# Patient Record
Sex: FEMALE | Race: WHITE | NOT HISPANIC OR LATINO | Employment: OTHER | URBAN - METROPOLITAN AREA
[De-identification: names, ages, dates, MRNs, and addresses within clinical notes are randomized per-mention and may not be internally consistent; named-entity substitution may affect disease eponyms.]

---

## 2017-01-04 ENCOUNTER — OFFICE VISIT (OUTPATIENT)
Dept: FAMILY MEDICINE | Facility: CLINIC | Age: 67
End: 2017-01-04
Payer: MEDICARE

## 2017-01-04 VITALS
HEART RATE: 90 BPM | TEMPERATURE: 98.6 F | DIASTOLIC BLOOD PRESSURE: 75 MMHG | HEIGHT: 60 IN | BODY MASS INDEX: 22.3 KG/M2 | OXYGEN SATURATION: 99 % | WEIGHT: 113.6 LBS | SYSTOLIC BLOOD PRESSURE: 115 MMHG

## 2017-01-04 DIAGNOSIS — E06.3 HYPOTHYROIDISM DUE TO HASHIMOTO'S THYROIDITIS: ICD-10-CM

## 2017-01-04 DIAGNOSIS — Z23 NEED FOR PNEUMOCOCCAL VACCINATION: ICD-10-CM

## 2017-01-04 DIAGNOSIS — Z00.00 ROUTINE GENERAL MEDICAL EXAMINATION AT A HEALTH CARE FACILITY: Primary | ICD-10-CM

## 2017-01-04 PROCEDURE — 36415 COLL VENOUS BLD VENIPUNCTURE: CPT | Performed by: NURSE PRACTITIONER

## 2017-01-04 PROCEDURE — 90732 PPSV23 VACC 2 YRS+ SUBQ/IM: CPT | Performed by: NURSE PRACTITIONER

## 2017-01-04 PROCEDURE — G0402 INITIAL PREVENTIVE EXAM: HCPCS | Performed by: NURSE PRACTITIONER

## 2017-01-04 PROCEDURE — 84443 ASSAY THYROID STIM HORMONE: CPT | Performed by: NURSE PRACTITIONER

## 2017-01-04 PROCEDURE — 84439 ASSAY OF FREE THYROXINE: CPT | Performed by: NURSE PRACTITIONER

## 2017-01-04 PROCEDURE — G0009 ADMIN PNEUMOCOCCAL VACCINE: HCPCS | Performed by: NURSE PRACTITIONER

## 2017-01-04 RX ORDER — LEVOTHYROXINE SODIUM 100 UG/1
TABLET ORAL
Qty: 45 TABLET | Refills: 3 | Status: SHIPPED | OUTPATIENT
Start: 2017-01-04 | End: 2018-01-05

## 2017-01-04 RX ORDER — LEVOTHYROXINE SODIUM 88 UG/1
TABLET ORAL
Qty: 45 TABLET | Refills: 3 | Status: SHIPPED | OUTPATIENT
Start: 2017-01-04 | End: 2018-01-05

## 2017-01-04 NOTE — MR AVS SNAPSHOT
After Visit Summary   1/4/2017    Julieta Rivera    MRN: [id number]           Patient Information     Date Of Birth          1950        Visit Information        Provider Department      1/4/2017 4:15 PM Simona Tucker APRN [facility]        Today's Diagnoses     Routine general medical examination at a health care facility    -  1     Hypothyroidism due to Hashimoto's thyroiditis         Need for pneumococcal vaccination           Care Instructions      Preventive Health Recommendations  Female Ages 65 +    Yearly exam:     See your health care provider every year in order to  o Review health changes.   o Discuss preventive care.    o Review your medicines if your doctor has prescribed any.      You no longer need a yearly Pap test unless you've had an abnormal Pap test in the past 10 years. If you have vaginal symptoms, such as bleeding or discharge, be sure to talk with your provider about a Pap test.      Every 1 to 2 years, have a mammogram.  If you are over 69, talk with your health care provider about whether or not you want to continue having screening mammograms.  Due by December 2017.      Every 10 years, have a colonoscopy - due 2019.  Or, have a yearly FIT test (stool test). These exams will check for colon cancer.       Have a cholesterol test every 5 years, or more often if your doctor advises it.       Have a diabetes test (fasting glucose) every three years. If you are at risk for diabetes, you should have this test more often.       At age 65, have a bone density scan (DEXA) to check for osteoporosis (brittle bone disease).    Shots:    Get a flu shot each year.    Get a tetanus shot every 10 years.    Talk to your doctor about your pneumonia vaccines. There are now two you should receive - Pneumovax (PPSV 23) and Prevnar (PCV 13).    Talk to your doctor about the shingles vaccine.    Talk to your doctor about the hepatitis B vaccine.    Nutrition:     Eat  at least 5 servings of fruits and vegetables each day.      Eat whole-grain bread, whole-wheat pasta and brown rice instead of white grains and rice.      Talk to your provider about Calcium and Vitamin D.     Lifestyle    Exercise at least 150 minutes a week (30 minutes a day, 5 days a week). This will help you control your weight and prevent disease.      Limit alcohol to one drink per day.      No smoking.       Wear sunscreen to prevent skin cancer.       See your dentist twice a year for an exam and cleaning.      See your eye doctor every 1 to 2 years to screen for conditions such as glaucoma, macular degeneration, cataracts, etc         Follow-ups after your visit        Who to contact     If you have questions or need follow up information about today's clinic visit or your schedule please contact [facility] directly at [phone].  Normal or non-critical lab and imaging results will be communicated to you by [employer], letter or phone within 4 business days after the clinic has received the results. If you do not hear from us within 7 days, please contact the clinic through [employer] or phone. If you have a critical or abnormal lab result, we will notify you by phone as soon as possible.  Submit refill requests through [employer] or call your pharmacy and they will forward the refill request to us. Please allow 3 business days for your refill to be completed.          Additional Information About Your Visit        [employer] Information     [employer] gives you secure access to your electronic health record. If you see a primary care provider, you can also send messages to your care team and make appointments. If you have questions, please call your primary care clinic.  If you do not have a primary care provider, please call [phone] and they will assist you.        Care EveryWhere ID     This is your Care EveryWhere ID. This could be used by other organizations to access your [facility]  records  [id number]        Your Vitals Were     Pulse Temperature Height BMI (Body Mass Index) Pulse Oximetry       90 98.6  F (37  C) (Oral) 5' (1.524 m) 22.19 kg/m2 99%        Blood Pressure from Last 3 Encounters:   01/04/17 115/75   03/18/16 110/76   12/15/15 105/72    Weight from Last 3 Encounters:   01/04/17 113 lb 9.6 oz (51.529 kg)   12/15/15 120 lb 14.4 oz (54.84 kg)   10/09/15 125 lb 6.4 oz (56.881 kg)              We Performed the Following     PNEUMOCOCCAL VACCINE,ADULT,SQ OR IM     T4 free     TSH          Today's Medication Changes          These changes are accurate as of: 1/4/17  5:03 PM.  If you have any questions, ask your nurse or doctor.               Stop taking these medicines if you haven't already. Please contact your care team if you have questions.     estradiol 0.1 MG/GM cream   Commonly known as:  ESTRACE VAGINAL   Stopped by:  Simona Tucker APRN CNP                Where to get your medicines      These medications were sent to [facility] [id number] [facility] [employer] - [place], [place] - [address]  [address] [address]     Phone:  [phone]    - levothyroxine 100 MCG tablet  - levothyroxine 88 MCG tablet             Primary Care Provider Office Phone # Fax #    Emperatriz Solomon [phone]       [facility] [address] [address]        Thank you!     Thank you for choosing [facility]  for your care. Our goal is always to provide you with excellent care. Hearing back from our patients is one way we can continue to improve our services. Please take a few minutes to complete the written survey that you may receive in the mail after your visit with us. Thank you!             Your Updated Medication List - Protect others around you: Learn how to safely use, store and throw away your medicines at www.disposemymeds.org.          This list is accurate as of: 1/4/17  5:03 PM.  Always use your most recent med  list.                   Brand Name Dispense Instructions for use    co-enzyme Q-10 100 MG Caps capsule      Take  by mouth daily.       FISH OIL PO      Take 1 capsule by mouth daily.       * levothyroxine 100 MCG tablet    SYNTHROID/LEVOTHROID    45 tablet    Take  by mouth See Admin Instructions. Alternate 0.10 mg tab with 0.088 mg tab every other day       * levothyroxine 88 MCG tablet    SYNTHROID/LEVOTHROID    45 tablet    Take  by mouth daily. Alternate 0.088 mg with 0.10 mg every other day       MULTIVITAMIN & MINERAL PO      Take 2 tablets by mouth daily.       VITAMIN D (CHOLECALCIFEROL) PO      Take 2,000 Units by mouth daily       * Notice:  This list has 2 medication(s) that are the same as other medications prescribed for you. Read the directions carefully, and ask your doctor or other care provider to review them with you.

## 2017-01-04 NOTE — PATIENT INSTRUCTIONS

## 2017-01-04 NOTE — PROGRESS NOTES
SUBJECTIVE:     CC: Julieta Rivera is an 66 year old woman who presents for preventive health visit.     Healthy Habits:    Answers for HPI/ROS submitted by the patient on 1/1/2017   Annual Exam:  Getting at least 3 servings of Calcium per day:: Yes  Bi-annual eye exam:: Yes  Dental care twice a year:: Yes  Sleep apnea or symptoms of sleep apnea:: None  Frequency of exercise:: 2-3 days/week  Duration of exercise:: 15-30 minutes  Taking medications regularly:: Yes  Medication side effects:: Other  Additional concerns today:: No  PHQ-2 Depressed: Not at all, Several days  PHQ-2 Score: 1    Questions/Concerns: None      Today's PHQ-2 Score:   PHQ-2 ( 1999 [employer]) 1/4/2017 1/1/2017   Q1: Little interest or pleasure in doing things 0 -   Q2: Feeling down, depressed or hopeless 0 -   PHQ-2 Score 0 -   Little interest or pleasure in doing things - Not at all   Feeling down, depressed or hopeless - Several days   PHQ-2 Score - 1       Abuse: Current or Past(Physical, Sexual or Emotional)- No  Do you feel safe in your environment - Yes    Social History   Substance Use Topics     Smoking status: Former Smoker     Quit date: 01/01/1971     Smokeless tobacco: Never Used     Alcohol Use: No      Comment: none since last november.     The patient does not drink >3 drinks per day nor >7 drinks per week.    Recent Labs   Lab Test  12/16/15   0828  11/22/13   0900  11/03/11   0833   CHOL  215*  233*  203*   HDL  83  92  75   LDL  114*  127  118   TRIG  89  67  49   CHOLHDLRATIO   --   2.5  2.7   NHDL  132*   --    --        Reviewed orders with patient.  Reviewed health maintenance and updated orders accordingly - Yes    Mammo Decision Support:  Patient over age 50, mutual decision to screen reflected in health maintenance.    Pertinent mammograms are reviewed under the imaging tab.  History of abnormal Pap smear: NO - age 65 - see link Cervical Cytology Screening Guidelines  All Histories reviewed and updated in Epic.  Past  Medical History   Diagnosis Date     Hypothyroidism 2000     Factor V Leiden (H)      Abnormal Pap smear 1970s     normal since      Past Surgical History   Procedure Laterality Date      section       Tonsillectomy, adenoidectomy, combined       Colposcopy cervix, biopsy cervix, endocervical curettage, combined         Twin grandsons are 14 months - Zach has long-gap esophageal atresia, came home at 6.5 months, had home nurse for a couple months, still many regular appointments and surgeries.  Isaac is other twin and healthy.  Julieta has been on long-term leave from job to help her daughter with the twins.  Feeling tired, but also reports feeling stronger.    ROS:  C: NEGATIVE for fever, chills, change in weight  I: NEGATIVE for worrisome rashes, moles or lesions  E: NEGATIVE for vision changes or irritation  ENT: NEGATIVE for ear, mouth and throat problems  R: NEGATIVE for significant cough or SOB  B: NEGATIVE for masses, tenderness or discharge  CV: NEGATIVE for chest pain, palpitations or peripheral edema  GI: NEGATIVE for nausea, abdominal pain, heartburn, or change in bowel habits  : NEGATIVE for unusual urinary or vaginal symptoms. No vaginal bleeding.  M: NEGATIVE for significant arthralgias or myalgia  N: NEGATIVE for weakness, dizziness or paresthesias  P: NEGATIVE for changes in mood or affect     Problem list, Medication list, Allergies, and Medical/Social/Surgical histories reviewed in [facility] and updated as appropriate.  OBJECTIVE:     /75 mmHg  Pulse 90  Temp(Src) 98.6  F (37  C) (Oral)  Ht 5' (1.524 m)  Wt 113 lb 9.6 oz (51.529 kg)  BMI 22.19 kg/m2  SpO2 99%  EXAM:  GENERAL APPEARANCE: healthy, alert and no distress  EYES: Eyes grossly normal to inspection, PERRL and conjunctivae and sclerae normal  HENT: ear canals and TM's normal, nose and mouth without ulcers or lesions, oropharynx clear and oral mucous membranes moist  NECK: no adenopathy, no asymmetry, masses,  or scars and thyroid normal to palpation  RESP: lungs clear to auscultation - no rales, rhonchi or wheezes  BREAST: normal without masses, tenderness or nipple discharge and no palpable axillary masses or adenopathy  CV: regular rate and rhythm, normal S1 S2, no S3 or S4, no murmur, click or rub, no peripheral edema and peripheral pulses strong  ABDOMEN: soft, nontender, no hepatosplenomegaly, no masses and bowel sounds normal  MS: no musculoskeletal defects are noted and gait is age appropriate without ataxia  SKIN: no suspicious lesions or rashes  NEURO: Normal strength and tone, sensory exam grossly normal, mentation intact and speech normal  PSYCH: mentation appears normal and affect normal/bright    ASSESSMENT/PLAN:     (Z00.00) Routine general medical examination at a health care facility  (primary encounter diagnosis)  Comment:   Plan:     (E03.8,  E06.3) Hypothyroidism due to Hashimoto's thyroiditis  Comment:   Plan: TSH, T4 free, levothyroxine         (SYNTHROID/LEVOTHROID) 100 MCG tablet,         levothyroxine (SYNTHROID/LEVOTHROID) 88 MCG         tablet            (Z23) Need for pneumococcal vaccination  Comment:   Plan: PNEUMOCOCCAL VACCINE,ADULT,SQ OR IM             COUNSELING:   Reviewed preventive health counseling, as reflected in patient instructions       reports that she quit smoking about 46 years ago. She has never used smokeless tobacco.    Estimated body mass index is 22.19 kg/(m^2) as calculated from the following:    Height as of this encounter: 5' (1.524 m).    Weight as of this encounter: 113 lb 9.6 oz (51.529 kg).       Counseling Resources:  ATP IV Guidelines  Pooled Cohorts Equation Calculator  Breast Cancer Risk Calculator  FRAX Risk Assessment  ICSI Preventive Guidelines  Dietary Guidelines for Americans, 2010  USDA's MyPlate  ASA Prophylaxis  Lung CA Screening    Simona Tucker, JOSÉ LUIS [facility]

## 2017-01-04 NOTE — NURSING NOTE
Chief Complaint   Patient presents with     Physical     Thyroid Disease       Initial /75 mmHg  Pulse 90  Temp(Src) 98.6  F (37  C) (Oral)  Ht 5' (1.524 m)  Wt 113 lb 9.6 oz (51.529 kg)  BMI 22.19 kg/m2  SpO2 99% Estimated body mass index is 22.19 kg/(m^2) as calculated from the following:    Height as of this encounter: 5' (1.524 m).    Weight as of this encounter: 113 lb 9.6 oz (51.529 kg).  BP completed using cuff size: bry Long MA

## 2017-01-06 LAB
T4 FREE SERPL-MCNC: 1.27 NG/DL (ref 0.76–1.46)
TSH SERPL DL<=0.05 MIU/L-ACNC: 0.67 MU/L (ref 0.4–4)

## 2017-01-06 NOTE — PROGRESS NOTES
Quick Note:    Julieta,    It was so nice to see you this week. I am very impressed with your energy and dedication to your family. They are so lamont to have you! Your labs were all normal. If you have any questions, please feel free to contact the clinic.    FRED Lewis  ______

## 2018-01-04 NOTE — PROGRESS NOTES
SUBJECTIVE:   Julieta Rivera is a 67 year old female who presents for Preventive Visit.  Are you in the first 12 months of your Medicare Part B coverage?  No    Healthy Habits:    Do you get at least three servings of calcium containing foods daily (dairy, green leafy vegetables, etc.)? yes    Amount of exercise or daily activities, outside of work: 7 day(s) per week    Problems taking medications regularly No    Medication side effects: No    Have you had an eye exam in the past two years? no    Do you see a dentist twice per year? no    Do you have sleep apnea, excessive snoring or daytime drowsiness?no      Ability to successfully perform activities of daily living: Yes, no assistance needed    Home safety:  none identified     Hearing impairment: No    Fall risk:  Fallen 2 or more times in the past year?: No  Any fall with injury in the past year?: No    COGNITIVE SCREEN  1) Repeat 3 items (Banana, Sunrise, Chair)    2) Clock draw: NORMAL  3) 3 item recall: Recalls 3 objects  Results: 3 items recalled: COGNITIVE IMPAIRMENT LESS LIKELY    Mini-CogTM Copyright S Jake. Licensed by the author for use in [facility]; reprinted with permission ([email or web address]@[email or web address]). All rights reserved.        Hypothyroidism Follow-up      Since last visit, patient describes the following symptoms: Weight stable, no hair loss, no skin changes, no constipation, no loose stools; fatigue    Reviewed and updated as needed this visit by clinical staff  Tobacco  Allergies  Meds  Med Hx  Surg Hx  Fam Hx  Soc Hx        Reviewed and updated as needed this visit by Provider        Social History   Substance Use Topics     Smoking status: Former Smoker     Quit date: 1/1/1971     Smokeless tobacco: Never Used     Alcohol use No      Comment: none since last november.       If you drink alcohol do you typically have >3 drinks per day or >7 drinks per week? No                        Today's PHQ-2 Score:   PHQ-2 ( 1999 Pfizer)  1/5/2018 1/4/2017   Q1: Little interest or pleasure in doing things 0 0   Q2: Feeling down, depressed or hopeless 0 0   PHQ-2 Score 0 0   Q1: Little interest or pleasure in doing things - -   Q2: Feeling down, depressed or hopeless - -   PHQ-2 Score - -       Do you feel safe in your environment - Yes    Do you have a Health Care Directive?: No: Advance care planning reviewed with patient; information given to patient to review.      Current providers sharing in care for this patient include: Patient Care Team:  Emperatriz Solomon MD as PCP - General (Family Practice)    The following health maintenance items are reviewed in Epic and correct as of today:  Health Maintenance   Topic Date Due     HEPATITIS C SCREENING  11/20/1967     ADVANCE DIRECTIVE PLANNING Q5 YRS  11/20/2004     DEXA SCAN SCREENING (SYSTEM ASSIGNED)  11/20/2014     FALL RISK ASSESSMENT  10/09/2016     INFLUENZA VACCINE (SYSTEM ASSIGNED)  09/01/2017     MAMMO SCREEN Q2 YR (SYSTEM ASSIGNED)  12/15/2017     COLON CANCER SCREEN (SYSTEM ASSIGNED)  10/06/2019     LIPID SCREEN Q5 YR FEMALE (SYSTEM ASSIGNED)  12/16/2020     TETANUS IMMUNIZATION (SYSTEM ASSIGNED)  12/15/2025     PNEUMOCOCCAL  Completed        Pneumonia Vaccine:UTD  Mammogram Screening: Patient over age 50, mutual decision to screen reflected in health maintenance.    History of abnormal Pap smear: NO - age 65 - see link Cervical Cytology Screening Guidelines  Grandkid twins turned 2 in November, corrected age January.  One twin still on J feeds, but eating well.  Getting up at night to take care of feedings.  Just bought a house last week in [place].    ROS:  C: NEGATIVE for fever, chills, change in weight  I: NEGATIVE for worrisome rashes, moles or lesions  E: NEGATIVE for vision changes or irritation  E/M: NEGATIVE for ear, mouth and throat problems  R: NEGATIVE for significant cough or SOB  B: NEGATIVE for masses, tenderness or discharge  CV: NEGATIVE for chest pain, palpitations  "or peripheral edema  GI: NEGATIVE for nausea, abdominal pain, heartburn, or change in bowel habits; loose stools recently  : NEGATIVE for frequency, dysuria, or hematuria  M: NEGATIVE for significant arthralgias or myalgia  N: NEGATIVE for weakness, dizziness or paresthesias  E: NEGATIVE for temperature intolerance, skin/hair changes  H: NEGATIVE for bleeding problems  P: NEGATIVE for changes in mood or affect    OBJECTIVE:   /72  Pulse 85  Temp 98.1  F (36.7  C) (Oral)  Ht 5' 0.43\" (1.535 m)  Wt 124 lb 1.6 oz (56.3 kg)  SpO2 97%  BMI 23.89 kg/m2 Estimated body mass index is 23.89 kg/(m^2) as calculated from the following:    Height as of this encounter: 5' 0.43\" (1.535 m).    Weight as of this encounter: 124 lb 1.6 oz (56.3 kg).  EXAM:   GENERAL: healthy, alert and no distress  EYES: Eyes grossly normal to inspection, PERRL and conjunctivae and sclerae normal  HENT: ear canals and TM's normal, nose and mouth without ulcers or lesions  NECK: no adenopathy, no asymmetry, masses, or scars and thyroid normal to palpation  RESP: lungs clear to auscultation - no rales, rhonchi or wheezes  BREAST: normal without masses, tenderness or nipple discharge and no palpable axillary masses or adenopathy  CV: regular rate and rhythm, normal S1 S2, no S3 or S4, no murmur, click or rub, no peripheral edema and peripheral pulses strong  ABDOMEN: soft, nontender, no hepatosplenomegaly, no masses and bowel sounds normal  MS: no gross musculoskeletal defects noted, no edema  SKIN: no suspicious lesions or rashes  NEURO: Normal strength and tone, mentation intact and speech normal  PSYCH: mentation appears normal, affect normal/bright  LYMPH: no cervical, supraclavicular, axillary adenopathy    ASSESSMENT / PLAN:   (Z00.00) Medicare annual wellness visit, subsequent  (primary encounter diagnosis)  Comment:   Plan:     (E03.8,  E06.3) Hypothyroidism due to Hashimoto's thyroiditis  Comment:   Plan: TSH, T4 free, levothyroxine " "        (SYNTHROID/LEVOTHROID) 100 MCG tablet,         levothyroxine (SYNTHROID/LEVOTHROID) 88 MCG         tablet   Levothyroxine company changed three months ago.  May affect lab values    (Z12.11) Special screening for malignant neoplasms, colon  Comment:   Plan: GASTROENTEROLOGY ADULT REF PROCEDURE ONLY,          Had been recommended 1 year follow-up in 2009    (Z11.59) Need for hepatitis C screening test  Comment:   Plan:   Hepatitis C antibody    End of Life Planning:  Patient currently has an advanced directive: No.  I have verified the patient's ablity to prepare an advanced directive/make health care decisions.  Literature was provided to assist patient in preparing an advanced directive.    COUNSELING:  Reviewed preventive health counseling, as reflected in patient instructions    Estimated body mass index is 23.89 kg/(m^2) as calculated from the following:    Height as of this encounter: 5' 0.43\" (1.535 m).    Weight as of this encounter: 124 lb 1.6 oz (56.3 kg).       reports that she quit smoking about 47 years ago. She has never used smokeless tobacco.        Appropriate preventive services were discussed with this patient, including applicable screening as appropriate for cardiovascular disease, diabetes, osteopenia/osteoporosis, and glaucoma.  As appropriate for age/gender, discussed screening for colorectal cancer, prostate cancer, breast cancer, and cervical cancer. Checklist reviewing preventive services available has been given to the patient.    Reviewed patients plan of care and provided an AVS. The Basic Care Plan (routine screening as documented in Health Maintenance) for Julieta meets the Care Plan requirement. This Care Plan has been established and reviewed with the Patient.    Counseling Resources:  ATP IV Guidelines  Pooled Cohorts Equation Calculator  Breast Cancer Risk Calculator  FRAX Risk Assessment  ICSI Preventive Guidelines  Dietary Guidelines for Americans, 2010  USDA's MyPlate  ASA " Prophylaxis  Lung CA Screening    JOSÉ LUIS [facility]

## 2018-01-04 NOTE — PATIENT INSTRUCTIONS
"Schedule colonoscopy    .[email or web address]     Or find the site by going to www.[facility].org and searching \"Honoring Choices\".      The forms are all on that page, but I highly recommend that you sign up for one of the classes. They're really good and the questions on the forms sound like mumbo jumbo to anyone who doesn't work in health care.         Preventive Health Recommendations    Female Ages 65 +    Yearly exam:     See your health care provider every year in order to  o Review health changes.   o Discuss preventive care.    o Review your medicines if your doctor has prescribed any.      You no longer need a yearly Pap test unless you've had an abnormal Pap test in the past 10 years. If you have vaginal symptoms, such as bleeding or discharge, be sure to talk with your provider about a Pap test.      Every 1 to 2 years, have a mammogram.  If you are over 69, talk with your health care provider about whether or not you want to continue having screening mammograms.      Every 10 years, have a colonoscopy. Or, have a yearly FIT test (stool test). These exams will check for colon cancer.       Have a cholesterol test every 5 years, or more often if your doctor advises it.       Have a diabetes test (fasting glucose) every three years. If you are at risk for diabetes, you should have this test more often.       At age 65, have a bone density scan (DEXA) to check for osteoporosis (brittle bone disease).    Shots:    Get a flu shot each year.    Get a tetanus shot every 10 years.    Talk to your doctor about your pneumonia vaccines. There are now two you should receive - Pneumovax (PPSV 23) and Prevnar (PCV 13).    Talk to your doctor about the shingles vaccine.    Talk to your doctor about the hepatitis B vaccine.    Nutrition:     Eat at least 5 servings of fruits and vegetables each day.      Eat whole-grain bread, whole-wheat pasta and " brown rice instead of white grains and rice.      Talk to your provider about Calcium and Vitamin D.     Lifestyle    Exercise at least 150 minutes a week (30 minutes a day, 5 days a week). This will help you control your weight and prevent disease.      Limit alcohol to one drink per day.      No smoking.       Wear sunscreen to prevent skin cancer.       See your dentist twice a year for an exam and cleaning.      See your eye doctor every 1 to 2 years to screen for conditions such as glaucoma, macular degeneration and cataracts.

## 2018-01-05 ENCOUNTER — OFFICE VISIT (OUTPATIENT)
Dept: FAMILY MEDICINE | Facility: CLINIC | Age: 68
End: 2018-01-05
Payer: COMMERCIAL

## 2018-01-05 VITALS
BODY MASS INDEX: 24.36 KG/M2 | OXYGEN SATURATION: 97 % | TEMPERATURE: 98.1 F | WEIGHT: 124.1 LBS | HEART RATE: 85 BPM | DIASTOLIC BLOOD PRESSURE: 72 MMHG | HEIGHT: 60 IN | SYSTOLIC BLOOD PRESSURE: 114 MMHG

## 2018-01-05 DIAGNOSIS — E06.3 HYPOTHYROIDISM DUE TO HASHIMOTO'S THYROIDITIS: ICD-10-CM

## 2018-01-05 DIAGNOSIS — Z11.59 NEED FOR HEPATITIS C SCREENING TEST: ICD-10-CM

## 2018-01-05 DIAGNOSIS — Z00.00 MEDICARE ANNUAL WELLNESS VISIT, SUBSEQUENT: Primary | ICD-10-CM

## 2018-01-05 DIAGNOSIS — Z23 NEED FOR PROPHYLACTIC VACCINATION AND INOCULATION AGAINST INFLUENZA: ICD-10-CM

## 2018-01-05 DIAGNOSIS — Z12.11 SPECIAL SCREENING FOR MALIGNANT NEOPLASMS, COLON: ICD-10-CM

## 2018-01-05 LAB
T4 FREE SERPL-MCNC: 1.4 NG/DL (ref 0.76–1.46)
TSH SERPL DL<=0.005 MIU/L-ACNC: 5.23 MU/L (ref 0.4–4)

## 2018-01-05 PROCEDURE — G0008 ADMIN INFLUENZA VIRUS VAC: HCPCS | Performed by: NURSE PRACTITIONER

## 2018-01-05 PROCEDURE — 84439 ASSAY OF FREE THYROXINE: CPT | Performed by: NURSE PRACTITIONER

## 2018-01-05 PROCEDURE — 86803 HEPATITIS C AB TEST: CPT | Performed by: NURSE PRACTITIONER

## 2018-01-05 PROCEDURE — G0438 PPPS, INITIAL VISIT: HCPCS | Performed by: NURSE PRACTITIONER

## 2018-01-05 PROCEDURE — 84443 ASSAY THYROID STIM HORMONE: CPT | Performed by: NURSE PRACTITIONER

## 2018-01-05 PROCEDURE — 90662 IIV NO PRSV INCREASED AG IM: CPT | Performed by: NURSE PRACTITIONER

## 2018-01-05 PROCEDURE — 36415 COLL VENOUS BLD VENIPUNCTURE: CPT | Performed by: NURSE PRACTITIONER

## 2018-01-05 RX ORDER — LEVOTHYROXINE SODIUM 100 UG/1
TABLET ORAL
Qty: 45 TABLET | Refills: 3 | Status: SHIPPED | OUTPATIENT
Start: 2018-01-05 | End: 2018-04-06

## 2018-01-05 RX ORDER — LEVOTHYROXINE SODIUM 88 UG/1
TABLET ORAL
Qty: 45 TABLET | Refills: 3 | Status: SHIPPED | OUTPATIENT
Start: 2018-01-05 | End: 2018-04-19

## 2018-01-05 NOTE — PROGRESS NOTES

## 2018-01-05 NOTE — NURSING NOTE
"Chief Complaint   Patient presents with     Physical       Initial /72  Pulse 85  Temp 98.1  F (36.7  C) (Oral)  Ht 5' 0.43\" (1.535 m)  Wt 124 lb 1.6 oz (56.3 kg)  SpO2 97%  BMI 23.89 kg/m2 Estimated body mass index is 23.89 kg/(m^2) as calculated from the following:    Height as of this encounter: 5' 0.43\" (1.535 m).    Weight as of this encounter: 124 lb 1.6 oz (56.3 kg).  Medication Reconciliation: complete       Erwin Long MA      "

## 2018-01-05 NOTE — MR AVS SNAPSHOT
"              After Visit Summary   1/5/2018    Julieta Rivera    MRN: [id number]           Patient Information     Date Of Birth          1950        Visit Information        Provider Department      1/5/2018 10:30 AM Simona Tucker APRN CNP [facility]        Today's Diagnoses     Medicare annual wellness visit, subsequent    -  1    Hypothyroidism due to Hashimoto's thyroiditis        Special screening for malignant neoplasms, colon        Need for hepatitis C screening test          Care Instructions    Schedule colonoscopy    [email or web address]     Or find the site by going to [email or web address] and searching \"Honoring Choices\".      The forms are all on that page, but I highly recommend that you sign up for one of the classes. They're really good and the questions on the forms sound like mumbo jumbo to anyone who doesn't work in health care.         Preventive Health Recommendations    Female Ages 65 +    Yearly exam:     See your health care provider every year in order to  o Review health changes.   o Discuss preventive care.    o Review your medicines if your doctor has prescribed any.      You no longer need a yearly Pap test unless you've had an abnormal Pap test in the past 10 years. If you have vaginal symptoms, such as bleeding or discharge, be sure to talk with your provider about a Pap test.      Every 1 to 2 years, have a mammogram.  If you are over 69, talk with your health care provider about whether or not you want to continue having screening mammograms.      Every 10 years, have a colonoscopy. Or, have a yearly FIT test (stool test). These exams will check for colon cancer.       Have a cholesterol test every 5 years, or more often if your doctor advises it.       Have a diabetes test (fasting glucose) every three years. If you are at risk for diabetes, you should have this test more often.       At " age 65, have a bone density scan (DEXA) to check for osteoporosis (brittle bone disease).    Shots:    Get a flu shot each year.    Get a tetanus shot every 10 years.    Talk to your doctor about your pneumonia vaccines. There are now two you should receive - Pneumovax (PPSV 23) and Prevnar (PCV 13).    Talk to your doctor about the shingles vaccine.    Talk to your doctor about the hepatitis B vaccine.    Nutrition:     Eat at least 5 servings of fruits and vegetables each day.      Eat whole-grain bread, whole-wheat pasta and brown rice instead of white grains and rice.      Talk to your provider about Calcium and Vitamin D.     Lifestyle    Exercise at least 150 minutes a week (30 minutes a day, 5 days a week). This will help you control your weight and prevent disease.      Limit alcohol to one drink per day.      No smoking.       Wear sunscreen to prevent skin cancer.       See your dentist twice a year for an exam and cleaning.      See your eye doctor every 1 to 2 years to screen for conditions such as glaucoma, macular degeneration and cataracts.          Follow-ups after your visit        Additional Services     GASTROENTEROLOGY ADULT REF PROCEDURE ONLY       Last Lab Result: Creatinine (mg/dL)       Date                     Value                 11/22/2013               0.69             ----------  Body mass index is 23.89 kg/(m^2).      Patient will be contacted to schedule procedure.     Please be aware that coverage of these services is subject to the terms and limitations of your health insurance plan.  Call member services at your health plan with any benefit or coverage questions.  Any procedures must be performed at a [facility] facility OR coordinated by your clinic's referral office.    Please bring the following with you to your appointment:    (1) Any X-Rays, CTs or MRIs which have been performed.  Contact the facility where they were done to arrange for  prior to your scheduled  "appointment.    (2) List of current medications   (3) This referral request   (4) Any documents/labs given to you for this referral                  Who to contact     If you have questions or need follow up information about today's clinic visit or your schedule please contact [facility] directly at [phone].  Normal or non-critical lab and imaging results will be communicated to you by MyChart, letter or phone within 4 business days after the clinic has received the results. If you do not hear from us within 7 days, please contact the clinic through [employer] or phone. If you have a critical or abnormal lab result, we will notify you by phone as soon as possible.  Submit refill requests through [employer] or call your pharmacy and they will forward the refill request to us. Please allow 3 business days for your refill to be completed.          Additional Information About Your Visit        MyChart Information     [employer] gives you secure access to your electronic health record. If you see a primary care provider, you can also send messages to your care team and make appointments. If you have questions, please call your primary care clinic.  If you do not have a primary care provider, please call [phone] and they will assist you.        Care EveryWhere ID     This is your Care EveryWhere ID. This could be used by other organizations to access your [place] medical records  [id number]        Your Vitals Were     Pulse Temperature Height Pulse Oximetry BMI (Body Mass Index)       85 98.1  F (36.7  C) (Oral) 5' 0.43\" (1.535 m) 97% 23.89 kg/m2        Blood Pressure from Last 3 Encounters:   01/05/18 114/72   01/04/17 115/75   03/18/16 110/76    Weight from Last 3 Encounters:   01/05/18 124 lb 1.6 oz (56.3 kg)   01/04/17 113 lb 9.6 oz (51.5 kg)   12/15/15 120 lb 14.4 oz (54.8 kg)              We Performed the Following     GASTROENTEROLOGY ADULT REF PROCEDURE ONLY     Hepatitis C antibody     T4 " free     TSH          Where to get your medicines      These medications were sent to [id number] - [place], [place] - [address]  [address] [address]     Phone:  [phone]     levothyroxine 100 MCG tablet    levothyroxine 88 MCG tablet          Primary Care Provider Office Phone # Fax #    Emperatriz Solomon [phone]       [facility] [address] [address]        Equal Access to Services     CARTER DEL RIO : Hadii aad ku hadasho Soomaali, waaxda luqadaha, qaybta kaalmada adeegyada, waxay idiin hayaan adeeg khstephen hdez . So [facility] [phone].    ATENCIÓN: Si habla español, tiene a phillips disposición servicios gratuitos de asistencia lingüística. [facility] [phone].    We comply with applicable federal civil rights laws and [place] laws. We do not discriminate on the basis of race, color, national origin, age, disability, sex, sexual orientation, or gender identity.            Thank you!     Thank you for choosing [facility]  for your care. Our goal is always to provide you with excellent care. Hearing back from our patients is one way we can continue to improve our services. Please take a few minutes to complete the written survey that you may receive in the mail after your visit with us. Thank you!             Your Updated Medication List - Protect others around you: Learn how to safely use, store and throw away your medicines at www.disposemymeds.org.          This list is accurate as of: 1/5/18 11:37 AM.  Always use your most recent med list.                   Brand Name Dispense Instructions for use Diagnosis    co-enzyme Q-10 100 MG Caps capsule      Take  by mouth daily.        FISH OIL PO      Take 1 capsule by mouth daily.        * levothyroxine 100 MCG tablet    SYNTHROID/LEVOTHROID    45 tablet    Take  by mouth See Admin Instructions. Alternate 0.10 mg tab with 0.088 mg tab every other day     Hypothyroidism due to Hashimoto's thyroiditis       * levothyroxine 88 MCG tablet    SYNTHROID/LEVOTHROID    45 tablet    Take  by mouth daily. Alternate 0.088 mg with 0.10 mg every other day    Hypothyroidism due to Hashimoto's thyroiditis       MULTIVITAMIN & MINERAL PO      Take 2 tablets by mouth daily.        VITAMIN D (CHOLECALCIFEROL) PO      Take 2,000 Units by mouth daily        * Notice:  This list has 2 medication(s) that are the same as other medications prescribed for you. Read the directions carefully, and ask your doctor or other care provider to review them with you.

## 2018-01-08 ENCOUNTER — MYC MEDICAL ADVICE (OUTPATIENT)
Dept: FAMILY MEDICINE | Facility: CLINIC | Age: 68
End: 2018-01-08

## 2018-01-08 DIAGNOSIS — E06.3 HYPOTHYROIDISM DUE TO HASHIMOTO'S THYROIDITIS: Primary | ICD-10-CM

## 2018-01-08 LAB — HCV AB SERPL QL IA: NONREACTIVE

## 2018-01-08 NOTE — TELEPHONE ENCOUNTER
Ava,     Patient sent a [employer] message about her thyroid medication/dosage.     TSH   Date Value Ref Range Status   01/05/2018 5.23 (H) 0.40 - 4.00 mU/L Final     T4 Free   Date Value Ref Range Status   01/05/2018 1.40 0.76 - 1.46 ng/dL Final     Vikki Carr RN  [facility]

## 2018-01-10 NOTE — TELEPHONE ENCOUNTER
Routing to provider - Garrett - please review and advise as appropriate  1. Please review message   2. From writer's understanding patient is planning on using alternative 88 and 100 mcg dose of generic Mylan and will recheck labs in 6-8 weeks?    Thank you,  Temo Gill RN

## 2018-01-10 NOTE — TELEPHONE ENCOUNTER
Component      Latest Ref Rng & Units 1/30/2012 11/1/2012 10/24/2013 1/23/2014   TSH      0.40 - 4.00 mU/L 2.75 1.89 3.06 2.82   T4 Free      0.76 - 1.46 ng/dL 1.44 1.35 1.44 1.41     Component      Latest Ref Rng & Units 11/10/2014 10/9/2015 1/4/2017 1/5/2018   TSH      0.40 - 4.00 mU/L 1.35 1.38 0.67 5.23 (H)   T4 Free      0.76 - 1.46 ng/dL 1.34 1.23 1.27 1.40     Spoke with the patient.  She was on the same Mylan dose for many years before this past October.  Will return to that  instead of changing doses.  Recheck lab-only in 6-10 weeks, but schedule OV if wanting to make dose changes or having symptoms.  FRED Lewis

## 2018-01-25 ENCOUNTER — RADIANT APPOINTMENT (OUTPATIENT)
Dept: MAMMOGRAPHY | Facility: CLINIC | Age: 68
End: 2018-01-25
Payer: COMMERCIAL

## 2018-01-25 DIAGNOSIS — Z12.31 VISIT FOR SCREENING MAMMOGRAM: ICD-10-CM

## 2018-01-25 PROCEDURE — 77067 SCR MAMMO BI INCL CAD: CPT

## 2018-01-29 ENCOUNTER — TELEPHONE (OUTPATIENT)
Dept: FAMILY MEDICINE | Facility: CLINIC | Age: 68
End: 2018-01-29

## 2018-02-02 ENCOUNTER — RADIANT APPOINTMENT (OUTPATIENT)
Dept: MAMMOGRAPHY | Facility: CLINIC | Age: 68
End: 2018-02-02
Attending: NURSE PRACTITIONER
Payer: COMMERCIAL

## 2018-02-02 DIAGNOSIS — R92.8 ABNORMAL MAMMOGRAM OF RIGHT BREAST: ICD-10-CM

## 2018-02-12 ENCOUNTER — RADIANT APPOINTMENT (OUTPATIENT)
Dept: MAMMOGRAPHY | Facility: CLINIC | Age: 68
End: 2018-02-12
Attending: NURSE PRACTITIONER
Payer: COMMERCIAL

## 2018-02-12 DIAGNOSIS — R92.8 ABNORMAL MAMMOGRAM OF RIGHT BREAST: ICD-10-CM

## 2018-02-12 RX ORDER — IOPAMIDOL 612 MG/ML
100 INJECTION, SOLUTION INTRAVASCULAR ONCE
Status: COMPLETED | OUTPATIENT
Start: 2018-02-12 | End: 2018-02-12

## 2018-02-12 RX ORDER — LIDOCAINE HYDROCHLORIDE 10 MG/ML
10 INJECTION, SOLUTION EPIDURAL; INFILTRATION; INTRACAUDAL; PERINEURAL ONCE
Status: COMPLETED | OUTPATIENT
Start: 2018-02-12 | End: 2018-02-12

## 2018-02-12 RX ADMIN — LIDOCAINE HYDROCHLORIDE 100 MG: 10 INJECTION, SOLUTION EPIDURAL; INFILTRATION; INTRACAUDAL; PERINEURAL at 10:40

## 2018-02-12 RX ADMIN — IOPAMIDOL 80 ML: 612 INJECTION, SOLUTION INTRAVASCULAR at 10:02

## 2018-02-13 LAB — COPATH REPORT: NORMAL

## 2018-02-14 ENCOUNTER — TELEPHONE (OUTPATIENT)
Dept: MAMMOGRAPHY | Facility: CLINIC | Age: 68
End: 2018-02-14

## 2018-02-14 ENCOUNTER — TELEPHONE (OUTPATIENT)
Dept: FAMILY MEDICINE | Facility: CLINIC | Age: 68
End: 2018-02-14

## 2018-02-14 ENCOUNTER — MYC MEDICAL ADVICE (OUTPATIENT)
Dept: FAMILY MEDICINE | Facility: CLINIC | Age: 68
End: 2018-02-14

## 2018-02-14 DIAGNOSIS — C50.911 INVASIVE DUCTAL CARCINOMA OF RIGHT BREAST (H): Primary | ICD-10-CM

## 2018-02-14 NOTE — TELEPHONE ENCOUNTER
Spoke to Julieta this afternoon regarding her new diagnosis of Invasive Mammary/Ductal Carcinoma found during her biopsy earlier this week.  We discussed the Radiologist's recommendation of surgical and oncological follow up.  She is going to start off with meeting Dr. Jonathan Gay on [facility], [facility]nd [facility] to discuss oncological treatment.  Julieta verbalized understanding and all questions and concerns were answered at this time..

## 2018-02-14 NOTE — TELEPHONE ENCOUNTER
Phone call to patient to check in regarding recent breast cancer diagnosis.  There was no answer and phone does not go to voicemail.  Sent [employer] message FRED Lewis

## 2018-02-15 LAB — COPATH REPORT: NORMAL

## 2018-02-21 ASSESSMENT — ENCOUNTER SYMPTOMS
BACK PAIN: 0
ARTHRALGIAS: 1
MUSCLE CRAMPS: 1
NECK MASS: 0
SORE THROAT: 0
BREAST PAIN: 1
SINUS CONGESTION: 1
SINUS PAIN: 0
MYALGIAS: 0
JOINT SWELLING: 1
ORTHOPNEA: 0
HYPERTENSION: 0
LIGHT-HEADEDNESS: 0
EXERCISE INTOLERANCE: 0
MUSCLE WEAKNESS: 0
BREAST MASS: 0
SLEEP DISTURBANCES DUE TO BREATHING: 0
NECK PAIN: 1
HOARSE VOICE: 0
PALPITATIONS: 1
TROUBLE SWALLOWING: 1
SYNCOPE: 0
SMELL DISTURBANCE: 0
HYPOTENSION: 0
LEG PAIN: 0
TASTE DISTURBANCE: 0
STIFFNESS: 1

## 2018-02-22 ENCOUNTER — ONCOLOGY VISIT (OUTPATIENT)
Dept: ONCOLOGY | Facility: CLINIC | Age: 68
End: 2018-02-22
Attending: INTERNAL MEDICINE
Payer: COMMERCIAL

## 2018-02-22 ENCOUNTER — ALLIED HEALTH/NURSE VISIT (OUTPATIENT)
Dept: ONCOLOGY | Facility: CLINIC | Age: 68
End: 2018-02-22

## 2018-02-22 ENCOUNTER — CARE COORDINATION (OUTPATIENT)
Dept: ONCOLOGY | Facility: CLINIC | Age: 68
End: 2018-02-22

## 2018-02-22 ENCOUNTER — CARE COORDINATION (OUTPATIENT)
Dept: CARE COORDINATION | Facility: CLINIC | Age: 68
End: 2018-02-22

## 2018-02-22 VITALS
DIASTOLIC BLOOD PRESSURE: 71 MMHG | WEIGHT: 124.7 LBS | TEMPERATURE: 97.3 F | HEART RATE: 63 BPM | BODY MASS INDEX: 24.48 KG/M2 | HEIGHT: 60 IN | OXYGEN SATURATION: 98 % | SYSTOLIC BLOOD PRESSURE: 119 MMHG

## 2018-02-22 DIAGNOSIS — C50.411 MALIGNANT NEOPLASM OF UPPER-OUTER QUADRANT OF RIGHT BREAST IN FEMALE, ESTROGEN RECEPTOR NEGATIVE (H): Primary | ICD-10-CM

## 2018-02-22 DIAGNOSIS — E03.9 HYPOTHYROIDISM, UNSPECIFIED TYPE: ICD-10-CM

## 2018-02-22 DIAGNOSIS — Z71.9 VISIT FOR COUNSELING: Primary | ICD-10-CM

## 2018-02-22 DIAGNOSIS — Z51.11 ENCOUNTER FOR ANTINEOPLASTIC CHEMOTHERAPY: ICD-10-CM

## 2018-02-22 DIAGNOSIS — Z17.1 MALIGNANT NEOPLASM OF UPPER-OUTER QUADRANT OF RIGHT BREAST IN FEMALE, ESTROGEN RECEPTOR NEGATIVE (H): Primary | ICD-10-CM

## 2018-02-22 DIAGNOSIS — D68.9 CLOTTING DISORDER (H): ICD-10-CM

## 2018-02-22 PROCEDURE — G0463 HOSPITAL OUTPT CLINIC VISIT: HCPCS | Mod: ZF

## 2018-02-22 PROCEDURE — 99205 OFFICE O/P NEW HI 60 MIN: CPT | Mod: ZP | Performed by: INTERNAL MEDICINE

## 2018-02-22 ASSESSMENT — PAIN SCALES - GENERAL: PAINLEVEL: NO PAIN (0)

## 2018-02-22 NOTE — PROGRESS NOTES
Social Work Follow-Up Encounter Visit  Oncology Clinic    Data/Intervention:  Patient Name:  Julieta Rivera  /Age:  1950 (67 year old)    Reason for Follow-Up:  Social work was asked by RNCC to meet with patient to assist with resources for the home.    Collaborated With:    -RNCC  -Patient   -Patient's sister in law    Social work met with patient and sister in law in exam room.  Patient stated she lives at home with her daughter and two twin grandsons.  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  Patient expressed great concerns about the availability of help in the home for her medically fragile grandson, extensively describing his care needs and her role as his primary nighttime caregiver.  She also stated that the family is in the process of moving homes.  She expressed a need for additional financial assistance for their childcare needs as well as support in the home. SW was able to briefly discuss limited ability to get additional assistance in the home for patient's grandson but recommended family go through the child's pediatrician office or the [facility] to discuss getting additional support services in the home.  SW also discussed availability of financial riky programs including [employer] and [employer] which would be able to off-set household expenses for private funds to be used for other needs.  Patient did not indicated interest in applying for these programs at this time.  SW gave education on [employer] and patient requested referral be completed.  Patient also given information about [employer] programs and support for children/granchildren of adults with cancer.  [employer] provided at this visit.    Resources Provided:  OA referral completed  [employer] given  AF financial info  Cancer Legal Line  Senior Linkage Line  [employer]    Assessment:  Patient  "appeared very anxious and was frequently difficult to redirect in conversation.  She rarely spoke about her cancer diagnosis simply stating \"we didn't need this right now.\" Patient primarily discussed her anxiety surrounding the caregiving needs of her grandsons and conversation was focused on her desire to \"get help for them.\"     Plan:  SW provided patient/family with writer's contact information and availability.    Soo Yeon Han, MSW, [facility]  Pager: [phone]  Phone: [phone]    "

## 2018-02-22 NOTE — TELEPHONE ENCOUNTER
Left voicemail for the patient on new phone number.  Asked to call clinic tomorrow.  FRED Lewis     Spoke with the patient.  Was very shocked with the results and the recommended treatment.  The type of cancer cells are more worrisome and they do want to.  Has first consultation appointment with oncologist tomorrow morning and March 2nd.  Sister-in-law is coming to help with move and babies.  Sister has chemo for endometrial cancer and had terrible sickness with chemo.      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  Daughter and twins live with patient and she is unsure how they get care for the child while patient is ill and in treatment.  Referred to care coordination for help with program and troubleshooting.    FRED Lewis

## 2018-02-22 NOTE — PROGRESS NOTES
Dr. Eugene Guzman  Professor  Department of Surgery  [address] [place] [address]    February 22, 2018    Dear Dr. Guzman,     Thank you for referring Julieta Rivera to our clinic for recommendations for her new diagnosis of triple negative breast cancer.     HISTORY OF PRESENT ILLNESS:  Julieta Rivera is a 67-year-old woman who was referred to our clinic with a new diagnosis of right triple-negative breast cancer.  Julieta was followed by routine screening mammography, when she was discovered to have a 7 x 6 x 9-mm mass at the 12 o'clock position of the right breast 6 cm from the nipple-areolar complex.  She did undergo a biopsy of this mass which showed an ER-negative, HI-negative, HER2-nonamplified, invasive mammary carcinoma of no special type, invasive ductal carcinoma, [place] grade 3.  Ductal carcinoma in situ was also noted.  Nuclear grade 2 solid type.  HER2 FISH showed no amplification.  She now comes to our clinic for recommendations.      She has hypothyroidism and is on levothyroxine 88 alternating with 100 mcg daily.  She has no pain.  She has fatigue related to the care of a grandson with esophageal atresia at home.  She has no depression and no anxiety.  She has no weight loss.  Diet has not changed.  She has no loss of energy.  She does not sleep during the day.  She can perform all of her household chores.  She has noticed no abnormality of either breast.   ECOG 0 PS.      REVIEW OF SYSTEMS:  She has no fever or headaches.  She has an occasional dry cough.  She has no chest pain, shortness of breath, hemoptysis, loss of appetite, nausea, vomiting, abdominal pain, constipation, diarrhea, bone pain, back pain, muscle or joint complaints, numbness or tingling in the hands and feet, or hearing loss.  She does have some arthritis in her hands.  She recently has had some depression related to the demands of caring for her two grandsons at home.  The remainder of a  12-point review of systems is negative.       PAST MEDICAL HISTORY:  She has no history of breast surgery in the past or breast cancer in the past.  She has no history of radiation of any kind.  She has no history of tumor of any kind.  She may have a history of a heart problem with mitral prolapse and a murmur.  The last echo we have on record is from 1996.  She has no history of heart attack, breathing problems, blood clots, seizures, arthritis, peptic ulcer disease, osteoporosis or bone fractures.  She is not currently participating in a clinical trial and has not had any significant weight loss.  She has no history of hypertension, but she does have a history of factor V Leiden because her sister was diagnosed with factor V Leiden and Julieta was tested, although Julieta herself has had no blood clots or pulmonary emboli.      FAMILY HISTORY:  There is a history of breast cancer in two paternal aunts, but no first-degree relatives.  One of her aunts was diagnosed in her 50s, the other in her 60s.  She has no male relatives with breast cancer.  The remainder of her family history was negative.       PAST MENSTRUAL HISTORY:  First period was at age 13-1/2.  Last menstrual period was in 05/2003.  She has been pregnant twice at age 26 and 30 with two live births and no miscarriages or abortions.  She used oral contraceptives only once or twice.  Uterus and ovaries are in place.  She has no history of hormone replacement therapy.       ALLERGIES:  She has no allergy to seafood, iodine or contrast dye.  She does not take aspirin.      HABITS:  She did smoke 1 pack per day in college for 3 years from age 17 to 20 and has not smoked since.  She does not drink significant alcohol and has no heavy alcohol history in the past.       PERSONAL AND SOCIAL HISTORY:  She does have a history of being a .  Her  is 80 years old but is able to take care of his activities of daily living.  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 Julieta has had much stress taking care of a toddler grandson with history of a  esophageal atresia repair and an upcoming move of her family.       PHYSICAL EXAMINATION:   VITAL SIGNS:  Blood pressure 119/71, temperature 97.3, pulse 63, respirations not noted, O2 sat 98% on room air, height 1.5 meters and weight 56.6 kg.   GENERAL:  Julieta Rivera appeared generally well.     HEENT:  She has no alopecia.  Examination of the oropharynx is without lesions.   LYMPH:  There is no palpable cervical, supraclavicular, subclavicular or axillary lymphadenopathy.   BREAST:  Right breast is without masses, although there is some slight fullness where the biopsy was performed at the 12 o'clock position about 3 fingerbreadths above the nipple-areolar complex.  No masses in the right breast.  No masses in the left breast.   LUNGS:  Clear to percussion and auscultation.   HEART:  There is a regular rate and rhythm, S1, S2.   ABDOMEN:  Soft, nontender, without hepatosplenomegaly.   EXTREMITIES:  Without edema.   PSYCHIATRIC:  Mood and affect were normal.   NEUROLOGIC:  Deep tendon reflexes were 2+ in the upper and lower extremities bilaterally.  Motor strength is normal in the upper and lower extremities bilaterally.       LABORATORY DATA:  None today.      ASSESSMENT AND PLAN:  Julieta Rivera is a 67-year-old woman with a new diagnosis of invasive ductal carcinoma of the right breast at the 12 o'clock position 6 cm above the nipple-areolar complex which was ER-negative, [place]-negative and HER2-nonamplified, measuring 7 x 6 x 9 mm, being a clinical stage I T1b clinical N0 MX, triple-negative invasive ductal carcinoma.  After discussion with Radiology, we do not think that a breast MRI would be indicated in this setting.  The ultrasound is in agreement with the mammogram and she also had a contrast mammogram which only showed uptake at the index lesion as described.  She is clinically node negative.       RECOMMENDATIONS:     1.  I discussed with Julieta and her sister that I do recommend neoadjuvant chemotherapy.  I think this would be a very reasonable approach for her and it would allow us to tell response of the tumor.  Furthermore, we would be able to potentially give capecitabine if need be if there is lack of response.  We discussed the types of chemotherapy that might be reasonable for her.  I did discuss this also with Dr. Kaley Tidwell.  Our thinking is that docetaxel and cyclophosphamide would be preferred to Taxol followed by dose-dense AC for 4 cycles.  Julieta was very nervous about anthracycline-based therapy because of a history of mitral prolapse diagnosed in the 1990s and uncertain cardiac status.  She does have a 2/6 systolic murmur at the left sternal border radiating to the apex, and we will obtain an echocardiogram.    2.  Choice of neoadjuvant chemotherapy.  I went over the risks and potential benefits of Taxol weekly for 12 weeks followed by dose dense AC and also docetaxel/cyclophosphamide for 4 cycles.  Both would involve growth factor support.  AC has a 3-4% risk of CHF.  TC has a 1-2% risk of pulmonary fibrosis.  Both regimens have risk of febrile neutropenia despite growth factor support. I think in terms of cardiac toxicity, TC for 4 cycles would be preferable, and Julieta feels that way as well.  She is not in favor of anthracycline-based therapy and came to the visit today very worried that we would be recommending it.  I discussed with her that TC is a very reasonable option.  I discussed the risks and potential benefits of TC.  I discussed that Neulasta growth factor would be required and that there still is a risk of febrile neutropenia even with Neulasta for growth factor support on day 2 of each cycle.  I discussed that docetaxel has about 2% incidence of pulmonary fibrosis and we will need to monitor her closely for pulmonary symptoms.  I discussed that in any event she would require a  port.    3.  We discussed that a baseline echocardiogram would be important.  A transthoracic echocardiogram would be good to start with.  It is possible she may require a SERGIO as well.  We will refer her to Dr. Kaley Tidwell for evaluation of her mitral prolapse and murmur which will be better determined by echocardiogram.  4.  Heart MRI is very reasonable.  There is a research study to look at heart MRI in relation to neoadjuvant chemotherapy for breast cancer.  This is a study by Dr. George Finn, and I do think that this study would be very reasonable for her.  She is in agreement but will need to look at the consent form.    5.  Social Work consult will need to be placed.  Social Work consult is essential because of the very difficult home situation and the need for respite in order to be treated for breast cancer.  Social Work consult in progress.    6.  Port placement.  I will consult with Dr. Kaz Feliciano regarding use of a port in a patient with factor V Leiden.  She will need anticoagulation in order to place a port.   7.  We will check the TSH.   8.  Follow up. We will see Julieta in followup in our clinic next week.  MRI heart, echocardiogram, social work consult, CBC, CMP.  Single lumen powerport.  Begin TC chemotherapy 3-1 after visit with me.  Cardiology consult this week.  Hematology consult this week. TSH next visit.      Thank you for referring Julieta Rivera to our clinic.  We will obtain a followup surgical consultation with Dr. Eugene Guzman.  Thank you for allowing us to participate in Julieta Rivera's care.     Sincerely,    Jonathan Gay M.D.      Division of Hematology, Oncology, Transplant   Department of Medicine   [facility]   [phone]     ADDENDUM:  Echo shows that she has mild to moderate aortic stenosis. Peak gradient is 25 mm.  EF 60-65%.    ADDENDUM2:  I called Julieta on 2-27 at 9:24 AM to follow up on tests.  I did discuss the port with  Dr. Kaz Feliciano and because there is no clotting history anticoagulation is not necessary. For a less than 1 cm [id number] triple negative breast cancer TC x 4 is a reasonable approach for node negative triple negative breast cancer where the primary is less than 1 cm.     Narrative            [id number]  [id number]  [id number]  [id number]^GABRIELLE^CHEVY^RUBEN           [facility]  Diagnostic and Treamtent-3rd Floor  [address] [place] [address]     Name: FAIZA BURGOS  MRN: [id number]  : 1950  Study Date: 2018 03:15 PM  Age: 67 yrs  Gender: Female  Patient Location: [facility]  Reason For Study: Malignant neoplasm of upper-outer quadrant of right breast  Ordering Physician: CHEVY ANTHONY  Referring Physician: CHEVY ANTHONY  Performed By: ARNOLD Brooks     BSA: 1.5 m2  Height: 60 in  Weight: 124 lb  BP: 119/71 mmHg  _____________________________________________________________________________  __        Procedure  Echocardiogram with two-dimensional, color and spectral Doppler performed.  _____________________________________________________________________________  __        Interpretation Summary  Left ventricular function, chamber size, wall motion, and wall thickness are  normal. The EF is 60-65%.  Global peak LV longitudinal strain is averaged at -21.2%. This is within  reported normal limits (normal <-18%).  The right ventricle is normal size. Global right ventricular function is  normal.  Mild aortic stenosis is present. Mean gradient is 13 mmHg.  No pericardial effusion is present.     Previous study not available for comparison.     _____________________________________________________________________________  __        Left Ventricle  Left ventricular function, chamber size, wall motion, and wall thickness are  normal.The EF is 60-65%. LV Global strain is -21.2%. Left ventricular  diastolic function is normal. Global peak LV  longitudinal strain is averaged  at -21.2%. This is within reported normal limits (normal <-18%).     Right Ventricle  The right ventricle is normal size. Global right ventricular function is  normal.     Atria  Both atria appear normal.     Mitral Valve  The mitral valve is normal.        Aortic Valve  Difficult to assess individual cusps. Mild aortic stenosis is present. The  mean AoV pressure gradient is 13.4 mmHg. The peak AoV pressure gradient is  25.2 mmHg. The peak aortic velocity is 2.51 m/sec. The calculated aortic valve  are is 1.2 cm^2.     Tricuspid Valve  Mild tricuspid insufficiency is present. The right ventricular systolic  pressure is approximated at 22.0 mmHg plus the right atrial pressure.  Pulmonary artery systolic pressure is normal.     Pulmonic Valve  The pulmonic valve is normal.     Vessels  The aorta root is normal. The inferior vena cava was normal in size with  preserved respiratory variability. Estimated mean right atrial pressure is 3  mmHg.     Pericardium  No pericardial effusion is present.        Compared to Previous Study  Previous study not available for comparison.     Attestation  I have personally viewed the imaging and agree with the interpretation and  report as documented by the fellow, Kwame Calvin, and/or edited by me.  _____________________________________________________________________________  __     MMode/2D Measurements & Calculations  IVSd: 0.67 cm  LVIDd: 3.9 cm  LVIDs: 2.6 cm  LVPWd: 0.63 cm  FS: 32.9 %  LV mass(C)d: 67.6 grams  LV mass(C)dI: 44.4 grams/m2  Ao root diam: 2.8 cm  asc Aorta Diam: 3.3 cm  LVOT diam: 2.1 cm  LVOT area: 3.5 cm2     EF(MOD-bp): 64.0 %  LA Volume (BP): 24.9 ml     LA Volume Index (BP): 16.4 ml/m2  RWT: 0.32        Doppler Measurements & Calculations  MV E max nick: 66.0 cm/sec  MV A max nick: 96.0 cm/sec  MV E/A: 0.69  MV dec time: 0.17 sec  Ao V2 max: 251.2 cm/sec  Ao max P.2 mmHg  Ao V2 mean: 170.2 cm/sec  Ao mean P.4  mmHg  Ao V2 VTI: 52.1 cm  JAGDEEP(I,D): 1.2 cm2  JAGDEEP(V,D): 1.4 cm2  LV V1 max P.0 mmHg  LV V1 max: 99.4 cm/sec  LV V1 VTI: 18.2 cm  SV(LVOT): 63.1 ml  SI(LVOT): 41.4 ml/m2  PA V2 max: 75.6 cm/sec  PA max P.3 mmHg  PA acc time: 0.11 sec  TR max nick: 234.6 cm/sec  TR max P.0 mmHg  JAGDEEP Index (cm2/m2): 0.79  E/E' av.7  Lateral E/e': 6.2     Medial E/e': 9.2     QLAB 2DQ/CMQ  10_EDV(AP2)(aCMQ): 39.6 ml  10_ESV(AP2)(aCMQ): 12.1 ml  10_EDV(AP4)(aCMQ): 42.1 ml  10_ESV(AP4)(aCMQ): 12.6 ml  10_EDV(Bi-Plane)(aCMQ): 40.6 ml  10_EF(Bi-Plane)(aCMQ): 69.8 %  10_EF(AP2)(aCMQ): 69.5 %  10_EF(AP4)(aCMQ): 70.1 %  10_ESV(Bi-Plane)(aCMQ): 12.3 ml     _____________________________________________________________________________  __        Report approved by: Harry MADSEN 2018 05:32 PM           I spent 90 minutes with the patient more than 50% of which was in counseling and coordination of care.

## 2018-02-22 NOTE — PROGRESS NOTES
Spoke to patient and sister and gave writer's business card and telephone number to contact the [facility]. Instructed patient not use MyChart for any symptom management. Gave brochures for [employer] and [employer]. Paged  to meet with patient with [employer] backpacks.  Answered all patient and Sister's questions and verbalized understanding. Tamanna Neff RN, BSN.

## 2018-02-22 NOTE — PROGRESS NOTES
Clinic Care Coordination Contact  Care Team Conversations    Today's oncology note reviewed. MD referred to oncology SW specifically to address the caregiving concerns below. PCP notified. Will close to care coordination at this time as patient is currently under the care of the oncology RN CC and SW team.     Suly Galaviz R.N.  Clinic Care Coordinator  [facility]  [phone]

## 2018-02-22 NOTE — LETTER
2/22/2018       RE: Julieta Rivera  [address] [place] [address]     Dear Colleague,    Thank you for referring your patient, Julieta Rivera, to the [facility]. Please see a copy of my visit note below.    Dr. Eugene Guzman  Professor  Department of Surgery  [address] [place] [address]    February 22, 2018    Dear Dr. Guzman,     Thank you for referring Julieta Rivera to our clinic for recommendations for her new diagnosis of triple negative breast cancer.     HISTORY OF PRESENT ILLNESS:  Julieta Rivera is a 67-year-old woman who was referred to our clinic with a new diagnosis of right triple-negative breast cancer.  Julieta was followed by routine screening mammography, when she was discovered to have a 7 x 6 x 9-mm mass at the 12 o'clock position of the right breast 6 cm from the nipple-areolar complex.  She did undergo a biopsy of this mass which showed an ER-negative, TX-negative, HER2-nonamplified, invasive mammary carcinoma of no special type, invasive ductal carcinoma, Jeff grade 3.  Ductal carcinoma in situ was also noted.  Nuclear grade 2 solid type.  HER2 FISH showed no amplification.  She now comes to our clinic for recommendations.      She has hypothyroidism and is on levothyroxine 88 alternating with 100 mcg daily.  She has no pain.  She has fatigue related to the care of a grandson with esophageal atresia at home.  She has no depression and no anxiety.  She has no weight loss.  Diet has not changed.  She has no loss of energy.  She does not sleep during the day.  She can perform all of her household chores.  She has noticed no abnormality of either breast.   ECOG 0 PS.      REVIEW OF SYSTEMS:  She has no fever or headaches.  She has an occasional dry cough.  She has no chest pain, shortness of breath, hemoptysis, loss of appetite, nausea, vomiting, abdominal pain, constipation, diarrhea, bone pain, back pain, muscle or joint  complaints, numbness or tingling in the hands and feet, or hearing loss.  She does have some arthritis in her hands.  She recently has had some depression related to the demands of caring for her two grandsons at home.  The remainder of a 12-point review of systems is negative.       PAST MEDICAL HISTORY:  She has no history of breast surgery in the past or breast cancer in the past.  She has no history of radiation of any kind.  She has no history of tumor of any kind.  She may have a history of a heart problem with mitral prolapse and a murmur.  The last echo we have on record is from 1996.  She has no history of heart attack, breathing problems, blood clots, seizures, arthritis, peptic ulcer disease, osteoporosis or bone fractures.  She is not currently participating in a clinical trial and has not had any significant weight loss.  She has no history of hypertension, but she does have a history of factor V Leiden because her sister was diagnosed with factor V Leiden and Julieta was tested, although Julieta herself has had no blood clots or pulmonary emboli.      FAMILY HISTORY:  There is a history of breast cancer in two paternal aunts, but no first-degree relatives.  One of her aunts was diagnosed in her 50s, the other in her 60s.  She has no male relatives with breast cancer.  The remainder of her family history was negative.       PAST MENSTRUAL HISTORY:  First period was at age 13-1/2.  Last menstrual period was in 05/2003.  She has been pregnant twice at age 26 and 30 with two live births and no miscarriages or abortions.  She used oral contraceptives only once or twice.  Uterus and ovaries are in place.  She has no history of hormone replacement therapy.       ALLERGIES:  She has no allergy to seafood, iodine or contrast dye.  She does not take aspirin.      HABITS:  She did smoke 1 pack per day in college for 3 years from age 17 to 20 and has not smoked since.  She does not drink significant alcohol and has no  heavy alcohol history in the past.       PERSONAL AND SOCIAL HISTORY:  She does have a history of being a .  Her  is 80 years old but is able to take care of his activities of daily living.  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       PHYSICAL EXAMINATION:   VITAL SIGNS:  Blood pressure 119/71, temperature 97.3, pulse 63, respirations not noted, O2 sat 98% on room air, height 1.5 meters and weight 56.6 kg.   GENERAL:  Julieta Rivera appeared generally well.     HEENT:  She has no alopecia.  Examination of the oropharynx is without lesions.   LYMPH:  There is no palpable cervical, supraclavicular, subclavicular or axillary lymphadenopathy.   BREAST:  Right breast is without masses, although there is some slight fullness where the biopsy was performed at the 12 o'clock position about 3 fingerbreadths above the nipple-areolar complex.  No masses in the right breast.  No masses in the left breast.   LUNGS:  Clear to percussion and auscultation.   HEART:  There is a regular rate and rhythm, S1, S2.   ABDOMEN:  Soft, nontender, without hepatosplenomegaly.   EXTREMITIES:  Without edema.   PSYCHIATRIC:  Mood and affect were normal.   NEUROLOGIC:  Deep tendon reflexes were 2+ in the upper and lower extremities bilaterally.  Motor strength is normal in the upper and lower extremities bilaterally.       LABORATORY DATA:  None today.      ASSESSMENT AND PLAN:  Julieta Rivera is a 67-year-old woman with a new diagnosis of invasive ductal carcinoma of the right breast at the 12 o'clock position 6 cm above the nipple-areolar complex which was ER-negative, PA-negative and HER2-nonamplified, measuring 7 x 6 x 9 mm, being a clinical stage I T1b clinical N0 MX, triple-negative invasive ductal carcinoma.  After discussion with Radiology, we do not think  that a breast MRI would be indicated in this setting.  The ultrasound is in agreement with the mammogram and she also had a contrast mammogram which only showed uptake at the index lesion as described.  She is clinically node negative.      RECOMMENDATIONS:     1.  I discussed with Julieta and her sister that I do recommend neoadjuvant chemotherapy.  I think this would be a very reasonable approach for her and it would allow us to tell response of the tumor.  Furthermore, we would be able to potentially give capecitabine if need be if there is lack of response.  We discussed the types of chemotherapy that might be reasonable for her.  I did discuss this also with Dr. Kaley Tidwell.  Our thinking is that docetaxel and cyclophosphamide would be preferred to Taxol followed by dose-dense AC for 4 cycles.  Julieta was very nervous about anthracycline-based therapy because of a history of mitral prolapse diagnosed in the 1990s and uncertain cardiac status.  She does have a 2/6 systolic murmur at the left sternal border radiating to the apex, and we will obtain an echocardiogram.    2.  Choice of neoadjuvant chemotherapy.  I went over the risks and potential benefits of Taxol weekly for 12 weeks followed by dose dense AC and also docetaxel/cyclophosphamide for 4 cycles.  Both would involve growth factor support.  AC has a 3-4% risk of CHF.  TC has a 1-2% risk of pulmonary fibrosis.  Both regimens have risk of febrile neutropenia despite growth factor support. I think in terms of cardiac toxicity, TC for 4 cycles would be preferable, and Julieta feels that way as well.  She is not in favor of anthracycline-based therapy and came to the visit today very worried that we would be recommending it.  I discussed with her that TC is a very reasonable option.  I discussed the risks and potential benefits of TC.  I discussed that Neulasta growth factor would be required and that there still is a risk of febrile neutropenia even with Neulasta  for growth factor support on day 2 of each cycle.  I discussed that docetaxel has about 2% incidence of pulmonary fibrosis and we will need to monitor her closely for pulmonary symptoms.  I discussed that in any event she would require a port.    3.  We discussed that a baseline echocardiogram would be important.  A transthoracic echocardiogram would be good to start with.  It is possible she may require a SERGIO as well.  We will refer her to Dr. Kaley Tidwell for evaluation of her mitral prolapse and murmur which will be better determined by echocardiogram.  4.  Heart MRI is very reasonable.  There is a research study to look at heart MRI in relation to neoadjuvant chemotherapy for breast cancer.  This is a study by Dr. George Finn, and I do think that this study would be very reasonable for her.  She is in agreement but will need to look at the consent form.    5.  Social Work consult will need to be placed.  Social Work consult is essential because of the very difficult home situation and the need for respite in order to be treated for breast cancer.  Social Work consult in progress.    6.  Port placement.  I will consult with Dr. Kaz Feliciano regarding use of a port in a patient with factor V Leiden.  She will need anticoagulation in order to place a port.   7.  We will check the TSH.   8.  Follow up. We will see Julieta in followup in our clinic next week.  MRI heart, echocardiogram, social work consult, CBC, CMP.  Single lumen powerport.  Begin TC chemotherapy 3-1 after visit with me.  Cardiology consult this week.  Hematology consult this week. TSH next visit.      Thank you for referring Julieta Rivera to our clinic.  We will obtain a followup surgical consultation with Dr. Eugene Guzman.  Thank you for allowing us to participate in Julieta Rivera's care.     Sincerely,    Jonathan Gay M.D.      Division of Hematology, Oncology, Transplant   Department of Medicine   [facility]  [place]   [phone]     ADDENDUM:  Echo shows that she has mild to moderate aortic stenosis. Peak gradient is 25 mm.  EF 60-65%.      Narrative            [id number]  [facility]  [id number]  [id number]^GABRIELLE^CHEVY^RUBEN           [facility]  Diagnostic and Treamtent-3rd Floor  [address] [place] [address]     Name: FAIZA BURGOS  MRN: [id number]  : 1950  Study Date: 2018 03:15 PM  Age: 67 yrs  Gender: Female  [place] [facility] [place]  Reason For Study: Malignant neoplasm of upper-outer quadrant of right breast  Ordering Physician: CHEVY ANTHONY  Referring Physician: CHEVY ANTHONY  Performed By: MARY GRACE Brooks     BSA: 1.5 m2  Height: 60 in  Weight: 124 lb  BP: 119/71 mmHg  _____________________________________________________________________________  __        Procedure  Echocardiogram with two-dimensional, color and spectral Doppler performed.  _____________________________________________________________________________  __        Interpretation Summary  Left ventricular function, chamber size, wall motion, and wall thickness are  normal. The EF is 60-65%.  Global peak LV longitudinal strain is averaged at -21.2%. This is within  reported normal limits (normal <-18%).  The right ventricle is normal size. Global right ventricular function is  normal.  Mild aortic stenosis is present. Mean gradient is 13 mmHg.  No pericardial effusion is present.     Previous study not available for comparison.     _____________________________________________________________________________  __        Left Ventricle  Left ventricular function, chamber size, wall motion, and wall thickness are  normal.The EF is 60-65%. LV Global strain is -21.2%. Left ventricular  diastolic function is normal. Global peak LV longitudinal strain is averaged  at -21.2%. This is within reported normal limits (normal <-18%).     Right Ventricle  The right  ventricle is normal size. Global right ventricular function is  normal.     Atria  Both atria appear normal.     Mitral Valve  The mitral valve is normal.        Aortic Valve  Difficult to assess individual cusps. Mild aortic stenosis is present. The  mean AoV pressure gradient is 13.4 mmHg. The peak AoV pressure gradient is  25.2 mmHg. The peak aortic velocity is 2.51 m/sec. The calculated aortic valve  are is 1.2 cm^2.     Tricuspid Valve  Mild tricuspid insufficiency is present. The right ventricular systolic  pressure is approximated at 22.0 mmHg plus the right atrial pressure.  Pulmonary artery systolic pressure is normal.     Pulmonic Valve  The pulmonic valve is normal.     Vessels  The aorta root is normal. The inferior vena cava was normal in size with  preserved respiratory variability. Estimated mean right atrial pressure is 3  mmHg.     Pericardium  No pericardial effusion is present.        Compared to Previous Study  Previous study not available for comparison.     Attestation  I have personally viewed the imaging and agree with the interpretation and  report as documented by the fellow, Kwame Calvin, and/or edited by me.  _____________________________________________________________________________  __     MMode/2D Measurements & Calculations  IVSd: 0.67 cm  LVIDd: 3.9 cm  LVIDs: 2.6 cm  LVPWd: 0.63 cm  FS: 32.9 %  LV mass(C)d: 67.6 grams  LV mass(C)dI: 44.4 grams/m2  Ao root diam: 2.8 cm  asc Aorta Diam: 3.3 cm  LVOT diam: 2.1 cm  LVOT area: 3.5 cm2     EF(MOD-bp): 64.0 %  LA Volume (BP): 24.9 ml     LA Volume Index (BP): 16.4 ml/m2  RWT: 0.32        Doppler Measurements & Calculations  MV E max nick: 66.0 cm/sec  MV A max nick: 96.0 cm/sec  MV E/A: 0.69  MV dec time: 0.17 sec  Ao V2 max: 251.2 cm/sec  Ao max P.2 mmHg  Ao V2 mean: 170.2 cm/sec  Ao mean P.4 mmHg  Ao V2 VTI: 52.1 cm  JAGDEEP(I,D): 1.2 cm2  JAGDEEP(V,D): 1.4 cm2  LV V1 max P.0 mmHg  LV V1 max: 99.4 cm/sec  LV V1 VTI: 18.2  cm  SV(LVOT): 63.1 ml  SI(LVOT): 41.4 ml/m2  PA V2 max: 75.6 cm/sec  PA max P.3 mmHg  PA acc time: 0.11 sec  TR max nick: 234.6 cm/sec  TR max P.0 mmHg  JAGDEEP Index (cm2/m2): 0.79  E/E' av.7  Lateral E/e': 6.2     Medial E/e': 9.2     QLAB 2DQ/CMQ  10_EDV(AP2)(aCMQ): 39.6 ml  10_ESV(AP2)(aCMQ): 12.1 ml  10_EDV(AP4)(aCMQ): 42.1 ml  10_ESV(AP4)(aCMQ): 12.6 ml  10_EDV(Bi-Plane)(aCMQ): 40.6 ml  10_EF(Bi-Plane)(aCMQ): 69.8 %  10_EF(AP2)(aCMQ): 69.5 %  10_EF(AP4)(aCMQ): 70.1 %  10_ESV(Bi-Plane)(aCMQ): 12.3 ml     _____________________________________________________________________________  __        Report approved by: Harry MADSEN 2018 05:32 PM           I spent 90 minutes with the patient more than 50% of which was in counseling and coordination of care.     Again, thank you for allowing me to participate in the care of your patient.      Sincerely,    Jonathan Gay MD

## 2018-02-22 NOTE — MR AVS SNAPSHOT
After Visit Summary   2/22/2018    Julieta Rivera    MRN: [id number]           Patient Information     Date Of Birth          1950        Visit Information        Provider Department      2/22/2018 2:54 PM Han, Soo Yeon, MSW [facility]        Today's Diagnoses     Visit for counseling    -  1       Follow-ups after your visit        Your next 10 appointments already scheduled     Feb 23, 2018  3:00 PM CST   Ech Complete with [id number]   [facility] ([facility])    [address]  3rd Floor  [facility] [id number] [phone]           1. Please bring or wear a comfortable two-piece outfit. 2. You may eat, drink and take your normal medicines. 3. For any questions that cannot be answered, please contact the ordering physician            Mar 01, 2018  9:30 AM CST   NEW CLOTTING DISORDER with Bill Hurd MD   [facility] ([facility])    [address]  [address]  [facility] [address]   [phone]            Mar 02, 2018  9:15 AM CST   (Arrive by 9:00 AM)   New Patient Visit with Eugene Guzman MD   [facility] ([facility])    [address]  [address]  [facility] [id number] [phone]            Mar 08, 2018   Procedure with Brian Tolbert PA-C   [facility] ([facility])    [address] [place] [address]  5th Floor  [facility] [id number] [phone]           Located in the Clinics and Surgery Center at [address] [place], [place] [id number].   parking is very convenient and highly recommended.  is a $6 flat rate fee.  Both  and self parkers should enter the main arrival plaza from [facility]; parking attendants will direct you based on your parking preference.            Mar 08, 2018  8:00 AM CST   (Arrive by 6:30 AM)   IR CHEST  PORT PLACEMENT > 5 YRS OF AGE with [id number]   [facility] ([facility])    [address]  5th Floor  [facility] [address]              1. Your doctor will need to do a history and physical within 7 days before this procedure. 2. Your doctor will which medications should not be taken the morning of the exam. 3. Laboratory tests are to be obtained by your doctor prior to the exam (Basic Metabolic Panel, CBCP, PTT and INR) (No labs needed if you are having a tunneled catheter exchange or removal) 4. If you have allergies to x-ray contrast or iodine, contact your doctor or a Radiology nurse prior to the exam day for instructions. 5. Someone will need to drive you to and from the hospital. 6. If you are or may be pregnant, contact your doctor or a Radiology nurse prior to the day of the exam. 7. If you have diabetes, check with your doctor or a Radiology nurse to see if your insulin needs to be adjusted for the exam. 8. If you are taking a medication called Glucophage or Glucovance; these medications need to be held the day of the exam and for approximately 48 hours following. A blood sample must be drawn so your creatinine level can be checked before resuming this medication. 9. If you are taking Coumadin (to thin you blood) please contact your doctor or a Radiology nurse at least 3 days before the exam for special instructions. 10. You should not have received contrast within 48 hours of this exam. 11. The day before your exam you may eat your regular diet and are encouraged to drink at least 2 quarts of clear liquids. Drink no alcoholic beverages for 24 hours prior to the exam. 12. If you have a colostomy you will need to irrigate it with tap water at 8PM the evening before and again at 6AM the morning of the exam. 13. Do not smoke for 24 hours prior to the procedure. 14. Birth to 4 years: - Breast feeding must be stopped 4 hours prior to exam - Solid food or formula must  be stopped 6 hours prior to exam - Tube feedings must be stopped 6 hours prior to exam 15. 4-10 years old: - Nothing to eat or drink 6 hours prior to exam 16. 10+ years old: - Nothing to eat or drink 8 hours prior to exam 17. The morning of the exam you may brush your teeth and take medications as directed with a sip of water. 18. When discharged, you cannot drive until morning, and an adult must be with you until then. You should stay in the [facility] overnight. 19. Bring a list of all drugs you are taking; include supplements and over-the-counter medications. Wear comfortable clothes and leave your valuables at home.            Mar 08, 2018 11:00 AM CST   Masonic Lab Draw with UC MASONIC LAB DRAW   [facility] ([facility])    [address] [place] [address]  [address]  [facility] [address]   [phone]            Mar 08, 2018 11:30 AM CST   (Arrive by 11:15 AM)   Return Visit with Jonathan Gay MD   [facility] ([facility])    [address] [place] [address]  [address]  [facility] [address]   [phone]            Mar 08, 2018 12:30 PM CST   Infusion 180 with  ONCOLOGY INFUSION, UC 30 ATC   [facility] ([facility])    [address] [place] [address]  [address]  [facility] [address]   [phone]            Mar 19, 2018  9:30 AM CDT   (Arrive by 9:15 AM)   NEW CANCER VISIT with Kaley Tidwell MD   [facility])    [address] [place] [address]  [address]  [facility] [address]   [phone]              Future tests that were ordered for you today     Open Standing Orders        Priority Remaining Interval Expires Ordered    CBC with platelets differential Routine 52/52 2/22/2019 2/22/2018    Comprehensive metabolic panel Routine 52/52 2/22/2019 2/22/2018          Open Future Orders        Priority Expected Expires Ordered    IR Chest Port  Placement > 5 Yrs of Age Routine  2/22/2019 2/22/2018    Echocardiogram Complete Routine  2/22/2019 2/22/2018            Who to contact     If you have questions or need follow up information about today's clinic visit or your schedule please contact [facility] directly at [phone].  Normal or non-critical lab and imaging results will be communicated to you by MyChart, letter or phone within 4 business days after the clinic has received the results. If you do not hear from us within 7 days, please contact the clinic through [employer] or phone. If you have a critical or abnormal lab result, we will notify you by phone as soon as possible.  Submit refill requests through [employer] or call your pharmacy and they will forward the refill request to us. Please allow 3 business days for your refill to be completed.          Additional Information About Your Visit        [employer] Information     [employer] gives you secure access to your electronic health record. If you see a primary care provider, you can also send messages to your care team and make appointments. If you have questions, please call your primary care clinic.  If you do not have a primary care provider, please call [phone] and they will assist you.        Care EveryWhere ID     This is your Care EveryWhere ID. This could be used by other organizations to access your [place] medical records  [id number]         Blood Pressure from Last 3 Encounters:   02/22/18 119/71   01/05/18 114/72   01/04/17 115/75    Weight from Last 3 Encounters:   02/22/18 56.6 kg (124 lb 11.2 oz)   01/05/18 56.3 kg (124 lb 1.6 oz)   01/04/17 51.5 kg (113 lb 9.6 oz)              Today, you had the following     No orders found for display       Primary Care Provider Fax #    Provider Not In System [phone]                Equal Access to Services     CARTER DEL RIO : katja Keita, khadar borrego  miriamflor iliajacquelineyasmani woodson'aan ah. So [facility] [phone].    ATENCIÓN: Si geeta law, tiene a phillips disposición servicios gratuitos de asistencia lingüística. Kvng al [phone].    We comply with applicable federal civil rights laws and [place] laws. We do not discriminate on the basis of race, color, national origin, age, disability, sex, sexual orientation, or gender identity.            Thank you!     Thank you for choosing [facility]  for your care. Our goal is always to provide you with excellent care. Hearing back from our patients is one way we can continue to improve our services. Please take a few minutes to complete the written survey that you may receive in the mail after your visit with us. Thank you!             Your Updated Medication List - Protect others around you: Learn how to safely use, store and throw away your medicines at www.disposemymeds.org.          This list is accurate as of 2/22/18  3:08 PM.  Always use your most recent med list.                   Brand Name Dispense Instructions for use Diagnosis    FISH OIL PO      Take 1 capsule by mouth daily.        * levothyroxine 100 MCG tablet    SYNTHROID/LEVOTHROID    45 tablet    Take  by mouth See Admin Instructions. Alternate 0.10 mg tab with 0.088 mg tab every other day    Hypothyroidism due to Hashimoto's thyroiditis       * levothyroxine 88 MCG tablet    SYNTHROID/LEVOTHROID    45 tablet    Take  by mouth daily. Alternate 0.088 mg with 0.10 mg every other day    Hypothyroidism due to Hashimoto's thyroiditis       MULTIVITAMIN & MINERAL PO      Take 2 tablets by mouth daily.        VITAMIN D (CHOLECALCIFEROL) PO      Take 2,000 Units by mouth daily        * Notice:  This list has 2 medication(s) that are the same as other medications prescribed for you. Read the directions carefully, and ask your doctor or other care provider to review them with you.

## 2018-02-22 NOTE — PROGRESS NOTES
FAIZA BURGOS          [id number]               : 1950  F       [address]                                 PCP: TONIO ONTIVEROS*     [facility] [id number]                                CTR: [facility]            Name: FAIZA BURGOS Date: 2018       Home: [phone]          Payor:              [facility]     Plan:               MVA PROGRESSIVE     Sponsor Code:       1975     Subscriber ID:      [id number]     Subscriber Name:    FAIZA BURGOS     Subscriber Address: [address] [place] [address]          Effective From:     16     Effective To:            Group Number: Not Available     Group Name  : Not Available               Date       Provider                   Department   Center            2018  [id number] PURCELL      RDFP         RDFP          Order Date:2018     Ordering User:SIMONA PURCELL [[email or web address]]     Encounter Provider:Simona Purcell APRN CNP [[id number]]     Authorizing Provider: Simona Purcell APRN CNP [[id number]]     Department: FAMILY PRACTICE[[id number]]          Ordering Provider NPI: [id number]  Simona Purcell     [facility] [place] [address], [address]      [address]     Phone: [phone]                    Procedure Requested       9027.110 CARE COORDINATION REFERRAL            [#[id number]]         Priority: Routine  Class: Local Print         Comment:Services are provided by a Care Coordinator for people with complex                  needs such as: medical, social, or financial troubles.  The Care                  Coordinator works with the patient and their Primary Care Provider                  to determine health goals, obtain resources, achieve outcomes, and                  develop care plans that help coordinate the patient's care.                                     Reason for Referral: Patient/Caregiver Support: Resources for                    Support and Respite Care                                     Additional pertinent details:  Patient diagnosed with breast                   cancer and will be starting treatment.  Has been acting as a                   primary caregiver for one of her twin grandson with complex                   medical needs.  Needs help with navigating limitations of cancer                   and treatment and how it will impact caregiving.                                    Clinical Staff have discussed the Care Coordination Referral with                   the patient and/or caregiver: yes       Associated Diagnoses         C50.911 Invasive ductal carcinoma of right breast (H)               PAIGERAFAEL AGUIARDAWOOD CARTER          [id number]               : 1950  F      [address]                                 PCP: [id number] WILSON     [place] [address]                                CTR: [facility]

## 2018-02-22 NOTE — NURSING NOTE
Oncology Rooming Note    February 22, 2018 10:22 AM   Julieta Rivera is a 67 year old female who presents for:    Chief Complaint   Patient presents with     Oncology Clinic Visit     New Patient-Consult     Initial Vitals: /71 (BP Location: Right arm, Patient Position: Sitting, Cuff Size: Adult Regular)  Pulse 63  Temp 97.3  F (36.3  C) (Oral)  Ht 1.524 m (5')  Wt 56.6 kg (124 lb 11.2 oz)  SpO2 98%  BMI 24.35 kg/m2 Estimated body mass index is 24.35 kg/(m^2) as calculated from the following:    Height as of this encounter: 1.524 m (5').    Weight as of this encounter: 56.6 kg (124 lb 11.2 oz). Body surface area is 1.55 meters squared.  No Pain (0) Comment: Data Unavailable   No LMP recorded. Patient is postmenopausal.  Allergies reviewed: Yes  Medications reviewed: Yes    Medications: Medication refills not needed today.  Pharmacy name entered into [employer]:    [facility] [address] [place], [place] - [address] [facility] [place], [place] - [address]    Clinical concerns: Questions Dr. Gay was notified.    10 minutes for nursing intake (face to face time)     Radha Chandler LPN

## 2018-02-22 NOTE — PROGRESS NOTES
Clinic Care Coordination Contact  Care Team Conversations    Per chart review patient and sister met with oncology RN CC and SW to discuss resources. Left msg for RN CC, Tamanna, to call back to make sure issues as below were also addressed.     Suly Galaviz R.N.  Clinic Care Coordinator  [facility]  [phone]

## 2018-02-23 ENCOUNTER — RADIANT APPOINTMENT (OUTPATIENT)
Dept: CARDIOLOGY | Facility: CLINIC | Age: 68
End: 2018-02-23
Attending: INTERNAL MEDICINE
Payer: COMMERCIAL

## 2018-02-23 DIAGNOSIS — Z51.11 ENCOUNTER FOR ANTINEOPLASTIC CHEMOTHERAPY: ICD-10-CM

## 2018-02-23 DIAGNOSIS — Z17.1 MALIGNANT NEOPLASM OF UPPER-OUTER QUADRANT OF RIGHT BREAST IN FEMALE, ESTROGEN RECEPTOR NEGATIVE (H): ICD-10-CM

## 2018-02-23 DIAGNOSIS — C50.411 MALIGNANT NEOPLASM OF UPPER-OUTER QUADRANT OF RIGHT BREAST IN FEMALE, ESTROGEN RECEPTOR NEGATIVE (H): ICD-10-CM

## 2018-02-25 PROBLEM — Z17.1 MALIGNANT NEOPLASM OF UPPER OUTER QUADRANT OF BREAST IN FEMALE, ESTROGEN RECEPTOR NEGATIVE (H): Status: ACTIVE | Noted: 2018-02-25

## 2018-02-25 PROBLEM — C50.419 MALIGNANT NEOPLASM OF UPPER OUTER QUADRANT OF BREAST IN FEMALE, ESTROGEN RECEPTOR NEGATIVE (H): Status: ACTIVE | Noted: 2018-02-25

## 2018-02-27 ENCOUNTER — CARE COORDINATION (OUTPATIENT)
Dept: ONCOLOGY | Facility: CLINIC | Age: 68
End: 2018-02-27

## 2018-02-27 ENCOUNTER — CARE COORDINATION (OUTPATIENT)
Dept: CARE COORDINATION | Facility: CLINIC | Age: 68
End: 2018-02-27

## 2018-02-27 ENCOUNTER — TELEPHONE (OUTPATIENT)
Dept: ONCOLOGY | Facility: CLINIC | Age: 68
End: 2018-02-27

## 2018-02-27 NOTE — TELEPHONE ENCOUNTER
I spoke with Julieta and let her know that her echo showed AS, mild to moderate.  I recommended TC x 4.  I discussed that I had a discussion with Dr. Feliciano this AM and she does not need anticoagulation with a port because she has not had any clotting events that we know of in her lifetime.  All of her questions were answered.     Jonathan Gay MD

## 2018-02-27 NOTE — PROGRESS NOTES
Clinic Care Coordination Contact  Care Team Conversations    Received voice mail from oncology RN CC. Chart reviewed. Her and her SW partner are assisting patient with many resources. Left msg with introduction on her voice mail with instructions to reach to me if needing anything from PCP.     Suly Galaviz R.N.  Clinic Care Coordinator  [facility]  [phone]

## 2018-03-01 ENCOUNTER — OFFICE VISIT (OUTPATIENT)
Dept: HEMATOLOGY | Facility: CLINIC | Age: 68
End: 2018-03-01
Attending: INTERNAL MEDICINE
Payer: COMMERCIAL

## 2018-03-01 VITALS
RESPIRATION RATE: 12 BRPM | HEIGHT: 60 IN | TEMPERATURE: 97.4 F | SYSTOLIC BLOOD PRESSURE: 118 MMHG | BODY MASS INDEX: 24.21 KG/M2 | OXYGEN SATURATION: 99 % | DIASTOLIC BLOOD PRESSURE: 72 MMHG | HEART RATE: 85 BPM | WEIGHT: 123.3 LBS

## 2018-03-01 DIAGNOSIS — E06.3 HYPOTHYROIDISM DUE TO HASHIMOTO'S THYROIDITIS: ICD-10-CM

## 2018-03-01 DIAGNOSIS — D68.51 HETEROZYGOUS FACTOR V LEIDEN MUTATION (H): Primary | ICD-10-CM

## 2018-03-01 LAB
T4 FREE SERPL-MCNC: 1.39 NG/DL (ref 0.76–1.46)
TSH SERPL DL<=0.005 MIU/L-ACNC: 3.76 MU/L (ref 0.4–4)

## 2018-03-01 PROCEDURE — 99214 OFFICE O/P EST MOD 30 MIN: CPT | Performed by: INTERNAL MEDICINE

## 2018-03-01 PROCEDURE — 36415 COLL VENOUS BLD VENIPUNCTURE: CPT | Performed by: NURSE PRACTITIONER

## 2018-03-01 PROCEDURE — G0463 HOSPITAL OUTPT CLINIC VISIT: HCPCS

## 2018-03-01 PROCEDURE — 84443 ASSAY THYROID STIM HORMONE: CPT | Performed by: NURSE PRACTITIONER

## 2018-03-01 PROCEDURE — 84439 ASSAY OF FREE THYROXINE: CPT | Performed by: NURSE PRACTITIONER

## 2018-03-01 ASSESSMENT — PAIN SCALES - GENERAL: PAINLEVEL: NO PAIN (0)

## 2018-03-01 NOTE — NURSING NOTE
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 Hurd  Face To Face Time for Education (After provider visit): 5 minutes  Reviewed & updated allergy and med list.    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, chemo, port placement  Teaching Topic: Reviewed risk of venous clots & signs & symptoms of DVT/PE  Family Hx:  Sister-FVL & had PE (after lung exposure at work); daughter had DVT when pregnant    Plan:  No anticoagulation recommended at present     Person(s) involved in teaching:   Patient     Motivation Level: good  Asks Questions: Yes      Eager to Learn: Yes  Cooperative: Yes    Receptive (willing/able to accept information): Yes     Patient demonstrates understanding of the following:  Reason for the appointment, diagnosis and treatment plan: Yes  Knowledge of proper use of medications and conditions for which they are ordered (with special attention to potential side effects or drug interactions): Yes  Which situations necessitate calling provider and whom to contact: Yes    Nutritional needs and diet plan: NA  Pain management techniques: NA  Wound Care: NA  How and/when to access community resources: NA    Educated patient about the signs, symptoms of and risk factors for venous thrombosis (VTE) and provided the [facility] book lawrence, which reviews these facts.    Patient has been found to be positive for Factor V Leiden.  Has known for many years.    Patient verbalized understanding of the above information.  They deny further questions at this time.    Patient given the contact card for the [facility] and has the appropriate numbers to call with any questions.  Alisha Pascual, RN, MSN -Nurse  Clinician, [facility] [phone]

## 2018-03-01 NOTE — PROGRESS NOTES
Julieta,    The thyroid results were normal.  I hope you have gotten all settled in at your new home.  Suly, our care coordinator, mentioned that the oncology clinic care coordinator was able to help find some resources.  If you need any other assistance, please don't hesitate to let me know.  If you have any questions, please feel free to contact the clinic.    FRED Lewis

## 2018-03-02 ENCOUNTER — MYC MEDICAL ADVICE (OUTPATIENT)
Dept: FAMILY MEDICINE | Facility: CLINIC | Age: 68
End: 2018-03-02

## 2018-03-02 ENCOUNTER — CARE COORDINATION (OUTPATIENT)
Dept: ONCOLOGY | Facility: CLINIC | Age: 68
End: 2018-03-02

## 2018-03-02 NOTE — PROGRESS NOTES
Spoke to patient to arrange chemotherapy education and telephone disconnected and left a VM to return call if having the day of the start of infusion. Tamanna Neff RN, BSN

## 2018-03-02 NOTE — PROGRESS NOTES
[facility]  [address] [place] [address]  Phone: [phone], Fax: [phone]      Outpatient Visit Note:    Patient: Julieta Rivera  MRN: [id number]  : 1950  RHONDA: Mar 1, 2018      Reason for Visit:  Concern about risk of VTE and Factor V Leiden    History: Julieta Rivera is a 67 year old woman with a history of heterozygosity for FVL who presents for discussion of VTE risk and FVL.  Julieta reports that she was told her about FVL after her sister was diagnosed with FVL heterozygosity after an unprovoked pulmonary embolism.  After she was tested, Julieta decided to get tested as well, but has never had a VTE event herself.  Julieta also notes that her daughter recently had a large and proximal DVT during a twin pregnancy.    Concern over VTE risk has recently arisen due to Julieta's recent diagnosis of breast cancer.  She is receiving a port placement soon.  She is having neadjuvant chemotherapy and then surgery and radiation.      Julieta is also extremely stressed with her current home situation.  She is helping to care for her twin grandchildren who were extremely premature and 1 of which had pulm and esophageal atresia requiring tube feeds and frequent care.      Past Medical History:  Past Medical History:   Diagnosis Date     Abnormal Pap smear 1970s    normal since     Factor V Leiden (H)      Hypothyroidism fall        Medications:  Current Outpatient Prescriptions   Medication Sig Dispense Refill     levothyroxine (SYNTHROID/LEVOTHROID) 100 MCG tablet Take  by mouth See Admin Instructions. Alternate 0.10 mg tab with 0.088 mg tab every other day 45 tablet 3     levothyroxine (SYNTHROID/LEVOTHROID) 88 MCG tablet Take  by mouth daily. Alternate 0.088 mg with 0.10 mg every other day 45 tablet 3     VITAMIN D, CHOLECALCIFEROL, PO Take 2,000 Units by mouth daily       Omega-3 Fatty Acids (FISH OIL PO) Take 1 capsule by mouth daily.       Multiple  Vitamins-Minerals (MULTIVITAMIN & MINERAL PO) Take 2 tablets by mouth daily.          Allergies:  Allergies   Allergen Reactions     Seasonal Allergies        ROS:  A 14 point ROS is negative except as stated in the HPI    Objective:  Vitals: B/P: 118/72, T: 97.4, P: 85, R: 12, Wt: 123 lbs 4.8 oz  Exam: Exam was deferred today as the entirety of the visit was spent counseling the patient.    Assessment and Plan:  In summary, Julieta Rivera is a 67 year old woman with heterozygosity for FVL.  My assessment is that without a personal history of VTE, she is at extremely low risk for VTE associated with indwelling catheter placement and for catheter-associated thrombosis during treatment.  She should have standard post-operative chemoprophylaxis.    I explained the genetics, including variable phenotypic penetrance of FVL today.  FVL is extremely common in people of Northern  decent (5%) with highly variable association with actual VTE events.  She does have a family history of VTE, including an unprovoked VTE in sister and pregnacy associated VTE in daughter but these are weaker associations for prediction VTE in her.  Her cancer diagnosis (though breast cancer is a lower-risk cancer) and chemotherapy do put her at risk for cancer-associated VTE, in fact this diagnosis likely is the greater contributor to her risk and FVL.  Her Khorana score for cancer-associated VTE is 0 indicating around a 1% risk for VTE in the next 6 months.  Thus the risk of anticoagulation is likely higher than the benefit she would get.    The patient is given our center's contact information and is instructed to call if she should have any further questions or concerns.  Otherwise, we will plan on seeing her back as needed.      Total Time Spent:  I spent a total of 25 minutes face-to-face with Julieta Rivera during today's office visit.  Over 50% of this time was spent counseling the patient and/or coordinating care regarding FVL  and VTE risk.      Bill Hurd MD   of Medicine  [facility] [employer]

## 2018-03-04 NOTE — PROGRESS NOTES
Dr. Eugene Guzman  Professor  Department of Surgery  [address] [address]     February 22, 2018     Dear Dr. Guzman,     Thank you for referring Julieta Rivera to our clinic for recommendations for her new diagnosis of triple negative breast cancer.      HISTORY OF PRESENT ILLNESS:  Julieta Rivera is a 67-year-old woman who was referred to our clinic with a new diagnosis of right triple-negative breast cancer.  Julieta was followed by routine screening mammography, when she was discovered to have a 7 x 6 x 9-mm mass at the 12 o'clock position of the right breast 6 cm from the nipple-areolar complex.  She did undergo a biopsy of this mass which showed an ER-negative, NH-negative, HER2-nonamplified, invasive mammary carcinoma of no special type, invasive ductal carcinoma, [place] grade 3.  Ductal carcinoma in situ was also noted.  Nuclear grade 2 solid type.  HER2 FISH showed no amplification.  She now comes to our clinic for recommendations.       She has hypothyroidism and is on levothyroxine 88 alternating with 100 mcg daily.  She has no pain.  She has fatigue related to the care of a grandson with esophageal atresia at home.  She has no depression and no anxiety.  She has no weight loss.  Diet has not changed.  She has no loss of energy.  She does not sleep during the day.  She can perform all of her household chores.  She has noticed no abnormality of either breast.   ECOG 0 PS.       REVIEW OF SYSTEMS:  She has no fever or headaches.  She has an occasional dry cough.  She has no chest pain, shortness of breath, hemoptysis, loss of appetite, nausea, vomiting, abdominal pain, constipation, diarrhea, bone pain, back pain, muscle or joint complaints, numbness or tingling in the hands and feet, or hearing loss.  She does have some arthritis in her hands.  She recently has had some depression related to the demands of caring for her two grandsons at home.  The remainder of  a 12-point review of systems is negative.        PAST MEDICAL HISTORY:  She has no history of breast surgery in the past or breast cancer in the past.  She has no history of radiation of any kind.  She has no history of tumor of any kind.  She may have a history of a heart problem with mitral prolapse and a murmur.  The last echo we have on record is from 1996.  She has no history of heart attack, breathing problems, blood clots, seizures, arthritis, peptic ulcer disease, osteoporosis or bone fractures.  She is not currently participating in a clinical trial and has not had any significant weight loss.  She has no history of hypertension, but she does have a history of factor V Leiden because her sister was diagnosed with factor V Leiden and Julieta was tested, although Julieta herself has had no blood clots or pulmonary emboli.       FAMILY HISTORY:  There is a history of breast cancer in two paternal aunts, but no first-degree relatives.  One of her aunts was diagnosed in her 50s, the other in her 60s.  She has no male relatives with breast cancer.  The remainder of her family history was negative.        PAST MENSTRUAL HISTORY:  First period was at age 13-1/2.  Last menstrual period was in 05/2003.  She has been pregnant twice at age 26 and 30 with two live births and no miscarriages or abortions.  She used oral contraceptives only once or twice.  Uterus and ovaries are in place.  She has no history of hormone replacement therapy.        ALLERGIES:  She has no allergy to seafood, iodine or contrast dye.  She does not take aspirin.       HABITS:  She did smoke 1 pack per day in college for 3 years from age 17 to 20 and has not smoked since.  She does not drink significant alcohol and has no heavy alcohol history in the past.        PERSONAL AND SOCIAL HISTORY:  She does have a history of being a .  Her  is 80 years old but is able to take care of his activities of daily living.  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 Julieta has had much stress taking care of a toddler grandson with history of a  esophageal atresia repair and an upcoming move of her family.          INTERVAL HISTORY:  Julieta returns to clinic having had her port placed.  She has had some mild discomfort with some neck stiffness and noticing the port when swallowing, but that has gotten better today.  She has no pain, moderate fatigue.  She is up at night feeding her 2-year-old grandson who has esophageal atresia.  She has no depression but significant anxiety.      REVIEW OF SYSTEMS:  She denies fevers or chills, cough, chest pain, shortness of breath, nausea, vomiting, constipation, diarrhea, bone pain, back pain or headache.  The remainder of a 10-point review of systems is negative.      Julieta has no history of angina.  She has no history of hypertension.  Her cholesterol has generally been in acceptable range, although slightly over 200 recently.      FAMILY HISTORY:  Positive for heart disease.  Father had an MI in his 50s and had a bypass and  at age 78.  Mother had rheumatic heart disease at age 38 and  at age 42.      She has been exercising and has been quite active.  She does yoga.  She works long hours taking care of her grandchildren.      PHYSICAL EXAMINATION:   VITAL SIGNS:  Blood pressure 111/70, temperature 97.5, pulse 84, respirations 16, O2 sat 99% on room air, height 1.5 meters and weight 55.4 kg.   GENERAL:  Julieta appeared generally well.  She has no alopecia.   HEENT:  Oropharynx is without lesions.   LYMPH:  There is no palpable cervical, supraclavicular, subclavicular or axillary lymphadenopathy.   BREASTS:  Exam was not performed today.  Port is in place and without tenderness.  Port is on the left.   RESPIRATORY:  Clear to percussion and auscultation.   HEART:  There is a regular rate and rhythm, S1, S2.  She has a 2/6 systolic murmur at the left sternal border radiating to the apex.   ABDOMEN:   Soft and nontender, without hepatosplenomegaly.   EXTREMITIES:  Without edema.   PSYCHIATRIC:  Mood and affect were normal.      LABORATORY DATA:  The CMP is within normal limits.  CBC was within normal limits.  Absolute neutrophil count is 4400, platelets 165,000, WBC of 6.7, hemoglobin of 13.6.  Echocardiogram showed an ejection fraction of 60%-65% with moderate aortic stenosis.      ASSESSMENT AND PLAN:   1.  Julieta Rivera is a 67-year-old woman with a history of a T1b N0 MX, triple-negative invasive ductal carcinoma of the right breast measuring maximum dimension 9 mm, grade 3.  She comes to clinic today to begin TC for 4 cycles, which will take a total of 12 weeks.  I discussed that she will need Neulasta growth factor.  The docetaxel is 75 mg/m2, and the cyclophosphamide 600 mg/m2, with Neulasta or Onpro given on day 2 of each cycle.  I did discuss with her that there is a 2% risk of pulmonary fibrosis, a less than 0.5% risk of leukemia and a 0.5% risk of permanent hair loss with the docetaxel.  She understands and would like to proceed.  We talked about that if she develops a fever, she should go to the emergency room within 1 hour and come to the [facility] if possible.  She understands.  I discussed that the Neulasta or Onpro works most of the time, but sometimes it does not and she should come in if she has a fever.   2.  Neulasta or Onpro can result in low back pain and flu-like symptoms.  I discussed that Claritin can sometimes help with this and she can get that over-the-counter.   3.  Nausea and vomiting are potential side effects, and a prescription for prochlorperazine 5 mg q.6h. as well as lorazepam 0.5 mg q.6 hours have been given.   4.  Reaffirmation of decision for TC versus dose-dense AC and paclitaxel.  I discussed with Julieta that she does have moderate aortic stenosis.  Development of congestive heart failure with the risk of 4% with AC could be an even higher risk if she has  moderate aortic stenosis within guidelines for a less than 1 cm tumor and less than 1 cm triple negative tumor that is node negative.  I do think that TC is a reasonable option.   5.  Discussion of the CREATE-X approach.  We discussed that if there is a less than complete response to neoadjuvant TC, the CREATE-X approach with capecitabine is a reasonable option.   6.  History of factor V Leiden.  I discussed with Dr. Kaz Feliciano, and no thrombosis prophylaxis is required given that Julieta is at age 67 and has had no history of thrombosis, even though she does carry the factor V Leiden mutation -- it is predilection to thrombosis, but anticoagulation is not absolutely indicated in this setting and does have risks.   7.  TSH slightly elevated.  We will continue to monitor.   8.  Cardiology consult with Dr. Tidwell.   9.  Followup.  We will see Julieta in followup in our clinic in 3 weeks or sooner as dictated by symptoms. Return tomorrow for Neulasta.  Follow up with me March 29 with CBC, CMP and docetaxel/cyclophosphamide.         Thank you for allowing us to continue to participate in Julieta Rivera's care.      Jonathan Gay MD      [facility]         I spent 45 minutes with the patient more than 50% of which was in counseling and coordination of care.

## 2018-03-05 ENCOUNTER — RADIANT APPOINTMENT (OUTPATIENT)
Dept: RADIOLOGY | Facility: AMBULATORY SURGERY CENTER | Age: 68
End: 2018-03-05
Attending: INTERNAL MEDICINE
Payer: COMMERCIAL

## 2018-03-05 ENCOUNTER — SURGERY (OUTPATIENT)
Age: 68
End: 2018-03-05

## 2018-03-05 ENCOUNTER — HOSPITAL ENCOUNTER (OUTPATIENT)
Facility: AMBULATORY SURGERY CENTER | Age: 68
End: 2018-03-05
Attending: PHYSICIAN ASSISTANT
Payer: COMMERCIAL

## 2018-03-05 VITALS
SYSTOLIC BLOOD PRESSURE: 126 MMHG | BODY MASS INDEX: 24.23 KG/M2 | DIASTOLIC BLOOD PRESSURE: 78 MMHG | RESPIRATION RATE: 18 BRPM | HEART RATE: 72 BPM | WEIGHT: 123.4 LBS | HEIGHT: 60 IN | OXYGEN SATURATION: 98 % | TEMPERATURE: 97.6 F

## 2018-03-05 DIAGNOSIS — Z17.1 MALIGNANT NEOPLASM OF UPPER-OUTER QUADRANT OF RIGHT BREAST IN FEMALE, ESTROGEN RECEPTOR NEGATIVE (H): ICD-10-CM

## 2018-03-05 DIAGNOSIS — C50.411 MALIGNANT NEOPLASM OF UPPER-OUTER QUADRANT OF RIGHT BREAST IN FEMALE, ESTROGEN RECEPTOR NEGATIVE (H): ICD-10-CM

## 2018-03-05 LAB
ERYTHROCYTE [DISTWIDTH] IN BLOOD BY AUTOMATED COUNT: 12.6 % (ref 10–15)
HCT VFR BLD AUTO: 39.1 % (ref 35–47)
HGB BLD-MCNC: 13.6 G/DL (ref 11.7–15.7)
INR PPP: 0.94 (ref 0.86–1.14)
MCH RBC QN AUTO: 30.9 PG (ref 26.5–33)
MCHC RBC AUTO-ENTMCNC: 34.8 G/DL (ref 31.5–36.5)
MCV RBC AUTO: 89 FL (ref 78–100)
PLATELET # BLD AUTO: 192 10E9/L (ref 150–450)
RBC # BLD AUTO: 4.4 10E12/L (ref 3.8–5.2)
WBC # BLD AUTO: 5 10E9/L (ref 4–11)

## 2018-03-05 DEVICE — CATH PORT POWERPORT CLEARVUE SLIM 6FR 5616000
Type: IMPLANTABLE DEVICE | Site: CHEST | Status: NON-FUNCTIONAL
Removed: 2018-11-15

## 2018-03-05 RX ORDER — SODIUM CHLORIDE, SODIUM LACTATE, POTASSIUM CHLORIDE, CALCIUM CHLORIDE 600; 310; 30; 20 MG/100ML; MG/100ML; MG/100ML; MG/100ML
INJECTION, SOLUTION INTRAVENOUS CONTINUOUS
Status: DISCONTINUED | OUTPATIENT
Start: 2018-03-05 | End: 2018-03-06 | Stop reason: HOSPADM

## 2018-03-05 RX ORDER — MEPERIDINE HYDROCHLORIDE 25 MG/ML
12.5 INJECTION INTRAMUSCULAR; INTRAVENOUS; SUBCUTANEOUS
Status: DISCONTINUED | OUTPATIENT
Start: 2018-03-05 | End: 2018-03-06 | Stop reason: HOSPADM

## 2018-03-05 RX ORDER — NALOXONE HYDROCHLORIDE 0.4 MG/ML
.1-.4 INJECTION, SOLUTION INTRAMUSCULAR; INTRAVENOUS; SUBCUTANEOUS
Status: DISCONTINUED | OUTPATIENT
Start: 2018-03-05 | End: 2018-03-06 | Stop reason: HOSPADM

## 2018-03-05 RX ORDER — HEPARIN SODIUM,PORCINE 10 UNIT/ML
5 VIAL (ML) INTRAVENOUS EVERY 24 HOURS
Status: DISCONTINUED | OUTPATIENT
Start: 2018-03-05 | End: 2018-03-06 | Stop reason: HOSPADM

## 2018-03-05 RX ORDER — LIDOCAINE 40 MG/G
CREAM TOPICAL
Status: DISCONTINUED | OUTPATIENT
Start: 2018-03-05 | End: 2018-03-06 | Stop reason: HOSPADM

## 2018-03-05 RX ORDER — ONDANSETRON 4 MG/1
4 TABLET, ORALLY DISINTEGRATING ORAL EVERY 30 MIN PRN
Status: DISCONTINUED | OUTPATIENT
Start: 2018-03-05 | End: 2018-03-06 | Stop reason: HOSPADM

## 2018-03-05 RX ORDER — HEPARIN SODIUM (PORCINE) LOCK FLUSH IV SOLN 100 UNIT/ML 100 UNIT/ML
5 SOLUTION INTRAVENOUS
Status: DISCONTINUED | OUTPATIENT
Start: 2018-03-05 | End: 2018-03-06 | Stop reason: HOSPADM

## 2018-03-05 RX ORDER — SODIUM CHLORIDE 9 MG/ML
INJECTION, SOLUTION INTRAVENOUS CONTINUOUS
Status: DISCONTINUED | OUTPATIENT
Start: 2018-03-05 | End: 2018-03-06 | Stop reason: HOSPADM

## 2018-03-05 RX ORDER — ONDANSETRON 2 MG/ML
4 INJECTION INTRAMUSCULAR; INTRAVENOUS EVERY 30 MIN PRN
Status: DISCONTINUED | OUTPATIENT
Start: 2018-03-05 | End: 2018-03-06 | Stop reason: HOSPADM

## 2018-03-05 RX ORDER — FENTANYL CITRATE 50 UG/ML
25-50 INJECTION, SOLUTION INTRAMUSCULAR; INTRAVENOUS
Status: DISCONTINUED | OUTPATIENT
Start: 2018-03-05 | End: 2018-03-06 | Stop reason: HOSPADM

## 2018-03-05 RX ADMIN — Medication 20 ML: at 14:11

## 2018-03-05 NOTE — IP AVS SNAPSHOT
[facility]    [address] [place] [address]    Phone:  [phone]    Fax:  [phone]                                       After Visit Summary   3/5/2018    Julieta Rivera    MRN: [id number]           After Visit Summary Signature Page     I have received my discharge instructions, and my questions have been answered. I have discussed any challenges I see with this plan with the nurse or doctor.    ..........................................................................................................................................  Patient/Patient Representative Signature      ..........................................................................................................................................  Patient Representative Print Name and Relationship to Patient    ..................................................               ................................................  Date                                            Time    ..........................................................................................................................................  Reviewed by Signature/Title    ...................................................              ..............................................  Date                                                            Time

## 2018-03-05 NOTE — IP AVS SNAPSHOT
MRN:[id number]                      After Visit Summary   3/5/2018    Julieta Rivera    MRN: [id number]           Thank you!     Thank you for choosing [facility] for your care. Our goal is always to provide you with excellent care. Hearing back from our patients is one way we can continue to improve our services. Please take a few minutes to complete the written survey that you may receive in the mail after you visit with us. Thank you!        Patient Information     Date Of Birth          1950        About your hospital stay     You were admitted on:  March 5, 2018 You last received care in the:  [facility]    You were discharged on:  March 5, 2018       Who to Call     For medical emergencies, please call 911.  For non-urgent questions about your medical care, please call your primary care provider or clinic, None  For questions related to your surgery, please call your surgery clinic        Attending Provider     Provider Brian Nixon PA-C Radiology       Primary Care Provider Fax #    Provider Not In System [phone]      Your next 10 appointments already scheduled     Mar 08, 2018 11:00 AM CST   [facility] Lab Draw with  [facility] LAB DRAW   [facility] Lab Draw ([facility])    [address] [place] [address]  [address]  [facility] [address]   [phone]            Mar 08, 2018 11:30 AM CST   (Arrive by 11:15 AM)   Return Visit with Jonathan Gay MD   [facility] ([facility])    [address] [place] [address]  [address]  [facility] [address]   [phone]            Mar 08, 2018 12:30 PM CST   Infusion 180 with  ONCOLOGY INFUSION, UC 30 ATC   [facility] ([facility])    [address] [place] [address]  [address]  [facility] [address]   [phone]            Mar 19, 2018  9:30 AM CDT   (Arrive by 9:15 AM)   NEW CANCER  VISIT with Kaley Tidwell MD   [facility] ([facility])    [address]  [address]  [facility] [address]   [phone]            Mar 29, 2018 12:00 PM CDT   [facility] Lab Draw with UC [facility] LAB DRAW   [facility] Lab Draw ([facility])    [address]  [address]  [facility] [address]   [phone]            Mar 29, 2018 12:30 PM CDT   Infusion 180 with  ONCOLOGY INFUSION, UC 30 ATC   [facility] ([facility])    [address]  [address]  [facility] [address]   [phone]              Further instructions from your care team         A collaboration between [facility] and [facility]  Experts in minimally invasive, targeted treatments performed using imaging guidance    Venous Access Device,  Port Catheter or Tunneled Central Line Placement    Today you had a procedure today to install a venous access device; either a tunneled central vein catheter or a subcutaneous port catheter.    One of our Radiology PAs performed this procedure for you today:  ? Brian Tolbert PA-C    After you go home:  - Drink plenty of fluids.  Generally 6-8 (8 ounce) glasses a day is recommended.  - Resume your regular diet unless otherwise ordered by a medical provider.  - Keep any applied tape/gauze dressings clean and dry.  Change tape/gauze dressings if they get wet or soiled.  - You may shower the following day after procedure, however cover and protect from moisture any tape/gauze dressings.  You may let water hit and run over dried skin glue, but do not scrub.  Pat the area dry after showering.  - Port placement incisions are closed with absorbable suture, meaning they do not need to be removed at a later date, and a topical skin adhesive (skin glue).  This glue will wear off in 7-14 days.  Do not remove before this time.  If 14 days  have passed and residual glue is present, you may gently remove it.  - Do not apply gels, lotions, or ointments to the glue site for the first 10 days as this may cause the glue to prematurely soften and fail.  - Do not perform strenuous activities or lift greater than 10 pounds for the next three days.  - If there is bleeding or oozing from the procedure site, apply firm pressure to the area for 5-10 minutes.  If the bleeding continues seek medical advice at the numbers below.  - Mild procedure site discomfort can be treated with an ice pack and over-the-counter pain relievers.        Call our Interventional Radiology (IR) service if:  - If you start bleeding from the procedure site.  If you do start to bleed from the site, lie down and hold some pressure on the site.  Our radiology provider can help you decide if you need to return to the hospital.  - If you have new or worsening pain related to the procedure.  - If you have concerning swelling at the procedure site.  - If you develop persistent nausea or vomiting.  - If you develop hives or a rash or any unexplained itching.  - If you have a fever of greater than 100.5  F and chills in the first 5 days after procedure.  - Any other concerns related to your procedure.      [facility]  Interventional Radiology (IR)  [address] [place] [address]    Contact Number:  [phone]  (IR control desk)  - Monday - Friday 8:00 am - 4:30 pm    After hours for urgent concerns:  [phone]  - After 4:30 pm Monday - Friday, Weekends and Holidays.   - Ask for Interventional Radiology on-call.  Someone is available 24 hours a day.  - [facility] toll free number:  [phone]        Pending Results     Date and Time Order Name Status Description    3/5/2018 1152 IR CHEST PORT PLACEMENT > 5 YRS OF AGE In process             Admission Information     Date & Time Provider Department Dept. Phone    3/5/2018 Didi  RAD [facility] [phone]      Your Vitals Were     Blood Pressure Pulse Temperature Respirations Height Weight    114/70 74 97.6  F (36.4  C) (Oral) 21 1.524 m (5') 56 kg (123 lb 6.4 oz)    Pulse Oximetry BMI (Body Mass Index)                98% 24.1 kg/m2          [employer] Information     [employer] gives you secure access to your electronic health record. If you see a primary care provider, you can also send messages to your care team and make appointments. If you have questions, please call your primary care clinic.  If you do not have a primary care provider, please call [phone] and they will assist you.      [employer] is an electronic gateway that provides easy, online access to your medical records. With [employer], you can request a clinic appointment, read your test results, renew a prescription or communicate with your care team.     To access your existing account, please contact your [facility] or call [phone] for assistance.        Care EveryWhere ID     This is your Care EveryWhere ID. This could be used by other organizations to access your [place] medical records  [id number]        Equal Access to Services     CARTER DEL RIO : Hadii india terrello Sohussein, waaxda luqadaha, qaybta kaalmada adebal, khadar solano. So [facility] [phone].    ATENCIÓN: Si habla español, tiene a phillips disposición servicios gratuitos de asistencia lingüística. Kvng al [phone].    We comply with applicable federal civil rights laws and [place] laws. We do not discriminate on the basis of race, color, national origin, age, disability, sex, sexual orientation, or gender identity.               Review of your medicines      UNREVIEWED medicines. Ask your doctor about these medicines        Dose / Directions    FISH OIL PO        Dose:  1 capsule   Take 1 capsule by mouth daily.   Refills:  0       * levothyroxine 100  MCG tablet   Commonly known as:  SYNTHROID/LEVOTHROID   Used for:  Hypothyroidism due to Hashimoto's thyroiditis        Take  by mouth See Admin Instructions. Alternate 0.10 mg tab with 0.088 mg tab every other day   Quantity:  45 tablet   Refills:  3       * levothyroxine 88 MCG tablet   Commonly known as:  SYNTHROID/LEVOTHROID   Used for:  Hypothyroidism due to Hashimoto's thyroiditis        Take  by mouth daily. Alternate 0.088 mg with 0.10 mg every other day   Quantity:  45 tablet   Refills:  3       MULTIVITAMIN & MINERAL PO        Dose:  2 tablet   Take 2 tablets by mouth daily.   Refills:  0       VITAMIN D (CHOLECALCIFEROL) PO        Dose:  2000 Units   Take 2,000 Units by mouth daily   Refills:  0       * Notice:  This list has 2 medication(s) that are the same as other medications prescribed for you. Read the directions carefully, and ask your doctor or other care provider to review them with you.             Protect others around you: Learn how to safely use, store and throw away your medicines at www.disposemymeds.org.             Medication List: This is a list of all your medications and when to take them. Check marks below indicate your daily home schedule. Keep this list as a reference.      Medications           Morning Afternoon Evening Bedtime As Needed    FISH OIL PO   Take 1 capsule by mouth daily.                                * levothyroxine 100 MCG tablet   Commonly known as:  SYNTHROID/LEVOTHROID   Take  by mouth See Admin Instructions. Alternate 0.10 mg tab with 0.088 mg tab every other day                                * levothyroxine 88 MCG tablet   Commonly known as:  SYNTHROID/LEVOTHROID   Take  by mouth daily. Alternate 0.088 mg with 0.10 mg every other day                                MULTIVITAMIN & MINERAL PO   Take 2 tablets by mouth daily.                                VITAMIN D (CHOLECALCIFEROL) PO   Take 2,000 Units by mouth daily                                * Notice:   This list has 2 medication(s) that are the same as other medications prescribed for you. Read the directions carefully, and ask your doctor or other care provider to review them with you.

## 2018-03-05 NOTE — BRIEF OP NOTE
Interventional Radiology Brief Post Procedure Note    Procedure: Central Venous Chest Port Placement     Proceduralist: Wm Leon PA-C    Assistant: Prashant RICHARDS    Time Out: Prior to the start of the procedure and with procedural staff participation, I verbally confirmed the patient s identity using two indicators, relevant allergies, that the procedure was appropriate and matched the consent or emergent situation, and that the correct equipment/implants were available. Immediately prior to starting the procedure I conducted the Time Out with the procedural staff and re-confirmed the patient s name, procedure, and site/side. (The Joint Commission universal protocol was followed.)  Yes        Sedation: None. Local Anesthestic used    Findings: Image guided placement of left IJ 6 Fr. 27 cm single lumen central venous chest port. Port is ready for use.     Estimated Blood Loss: Less than 10 ml    Fluoroscopy Time:  1.0 minute(s)    SPECIMENS: None    Complications: 1. None     Condition: Stable    Plan: Follow up per primary team. Return to IR for removal when indicated.     Comments: See dictated procedure note for full details.    Brian Tolbert PA-C

## 2018-03-05 NOTE — DISCHARGE INSTRUCTIONS
A collaboration between [facility] and [facility]  Experts in minimally invasive, targeted treatments performed using imaging guidance    Venous Access Device,  Port Catheter or Tunneled Central Line Placement    Today you had a procedure today to install a venous access device; either a tunneled central vein catheter or a subcutaneous port catheter.    One of our Radiology PAs performed this procedure for you today:  ? Brian Tolbert PA-C    After you go home:  - Drink plenty of fluids.  Generally 6-8 (8 ounce) glasses a day is recommended.  - Resume your regular diet unless otherwise ordered by a medical provider.  - Keep any applied tape/gauze dressings clean and dry.  Change tape/gauze dressings if they get wet or soiled.  - You may shower the following day after procedure, however cover and protect from moisture any tape/gauze dressings.  You may let water hit and run over dried skin glue, but do not scrub.  Pat the area dry after showering.  - Port placement incisions are closed with absorbable suture, meaning they do not need to be removed at a later date, and a topical skin adhesive (skin glue).  This glue will wear off in 7-14 days.  Do not remove before this time.  If 14 days have passed and residual glue is present, you may gently remove it.  - Do not apply gels, lotions, or ointments to the glue site for the first 10 days as this may cause the glue to prematurely soften and fail.  - Do not perform strenuous activities or lift greater than 10 pounds for the next three days.  - If there is bleeding or oozing from the procedure site, apply firm pressure to the area for 5-10 minutes.  If the bleeding continues seek medical advice at the numbers below.  - Mild procedure site discomfort can be treated with an ice pack and over-the-counter pain relievers.        Call our Interventional Radiology (IR) service if:  - If you start bleeding from the procedure  site.  If you do start to bleed from the site, lie down and hold some pressure on the site.  Our radiology provider can help you decide if you need to return to the hospital.  - If you have new or worsening pain related to the procedure.  - If you have concerning swelling at the procedure site.  - If you develop persistent nausea or vomiting.  - If you develop hives or a rash or any unexplained itching.  - If you have a fever of greater than 100.5  F and chills in the first 5 days after procedure.  - Any other concerns related to your procedure.      [facility]  Interventional Radiology (IR)  [address] [place] [address]    Contact Number:  [phone]  (IR control desk)  - Monday - Friday 8:00 am - 4:30 pm    After hours for urgent concerns:  [phone]  - After 4:30 pm Monday - Friday, Weekends and Holidays.   - Ask for Interventional Radiology on-call.  Someone is available 24 hours a day.  - [facility] toll free number:  [phone]

## 2018-03-07 ENCOUNTER — CARE COORDINATION (OUTPATIENT)
Dept: ONCOLOGY | Facility: CLINIC | Age: 68
End: 2018-03-07

## 2018-03-07 DIAGNOSIS — C50.419 MALIGNANT NEOPLASM OF UPPER OUTER QUADRANT OF BREAST IN FEMALE, ESTROGEN RECEPTOR NEGATIVE (H): Primary | ICD-10-CM

## 2018-03-07 DIAGNOSIS — Z17.1 MALIGNANT NEOPLASM OF UPPER OUTER QUADRANT OF BREAST IN FEMALE, ESTROGEN RECEPTOR NEGATIVE (H): Primary | ICD-10-CM

## 2018-03-07 RX ORDER — LIDOCAINE/PRILOCAINE 2.5 %-2.5%
CREAM (GRAM) TOPICAL
Qty: 30 G | Refills: 1 | Status: SHIPPED | OUTPATIENT
Start: 2018-03-07 | End: 2018-06-04

## 2018-03-07 NOTE — PROGRESS NOTES
Spoke to patient to discuss chemotherapy education tomorrow at 1PM during first infusion. Discussed if patient wants to use EMLA cream to start six days after port placement and patient is interested and sent prescription to the [facility] to have available tomorrow to start using with next infusion.  Answered all patient's questions and verbalized understanding. Tamanna Neff RN, BSN.

## 2018-03-08 ENCOUNTER — ONCOLOGY VISIT (OUTPATIENT)
Dept: ONCOLOGY | Facility: CLINIC | Age: 68
End: 2018-03-08
Attending: INTERNAL MEDICINE
Payer: COMMERCIAL

## 2018-03-08 ENCOUNTER — APPOINTMENT (OUTPATIENT)
Dept: LAB | Facility: CLINIC | Age: 68
End: 2018-03-08
Attending: INTERNAL MEDICINE
Payer: COMMERCIAL

## 2018-03-08 VITALS
OXYGEN SATURATION: 99 % | BODY MASS INDEX: 23.99 KG/M2 | WEIGHT: 122.2 LBS | HEIGHT: 60 IN | RESPIRATION RATE: 16 BRPM | SYSTOLIC BLOOD PRESSURE: 111 MMHG | HEART RATE: 84 BPM | DIASTOLIC BLOOD PRESSURE: 70 MMHG | TEMPERATURE: 97.5 F

## 2018-03-08 DIAGNOSIS — Z17.1 MALIGNANT NEOPLASM OF UPPER-OUTER QUADRANT OF RIGHT BREAST IN FEMALE, ESTROGEN RECEPTOR NEGATIVE (H): Primary | ICD-10-CM

## 2018-03-08 DIAGNOSIS — C50.411 MALIGNANT NEOPLASM OF UPPER-OUTER QUADRANT OF RIGHT BREAST IN FEMALE, ESTROGEN RECEPTOR NEGATIVE (H): Primary | ICD-10-CM

## 2018-03-08 DIAGNOSIS — Z51.89 ENCOUNTER FOR OTHER SPECIFIED AFTERCARE: ICD-10-CM

## 2018-03-08 DIAGNOSIS — E03.9 HYPOTHYROIDISM, UNSPECIFIED TYPE: ICD-10-CM

## 2018-03-08 LAB
ALBUMIN SERPL-MCNC: 3.8 G/DL (ref 3.4–5)
ALP SERPL-CCNC: 63 U/L (ref 40–150)
ALT SERPL W P-5'-P-CCNC: 22 U/L (ref 0–50)
ANION GAP SERPL CALCULATED.3IONS-SCNC: 8 MMOL/L (ref 3–14)
AST SERPL W P-5'-P-CCNC: 23 U/L (ref 0–45)
BASOPHILS # BLD AUTO: 0 10E9/L (ref 0–0.2)
BASOPHILS NFR BLD AUTO: 0.3 %
BILIRUB SERPL-MCNC: 0.5 MG/DL (ref 0.2–1.3)
BUN SERPL-MCNC: 12 MG/DL (ref 7–30)
CALCIUM SERPL-MCNC: 8.8 MG/DL (ref 8.5–10.1)
CHLORIDE SERPL-SCNC: 102 MMOL/L (ref 94–109)
CO2 SERPL-SCNC: 24 MMOL/L (ref 20–32)
CREAT SERPL-MCNC: 0.59 MG/DL (ref 0.52–1.04)
DIFFERENTIAL METHOD BLD: NORMAL
EOSINOPHIL # BLD AUTO: 0.1 10E9/L (ref 0–0.7)
EOSINOPHIL NFR BLD AUTO: 0.9 %
ERYTHROCYTE [DISTWIDTH] IN BLOOD BY AUTOMATED COUNT: 12.5 % (ref 10–15)
GFR SERPL CREATININE-BSD FRML MDRD: >90 ML/MIN/1.7M2
GLUCOSE SERPL-MCNC: 119 MG/DL (ref 70–99)
HCT VFR BLD AUTO: 40 % (ref 35–47)
HGB BLD-MCNC: 13.6 G/DL (ref 11.7–15.7)
IMM GRANULOCYTES # BLD: 0 10E9/L (ref 0–0.4)
IMM GRANULOCYTES NFR BLD: 0.3 %
LYMPHOCYTES # BLD AUTO: 1.7 10E9/L (ref 0.8–5.3)
LYMPHOCYTES NFR BLD AUTO: 25.6 %
MCH RBC QN AUTO: 31.2 PG (ref 26.5–33)
MCHC RBC AUTO-ENTMCNC: 34 G/DL (ref 31.5–36.5)
MCV RBC AUTO: 92 FL (ref 78–100)
MONOCYTES # BLD AUTO: 0.5 10E9/L (ref 0–1.3)
MONOCYTES NFR BLD AUTO: 7.2 %
NEUTROPHILS # BLD AUTO: 4.4 10E9/L (ref 1.6–8.3)
NEUTROPHILS NFR BLD AUTO: 65.7 %
NRBC # BLD AUTO: 0 10*3/UL
NRBC BLD AUTO-RTO: 0 /100
PLATELET # BLD AUTO: 165 10E9/L (ref 150–450)
POTASSIUM SERPL-SCNC: 3.5 MMOL/L (ref 3.4–5.3)
PROT SERPL-MCNC: 6.9 G/DL (ref 6.8–8.8)
RBC # BLD AUTO: 4.36 10E12/L (ref 3.8–5.2)
SODIUM SERPL-SCNC: 134 MMOL/L (ref 133–144)
T4 FREE SERPL-MCNC: 1.4 NG/DL (ref 0.76–1.46)
TSH SERPL DL<=0.005 MIU/L-ACNC: 4.41 MU/L (ref 0.4–4)
WBC # BLD AUTO: 6.7 10E9/L (ref 4–11)

## 2018-03-08 PROCEDURE — 99215 OFFICE O/P EST HI 40 MIN: CPT | Mod: ZP | Performed by: INTERNAL MEDICINE

## 2018-03-08 PROCEDURE — 25000128 H RX IP 250 OP 636: Mod: ZF | Performed by: INTERNAL MEDICINE

## 2018-03-08 PROCEDURE — 84443 ASSAY THYROID STIM HORMONE: CPT | Performed by: INTERNAL MEDICINE

## 2018-03-08 PROCEDURE — 84439 ASSAY OF FREE THYROXINE: CPT | Performed by: INTERNAL MEDICINE

## 2018-03-08 PROCEDURE — 96417 CHEMO IV INFUS EACH ADDL SEQ: CPT

## 2018-03-08 PROCEDURE — 96375 TX/PRO/DX INJ NEW DRUG ADDON: CPT

## 2018-03-08 PROCEDURE — 85025 COMPLETE CBC W/AUTO DIFF WBC: CPT | Performed by: INTERNAL MEDICINE

## 2018-03-08 PROCEDURE — 96413 CHEMO IV INFUSION 1 HR: CPT

## 2018-03-08 PROCEDURE — 80053 COMPREHEN METABOLIC PANEL: CPT | Performed by: INTERNAL MEDICINE

## 2018-03-08 PROCEDURE — 96377 APPLICATON ON-BODY INJECTOR: CPT | Mod: 59

## 2018-03-08 PROCEDURE — G0463 HOSPITAL OUTPT CLINIC VISIT: HCPCS | Mod: ZF

## 2018-03-08 RX ORDER — PALONOSETRON 0.05 MG/ML
0.25 INJECTION, SOLUTION INTRAVENOUS ONCE
Status: COMPLETED | OUTPATIENT
Start: 2018-03-08 | End: 2018-03-08

## 2018-03-08 RX ORDER — LORAZEPAM 2 MG/ML
0.5 INJECTION INTRAMUSCULAR EVERY 4 HOURS PRN
Status: CANCELLED
Start: 2018-03-08

## 2018-03-08 RX ORDER — SODIUM CHLORIDE 9 MG/ML
1000 INJECTION, SOLUTION INTRAVENOUS CONTINUOUS PRN
Status: CANCELLED
Start: 2018-03-08

## 2018-03-08 RX ORDER — PROCHLORPERAZINE MALEATE 10 MG
5 TABLET ORAL EVERY 6 HOURS PRN
Qty: 30 TABLET | Refills: 3 | Status: SHIPPED | OUTPATIENT
Start: 2018-03-08 | End: 2018-05-31

## 2018-03-08 RX ORDER — EPINEPHRINE 1 MG/ML
0.3 INJECTION, SOLUTION, CONCENTRATE INTRAVENOUS EVERY 5 MIN PRN
Status: CANCELLED | OUTPATIENT
Start: 2018-03-08

## 2018-03-08 RX ORDER — HEPARIN SODIUM (PORCINE) LOCK FLUSH IV SOLN 100 UNIT/ML 100 UNIT/ML
5 SOLUTION INTRAVENOUS ONCE
Status: COMPLETED | OUTPATIENT
Start: 2018-03-08 | End: 2018-03-08

## 2018-03-08 RX ORDER — DEXAMETHASONE 4 MG/1
8 TABLET ORAL 2 TIMES DAILY WITH MEALS
Qty: 10 TABLET | Refills: 3 | Status: SHIPPED | OUTPATIENT
Start: 2018-03-08 | End: 2018-03-12

## 2018-03-08 RX ORDER — ALBUTEROL SULFATE 0.83 MG/ML
2.5 SOLUTION RESPIRATORY (INHALATION)
Status: CANCELLED | OUTPATIENT
Start: 2018-03-08

## 2018-03-08 RX ORDER — MEPERIDINE HYDROCHLORIDE 25 MG/ML
25 INJECTION INTRAMUSCULAR; INTRAVENOUS; SUBCUTANEOUS EVERY 30 MIN PRN
Status: CANCELLED | OUTPATIENT
Start: 2018-03-08

## 2018-03-08 RX ORDER — ALBUTEROL SULFATE 90 UG/1
1-2 AEROSOL, METERED RESPIRATORY (INHALATION)
Status: CANCELLED
Start: 2018-03-08

## 2018-03-08 RX ORDER — HEPARIN SODIUM (PORCINE) LOCK FLUSH IV SOLN 100 UNIT/ML 100 UNIT/ML
500 SOLUTION INTRAVENOUS ONCE
Status: COMPLETED | OUTPATIENT
Start: 2018-03-08 | End: 2018-03-08

## 2018-03-08 RX ORDER — PALONOSETRON 0.05 MG/ML
0.25 INJECTION, SOLUTION INTRAVENOUS ONCE
Status: CANCELLED
Start: 2018-03-08 | End: 2018-03-08

## 2018-03-08 RX ORDER — METHYLPREDNISOLONE SODIUM SUCCINATE 125 MG/2ML
125 INJECTION, POWDER, LYOPHILIZED, FOR SOLUTION INTRAMUSCULAR; INTRAVENOUS
Status: CANCELLED
Start: 2018-03-08

## 2018-03-08 RX ORDER — LORAZEPAM 0.5 MG/1
0.5 TABLET ORAL EVERY 4 HOURS PRN
Qty: 30 TABLET | Refills: 3 | Status: SHIPPED | OUTPATIENT
Start: 2018-03-08 | End: 2018-06-04

## 2018-03-08 RX ORDER — DIPHENHYDRAMINE HYDROCHLORIDE 50 MG/ML
50 INJECTION INTRAMUSCULAR; INTRAVENOUS
Status: CANCELLED
Start: 2018-03-08

## 2018-03-08 RX ORDER — EPINEPHRINE 0.3 MG/.3ML
0.3 INJECTION SUBCUTANEOUS EVERY 5 MIN PRN
Status: CANCELLED | OUTPATIENT
Start: 2018-03-08

## 2018-03-08 RX ADMIN — SODIUM CHLORIDE, PRESERVATIVE FREE 500 UNITS: 5 INJECTION INTRAVENOUS at 15:56

## 2018-03-08 RX ADMIN — PALONOSETRON HYDROCHLORIDE 0.25 MG: 0.25 INJECTION INTRAVENOUS at 13:20

## 2018-03-08 RX ADMIN — CYCLOPHOSPHAMIDE 1000 MG: 1 INJECTION, POWDER, FOR SOLUTION INTRAVENOUS; ORAL at 15:02

## 2018-03-08 RX ADMIN — SODIUM CHLORIDE 250 ML: 9 INJECTION, SOLUTION INTRAVENOUS at 13:20

## 2018-03-08 RX ADMIN — PEGFILGRASTIM 6 MG: KIT SUBCUTANEOUS at 15:47

## 2018-03-08 RX ADMIN — DEXAMETHASONE SODIUM PHOSPHATE 12 MG: 10 INJECTION, SOLUTION INTRAMUSCULAR; INTRAVENOUS at 13:22

## 2018-03-08 RX ADMIN — SODIUM CHLORIDE, PRESERVATIVE FREE 5 ML: 5 INJECTION INTRAVENOUS at 11:21

## 2018-03-08 RX ADMIN — DOCETAXEL 120 MG: 20 INJECTION, SOLUTION, CONCENTRATE INTRAVENOUS at 13:52

## 2018-03-08 ASSESSMENT — PAIN SCALES - GENERAL: PAINLEVEL: MILD PAIN (2)

## 2018-03-08 NOTE — MR AVS SNAPSHOT
After Visit Summary   3/8/2018    Julieta Rivera    MRN: [id number]           Patient Information     Date Of Birth          1950        Visit Information        Provider Department      3/8/2018 12:30 PM  30 ATC;  ONCOLOGY INFUSION [facility]        Today's Diagnoses     Malignant neoplasm of upper-outer quadrant of right breast in female, estrogen receptor negative (H)    -  1    Hypothyroidism, unspecified type        Encounter for other specified aftercare          Care Instructions    Neulasta Onpro On-Body injector applied to left arm at 4pm  Neulasta injection will start on FRIDAY at 7pm, approximately 27 hours after application applied today.   When the dose delivery starts, it will take about 45 minutes to complete.  You may remove the on-body neulasta patch at 8pm.  Neulasta Onpro On-Body should have green flashing light. Call triage or on-call MD if injector flashes red or appears to be leaking.  Keep Onpro On-Body Neulasta 4 inches away from electrical equipment and to avoid showering 4 hours prior to injection.         [facility] Main Line: [phone]    Call triage nurse with chills and/or temperature greater than or equal to 100.4, uncontrolled nausea/vomiting, diarrhea, constipation, dizziness, shortness of breath, chest pain, bleeding, unexplained bruising, or any new/concerning symptoms, questions/concerns.   If you are having any concerning symptoms or wish to speak to a provider before your next infusion visit, please call your care coordinator or triage to notify them so we can adequately serve you.   Triage Nurse Line: [phone]    If after hours, weekends, or holidays, call main hospital  and ask for Oncology doctor on call @ [phone]               March 2018 Sunday Monday Tuesday Wednesday Thursday Friday Saturday                       1     NEW CLOTTING DISORDER    9:30 AM   (60 min.)   Bill Hurd MD    [facility]     LAB   11:15 AM   (15 min.)   RD LAB   [facility] 2     3       4     5     Outpatient Visit   10:50 AM   [facility]     IR CHEST PORT PLACEMENT >5 YRS   11:00 AM   (75 min.)   [id number]   [facility]     INSERT PORT VASCULAR ACCESS   12:30 PM   Brian Tolbert PA-C   [facility] OR 6     7     8     [facility] DRAW   11:00 AM   (15 min.)    [facility]     [facility] RETURN   11:15 AM   (30 min.)   Jonathan Gay MD   [facility]     [facility] ONC INFUSION 180   12:30 PM   (180 min.)   UC ONCOLOGY INFUSION   [facility] 9     10       11     12     13     14     15     16     17       18     19     P NEW CANCER    9:15 AM   (60 min.)   Kaley Tidwell MD   [facility] 20     21     22     23     24       25     26     27     28     29     P [facility] LAB DRAW   12:00 PM   (15 min.)    [facility] LAB DRAW   [facility] Lab Draw     [facility] RETURN   12:15 PM   (30 min.)   [facility]    1:00 PM   (180 min.)    ONCOLOGY INFUSION   [facility] 30 31 April 2018 Sunday Monday Tuesday Wednesday Thursday Friday Saturday   1     2     3     4     5     6     7       8     9     10     11     12     13     14       15     16     17     18     19     20     21       22     23     24     25     26     27     28       29     30                                           Lab Results:  Recent Results (from the past 12 hour(s))   CBC with platelets differential    Collection Time: 03/08/18 11:32 AM   Result Value Ref Range    WBC 6.7 4.0 - 11.0 10e9/L    RBC Count 4.36 3.8 - 5.2 10e12/L    Hemoglobin 13.6 11.7 - 15.7 g/dL    Hematocrit 40.0 35.0 - 47.0 %    MCV 92 78 - 100 fl    MCH 31.2 26.5 - 33.0 pg    MCHC 34.0 31.5 - 36.5 g/dL    RDW  12.5 10.0 - 15.0 %    Platelet Count 165 150 - 450 10e9/L    Diff Method Automated Method     % Neutrophils 65.7 %    % Lymphocytes 25.6 %    % Monocytes 7.2 %    % Eosinophils 0.9 %    % Basophils 0.3 %    % Immature Granulocytes 0.3 %    Nucleated RBCs 0 0 /100    Absolute Neutrophil 4.4 1.6 - 8.3 10e9/L    Absolute Lymphocytes 1.7 0.8 - 5.3 10e9/L    Absolute Monocytes 0.5 0.0 - 1.3 10e9/L    Absolute Eosinophils 0.1 0.0 - 0.7 10e9/L    Absolute Basophils 0.0 0.0 - 0.2 10e9/L    Abs Immature Granulocytes 0.0 0 - 0.4 10e9/L    Absolute Nucleated RBC 0.0    Comprehensive metabolic panel    Collection Time: 03/08/18 11:32 AM   Result Value Ref Range    Sodium 134 133 - 144 mmol/L    Potassium 3.5 3.4 - 5.3 mmol/L    Chloride 102 94 - 109 mmol/L    Carbon Dioxide 24 20 - 32 mmol/L    Anion Gap 8 3 - 14 mmol/L    Glucose 119 (H) 70 - 99 mg/dL    Urea Nitrogen 12 7 - 30 mg/dL    Creatinine 0.59 0.52 - 1.04 mg/dL    GFR Estimate >90 >60 mL/min/1.7m2    GFR Estimate If Black >90 >60 mL/min/1.7m2    Calcium 8.8 8.5 - 10.1 mg/dL    Bilirubin Total 0.5 0.2 - 1.3 mg/dL    Albumin 3.8 3.4 - 5.0 g/dL    Protein Total 6.9 6.8 - 8.8 g/dL    Alkaline Phosphatase 63 40 - 150 U/L    ALT 22 0 - 50 U/L    AST 23 0 - 45 U/L   TSH with free T4 reflex    Collection Time: 03/08/18 11:32 AM   Result Value Ref Range    TSH 4.41 (H) 0.40 - 4.00 mU/L   T4 free    Collection Time: 03/08/18 11:32 AM   Result Value Ref Range    T4 Free 1.40 0.76 - 1.46 ng/dL             Follow-ups after your visit        Your next 10 appointments already scheduled     Mar 19, 2018  9:30 AM CDT   (Arrive by 9:15 AM)   NEW CANCER VISIT with Kaley Tidwell MD   [facility])    [address] [place] [address]  [address] [place] [address]   [phone]            Mar 29, 2018 12:00 PM CDT   [facility] Lab Draw with  [facility] LAB DRAW   [facility] Lab Draw ([facility] [place])    [address] [place] [address]  [address]  [address]  [facility] [address]   [phone]            Mar 29, 2018 12:30 PM CDT   (Arrive by 12:15 PM)   Return Visit with Jonathan Gay MD   [facility] ([facility])    [address] [place] [address] [address]  [address]  [facility] [id number] [phone]            Mar 29, 2018  1:00 PM CDT   Infusion 180 with UC ONCOLOGY INFUSION, UC 25 ATC   [facility] ([facility])    [address] [place] [address] [address]  [address]  [facility] [id number] [phone]              Future tests that were ordered for you today     Open Standing Orders        Priority Remaining Interval Expires Ordered    CBC with platelets differential Routine 52/52  3/8/2019 3/8/2018    Comprehensive metabolic panel Routine 52/52  3/8/2019 3/8/2018            Who to contact     If you have questions or need follow up information about today's clinic visit or your schedule please contact [facility] directly at [phone].  Normal or non-critical lab and imaging results will be communicated to you by Vopiumhart, letter or phone within 4 business days after the clinic has received the results. If you do not hear from us within 7 days, please contact the clinic through Hilosoft or phone. If you have a critical or abnormal lab result, we will notify you by phone as soon as possible.  Submit refill requests through Hilosoft or call your pharmacy and they will forward the refill request to us. Please allow 3 business days for your refill to be completed.          Additional Information About Your Visit        Vopiumhart Information     Hilosoft gives you secure access to your electronic health record. If you see a primary care provider, you can also send messages to your care team and make appointments. If you have questions, please call your primary care clinic.  If you do not have a primary care provider, please call [phone] and they will assist  you.        Care EveryWhere ID     This is your Care EveryWhere ID. This could be used by other organizations to access your [facility] medical records  [id number]         Blood Pressure from Last 3 Encounters:   03/08/18 111/70   03/05/18 126/78   03/01/18 118/72    Weight from Last 3 Encounters:   03/08/18 55.4 kg (122 lb 3.2 oz)   03/05/18 56 kg (123 lb 6.4 oz)   03/01/18 55.9 kg (123 lb 4.8 oz)              We Performed the Following     CBC with platelets differential     Comprehensive metabolic panel     T4 free     TSH with free T4 reflex          Today's Medication Changes          These changes are accurate as of 3/8/18  4:03 PM.  If you have any questions, ask your nurse or doctor.               Start taking these medicines.        Dose/Directions    dexamethasone 4 MG tablet   Commonly known as:  DECADRON   Used for:  Encounter for other specified aftercare, Malignant neoplasm of upper-outer quadrant of right breast in female, estrogen receptor negative (H)        Dose:  8 mg   Take 2 tablets (8 mg) by mouth 2 times daily (with meals) Start evening AFTER Docetaxel dose and continue for 4 additional doses.   Quantity:  10 tablet   Refills:  3       LORazepam 0.5 MG tablet   Commonly known as:  ATIVAN   Used for:  Encounter for other specified aftercare, Malignant neoplasm of upper-outer quadrant of right breast in female, estrogen receptor negative (H)        Dose:  0.5 mg   Take 1 tablet (0.5 mg) by mouth every 4 hours as needed (Anxiety, Nausea/Vomiting or Sleep)   Quantity:  30 tablet   Refills:  3       prochlorperazine 10 MG tablet   Commonly known as:  COMPAZINE   Used for:  Encounter for other specified aftercare, Malignant neoplasm of upper-outer quadrant of right breast in female, estrogen receptor negative (H)        Dose:  5 mg   Take 0.5 tablets (5 mg) by mouth every 6 hours as needed (Nausea/Vomiting)   Quantity:  30 tablet   Refills:  3            Where to get your medicines      These  medications were sent to [place], [place] - [address]  [address], [facility] [address]    Hours:  TRANSPLANT PHONE NUMBER [phone] Phone:  [phone]     dexamethasone 4 MG tablet    prochlorperazine 10 MG tablet         Some of these will need a paper prescription and others can be bought over the counter.  Ask your nurse if you have questions.     Bring a paper prescription for each of these medications     LORazepam 0.5 MG tablet                Primary Care Provider Fax #    Provider Not In System [phone]                Equal Access to Services     [facility] : Hadii ind[place] pierre hadyazan Sohussein, waaxda luqadaha, qaybta kaalmacait sylvester, khadar hdez . So [facility] [phone].    ATENCIÓN: Si habla español, tiene a phillips disposición servicios gratuitos de asistencia lingüística. Blancoame al [phone].    We comply with applicable federal civil rights laws and [place] laws. We do not discriminate on the basis of race, color, national origin, age, disability, sex, sexual orientation, or gender identity.            Thank you!     Thank you for choosing [facility]  for your care. Our goal is always to provide you with excellent care. Hearing back from our patients is one way we can continue to improve our services. Please take a few minutes to complete the written survey that you may receive in the mail after your visit with us. Thank you!             Your Updated Medication List - Protect others around you: Learn how to safely use, store and throw away your medicines at www.disposemymeds.org.          This list is accurate as of 3/8/18  4:03 PM.  Always use your most recent med list.                   Brand Name Dispense Instructions for use Diagnosis    dexamethasone 4 MG tablet    DECADRON    10 tablet    Take 2 tablets (8 mg) by mouth 2 times daily (with meals) Start evening AFTER Docetaxel  dose and continue for 4 additional doses.    Encounter for other specified aftercare, Malignant neoplasm of upper-outer quadrant of right breast in female, estrogen receptor negative (H)       FISH OIL PO      Take 1 capsule by mouth daily.        * levothyroxine 100 MCG tablet    SYNTHROID/LEVOTHROID    45 tablet    Take  by mouth See Admin Instructions. Alternate 0.10 mg tab with 0.088 mg tab every other day    Hypothyroidism due to Hashimoto's thyroiditis       * levothyroxine 88 MCG tablet    SYNTHROID/LEVOTHROID    45 tablet    Take  by mouth daily. Alternate 0.088 mg with 0.10 mg every other day    Hypothyroidism due to Hashimoto's thyroiditis       lidocaine-prilocaine cream    EMLA    30 g    Apply to port site one hour prior to access. May start using six days after port placement    Malignant neoplasm of upper outer quadrant of breast in female, estrogen receptor negative (H)       LORazepam 0.5 MG tablet    ATIVAN    30 tablet    Take 1 tablet (0.5 mg) by mouth every 4 hours as needed (Anxiety, Nausea/Vomiting or Sleep)    Encounter for other specified aftercare, Malignant neoplasm of upper-outer quadrant of right breast in female, estrogen receptor negative (H)       MULTIVITAMIN & MINERAL PO      Take 2 tablets by mouth daily.        prochlorperazine 10 MG tablet    COMPAZINE    30 tablet    Take 0.5 tablets (5 mg) by mouth every 6 hours as needed (Nausea/Vomiting)    Encounter for other specified aftercare, Malignant neoplasm of upper-outer quadrant of right breast in female, estrogen receptor negative (H)       VITAMIN D (CHOLECALCIFEROL) PO      Take 2,000 Units by mouth daily        * Notice:  This list has 2 medication(s) that are the same as other medications prescribed for you. Read the directions carefully, and ask your doctor or other care provider to review them with you.

## 2018-03-08 NOTE — NURSING NOTE
"Chief Complaint   Patient presents with     Port Draw     Labs drawn from port by RN. Line flushed with saline and heparin. Vs taken and pt checked in for appt     Port accessed with 20g 3/4\" gripper needle by RN, labs collected, line flushed with saline and heparin.  Vitals taken. Pt checked in for appointment(s).    Vikki Han RN  "

## 2018-03-08 NOTE — PATIENT INSTRUCTIONS
Neulasta Onpro On-Body injector applied to left arm at 4pm  Neulasta injection will start on FRIDAY at 7pm, approximately 27 hours after application applied today.   When the dose delivery starts, it will take about 45 minutes to complete.  You may remove the on-body neulasta patch at 8pm.  Neulasta Onpro On-Body should have green flashing light. Call triage or on-call MD if injector flashes red or appears to be leaking.  Keep Onpro On-Body Neulasta 4 inches away from electrical equipment and to avoid showering 4 hours prior to injection.         [facility] Main Line: [phone]    Call triage nurse with chills and/or temperature greater than or equal to 100.4, uncontrolled nausea/vomiting, diarrhea, constipation, dizziness, shortness of breath, chest pain, bleeding, unexplained bruising, or any new/concerning symptoms, questions/concerns.   If you are having any concerning symptoms or wish to speak to a provider before your next infusion visit, please call your care coordinator or triage to notify them so we can adequately serve you.   Triage Nurse Line: [phone]    If after hours, weekends, or holidays, call main hospital  and ask for Oncology doctor on call @ [phone]               March 2018 Sunday Monday Tuesday Wednesday Thursday Friday Saturday                       1     NEW CLOTTING DISORDER    9:30 AM   (60 min.)   Bill Hurd MD   [facility]     LAB   11:15 AM   (15 min.)   RD LAB   [facility] 2     3       4     5     Outpatient Visit   10:50 AM   [facility]     IR CHEST PORT PLACEMENT >5 YRS   11:00 AM   (75 min.)   [id number]   [facility]     INSERT PORT VASCULAR ACCESS   12:30 PM   Brian Tolbert PA-C    OR 6     7     8     [facility] LAB DRAW   11:00 AM   (15 min.)    [facility] LAB DRAW   [facility] Lab Draw     [facility] RETURN   11:15 AM   (30 min.)   Deidra  Jonathan Zuniga MD   [facility]   12:30 PM   (180 min.)   UC ONCOLOGY INFUSION   [facility] 9     10       11     12     13     14     15     16     17       18     19     UMP NEW CANCER    9:15 AM   (60 min.)   Kaley Tidwell MD   [facility] 20     21     22     23     24       25     26     27     28     29     [facility] LAB DRAW   12:00 PM   (15 min.)    [facility] LAB DRAW   [facility] Lab Draw     UMP RETURN   12:15 PM   (30 min.)   Jonathan Gay MD   [facility]     UM ONC INFUSION 180    1:00 PM   (180 min.)    ONCOLOGY INFUSION   [facility] 30 31 April 2018 Sunday Monday Tuesday Wednesday Thursday Friday Saturday   1     2     3     4     5     6     7       8     9     10     11     12     13     14       15     16     17     18     19     20     21       22     23     24     25     26     27     28       29     30                                           Lab Results:  Recent Results (from the past 12 hour(s))   CBC with platelets differential    Collection Time: 03/08/18 11:32 AM   Result Value Ref Range    WBC 6.7 4.0 - 11.0 10e9/L    RBC Count 4.36 3.8 - 5.2 10e12/L    Hemoglobin 13.6 11.7 - 15.7 g/dL    Hematocrit 40.0 35.0 - 47.0 %    MCV 92 78 - 100 fl    MCH 31.2 26.5 - 33.0 pg    MCHC 34.0 31.5 - 36.5 g/dL    RDW 12.5 10.0 - 15.0 %    Platelet Count 165 150 - 450 10e9/L    Diff Method Automated Method     % Neutrophils 65.7 %    % Lymphocytes 25.6 %    % Monocytes 7.2 %    % Eosinophils 0.9 %    % Basophils 0.3 %    % Immature Granulocytes 0.3 %    Nucleated RBCs 0 0 /100    Absolute Neutrophil 4.4 1.6 - 8.3 10e9/L    Absolute Lymphocytes 1.7 0.8 - 5.3 10e9/L    Absolute Monocytes 0.5 0.0 - 1.3 10e9/L    Absolute Eosinophils 0.1 0.0 - 0.7 10e9/L    Absolute Basophils 0.0 0.0 - 0.2 10e9/L    Abs Immature Granulocytes 0.0 0 - 0.4 10e9/L     Absolute Nucleated RBC 0.0    Comprehensive metabolic panel    Collection Time: 03/08/18 11:32 AM   Result Value Ref Range    Sodium 134 133 - 144 mmol/L    Potassium 3.5 3.4 - 5.3 mmol/L    Chloride 102 94 - 109 mmol/L    Carbon Dioxide 24 20 - 32 mmol/L    Anion Gap 8 3 - 14 mmol/L    Glucose 119 (H) 70 - 99 mg/dL    Urea Nitrogen 12 7 - 30 mg/dL    Creatinine 0.59 0.52 - 1.04 mg/dL    GFR Estimate >90 >60 mL/min/1.7m2    GFR Estimate If Black >90 >60 mL/min/1.7m2    Calcium 8.8 8.5 - 10.1 mg/dL    Bilirubin Total 0.5 0.2 - 1.3 mg/dL    Albumin 3.8 3.4 - 5.0 g/dL    Protein Total 6.9 6.8 - 8.8 g/dL    Alkaline Phosphatase 63 40 - 150 U/L    ALT 22 0 - 50 U/L    AST 23 0 - 45 U/L   TSH with free T4 reflex    Collection Time: 03/08/18 11:32 AM   Result Value Ref Range    TSH 4.41 (H) 0.40 - 4.00 mU/L   T4 free    Collection Time: 03/08/18 11:32 AM   Result Value Ref Range    T4 Free 1.40 0.76 - 1.46 ng/dL

## 2018-03-08 NOTE — NURSING NOTE
Oncology Rooming Note    March 8, 2018 11:41 AM   Julieta Rivera is a 67 year old female who presents for:    Chief Complaint   Patient presents with     Port Draw     Labs drawn from port by RN. Line flushed with saline and heparin. Vs taken and pt checked in for appt     Oncology Clinic Visit     Return: New Breast CA     Initial Vitals: /70 (BP Location: Right arm, Cuff Size: Adult Regular)  Pulse 84  Temp 97.5  F (36.4  C) (Oral)  Resp 16  Ht 1.524 m (5')  Wt 55.4 kg (122 lb 3.2 oz)  SpO2 99%  BMI 23.87 kg/m2 Estimated body mass index is 23.87 kg/(m^2) as calculated from the following:    Height as of this encounter: 1.524 m (5').    Weight as of this encounter: 55.4 kg (122 lb 3.2 oz). Body surface area is 1.53 meters squared.  Mild Pain (2) Comment: Data Unavailable   No LMP recorded. Patient is postmenopausal.  Allergies reviewed: YES  Medications reviewed: YES    Medications: Medication refills not needed today.  Pharmacy name entered into O[facility]:    [facility] [place], [place] - [address] [facility] [place], [place] - [address]    Clinical concerns: no new concerns.  Pt received flu shot elsewhere. See Immunizations     6 minutes for nursing intake (face to face time)     Lucía Maddox CMA

## 2018-03-08 NOTE — MR AVS SNAPSHOT
After Visit Summary   3/8/2018    Julieta Rivera    MRN: [id number]           Patient Information     Date Of Birth          1950        Visit Information        Provider Department      3/8/2018 11:30 AM Jonathan Gay MD [facility]        Today's Diagnoses     Malignant neoplasm of upper-outer quadrant of right breast in female, estrogen receptor negative (H)    -  1    Encounter for other specified aftercare           Follow-ups after your visit        Follow-up notes from your care team     Return in about 3 weeks (around 3/29/2018).      Your next 10 appointments already scheduled     Mar 19, 2018  9:30 AM CDT   (Arrive by 9:15 AM)   NEW CANCER VISIT with Kaley Tidwell MD   [facility] ([facility])    [address] [place] [address]  [address]  [facility] [id number] [phone]            Mar 29, 2018 12:00 PM CDT   [facility] Draw with  [facility] DRAW   [facility] Draw ([facility])    [address] [place] [address]  [address]  [facility] [id number] [phone]            Mar 29, 2018 12:30 PM CDT   (Arrive by 12:15 PM)   Return Visit with Jonathan Gay MD   [facility] ([facility])    [address] [place] [address]  [address]  [facility] [id number] [phone]            Mar 29, 2018  1:00 PM CDT   Infusion 180 with  ONCOLOGY INFUSION, UC 25 ATC   [facility] ([facility])    [address] [place] [address]  [address]  [facility] [id number] [phone]              Who to contact     If you have questions or need follow up information about today's clinic visit or your schedule please contact [facility] directly at [phone].  Normal or non-critical lab and imaging results will be communicated to you by MyChart, letter or phone within 4 business days after the clinic has received  the results. If you do not hear from us within 7 days, please contact the clinic through [employer] or phone. If you have a critical or abnormal lab result, we will notify you by phone as soon as possible.  Submit refill requests through [employer] or call your pharmacy and they will forward the refill request to us. Please allow 3 business days for your refill to be completed.          Additional Information About Your Visit        [employer] Information     [employer] gives you secure access to your electronic health record. If you see a primary care provider, you can also send messages to your care team and make appointments. If you have questions, please call your primary care clinic.  If you do not have a primary care provider, please call [phone] and they will assist you.        Care EveryWhere ID     This is your Care EveryWhere ID. This could be used by other organizations to access your [place] medical records  [id number]        Your Vitals Were     Pulse Temperature Respirations Height Pulse Oximetry BMI (Body Mass Index)    84 97.5  F (36.4  C) (Oral) 16 1.524 m (5') 99% 23.87 kg/m2       Blood Pressure from Last 3 Encounters:   03/08/18 111/70   03/05/18 126/78   03/01/18 118/72    Weight from Last 3 Encounters:   03/08/18 55.4 kg (122 lb 3.2 oz)   03/05/18 56 kg (123 lb 6.4 oz)   03/01/18 55.9 kg (123 lb 4.8 oz)                 Today's Medication Changes          These changes are accurate as of 3/8/18 11:59 PM.  If you have any questions, ask your nurse or doctor.               Start taking these medicines.        Dose/Directions    dexamethasone 4 MG tablet   Commonly known as:  DECADRON   Used for:  Encounter for other specified aftercare, Malignant neoplasm of upper-outer quadrant of right breast in female, estrogen receptor negative (H)        Dose:  8 mg   Take 2 tablets (8 mg) by mouth 2 times daily (with meals) Start evening AFTER Docetaxel dose and continue for 4 additional doses.   Quantity:  10  tablet   Refills:  3       LORazepam 0.5 MG tablet   Commonly known as:  ATIVAN   Used for:  Encounter for other specified aftercare, Malignant neoplasm of upper-outer quadrant of right breast in female, estrogen receptor negative (H)        Dose:  0.5 mg   Take 1 tablet (0.5 mg) by mouth every 4 hours as needed (Anxiety, Nausea/Vomiting or Sleep)   Quantity:  30 tablet   Refills:  3       prochlorperazine 10 MG tablet   Commonly known as:  COMPAZINE   Used for:  Encounter for other specified aftercare, Malignant neoplasm of upper-outer quadrant of right breast in female, estrogen receptor negative (H)        Dose:  5 mg   Take 0.5 tablets (5 mg) by mouth every 6 hours as needed (Nausea/Vomiting)   Quantity:  30 tablet   Refills:  3            Where to get your medicines      These medications were sent to [place], [place] - [address]  [address] [address]    Hours:  TRANSPLANT PHONE NUMBER [phone] Phone:  [phone]     dexamethasone 4 MG tablet    prochlorperazine 10 MG tablet         Some of these will need a paper prescription and others can be bought over the counter.  Ask your nurse if you have questions.     Bring a paper prescription for each of these medications     LORazepam 0.5 MG tablet                Primary Care Provider Fax #    Provider Not In System [phone]                Equal Access to Services     CARTER DEL RIO AH: Luis chong Sohussein, waaxda luqadaha, qaybta kaalmada adebal, khadar solano. So [facility] [phone].    ATENCIÓN: Si habla español, tiene a phillips disposición servicios gratuitos de asistencia lingüística. Llame al [phone].    We comply with applicable federal civil rights laws and [place] laws. We do not discriminate on the basis of race, color, national origin, age, disability, sex, sexual orientation, or gender identity.            Thank you!     Thank  you for choosing [facility]IC  for your care. Our goal is always to provide you with excellent care. Hearing back from our patients is one way we can continue to improve our services. Please take a few minutes to complete the written survey that you may receive in the mail after your visit with us. Thank you!             Your Updated Medication List - Protect others around you: Learn how to safely use, store and throw away your medicines at www.disposemymeds.org.          This list is accurate as of 3/8/18 11:59 PM.  Always use your most recent med list.                   Brand Name Dispense Instructions for use Diagnosis    dexamethasone 4 MG tablet    DECADRON    10 tablet    Take 2 tablets (8 mg) by mouth 2 times daily (with meals) Start evening AFTER Docetaxel dose and continue for 4 additional doses.    Encounter for other specified aftercare, Malignant neoplasm of upper-outer quadrant of right breast in female, estrogen receptor negative (H)       FISH OIL PO      Take 1 capsule by mouth daily.        * levothyroxine 100 MCG tablet    SYNTHROID/LEVOTHROID    45 tablet    Take  by mouth See Admin Instructions. Alternate 0.10 mg tab with 0.088 mg tab every other day    Hypothyroidism due to Hashimoto's thyroiditis       * levothyroxine 88 MCG tablet    SYNTHROID/LEVOTHROID    45 tablet    Take  by mouth daily. Alternate 0.088 mg with 0.10 mg every other day    Hypothyroidism due to Hashimoto's thyroiditis       lidocaine-prilocaine cream    EMLA    30 g    Apply to port site one hour prior to access. May start using six days after port placement    Malignant neoplasm of upper outer quadrant of breast in female, estrogen receptor negative (H)       LORazepam 0.5 MG tablet    ATIVAN    30 tablet    Take 1 tablet (0.5 mg) by mouth every 4 hours as needed (Anxiety, Nausea/Vomiting or Sleep)    Encounter for other specified aftercare, Malignant neoplasm of upper-outer quadrant of right breast in  female, estrogen receptor negative (H)       MULTIVITAMIN & MINERAL PO      Take 2 tablets by mouth daily.        prochlorperazine 10 MG tablet    COMPAZINE    30 tablet    Take 0.5 tablets (5 mg) by mouth every 6 hours as needed (Nausea/Vomiting)    Encounter for other specified aftercare, Malignant neoplasm of upper-outer quadrant of right breast in female, estrogen receptor negative (H)       VITAMIN D (CHOLECALCIFEROL) PO      Take 2,000 Units by mouth daily        * Notice:  This list has 2 medication(s) that are the same as other medications prescribed for you. Read the directions carefully, and ask your doctor or other care provider to review them with you.

## 2018-03-08 NOTE — PROGRESS NOTES
Infusion Nursing Note:  Julieta Rivera presents today for Cycle 1 Day 1 Taxotere, Cytoxan.    Patient seen by provider today: Yes: Dr. Gya   present during visit today: Not Applicable.    Note: New to infusion room.  Tour of [address] given.  Oriented to snack station, bathroom location and call light.  Has received information on Taxotere and Cytoxan prior to infusion.  Tamanna Neff RNCC here as well reviewing teaching materials while in infusion.    Intravenous Access:  Implanted Port.    Treatment Conditions:  Lab Results   Component Value Date    HGB 13.6 03/08/2018     Lab Results   Component Value Date    WBC 6.7 03/08/2018      Lab Results   Component Value Date    ANEU 4.4 03/08/2018     Lab Results   Component Value Date     03/08/2018      Lab Results   Component Value Date     03/08/2018                   Lab Results   Component Value Date    POTASSIUM 3.5 03/08/2018           No results found for: MAG         Lab Results   Component Value Date    CR 0.59 03/08/2018                   Lab Results   Component Value Date    CINDI 8.8 03/08/2018                Lab Results   Component Value Date    BILITOTAL 0.5 03/08/2018           Lab Results   Component Value Date    ALBUMIN 3.8 03/08/2018                    Lab Results   Component Value Date    ALT 22 03/08/2018           Lab Results   Component Value Date    AST 23 03/08/2018       Results reviewed, labs MET treatment parameters, ok to proceed with treatment.    Neulasta Onpro On-Body injector applied to left arm at 4pm with light facing down.  Writer discussed Neulasta injection would start on Friday at 7pm, approximately 27 hours after application applied today.  Written and Verbal instruction reviewed with patient.  Pt instructed when the dose delivery starts, it will take about 45 minutes to complete.  Pt aware Neulasta Onpro On-Body should have green flashing light and to call triage or on-call MD if injector flashes red or  appears to be leaking. Pt aware to keep Onpro On-Body Neulasta 4 inches away from electrical equipment and to avoid showering 4 hours prior to injection.   Neulasta Onpro Lot number: [id number]      Post Infusion Assessment:  Patient tolerated infusion without incident.  Blood return noted pre and post infusion.  Site patent and intact, free from redness, edema or discomfort.  No evidence of extravasations.  Access discontinued per protocol.    Discharge Plan:   Prescription refills given for Ativan, Compazine, Dexamethasone (Claritin provided OTC).  Copy of AVS reviewed with patient and/or family.  Patient will return 3/29/18 for next appointment.  Patient discharged in stable condition accompanied by: .  Departure Mode: Ambulatory.  Face to Face time: 0.    Ana Freraro RN

## 2018-03-08 NOTE — LETTER
3/8/2018       RE: Julieta Rivera  [address] [place] [address]     Dear Colleague,    Thank you for referring your patient, Julieta Rivera, to the [facility]. Please see a copy of my visit note below.    Dr. Eugene Guzman  Professor  Department of Surgery  [address] [place] [address]     February 22, 2018     Dear Dr. Guzman,     Thank you for referring Julieta Rivera to our clinic for recommendations for her new diagnosis of triple negative breast cancer.      HISTORY OF PRESENT ILLNESS:  Julieta Rivera is a 67-year-old woman who was referred to our clinic with a new diagnosis of right triple-negative breast cancer.  Julieta was followed by routine screening mammography, when she was discovered to have a 7 x 6 x 9-mm mass at the 12 o'clock position of the right breast 6 cm from the nipple-areolar complex.  She did undergo a biopsy of this mass which showed an ER-negative, OR-negative, HER2-nonamplified, invasive mammary carcinoma of no special type, invasive ductal carcinoma, Jeff grade 3.  Ductal carcinoma in situ was also noted.  Nuclear grade 2 solid type.  HER2 FISH showed no amplification.  She now comes to our clinic for recommendations.       She has hypothyroidism and is on levothyroxine 88 alternating with 100 mcg daily.  She has no pain.  She has fatigue related to the care of a grandson with esophageal atresia at home.  She has no depression and no anxiety.  She has no weight loss.  Diet has not changed.  She has no loss of energy.  She does not sleep during the day.  She can perform all of her household chores.  She has noticed no abnormality of either breast.   ECOG 0 PS.       REVIEW OF SYSTEMS:  She has no fever or headaches.  She has an occasional dry cough.  She has no chest pain, shortness of breath, hemoptysis, loss of appetite, nausea, vomiting, abdominal pain, constipation, diarrhea, bone pain, back pain, muscle or  joint complaints, numbness or tingling in the hands and feet, or hearing loss.  She does have some arthritis in her hands.  She recently has had some depression related to the demands of caring for her two grandsons at home.  The remainder of a 12-point review of systems is negative.        PAST MEDICAL HISTORY:  She has no history of breast surgery in the past or breast cancer in the past.  She has no history of radiation of any kind.  She has no history of tumor of any kind.  She may have a history of a heart problem with mitral prolapse and a murmur.  The last echo we have on record is from 1996.  She has no history of heart attack, breathing problems, blood clots, seizures, arthritis, peptic ulcer disease, osteoporosis or bone fractures.  She is not currently participating in a clinical trial and has not had any significant weight loss.  She has no history of hypertension, but she does have a history of factor V Leiden because her sister was diagnosed with factor V Leiden and Julieta was tested, although Julieta herself has had no blood clots or pulmonary emboli.       FAMILY HISTORY:  There is a history of breast cancer in two paternal aunts, but no first-degree relatives.  One of her aunts was diagnosed in her 50s, the other in her 60s.  She has no male relatives with breast cancer.  The remainder of her family history was negative.        PAST MENSTRUAL HISTORY:  First period was at age 13-1/2.  Last menstrual period was in 05/2003.  She has been pregnant twice at age 26 and 30 with two live births and no miscarriages or abortions.  She used oral contraceptives only once or twice.  Uterus and ovaries are in place.  She has no history of hormone replacement therapy.        ALLERGIES:  She has no allergy to seafood, iodine or contrast dye.  She does not take aspirin.       HABITS:  She did smoke 1 pack per day in college for 3 years from age 17 to 20 and has not smoked since.  She does not drink significant alcohol  and has no heavy alcohol history in the past.        PERSONAL AND SOCIAL HISTORY:  She does have a history of being a .  Her  is 80 years old but is able to take care of his activities of daily living.  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          INTERVAL HISTORY:  Julieta returns to clinic having had her port placed.  She has had some mild discomfort with some neck stiffness and noticing the port when swallowing, but that has gotten better today.  She has no pain, moderate fatigue.  She is up at night feeding her 2-year-old grandson who has esophageal atresia.  She has no depression but significant anxiety.      REVIEW OF SYSTEMS:  She denies fevers or chills, cough, chest pain, shortness of breath, nausea, vomiting, constipation, diarrhea, bone pain, back pain or headache.  The remainder of a 10-point review of systems is negative.      Julieta has no history of angina.  She has no history of hypertension.  Her cholesterol has generally been in acceptable range, although slightly over 200 recently.      FAMILY HISTORY:  Positive for heart disease.  Father had an MI in his 50s and had a bypass and  at age 78.  Mother had rheumatic heart disease at age 38 and  at age 42.      She has been exercising and has been quite active.  She does yoga.  She works long hours taking care of her grandchildren.      PHYSICAL EXAMINATION:   VITAL SIGNS:  Blood pressure 111/70, temperature 97.5, pulse 84, respirations 16, O2 sat 99% on room air, height 1.5 meters and weight 55.4 kg.   GENERAL:  Julieta appeared generally well.  She has no alopecia.   HEENT:  Oropharynx is without lesions.   LYMPH:  There is no palpable cervical, supraclavicular, subclavicular or axillary lymphadenopathy.   BREASTS:  Exam was not performed today.  Port is in place and  without tenderness.  Port is on the left.   RESPIRATORY:  Clear to percussion and auscultation.   HEART:  There is a regular rate and rhythm, S1, S2.  She has a 2/6 systolic murmur at the left sternal border radiating to the apex.   ABDOMEN:  Soft and nontender, without hepatosplenomegaly.   EXTREMITIES:  Without edema.   PSYCHIATRIC:  Mood and affect were normal.      LABORATORY DATA:  The CMP is within normal limits.  CBC was within normal limits.  Absolute neutrophil count is 4400, platelets 165,000, WBC of 6.7, hemoglobin of 13.6.  Echocardiogram showed an ejection fraction of 60%-65% with moderate aortic stenosis.      ASSESSMENT AND PLAN:   1.  Julieta Rivera is a 67-year-old woman with a history of a T1b N0 MX, triple-negative invasive ductal carcinoma of the right breast measuring maximum dimension 9 mm, grade 3.  She comes to clinic today to begin TC for 4 cycles, which will take a total of 12 weeks.  I discussed that she will need Neulasta growth factor.  The docetaxel is 75 mg/m2, and the cyclophosphamide 600 mg/m2, with Neulasta or Onpro given on day 2 of each cycle.  I did discuss with her that there is a 2% risk of pulmonary fibrosis, a less than 0.5% risk of leukemia and a 0.5% risk of permanent hair loss with the docetaxel.  She understands and would like to proceed.  We talked about that if she develops a fever, she should go to the emergency room within 1 hour and come to the [facility] if possible.  She understands.  I discussed that the Neulasta or Onpro works most of the time, but sometimes it does not and she should come in if she has a fever.   2.  Neulasta or Onpro can result in low back pain and flu-like symptoms.  I discussed that Claritin can sometimes help with this and she can get that over-the-counter.   3.  Nausea and vomiting are potential side effects, and a prescription for prochlorperazine 5 mg q.6h. as well as lorazepam 0.5 mg q.6 hours have been given.   4.   Reaffirmation of decision for TC versus dose-dense AC and paclitaxel.  I discussed with Julieta that she does have moderate aortic stenosis.  Development of congestive heart failure with the risk of 4% with AC could be an even higher risk if she has moderate aortic stenosis within guidelines for a less than 1 cm tumor and less than 1 cm triple negative tumor that is node negative.  I do think that TC is a reasonable option.   5.  Discussion of the CREATE-X approach.  We discussed that if there is a less than complete response to neoadjuvant TC, the CREATE-X approach with capecitabine is a reasonable option.   6.  History of factor V Leiden.  I discussed with Dr. Kaz Feliciano, and no thrombosis prophylaxis is required given that Julieta is at age 67 and has had no history of thrombosis, even though she does carry the factor V Leiden mutation -- it is predilection to thrombosis, but anticoagulation is not absolutely indicated in this setting and does have risks.   7.  TSH slightly elevated.  We will continue to monitor.   8.  Cardiology consult with Dr. Tidwell.   9.  Followup.  We will see Julieta in followup in our clinic in 3 weeks or sooner as dictated by symptoms. Return tomorrow for Neulasta.  Follow up with me March 29 with CBC, CMP and docetaxel/cyclophosphamide.      Thank you for allowing us to continue to participate in Julieta Rivera's care.      Jonathan Gay MD      [facility]     I spent 45 minutes with the patient more than 50% of which was in counseling and coordination of care.

## 2018-03-12 ENCOUNTER — TELEPHONE (OUTPATIENT)
Dept: ONCOLOGY | Facility: CLINIC | Age: 68
End: 2018-03-12

## 2018-03-12 ENCOUNTER — OFFICE VISIT (OUTPATIENT)
Dept: URGENT CARE | Facility: URGENT CARE | Age: 68
End: 2018-03-12
Payer: COMMERCIAL

## 2018-03-12 ENCOUNTER — CARE COORDINATION (OUTPATIENT)
Dept: ONCOLOGY | Facility: CLINIC | Age: 68
End: 2018-03-12

## 2018-03-12 ENCOUNTER — HOSPITAL ENCOUNTER (EMERGENCY)
Facility: CLINIC | Age: 68
Discharge: HOME | End: 2018-03-12
Attending: EMERGENCY MEDICINE | Admitting: EMERGENCY MEDICINE
Payer: COMMERCIAL

## 2018-03-12 VITALS
BODY MASS INDEX: 23.5 KG/M2 | SYSTOLIC BLOOD PRESSURE: 100 MMHG | OXYGEN SATURATION: 98 % | HEIGHT: 60 IN | HEART RATE: 86 BPM | DIASTOLIC BLOOD PRESSURE: 72 MMHG | RESPIRATION RATE: 18 BRPM | TEMPERATURE: 98.2 F | WEIGHT: 119.7 LBS

## 2018-03-12 VITALS
SYSTOLIC BLOOD PRESSURE: 114 MMHG | HEART RATE: 101 BPM | WEIGHT: 122 LBS | TEMPERATURE: 98.6 F | HEIGHT: 63 IN | BODY MASS INDEX: 21.62 KG/M2 | OXYGEN SATURATION: 99 % | DIASTOLIC BLOOD PRESSURE: 70 MMHG

## 2018-03-12 DIAGNOSIS — R53.81 MALAISE: ICD-10-CM

## 2018-03-12 DIAGNOSIS — T50.905A ADVERSE EFFECT OF DRUG, INITIAL ENCOUNTER: ICD-10-CM

## 2018-03-12 DIAGNOSIS — R30.0 DYSURIA: Primary | ICD-10-CM

## 2018-03-12 LAB
ALBUMIN SERPL-MCNC: 3.8 G/DL (ref 3.4–5)
ALBUMIN UR-MCNC: NEGATIVE MG/DL
ALP SERPL-CCNC: 91 U/L (ref 40–150)
ALT SERPL W P-5'-P-CCNC: 25 U/L (ref 0–50)
ANION GAP SERPL CALCULATED.3IONS-SCNC: 8 MMOL/L (ref 3–14)
APPEARANCE UR: CLEAR
AST SERPL W P-5'-P-CCNC: 22 U/L (ref 0–45)
BASOPHILS # BLD AUTO: 0 10E9/L (ref 0–0.2)
BASOPHILS NFR BLD AUTO: 0.9 %
BILIRUB SERPL-MCNC: 0.6 MG/DL (ref 0.2–1.3)
BILIRUB UR QL STRIP: NEGATIVE
BUN SERPL-MCNC: 13 MG/DL (ref 7–30)
CALCIUM SERPL-MCNC: 8.7 MG/DL (ref 8.5–10.1)
CHLORIDE SERPL-SCNC: 103 MMOL/L (ref 94–109)
CO2 BLDCOV-SCNC: 24 MMOL/L (ref 21–28)
CO2 SERPL-SCNC: 27 MMOL/L (ref 20–32)
COLOR UR AUTO: YELLOW
CREAT SERPL-MCNC: 0.63 MG/DL (ref 0.52–1.04)
DEPRECATED S PYO AG THROAT QL EIA: NORMAL
DIFFERENTIAL METHOD BLD: ABNORMAL
EOSINOPHIL # BLD AUTO: 0.2 10E9/L (ref 0–0.7)
EOSINOPHIL NFR BLD AUTO: 2.8 %
ERYTHROCYTE [DISTWIDTH] IN BLOOD BY AUTOMATED COUNT: 12.4 % (ref 10–15)
GFR SERPL CREATININE-BSD FRML MDRD: >90 ML/MIN/1.7M2
GLUCOSE SERPL-MCNC: 98 MG/DL (ref 70–99)
GLUCOSE UR STRIP-MCNC: NEGATIVE MG/DL
HCT VFR BLD AUTO: 39.5 % (ref 35–47)
HGB BLD-MCNC: 13.6 G/DL (ref 11.7–15.7)
HGB UR QL STRIP: NEGATIVE
KETONES UR STRIP-MCNC: NEGATIVE MG/DL
LACTATE BLD-SCNC: 1.1 MMOL/L (ref 0.7–2.1)
LEUKOCYTE ESTERASE UR QL STRIP: NEGATIVE
LYMPHOCYTES # BLD AUTO: 0.6 10E9/L (ref 0.8–5.3)
LYMPHOCYTES NFR BLD AUTO: 11.1 %
MCH RBC QN AUTO: 31 PG (ref 26.5–33)
MCHC RBC AUTO-ENTMCNC: 34.4 G/DL (ref 31.5–36.5)
MCV RBC AUTO: 90 FL (ref 78–100)
MONOCYTES # BLD AUTO: 0.2 10E9/L (ref 0–1.3)
MONOCYTES NFR BLD AUTO: 2.8 %
NEUTROPHILS # BLD AUTO: 4.4 10E9/L (ref 1.6–8.3)
NEUTROPHILS NFR BLD AUTO: 82.4 %
NITRATE UR QL: NEGATIVE
PCO2 BLDV: 29 MM HG (ref 40–50)
PH BLDV: 7.53 PH (ref 7.32–7.43)
PH UR STRIP: 6 PH (ref 5–7)
PLATELET # BLD AUTO: 94 10E9/L (ref 150–450)
PLATELET # BLD EST: ABNORMAL 10*3/UL
PO2 BLDV: 16 MM HG (ref 25–47)
POTASSIUM SERPL-SCNC: 3.8 MMOL/L (ref 3.4–5.3)
PROT SERPL-MCNC: 7.3 G/DL (ref 6.8–8.8)
RBC # BLD AUTO: 4.39 10E12/L (ref 3.8–5.2)
RBC MORPH BLD: NORMAL
SAO2 % BLDV FROM PO2: 28 %
SODIUM SERPL-SCNC: 138 MMOL/L (ref 133–144)
SOURCE: NORMAL
SP GR UR STRIP: 1 (ref 1–1.03)
SPECIMEN SOURCE: NORMAL
UROBILINOGEN UR STRIP-ACNC: 0.2 EU/DL (ref 0.2–1)
WBC # BLD AUTO: 5.4 10E9/L (ref 4–11)

## 2018-03-12 PROCEDURE — 99213 OFFICE O/P EST LOW 20 MIN: CPT | Performed by: PHYSICIAN ASSISTANT

## 2018-03-12 PROCEDURE — 82803 BLOOD GASES ANY COMBINATION: CPT

## 2018-03-12 PROCEDURE — 87081 CULTURE SCREEN ONLY: CPT | Performed by: EMERGENCY MEDICINE

## 2018-03-12 PROCEDURE — 99283 EMERGENCY DEPT VISIT LOW MDM: CPT | Performed by: EMERGENCY MEDICINE

## 2018-03-12 PROCEDURE — 87880 STREP A ASSAY W/OPTIC: CPT | Performed by: EMERGENCY MEDICINE

## 2018-03-12 PROCEDURE — 81003 URINALYSIS AUTO W/O SCOPE: CPT | Performed by: FAMILY MEDICINE

## 2018-03-12 PROCEDURE — 25000132 ZZH RX MED GY IP 250 OP 250 PS 637: Performed by: EMERGENCY MEDICINE

## 2018-03-12 PROCEDURE — 99284 EMERGENCY DEPT VISIT MOD MDM: CPT | Mod: Z6 | Performed by: EMERGENCY MEDICINE

## 2018-03-12 PROCEDURE — 83605 ASSAY OF LACTIC ACID: CPT

## 2018-03-12 PROCEDURE — 85025 COMPLETE CBC W/AUTO DIFF WBC: CPT | Performed by: EMERGENCY MEDICINE

## 2018-03-12 PROCEDURE — 80053 COMPREHEN METABOLIC PANEL: CPT | Performed by: EMERGENCY MEDICINE

## 2018-03-12 RX ORDER — ACETAMINOPHEN 500 MG
1000 TABLET ORAL ONCE
Status: COMPLETED | OUTPATIENT
Start: 2018-03-12 | End: 2018-03-12

## 2018-03-12 RX ADMIN — ACETAMINOPHEN 1000 MG: 500 TABLET ORAL at 21:09

## 2018-03-12 NOTE — ED AVS SNAPSHOT
[facility], Emergency Department    [address]    [facility] [address]    Phone:  [phone]    Fax:  [phone]                                       Julieta Rivera   MRN: [id number]    Department:  [facility], Emergency Department   Date of Visit:  3/12/2018           After Visit Summary Signature Page     I have received my discharge instructions, and my questions have been answered. I have discussed any challenges I see with this plan with the nurse or doctor.    ..........................................................................................................................................  Patient/Patient Representative Signature      ..........................................................................................................................................  Patient Representative Print Name and Relationship to Patient    ..................................................               ................................................  Date                                            Time    ..........................................................................................................................................  Reviewed by Signature/Title    ...................................................              ..............................................  Date                                                            Time

## 2018-03-12 NOTE — PROGRESS NOTES
"Patient called with c/o a rash on face and swollen face w/o shortness of breath or lip swelling that started the \"first few days after chemotherapy that has resolved. Port site \"hurts\" over the weekend and had lifted one of her two year old grandson's to change his diaper. Patient also c/o pain with urination and is trying to get more fluids in. Instructed patient to have a UA done today and found a [facility] near her home to have a UA done. Will send message to Dr Gay with facial rash symptoms Tamanna Neff RN, BSN    "

## 2018-03-12 NOTE — MR AVS SNAPSHOT
After Visit Summary   3/12/2018    Juleita Rivera    MRN: [id number]           Patient Information     Date Of Birth          1950        Visit Information        Provider Department      3/12/2018 6:30 PM Lydia Britton PA-C [facility]        Today's Diagnoses     Dysuria    -  1    Malaise           Follow-ups after your visit        Follow-up notes from your care team     Return for ER immediately today.      Your next 10 appointments already scheduled     Mar 19, 2018  9:30 AM CDT   (Arrive by 9:15 AM)   NEW CANCER VISIT with Kaley Tidwell MD   [facility]    [address] [place] [address]  [address]  [facility] [address]   [phone]            Mar 29, 2018 12:00 PM CDT   [facility] Draw with  [facility] DRAW   [facility] ([facility])    [address]  [address]  [facility] [address] [phone]            Mar 29, 2018 12:30 PM CDT   (Arrive by 12:15 PM)   Return Visit with Jonathan Gay MD   [facility] ([facility])    [address]  [address]  [facility] [address] [phone]            Mar 29, 2018  1:00 PM CDT   Infusion 180 with  ONCOLOGY INFUSION, UC 25 ATC   [facility] ([facility])    [address]  [address]  [facility] [address] [phone]              Who to contact     If you have questions or need follow up information about today's clinic visit or your schedule please contact [facility] directly at [phone].  Normal or non-critical lab and imaging results will be communicated to you by MyChart, letter or phone within 4 business days after the clinic has received the results. If you do not hear from us within 7 days, please contact the clinic through MyChart or phone. If you have a  "critical or abnormal lab result, we will notify you by phone as soon as possible.  Submit refill requests through [employer] or call your pharmacy and they will forward the refill request to us. Please allow 3 business days for your refill to be completed.          Additional Information About Your Visit        [employer] Information     [employer] gives you secure access to your electronic health record. If you see a primary care provider, you can also send messages to your care team and make appointments. If you have questions, please call your primary care clinic.  If you do not have a primary care provider, please call [phone] and they will assist you.        Care EveryWhere ID     This is your Care EveryWhere ID. This could be used by other organizations to access your [facility] medical records  [id number]        Your Vitals Were     Pulse Temperature Height Pulse Oximetry BMI (Body Mass Index)       101 98.6  F (37  C) (Tympanic) 5' 3\" (1.6 m) 99% 21.61 kg/m2        Blood Pressure from Last 3 Encounters:   03/12/18 112/65   03/12/18 114/70   03/08/18 111/70    Weight from Last 3 Encounters:   03/12/18 119 lb 11.2 oz (54.3 kg)   03/12/18 122 lb (55.3 kg)   03/08/18 122 lb 3.2 oz (55.4 kg)              We Performed the Following     *UA reflex to Microscopic and Culture ([place] and [facility] [facility] (except [place] and [place])        Primary Care Provider Fax #    Provider Not In System [phone]                Equal Access to Services     CARTER DEL RIO : Hadii india ku hadasho Soomaali, waaxda luqadaha, qaybta kaalmada ziayacait, khadar hdez . So [facility] [phone].    ATENCIÓN: Si habla español, tiene a phillips disposición servicios gratuitos de asistencia lingüística. Llame al [phone].    We comply with applicable federal civil rights laws and [place] laws. We do not discriminate on the basis of race, color, national origin, age, disability, sex, sexual orientation, or gender " identity.            Thank you!     Thank you for choosing [facility]  for your care. Our goal is always to provide you with excellent care. Hearing back from our patients is one way we can continue to improve our services. Please take a few minutes to complete the written survey that you may receive in the mail after your visit with us. Thank you!             Your Updated Medication List - Protect others around you: Learn how to safely use, store and throw away your medicines at www.disposemymeds.org.          This list is accurate as of 3/12/18  8:59 PM.  Always use your most recent med list.                   Brand Name Dispense Instructions for use Diagnosis    FISH OIL PO      Take 1 capsule by mouth daily.        * levothyroxine 100 MCG tablet    SYNTHROID/LEVOTHROID    45 tablet    Take  by mouth See Admin Instructions. Alternate 0.10 mg tab with 0.088 mg tab every other day    Hypothyroidism due to Hashimoto's thyroiditis       * levothyroxine 88 MCG tablet    SYNTHROID/LEVOTHROID    45 tablet    Take  by mouth daily. Alternate 0.088 mg with 0.10 mg every other day    Hypothyroidism due to Hashimoto's thyroiditis       lidocaine-prilocaine cream    EMLA    30 g    Apply to port site one hour prior to access. May start using six days after port placement    Malignant neoplasm of upper outer quadrant of breast in female, estrogen receptor negative (H)       LORazepam 0.5 MG tablet    ATIVAN    30 tablet    Take 1 tablet (0.5 mg) by mouth every 4 hours as needed (Anxiety, Nausea/Vomiting or Sleep)    Encounter for other specified aftercare, Malignant neoplasm of upper-outer quadrant of right breast in female, estrogen receptor negative (H)       MULTIVITAMIN & MINERAL PO      Take 2 tablets by mouth daily.        prochlorperazine 10 MG tablet    COMPAZINE    30 tablet    Take 0.5 tablets (5 mg) by mouth every 6 hours as needed (Nausea/Vomiting)    Encounter for other specified aftercare,  Malignant neoplasm of upper-outer quadrant of right breast in female, estrogen receptor negative (H)       VITAMIN D (CHOLECALCIFEROL) PO      Take 2,000 Units by mouth daily        * Notice:  This list has 2 medication(s) that are the same as other medications prescribed for you. Read the directions carefully, and ask your doctor or other care provider to review them with you.

## 2018-03-12 NOTE — TELEPHONE ENCOUNTER
Central Prior Authorization Team   Phone: [phone]      PA Initiation    Medication: LORazepam (ATIVAN) 0.5 MG tablet - initiated  Insurance Company: [employer] - Phone [phone] Fax [phone]  Pharmacy Filling the Rx: [facility] *MAIL ONLY*  Filling Pharmacy Phone: [phone]  Filling Pharmacy Fax:    Start Date: 3/12/2018    Initiated by phone

## 2018-03-12 NOTE — ED AVS SNAPSHOT
[facility], Emergency Department    [address]    [place] [address]    Phone:  [phone]    Fax:  [phone]                                       Julieta Rivera   MRN: [id number]    Department:  [facility], Emergency Department   Date of Visit:  3/12/2018           Patient Information     Date Of Birth          1950        Your diagnoses for this visit were:     Adverse effect of drug, initial encounter known side effects       You were seen by Tarun Galaviz MD.        Discharge Instructions       Please make an appointment to follow up with your physicians as directed.    Discharge References/Attachments     PEGFILGRASTIM (ENGLISH)      Future Appointments        Provider Department Dept Phone Center    3/19/2018 9:30 AM Kaley Tidwell MD [facility] [phone] [facility]    3/29/2018 12:00 PM [facility] Lab Draw [facility] Lab Draw [phone] [facility]    3/29/2018 12:30 PM Jonathan Gay MD [facility] [phone] [facility]    3/29/2018 1:00 PM [facility] Oncology Infusion [facility] [phone] [facility]      24 Hour Appointment Hotline       To make an appointment at any [facility], call [phone] ([phone]). If you don't have a family doctor or clinic, we will help you find one. [facility] clinics are conveniently located to serve the needs of you and your family.             Review of your medicines      Our records show that you are taking the medicines listed below. If these are incorrect, please call your family doctor or clinic.        Dose / Directions Last dose taken    FISH OIL PO   Dose:  1 capsule        Take 1 capsule by mouth daily.   Refills:  0        * levothyroxine 100 MCG tablet   Commonly known as:  SYNTHROID/LEVOTHROID   Quantity:  45 tablet        Take  by mouth See Admin Instructions. Alternate 0.10 mg tab with 0.088 mg tab every other day   Refills:  3        *  levothyroxine 88 MCG tablet   Commonly known as:  SYNTHROID/LEVOTHROID   Quantity:  45 tablet        Take  by mouth daily. Alternate 0.088 mg with 0.10 mg every other day   Refills:  3        lidocaine-prilocaine cream   Commonly known as:  EMLA   Quantity:  30 g        Apply to port site one hour prior to access. May start using six days after port placement   Refills:  1        LORazepam 0.5 MG tablet   Commonly known as:  ATIVAN   Dose:  0.5 mg   Quantity:  30 tablet        Take 1 tablet (0.5 mg) by mouth every 4 hours as needed (Anxiety, Nausea/Vomiting or Sleep)   Refills:  3        MULTIVITAMIN & MINERAL PO   Dose:  2 tablet        Take 2 tablets by mouth daily.   Refills:  0        prochlorperazine 10 MG tablet   Commonly known as:  COMPAZINE   Dose:  5 mg   Quantity:  30 tablet        Take 0.5 tablets (5 mg) by mouth every 6 hours as needed (Nausea/Vomiting)   Refills:  3        VITAMIN D (CHOLECALCIFEROL) PO   Dose:  2000 Units        Take 2,000 Units by mouth daily   Refills:  0        * Notice:  This list has 2 medication(s) that are the same as other medications prescribed for you. Read the directions carefully, and ask your doctor or other care provider to review them with you.            Procedures and tests performed during your visit     Beta strep group A culture    CBC with platelets differential    Comprehensive metabolic panel    ISTAT CG4 gases lactate eve nursing POCT    ISTAT gases lactate eve POCT    Rapid strep screen      Orders Needing Specimen Collection     None      Pending Results     Date and Time Order Name Status Description    3/12/2018 2131 Beta strep group A culture In process             Pending Culture Results     Date and Time Order Name Status Description    3/12/2018 2131 Beta strep group A culture In process             Pending Results Instructions     If you had any lab results that were not finalized at the time of your Discharge, you can call the ED Lab Result RN at  [phone]. You will be contacted by this team for any positive Lab results or changes in treatment. The nurses are available 7 days a week from 10A to 6:30P.  You can leave a message 24 hours per day and they will return your call.        Thank you for choosing [facility]       Thank you for choosing [facility] for your care. Our goal is always to provide you with excellent care. Hearing back from our patients is one way we can continue to improve our services. Please take a few minutes to complete the written survey that you may receive in the mail after you visit with us. Thank you!        [employer] Information     [employer] gives you secure access to your electronic health record. If you see a primary care provider, you can also send messages to your care team and make appointments. If you have questions, please call your primary care clinic.  If you do not have a primary care provider, please call [phone] and they will assist you.        Care EveryWhere ID     This is your Care EveryWhere ID. This could be used by other organizations to access your [facility] medical records  [id number]        Equal Access to Services     CARTER DEL RIO : Hadwenceslao Polk, wahoney lewis, qagiorgi sylvester, khadar solano. So [facility] [phone].    ATENCIÓN: Si habla español, tiene a phillips disposición servicios gratuitos de asistencia lingüística. Llame al [phone].    We comply with applicable federal civil rights laws and [place] laws. We do not discriminate on the basis of race, color, national origin, age, disability, sex, sexual orientation, or gender identity.            After Visit Summary       This is your record. Keep this with you and show to your community pharmacist(s) and doctor(s) at your next visit.

## 2018-03-13 NOTE — PROGRESS NOTES
"SUBJECTIVE:   Julieta Rivera is a 67 year old female presenting with a chief complaint of   Chief Complaint   Patient presents with     Urgent Care     UTI     c/o dysuria for 1 day   .  SUBJECTIVE:  Onset of symptoms was 1day(s).  Course of illness is worsening  Severity moderate  Current and associated symptoms dysuria- burning pain with urination. She has some \"pelvic cramping.\"   She denies any urinary frequency. No hematuria. No darker urine color. No fever measured, but she \"feels feverish.\" She has a sore throat, headache, fatigue. She admits to mild cough. No vomiting. She is eating and drinking okay.  She is pushing fluids as she is on chemo for breast cancer. She started last Thursday. She has some \"upper chest pain which is not comfortable.\" She denies any redness around her port site.    Treatment and measures tried Increase fluid intake  Predisposing factors include currently on chemo for breast cancer      ROS  See HPI      Past Medical History:   Diagnosis Date     Abnormal Pap smear 1970s    normal since     Factor V Leiden (H)      Hypothyroidism fall 2000     Current Outpatient Prescriptions   Medication Sig Dispense Refill     LORazepam (ATIVAN) 0.5 MG tablet Take 1 tablet (0.5 mg) by mouth every 4 hours as needed (Anxiety, Nausea/Vomiting or Sleep) 30 tablet 3     prochlorperazine (COMPAZINE) 10 MG tablet Take 0.5 tablets (5 mg) by mouth every 6 hours as needed (Nausea/Vomiting) 30 tablet 3     lidocaine-prilocaine (EMLA) cream Apply to port site one hour prior to access. May start using six days after port placement 30 g 1     levothyroxine (SYNTHROID/LEVOTHROID) 100 MCG tablet Take  by mouth See Admin Instructions. Alternate 0.10 mg tab with 0.088 mg tab every other day 45 tablet 3     levothyroxine (SYNTHROID/LEVOTHROID) 88 MCG tablet Take  by mouth daily. Alternate 0.088 mg with 0.10 mg every other day 45 tablet 3     VITAMIN D, CHOLECALCIFEROL, PO Take 2,000 Units by mouth daily       " "Omega-3 Fatty Acids (FISH OIL PO) Take 1 capsule by mouth daily.       Multiple Vitamins-Minerals (MULTIVITAMIN & MINERAL PO) Take 2 tablets by mouth daily.         Family History   Problem Relation Age of Onset     HEART DISEASE Mother      HEART DISEASE Father      CEREBROVASCULAR DISEASE Father      DIABETES Father      DIABETES Brother      Hypertension Brother      Obesity Brother      Obesity Sister      CANCER Sister      Hypertension Sister        Social History   Substance Use Topics     Smoking status: Former Smoker     Quit date: 1/1/1971     Smokeless tobacco: Never Used     Alcohol use No      Comment: none since last november.       OBJECTIVE  /70  Pulse 101  Temp 98.6  F (37  C) (Tympanic)  Ht 5' 3\" (1.6 m)  Wt 122 lb (55.3 kg)  SpO2 99%  BMI 21.61 kg/m2    General: alert, appears nontoxic, but not feeling well, NAD. Afebrile.  Skin: no erythema of skin surrounding chemo ports site - left upper chest  EENT: Oropharynx: MMM.   Respiratory: No distress. Lungs CTAB.  Cardiovascular:RRR. No murmurs, clicks gallops or rub.   Gastrointestinal: Abdomen soft, nontender. BS normal. No masses, organomegaly.   Neurologic: Follows commands. Gait normal.   Psychiatric: flat affect, downcast mood        Labs:  Results for orders placed or performed in visit on 03/12/18 (from the past 24 hour(s))   *UA reflex to Microscopic and Culture ([place] [facility] (except [place] and [place])   Result Value Ref Range    Color Urine Yellow     Appearance Urine Clear     Glucose Urine Negative NEG^Negative mg/dL    Bilirubin Urine Negative NEG^Negative    Ketones Urine Negative NEG^Negative mg/dL    Specific Gravity Urine 1.005 1.003 - 1.035    Blood Urine Negative NEG^Negative    pH Urine 6.0 5.0 - 7.0 pH    Protein Albumin Urine Negative NEG^Negative mg/dL    Urobilinogen Urine 0.2 0.2 - 1.0 EU/dL    Nitrite Urine Negative NEG^Negative    Leukocyte Esterase Urine Negative NEG^Negative    Source " Midstream Urine              ASSESSMENT/PLAN:    ICD-10-CM    1. Dysuria R30.0 *UA reflex to Microscopic and Culture ([place] and [facility] (except [place] and [place])   2. Malaise R53.81            Medical Decision Making:    Differential Diagnosis: UTI, sepsis, other serious bacterial infection    Serious Comorbid Conditions: breast cancer, just started chemo 4 days ago    Patient's UA was negative. Despite absence of fever, she may not spike a fever due to immunosuppression. I have concern for other severe infection given her generalized malaise and fatigue. I recommended she go to the ER for workup. Patient understood and agreed. As she is vitally normal, she is appropriate for transport via private car with her .  I gave report to Dr Jim Cheng at [facility] ED who accepted patient information.      Lydia Britton PA-C

## 2018-03-13 NOTE — NURSING NOTE
"Chief Complaint   Patient presents with     Urgent Care     UTI     c/o dysuria for 1 day       Initial /70  Pulse 101  Temp 98.6  F (37  C) (Tympanic)  Ht 5' 3\" (1.6 m)  Wt 122 lb (55.3 kg)  SpO2 99%  BMI 21.61 kg/m2 Estimated body mass index is 21.61 kg/(m^2) as calculated from the following:    Height as of this encounter: 5' 3\" (1.6 m).    Weight as of this encounter: 122 lb (55.3 kg).  Medication Reconciliation: complete   Alicia Khanna MA    "

## 2018-03-13 NOTE — ED PROVIDER NOTES
"  History     Chief Complaint   Patient presents with     Chest Pain     \"low grade chest pain since chemo port was put in\"; denies SOB or dyspnea     Possible infection     patient sent here due to symptoms of burning with urination, but UTI r/o; sore throat; chills but no fever; pt ended chemo last Thursday; on NuLesta     HPI  Julieta Rivera is a 67 year old female who presents Emergency Department stating that she just received a Neulasta injection and now is on day 3.  Patient states she was warned that she would have some side effects from it , and she states that she feels like she was hit by a [employer] truck last night.  Patient has a history of breast cancer and is undergoing chemotherapy.  The patient states that she has had no fevers but has diffuse myalgias with some mild shortness of breath and some lower pelvic discomfort.  Patient states she feels like she has to pee, and that she has a UTI. However, she states that she was checked at the [facility] and her urine was negative.  The patient states that she was told she should take some Tylenol for myalgias, but she has not done so as of yet.  Patient denies any vomiting, diarrhea, melena, or bright blood per rectum.    This part of the medical record was transcribed by Sahde Mayo Scribnikole, from a dictation done by Tarun Galaviz MD.     PAST MEDICAL HISTORY  Past Medical History:   Diagnosis Date     Abnormal Pap smear 1970s    normal since     Factor V Leiden (H)      Hypothyroidism 2000     PAST SURGICAL HISTORY  Past Surgical History:   Procedure Laterality Date      SECTION       COLPOSCOPY CERVIX, BIOPSY CERVIX, ENDOCERVICAL CURETTAGE, COMBINED       INSERT PORT VASCULAR ACCESS N/A 3/5/2018    Procedure: INSERT PORT VASCULAR ACCESS;  Single Lumen Chest Power Port Placement;  Surgeon: Brian Tolbert PA-C;  [facility]     TONSILLECTOMY, ADENOIDECTOMY, COMBINED       FAMILY HISTORY  Family History "   Problem Relation Age of Onset     HEART DISEASE Mother      HEART DISEASE Father      CEREBROVASCULAR DISEASE Father      DIABETES Father      DIABETES Brother      Hypertension Brother      Obesity Brother      Obesity Sister      CANCER Sister      Hypertension Sister      SOCIAL HISTORY  Social History   Substance Use Topics     Smoking status: Former Smoker     Quit date: 1/1/1971     Smokeless tobacco: Never Used     Alcohol use No      Comment: none since last november.     MEDICATIONS  Previous Medications    LEVOTHYROXINE (SYNTHROID/LEVOTHROID) 100 MCG TABLET    Take  by mouth See Admin Instructions. Alternate 0.10 mg tab with 0.088 mg tab every other day    LEVOTHYROXINE (SYNTHROID/LEVOTHROID) 88 MCG TABLET    Take  by mouth daily. Alternate 0.088 mg with 0.10 mg every other day    LIDOCAINE-PRILOCAINE (EMLA) CREAM    Apply to port site one hour prior to access. May start using six days after port placement    LORAZEPAM (ATIVAN) 0.5 MG TABLET    Take 1 tablet (0.5 mg) by mouth every 4 hours as needed (Anxiety, Nausea/Vomiting or Sleep)    MULTIPLE VITAMINS-MINERALS (MULTIVITAMIN & MINERAL PO)    Take 2 tablets by mouth daily.    OMEGA-3 FATTY ACIDS (FISH OIL PO)    Take 1 capsule by mouth daily.    PROCHLORPERAZINE (COMPAZINE) 10 MG TABLET    Take 0.5 tablets (5 mg) by mouth every 6 hours as needed (Nausea/Vomiting)    VITAMIN D, CHOLECALCIFEROL, PO    Take 2,000 Units by mouth daily     ALLERGIES  Allergies   Allergen Reactions     Seasonal Allergies            I have reviewed the Medications, Allergies, Past Medical and Surgical History, and Social History in the Epic system.    Review of Systems   All other systems reviewed and are negative.      Physical Exam   BP: 112/65  Pulse: 86  Temp: 98.2  F (36.8  C)  Resp: 20 (does sound SOB with activity)  Height: 152.4 cm (5')  Weight: 54.3 kg (119 lb 11.2 oz)  SpO2: 99 %      Physical Exam   Constitutional: She is oriented to person, place, and time.    Alert conversant pleasant slightly anxious   HENT:   Head: Atraumatic.   Eyes: EOM are normal. Pupils are equal, round, and reactive to light.   Cardiovascular: Normal heart sounds.    Pulmonary/Chest: Breath sounds normal. She has no wheezes. She has no rales.   Abdominal: Soft. There is no tenderness.   Musculoskeletal: She exhibits no edema or tenderness.   Neurological: She is alert and oriented to person, place, and time. No cranial nerve deficit.   Grossly intact and symmetric   Skin: No rash noted.   Psychiatric: She has a normal mood and affect.       ED Course     ED Course     Procedures        Results for orders placed or performed during the hospital encounter of 03/12/18   CBC with platelets differential   Result Value Ref Range    WBC 5.4 4.0 - 11.0 10e9/L    RBC Count 4.39 3.8 - 5.2 10e12/L    Hemoglobin 13.6 11.7 - 15.7 g/dL    Hematocrit 39.5 35.0 - 47.0 %    MCV 90 78 - 100 fl    MCH 31.0 26.5 - 33.0 pg    MCHC 34.4 31.5 - 36.5 g/dL    RDW 12.4 10.0 - 15.0 %    Platelet Count 94 (L) 150 - 450 10e9/L    Diff Method Manual Differential     % Neutrophils 82.4 %    % Lymphocytes 11.1 %    % Monocytes 2.8 %    % Eosinophils 2.8 %    % Basophils 0.9 %    Absolute Neutrophil 4.4 1.6 - 8.3 10e9/L    Absolute Lymphocytes 0.6 (L) 0.8 - 5.3 10e9/L    Absolute Monocytes 0.2 0.0 - 1.3 10e9/L    Absolute Eosinophils 0.2 0.0 - 0.7 10e9/L    Absolute Basophils 0.0 0.0 - 0.2 10e9/L    RBC Morphology Normal     Platelet Estimate Decreased    Comprehensive metabolic panel   Result Value Ref Range    Sodium 138 133 - 144 mmol/L    Potassium 3.8 3.4 - 5.3 mmol/L    Chloride 103 94 - 109 mmol/L    Carbon Dioxide 27 20 - 32 mmol/L    Anion Gap 8 3 - 14 mmol/L    Glucose 98 70 - 99 mg/dL    Urea Nitrogen 13 7 - 30 mg/dL    Creatinine 0.63 0.52 - 1.04 mg/dL    GFR Estimate >90 >60 mL/min/1.7m2    GFR Estimate If Black >90 >60 mL/min/1.7m2    Calcium 8.7 8.5 - 10.1 mg/dL    Bilirubin Total 0.6 0.2 - 1.3 mg/dL    Albumin  3.8 3.4 - 5.0 g/dL    Protein Total 7.3 6.8 - 8.8 g/dL    Alkaline Phosphatase 91 40 - 150 U/L    ALT 25 0 - 50 U/L    AST 22 0 - 45 U/L   ISTAT gases lactate eve POCT   Result Value Ref Range    Ph Venous 7.53 (H) 7.32 - 7.43 pH    PCO2 Venous 29 (L) 40 - 50 mm Hg    PO2 Venous 16 (L) 25 - 47 mm Hg    Bicarbonate Venous 24 21 - 28 mmol/L    O2 Sat Venous 28 %    Lactic Acid 1.1 0.7 - 2.1 mmol/L   Rapid strep screen   Result Value Ref Range    Specimen Description Throat     Rapid Strep A Screen       NEGATIVE: No Group A streptococcal antigen detected by immunoassay, await culture report.       Labs Ordered and Resulted from Time of ED Arrival Up to the Time of Departure from the ED   CBC WITH PLATELETS DIFFERENTIAL - Abnormal; Notable for the following:        Result Value    Platelet Count 94 (*)     Absolute Lymphocytes 0.6 (*)     All other components within normal limits   ISTAT  GASES LACTATE EVE POCT - Abnormal; Notable for the following:     Ph Venous 7.53 (*)     PCO2 Venous 29 (*)     PO2 Venous 16 (*)     All other components within normal limits   COMPREHENSIVE METABOLIC PANEL   ISTAT CG4 GASES LACTATE EVE NURSING POCT   RAPID STREP SCREEN   BETA STREP GROUP A CULTURE            Assessments & Plan (with Medical Decision Making)     I have reviewed the nursing notes.    Medications   acetaminophen (TYLENOL) tablet 1,000 mg (1,000 mg Oral Given 3/12/18 2109)     Patient remained afebrile in the ER.  After the patient's evaluation in the ER and her labs came back, she states that she feels much better and that most of her myalgias are gone.  Patient is in agreement that her symptoms are most likely from the Neulasta and is ready to go home.    I have reviewed the findings, diagnosis, plan and need for follow up with the patient.    Final diagnoses:   Adverse effect of drug, initial encounter - known side effects     Please make an appointment to follow up with your physicians as directed.    Routine  discharge instructions were given for this diagnosis.    Tarun Galaviz MD    3/12/2018   [facility], [place], EMERGENCY DEPARTMENT     Tarun Galaviz MD  03/12/18 2566

## 2018-03-14 ENCOUNTER — CARE COORDINATION (OUTPATIENT)
Dept: CARE COORDINATION | Facility: CLINIC | Age: 68
End: 2018-03-14

## 2018-03-14 LAB
BACTERIA SPEC CULT: NORMAL
SPECIMEN SOURCE: NORMAL

## 2018-03-14 NOTE — TELEPHONE ENCOUNTER
Central Prior Authorization Team   Phone: [phone]    Can we get the patients chart updated with a DX of something specific to the lorazepam use so that we can get this sent back in and approved for the patient please?

## 2018-03-14 NOTE — TELEPHONE ENCOUNTER
Spoke with patient last Friday 3/9 on personal phone.  She is struggling with care of medically fragile grandson.  Mom has reached out to pediatrician.  Did start chemo and feeling surprisingly good.  I spoke with Dr. Fountain who is the peds PCP - mom did reach out and patient would likely be able to have some type of home care.  We have same RN care coordinator, Suly Galaviz.  Was able to speak with her today about getting ball rolling on some sort of home care to reduce care burden.  FRED Lewis

## 2018-03-14 NOTE — PROGRESS NOTES
Clinic Care Coordination Contact  Care Team Conversations    Sent mushtaq msg to patient for ED follow up    Suly Galaviz R.N.  Clinic Care Coordinator  [facility]  [phone]

## 2018-03-14 NOTE — TELEPHONE ENCOUNTER
Central Prior Authorization Team   Phone: [phone]      Prior Authorization Approval    Authorization Effective Date: 2/10/2018  Authorization Expiration Date: 3/14/2019  Medication: LORazepam (ATIVAN) 0.5 MG tablet - Approved  Insurance Company: [employer] - Phone [phone] Fax [phone]  Expected CoPay: Insurance did a temp. 30 day fill on 3/08/2018 cant be re-ran until next month      Which Pharmacy is filling the prescription (Not needed for infusion/clinic administered): [facility] *MAIL ONLY*  Pharmacy Notified: Yes  Patient Notified: Yes

## 2018-03-15 ENCOUNTER — CARE COORDINATION (OUTPATIENT)
Dept: ONCOLOGY | Facility: CLINIC | Age: 68
End: 2018-03-15

## 2018-03-15 NOTE — PROGRESS NOTES
Called patient's cell phone to follow-up ED visit and left a VM to return call to discuss how currently doing. Tamanna Neff RN, BSN

## 2018-03-19 ENCOUNTER — OFFICE VISIT (OUTPATIENT)
Dept: CARDIOLOGY | Facility: CLINIC | Age: 68
End: 2018-03-19
Attending: INTERNAL MEDICINE
Payer: COMMERCIAL

## 2018-03-19 ENCOUNTER — TELEPHONE (OUTPATIENT)
Dept: ONCOLOGY | Facility: CLINIC | Age: 68
End: 2018-03-19

## 2018-03-19 VITALS
SYSTOLIC BLOOD PRESSURE: 110 MMHG | WEIGHT: 120.1 LBS | BODY MASS INDEX: 23.46 KG/M2 | DIASTOLIC BLOOD PRESSURE: 72 MMHG | OXYGEN SATURATION: 99 % | HEART RATE: 82 BPM

## 2018-03-19 DIAGNOSIS — Z17.1 MALIGNANT NEOPLASM OF UPPER-OUTER QUADRANT OF RIGHT BREAST IN FEMALE, ESTROGEN RECEPTOR NEGATIVE (H): Primary | ICD-10-CM

## 2018-03-19 DIAGNOSIS — Z91.89 AT RISK FOR CARDIOMYOPATHY: ICD-10-CM

## 2018-03-19 DIAGNOSIS — C50.411 MALIGNANT NEOPLASM OF UPPER-OUTER QUADRANT OF RIGHT BREAST IN FEMALE, ESTROGEN RECEPTOR NEGATIVE (H): Primary | ICD-10-CM

## 2018-03-19 DIAGNOSIS — Q23.0 AORTIC STENOSIS WITH BICUSPID VALVE: ICD-10-CM

## 2018-03-19 DIAGNOSIS — Q23.81 AORTIC STENOSIS WITH BICUSPID VALVE: ICD-10-CM

## 2018-03-19 PROCEDURE — 93010 ELECTROCARDIOGRAM REPORT: CPT | Mod: ZP | Performed by: INTERNAL MEDICINE

## 2018-03-19 PROCEDURE — 99204 OFFICE O/P NEW MOD 45 MIN: CPT | Mod: ZP | Performed by: INTERNAL MEDICINE

## 2018-03-19 PROCEDURE — G0463 HOSPITAL OUTPT CLINIC VISIT: HCPCS | Mod: 25,ZF

## 2018-03-19 PROCEDURE — 93005 ELECTROCARDIOGRAM TRACING: CPT | Mod: ZF

## 2018-03-19 ASSESSMENT — PAIN SCALES - GENERAL: PAINLEVEL: NO PAIN (0)

## 2018-03-19 NOTE — MR AVS SNAPSHOT
After Visit Summary   3/19/2018    Julieta Rivera    MRN: [id number]           Patient Information     Date Of Birth          1950        Visit Information        Provider Department      3/19/2018 9:30 AM Kaley Tidwell MD [facility]        Today's Diagnoses     Mitral valve prolapse    -  1      Care Instructions    Patient Instructions:  It was a pleasure to see you in the cardiology clinic today.      If you have any questions, call  Ami Dia RN, at [phone].  Press Option #1 for the [facility], and then press Option #3 for nursing.  We are encouraging the use of MyChart to communicate with your HealthCare Provider    Note the new medications: none  Stop the following medications: none    The results from today include: none  Please follow up with Dr. Kaley Tidwell in three months      If you have an urgent need after hours (8:00 am to 4:30 pm) please call [phone] and ask for the cardiology fellow on call.          Follow-ups after your visit        Additional Services     Follow-Up with Cardiologist                 Follow-up notes from your care team     Return in about 3 months (around 6/19/2018).      Your next 10 appointments already scheduled     Mar 29, 2018 12:00 PM CDT   [facility] Lab Draw with  [facility] DRAW   [facility] Lab Draw ([facility])    [address] [place] [address]  [address]  [facility] [address] [phone]            Mar 29, 2018 12:30 PM CDT   (Arrive by 12:15 PM)   Return Visit with Jonathan Gay MD   [facility] ([facility])    [address]  [address]  [facility] [address] [phone]            Mar 29, 2018  1:00 PM CDT   Infusion 180 with  ONCOLOGY INFUSION, [facility] 25 ATC   [facility] ([facility])    [address] [place] [address]  [address]  [facility] [address]    [phone]            Jun 04, 2018 11:00 AM CDT   (Arrive by 10:45 AM)   RETURN CANCER VISIT with Kaley Tidwell MD   [facility] ([facility])    [address]  [address] [place] [address] [phone]              Future tests that were ordered for you today     Open Future Orders        Priority Expected Expires Ordered    Follow-Up with Cardiologist Routine 6/19/2018 8/24/2018 3/19/2018            Who to contact     If you have questions or need follow up information about today's clinic visit or your schedule please contact [facility] directly at [phone].  Normal or non-critical lab and imaging results will be communicated to you by Zapointhart, letter or phone within 4 business days after the clinic has received the results. If you do not hear from us within 7 days, please contact the clinic through Zapointhart or phone. If you have a critical or abnormal lab result, we will notify you by phone as soon as possible.  Submit refill requests through Veezeon or call your pharmacy and they will forward the refill request to us. Please allow 3 business days for your refill to be completed.          Additional Information About Your Visit        Zapointhart Information     Veezeon gives you secure access to your electronic health record. If you see a primary care provider, you can also send messages to your care team and make appointments. If you have questions, please call your primary care clinic.  If you do not have a primary care provider, please call [phone] and they will assist you.        Care EveryWhere ID     This is your Care EveryWhere ID. This could be used by other organizations to access your [facility] medical records  [id number]        Your Vitals Were     Pulse Pulse Oximetry BMI (Body Mass Index)             82 99% 23.46 kg/m2          Blood Pressure from Last 3 Encounters:   03/19/18 110/72   03/12/18 100/72   03/12/18 114/70    Weight  from Last 3 Encounters:   03/19/18 54.5 kg (120 lb 1.6 oz)   03/12/18 54.3 kg (119 lb 11.2 oz)   03/12/18 55.3 kg (122 lb)              We Performed the Following     EKG 12-lead, tracing only (Same Day)        Primary Care Provider Office Phone # Fax #    JOSÉ LUIS Sol [facility] [phone]       [facility] [address] [facility] [address]  [facility] [address]        Equal Access to Services     [facility] : Hadii aad ku hadasho Soomaali, waaxda luqadaha, qaybta kaalmada adeegyada, waxay idiin hayskye hdez . So [facility] [phone].    ATENCIÓN: Si habla español, tiene a phillips disposición servicios gratuitos de asistencia lingüística. [facility] [phone].    We comply with applicable federal civil rights laws and [place] laws. We do not discriminate on the basis of race, color, national origin, age, disability, sex, sexual orientation, or gender identity.            Thank you!     Thank you for choosing [facility]  for your care. Our goal is always to provide you with excellent care. Hearing back from our patients is one way we can continue to improve our services. Please take a few minutes to complete the written survey that you may receive in the mail after your visit with us. Thank you!             Your Updated Medication List - Protect others around you: Learn how to safely use, store and throw away your medicines at www.disposemymeds.org.          This list is accurate as of 3/19/18 10:43 AM.  Always use your most recent med list.                   Brand Name Dispense Instructions for use Diagnosis    FISH OIL PO      Take 1 capsule by mouth daily.        * levothyroxine 100 MCG tablet    SYNTHROID/LEVOTHROID    45 tablet    Take  by mouth See Admin Instructions. Alternate 0.10 mg tab with 0.088 mg tab every other day    Hypothyroidism due to Hashimoto's thyroiditis       * levothyroxine 88 MCG tablet    SYNTHROID/LEVOTHROID    45 tablet    Take  by mouth daily. Alternate  0.088 mg with 0.10 mg every other day    Hypothyroidism due to Hashimoto's thyroiditis       lidocaine-prilocaine cream    EMLA    30 g    Apply to port site one hour prior to access. May start using six days after port placement    Malignant neoplasm of upper outer quadrant of breast in female, estrogen receptor negative (H)       LORazepam 0.5 MG tablet    ATIVAN    30 tablet    Take 1 tablet (0.5 mg) by mouth every 4 hours as needed (Anxiety, Nausea/Vomiting or Sleep)    Encounter for other specified aftercare, Malignant neoplasm of upper-outer quadrant of right breast in female, estrogen receptor negative (H)       MULTIVITAMIN & MINERAL PO      Take 2 tablets by mouth daily.        prochlorperazine 10 MG tablet    COMPAZINE    30 tablet    Take 0.5 tablets (5 mg) by mouth every 6 hours as needed (Nausea/Vomiting)    Encounter for other specified aftercare, Malignant neoplasm of upper-outer quadrant of right breast in female, estrogen receptor negative (H)       VITAMIN D (CHOLECALCIFEROL) PO      Take 2,000 Units by mouth daily        * Notice:  This list has 2 medication(s) that are the same as other medications prescribed for you. Read the directions carefully, and ask your doctor or other care provider to review them with you.

## 2018-03-19 NOTE — NURSING NOTE
Chief Complaint   Patient presents with     New Patient      referral from Dr. Gay for cardio oncology     Vitals were taken and medications were reconciled. EKG was performed    Jocelyn GURROLA  9:20 AM

## 2018-03-19 NOTE — TELEPHONE ENCOUNTER
"Julieta called into triage requesting to speak to Dr. Gay. She states that last Wednesday she developed a rash on one of there hands and a few days ago it start on the other. It is improving, and is not as \"raw.\" She describes it as red and with a lit of little bumps. No open blisters or bleeding. She has been using cortisone cream which seems to be helping, and is wondering if Dr. Gay recommends another remedy. Dr. Gay is out of the office today, discussed with Tamanna Neff RNCC. Agreed that patient could be set up to be seen in clinic later this week. Unable to recommend creams without seeing rash.    Called patient back to recommend coming in for an appointment and she says that she does not feel it's necessary as it's improving. She knows to call if the rash spreads of does not improve, but reiterates the fact that it has much improved in the last 5 days.    Beba Pena RN  "

## 2018-03-19 NOTE — PATIENT INSTRUCTIONS
Patient Instructions:  It was a pleasure to see you in the cardiology clinic today.      If you have any questions, call  Ami Dia RN, at [phone].  Press Option #1 for the [facility], and then press Option #3 for nursing.  We are encouraging the use of [employer] to communicate with your HealthCare Provider    Note the new medications: none  Stop the following medications: none    The results from today include: none  Please follow up with Dr. Kaley Tidwell in three months      If you have an urgent need after hours (8:00 am to 4:30 pm) please call [phone] and ask for the cardiology fellow on call.

## 2018-03-19 NOTE — LETTER
3/19/2018      RE: Julieta Rivera  [address] [place] [address]       Dear Colleague,    Thank you for the opportunity to participate in the care of your patient, Julieta Rivera, at the [facility]. Please see a copy of my visit note below.    HPI:     Julieta Rivera is a 67-year-old woman who was recently diagnosed with triple-negative invasive ductal carcinoma of the right breast and started her first cycle of docetaxel/cyclophosphamide on 3/8/18. She also has a history of factor V Leiden mutation but has no history of thrombosis and is not on anticoagulation.   She underwent an echocardiogram prior to initiation of chemotherapy in Feb 2018 and she has mild bicuspid aortic valvular stenosis with a peak velocity of 2.5 m/s and mean gradient of 13 mmHg. She has preserved biventricular function and no evidence of pulmonary hypertension. She does have a long standing history of cardiac murmur and had an echocardiogram in 1999 and the aortic valve was suspicious for a bicuspid valve but without any significant stenosis or regurgitation. She  has no personal history of coronary artery disease, heart failure, arrhythmias, hyperlipidemia, tobacco use or diabetes. There is no family history of premature coronary artery disease. She denies symptoms of chest pain, dyspnea, palpitations, edema or syncope. She has mild fatigue otherwise reports a good exercise tolerance of >4 METs.    PAST MEDICAL HISTORY:  Past Medical History:   Diagnosis Date     Abnormal Pap smear 1970s    normal since     Factor V Leiden (H)      Hypothyroidism fall 2000       CURRENT MEDICATIONS:  Current Outpatient Prescriptions   Medication Sig Dispense Refill     LORazepam (ATIVAN) 0.5 MG tablet Take 1 tablet (0.5 mg) by mouth every 4 hours as needed (Anxiety, Nausea/Vomiting or Sleep) 30 tablet 3     prochlorperazine (COMPAZINE) 10 MG tablet Take 0.5 tablets (5 mg) by mouth  every 6 hours as needed (Nausea/Vomiting) 30 tablet 3     lidocaine-prilocaine (EMLA) cream Apply to port site one hour prior to access. May start using six days after port placement 30 g 1     levothyroxine (SYNTHROID/LEVOTHROID) 100 MCG tablet Take  by mouth See Admin Instructions. Alternate 0.10 mg tab with 0.088 mg tab every other day 45 tablet 3     levothyroxine (SYNTHROID/LEVOTHROID) 88 MCG tablet Take  by mouth daily. Alternate 0.088 mg with 0.10 mg every other day 45 tablet 3     VITAMIN D, CHOLECALCIFEROL, PO Take 2,000 Units by mouth daily       Omega-3 Fatty Acids (FISH OIL PO) Take 1 capsule by mouth daily.       Multiple Vitamins-Minerals (MULTIVITAMIN & MINERAL PO) Take 2 tablets by mouth daily.         PAST SURGICAL HISTORY:  Past Surgical History:   Procedure Laterality Date      SECTION       COLPOSCOPY CERVIX, BIOPSY CERVIX, ENDOCERVICAL CURETTAGE, COMBINED       INSERT PORT VASCULAR ACCESS N/A 3/5/2018    Procedure: INSERT PORT VASCULAR ACCESS;  Single Lumen Chest Power Port Placement;  Surgeon: Brian Tolbert PA-C;  Location: [facility] OR     TONSILLECTOMY, ADENOIDECTOMY, COMBINED         ALLERGIES     Allergies   Allergen Reactions     Seasonal Allergies        FAMILY HISTORY:  Family History   Problem Relation Age of Onset     HEART DISEASE Mother      HEART DISEASE Father      CEREBROVASCULAR DISEASE Father      DIABETES Father      DIABETES Brother      Hypertension Brother      Obesity Brother      Obesity Sister      CANCER Sister      Hypertension Sister        SOCIAL HISTORY:  Social History     Social History     Marital status:      Spouse name: N/A     Number of children: N/A     Years of education: N/A     Social History Main Topics     Smoking status: Former Smoker     Quit date: 1971     Smokeless tobacco: Never Used     Alcohol use No      Comment: none since last november.     Drug use: No     Sexual activity: Yes     Partners: Male     Other Topics  Concern     Stress Concern No     Weight Concern No     Exercise Yes     yoga class weekly, walking     Seat Belt Yes     ROS:   Constitutional: No fever, chills, or sweats. No weight gain/loss   ENT: No visual disturbance, ear ache, epistaxis, sore throat  Allergies/Immunologic: Negative.   Respiratory: No cough, hemoptysia  Cardiovascular: As per HPI  GI: No nausea, vomiting, hematemesis, melena, or hematochezia  : No urinary frequency, dysuria, or hematuria  Integument: Negative  Psychiatric: Negative  Neuro: Negative  Endocrinology: Negative   Musculoskeletal: Negative    EXAM:  /72  Pulse 82  Wt 54.5 kg (120 lb 1.6 oz)  SpO2 99%  BMI 23.46 kg/m2  In general, the patient is a pleasant female in no apparent distress.      HEENT: NC/AT.  PERRLA.  EOMI.  Sclerae white, not injected.    Neck: Carotids 2+ bilaterally without bruits.  No jugular venous distension.   Lymph: No cervical adenopathy. No thyromegaly.   Heart: RRR. Normal S1, S2 preserved. 2/6 systolic ejection murmur RUSB; no rub, click, or gallop. There is no heave.    Lungs: Clear bilaterally.  No rhonchi, wheezes, rales.   GI: Soft, nontender, nondistended.   Extremities: No edema.  The pulses are 2+at the radial and DP bilaterally.  Neuro: grossly non focal.   Skin: no rashes.  Musculoskeletal: normal muscle strength, no acute arthritis, gait normal.    Labs:  LIPID RESULTS:  Lab Results   Component Value Date    CHOL 215 (H) 12/16/2015    HDL 83 12/16/2015     (H) 12/16/2015    TRIG 89 12/16/2015    CHOLHDLRATIO 2.5 11/22/2013    NHDL 132 (H) 12/16/2015       LIVER ENZYME RESULTS:  Lab Results   Component Value Date    AST 22 03/12/2018    ALT 25 03/12/2018       CBC RESULTS:  Lab Results   Component Value Date    WBC 5.4 03/12/2018    RBC 4.39 03/12/2018    HGB 13.6 03/12/2018    HCT 39.5 03/12/2018    MCV 90 03/12/2018    MCH 31.0 03/12/2018    MCHC 34.4 03/12/2018    RDW 12.4 03/12/2018    PLT 94 (L) 03/12/2018       BMP  RESULTS:  Lab Results   Component Value Date     03/12/2018    POTASSIUM 3.8 03/12/2018    CHLORIDE 103 03/12/2018    CO2 27 03/12/2018    ANIONGAP 8 03/12/2018    GLC 98 03/12/2018    BUN 13 03/12/2018    CR 0.63 03/12/2018    GFRESTIMATED >90 03/12/2018    GFRESTBLACK >90 03/12/2018    CINDI 8.7 03/12/2018        A1C RESULTS:  Lab Results   Component Value Date    A1C 5.1 11/22/2013       INR RESULTS:  Lab Results   Component Value Date    INR 0.94 03/05/2018       Cardiac data:    ECG today was personally reviewed and shows NSR, no acute ST-T changes      Echo 2/23/18 was personally reviewed   Left ventricular function, chamber size, wall motion, and wall thickness are normal. The EF is 60-65%.  Global peak LV longitudinal strain is averaged at -21.2%. This is within reported normal limits (normal <-18%).  The right ventricle is normal size. Global right ventricular function is normal.  Aortic valve is bicuspid. Mild aortic stenosis is present. Mean gradient is 13 mmHg.  No pericardial effusion is present.      Assessment and Plan:   67 year old woman  1. Right breast cancer, triple negative  2. Mild bicuspid aortic valve stenosis, mean gradient 13 mmHg  3. Preserved biventricular function    Julieta has no symptoms of angina or cardiomyopathy such as shortness of breath, edema or weight gain. Her exam today is consistent with mild aortic stenosis, euvolemia and normal heart rate and blood pressure. She reports an exercise tolerance of > 4 METS. I would recommend continued cardiac surveillance by assessment of cardiac symptoms during chemotherapy. She has a congenital bicuspid valve with mild stenosis that does not require any intervention at this point and this should not preclude her from receiving chemotherapy. I have recommended a cardiac MRA of the aorta for assessment of aortopathy given that she has a bicuspid valve. Based on the limited views of the thoracic aorta on the Feb 2018 echo, she does not have  aortic aneurysm and she can undergo the MRA after the completion of chemotherapy. She was advised to remain physically active and I will see her back in 3 mo.     Kaley Tidwell MD, MS  Staff Cardiologist, [facility] [place]   Pager: [phone]      CC  Patient Care Team:  Simona Tucker APRN CNP as PCP - General (Nurse Practitioner - Family)  Tamanna Neff, RN as Nurse Coordinator (Breast Oncology)  Chevy Gay MD as Referring Physician (Oncology)  Kaley Tidwell MD as MD (Cardiology)  Trice Galaviz RN as Clinic Care Coordinator (Primary Care - CC)  CHEVY AGY

## 2018-03-19 NOTE — PROGRESS NOTES
HPI:     Julieta Rivera is a 67-year-old woman who was recently diagnosed with triple-negative invasive ductal carcinoma of the right breast and started her first cycle of docetaxel/cyclophosphamide on 3/8/18. She also has a history of factor V Leiden mutation but has no history of thrombosis and is not on anticoagulation.   She underwent an echocardiogram prior to initiation of chemotherapy in Feb 2018 and she has mild bicuspid aortic valvular stenosis with a peak velocity of 2.5 m/s and mean gradient of 13 mmHg. She has preserved biventricular function and no evidence of pulmonary hypertension. She does have a long standing history of cardiac murmur and had an echocardiogram in 1999 and the aortic valve was suspicious for a bicuspid valve but without any significant stenosis or regurgitation. She  has no personal history of coronary artery disease, heart failure, arrhythmias, hyperlipidemia, tobacco use or diabetes. There is no family history of premature coronary artery disease. She denies symptoms of chest pain, dyspnea, palpitations, edema or syncope. She has mild fatigue otherwise reports a good exercise tolerance of >4 METs.    PAST MEDICAL HISTORY:  Past Medical History:   Diagnosis Date     Abnormal Pap smear 1970s    normal since     Factor V Leiden (H)      Hypothyroidism fall 2000       CURRENT MEDICATIONS:  Current Outpatient Prescriptions   Medication Sig Dispense Refill     LORazepam (ATIVAN) 0.5 MG tablet Take 1 tablet (0.5 mg) by mouth every 4 hours as needed (Anxiety, Nausea/Vomiting or Sleep) 30 tablet 3     prochlorperazine (COMPAZINE) 10 MG tablet Take 0.5 tablets (5 mg) by mouth every 6 hours as needed (Nausea/Vomiting) 30 tablet 3     lidocaine-prilocaine (EMLA) cream Apply to port site one hour prior to access. May start using six days after port placement 30 g 1     levothyroxine (SYNTHROID/LEVOTHROID) 100 MCG tablet Take  by mouth See Admin Instructions. Alternate 0.10 mg tab with 0.088 mg  tab every other day 45 tablet 3     levothyroxine (SYNTHROID/LEVOTHROID) 88 MCG tablet Take  by mouth daily. Alternate 0.088 mg with 0.10 mg every other day 45 tablet 3     VITAMIN D, CHOLECALCIFEROL, PO Take 2,000 Units by mouth daily       Omega-3 Fatty Acids (FISH OIL PO) Take 1 capsule by mouth daily.       Multiple Vitamins-Minerals (MULTIVITAMIN & MINERAL PO) Take 2 tablets by mouth daily.         PAST SURGICAL HISTORY:  Past Surgical History:   Procedure Laterality Date      SECTION       COLPOSCOPY CERVIX, BIOPSY CERVIX, ENDOCERVICAL CURETTAGE, COMBINED       INSERT PORT VASCULAR ACCESS N/A 3/5/2018    Procedure: INSERT PORT VASCULAR ACCESS;  Single Lumen Chest Power Port Placement;  Surgeon: Brian Tolbert PA-C;  Location: [facility] OR     TONSILLECTOMY, ADENOIDECTOMY, COMBINED         ALLERGIES     Allergies   Allergen Reactions     Seasonal Allergies        FAMILY HISTORY:  Family History   Problem Relation Age of Onset     HEART DISEASE Mother      HEART DISEASE Father      CEREBROVASCULAR DISEASE Father      DIABETES Father      DIABETES Brother      Hypertension Brother      Obesity Brother      Obesity Sister      CANCER Sister      Hypertension Sister        SOCIAL HISTORY:  Social History     Social History     Marital status:      Spouse name: N/A     Number of children: N/A     Years of education: N/A     Social History Main Topics     Smoking status: Former Smoker     Quit date: 1971     Smokeless tobacco: Never Used     Alcohol use No      Comment: none since last november.     Drug use: No     Sexual activity: Yes     Partners: Male     Other Topics Concern     Stress Concern No     Weight Concern No     Exercise Yes     yoga class weekly, walking     Seat Belt Yes     ROS:   Constitutional: No fever, chills, or sweats. No weight gain/loss   ENT: No visual disturbance, ear ache, epistaxis, sore throat  Allergies/Immunologic: Negative.   Respiratory: No cough,  hemoptysia  Cardiovascular: As per HPI  GI: No nausea, vomiting, hematemesis, melena, or hematochezia  : No urinary frequency, dysuria, or hematuria  Integument: Negative  Psychiatric: Negative  Neuro: Negative  Endocrinology: Negative   Musculoskeletal: Negative    EXAM:  /72  Pulse 82  Wt 54.5 kg (120 lb 1.6 oz)  SpO2 99%  BMI 23.46 kg/m2  In general, the patient is a pleasant female in no apparent distress.      HEENT: NC/AT.  PERRLA.  EOMI.  Sclerae white, not injected.    Neck: Carotids 2+ bilaterally without bruits.  No jugular venous distension.   Lymph: No cervical adenopathy. No thyromegaly.   Heart: RRR. Normal S1, S2 preserved. 2/6 systolic ejection murmur RUSB; no rub, click, or gallop. There is no heave.    Lungs: Clear bilaterally.  No rhonchi, wheezes, rales.   GI: Soft, nontender, nondistended.   Extremities: No edema.  The pulses are 2+at the radial and DP bilaterally.  Neuro: grossly non focal.   Skin: no rashes.  Musculoskeletal: normal muscle strength, no acute arthritis, gait normal.    Labs:  LIPID RESULTS:  Lab Results   Component Value Date    CHOL 215 (H) 12/16/2015    HDL 83 12/16/2015     (H) 12/16/2015    TRIG 89 12/16/2015    CHOLHDLRATIO 2.5 11/22/2013    NHDL 132 (H) 12/16/2015       LIVER ENZYME RESULTS:  Lab Results   Component Value Date    AST 22 03/12/2018    ALT 25 03/12/2018       CBC RESULTS:  Lab Results   Component Value Date    WBC 5.4 03/12/2018    RBC 4.39 03/12/2018    HGB 13.6 03/12/2018    HCT 39.5 03/12/2018    MCV 90 03/12/2018    MCH 31.0 03/12/2018    MCHC 34.4 03/12/2018    RDW 12.4 03/12/2018    PLT 94 (L) 03/12/2018       BMP RESULTS:  Lab Results   Component Value Date     03/12/2018    POTASSIUM 3.8 03/12/2018    CHLORIDE 103 03/12/2018    CO2 27 03/12/2018    ANIONGAP 8 03/12/2018    GLC 98 03/12/2018    BUN 13 03/12/2018    CR 0.63 03/12/2018    GFRESTIMATED >90 03/12/2018    GFRESTBLACK >90 03/12/2018    CINDI 8.7 03/12/2018        A1C  RESULTS:  Lab Results   Component Value Date    A1C 5.1 11/22/2013       INR RESULTS:  Lab Results   Component Value Date    INR 0.94 03/05/2018       Cardiac data:    ECG today was personally reviewed and shows NSR, no acute ST-T changes      Echo 2/23/18 was personally reviewed   Left ventricular function, chamber size, wall motion, and wall thickness are normal. The EF is 60-65%.  Global peak LV longitudinal strain is averaged at -21.2%. This is within reported normal limits (normal <-18%).  The right ventricle is normal size. Global right ventricular function is normal.  Aortic valve is bicuspid. Mild aortic stenosis is present. Mean gradient is 13 mmHg.  No pericardial effusion is present.      Assessment and Plan:   67 year old woman  1. Right breast cancer, triple negative  2. Mild bicuspid aortic valve stenosis, mean gradient 13 mmHg  3. Preserved biventricular function    Julieta has no symptoms of angina or cardiomyopathy such as shortness of breath, edema or weight gain. Her exam today is consistent with mild aortic stenosis, euvolemia and normal heart rate and blood pressure. She reports an exercise tolerance of > 4 METS. I would recommend continued cardiac surveillance by assessment of cardiac symptoms during chemotherapy. She has a congenital bicuspid valve with mild stenosis that does not require any intervention at this point and this should not preclude her from receiving chemotherapy. I have recommended a cardiac MRA of the aorta for assessment of aortopathy given that she has a bicuspid valve. Based on the limited views of the thoracic aorta on the Feb 2018 echo, she does not have aortic aneurysm and she can undergo the MRA after the completion of chemotherapy. She was advised to remain physically active and I will see her back in 3 mo.     Kaley Tidwell MD, MS  Staff Cardiologist, [facility]   Pager: [phone]      CC  Patient Care Team:  Simona Tucker APRN CNP as PCP - General  (Nurse Practitioner - Family)  Tamanna Neff RN as Nurse Coordinator (Breast Oncology)  Chevy Gay MD as Referring Physician (Oncology)  Kaley Tidwell MD as MD (Cardiology)  Trice Galaviz RN as Clinic Care Coordinator (Primary Care - CC)  CHEVY GAY

## 2018-03-20 LAB — INTERPRETATION ECG - MUSE: NORMAL

## 2018-03-23 ASSESSMENT — ENCOUNTER SYMPTOMS
SNORES LOUDLY: 0
POOR WOUND HEALING: 0
FEVER: 0
DYSPNEA ON EXERTION: 1
NIGHT SWEATS: 1
TASTE DISTURBANCE: 1
SINUS CONGESTION: 1
HYPOTENSION: 0
ORTHOPNEA: 1
HOARSE VOICE: 1
LEG PAIN: 0
WHEEZING: 0
BRUISES/BLEEDS EASILY: 1
NECK PAIN: 1
INCREASED ENERGY: 0
BREAST PAIN: 1
SLEEP DISTURBANCES DUE TO BREATHING: 0
SMELL DISTURBANCE: 0
SWOLLEN GLANDS: 1
SPUTUM PRODUCTION: 0
HEMOPTYSIS: 0
STIFFNESS: 1
HYPERTENSION: 0
FATIGUE: 1
WEIGHT GAIN: 0
EXERCISE INTOLERANCE: 0
ARTHRALGIAS: 1
DIFFICULTY URINATING: 0
DECREASED APPETITE: 0
SKIN CHANGES: 0
POLYPHAGIA: 0
CHILLS: 1
BREAST MASS: 0
COUGH DISTURBING SLEEP: 1
SHORTNESS OF BREATH: 1
FLANK PAIN: 0
POLYDIPSIA: 1
LIGHT-HEADEDNESS: 0
SORE THROAT: 1
JOINT SWELLING: 1
SINUS PAIN: 1
ALTERED TEMPERATURE REGULATION: 1
DYSURIA: 1
COUGH: 1
POSTURAL DYSPNEA: 1
HALLUCINATIONS: 0
MYALGIAS: 0
TROUBLE SWALLOWING: 0
NECK MASS: 1
SYNCOPE: 0
MUSCLE WEAKNESS: 0
MUSCLE CRAMPS: 0
NAIL CHANGES: 1
WEIGHT LOSS: 0
PALPITATIONS: 0
BACK PAIN: 1
HEMATURIA: 0

## 2018-03-28 RX ORDER — SODIUM CHLORIDE 9 MG/ML
1000 INJECTION, SOLUTION INTRAVENOUS CONTINUOUS PRN
Status: CANCELLED
Start: 2018-03-29

## 2018-03-28 RX ORDER — PALONOSETRON 0.05 MG/ML
0.25 INJECTION, SOLUTION INTRAVENOUS ONCE
Status: CANCELLED
Start: 2018-03-29 | End: 2018-03-29

## 2018-03-28 RX ORDER — ALBUTEROL SULFATE 90 UG/1
1-2 AEROSOL, METERED RESPIRATORY (INHALATION)
Status: CANCELLED
Start: 2018-03-29

## 2018-03-28 RX ORDER — LORAZEPAM 2 MG/ML
0.5 INJECTION INTRAMUSCULAR EVERY 4 HOURS PRN
Status: CANCELLED
Start: 2018-03-29

## 2018-03-28 RX ORDER — EPINEPHRINE 0.3 MG/.3ML
0.3 INJECTION SUBCUTANEOUS EVERY 5 MIN PRN
Status: CANCELLED | OUTPATIENT
Start: 2018-03-29

## 2018-03-28 RX ORDER — METHYLPREDNISOLONE SODIUM SUCCINATE 125 MG/2ML
125 INJECTION, POWDER, LYOPHILIZED, FOR SOLUTION INTRAMUSCULAR; INTRAVENOUS
Status: CANCELLED
Start: 2018-03-29

## 2018-03-28 RX ORDER — ALBUTEROL SULFATE 0.83 MG/ML
2.5 SOLUTION RESPIRATORY (INHALATION)
Status: CANCELLED | OUTPATIENT
Start: 2018-03-29

## 2018-03-28 RX ORDER — DIPHENHYDRAMINE HYDROCHLORIDE 50 MG/ML
50 INJECTION INTRAMUSCULAR; INTRAVENOUS
Status: CANCELLED
Start: 2018-03-29

## 2018-03-28 RX ORDER — MEPERIDINE HYDROCHLORIDE 25 MG/ML
25 INJECTION INTRAMUSCULAR; INTRAVENOUS; SUBCUTANEOUS EVERY 30 MIN PRN
Status: CANCELLED | OUTPATIENT
Start: 2018-03-29

## 2018-03-28 RX ORDER — EPINEPHRINE 1 MG/ML
0.3 INJECTION, SOLUTION, CONCENTRATE INTRAVENOUS EVERY 5 MIN PRN
Status: CANCELLED | OUTPATIENT
Start: 2018-03-29

## 2018-03-28 RX ORDER — HEPARIN SODIUM (PORCINE) LOCK FLUSH IV SOLN 100 UNIT/ML 100 UNIT/ML
500 SOLUTION INTRAVENOUS ONCE
Status: CANCELLED
Start: 2018-03-29 | End: 2018-03-29

## 2018-03-28 NOTE — PROGRESS NOTES
Dr. Eugene Guzman  Professor  Department of Surgery  [address] [address]      February 22, 2018      Dear Dr. Guzman,     Thank you for referring Julieta Rivera to our clinic for recommendations for her new diagnosis of triple negative breast cancer.       HISTORY OF PRESENT ILLNESS:  Julieta Rivera is a 67-year-old woman who was referred to our clinic with a new diagnosis of right triple-negative breast cancer.  Julieta was followed by routine screening mammography, when she was discovered to have a 7 x 6 x 9-mm mass at the 12 o'clock position of the right breast 6 cm from the nipple-areolar complex.  She did undergo a biopsy of this mass which showed an ER-negative, FL-negative, HER2-nonamplified, invasive mammary carcinoma of no special type, invasive ductal carcinoma, Rayle grade 3.  Ductal carcinoma in situ was also noted.  Nuclear grade 2 solid type.  HER2 FISH showed no amplification.  She now comes to our clinic for recommendations.       She has hypothyroidism and is on levothyroxine 88 alternating with 100 mcg daily.  She has no pain.  She has fatigue related to the care of a grandson with esophageal atresia at home.  She has no depression and no anxiety.  She has no weight loss.  Diet has not changed.  She has no loss of energy.  She does not sleep during the day.  She can perform all of her household chores.  She has noticed no abnormality of either breast.   ECOG 0 PS.       REVIEW OF SYSTEMS:  She has no fever or headaches.  She has an occasional dry cough.  She has no chest pain, shortness of breath, hemoptysis, loss of appetite, nausea, vomiting, abdominal pain, constipation, diarrhea, bone pain, back pain, muscle or joint complaints, numbness or tingling in the hands and feet, or hearing loss.  She does have some arthritis in her hands.  She recently has had some depression related to the demands of caring for her two grandsons at home.  The remainder  of a 12-point review of systems is negative.        PAST MEDICAL HISTORY:  She has no history of breast surgery in the past or breast cancer in the past.  She has no history of radiation of any kind.  She has no history of tumor of any kind.  She may have a history of a heart problem with mitral prolapse and a murmur.  The last echo we have on record is from 1996.  She has no history of heart attack, breathing problems, blood clots, seizures, arthritis, peptic ulcer disease, osteoporosis or bone fractures.  She is not currently participating in a clinical trial and has not had any significant weight loss.  She has no history of hypertension, but she does have a history of factor V Leiden because her sister was diagnosed with factor V Leiden and Julieta was tested, although Julieta herself has had no blood clots or pulmonary emboli.       FAMILY HISTORY:  There is a history of breast cancer in two paternal aunts, but no first-degree relatives.  One of her aunts was diagnosed in her 50s, the other in her 60s.  She has no male relatives with breast cancer.  The remainder of her family history was negative.        PAST MENSTRUAL HISTORY:  First period was at age 13-1/2.  Last menstrual period was in 05/2003.  She has been pregnant twice at age 26 and 30 with two live births and no miscarriages or abortions.  She used oral contraceptives only once or twice.  Uterus and ovaries are in place.  She has no history of hormone replacement therapy.        ALLERGIES:  She has no allergy to seafood, iodine or contrast dye.  She does not take aspirin.       HABITS:  She did smoke 1 pack per day in college for 3 years from age 17 to 20 and has not smoked since.  She does not drink significant alcohol and has no heavy alcohol history in the past.        PERSONAL AND SOCIAL HISTORY:  She does have a history of being a .  Her  is 80 years old but is able to take care of his activities of daily living.  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 Julieta has had much stress taking care of a toddler grandson with history of a  esophageal atresia repair and an upcoming move of her family.        PAST MEDICAL HISTORY:  Julieta has no history of angina.  She has no history of hypertension.  Her cholesterol has generally been in acceptable range, although slightly over 200 recently.       FAMILY HISTORY:  Positive for heart disease.  Father had an MI in his 50s and had a bypass and  at age 78.  Mother had rheumatic heart disease at age 38 and  at age 42.       Julieta returns to clinic for cycle 2, day 1 of docetaxel and cyclophosphamide.  She tolerated her first cycle reasonably well.  She had very significant fatigue which gradually improved.  She also has some pain in her low back related to Neulasta.  She also had very significant fatigue.  She has now recovered from that fatigue.  She did have some difficulty sleeping but is no longer on night shift work for taking care of her grandson who is 2 years old with esophageal atresia.  Her daughter has been taking over for the night shift.  She has no depression, no anxiety.  On review of systems, she denies fevers or chills.  Her highest temperature was 99.5.  She does have a cough at night but no chest pain or shortness of breath.  She has some upper respiratory infection symptoms with a drippy nose.  No nausea or vomiting.  She did have a rash on the dorsum of both hands which responded to steroids and she also had some of this rash on her face as well.  The remainder of a 10-point review of systems is negative except for some low back pain and bone pain, which she attributes to the Neulasta.  Claritin did not help.  She denies any headaches.  She is taking calcium and vitamin D.      PHYSICAL EXAMINATION:   VITAL SIGNS:  Blood pressure 123/73, temperature 97.6, pulse 85, respirations 16, O2 sat 100% on room air, height 1.5 meters and weight 54 kg.   GENERAL:  Julieta  appeared generally well.  She has thinning hair and is wearing a head scarf.   HEENT:  No lesions in the oropharynx and no erythema.   LYMPH NODE:  No palpable cervical, supraclavicular, subclavicular or axillary lymphadenopathy.   BREASTS:  Exam was not performed today.   LUNGS:  Clear to percussion and auscultation.   HEART:  There is a regular rate and rhythm, S1, S2, with a 2/6 systolic murmur best heard at the left sternal border radiating to the apex.   ABDOMEN:  Soft and nontender without hepatosplenomegaly.   EXTREMITIES:  Without edema.   PSYCHIATRIC:  Mood and affect were normal.      The CMP and CBC were within normal limits.  She did have a strep test 03/12/2018 which was negative.  She did have an abnormal blood gas from the emergency room 03/12/2018.  The reason for this is unclear.  She did have a urinalysis that was unremarkable and was released home from the 03/12/2018 ED visit.      ASSESSMENT AND PLAN:     1.  Julieta Rivera is a 67-year-old woman with a history of a [id number], triple-negative invasive ductal carcinoma of the right breast, measuring maximum dimension 9 mm, grade 3.  She comes to clinic today for cycle 2 of docetaxel and cyclophosphamide.  She tolerated the first cycle of treatment and tolerated the Neulasta with some low back pain which was not relieved by Claritin.  She is ready to proceed with her next cycle.  The neutrophil count is now normal.   2.  She had very little nausea or vomiting with the chemotherapy but did have very significant fatigue.   3.  Port placement has been successful with not clotting so far, despite history of factor V Leiden.  I did discuss with Dr. Kaz Feliciano and he affirmed that no thrombosis prophylaxis is required given that Julieta is at age 67 and no history of thrombosis.  So far her port has been working well without any difficulty.   4.  Cardiology followup will be with Dr. Tidwell for her valve disease.   5.  Followup.  We will see Julieta in followup  in our clinic in 3 weeks or sooner as dictated by symptoms. Follow up with [facility] 4-19 with TC C3 and with me 5-10 with TC C4/4. With CBC, CMP both of her visits.        Thank you for allowing us to continue to participate in Julieta Rivera's care.      Jonathan Gay MD      [facility]           I spent 30 minutes with the patient more than 50% of which was in counseling and coordination of care.

## 2018-03-29 ENCOUNTER — INFUSION THERAPY VISIT (OUTPATIENT)
Dept: ONCOLOGY | Facility: CLINIC | Age: 68
End: 2018-03-29
Attending: INTERNAL MEDICINE
Payer: COMMERCIAL

## 2018-03-29 ENCOUNTER — APPOINTMENT (OUTPATIENT)
Dept: LAB | Facility: CLINIC | Age: 68
End: 2018-03-29
Attending: INTERNAL MEDICINE
Payer: COMMERCIAL

## 2018-03-29 VITALS
HEART RATE: 85 BPM | SYSTOLIC BLOOD PRESSURE: 126 MMHG | HEIGHT: 60 IN | TEMPERATURE: 97.6 F | DIASTOLIC BLOOD PRESSURE: 73 MMHG | RESPIRATION RATE: 16 BRPM | OXYGEN SATURATION: 100 % | BODY MASS INDEX: 23.44 KG/M2 | WEIGHT: 119.4 LBS

## 2018-03-29 DIAGNOSIS — C50.411 MALIGNANT NEOPLASM OF UPPER-OUTER QUADRANT OF RIGHT BREAST IN FEMALE, ESTROGEN RECEPTOR NEGATIVE (H): ICD-10-CM

## 2018-03-29 DIAGNOSIS — Z17.1 MALIGNANT NEOPLASM OF UPPER-OUTER QUADRANT OF RIGHT BREAST IN FEMALE, ESTROGEN RECEPTOR NEGATIVE (H): ICD-10-CM

## 2018-03-29 DIAGNOSIS — Z17.1 MALIGNANT NEOPLASM OF UPPER-OUTER QUADRANT OF RIGHT BREAST IN FEMALE, ESTROGEN RECEPTOR NEGATIVE (H): Primary | ICD-10-CM

## 2018-03-29 DIAGNOSIS — Z51.89 ENCOUNTER FOR OTHER SPECIFIED AFTERCARE: ICD-10-CM

## 2018-03-29 DIAGNOSIS — C50.411 MALIGNANT NEOPLASM OF UPPER-OUTER QUADRANT OF RIGHT BREAST IN FEMALE, ESTROGEN RECEPTOR NEGATIVE (H): Primary | ICD-10-CM

## 2018-03-29 DIAGNOSIS — Z51.89 ENCOUNTER FOR OTHER SPECIFIED AFTERCARE: Primary | ICD-10-CM

## 2018-03-29 LAB
ALBUMIN SERPL-MCNC: 3.6 G/DL (ref 3.4–5)
ALP SERPL-CCNC: 67 U/L (ref 40–150)
ALT SERPL W P-5'-P-CCNC: 20 U/L (ref 0–50)
ANION GAP SERPL CALCULATED.3IONS-SCNC: 8 MMOL/L (ref 3–14)
AST SERPL W P-5'-P-CCNC: 18 U/L (ref 0–45)
BASOPHILS # BLD AUTO: 0.1 10E9/L (ref 0–0.2)
BASOPHILS NFR BLD AUTO: 0.9 %
BILIRUB SERPL-MCNC: 0.4 MG/DL (ref 0.2–1.3)
BUN SERPL-MCNC: 8 MG/DL (ref 7–30)
CALCIUM SERPL-MCNC: 8.9 MG/DL (ref 8.5–10.1)
CHLORIDE SERPL-SCNC: 107 MMOL/L (ref 94–109)
CO2 SERPL-SCNC: 24 MMOL/L (ref 20–32)
CREAT SERPL-MCNC: 0.56 MG/DL (ref 0.52–1.04)
DIFFERENTIAL METHOD BLD: NORMAL
EOSINOPHIL # BLD AUTO: 0 10E9/L (ref 0–0.7)
EOSINOPHIL NFR BLD AUTO: 0.5 %
ERYTHROCYTE [DISTWIDTH] IN BLOOD BY AUTOMATED COUNT: 13.5 % (ref 10–15)
GFR SERPL CREATININE-BSD FRML MDRD: >90 ML/MIN/1.7M2
GLUCOSE SERPL-MCNC: 102 MG/DL (ref 70–99)
HCT VFR BLD AUTO: 36.7 % (ref 35–47)
HGB BLD-MCNC: 12.4 G/DL (ref 11.7–15.7)
IMM GRANULOCYTES # BLD: 0 10E9/L (ref 0–0.4)
IMM GRANULOCYTES NFR BLD: 0.4 %
LYMPHOCYTES # BLD AUTO: 1.4 10E9/L (ref 0.8–5.3)
LYMPHOCYTES NFR BLD AUTO: 24.5 %
MCH RBC QN AUTO: 31.2 PG (ref 26.5–33)
MCHC RBC AUTO-ENTMCNC: 33.8 G/DL (ref 31.5–36.5)
MCV RBC AUTO: 92 FL (ref 78–100)
MONOCYTES # BLD AUTO: 0.5 10E9/L (ref 0–1.3)
MONOCYTES NFR BLD AUTO: 9.5 %
NEUTROPHILS # BLD AUTO: 3.7 10E9/L (ref 1.6–8.3)
NEUTROPHILS NFR BLD AUTO: 64.2 %
NRBC # BLD AUTO: 0 10*3/UL
NRBC BLD AUTO-RTO: 0 /100
PLATELET # BLD AUTO: 268 10E9/L (ref 150–450)
POTASSIUM SERPL-SCNC: 3.9 MMOL/L (ref 3.4–5.3)
PROT SERPL-MCNC: 6.6 G/DL (ref 6.8–8.8)
RBC # BLD AUTO: 3.97 10E12/L (ref 3.8–5.2)
SODIUM SERPL-SCNC: 139 MMOL/L (ref 133–144)
WBC # BLD AUTO: 5.7 10E9/L (ref 4–11)

## 2018-03-29 PROCEDURE — 25000128 H RX IP 250 OP 636: Mod: ZF | Performed by: INTERNAL MEDICINE

## 2018-03-29 PROCEDURE — 96377 APPLICATON ON-BODY INJECTOR: CPT | Mod: 59

## 2018-03-29 PROCEDURE — 96417 CHEMO IV INFUS EACH ADDL SEQ: CPT

## 2018-03-29 PROCEDURE — G0463 HOSPITAL OUTPT CLINIC VISIT: HCPCS | Mod: ZF

## 2018-03-29 PROCEDURE — 85025 COMPLETE CBC W/AUTO DIFF WBC: CPT | Performed by: INTERNAL MEDICINE

## 2018-03-29 PROCEDURE — 80053 COMPREHEN METABOLIC PANEL: CPT | Performed by: INTERNAL MEDICINE

## 2018-03-29 PROCEDURE — 99214 OFFICE O/P EST MOD 30 MIN: CPT | Mod: ZP | Performed by: INTERNAL MEDICINE

## 2018-03-29 PROCEDURE — 96413 CHEMO IV INFUSION 1 HR: CPT

## 2018-03-29 PROCEDURE — 96375 TX/PRO/DX INJ NEW DRUG ADDON: CPT

## 2018-03-29 RX ORDER — PALONOSETRON 0.05 MG/ML
0.25 INJECTION, SOLUTION INTRAVENOUS ONCE
Status: COMPLETED | OUTPATIENT
Start: 2018-03-29 | End: 2018-03-29

## 2018-03-29 RX ORDER — HEPARIN SODIUM (PORCINE) LOCK FLUSH IV SOLN 100 UNIT/ML 100 UNIT/ML
5 SOLUTION INTRAVENOUS
Status: COMPLETED | OUTPATIENT
Start: 2018-03-29 | End: 2018-03-29

## 2018-03-29 RX ORDER — HEPARIN SODIUM (PORCINE) LOCK FLUSH IV SOLN 100 UNIT/ML 100 UNIT/ML
500 SOLUTION INTRAVENOUS ONCE
Status: COMPLETED | OUTPATIENT
Start: 2018-03-29 | End: 2018-03-29

## 2018-03-29 RX ADMIN — PALONOSETRON HYDROCHLORIDE 0.25 MG: 0.25 INJECTION INTRAVENOUS at 13:47

## 2018-03-29 RX ADMIN — DOCETAXEL 120 MG: 20 INJECTION, SOLUTION, CONCENTRATE INTRAVENOUS at 14:12

## 2018-03-29 RX ADMIN — DEXAMETHASONE SODIUM PHOSPHATE 12 MG: 10 INJECTION, SOLUTION INTRAMUSCULAR; INTRAVENOUS at 13:50

## 2018-03-29 RX ADMIN — SODIUM CHLORIDE, PRESERVATIVE FREE 500 UNITS: 5 INJECTION INTRAVENOUS at 15:49

## 2018-03-29 RX ADMIN — PEGFILGRASTIM 6 MG: KIT SUBCUTANEOUS at 15:27

## 2018-03-29 RX ADMIN — SODIUM CHLORIDE, PRESERVATIVE FREE 5 ML: 5 INJECTION INTRAVENOUS at 12:18

## 2018-03-29 RX ADMIN — SODIUM CHLORIDE 250 ML: 9 INJECTION, SOLUTION INTRAVENOUS at 13:47

## 2018-03-29 RX ADMIN — CYCLOPHOSPHAMIDE 920 MG: 1 INJECTION, POWDER, FOR SOLUTION INTRAVENOUS; ORAL at 15:17

## 2018-03-29 ASSESSMENT — PAIN SCALES - GENERAL: PAINLEVEL: NO PAIN (0)

## 2018-03-29 NOTE — PROGRESS NOTES
Infusion Nursing Note:  Julieta Rivera presents today for Cycle 2 Day 1 Taxotere, Cyclophosphamide, and Neulasta On-Pro..    Patient seen by provider today: Yes: Dr. Jonathan Gay MD.    Intravenous Access:  Implanted Port.    Treatment Conditions:  Lab Results   Component Value Date    HGB 12.4 03/29/2018     Lab Results   Component Value Date    WBC 5.7 03/29/2018      Lab Results   Component Value Date    ANEU 3.7 03/29/2018     Lab Results   Component Value Date     03/29/2018      Lab Results   Component Value Date     03/29/2018                   Lab Results   Component Value Date    POTASSIUM 3.9 03/29/2018           No results found for: MAG         Lab Results   Component Value Date    CR 0.56 03/29/2018                   Lab Results   Component Value Date    CINDI 8.9 03/29/2018                Lab Results   Component Value Date    BILITOTAL 0.4 03/29/2018           Lab Results   Component Value Date    ALBUMIN 3.6 03/29/2018                    Lab Results   Component Value Date    ALT 20 03/29/2018           Lab Results   Component Value Date    AST 18 03/29/2018       Results reviewed, labs MET treatment parameters, ok to proceed with treatment.      Post Infusion Assessment:  Patient tolerated infusion without incident.  Blood return noted pre and post infusion.  Site patent and intact, free from redness, edema or discomfort.  No evidence of extravasations.  Access discontinued per protocol.    Discharge Plan:   Prescription refills given for Decadron.  Discharge instructions reviewed with: Patient.  Patient and/or family verbalized understanding of discharge instructions and all questions answered.  Patient already received a copy of her schedule.  Patient discharged in stable condition accompanied by: self.  Departure Mode: Ambulatory.    MIKAYLA FITZPATRICK RN    Neulasta On Pro- On Body injector applied to patient today on the back of patient's left arm at 1530 with light facing  elza. Writer discussed Neulasta injection would start tomorrow at 1830, approximately 27 hours after application applied today.  Written and Verbal instruction reviewed with patient.  Pt instructed when the dose delivery starts, it will take about 45 minutes to complete. Pt aware Neulasta Onpro On-Body should have green flashing light and to call triage or on-call MD if injector flashes red or appears to be leaking. Pt aware to keep Onpro On-Body Neualsta 4 inches away from electrical equipment and to avoid showering 4 hours prior to injection. Neulasta Onpro Lot number documented on MAR.

## 2018-03-29 NOTE — NURSING NOTE
Oncology Rooming Note    March 29, 2018 12:51 PM   Julieta Rivera is a 67 year old female who presents for:    Chief Complaint   Patient presents with     Port Draw     port accessed and labs drawn by rn.  vs taken.     Oncology Clinic Visit     Return: Breast ca      Initial Vitals: /73 (BP Location: Right arm, Patient Position: Sitting, Cuff Size: Adult Regular)  Pulse 85  Temp 97.6  F (36.4  C) (Oral)  Resp 16  Ht 1.524 m (5')  Wt 54.2 kg (119 lb 6.4 oz)  SpO2 100%  BMI 23.32 kg/m2 Estimated body mass index is 23.32 kg/(m^2) as calculated from the following:    Height as of this encounter: 1.524 m (5').    Weight as of this encounter: 54.2 kg (119 lb 6.4 oz). Body surface area is 1.51 meters squared.  No Pain (0) Comment: Data Unavailable   No LMP recorded. Patient is postmenopausal.  Allergies reviewed: YES  Medications reviewed: YES    Medications: Medication refills not needed today.  Pharmacy name entered into [employer]:    [id number] [place], [place] - [address] [facility] [place], [place] - [address] [id number] [place], [place] - [address]    Clinical concerns: no new concerns.  Pt received flu shot elsewhere. See Immunizations     6 minutes for nursing intake (face to face time)     Lucía Maddox CMA

## 2018-03-29 NOTE — MR AVS SNAPSHOT
After Visit Summary   3/29/2018    Julieta Rivera    MRN: [id number]           Patient Information     Date Of Birth          1950        Visit Information        Provider Department      3/29/2018 1:00 PM  25 ATC;  ONCOLOGY INFUSION [facility]        Today's Diagnoses     Encounter for other specified aftercare    -  1    Malignant neoplasm of upper-outer quadrant of right breast in female, estrogen receptor negative (H)          Care Instructions    Contact Numbers  [facility] Nurse Triage: [phone]  After Hours Nurse Line:  [phone]    Please call the [facility] Nurse Triage line or after hours number if you experience a temperature greater than or equal to 100.5, shaking chills, have uncontrolled nausea, vomiting and/or diarrhea, dizziness, shortness of breath, chest pain, bleeding, unexplained bruising, or if you have any other new/concerning symptoms, questions or concerns.     If you are having any concerning symptoms or wish to speak to a provider before your next infusion visit, please call your care coordinator or triage to notify them so we can adequately serve you.     If you need a refill on a narcotic prescription or other medication, please call triage before your infusion appointment.           March 2018 Sunday Monday Tuesday Wednesday Thursday Friday Saturday                       1     NEW CLOTTING DISORDER    9:30 AM   (60 min.)   Bill Hurd MD   [facility]     LAB   11:15 AM   (15 min.)   RD LAB   [facility] 2     3       4     5     Outpatient Visit   10:50 AM   [facility]     IR CHEST PORT PLACEMENT >5 YRS   11:00 AM   (75 min.)   [email or web address]   [facility]     INSERT PORT VASCULAR ACCESS   12:30 PM   Brian Tolbert PA-C    OR 6     7     8     [facility] LAB DRAW   11:00 AM   (15 min.)    [facility]     UMP RETURN   11:15 AM   (30 min.)   Jonathan Gay MD   [facility]     UMP ONC INFUSION 180   12:30 PM   (180 min.)   UC ONCOLOGY INFUSION   [facility] 9     10       11     12     TEAM SHORT    6:30 PM   (5 min.)   Lydia Britton PA-C   [facility]     Admission    8:42 PM   Tarun Galaviz MD   [facility] [place], Emergency Department   (Discharge: 3/12/2018) 13     14     15     16     17       18     19     UMP NEW CANCER    9:15 AM   (60 min.)   Kaley Tidwell MD   [facility] 20     21     22     23     24       25     26     27     28     29     P [facility] LAB DRAW   12:00 PM   (15 min.)    [facility] LAB DRAW   [facility] Draw     UMP RETURN   12:15 PM   (30 min.)   Jonathan Gay MD   [facility]     UMP ONC INFUSION 180    1:00 PM   (180 min.)   UC ONCOLOGY INFUSION   [facility] 30 31 April 2018 Sunday Monday Tuesday Wednesday Thursday Friday Saturday   1     2     3     4     5     6     7       8     9     10     11     12     13     14       15     16     17     18     19     P [facility] LAB DRAW   10:45 AM   (15 min.)    [facility] LAB DRAW   [facility] Lab Draw     UMP RETURN   11:05 AM   (50 min.)   Valentine Blevins PA   [facility]     UMP ONC INFUSION 180   12:30 PM   (180 min.)   UC ONCOLOGY INFUSION   [facility] 20     21       22     23     24     25     26     27     28       29     30                                          Recent Results (from the past 24 hour(s))   CBC with platelets differential    Collection Time: 03/29/18 12:25 PM   Result Value Ref Range    WBC 5.7 4.0 - 11.0 10e9/L    RBC Count 3.97 3.8 - 5.2 10e12/L    Hemoglobin 12.4 11.7 - 15.7 g/dL    Hematocrit 36.7 35.0 - 47.0 %    MCV 92 78 - 100 fl    MCH 31.2 26.5 - 33.0 pg    MCHC 33.8 31.5 - 36.5  g/dL    RDW 13.5 10.0 - 15.0 %    Platelet Count 268 150 - 450 10e9/L    Diff Method Automated Method     % Neutrophils 64.2 %    % Lymphocytes 24.5 %    % Monocytes 9.5 %    % Eosinophils 0.5 %    % Basophils 0.9 %    % Immature Granulocytes 0.4 %    Nucleated RBCs 0 0 /100    Absolute Neutrophil 3.7 1.6 - 8.3 10e9/L    Absolute Lymphocytes 1.4 0.8 - 5.3 10e9/L    Absolute Monocytes 0.5 0.0 - 1.3 10e9/L    Absolute Eosinophils 0.0 0.0 - 0.7 10e9/L    Absolute Basophils 0.1 0.0 - 0.2 10e9/L    Abs Immature Granulocytes 0.0 0 - 0.4 10e9/L    Absolute Nucleated RBC 0.0    Comprehensive metabolic panel    Collection Time: 03/29/18 12:25 PM   Result Value Ref Range    Sodium 139 133 - 144 mmol/L    Potassium 3.9 3.4 - 5.3 mmol/L    Chloride 107 94 - 109 mmol/L    Carbon Dioxide 24 20 - 32 mmol/L    Anion Gap 8 3 - 14 mmol/L    Glucose 102 (H) 70 - 99 mg/dL    Urea Nitrogen 8 7 - 30 mg/dL    Creatinine 0.56 0.52 - 1.04 mg/dL    GFR Estimate >90 >60 mL/min/1.7m2    GFR Estimate If Black >90 >60 mL/min/1.7m2    Calcium 8.9 8.5 - 10.1 mg/dL    Bilirubin Total 0.4 0.2 - 1.3 mg/dL    Albumin 3.6 3.4 - 5.0 g/dL    Protein Total 6.6 (L) 6.8 - 8.8 g/dL    Alkaline Phosphatase 67 40 - 150 U/L    ALT 20 0 - 50 U/L    AST 18 0 - 45 U/L                 Follow-ups after your visit        Your next 10 appointments already scheduled     Apr 19, 2018 10:45 AM CDT   [facility] Lab Draw with  [facility] LAB DRAW   [facility] ([facility])    [address] [place] [address] [phone]            Apr 19, 2018 11:20 AM CDT   (Arrive by 11:05 AM)   Return Visit with PADMINI Saavedra   [facility] ([facility])    [address]  [address]  [facility] [address]   [phone]            Apr 19, 2018 12:30 PM CDT   Infusion 180 with UC ONCOLOGY INFUSION, UC 17 ATC   [facility] ([facility] [place])    [address]  [address]  [facility] [address]   [phone]            May 10, 2018 11:00 AM CDT   [facility] Lab Draw with  [facility] LAB DRAW   [facility] Draw ([facility])    [address]  [address]  [facility] [address]   [phone]            May 10, 2018 11:30 AM CDT   (Arrive by 11:15 AM)   Return Visit with Jonathan Gay MD   [facility] ([facility])    [address] [place] [address]  [address]  [facility] [address]   [phone]            May 10, 2018 12:30 PM CDT   Infusion 180 with UC ONCOLOGY INFUSION, UC 11 ATC   [facility] ([facility])    [address] [place] [address]  [address]  [facility] [address]   [phone]            Jun 04, 2018 11:00 AM CDT   (Arrive by 10:45 AM)   RETURN CANCER VISIT with Kaley Tidwell MD   [facility] ([facility])    [address] [place] [address]  [address]  [facility] [address] [phone]              Future tests that were ordered for you today     Open Standing Orders        Priority Remaining Interval Expires Ordered    CBC with platelets differential Routine 51/52  3/28/2019 3/28/2018    Comprehensive metabolic panel Routine 51/52  3/28/2019 3/28/2018            Who to contact     If you have questions or need follow up information about today's clinic visit or your schedule please contact [facility] directly at [phone].  Normal or non-critical lab and imaging results will be communicated to you by MyChart, letter or phone within 4 business days after the clinic has received the results. If you do not hear from us within 7 days, please contact the clinic through MyChart or phone. If you have a critical or abnormal lab result, we will notify you by phone as soon as possible.  Submit refill requests through [employer] or call your pharmacy and  they will forward the refill request to us. Please allow 3 business days for your refill to be completed.          Additional Information About Your Visit        [employer] Information     [employer] gives you secure access to your electronic health record. If you see a primary care provider, you can also send messages to your care team and make appointments. If you have questions, please call your primary care clinic.  If you do not have a primary care provider, please call [phone] and they will assist you.        Care EveryWhere ID     This is your Care EveryWhere ID. This could be used by other organizations to access your [place] medical records  [id number]         Blood Pressure from Last 3 Encounters:   03/29/18 126/73   03/19/18 110/72   03/12/18 100/72    Weight from Last 3 Encounters:   03/29/18 54.2 kg (119 lb 6.4 oz)   03/19/18 54.5 kg (120 lb 1.6 oz)   03/12/18 54.3 kg (119 lb 11.2 oz)              Today, you had the following     No orders found for display       Primary Care Provider Office Phone # Fax #    Simona GUERRA Garrett, APRN [facility] [phone]       [facility] [address]  [facility] [address]        Equal Access to Services     CARTER DEL RIO : Hadii aad ku hadasho Soomaali, waaxda luqadaha, qaybta kaalmada adeegyada, waxay idiin haydanieln zia william laerik . So [facility] [phone].    ATENCIÓN: Si habla español, tiene a phillips disposición servicios gratuitos de asistencia lingüística. Llame al [phone].    We comply with applicable federal civil rights laws and [place] laws. We do not discriminate on the basis of race, color, national origin, age, disability, sex, sexual orientation, or gender identity.            Thank you!     Thank you for choosing [facility]  for your care. Our goal is always to provide you with excellent care. Hearing back from our patients is one way we can continue to improve our services. Please take a few minutes to complete  the written survey that you may receive in the mail after your visit with us. Thank you!             Your Updated Medication List - Protect others around you: Learn how to safely use, store and throw away your medicines at www.disposemymeds.org.          This list is accurate as of 3/29/18  3:54 PM.  Always use your most recent med list.                   Brand Name Dispense Instructions for use Diagnosis    FISH OIL PO      Take 1 capsule by mouth daily.        * levothyroxine 100 MCG tablet    SYNTHROID/LEVOTHROID    45 tablet    Take  by mouth See Admin Instructions. Alternate 0.10 mg tab with 0.088 mg tab every other day    Hypothyroidism due to Hashimoto's thyroiditis       * levothyroxine 88 MCG tablet    SYNTHROID/LEVOTHROID    45 tablet    Take  by mouth daily. Alternate 0.088 mg with 0.10 mg every other day    Hypothyroidism due to Hashimoto's thyroiditis       lidocaine-prilocaine cream    EMLA    30 g    Apply to port site one hour prior to access. May start using six days after port placement    Malignant neoplasm of upper outer quadrant of breast in female, estrogen receptor negative (H)       LORazepam 0.5 MG tablet    ATIVAN    30 tablet    Take 1 tablet (0.5 mg) by mouth every 4 hours as needed (Anxiety, Nausea/Vomiting or Sleep)    Encounter for other specified aftercare, Malignant neoplasm of upper-outer quadrant of right breast in female, estrogen receptor negative (H)       MULTIVITAMIN & MINERAL PO      Take 2 tablets by mouth daily.        prochlorperazine 10 MG tablet    COMPAZINE    30 tablet    Take 0.5 tablets (5 mg) by mouth every 6 hours as needed (Nausea/Vomiting)    Encounter for other specified aftercare, Malignant neoplasm of upper-outer quadrant of right breast in female, estrogen receptor negative (H)       VITAMIN D (CHOLECALCIFEROL) PO      Take 2,000 Units by mouth daily        * Notice:  This list has 2 medication(s) that are the same as other medications prescribed for you.  Read the directions carefully, and ask your doctor or other care provider to review them with you.

## 2018-03-29 NOTE — PATIENT INSTRUCTIONS
Contact Numbers  [facility] Nurse Triage: [phone]  After Hours Nurse Line:  [phone]    Please call the [facility] Nurse Triage line or after hours number if you experience a temperature greater than or equal to 100.5, shaking chills, have uncontrolled nausea, vomiting and/or diarrhea, dizziness, shortness of breath, chest pain, bleeding, unexplained bruising, or if you have any other new/concerning symptoms, questions or concerns.     If you are having any concerning symptoms or wish to speak to a provider before your next infusion visit, please call your care coordinator or triage to notify them so we can adequately serve you.     If you need a refill on a narcotic prescription or other medication, please call triage before your infusion appointment.           March 2018 Sunday Monday Tuesday Wednesday Thursday Friday Saturday                       1     NEW CLOTTING DISORDER    9:30 AM   (60 min.)   Bill Hurd MD   [facility]     LAB   11:15 AM   (15 min.)   RD LAB   [facility] 2     3       4     5     Outpatient Visit   10:50 AM   [facility]     IR CHEST PORT PLACEMENT >5 YRS   11:00 AM   (75 min.)   [id number]   [facility] Imaging     INSERT PORT VASCULAR ACCESS   12:30 PM   Brian Tolbert PA-C    OR 6     7     8     [facility] LAB DRAW   11:00 AM   (15 min.)    [facility] LAB DRAW   [facility] Lab Draw     [facility] RETURN   11:15 AM   (30 min.)   Jonathan Gay MD   [facility] ONC INFUSION 180   12:30 PM   (180 min.)    ONCOLOGY INFUSION   [facility] 9     10       11     12     TEAM SHORT    6:30 PM   (5 min.)   Lydia Britton PA-C   [facility]     Admission    8:42 PM   Tarun Galaviz MD   [facility], Emergency Department   (Discharge: 3/12/2018) 13     14     15     16     17        18     19     UMP NEW CANCER    9:15 AM   (60 min.)   Kaley Tidwell MD   [facility] 20     21     22     23     24       25     26     27     28     29     P [facility] LAB DRAW   12:00 PM   (15 min.)   UC [facility] LAB DRAW   [facility] Lab Draw     UMP RETURN   12:15 PM   (30 min.)   Jonathan Gay MD   [facility]     UMP ONC INFUSION 180    1:00 PM   (180 min.)   UC ONCOLOGY INFUSION   [facility] 30 31 April 2018 Sunday Monday Tuesday Wednesday Thursday Friday Saturday   1     2     3     4     5     6     7       8     9     10     11     12     13     14       15     16     17     18     19     [facility] LAB DRAW   10:45 AM   (15 min.)    [facility] LAB DRAW   [facility] Lab Draw     UMP RETURN   11:05 AM   (50 min.)   Valentine Blevins PA   [facility]     UMP ONC INFUSION 180   12:30 PM   (180 min.)   UC ONCOLOGY INFUSION   [facility] 20     21       22     23     24     25     26     27     28       29     30                                          Recent Results (from the past 24 hour(s))   CBC with platelets differential    Collection Time: 03/29/18 12:25 PM   Result Value Ref Range    WBC 5.7 4.0 - 11.0 10e9/L    RBC Count 3.97 3.8 - 5.2 10e12/L    Hemoglobin 12.4 11.7 - 15.7 g/dL    Hematocrit 36.7 35.0 - 47.0 %    MCV 92 78 - 100 fl    MCH 31.2 26.5 - 33.0 pg    MCHC 33.8 31.5 - 36.5 g/dL    RDW 13.5 10.0 - 15.0 %    Platelet Count 268 150 - 450 10e9/L    Diff Method Automated Method     % Neutrophils 64.2 %    % Lymphocytes 24.5 %    % Monocytes 9.5 %    % Eosinophils 0.5 %    % Basophils 0.9 %    % Immature Granulocytes 0.4 %    Nucleated RBCs 0 0 /100    Absolute Neutrophil 3.7 1.6 - 8.3 10e9/L    Absolute Lymphocytes 1.4 0.8 - 5.3 10e9/L    Absolute Monocytes 0.5 0.0 - 1.3 10e9/L    Absolute Eosinophils 0.0 0.0 - 0.7 10e9/L    Absolute Basophils 0.1 0.0 - 0.2 10e9/L     Abs Immature Granulocytes 0.0 0 - 0.4 10e9/L    Absolute Nucleated RBC 0.0    Comprehensive metabolic panel    Collection Time: 03/29/18 12:25 PM   Result Value Ref Range    Sodium 139 133 - 144 mmol/L    Potassium 3.9 3.4 - 5.3 mmol/L    Chloride 107 94 - 109 mmol/L    Carbon Dioxide 24 20 - 32 mmol/L    Anion Gap 8 3 - 14 mmol/L    Glucose 102 (H) 70 - 99 mg/dL    Urea Nitrogen 8 7 - 30 mg/dL    Creatinine 0.56 0.52 - 1.04 mg/dL    GFR Estimate >90 >60 mL/min/1.7m2    GFR Estimate If Black >90 >60 mL/min/1.7m2    Calcium 8.9 8.5 - 10.1 mg/dL    Bilirubin Total 0.4 0.2 - 1.3 mg/dL    Albumin 3.6 3.4 - 5.0 g/dL    Protein Total 6.6 (L) 6.8 - 8.8 g/dL    Alkaline Phosphatase 67 40 - 150 U/L    ALT 20 0 - 50 U/L    AST 18 0 - 45 U/L

## 2018-03-29 NOTE — NURSING NOTE
"Chief Complaint   Patient presents with     Port Draw     port accessed and labs drawn by rn.  vs taken.     Port accessed with 20g 3/4\" gripper needle, labs drawn, port flushed with saline and heparin, vitals checked, pt checked in for next appointment.  Suly Samayoa RN    "

## 2018-03-29 NOTE — LETTER
3/29/2018       RE: Julieta Rivera  [address] [place] [address]     Dear Colleague,    Thank you for referring your patient, Julieta Rivera, to the [facility]. Please see a copy of my visit note below.    Dr. Eugene Guzman  Professor  Department of Surgery  [address] [place] [address]      February 22, 2018      Dear Dr. Guzman,     Thank you for referring Julieta Rivera to our clinic for recommendations for her new diagnosis of triple negative breast cancer.       HISTORY OF PRESENT ILLNESS:  Julieta Rivera is a 67-year-old woman who was referred to our clinic with a new diagnosis of right triple-negative breast cancer.  Julieta was followed by routine screening mammography, when she was discovered to have a 7 x 6 x 9-mm mass at the 12 o'clock position of the right breast 6 cm from the nipple-areolar complex.  She did undergo a biopsy of this mass which showed an ER-negative, W[place]-negative, HER2-nonamplified, invasive mammary carcinoma of no special type, invasive ductal carcinoma, Jeff grade 3.  Ductal carcinoma in situ was also noted.  Nuclear grade 2 solid type.  HER2 FISH showed no amplification.  She now comes to our clinic for recommendations.       She has hypothyroidism and is on levothyroxine 88 alternating with 100 mcg daily.  She has no pain.  She has fatigue related to the care of a grandson with esophageal atresia at home.  She has no depression and no anxiety.  She has no weight loss.  Diet has not changed.  She has no loss of energy.  She does not sleep during the day.  She can perform all of her household chores.  She has noticed no abnormality of either breast.   ECOG 0 PS.       REVIEW OF SYSTEMS:  She has no fever or headaches.  She has an occasional dry cough.  She has no chest pain, shortness of breath, hemoptysis, loss of appetite, nausea, vomiting, abdominal pain, constipation, diarrhea, bone pain, back pain, muscle  or joint complaints, numbness or tingling in the hands and feet, or hearing loss.  She does have some arthritis in her hands.  She recently has had some depression related to the demands of caring for her two grandsons at home.  The remainder of a 12-point review of systems is negative.        PAST MEDICAL HISTORY:  She has no history of breast surgery in the past or breast cancer in the past.  She has no history of radiation of any kind.  She has no history of tumor of any kind.  She may have a history of a heart problem with mitral prolapse and a murmur.  The last echo we have on record is from 1996.  She has no history of heart attack, breathing problems, blood clots, seizures, arthritis, peptic ulcer disease, osteoporosis or bone fractures.  She is not currently participating in a clinical trial and has not had any significant weight loss.  She has no history of hypertension, but she does have a history of factor V Leiden because her sister was diagnosed with factor V Leiden and Julieta was tested, although Julieta herself has had no blood clots or pulmonary emboli.       FAMILY HISTORY:  There is a history of breast cancer in two paternal aunts, but no first-degree relatives.  One of her aunts was diagnosed in her 50s, the other in her 60s.  She has no male relatives with breast cancer.  The remainder of her family history was negative.        PAST MENSTRUAL HISTORY:  First period was at age 13-1/2.  Last menstrual period was in 05/2003.  She has been pregnant twice at age 26 and 30 with two live births and no miscarriages or abortions.  She used oral contraceptives only once or twice.  Uterus and ovaries are in place.  She has no history of hormone replacement therapy.        ALLERGIES:  She has no allergy to seafood, iodine or contrast dye.  She does not take aspirin.       HABITS:  She did smoke 1 pack per day in college for 3 years from age 17 to 20 and has not smoked since.  She does not drink significant  alcohol and has no heavy alcohol history in the past.        PERSONAL AND SOCIAL HISTORY:  She does have a history of being a .  Her  is 80 years old but is able to take care of his activities of daily living.  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        PAST MEDICAL HISTORY:  Julieta has no history of angina.  She has no history of hypertension.  Her cholesterol has generally been in acceptable range, although slightly over 200 recently.       FAMILY HISTORY:  Positive for heart disease.  Father had an MI in his 50s and had a bypass and  at age 78.  Mother had rheumatic heart disease at age 38 and  at age 42.       Julieta returns to clinic for cycle 2, day 1 of docetaxel and cyclophosphamide.  She tolerated her first cycle reasonably well.  She had very significant fatigue which gradually improved.  She also has some pain in her low back related to Neulasta.  She also had very significant fatigue.  She has now recovered from that fatigue.  She did have some difficulty sleeping but is no longer on night shift work for taking care of her grandson who is 2 years old with esophageal atresia.  Her daughter has been taking over for the night shift.  She has no depression, no anxiety.  On review of systems, she denies fevers or chills.  Her highest temperature was 99.5.  She does have a cough at night but no chest pain or shortness of breath.  She has some upper respiratory infection symptoms with a drippy nose.  No nausea or vomiting.  She did have a rash on the dorsum of both hands which responded to steroids and she also had some of this rash on her face as well.  The remainder of a 10-point review of systems is negative except for some low back pain and bone pain, which she attributes to the Neulasta.  Claritin did not help.  She  denies any headaches.  She is taking calcium and vitamin D.      PHYSICAL EXAMINATION:   VITAL SIGNS:  Blood pressure 123/73, temperature 97.6, pulse 85, respirations 16, O2 sat 100% on room air, height 1.5 meters and weight 54 kg.   GENERAL:  Julieta appeared generally well.  She has thinning hair and is wearing a head scarf.   HEENT:  No lesions in the oropharynx and no erythema.   LYMPH NODE:  No palpable cervical, supraclavicular, subclavicular or axillary lymphadenopathy.   BREASTS:  Exam was not performed today.   LUNGS:  Clear to percussion and auscultation.   HEART:  There is a regular rate and rhythm, S1, S2, with a 2/6 systolic murmur best heard at the left sternal border radiating to the apex.   ABDOMEN:  Soft and nontender without hepatosplenomegaly.   EXTREMITIES:  Without edema.   PSYCHIATRIC:  Mood and affect were normal.      The CMP and CBC were within normal limits.  She did have a strep test 03/12/2018 which was negative.  She did have an abnormal blood gas from the emergency room 03/12/2018.  The reason for this is unclear.  She did have a urinalysis that was unremarkable and was released home from the 03/12/2018 ED visit.      ASSESSMENT AND PLAN:     1.  Julieta Rivera is a 67-year-old woman with a history of a [id number], triple-negative invasive ductal carcinoma of the right breast, measuring maximum dimension 9 mm, grade 3.  She comes to clinic today for cycle 2 of docetaxel and cyclophosphamide.  She tolerated the first cycle of treatment and tolerated the Neulasta with some low back pain which was not relieved by Claritin.  She is ready to proceed with her next cycle.  The neutrophil count is now normal.   2.  She had very little nausea or vomiting with the chemotherapy but did have very significant fatigue.   3.  Port placement has been successful with not clotting so far, despite history of factor V Leiden.  I did discuss with Dr. Kaz Feliciano and he affirmed that no thrombosis prophylaxis is  required given that Julieta is at age 67 and no history of thrombosis.  So far her port has been working well without any difficulty.   4.  Cardiology followup will be with Dr. Tidwell for her valve disease.   5.  Followup.  We will see Julieta in followup in our clinic in 3 weeks or sooner as dictated by symptoms. Follow up with SAM 4-19 with TC C3 and with me 5-10 with TC C4/4. With CBC, CMP both of her visits.        Thank you for allowing us to continue to participate in Julieta Rivera's care.      Jonathan Gay MD      [facility]       I spent 30 minutes with the patient more than 50% of which was in counseling and coordination of care.     Again, thank you for allowing me to participate in the care of your patient.      Sincerely,    Jonathan Gay MD

## 2018-03-29 NOTE — MR AVS SNAPSHOT
After Visit Summary   3/29/2018    Julieta Rivera    MRN: [id number]           Patient Information     Date Of Birth          1950        Visit Information        Provider Department      3/29/2018 12:30 PM Jonathan Gya MD [facility]        Today's Diagnoses     Malignant neoplasm of upper-outer quadrant of right breast in female, estrogen receptor negative (H)    -  1       Follow-ups after your visit        Follow-up notes from your care team     Return in about 6 weeks (around 5/10/2018).      Your next 10 appointments already scheduled     Apr 19, 2018 10:45 AM CDT   Masonic Lab Draw with UC MASONIC LAB DRAW   [facility] Masonic Lab Draw ([facility])    [address]  [address]  [facility] [address]   [phone]            Apr 19, 2018 11:20 AM CDT   (Arrive by 11:05 AM)   Return Visit with PADMINI Saavedra   [facility] ([facility])    [address] [place] [address]  [address]  [facility] [address]   [phone]            Apr 19, 2018 12:30 PM CDT   Infusion 180 with UC ONCOLOGY INFUSION, UC 17 ATC   [facility] ([facility])    [address]  [address]  [place] [facility] [address]   [phone]            May 10, 2018 11:00 AM CDT   Masonic Lab Draw with UC MASONIC LAB DRAW   [facility] Masonic Lab Draw ([facility])    [address]  [address]  [place] [address]   [phone]            May 10, 2018 11:30 AM CDT   (Arrive by 11:15 AM)   Return Visit with Jonathan Gay MD   [facility] ([facility])    [address] [place] [address]  [address]  [facility] [address]   [phone]            May 10, 2018 12:30 PM CDT   Infusion 180 with UC ONCOLOGY INFUSION, UC 11 ATC   [facility] ([facility])    [address]  [address]  [facility] [id number] [phone]            Jun 04, 2018 11:00 AM CDT   (Arrive by 10:45 AM)   RETURN CANCER VISIT with Kaley Tidwell MD   [facility] ([facility])    [address]  [address]  [facility] [place] [facility] [id number] [phone]              Future tests that were ordered for you today     Open Standing Orders        Priority Remaining Interval Expires Ordered    CBC with platelets differential Routine 51/52  3/28/2019 3/28/2018    Comprehensive metabolic panel Routine 51/52  3/28/2019 3/28/2018            Who to contact     If you have questions or need follow up information about today's clinic visit or your schedule please contact [facility] directly at [phone].  Normal or non-critical lab and imaging results will be communicated to you by Wummelkistehart, letter or phone within 4 business days after the clinic has received the results. If you do not hear from us within 7 days, please contact the clinic through Wummelkistehart or phone. If you have a critical or abnormal lab result, we will notify you by phone as soon as possible.  Submit refill requests through Gucash or call your pharmacy and they will forward the refill request to us. Please allow 3 business days for your refill to be completed.          Additional Information About Your Visit        WummelkisteharHaileo Information     Gucash gives you secure access to your electronic health record. If you see a primary care provider, you can also send messages to your care team and make appointments. If you have questions, please call your primary care clinic.  If you do not have a primary care provider, please call [phone] and they will assist you.        Care EveryWhere ID     This is your Care EveryWhere ID. This could be used by other organizations to access your [facility] medical records  [id number]        Your Vitals Were     Pulse Temperature Respirations Height  Pulse Oximetry BMI (Body Mass Index)    85 97.6  F (36.4  C) (Oral) 16 1.524 m (5') 100% 23.32 kg/m2       Blood Pressure from Last 3 Encounters:   03/29/18 126/73   03/19/18 110/72   03/12/18 100/72    Weight from Last 3 Encounters:   03/29/18 54.2 kg (119 lb 6.4 oz)   03/19/18 54.5 kg (120 lb 1.6 oz)   03/12/18 54.3 kg (119 lb 11.2 oz)              We Performed the Following     CBC with platelets differential     Comprehensive metabolic panel        Primary Care Provider Office Phone # Fax #    Simona GUERRA JOSÉ LUIS Tucker [facility] [phone]       [facility] [address]  [facility] [address]        Equal Access to Services     CARTER DEL RIO : Hadii india pierre hadasho Sohussein, waaxda luqadaha, qaybta kaalmada adefloryada, khadar hdez . So [facility] [phone].    ATENCIÓN: Si habla español, tiene a phillips disposición servicios gratuitos de asistencia lingüística. Kvng al [phone].    We comply with applicable federal civil rights laws and [place] laws. We do not discriminate on the basis of race, color, national origin, age, disability, sex, sexual orientation, or gender identity.            Thank you!     Thank you for choosing [facility]  for your care. Our goal is always to provide you with excellent care. Hearing back from our patients is one way we can continue to improve our services. Please take a few minutes to complete the written survey that you may receive in the mail after your visit with us. Thank you!             Your Updated Medication List - Protect others around you: Learn how to safely use, store and throw away your medicines at www.disposemymeds.org.          This list is accurate as of 3/29/18  1:20 PM.  Always use your most recent med list.                   Brand Name Dispense Instructions for use Diagnosis    FISH OIL PO      Take 1 capsule by mouth daily.        * levothyroxine 100 MCG tablet    SYNTHROID/LEVOTHROID    45 tablet     Take  by mouth See Admin Instructions. Alternate 0.10 mg tab with 0.088 mg tab every other day    Hypothyroidism due to Hashimoto's thyroiditis       * levothyroxine 88 MCG tablet    SYNTHROID/LEVOTHROID    45 tablet    Take  by mouth daily. Alternate 0.088 mg with 0.10 mg every other day    Hypothyroidism due to Hashimoto's thyroiditis       lidocaine-prilocaine cream    EMLA    30 g    Apply to port site one hour prior to access. May start using six days after port placement    Malignant neoplasm of upper outer quadrant of breast in female, estrogen receptor negative (H)       LORazepam 0.5 MG tablet    ATIVAN    30 tablet    Take 1 tablet (0.5 mg) by mouth every 4 hours as needed (Anxiety, Nausea/Vomiting or Sleep)    Encounter for other specified aftercare, Malignant neoplasm of upper-outer quadrant of right breast in female, estrogen receptor negative (H)       MULTIVITAMIN & MINERAL PO      Take 2 tablets by mouth daily.        prochlorperazine 10 MG tablet    COMPAZINE    30 tablet    Take 0.5 tablets (5 mg) by mouth every 6 hours as needed (Nausea/Vomiting)    Encounter for other specified aftercare, Malignant neoplasm of upper-outer quadrant of right breast in female, estrogen receptor negative (H)       VITAMIN D (CHOLECALCIFEROL) PO      Take 2,000 Units by mouth daily        * Notice:  This list has 2 medication(s) that are the same as other medications prescribed for you. Read the directions carefully, and ask your doctor or other care provider to review them with you.

## 2018-04-03 ENCOUNTER — PATIENT OUTREACH (OUTPATIENT)
Dept: CARE COORDINATION | Facility: CLINIC | Age: 68
End: 2018-04-03

## 2018-04-03 DIAGNOSIS — Z51.89 ENCOUNTER FOR OTHER SPECIFIED AFTERCARE: Primary | ICD-10-CM

## 2018-04-03 NOTE — PROGRESS NOTES
Clinic Care Coordination Contact    Situation: Patient chart reviewed by care coordinator.    Background: active treatment for breast cancer    Assessment: patient attended oncology visit 3-29-18    Plan/Recommendations: tolerating chemotherapy treatments well. Follow up with oncologist in 3 weeks    Suly Galaviz R.N.  Clinic Care Coordinator  [facility]  [phone]

## 2018-04-05 ENCOUNTER — MYC MEDICAL ADVICE (OUTPATIENT)
Dept: FAMILY MEDICINE | Facility: CLINIC | Age: 68
End: 2018-04-05

## 2018-04-05 DIAGNOSIS — E06.3 HYPOTHYROIDISM DUE TO HASHIMOTO'S THYROIDITIS: ICD-10-CM

## 2018-04-05 NOTE — TELEPHONE ENCOUNTER
Ava,    Please see patient's mychart message.     Medication/pharmacy is cued in refill encounter under today for pt, will route to you.    Adia Iraheta RN  [facility]

## 2018-04-06 ENCOUNTER — MYC MEDICAL ADVICE (OUTPATIENT)
Dept: FAMILY MEDICINE | Facility: CLINIC | Age: 68
End: 2018-04-06

## 2018-04-06 RX ORDER — LEVOTHYROXINE SODIUM 100 UG/1
100 TABLET ORAL DAILY
Qty: 60 TABLET | Refills: 1 | Status: SHIPPED | OUTPATIENT
Start: 2018-04-06 | End: 2018-08-05

## 2018-04-06 NOTE — TELEPHONE ENCOUNTER
Patient's recent thyroid levels.    Component      Latest Ref Rng & Units 1/4/2017 1/5/2018 3/1/2018 3/8/2018   TSH      0.40 - 4.00 mU/L 0.67 5.23 (H) 3.76 4.41 (H)   T4 Free      0.76 - 1.46 ng/dL 1.27 1.40 1.39 1.40

## 2018-04-19 ENCOUNTER — ONCOLOGY VISIT (OUTPATIENT)
Dept: ONCOLOGY | Facility: CLINIC | Age: 68
End: 2018-04-19
Attending: INTERNAL MEDICINE
Payer: COMMERCIAL

## 2018-04-19 VITALS
RESPIRATION RATE: 16 BRPM | SYSTOLIC BLOOD PRESSURE: 111 MMHG | HEIGHT: 60 IN | WEIGHT: 117 LBS | OXYGEN SATURATION: 98 % | BODY MASS INDEX: 22.97 KG/M2 | TEMPERATURE: 97.8 F | DIASTOLIC BLOOD PRESSURE: 70 MMHG | HEART RATE: 79 BPM

## 2018-04-19 DIAGNOSIS — Z51.89 ENCOUNTER FOR OTHER SPECIFIED AFTERCARE: ICD-10-CM

## 2018-04-19 DIAGNOSIS — C50.411 MALIGNANT NEOPLASM OF UPPER-OUTER QUADRANT OF RIGHT BREAST IN FEMALE, ESTROGEN RECEPTOR NEGATIVE (H): Primary | ICD-10-CM

## 2018-04-19 DIAGNOSIS — T45.1X5A PERIPHERAL NEUROPATHY DUE TO CHEMOTHERAPY (H): Primary | ICD-10-CM

## 2018-04-19 DIAGNOSIS — C50.411 MALIGNANT NEOPLASM OF UPPER-OUTER QUADRANT OF RIGHT BREAST IN FEMALE, ESTROGEN RECEPTOR NEGATIVE (H): ICD-10-CM

## 2018-04-19 DIAGNOSIS — Z17.1 MALIGNANT NEOPLASM OF UPPER-OUTER QUADRANT OF RIGHT BREAST IN FEMALE, ESTROGEN RECEPTOR NEGATIVE (H): Primary | ICD-10-CM

## 2018-04-19 DIAGNOSIS — Z17.1 MALIGNANT NEOPLASM OF UPPER-OUTER QUADRANT OF RIGHT BREAST IN FEMALE, ESTROGEN RECEPTOR NEGATIVE (H): ICD-10-CM

## 2018-04-19 DIAGNOSIS — G62.0 PERIPHERAL NEUROPATHY DUE TO CHEMOTHERAPY (H): Primary | ICD-10-CM

## 2018-04-19 LAB
ALBUMIN SERPL-MCNC: 3.6 G/DL (ref 3.4–5)
ALP SERPL-CCNC: 62 U/L (ref 40–150)
ALT SERPL W P-5'-P-CCNC: 18 U/L (ref 0–50)
ANION GAP SERPL CALCULATED.3IONS-SCNC: 8 MMOL/L (ref 3–14)
AST SERPL W P-5'-P-CCNC: 20 U/L (ref 0–45)
BASOPHILS # BLD AUTO: 0 10E9/L (ref 0–0.2)
BASOPHILS NFR BLD AUTO: 0.8 %
BILIRUB SERPL-MCNC: 0.5 MG/DL (ref 0.2–1.3)
BUN SERPL-MCNC: 8 MG/DL (ref 7–30)
CALCIUM SERPL-MCNC: 8.6 MG/DL (ref 8.5–10.1)
CHLORIDE SERPL-SCNC: 107 MMOL/L (ref 94–109)
CO2 SERPL-SCNC: 25 MMOL/L (ref 20–32)
CREAT SERPL-MCNC: 0.56 MG/DL (ref 0.52–1.04)
DIFFERENTIAL METHOD BLD: ABNORMAL
EOSINOPHIL # BLD AUTO: 0 10E9/L (ref 0–0.7)
EOSINOPHIL NFR BLD AUTO: 0.2 %
ERYTHROCYTE [DISTWIDTH] IN BLOOD BY AUTOMATED COUNT: 14.3 % (ref 10–15)
GFR SERPL CREATININE-BSD FRML MDRD: >90 ML/MIN/1.7M2
GLUCOSE SERPL-MCNC: 98 MG/DL (ref 70–99)
HCT VFR BLD AUTO: 34.9 % (ref 35–47)
HGB BLD-MCNC: 11.9 G/DL (ref 11.7–15.7)
IMM GRANULOCYTES # BLD: 0 10E9/L (ref 0–0.4)
IMM GRANULOCYTES NFR BLD: 0.2 %
LYMPHOCYTES # BLD AUTO: 1.1 10E9/L (ref 0.8–5.3)
LYMPHOCYTES NFR BLD AUTO: 20.4 %
MCH RBC QN AUTO: 31.4 PG (ref 26.5–33)
MCHC RBC AUTO-ENTMCNC: 34.1 G/DL (ref 31.5–36.5)
MCV RBC AUTO: 92 FL (ref 78–100)
MONOCYTES # BLD AUTO: 0.5 10E9/L (ref 0–1.3)
MONOCYTES NFR BLD AUTO: 9.4 %
NEUTROPHILS # BLD AUTO: 3.6 10E9/L (ref 1.6–8.3)
NEUTROPHILS NFR BLD AUTO: 69 %
NRBC # BLD AUTO: 0 10*3/UL
NRBC BLD AUTO-RTO: 0 /100
PLATELET # BLD AUTO: 229 10E9/L (ref 150–450)
POTASSIUM SERPL-SCNC: 3.6 MMOL/L (ref 3.4–5.3)
PROT SERPL-MCNC: 6.4 G/DL (ref 6.8–8.8)
RBC # BLD AUTO: 3.79 10E12/L (ref 3.8–5.2)
SODIUM SERPL-SCNC: 140 MMOL/L (ref 133–144)
WBC # BLD AUTO: 5.2 10E9/L (ref 4–11)

## 2018-04-19 PROCEDURE — 99214 OFFICE O/P EST MOD 30 MIN: CPT | Mod: ZP | Performed by: PHYSICIAN ASSISTANT

## 2018-04-19 PROCEDURE — 96377 APPLICATON ON-BODY INJECTOR: CPT | Mod: 59

## 2018-04-19 PROCEDURE — G0463 HOSPITAL OUTPT CLINIC VISIT: HCPCS | Mod: ZF

## 2018-04-19 PROCEDURE — 85025 COMPLETE CBC W/AUTO DIFF WBC: CPT | Performed by: INTERNAL MEDICINE

## 2018-04-19 PROCEDURE — 25000128 H RX IP 250 OP 636: Mod: ZF | Performed by: PHYSICIAN ASSISTANT

## 2018-04-19 PROCEDURE — 96375 TX/PRO/DX INJ NEW DRUG ADDON: CPT

## 2018-04-19 PROCEDURE — 96413 CHEMO IV INFUSION 1 HR: CPT

## 2018-04-19 PROCEDURE — 80053 COMPREHEN METABOLIC PANEL: CPT | Performed by: INTERNAL MEDICINE

## 2018-04-19 PROCEDURE — 25000128 H RX IP 250 OP 636: Mod: ZF | Performed by: INTERNAL MEDICINE

## 2018-04-19 PROCEDURE — 96417 CHEMO IV INFUS EACH ADDL SEQ: CPT

## 2018-04-19 RX ORDER — HEPARIN SODIUM (PORCINE) LOCK FLUSH IV SOLN 100 UNIT/ML 100 UNIT/ML
500 SOLUTION INTRAVENOUS ONCE
Status: CANCELLED
Start: 2018-04-19 | End: 2018-04-19

## 2018-04-19 RX ORDER — ALBUTEROL SULFATE 90 UG/1
1-2 AEROSOL, METERED RESPIRATORY (INHALATION)
Status: CANCELLED
Start: 2018-04-19

## 2018-04-19 RX ORDER — HEPARIN SODIUM (PORCINE) LOCK FLUSH IV SOLN 100 UNIT/ML 100 UNIT/ML
5 SOLUTION INTRAVENOUS ONCE
Status: COMPLETED | OUTPATIENT
Start: 2018-04-19 | End: 2018-04-19

## 2018-04-19 RX ORDER — METHYLPREDNISOLONE SODIUM SUCCINATE 125 MG/2ML
125 INJECTION, POWDER, LYOPHILIZED, FOR SOLUTION INTRAMUSCULAR; INTRAVENOUS
Status: CANCELLED
Start: 2018-04-19

## 2018-04-19 RX ORDER — EPINEPHRINE 0.3 MG/.3ML
0.3 INJECTION SUBCUTANEOUS EVERY 5 MIN PRN
Status: CANCELLED | OUTPATIENT
Start: 2018-04-19

## 2018-04-19 RX ORDER — EPINEPHRINE 1 MG/ML
0.3 INJECTION, SOLUTION, CONCENTRATE INTRAVENOUS EVERY 5 MIN PRN
Status: CANCELLED | OUTPATIENT
Start: 2018-04-19

## 2018-04-19 RX ORDER — MEPERIDINE HYDROCHLORIDE 25 MG/ML
25 INJECTION INTRAMUSCULAR; INTRAVENOUS; SUBCUTANEOUS EVERY 30 MIN PRN
Status: CANCELLED | OUTPATIENT
Start: 2018-04-19

## 2018-04-19 RX ORDER — HEPARIN SODIUM (PORCINE) LOCK FLUSH IV SOLN 100 UNIT/ML 100 UNIT/ML
500 SOLUTION INTRAVENOUS ONCE
Status: COMPLETED | OUTPATIENT
Start: 2018-04-19 | End: 2018-04-19

## 2018-04-19 RX ORDER — MULTIVITAMIN WITH IRON
100 TABLET ORAL DAILY
Qty: 30 TABLET | Refills: 3 | Status: SHIPPED | OUTPATIENT
Start: 2018-04-19 | End: 2018-08-17

## 2018-04-19 RX ORDER — PALONOSETRON 0.05 MG/ML
0.25 INJECTION, SOLUTION INTRAVENOUS ONCE
Status: COMPLETED | OUTPATIENT
Start: 2018-04-19 | End: 2018-04-19

## 2018-04-19 RX ORDER — DIPHENHYDRAMINE HYDROCHLORIDE 50 MG/ML
50 INJECTION INTRAMUSCULAR; INTRAVENOUS
Status: CANCELLED
Start: 2018-04-19

## 2018-04-19 RX ORDER — SODIUM CHLORIDE 9 MG/ML
1000 INJECTION, SOLUTION INTRAVENOUS CONTINUOUS PRN
Status: CANCELLED
Start: 2018-04-19

## 2018-04-19 RX ORDER — PALONOSETRON 0.05 MG/ML
0.25 INJECTION, SOLUTION INTRAVENOUS ONCE
Status: CANCELLED
Start: 2018-04-19 | End: 2018-04-19

## 2018-04-19 RX ORDER — ALBUTEROL SULFATE 0.83 MG/ML
2.5 SOLUTION RESPIRATORY (INHALATION)
Status: CANCELLED | OUTPATIENT
Start: 2018-04-19

## 2018-04-19 RX ORDER — LORAZEPAM 2 MG/ML
0.5 INJECTION INTRAMUSCULAR EVERY 4 HOURS PRN
Status: CANCELLED
Start: 2018-04-19

## 2018-04-19 RX ADMIN — PALONOSETRON HYDROCHLORIDE 0.25 MG: 0.25 INJECTION INTRAVENOUS at 13:28

## 2018-04-19 RX ADMIN — CYCLOPHOSPHAMIDE 920 MG: 1 INJECTION, POWDER, FOR SOLUTION INTRAVENOUS; ORAL at 14:48

## 2018-04-19 RX ADMIN — DEXAMETHASONE SODIUM PHOSPHATE 12 MG: 10 INJECTION, SOLUTION INTRAMUSCULAR; INTRAVENOUS at 13:34

## 2018-04-19 RX ADMIN — SODIUM CHLORIDE 250 ML: 9 INJECTION, SOLUTION INTRAVENOUS at 13:29

## 2018-04-19 RX ADMIN — PEGFILGRASTIM 6 MG: KIT SUBCUTANEOUS at 14:51

## 2018-04-19 RX ADMIN — SODIUM CHLORIDE, PRESERVATIVE FREE 500 UNITS: 5 INJECTION INTRAVENOUS at 15:20

## 2018-04-19 RX ADMIN — DOCETAXEL 120 MG: 20 INJECTION, SOLUTION, CONCENTRATE INTRAVENOUS at 13:46

## 2018-04-19 RX ADMIN — SODIUM CHLORIDE, PRESERVATIVE FREE 5 ML: 5 INJECTION INTRAVENOUS at 11:33

## 2018-04-19 ASSESSMENT — PAIN SCALES - GENERAL: PAINLEVEL: NO PAIN (0)

## 2018-04-19 NOTE — MR AVS SNAPSHOT
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)    -  1    Encounter for other specified aftercare        Malignant neoplasm of upper-outer quadrant of right breast in female, estrogen receptor negative (H)          Care Instructions    Dexamethasone:    Take 2 tablets (8mg) tonight, and 2 tablets (8mg) twice daily on Friday, 4/20, then 1 tablet (4mg) twice daily on Saturday, 4/21, then 1 tablet (4mg) on Sunday and 1 tablet (4mg) on Monday          Follow-ups after your visit        Your next 10 appointments already scheduled     May 10, 2018 11:00 AM CDT   [facility] Draw with [facility] LAB DRAW   [facility] ([facility])    [address]  [address]  [facility] [id number] [phone]            May 10, 2018 11:30 AM CDT   (Arrive by 11:15 AM)   Return Visit with Jonathan Gay MD   [facility] ([facility])    [address]  [address]  [facility] [id number] [phone]            May 10, 2018 12:30 PM CDT   Infusion 180 with  ONCOLOGY INFUSION, UC 11 ATC   [facility] ([facility])    [address]  [address]  [facility] [address] [phone]            Jun 04, 2018 11:00 AM CDT   (Arrive by 10:45 AM)   RETURN CANCER VISIT with Kaley Tidwell MD   [facility] ([facility])    [address] [place] [address]  [address]  [facility] [id number] [phone]              Who to contact     If you have questions or need follow up information about today's clinic visit or your schedule please contact [facility] [place] directly at [phone].  Normal or non-critical lab and imaging results will be communicated to you by Cylon Controlshart, letter or phone within 4 business days after the clinic has received the results. If you do not hear from us within 7 days, please contact the clinic through [employer] or phone. If you have a critical or abnormal lab result, we will notify you by phone as soon as possible.  Submit refill requests through Zerply or call your pharmacy and they will forward the refill request to us. Please allow 3 business days for your refill to be completed.          Additional Information About Your Visit        Zerply Information     Zerply gives you secure access to your electronic health record. If you see a primary care provider, you can also send messages to your care team and make appointments. If you have questions, please call your primary care clinic.  If you do not have a primary care provider, please call [phone] and they will assist you.        Care EveryWhere ID     This is your Care EveryWhere ID. This could be used by other organizations to access your [place] medical records  [id number]        Your Vitals Were     Pulse Temperature Respirations Height Pulse Oximetry BMI (Body Mass Index)    79 97.8  F (36.6  C) (Oral) 16 1.524 m (5') 98% 22.85 kg/m2       Blood Pressure from Last 3 Encounters:   04/19/18 111/70   03/29/18 126/73   03/19/18 110/72    Weight from Last 3 Encounters:   04/19/18 53.1 kg (117 lb)   03/29/18 54.2 kg (119 lb 6.4 oz)   03/19/18 54.5 kg (120 lb 1.6 oz)              Today, you had the following     No orders found for display         Today's Medication Changes          These changes are accurate as of 4/19/18  2:08 PM.  If you have any questions, ask your nurse or doctor.               Start taking these medicines.        Dose/Directions    pyridoxine 100 MG tablet   Commonly known as:  VITAMIN B-6   Used for:  Peripheral neuropathy due to chemotherapy  (H)   Started by:  Valentine Blevins PA        Dose:  100 mg   Take 1 tablet (100 mg) by mouth daily   Quantity:  30 tablet   Refills:  3         These medicines have changed or have updated prescriptions.        Dose/Directions    levothyroxine 100 MCG tablet   Commonly known as:  SYNTHROID/LEVOTHROID   This may have changed:  Another medication with the same name was removed. Continue taking this medication, and follow the directions you see here.   Used for:  Hypothyroidism due to Hashimoto's thyroiditis   Changed by:  Valentine Blevins PA        Dose:  100 mcg   Take 1 tablet (100 mcg) by mouth daily Take by mouth daily   Quantity:  60 tablet   Refills:  1            Where to get your medicines      These medications were sent to [facility] [address]  [address] [address]    Hours:  TRANSPLANT PHONE NUMBER [phone] Phone:  [phone]     pyridoxine 100 MG tablet                Primary Care Provider Office Phone # Fax #    Simona JOSÉ LUIS [facility] [phone]       [facility] [address]  [facility] [address]        Goals        General    Medical (pt-stated)     Notes - Note created  4/3/2018 12:31 PM by Trice Galaviz, RN    Goal Statement: I will manage stress associated with caring for my grandchildren  Measure of Success: verbalization of stress reduction  Supportive Steps to Achieve: outpatient counseling, support of RN CC  Barriers: none  Strengths: family support  Date to Achieve By: 6 months            Equal Access to Services     BELINDA DEL RIO AH: Hadii aad ku hadasho Soomaali, waaxda luqadaha, qaybta kaalmada adeegyada, khadar pruitt hayskye hdez . So [facility] [phone].    ATENCIÓN: Si habla español, tiene a phillips disposición servicios gratuitos de asistencia lingüística. Llame al [phone].    We comply with applicable federal civil rights laws and [place]  laws. We do not discriminate on the basis of race, color, national origin, age, disability, sex, sexual orientation, or gender identity.            Thank you!     Thank you for choosing [facility]  for your care. Our goal is always to provide you with excellent care. Hearing back from our patients is one way we can continue to improve our services. Please take a few minutes to complete the written survey that you may receive in the mail after your visit with us. Thank you!             Your Updated Medication List - Protect others around you: Learn how to safely use, store and throw away your medicines at www.disposemymeds.org.          This list is accurate as of 4/19/18  2:08 PM.  Always use your most recent med list.                   Brand Name Dispense Instructions for use Diagnosis    FISH OIL PO      Take 1 capsule by mouth daily.        levothyroxine 100 MCG tablet    SYNTHROID/LEVOTHROID    60 tablet    Take 1 tablet (100 mcg) by mouth daily Take by mouth daily    Hypothyroidism due to Hashimoto's thyroiditis       lidocaine-prilocaine cream    EMLA    30 g    Apply to port site one hour prior to access. May start using six days after port placement    Malignant neoplasm of upper outer quadrant of breast in female, estrogen receptor negative (H)       LORazepam 0.5 MG tablet    ATIVAN    30 tablet    Take 1 tablet (0.5 mg) by mouth every 4 hours as needed (Anxiety, Nausea/Vomiting or Sleep)    Encounter for other specified aftercare, Malignant neoplasm of upper-outer quadrant of right breast in female, estrogen receptor negative (H)       MULTIVITAMIN & MINERAL PO      Take 2 tablets by mouth daily.        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

## 2018-04-19 NOTE — MR AVS SNAPSHOT
After Visit Summary   4/19/2018    Julieta Rivera    MRN: [id number]           Patient Information     Date Of Birth          1950        Visit Information        Provider Department      4/19/2018 12:30 PM LYNN 17 ATC;  ONCOLOGY INFUSION [facility]        Today's Diagnoses     Malignant neoplasm of upper-outer quadrant of right breast in female, estrogen receptor negative (H)    -  1    Encounter for other specified aftercare          Care Instructions    Dexamethasone:     Take 2 tablets (8mg) tonight, and 2 tablets (8mg) twice daily on Friday, 4/20, then 1 tablet (4mg) twice daily on Saturday, 4/21, then 1 tablet (4mg) on Sunday and 1 tablet (4mg) on Monday      Contact Numbers  [facility]: [phone]    After Hours:  [phone]  Triage: [phone]    Please call the [facility] Triage line if you experience a temperature greater than or equal to 100.5, shaking chills, have uncontrolled nausea, vomiting and/or diarrhea, dizziness, shortness of breath, chest pain, bleeding, unexplained bruising, or if you have any other new/concerning symptoms, questions or concerns.     If it is after hours, weekends, or holidays, please call the main hospital  at  [phone] and ask to speak to the Oncology doctor on call.     If you are having any concerning symptoms or wish to speak to a provider before your next infusion visit, please call your care coordinator or triage to notify them so we can adequately serve you.     If you need a refill on a narcotic prescription or other medication, please call triage before your infusion appointment.         April 2018 Sunday Monday Tuesday Wednesday Thursday Friday Saturday   1     2     3     4     5     6     7       8     9     10     11     12     13     14       15     16     17     18     19     [facility] MASONIC LAB DRAW   10:45 AM   (15 min.)    [facility] LAB DRAW   [facility]     [facility] RETURN   11:05  AM   (50 min.)   Valentine Blevins PA   [facility] ONC INFUSION 180   12:30 PM   (180 min.)   UC ONCOLOGY INFUSION   [facility] 20     21       22     23     24     25     26     27     28       29     30                                         May 2018   [place] Monday Tuesday Wednesday Thursday Friday Saturday             1     2     3     4     5       6     7     8     9     10     [facility] MASONIC LAB DRAW   11:00 AM   (15 min.)    MASONIC LAB DRAW   [facility] Lab Draw     P RETURN   11:15 AM   (30 min.)   Jonathan Gay MD   [facility] ONC INFUSION 180   12:30 PM   (180 min.)   UC ONCOLOGY INFUSION   [facility] 11     12       13     14     15     16     17     18     19       20     21     22     23     24     25     26       27     28     29     30     31                           Recent Results (from the past 24 hour(s))   CBC with platelets differential    Collection Time: 04/19/18 11:40 AM   Result Value Ref Range    WBC 5.2 4.0 - 11.0 10e9/L    RBC Count 3.79 (L) 3.8 - 5.2 10e12/L    Hemoglobin 11.9 11.7 - 15.7 g/dL    Hematocrit 34.9 (L) 35.0 - 47.0 %    MCV 92 78 - 100 fl    MCH 31.4 26.5 - 33.0 pg    MCHC 34.1 31.5 - 36.5 g/dL    RDW 14.3 10.0 - 15.0 %    Platelet Count 229 150 - 450 10e9/L    Diff Method Automated Method     % Neutrophils 69.0 %    % Lymphocytes 20.4 %    % Monocytes 9.4 %    % Eosinophils 0.2 %    % Basophils 0.8 %    % Immature Granulocytes 0.2 %    Nucleated RBCs 0 0 /100    Absolute Neutrophil 3.6 1.6 - 8.3 10e9/L    Absolute Lymphocytes 1.1 0.8 - 5.3 10e9/L    Absolute Monocytes 0.5 0.0 - 1.3 10e9/L    Absolute Eosinophils 0.0 0.0 - 0.7 10e9/L    Absolute Basophils 0.0 0.0 - 0.2 10e9/L    Abs Immature Granulocytes 0.0 0 - 0.4 10e9/L    Absolute Nucleated RBC 0.0    Comprehensive metabolic panel    Collection Time: 04/19/18 11:40 AM   Result Value Ref Range    Sodium  140 133 - 144 mmol/L    Potassium 3.6 3.4 - 5.3 mmol/L    Chloride 107 94 - 109 mmol/L    Carbon Dioxide 25 20 - 32 mmol/L    Anion Gap 8 3 - 14 mmol/L    Glucose 98 70 - 99 mg/dL    Urea Nitrogen 8 7 - 30 mg/dL    Creatinine 0.56 0.52 - 1.04 mg/dL    GFR Estimate >90 >60 mL/min/1.7m2    GFR Estimate If Black >90 >60 mL/min/1.7m2    Calcium 8.6 8.5 - 10.1 mg/dL    Bilirubin Total 0.5 0.2 - 1.3 mg/dL    Albumin 3.6 3.4 - 5.0 g/dL    Protein Total 6.4 (L) 6.8 - 8.8 g/dL    Alkaline Phosphatase 62 40 - 150 U/L    ALT 18 0 - 50 U/L    AST 20 0 - 45 U/L                 Follow-ups after your visit        Your next 10 appointments already scheduled     May 10, 2018 11:00 AM CDT   [facility]   [facility] Draw ([facility])    [address] [place] [address]  [address]  [facility] [id number] [phone]            May 10, 2018 11:30 AM CDT   (Arrive by 11:15 AM)   Return Visit with Jonathan Gay MD   [facility] ([facility])    [address] [place] [address]  [address]  [facility] [address] [phone]            May 10, 2018 12:30 PM CDT   Infusion 180 with  ONCOLOGY INFUSION, UC 11 ATC   [facility] ([facility])    [address] [place] [address]  [address]  [facility] [id number] [phone]            Jun 04, 2018 11:00 AM CDT   (Arrive by 10:45 AM)   RETURN CANCER VISIT with Kaley Tidwell MD   [facility] ([facility])    [address] [place] [address]  [address]  [facility] [id number] [phone]              Who to contact     If you have questions or need follow up information about today's clinic visit or your schedule please contact [facility] directly at [phone].  Normal or non-critical lab and imaging results will be communicated to you by MyChart, letter or phone within 4 business days after the clinic has  received the results. If you do not hear from us within 7 days, please contact the clinic through Drync or phone. If you have a critical or abnormal lab result, we will notify you by phone as soon as possible.  Submit refill requests through Drync or call your pharmacy and they will forward the refill request to us. Please allow 3 business days for your refill to be completed.          Additional Information About Your Visit        Drync Information     Drync gives you secure access to your electronic health record. If you see a primary care provider, you can also send messages to your care team and make appointments. If you have questions, please call your primary care clinic.  If you do not have a primary care provider, please call [phone] and they will assist you.        Care EveryWhere ID     This is your Care EveryWhere ID. This could be used by other organizations to access your [place] medical records  [id number]         Blood Pressure from Last 3 Encounters:   04/19/18 111/70   03/29/18 126/73   03/19/18 110/72    Weight from Last 3 Encounters:   04/19/18 53.1 kg (117 lb)   03/29/18 54.2 kg (119 lb 6.4 oz)   03/19/18 54.5 kg (120 lb 1.6 oz)              We Performed the Following     CBC with platelets differential     Comprehensive metabolic panel          Today's Medication Changes          These changes are accurate as of 4/19/18  1:23 PM.  If you have any questions, ask your nurse or doctor.               Start taking these medicines.        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        Dose/Directions    levothyroxine 100 MCG tablet   Commonly known as:  SYNTHROID/LEVOTHROID   This may have  changed:  Another medication with the same name was removed. Continue taking this medication, and follow the directions you see here.   Used for:  Hypothyroidism due to Hashimoto's thyroiditis   Changed by:  Valentine Blevins PA        Dose:  100 mcg   Take 1 tablet (100 mcg) by mouth daily Take by mouth daily   Quantity:  60 tablet   Refills:  1            Where to get your medicines      These medications were sent to [place], [place] - [address]  [address], [facility] [address]    Hours:  TRANSPLANT PHONE NUMBER [phone] Phone:  [phone]     pyridoxine 100 MG tablet                Primary Care Provider Office Phone # Fax #    Simona GUERRA TuckerJOSÉ LUIS [facility] [phone]       [facility] [address]  [facility] [address]        Goals        General    Medical (pt-stated)     Notes - Note created  4/3/2018 12:31 PM by Trice Galaviz RN    Goal Statement: I will manage stress associated with caring for my grandchildren  Measure of Success: verbalization of stress reduction  Supportive Steps to Achieve: outpatient counseling, support of RN CC  Barriers: none  Strengths: family support  Date to Achieve By: 6 months            Equal Access to Services     CARTER DEL RIO AH: Hadwenceslao terrello Sohussein, waaxda luqadaha, qaybta kaalmada adeegyada, khadar solano. So [facility] [phone].    ATENCIÓN: Si habla español, tiene a phillips disposición servicios gratuitos de asistencia lingüística. Llame al [phone].    We comply with applicable federal civil rights laws and [place] laws. We do not discriminate on the basis of race, color, national origin, age, disability, sex, sexual orientation, or gender identity.            Thank you!     Thank you for choosing [facility]CER [facility]  for your care. Our goal is always to provide you with excellent care. Hearing back from our patients is  one way we can continue to improve our services. Please take a few minutes to complete the written survey that you may receive in the mail after your visit with us. Thank you!             Your Updated Medication List - Protect others around you: Learn how to safely use, store and throw away your medicines at www.disposemymeds.org.          This list is accurate as of 4/19/18  1:23 PM.  Always use your most recent med list.                   Brand Name Dispense Instructions for use Diagnosis    FISH OIL PO      Take 1 capsule by mouth daily.        levothyroxine 100 MCG tablet    SYNTHROID/LEVOTHROID    60 tablet    Take 1 tablet (100 mcg) by mouth daily Take by mouth daily    Hypothyroidism due to Hashimoto's thyroiditis       lidocaine-prilocaine cream    EMLA    30 g    Apply to port site one hour prior to access. May start using six days after port placement    Malignant neoplasm of upper outer quadrant of breast in female, estrogen receptor negative (H)       LORazepam 0.5 MG tablet    ATIVAN    30 tablet    Take 1 tablet (0.5 mg) by mouth every 4 hours as needed (Anxiety, Nausea/Vomiting or Sleep)    Encounter for other specified aftercare, Malignant neoplasm of upper-outer quadrant of right breast in female, estrogen receptor negative (H)       MULTIVITAMIN & MINERAL PO      Take 2 tablets by mouth daily.        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

## 2018-04-19 NOTE — NURSING NOTE
Oncology Rooming Note    April 19, 2018 12:05 PM   Julieta Rivera is a 67 year old female who presents for:    Chief Complaint   Patient presents with     Oncology Clinic Visit     Return visit related to Breast Cancer     Port Draw     labs drawn via port by RN     Initial Vitals: /70 (BP Location: Right arm, Patient Position: Chair, Cuff Size: Adult Regular)  Pulse 79  Temp 97.8  F (36.6  C) (Oral)  Resp 16  Ht 1.524 m (5')  Wt 53.1 kg (117 lb)  SpO2 98%  BMI 22.85 kg/m2 Estimated body mass index is 22.85 kg/(m^2) as calculated from the following:    Height as of this encounter: 1.524 m (5').    Weight as of this encounter: 53.1 kg (117 lb). Body surface area is 1.5 meters squared.  No Pain (0) Comment: Data Unavailable   No LMP recorded. Patient is postmenopausal.  Allergies reviewed: Yes  Medications reviewed: Yes    Medications: Medication refills not needed today.  Pharmacy name entered into whodoyou:    [id number] [place], [place] - [address] [place], [place] - [address]  [id number] [place], [place] - [address]    Clinical concerns: No new concerns. Provider was notified.    10 minutes for nursing intake (face to face time)     Moni Baer LPN

## 2018-04-19 NOTE — PATIENT INSTRUCTIONS
Dexamethasone:    Take 2 tablets (8mg) tonight, and 2 tablets (8mg) twice daily on Friday, 4/20, then 1 tablet (4mg) twice daily on Saturday, 4/21, then 1 tablet (4mg) on Sunday and 1 tablet (4mg) on Monday

## 2018-04-19 NOTE — LETTER
4/19/2018       RE: Julieta Rivera  [address] [place] [address]     Dear Colleague,    Thank you for referring your patient, Julieta Rivera, to the [facility]. Please see a copy of my visit note below.    Oncology/Hematology Visit Note  Apr 19, 2018    Reason for Visit: follow up of right triple-negative breast CA    History of Present Illness: Julieta Rivera is a 67 year old female with moderate aortic stenosis, hypothyroidism and a new diagnosis of right triple-negative breast cancer.  Julieta was followed by routine screening mammography, when she was discovered to have a 7 x 6 x 9-mm mass at the 12 o'clock position of the right breast 6 cm from the nipple-areolar complex.  She did undergo a biopsy of this mass which showed an ER-negative, NE-negative, HER2-nonamplified, invasive mammary carcinoma of no special type, invasive ductal carcinoma, [place] grade 3.  Ductal carcinoma in situ was also noted.  Nuclear grade 2 solid type.  HER2 FISH showed no amplification. She is currently undergoing treatment with neoadjuvant docetaxel and cyclophosphamide. Please see previous notes for further details on the patient's history. She comes in today for routine follow up prior to cycle 3.    Interval History:  Julieta states that starting on the evening of the third evening after chemo she starts feeling very fatigued. She has altered taste and no appetite. No nausea. She feels like lying in bed all day. She has noticed some mild dyspnea while walking up stairs carrying laundry, but not at rest. Denies chest pain, edema. She takes Claritin for the discomfort from neulasta but does not feel as though it is helping. She had a temp of 100.2 one time but it did not go higher than that. She had a pruritic rash on her low back after this past cycle. She applied some hydrocortisone. She did notice some bleeding from excoriations, although she tried not to scratch it. No longer pruritic. The  second week after chemotherapy she starts feeling better. Appetite returns and less fatigue. She sometimes has loose stool, sometimes constipation. She has also noticed some tingling and numbness in feet, but this is mild. Remaining ROS negative    Current Outpatient Prescriptions   Medication Sig Dispense Refill     levothyroxine (SYNTHROID/LEVOTHROID) 100 MCG tablet Take 1 tablet (100 mcg) by mouth daily Take by mouth daily 60 tablet 1     LORazepam (ATIVAN) 0.5 MG tablet Take 1 tablet (0.5 mg) by mouth every 4 hours as needed (Anxiety, Nausea/Vomiting or Sleep) 30 tablet 3     Multiple Vitamins-Minerals (MULTIVITAMIN & MINERAL PO) Take 2 tablets by mouth daily.       Omega-3 Fatty Acids (FISH OIL PO) Take 1 capsule by mouth daily.       pyridoxine (VITAMIN B-6) 100 MG tablet Take 1 tablet (100 mg) by mouth daily 30 tablet 3     VITAMIN D, CHOLECALCIFEROL, PO Take 2,000 Units by mouth daily       lidocaine-prilocaine (EMLA) cream Apply to port site one hour prior to access. May start using six days after port placement (Patient not taking: Reported on 4/19/2018) 30 g 1     prochlorperazine (COMPAZINE) 10 MG tablet Take 0.5 tablets (5 mg) by mouth every 6 hours as needed (Nausea/Vomiting) (Patient not taking: Reported on 4/19/2018) 30 tablet 3     [DISCONTINUED] levothyroxine (SYNTHROID/LEVOTHROID) 88 MCG tablet Take  by mouth daily. Alternate 0.088 mg with 0.10 mg every other day (Patient not taking: Reported on 4/19/2018) 45 tablet 3       Physical Examination:  General: The patient is a pleasant female in no acute distress.  /70 (BP Location: Right arm, Patient Position: Chair, Cuff Size: Adult Regular)  Pulse 79  Temp 97.8  F (36.6  C) (Oral)  Resp 16  Ht 1.524 m (5')  Wt 53.1 kg (117 lb)  SpO2 98%  BMI 22.85 kg/m2  Wt Readings from Last 10 Encounters:   04/19/18 53.1 kg (117 lb)   03/29/18 54.2 kg (119 lb 6.4 oz)   03/19/18 54.5 kg (120 lb 1.6 oz)   03/12/18 54.3 kg (119 lb 11.2 oz)   03/12/18  55.3 kg (122 lb)   03/08/18 55.4 kg (122 lb 3.2 oz)   03/05/18 56 kg (123 lb 6.4 oz)   03/01/18 55.9 kg (123 lb 4.8 oz)   02/22/18 56.6 kg (124 lb 11.2 oz)   01/05/18 56.3 kg (124 lb 1.6 oz)     HEENT: EOMI, PERRL. Sclerae are anicteric. Oral mucosa is pink and moist with no lesions or thrush.   Lymph: No lymphadenopathy in the cervical, supraclavicular or axillary areas.   Breast: not performed today  Heart: Regular rate and rhythm. 2/6 FIORELLA   Lungs: Clear to auscultation bilaterally.   Abdomen: Bowel sounds present, soft, nontender with no palpable hepatosplenomegaly or masses.   Extremities: No lower extremity edema noted bilaterally.   Neuro: Cranial nerves II through XII are grossly intact.  Skin: Healing excoriations in mid low back. No erythema. No other rashes, petechiae, or bruising noted on exposed skin.    Laboratory Data:  Results for FAIZA BURGOS (MRN [id number]) as of 4/19/2018 13:13   4/19/2018 11:40   Sodium 140   Potassium 3.6   Chloride 107   Carbon Dioxide 25   Urea Nitrogen 8   Creatinine 0.56   GFR Estimate >90   GFR Estimate If Black >90   Calcium 8.6   Anion Gap 8   Albumin 3.6   Protein Total 6.4 (L)   Bilirubin Total 0.5   Alkaline Phosphatase 62   ALT 18   AST 20   Glucose 98   WBC 5.2   Hemoglobin 11.9   Hematocrit 34.9 (L)   Platelet Count 229   RBC Count 3.79 (L)   MCV 92   MCH 31.4   MCHC 34.1   RDW 14.3   Diff Method Automated Method   % Neutrophils 69.0   % Lymphocytes 20.4   % Monocytes 9.4   % Eosinophils 0.2   % Basophils 0.8   % Immature Granulocytes 0.2   Nucleated RBCs 0   Absolute Neutrophil 3.6   Absolute Lymphocytes 1.1   Absolute Monocytes 0.5   Absolute Eosinophils 0.0   Absolute Basophils 0.0   Abs Immature Granulocytes 0.0   Absolute Nucleated RBC 0.0       Assessment and Plan:    Triple-negative right breast cancer: She presents today prior to C3 Docetaxel and cyclophosphamide. She is tolerating well aside from fatigue and low appetite for the first week after chemo.  Mild neuropathy in feet after the last cycle. Rash on right hand after C1 and rash on back after c2. Labs reviewed. Proceed with C3  --Will slowly taper dex prep this cycle to see if it helps with fatigue.  --Scheduled for C4 on 5/10 with labs, Dr. Gay prior    Rash: Maybe secondary to docetaxel or possibly neulasta. Affecting right hand after C1 and low back after C2. Resolved today on exam. Continue OTC hydrocortisone cream. Will continue to monitor. Patient to call if worsening rash this cycle. Also discussed benadryl prn for pruritic symptoms.    Peripheral neuropathy: Grade 1 on feet. Mild tingling/numbness. Likely 2/2 to chemo. Will start vitamin B6 100mg daily. Continue to monitor.    Bicuspid aortic valve stenosis: Followed by Dr. Tidwell who has recommended cardiac MRA after chemotherapy.     Hypothyroidism: dose recently increased to 100mcg daily. She will follow up with PCP in 6 weeks.    Family history Factor V Leiden: no thrombosis prophylaxis required given Julieta is age 67 with no history of thrombosis, per Dr. Feliciano. So far port is working well without difficulty.    Valentine Blevins PA-C  [facility]  [address] [place] [address] [phone]

## 2018-04-19 NOTE — PROGRESS NOTES
Infusion Nursing Note:  Julieta Rivera presents today for C3 Taxotere-Cytoxan-Onbody Neulasta.    Patient seen by provider today: Yes: PADMINI Diehl    Treatment Conditions:  Lab Results   Component Value Date    HGB 11.9 04/19/2018     Lab Results   Component Value Date    WBC 5.2 04/19/2018      Lab Results   Component Value Date    ANEU 3.6 04/19/2018     Lab Results   Component Value Date     04/19/2018      Lab Results   Component Value Date     04/19/2018                   Lab Results   Component Value Date    POTASSIUM 3.6 04/19/2018           No results found for: MAG         Lab Results   Component Value Date    CR 0.56 04/19/2018                   Lab Results   Component Value Date    CINDI 8.6 04/19/2018                Lab Results   Component Value Date    BILITOTAL 0.5 04/19/2018           Lab Results   Component Value Date    ALBUMIN 3.6 04/19/2018                    Lab Results   Component Value Date    ALT 18 04/19/2018           Lab Results   Component Value Date    AST 20 04/19/2018       Results reviewed, labs MET treatment parameters, ok to proceed with treatment.    Intravenous Access:  Implanted Port.  Access dc'd at time of discharge.      Note:   Results reviewed, copy given to patient.  Proceed with treatment.    Copy of AVS given to patient. + Blood return from PORT pre and post infusion.  Tolerated infusion without incident. DEX Prescription filled today.   D/C in care of self.  Pt will return 5/10 for next appointment.   Neulasta Onpro On-Body injector applied to L arm  at 1445 with light facing pt's elbow.  Writer discussed Neulasta injection would start on 4/20 at 1745, approximately 27 hours after application applied today.  Written and Verbal instruction reviewed with patient.  Pt instructed when the dose delivery starts, it will take about 45 minutes to complete.  Pt aware Neulasta Onpro On-Body should have green flashing light and to call triage or on-call MD if  injector flashes red or appears to be leaking. Pt aware to keep Onpro On-Body Neulasta 4 inches away from electrical equipment and to avoid showering 4 hours prior to injection.   Neulasta Onpro Lot number: [id number]        Melody Arias RN

## 2018-04-19 NOTE — PROGRESS NOTES
Oncology/Hematology Visit Note  Apr 19, 2018    Reason for Visit: follow up of right triple-negative breast CA    History of Present Illness: Julieta Rivera is a 67 year old female with moderate aortic stenosis, hypothyroidism and a new diagnosis of right triple-negative breast cancer.  Julieta was followed by routine screening mammography, when she was discovered to have a 7 x 6 x 9-mm mass at the 12 o'clock position of the right breast 6 cm from the nipple-areolar complex.  She did undergo a biopsy of this mass which showed an ER-negative, LA-negative, HER2-nonamplified, invasive mammary carcinoma of no special type, invasive ductal carcinoma, [place] grade 3.  Ductal carcinoma in situ was also noted.  Nuclear grade 2 solid type.  HER2 FISH showed no amplification. She is currently undergoing treatment with neoadjuvant docetaxel and cyclophosphamide. Please see previous notes for further details on the patient's history. She comes in today for routine follow up prior to cycle 3.    Interval History:  Julieta states that starting on the evening of the third evening after chemo she starts feeling very fatigued. She has altered taste and no appetite. No nausea. She feels like lying in bed all day. She has noticed some mild dyspnea while walking up stairs carrying laundry, but not at rest. Denies chest pain, edema. She takes Claritin for the discomfort from neulasta but does not feel as though it is helping. She had a temp of 100.2 one time but it did not go higher than that. She had a pruritic rash on her low back after this past cycle. She applied some hydrocortisone. She did notice some bleeding from excoriations, although she tried not to scratch it. No longer pruritic. The second week after chemotherapy she starts feeling better. Appetite returns and less fatigue. She sometimes has loose stool, sometimes constipation. She has also noticed some tingling and numbness in feet, but this is mild. Remaining ROS  negative    Current Outpatient Prescriptions   Medication Sig Dispense Refill     levothyroxine (SYNTHROID/LEVOTHROID) 100 MCG tablet Take 1 tablet (100 mcg) by mouth daily Take by mouth daily 60 tablet 1     LORazepam (ATIVAN) 0.5 MG tablet Take 1 tablet (0.5 mg) by mouth every 4 hours as needed (Anxiety, Nausea/Vomiting or Sleep) 30 tablet 3     Multiple Vitamins-Minerals (MULTIVITAMIN & MINERAL PO) Take 2 tablets by mouth daily.       Omega-3 Fatty Acids (FISH OIL PO) Take 1 capsule by mouth daily.       pyridoxine (VITAMIN B-6) 100 MG tablet Take 1 tablet (100 mg) by mouth daily 30 tablet 3     VITAMIN D, CHOLECALCIFEROL, PO Take 2,000 Units by mouth daily       lidocaine-prilocaine (EMLA) cream Apply to port site one hour prior to access. May start using six days after port placement (Patient not taking: Reported on 4/19/2018) 30 g 1     prochlorperazine (COMPAZINE) 10 MG tablet Take 0.5 tablets (5 mg) by mouth every 6 hours as needed (Nausea/Vomiting) (Patient not taking: Reported on 4/19/2018) 30 tablet 3     [DISCONTINUED] levothyroxine (SYNTHROID/LEVOTHROID) 88 MCG tablet Take  by mouth daily. Alternate 0.088 mg with 0.10 mg every other day (Patient not taking: Reported on 4/19/2018) 45 tablet 3       Physical Examination:  General: The patient is a pleasant female in no acute distress.  /70 (BP Location: Right arm, Patient Position: Chair, Cuff Size: Adult Regular)  Pulse 79  Temp 97.8  F (36.6  C) (Oral)  Resp 16  Ht 1.524 m (5')  Wt 53.1 kg (117 lb)  SpO2 98%  BMI 22.85 kg/m2  Wt Readings from Last 10 Encounters:   04/19/18 53.1 kg (117 lb)   03/29/18 54.2 kg (119 lb 6.4 oz)   03/19/18 54.5 kg (120 lb 1.6 oz)   03/12/18 54.3 kg (119 lb 11.2 oz)   03/12/18 55.3 kg (122 lb)   03/08/18 55.4 kg (122 lb 3.2 oz)   03/05/18 56 kg (123 lb 6.4 oz)   03/01/18 55.9 kg (123 lb 4.8 oz)   02/22/18 56.6 kg (124 lb 11.2 oz)   01/05/18 56.3 kg (124 lb 1.6 oz)     HEENT: EOMI, PERRL. Sclerae are anicteric.  Oral mucosa is pink and moist with no lesions or thrush.   Lymph: No lymphadenopathy in the cervical, supraclavicular or axillary areas.   Breast: not performed today  Heart: Regular rate and rhythm. 2/6 FIORELLA   Lungs: Clear to auscultation bilaterally.   Abdomen: Bowel sounds present, soft, nontender with no palpable hepatosplenomegaly or masses.   Extremities: No lower extremity edema noted bilaterally.   Neuro: Cranial nerves II through XII are grossly intact.  Skin: Healing excoriations in mid low back. No erythema. No other rashes, petechiae, or bruising noted on exposed skin.    Laboratory Data:  Results for FAIZA BURGOS (MRN [id number]) as of 4/19/2018 13:13   4/19/2018 11:40   Sodium 140   Potassium 3.6   Chloride 107   Carbon Dioxide 25   Urea Nitrogen 8   Creatinine 0.56   GFR Estimate >90   GFR Estimate If Black >90   Calcium 8.6   Anion Gap 8   Albumin 3.6   Protein Total 6.4 (L)   Bilirubin Total 0.5   Alkaline Phosphatase 62   ALT 18   AST 20   Glucose 98   WBC 5.2   Hemoglobin 11.9   Hematocrit 34.9 (L)   Platelet Count 229   RBC Count 3.79 (L)   MCV 92   MCH 31.4   MCHC 34.1   RDW 14.3   Diff Method Automated Method   % Neutrophils 69.0   % Lymphocytes 20.4   % Monocytes 9.4   % Eosinophils 0.2   % Basophils 0.8   % Immature Granulocytes 0.2   Nucleated RBCs 0   Absolute Neutrophil 3.6   Absolute Lymphocytes 1.1   Absolute Monocytes 0.5   Absolute Eosinophils 0.0   Absolute Basophils 0.0   Abs Immature Granulocytes 0.0   Absolute Nucleated RBC 0.0       Assessment and Plan:    Triple-negative right breast cancer: She presents today prior to C3 Docetaxel and cyclophosphamide. She is tolerating well aside from fatigue and low appetite for the first week after chemo. Mild neuropathy in feet after the last cycle. Rash on right hand after C1 and rash on back after c2. Labs reviewed. Proceed with C3  --Will slowly taper dex prep this cycle to see if it helps with fatigue.  --Scheduled for C4 on 5/10  with labs, Dr. Gay prior    Rash: Maybe secondary to docetaxel or possibly neulasta. Affecting right hand after C1 and low back after C2. Resolved today on exam. Continue OTC hydrocortisone cream. Will continue to monitor. Patient to call if worsening rash this cycle. Also discussed benadryl prn for pruritic symptoms.    Peripheral neuropathy: Grade 1 on feet. Mild tingling/numbness. Likely 2/2 to chemo. Will start vitamin B6 100mg daily. Continue to monitor.    Bicuspid aortic valve stenosis: Followed by Dr. Tidwell who has recommended cardiac MRA after chemotherapy.     Hypothyroidism: dose recently increased to 100mcg daily. She will follow up with PCP in 6 weeks.    Family history Factor V Leiden: no thrombosis prophylaxis required given Julieta is age 67 with no history of thrombosis, per Dr. Feliciano. So far port is working well without difficulty.    Valentine Blevins PA-C  [facility]  [address], [place] [id number] [phone]

## 2018-04-19 NOTE — NURSING NOTE
Chief Complaint   Patient presents with     Oncology Clinic Visit     Return visit related to Breast Cancer     Port Draw     labs drawn via port by RN     /70 (BP Location: Right arm, Patient Position: Chair, Cuff Size: Adult Regular)  Pulse 79  Temp 97.8  F (36.6  C) (Oral)  Wt 53.1 kg (117 lb)  SpO2 98%  BMI 22.85 kg/m2    Vitals taken. Port accessed by RN. Labs collected and sent. Line flushed with NS & Heparin. Pt tolerated well. Pt checked in for next appointment.    Svetlana Larose, RN

## 2018-05-02 ENCOUNTER — CARE COORDINATION (OUTPATIENT)
Dept: ONCOLOGY | Facility: CLINIC | Age: 68
End: 2018-05-02

## 2018-05-02 NOTE — PROGRESS NOTES
Patient requesting a script for a cranial prosthesis, but her insurance does not cover wigs. Message sent to [facility] Tamanna Blair to contact patient for a wig fitting in the near future.  Answered all patient's questions and verbalized understanding. Tamanna Neff RN, BSN.

## 2018-05-09 RX ORDER — METHYLPREDNISOLONE SODIUM SUCCINATE 125 MG/2ML
125 INJECTION, POWDER, LYOPHILIZED, FOR SOLUTION INTRAMUSCULAR; INTRAVENOUS
Status: CANCELLED
Start: 2018-05-10

## 2018-05-09 RX ORDER — LORAZEPAM 2 MG/ML
0.5 INJECTION INTRAMUSCULAR EVERY 4 HOURS PRN
Status: CANCELLED
Start: 2018-05-10

## 2018-05-09 RX ORDER — DIPHENHYDRAMINE HYDROCHLORIDE 50 MG/ML
50 INJECTION INTRAMUSCULAR; INTRAVENOUS
Status: CANCELLED
Start: 2018-05-10

## 2018-05-09 RX ORDER — ALBUTEROL SULFATE 0.83 MG/ML
2.5 SOLUTION RESPIRATORY (INHALATION)
Status: CANCELLED | OUTPATIENT
Start: 2018-05-10

## 2018-05-09 RX ORDER — PALONOSETRON 0.05 MG/ML
0.25 INJECTION, SOLUTION INTRAVENOUS ONCE
Status: CANCELLED
Start: 2018-05-10 | End: 2018-05-10

## 2018-05-09 RX ORDER — ALBUTEROL SULFATE 90 UG/1
1-2 AEROSOL, METERED RESPIRATORY (INHALATION)
Status: CANCELLED
Start: 2018-05-10

## 2018-05-09 RX ORDER — EPINEPHRINE 0.3 MG/.3ML
0.3 INJECTION SUBCUTANEOUS EVERY 5 MIN PRN
Status: CANCELLED | OUTPATIENT
Start: 2018-05-10

## 2018-05-09 RX ORDER — MEPERIDINE HYDROCHLORIDE 25 MG/ML
25 INJECTION INTRAMUSCULAR; INTRAVENOUS; SUBCUTANEOUS EVERY 30 MIN PRN
Status: CANCELLED | OUTPATIENT
Start: 2018-05-10

## 2018-05-09 RX ORDER — HEPARIN SODIUM (PORCINE) LOCK FLUSH IV SOLN 100 UNIT/ML 100 UNIT/ML
500 SOLUTION INTRAVENOUS ONCE
Status: CANCELLED
Start: 2018-05-10 | End: 2018-05-10

## 2018-05-09 RX ORDER — SODIUM CHLORIDE 9 MG/ML
1000 INJECTION, SOLUTION INTRAVENOUS CONTINUOUS PRN
Status: CANCELLED
Start: 2018-05-10

## 2018-05-09 RX ORDER — EPINEPHRINE 1 MG/ML
0.3 INJECTION, SOLUTION, CONCENTRATE INTRAVENOUS EVERY 5 MIN PRN
Status: CANCELLED | OUTPATIENT
Start: 2018-05-10

## 2018-05-09 NOTE — PROGRESS NOTES
Dr. Eugene Guzman  Professor  Department of Surgery  [address] [address]      February 22, 2018      Dear Dr. Guzman,     Thank you for referring Julieta Rivera to our clinic for recommendations for her new diagnosis of triple negative breast cancer.       HISTORY OF PRESENT ILLNESS:  Julieta Rivera is a 67-year-old woman who was referred to our clinic with a new diagnosis of right triple-negative breast cancer.  Julieta was followed by routine screening mammography, when she was discovered to have a 7 x 6 x 9-mm mass at the 12 o'clock position of the right breast 6 cm from the nipple-areolar complex.  She did undergo a biopsy of this mass which showed an ER-negative, MI-negative, HER2-nonamplified, invasive mammary carcinoma of no special type, invasive ductal carcinoma, Teague grade 3.  Ductal carcinoma in situ was also noted.  Nuclear grade 2 solid type.  HER2 FISH showed no amplification.  She now comes to our clinic for recommendations.       She has hypothyroidism and is on levothyroxine 88 alternating with 100 mcg daily.  She has no pain.  She has fatigue related to the care of a grandson with esophageal atresia at home.  She has no depression and no anxiety.  She has no weight loss.  Diet has not changed.  She has no loss of energy.  She does not sleep during the day.  She can perform all of her household chores.  She has noticed no abnormality of either breast.         PAST MEDICAL HISTORY:  She has no history of breast surgery in the past or breast cancer in the past.  She has no history of radiation of any kind.  She has no history of tumor of any kind.  She may have a history of a heart problem with mitral prolapse and a murmur.  The last echo we have on record is from 1996.  She has no history of heart attack, breathing problems, blood clots, seizures, arthritis, peptic ulcer disease, osteoporosis or bone fractures.  She is not currently participating in a  clinical trial and has not had any significant weight loss.  She has no history of hypertension, but she does have a history of factor V Leiden because her sister was diagnosed with factor V Leiden and Julieta was tested, although Julieta herself has had no blood clots or pulmonary emboli.       FAMILY HISTORY:  There is a history of breast cancer in two paternal aunts, but no first-degree relatives.  One of her aunts was diagnosed in her 50s, the other in her 60s.  She has no male relatives with breast cancer.  The remainder of her family history was negative.        PAST MENSTRUAL HISTORY:  First period was at age 12-.  Last menstrual period was in 2003.  She has been pregnant twice at age 26 and 30 with two live births and no miscarriages or abortions.  She used oral contraceptives only once or twice.  Uterus and ovaries are in place.  She has no history of hormone replacement therapy.        ALLERGIES:  She has no allergy to seafood, iodine or contrast dye.  She does not take aspirin.       HABITS:  She did smoke 1 pack per day in college for 3 years from age 17 to 20 and has not smoked since.  She does not drink significant alcohol and has no heavy alcohol history in the past.        PERSONAL AND SOCIAL HISTORY:  She does have a history of being a .  Her  is 80 years old but is able to take care of his activities of daily living.  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        PAST MEDICAL HISTORY:  Julieta has no history of angina.  She has no history of hypertension.  Her cholesterol has generally been in acceptable range, although slightly over 200 recently.       FAMILY HISTORY:  Positive for heart disease.  Father had an MI in his 50s and had a bypass and  at age 78.  Mother had rheumatic heart disease at age 38 and  at  age 42.     Julieta returns to clinic for her fourth and last cycle of TC.  She has had a very difficult time with her treatment.  She has developed hand-foot syndrome, which has been quite significant with some peeling of skin on the lateral aspect of both of her hands in the radial distribution.  She also has had very significant neuropathy which is concerning and has been getting worse with each cycle.  Today she says the pain is about a 2/10 on her toes and fingers.  I do think that starting duloxetine for chemotherapy-associated neuropathy is very reasonable and this is supported by a Journal of the American Medical Association publication.  She has mild depression and significant anxiety.  She has had a lot of sleep deprivation because she is taking care of her 2-year-old twin grandchildren, one of whom has esophageal atresia and requires around-the-clock attention with regard to feeding and hydration and she is sharing the responsibility with her daughter.  She did develop a temperature of 100.4 on this last cycle and did not come in to the emergency room.  I discussed with her that she absolutely must come to the emergency room if she has a temperature in the 100.4 range or if she in any way feels poorly.  I discussed the Neulasta does not always work and she may need IV antibiotics for febrile neutropenia.  She understands and will comply.  She knows she has only 1 more cycle left.  She also had significant pain in her low back and the Claritin did not help her.      REVIEW OF SYSTEMS:  The remainder of a 10-point review of systems is negative.        One notable finding was that she also says that she was having some hand swelling for several days after chemotherapy.      PHYSICAL EXAMINATION:   VITAL SIGNS:  Blood pressure 112/66, temperature 97.7, pulse 75, respirations 16, O2 sat 99% on room air, height 1.5 meters and weight 52.3 kg.   GENERAL:  Julieta has total alopecia.   HEENT:  Oropharynx is without  lesions.   LYMPH:  There is no palpable cervical, supraclavicular, subclavicular or axillary lymphadenopathy.   BREASTS:  Examination of both breasts reveals no breast masses.   LUNGS:  Clear to percussion and auscultation.   HEART:  Regular rate and rhythm and a 3/6 systolic murmur heard best at the left sternal border and extending to the apex.   ABDOMEN:  Soft and nontender without hepatosplenomegaly.   EXTREMITIES:  Without edema.   PSYCHIATRIC:  Mood was anxious.  Affect was normal.      LABORATORY DATA:  The CMP was within normal limits.  The CBC showed a WBC of 5.0, hemoglobin 11.5, platelets of 215,000 and absolute neutrophil count was 3400.      ASSESSMENT AND PLAN:     1.  Julieta Rivera is a 67-year-old woman with a history of a stage I, clinical [id number], triple-negative invasive ductal carcinoma of the right breast measuring maximum dimension of 9 mm, grade 3.  She comes in today for cycle 4 of 4 of docetaxel and cyclophosphamide.  She has tolerated her chemotherapy with less tolerance with each cycle and did have an episode of temperature of 100.4 and did not come in.  I did emphasize to her that she must come to the emergency room if she has a fever.  She understands and says that she will comply.  She has worsening neuropathy.  We started duloxetine 60 mg daily.  I suggested she take duloxetine at bedtime so there is less risk of impairment of function.  One of the problems is that she is very significantly sleep deprived because of caring for her very young grandsons and I urged her to be cautious and try to get enough rest.   2.  Factor V Leiden.  She has a port in place.  She has not required anticoagulation.  She has had no thrombosis in the past and has not required any thrombosis prophylaxis.  This was discussed with Dr. Kaz Feliciano.   3.  Cardiology.  Followup will be with Dr. Tidwell for her valve disease.  I believe she has some degree of aortic stenosis.   4.  Discussion of the importance of  coming to the emergency room if she has febrile neutropenia.  I discussed with her that given that she had a temperature on her last cycle chances she would have a temperature on this cycle which may require going to the emergency room for evaluation.  I emphasized to her that this is the last cycle of her treatment and that safety is of the utmost importance and she understands.  I discussed that sometimes the Neulasta does not kick in in time to prevent neutropenia, and if she has a fever, she needs to be seen.   5.  Low back pain related to Neulasta.  This is a major problem for her.  She has been taking Claritin which has not been working.  We could give her MSIR 15 mg 3 tablets if she needs it.   6.  Followup.  We will see Julieta in followup in our clinic on 06/07 and will arrange an appointment with Dr. Guzman.  Julieta understands that after lumpectomy she will need radiation.  She would like to have the radiation in [place].  I discussed that we can arrange that.  We would like to get one consultation with our radiation oncologist prior.   All of her questions were answered. Appointment with Dr. Guzman in 2 to 3 weeks.  Follow up with me June 7 with CBC, CMP.         Thank you for allowing us to continue to participate in Julieta Rivera's care.      Jonathan Gay MD      [facility]           I spent 40 minutes with the patient more than 50% of which was in counseling and coordination of care.

## 2018-05-10 ENCOUNTER — ALLIED HEALTH/NURSE VISIT (OUTPATIENT)
Dept: ONCOLOGY | Facility: CLINIC | Age: 68
End: 2018-05-10

## 2018-05-10 ENCOUNTER — APPOINTMENT (OUTPATIENT)
Dept: LAB | Facility: CLINIC | Age: 68
End: 2018-05-10
Attending: INTERNAL MEDICINE
Payer: COMMERCIAL

## 2018-05-10 ENCOUNTER — ONCOLOGY VISIT (OUTPATIENT)
Dept: ONCOLOGY | Facility: CLINIC | Age: 68
End: 2018-05-10
Attending: INTERNAL MEDICINE
Payer: COMMERCIAL

## 2018-05-10 VITALS
DIASTOLIC BLOOD PRESSURE: 66 MMHG | TEMPERATURE: 97.7 F | RESPIRATION RATE: 16 BRPM | HEART RATE: 75 BPM | OXYGEN SATURATION: 99 % | BODY MASS INDEX: 22.65 KG/M2 | HEIGHT: 60 IN | SYSTOLIC BLOOD PRESSURE: 112 MMHG | WEIGHT: 115.4 LBS

## 2018-05-10 DIAGNOSIS — Z51.89 ENCOUNTER FOR OTHER SPECIFIED AFTERCARE: Primary | ICD-10-CM

## 2018-05-10 DIAGNOSIS — Z71.9 VISIT FOR COUNSELING: Primary | ICD-10-CM

## 2018-05-10 DIAGNOSIS — C50.411 MALIGNANT NEOPLASM OF UPPER-OUTER QUADRANT OF RIGHT BREAST IN FEMALE, ESTROGEN RECEPTOR NEGATIVE (H): ICD-10-CM

## 2018-05-10 DIAGNOSIS — Z51.89 ENCOUNTER FOR OTHER SPECIFIED AFTERCARE: ICD-10-CM

## 2018-05-10 DIAGNOSIS — F43.21 ADJUSTMENT DISORDER WITH DEPRESSED MOOD: ICD-10-CM

## 2018-05-10 DIAGNOSIS — G62.9 NEUROPATHY: ICD-10-CM

## 2018-05-10 DIAGNOSIS — C50.411 MALIGNANT NEOPLASM OF UPPER-OUTER QUADRANT OF RIGHT BREAST IN FEMALE, ESTROGEN RECEPTOR NEGATIVE (H): Primary | ICD-10-CM

## 2018-05-10 DIAGNOSIS — Z17.1 MALIGNANT NEOPLASM OF UPPER-OUTER QUADRANT OF RIGHT BREAST IN FEMALE, ESTROGEN RECEPTOR NEGATIVE (H): Primary | ICD-10-CM

## 2018-05-10 DIAGNOSIS — Z17.1 MALIGNANT NEOPLASM OF UPPER-OUTER QUADRANT OF RIGHT BREAST IN FEMALE, ESTROGEN RECEPTOR NEGATIVE (H): ICD-10-CM

## 2018-05-10 LAB
ALBUMIN SERPL-MCNC: 3.4 G/DL (ref 3.4–5)
ALP SERPL-CCNC: 62 U/L (ref 40–150)
ALT SERPL W P-5'-P-CCNC: 19 U/L (ref 0–50)
ANION GAP SERPL CALCULATED.3IONS-SCNC: 6 MMOL/L (ref 3–14)
AST SERPL W P-5'-P-CCNC: 21 U/L (ref 0–45)
BASOPHILS # BLD AUTO: 0.1 10E9/L (ref 0–0.2)
BASOPHILS NFR BLD AUTO: 1 %
BILIRUB SERPL-MCNC: 0.5 MG/DL (ref 0.2–1.3)
BUN SERPL-MCNC: 7 MG/DL (ref 7–30)
CALCIUM SERPL-MCNC: 8.6 MG/DL (ref 8.5–10.1)
CHLORIDE SERPL-SCNC: 110 MMOL/L (ref 94–109)
CO2 SERPL-SCNC: 23 MMOL/L (ref 20–32)
CREAT SERPL-MCNC: 0.53 MG/DL (ref 0.52–1.04)
DIFFERENTIAL METHOD BLD: ABNORMAL
EOSINOPHIL # BLD AUTO: 0 10E9/L (ref 0–0.7)
EOSINOPHIL NFR BLD AUTO: 0.4 %
ERYTHROCYTE [DISTWIDTH] IN BLOOD BY AUTOMATED COUNT: 15.3 % (ref 10–15)
GFR SERPL CREATININE-BSD FRML MDRD: >90 ML/MIN/1.7M2
GLUCOSE SERPL-MCNC: 97 MG/DL (ref 70–99)
HCT VFR BLD AUTO: 34 % (ref 35–47)
HGB BLD-MCNC: 11.5 G/DL (ref 11.7–15.7)
IMM GRANULOCYTES # BLD: 0 10E9/L (ref 0–0.4)
IMM GRANULOCYTES NFR BLD: 0.2 %
LYMPHOCYTES # BLD AUTO: 1.1 10E9/L (ref 0.8–5.3)
LYMPHOCYTES NFR BLD AUTO: 20.9 %
MCH RBC QN AUTO: 31.8 PG (ref 26.5–33)
MCHC RBC AUTO-ENTMCNC: 33.8 G/DL (ref 31.5–36.5)
MCV RBC AUTO: 94 FL (ref 78–100)
MONOCYTES # BLD AUTO: 0.5 10E9/L (ref 0–1.3)
MONOCYTES NFR BLD AUTO: 9.5 %
NEUTROPHILS # BLD AUTO: 3.4 10E9/L (ref 1.6–8.3)
NEUTROPHILS NFR BLD AUTO: 68 %
NRBC # BLD AUTO: 0 10*3/UL
NRBC BLD AUTO-RTO: 0 /100
PLATELET # BLD AUTO: 215 10E9/L (ref 150–450)
POTASSIUM SERPL-SCNC: 3.8 MMOL/L (ref 3.4–5.3)
PROT SERPL-MCNC: 6.1 G/DL (ref 6.8–8.8)
RBC # BLD AUTO: 3.62 10E12/L (ref 3.8–5.2)
SODIUM SERPL-SCNC: 140 MMOL/L (ref 133–144)
WBC # BLD AUTO: 5 10E9/L (ref 4–11)

## 2018-05-10 PROCEDURE — 96377 APPLICATON ON-BODY INJECTOR: CPT | Mod: 59

## 2018-05-10 PROCEDURE — 99215 OFFICE O/P EST HI 40 MIN: CPT | Mod: ZP | Performed by: INTERNAL MEDICINE

## 2018-05-10 PROCEDURE — 25000128 H RX IP 250 OP 636: Mod: ZF | Performed by: INTERNAL MEDICINE

## 2018-05-10 PROCEDURE — 85025 COMPLETE CBC W/AUTO DIFF WBC: CPT | Performed by: INTERNAL MEDICINE

## 2018-05-10 PROCEDURE — 96375 TX/PRO/DX INJ NEW DRUG ADDON: CPT

## 2018-05-10 PROCEDURE — 80053 COMPREHEN METABOLIC PANEL: CPT | Performed by: INTERNAL MEDICINE

## 2018-05-10 PROCEDURE — G0463 HOSPITAL OUTPT CLINIC VISIT: HCPCS | Mod: ZF

## 2018-05-10 PROCEDURE — 96417 CHEMO IV INFUS EACH ADDL SEQ: CPT

## 2018-05-10 PROCEDURE — 96413 CHEMO IV INFUSION 1 HR: CPT

## 2018-05-10 RX ORDER — PALONOSETRON 0.05 MG/ML
0.25 INJECTION, SOLUTION INTRAVENOUS ONCE
Status: COMPLETED | OUTPATIENT
Start: 2018-05-10 | End: 2018-05-10

## 2018-05-10 RX ORDER — DULOXETIN HYDROCHLORIDE 60 MG/1
60 CAPSULE, DELAYED RELEASE ORAL DAILY
Qty: 30 CAPSULE | Refills: 3 | Status: SHIPPED | OUTPATIENT
Start: 2018-05-10 | End: 2018-05-25

## 2018-05-10 RX ORDER — HEPARIN SODIUM (PORCINE) LOCK FLUSH IV SOLN 100 UNIT/ML 100 UNIT/ML
500 SOLUTION INTRAVENOUS ONCE
Status: COMPLETED | OUTPATIENT
Start: 2018-05-10 | End: 2018-05-10

## 2018-05-10 RX ORDER — DULOXETIN HYDROCHLORIDE 60 MG/1
60 CAPSULE, DELAYED RELEASE ORAL DAILY
Qty: 30 CAPSULE | Refills: 3 | Status: SHIPPED | OUTPATIENT
Start: 2018-05-10 | End: 2018-05-10

## 2018-05-10 RX ORDER — HEPARIN SODIUM (PORCINE) LOCK FLUSH IV SOLN 100 UNIT/ML 100 UNIT/ML
5 SOLUTION INTRAVENOUS ONCE
Status: COMPLETED | OUTPATIENT
Start: 2018-05-10 | End: 2018-05-10

## 2018-05-10 RX ADMIN — Medication 500 UNITS: at 15:29

## 2018-05-10 RX ADMIN — PEGFILGRASTIM 6 MG: KIT SUBCUTANEOUS at 14:55

## 2018-05-10 RX ADMIN — SODIUM CHLORIDE 250 ML: 9 INJECTION, SOLUTION INTRAVENOUS at 13:40

## 2018-05-10 RX ADMIN — DOCETAXEL 120 MG: 80 INJECTION, SOLUTION, CONCENTRATE INTRAVENOUS at 13:41

## 2018-05-10 RX ADMIN — SODIUM CHLORIDE, PRESERVATIVE FREE 5 ML: 5 INJECTION INTRAVENOUS at 11:05

## 2018-05-10 RX ADMIN — CYCLOPHOSPHAMIDE 1000 MG: 1 INJECTION, POWDER, FOR SOLUTION INTRAVENOUS; ORAL at 14:52

## 2018-05-10 RX ADMIN — PALONOSETRON HYDROCHLORIDE 0.25 MG: 0.25 INJECTION INTRAVENOUS at 13:23

## 2018-05-10 RX ADMIN — DEXAMETHASONE SODIUM PHOSPHATE 12 MG: 10 INJECTION, SOLUTION INTRAMUSCULAR; INTRAVENOUS at 13:24

## 2018-05-10 ASSESSMENT — PAIN SCALES - GENERAL: PAINLEVEL: MILD PAIN (2)

## 2018-05-10 NOTE — MR AVS SNAPSHOT
After Visit Summary   5/10/2018    Julieta Rivera    MRN: [id number]           Patient Information     Date Of Birth          1950        Visit Information        Provider Department      5/10/2018 11:30 AM Jonathan Gay MD [facility]        Today's Diagnoses     Malignant neoplasm of upper-outer quadrant of right breast in female, estrogen receptor negative (H)    -  1    Adjustment disorder with depressed mood        Neuropathy        Encounter for other specified aftercare           Follow-ups after your visit        Additional Services     SURGICAL ONCOLOGIST REFERRAL BREAST CENTER       Dr. Guzman.                  Your next 10 appointments already scheduled     May 25, 2018  9:15 AM CDT   (Arrive by 9:00 AM)   New Patient Visit with Eugene Guzman MD   [facility] ([facility])    [address] [place] [address]  [address]  [facility] [address]   [phone]            Jun 04, 2018 11:00 AM CDT   (Arrive by 10:45 AM)   RETURN CANCER VISIT with Kaley Tidwell MD   [facility] ([facility])    [address] [place] [address]  [address]  [facility] [address]   [phone]            Jun 07, 2018  3:00 PM CDT   [facility] with  [facility]   [facility] ([facility])    [address]  [address]  [facility] [address] [phone]            Jun 07, 2018  3:30 PM CDT   (Arrive by 3:15 PM)   Return Visit with Jonathan Gay MD   [facility] ([facility])    [address] [place] [address]  [address]  [facility] [address] [phone]              Future tests that were ordered for you today     Open Standing Orders        Priority Remaining Interval Expires Ordered    CBC with platelets differential Routine 52/52 5/9/2019 5/9/2018    Comprehensive metabolic panel Routine 52/52 5/9/2019 5/9/2018           Open Future Orders        Priority Expected Expires Ordered    SURGICAL ONCOLOGIST REFERRAL BREAST CENTER Routine  11/6/2018 5/10/2018            Who to contact     If you have questions or need follow up information about today's clinic visit or your schedule please contact [facility] directly at [phone].  Normal or non-critical lab and imaging results will be communicated to you by MyChart, letter or phone within 4 business days after the clinic has received the results. If you do not hear from us within 7 days, please contact the clinic through [employer] or phone. If you have a critical or abnormal lab result, we will notify you by phone as soon as possible.  Submit refill requests through [employer] or call your pharmacy and they will forward the refill request to us. Please allow 3 business days for your refill to be completed.          Additional Information About Your Visit        MyChart Information     [employer] gives you secure access to your electronic health record. If you see a primary care provider, you can also send messages to your care team and make appointments. If you have questions, please call your primary care clinic.  If you do not have a primary care provider, please call [phone] and they will assist you.        Care EveryWhere ID     This is your Care EveryWhere ID. This could be used by other organizations to access your [place] medical records  [id number]        Your Vitals Were     Pulse Temperature Respirations Height Pulse Oximetry BMI (Body Mass Index)    75 97.7  F (36.5  C) (Oral) 16 1.524 m (5') 99% 22.54 kg/m2       Blood Pressure from Last 3 Encounters:   05/10/18 112/66   04/19/18 111/70   03/29/18 126/73    Weight from Last 3 Encounters:   05/10/18 52.3 kg (115 lb 6.4 oz)   04/19/18 53.1 kg (117 lb)   03/29/18 54.2 kg (119 lb 6.4 oz)                 Today's Medication Changes          These changes are accurate as of 5/10/18 11:59 PM.  If you  have any questions, ask your nurse or doctor.               Start taking these medicines.        Dose/Directions    DULoxetine 60 MG EC capsule   Commonly known as:  CYMBALTA   Used for:  Adjustment disorder with depressed mood, Neuropathy   Started by:  Jonathan Gay MD        Dose:  60 mg   Take 1 capsule (60 mg) by mouth daily   Quantity:  30 capsule   Refills:  3            Where to get your medicines      These medications were sent to [place], [place] - [address]  [address], [facility] [address]    Hours:  TRANSPLANT PHONE NUMBER [phone] Phone:  [phone]     DULoxetine 60 MG EC capsule                Primary Care Provider Office Phone # Fax #    Simona GUERRA JOSÉ LUIS [facility] [phone]       [address]  [facility] [address]        Goals        General    Medical (pt-stated)     Notes - Note created  4/3/2018 12:31 PM by Trice Galaviz RN    Goal Statement: I will manage stress associated with caring for my grandchildren  Measure of Success: verbalization of stress reduction  Supportive Steps to Achieve: outpatient counseling, support of RN CC  Barriers: none  Strengths: family support  Date to Achieve By: 6 months            Equal Access to Services     CARTER DEL RIO AH: Hadwenceslao chong Sohussein, waaxda luqadaha, qaybta kaalmada adefortunatoda, khadar solano. So [facility] [phone].    ATENCIÓN: Si habla español, tiene a phillips disposición servicios gratuitos de asistencia lingüística. Llame al [phone].    We comply with applicable federal civil rights laws and [place] laws. We do not discriminate on the basis of race, color, national origin, age, disability, sex, sexual orientation, or gender identity.            Thank you!     Thank you for choosing [facility]  for your care. Our goal is always to provide you with excellent care. Hearing back  from our patients is one way we can continue to improve our services. Please take a few minutes to complete the written survey that you may receive in the mail after your visit with us. Thank you!             Your Updated Medication List - Protect others around you: Learn how to safely use, store and throw away your medicines at www.disposemymeds.org.          This list is accurate as of 5/10/18 11:59 PM.  Always use your most recent med list.                   Brand Name Dispense Instructions for use Diagnosis    DULoxetine 60 MG EC capsule    CYMBALTA    30 capsule    Take 1 capsule (60 mg) by mouth daily    Adjustment disorder with depressed mood, Neuropathy       FISH OIL PO      Take 1 capsule by mouth daily.        levothyroxine 100 MCG tablet    SYNTHROID/LEVOTHROID    60 tablet    Take 1 tablet (100 mcg) by mouth daily Take by mouth daily    Hypothyroidism due to Hashimoto's thyroiditis       lidocaine-prilocaine cream    EMLA    30 g    Apply to port site one hour prior to access. May start using six days after port placement    Malignant neoplasm of upper outer quadrant of breast in female, estrogen receptor negative (H)       LORazepam 0.5 MG tablet    ATIVAN    30 tablet    Take 1 tablet (0.5 mg) by mouth every 4 hours as needed (Anxiety, Nausea/Vomiting or Sleep)    Encounter for other specified aftercare, Malignant neoplasm of upper-outer quadrant of right breast in female, estrogen receptor negative (H)       MULTIVITAMIN & MINERAL PO      Take 2 tablets by mouth daily.        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

## 2018-05-10 NOTE — PROGRESS NOTES
Social Work Follow-Up Encounter Visit  Oncology Clinic    Data/Intervention:  Patient Name:  Julieta Rivera  /Age:  1950 (67 year old)    Reason for Follow-Up:  SW received referral from [facility] navigator to meet with patient regarding application for [employer]    Collaborated With:    -Patient     SW met with patient in infusion. Patient accompanied to appointment by her daughter.  Patient indicated interest in applying for [employer].  Application completed and submitted at this time.    Resources Provided:  AF application submitted.    Assessment:  Patient appeared anxious during visit.  During conversation, patient would frequently talk rapidly and extensively about the health of her grandson and his care needs at home which she and her daughter are primarily managing.  She would often discuss his home services and the other benefits they are trying to arrange for him with assistance of home health.  SW had to frequently redirect patient to assess for her needs, her coping with her cancer and discussion of oncology resources.  SW provided support to patient regarding situation while also explaining that this worker is specialized in oncology resources and support. SW encouraged patient to also work with her grandson's care team on finding different pediatric resources.     Plan:  Previously provided patient/family with writer's contact information and availability.   AF will follow up with patient directly regarding [employer] application.     Soo Yeon Han, MSW, [facility]  Pager: [phone]  Phone: [phone]

## 2018-05-10 NOTE — PATIENT INSTRUCTIONS
Contact Numbers    [facility] Main Line: [phone]  [facility] Triage:  [phone]    Call triage with chills and/or temperature greater than or equal to 100.5, uncontrolled nausea/vomiting, diarrhea, constipation, dizziness, shortness of breath, chest pain, bleeding, unexplained bruising, or any new/concerning symptoms, questions/concerns.     If you are having any concerning symptoms or wish to speak to a provider before your next infusion visit, please call your care coordinator or triage to notify them so we can adequately serve you.       After Hours: [phone]    If after hours, weekends, or holidays, call main hospital  and ask for Oncology doctor on call.     Neulasta will begin at ______ and take 45 minutes to inject.  If the red light comes on at any point please contact triage.         May 2018   [place] Monday Tuesday Wednesday Thursday Friday Saturday             1     2     3     4     5       6     7     8     9     10     [facility]   11:00 AM   (15 min.)   [facility]   [facility] Lab Draw     [facility] RETURN   11:15 AM   (30 min.)   Jonathan Gay MD   [facility]     [facility] ONC INFUSION 180   12:30 PM   (180 min.)   [facility] ONCOLOGY INFUSION   [facility] 11     12       13     14     15     16     17     18     19       20     21     22     23     24     25     [facility] NEW    9:00 AM   (45 min.)   Eugene Guzman MD   [facility] 26       27     28     29     30     31                          June 2018 Sunday Monday Tuesday Wednesday Thursday Friday Saturday                            1     2       3     4     [facility] RETURN CANCER   10:45 AM   (30 min.)   Kaley Tidwell MD   [facility] 5     6     7     [facility] DRAW    3:00 PM   (15 min.)   [facility]   [facility] Draw     [facility] RETURN    3:15 PM   (30 min.)   Jonathan Gay MD   [facility] 8     9       10      11     12     13     14     15     16       17     18     19     20     21     22     23       24     25     26     27     28     29     30                 Recent Results (from the past 24 hour(s))   CBC with platelets differential    Collection Time: 05/10/18 11:11 AM   Result Value Ref Range    WBC 5.0 4.0 - 11.0 10e9/L    RBC Count 3.62 (L) 3.8 - 5.2 10e12/L    Hemoglobin 11.5 (L) 11.7 - 15.7 g/dL    Hematocrit 34.0 (L) 35.0 - 47.0 %    MCV 94 78 - 100 fl    MCH 31.8 26.5 - 33.0 pg    MCHC 33.8 31.5 - 36.5 g/dL    RDW 15.3 (H) 10.0 - 15.0 %    Platelet Count 215 150 - 450 10e9/L    Diff Method Automated Method     % Neutrophils 68.0 %    % Lymphocytes 20.9 %    % Monocytes 9.5 %    % Eosinophils 0.4 %    % Basophils 1.0 %    % Immature Granulocytes 0.2 %    Nucleated RBCs 0 0 /100    Absolute Neutrophil 3.4 1.6 - 8.3 10e9/L    Absolute Lymphocytes 1.1 0.8 - 5.3 10e9/L    Absolute Monocytes 0.5 0.0 - 1.3 10e9/L    Absolute Eosinophils 0.0 0.0 - 0.7 10e9/L    Absolute Basophils 0.1 0.0 - 0.2 10e9/L    Abs Immature Granulocytes 0.0 0 - 0.4 10e9/L    Absolute Nucleated RBC 0.0    Comprehensive metabolic panel    Collection Time: 05/10/18 11:11 AM   Result Value Ref Range    Sodium 140 133 - 144 mmol/L    Potassium 3.8 3.4 - 5.3 mmol/L    Chloride 110 (H) 94 - 109 mmol/L    Carbon Dioxide 23 20 - 32 mmol/L    Anion Gap 6 3 - 14 mmol/L    Glucose 97 70 - 99 mg/dL    Urea Nitrogen 7 7 - 30 mg/dL    Creatinine 0.53 0.52 - 1.04 mg/dL    GFR Estimate >90 >60 mL/min/1.7m2    GFR Estimate If Black >90 >60 mL/min/1.7m2    Calcium 8.6 8.5 - 10.1 mg/dL    Bilirubin Total 0.5 0.2 - 1.3 mg/dL    Albumin 3.4 3.4 - 5.0 g/dL    Protein Total 6.1 (L) 6.8 - 8.8 g/dL    Alkaline Phosphatase 62 40 - 150 U/L    ALT 19 0 - 50 U/L    AST 21 0 - 45 U/L

## 2018-05-10 NOTE — MR AVS SNAPSHOT
After Visit Summary   5/10/2018    Julieta Rivera    MRN: [id number]           Patient Information     Date Of Birth          1950        Visit Information        Provider Department      5/10/2018 4:08 PM Han, Soo Yeon, MSW [facility]        Today's Diagnoses     Visit for counseling    -  1       Follow-ups after your visit        Your next 10 appointments already scheduled     May 25, 2018  9:15 AM CDT   (Arrive by 9:00 AM)   New Patient Visit with Eugene Guzman MD   [facility] ([facility])    [address] [place] [address]  [address]  [facility] [address]   [phone]            Jun 04, 2018 11:00 AM CDT   (Arrive by 10:45 AM)   RETURN CANCER VISIT with Kaley Tidwell MD   [facility] ([facility])    [address] [place] [address]  [address]  [facility] [address]   [phone]            Jun 07, 2018  3:00 PM CDT   [facility] Lab Draw with  MASMARIA[facility] LAB DRAW   [facility] Lab Draw ([facility])    [address] [place] [address]  [address]  [facility] [address]   [phone]            Jun 07, 2018  3:30 PM CDT   (Arrive by 3:15 PM)   Return Visit with Jonathan Gay MD   [facility] ([facility])    [address] [place] [address]  [address]  [facility] [address]   [phone]              Future tests that were ordered for you today     Open Standing Orders        Priority Remaining Interval Expires Ordered    CBC with platelets differential Routine 52/52 5/9/2019 5/9/2018    Comprehensive metabolic panel Routine 52/52 5/9/2019 5/9/2018          Open Future Orders        Priority Expected Expires Ordered    SURGICAL ONCOLOGIST REFERRAL BREAST CENTER Routine  11/6/2018 5/10/2018            Who to contact     If you have questions or need follow up information about today's clinic visit or your schedule please contact [facility]  CANCER CLINIC directly at [phone].  Normal or non-critical lab and imaging results will be communicated to you by We[employer], letter or phone within 4 business days after the clinic has received the results. If you do not hear from us within 7 days, please contact the clinic through We[employer] or phone. If you have a critical or abnormal lab result, we will notify you by phone as soon as possible.  Submit refill requests through [employer] or call your pharmacy and they will forward the refill request to us. Please allow 3 business days for your refill to be completed.          Additional Information About Your Visit        WeHealth[employer]Smart Cube Information     [employer] gives you secure access to your electronic health record. If you see a primary care provider, you can also send messages to your care team and make appointments. If you have questions, please call your primary care clinic.  If you do not have a primary care provider, please call [phone] and they will assist you.        Care EveryWhere ID     This is your Care EveryWhere ID. This could be used by other organizations to access your [facility] medical records  [id number]         Blood Pressure from Last 3 Encounters:   05/10/18 112/66   04/19/18 111/70   03/29/18 126/73    Weight from Last 3 Encounters:   05/10/18 52.3 kg (115 lb 6.4 oz)   04/19/18 53.1 kg (117 lb)   03/29/18 54.2 kg (119 lb 6.4 oz)              Today, you had the following     No orders found for display         Today's Medication Changes          These changes are accurate as of 5/10/18  4:21 PM.  If you have any questions, ask your nurse or doctor.               Start taking these medicines.        Dose/Directions    DULoxetine 60 MG EC capsule   Commonly known as:  CYMBALTA   Used for:  Adjustment disorder with depressed mood, Neuropathy   Started by:  Jonathan Gay MD        Dose:  60 mg   Take 1 capsule (60 mg) by mouth daily   Quantity:  30 capsule   Refills:  3            Where to  get your medicines      These medications were sent to [place], [place] - [address]  [address], [facility] [address]    Hours:  TRANSPLANT PHONE NUMBER [phone] Phone:  [phone]     DULoxetine 60 MG EC capsule                Primary Care Provider Office Phone # Fax #    JOSÉ LUIS Sol [phone]       [address]  [facility] [address]        Goals        General    Medical (pt-stated)     Notes - Note created  4/3/2018 12:31 PM by Trice Galaviz, RN    Goal Statement: I will manage stress associated with caring for my grandchildren  Measure of Success: verbalization of stress reduction  Supportive Steps to Achieve: outpatient counseling, support of RN CC  Barriers: none  Strengths: family support  Date to Achieve By: 6 months            Equal Access to Services     CARTER DEL RIO AH: Hadii india pierre hadyazan Sohussein, waaxda luqadaha, qaybta kaalmada adefloryada, khadar hdez . So [facility] [phone].    ATENCIÓN: Si habla español, tiene a phillips disposición servicios gratuitos de asistencia lingüística. Llame al [phone].    We comply with applicable federal civil rights laws and [place] laws. We do not discriminate on the basis of race, color, national origin, age, disability, sex, sexual orientation, or gender identity.            Thank you!     Thank you for choosing [facility]CER [facility]  for your care. Our goal is always to provide you with excellent care. Hearing back from our patients is one way we can continue to improve our services. Please take a few minutes to complete the written survey that you may receive in the mail after your visit with us. Thank you!             Your Updated Medication List - Protect others around you: Learn how to safely use, store and throw away your medicines at www.disposemymeds.org.          This list is accurate as of 5/10/18   4:21 PM.  Always use your most recent med list.                   Brand Name Dispense Instructions for use Diagnosis    DULoxetine 60 MG EC capsule    CYMBALTA    30 capsule    Take 1 capsule (60 mg) by mouth daily    Adjustment disorder with depressed mood, Neuropathy       FISH OIL PO      Take 1 capsule by mouth daily.        levothyroxine 100 MCG tablet    SYNTHROID/LEVOTHROID    60 tablet    Take 1 tablet (100 mcg) by mouth daily Take by mouth daily    Hypothyroidism due to Hashimoto's thyroiditis       lidocaine-prilocaine cream    EMLA    30 g    Apply to port site one hour prior to access. May start using six days after port placement    Malignant neoplasm of upper outer quadrant of breast in female, estrogen receptor negative (H)       LORazepam 0.5 MG tablet    ATIVAN    30 tablet    Take 1 tablet (0.5 mg) by mouth every 4 hours as needed (Anxiety, Nausea/Vomiting or Sleep)    Encounter for other specified aftercare, Malignant neoplasm of upper-outer quadrant of right breast in female, estrogen receptor negative (H)       MULTIVITAMIN & MINERAL PO      Take 2 tablets by mouth daily.        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

## 2018-05-10 NOTE — PROGRESS NOTES
Infusion Nursing Note:  Julieta Rivera presents today for Cycle 4 Day 1 Taxotere and Cytoxan.    Patient seen and examined by Dr Gay in clinic prior to infusion.    Intravenous Access:  Implanted Port.    Treatment Conditions:  Lab Results   Component Value Date    HGB 11.5 05/10/2018     Lab Results   Component Value Date    WBC 5.0 05/10/2018      Lab Results   Component Value Date    ANEU 3.4 05/10/2018     Lab Results   Component Value Date     05/10/2018      Lab Results   Component Value Date     05/10/2018                   Lab Results   Component Value Date    POTASSIUM 3.8 05/10/2018           No results found for: MAG         Lab Results   Component Value Date    CR 0.53 05/10/2018                   Lab Results   Component Value Date    CINDI 8.6 05/10/2018                Lab Results   Component Value Date    BILITOTAL 0.5 05/10/2018           Lab Results   Component Value Date    ALBUMIN 3.4 05/10/2018                    Lab Results   Component Value Date    ALT 19 05/10/2018           Lab Results   Component Value Date    AST 21 05/10/2018       Results reviewed, labs MET treatment parameters, ok to proceed with treatment.    Note: Neulasta Onpro On-Body injector applied to L upper arm  at 1455 with light facing toward elbow.  Writer discussed Neulasta injection would start on 5/11/18 at 1800, approximately 27 hours after application applied today.  Written and Verbal instruction reviewed with patient.  Pt instructed when the dose delivery starts, it will take about 45 minutes to complete.  Pt aware Neulasta Onpro On-Body should have green flashing light and to call triage or on-call MD if injector flashes red or appears to be leaking.     Pt does report significant bone pain with neulasta.  Dr Gay aware and offer an opioid prescription to pt however, she declined      Post Infusion Assessment:  Patient tolerated infusion without incident.    Discharge Plan:   Prescription refills  given for decadron.    Copy of AVS reviewed with patient and/or family.  Today was pt's final cycle of chemotherapy  Patient will return 5/25/18 to see Dr Guzman in clinic and 6/7/18 for a visit with Dr Gay    Face to Face time: 0.    Rosanna Maldonado RN

## 2018-05-10 NOTE — LETTER
5/10/2018       RE: Julieta Rivera  [address] [place] [address]     Dear Colleague,    Thank you for referring your patient, Julieta Rivera, to the [facility]. Please see a copy of my visit note below.    Dr. Eugene Guzman  Professor  Department of Surgery  [facility]  [address] [place] [address]      February 22, 2018      Dear Dr. Guzman,     Thank you for referring Julieta Rivera to our clinic for recommendations for her new diagnosis of triple negative breast cancer.       HISTORY OF PRESENT ILLNESS:  Julieta Rivera is a 67-year-old woman who was referred to our clinic with a new diagnosis of right triple-negative breast cancer.  Julieta was followed by routine screening mammography, when she was discovered to have a 7 x 6 x 9-mm mass at the 12 o'clock position of the right breast 6 cm from the nipple-areolar complex.  She did undergo a biopsy of this mass which showed an ER-negative, MS-negative, HER2-nonamplified, invasive mammary carcinoma of no special type, invasive ductal carcinoma, Jeff grade 3.  Ductal carcinoma in situ was also noted.  Nuclear grade 2 solid type.  HER2 FISH showed no amplification.  She now comes to our clinic for recommendations.       She has hypothyroidism and is on levothyroxine 88 alternating with 100 mcg daily.  She has no pain.  She has fatigue related to the care of a grandson with esophageal atresia at home.  She has no depression and no anxiety.  She has no weight loss.  Diet has not changed.  She has no loss of energy.  She does not sleep during the day.  She can perform all of her household chores.  She has noticed no abnormality of either breast.         PAST MEDICAL HISTORY:  She has no history of breast surgery in the past or breast cancer in the past.  She has no history of radiation of any kind.  She has no history of tumor of any kind.  She may have a history of a heart problem with mitral prolapse and a  murmur.  The last echo we have on record is from .  She has no history of heart attack, breathing problems, blood clots, seizures, arthritis, peptic ulcer disease, osteoporosis or bone fractures.  She is not currently participating in a clinical trial and has not had any significant weight loss.  She has no history of hypertension, but she does have a history of factor V Leiden because her sister was diagnosed with factor V Leiden and Julieta was tested, although Julieta herself has had no blood clots or pulmonary emboli.       FAMILY HISTORY:  There is a history of breast cancer in two paternal aunts, but no first-degree relatives.  One of her aunts was diagnosed in her 50s, the other in her 60s.  She has no male relatives with breast cancer.  The remainder of her family history was negative.        PAST MENSTRUAL HISTORY:  First period was at age 12-.  Last menstrual period was in 2003.  She has been pregnant twice at age 26 and 30 with two live births and no miscarriages or abortions.  She used oral contraceptives only once or twice.  Uterus and ovaries are in place.  She has no history of hormone replacement therapy.        ALLERGIES:  She has no allergy to seafood, iodine or contrast dye.  She does not take aspirin.       HABITS:  She did smoke 1 pack per day in college for 3 years from age 17 to 20 and has not smoked since.  She does not drink significant alcohol and has no heavy alcohol history in the past.        PERSONAL AND SOCIAL HISTORY:  She does have a history of being a .  Her  is 80 years old but is able to take care of his activities of daily living.  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        PAST MEDICAL HISTORY:  Julieta has no history of angina.  She has no history of hypertension.  Her cholesterol has generally  been in acceptable range, although slightly over 200 recently.       FAMILY HISTORY:  Positive for heart disease.  Father had an MI in his 50s and had a bypass and  at age 78.  Mother had rheumatic heart disease at age 38 and  at age 42.     Julieta returns to clinic for her fourth and last cycle of TC.  She has had a very difficult time with her treatment.  She has developed hand-foot syndrome, which has been quite significant with some peeling of skin on the lateral aspect of both of her hands in the radial distribution.  She also has had very significant neuropathy which is concerning and has been getting worse with each cycle.  Today she says the pain is about a 2/10 on her toes and fingers.  I do think that starting duloxetine for chemotherapy-associated neuropathy is very reasonable and this is supported by a Journal of the American Medical Association publication.  She has mild depression and significant anxiety.  She has had a lot of sleep deprivation because she is taking care of her 2-year-old twin grandchildren, one of whom has esophageal atresia and requires around-the-clock attention with regard to feeding and hydration and she is sharing the responsibility with her daughter.  She did develop a temperature of 100.4 on this last cycle and did not come in to the emergency room.  I discussed with her that she absolutely must come to the emergency room if she has a temperature in the 100.4 range or if she in any way feels poorly.  I discussed the Neulasta does not always work and she may need IV antibiotics for febrile neutropenia.  She understands and will comply.  She knows she has only 1 more cycle left.  She also had significant pain in her low back and the Claritin did not help her.      REVIEW OF SYSTEMS:  The remainder of a 10-point review of systems is negative.        One notable finding was that she also says that she was having some hand swelling for several days after chemotherapy.       PHYSICAL EXAMINATION:   VITAL SIGNS:  Blood pressure 112/66, temperature 97.7, pulse 75, respirations 16, O2 sat 99% on room air, height 1.5 meters and weight 52.3 kg.   GENERAL:  Julieta has total alopecia.   HEENT:  Oropharynx is without lesions.   LYMPH:  There is no palpable cervical, supraclavicular, subclavicular or axillary lymphadenopathy.   BREASTS:  Examination of both breasts reveals no breast masses.   LUNGS:  Clear to percussion and auscultation.   HEART:  Regular rate and rhythm and a 3/6 systolic murmur heard best at the left sternal border and extending to the apex.   ABDOMEN:  Soft and nontender without hepatosplenomegaly.   EXTREMITIES:  Without edema.   PSYCHIATRIC:  Mood was anxious.  Affect was normal.      LABORATORY DATA:  The CMP was within normal limits.  The CBC showed a WBC of 5.0, hemoglobin 11.5, platelets of 215,000 and absolute neutrophil count was 3400.      ASSESSMENT AND PLAN:     1.  Julieta Rivera is a 67-year-old woman with a history of a stage I, clinical [id number], triple-negative invasive ductal carcinoma of the right breast measuring maximum dimension of 9 mm, grade 3.  She comes in today for cycle 4 of 4 of docetaxel and cyclophosphamide.  She has tolerated her chemotherapy with less tolerance with each cycle and did have an episode of temperature of 100.4 and did not come in.  I did emphasize to her that she must come to the emergency room if she has a fever.  She understands and says that she will comply.  She has worsening neuropathy.  We started duloxetine 60 mg daily.  I suggested she take duloxetine at bedtime so there is less risk of impairment of function.  One of the problems is that she is very significantly sleep deprived because of caring for her very young grandsons and I urged her to be cautious and try to get enough rest.   2.  Factor V Leiden.  She has a port in place.  She has not required anticoagulation.  She has had no thrombosis in the past and has not  required any thrombosis prophylaxis.  This was discussed with Dr. Kaz Feliciano.   3.  Cardiology.  Followup will be with Dr. Tidwell for her valve disease.  I believe she has some degree of aortic stenosis.   4.  Discussion of the importance of coming to the emergency room if she has febrile neutropenia.  I discussed with her that given that she had a temperature on her last cycle chances she would have a temperature on this cycle which may require going to the emergency room for evaluation.  I emphasized to her that this is the last cycle of her treatment and that safety is of the utmost importance and she understands.  I discussed that sometimes the Neulasta does not kick in in time to prevent neutropenia, and if she has a fever, she needs to be seen.   5.  Low back pain related to Neulasta.  This is a major problem for her.  She has been taking Claritin which has not been working.  We could give her MSIR 15 mg 3 tablets if she needs it.   6.  Followup.  We will see Julieta in followup in our clinic on 06/07 and will arrange an appointment with Dr. Guzman.  Julieta understands that after lumpectomy she will need radiation.  She would like to have the radiation in [place].  I discussed that we can arrange that.  We would like to get one consultation with our radiation oncologist prior.   All of her questions were answered. Appointment with Dr. Guzman in 2 to 3 weeks.  Follow up with me June 7 with CBC, CMP.         Thank you for allowing us to continue to participate in Julieta Rivera's care.      Jonathan Gay MD      [facility]         I spent 40 minutes with the patient more than 50% of which was in counseling and coordination of care.     Again, thank you for allowing me to participate in the care of your patient.      Sincerely,    Jonathan Gay MD

## 2018-05-10 NOTE — NURSING NOTE
Oncology Rooming Note    May 10, 2018 11:52 AM   Julieta Rivera is a 67 year old female who presents for:    Chief Complaint   Patient presents with     Port Draw     Labs drawn from port by RN. Line flushed with saline and heparin. Vs taken and pt checked in for appt     Oncology Clinic Visit     Return: Breast ca      Initial Vitals: /66 (BP Location: Right arm, Cuff Size: Adult Small)  Pulse 75  Temp 97.7  F (36.5  C) (Oral)  Resp 16  Ht 1.524 m (5')  Wt 52.3 kg (115 lb 6.4 oz)  SpO2 99%  BMI 22.54 kg/m2 Estimated body mass index is 22.54 kg/(m^2) as calculated from the following:    Height as of this encounter: 1.524 m (5').    Weight as of this encounter: 52.3 kg (115 lb 6.4 oz). Body surface area is 1.49 meters squared.  Mild Pain (2) Comment: Data Unavailable   No LMP recorded. Patient is postmenopausal.  Allergies reviewed: YES  Medications reviewed: YES    Medications: Medication refills not needed today.  Pharmacy name entered into [facility]:    [id number] [place], [place] - [address] [facility] [place], [place] - [address]  [id number] - [place], [place] - [address]    Clinical concerns: no new concerns.  Pt received flu shot elsewhere. See Immunizations     6 minutes for nursing intake (face to face time)     Lucía Maddox [facility]

## 2018-05-10 NOTE — MR AVS SNAPSHOT
After Visit Summary   5/10/2018    Julieta Rivera    MRN: [id number]           Patient Information     Date Of Birth          1950        Visit Information        Provider Department      5/10/2018 12:30 PM  11 ATC;  ONCOLOGY INFUSION [facility]        Today's Diagnoses     Encounter for other specified aftercare    -  1    Malignant neoplasm of upper-outer quadrant of right breast in female, estrogen receptor negative (H)          Care Instructions    Contact Numbers    [facility] Main Line: [phone]  [facility] Triage:  [phone]    Call triage with chills and/or temperature greater than or equal to 100.5, uncontrolled nausea/vomiting, diarrhea, constipation, dizziness, shortness of breath, chest pain, bleeding, unexplained bruising, or any new/concerning symptoms, questions/concerns.     If you are having any concerning symptoms or wish to speak to a provider before your next infusion visit, please call your care coordinator or triage to notify them so we can adequately serve you.       After Hours: [phone]    If after hours, weekends, or holidays, call main hospital  and ask for Oncology doctor on call.     Neulasta will begin at ______ and take 45 minutes to inject.  If the red light comes on at any point please contact triage.         May 2018   Norberto Monday Tuesday Wednesday Thursday Friday Saturday             1     2     3     4     5       6     7     8     9     10     [facility] LAB DRAW   11:00 AM   (15 min.)   [facility] LAB DRAW   [facility] Lab Draw     [facility] RETURN   11:15 AM   (30 min.)   Jonathan Gay MD   [facility] ONC INFUSION 180   12:30 PM   (180 min.)    ONCOLOGY INFUSION   [facility] 11     12       13     14     15     16     17     18     19       20     21     22     23     24     25     [facility] NEW    9:00 AM   (45 min.)   Eugene Guzman MD   [facility] 26        27     28     29     30     31 June 2018 Sunday Monday Tuesday Wednesday Thursday Friday Saturday                            1     2       3     4     [facility] RETURN CANCER   10:45 AM   (30 min.)   Kaley Tidwell MD   [facility] 5     6     7     [facility] LAB DRAW    3:00 PM   (15 min.)    [facility] LAB DRAW   [facility] Draw     [facility] RETURN    3:15 PM   (30 min.)   Jonathan Gay MD   [facility] 8     9       10     11     12     13     14     15     16       17     18     19     20     21     22     23       24     25     26     27     28     29     30                 Recent Results (from the past 24 hour(s))   CBC with platelets differential    Collection Time: 05/10/18 11:11 AM   Result Value Ref Range    WBC 5.0 4.0 - 11.0 10e9/L    RBC Count 3.62 (L) 3.8 - 5.2 10e12/L    Hemoglobin 11.5 (L) 11.7 - 15.7 g/dL    Hematocrit 34.0 (L) 35.0 - 47.0 %    MCV 94 78 - 100 fl    MCH 31.8 26.5 - 33.0 pg    MCHC 33.8 31.5 - 36.5 g/dL    RDW 15.3 (H) 10.0 - 15.0 %    Platelet Count 215 150 - 450 10e9/L    Diff Method Automated Method     % Neutrophils 68.0 %    % Lymphocytes 20.9 %    % Monocytes 9.5 %    % Eosinophils 0.4 %    % Basophils 1.0 %    % Immature Granulocytes 0.2 %    Nucleated RBCs 0 0 /100    Absolute Neutrophil 3.4 1.6 - 8.3 10e9/L    Absolute Lymphocytes 1.1 0.8 - 5.3 10e9/L    Absolute Monocytes 0.5 0.0 - 1.3 10e9/L    Absolute Eosinophils 0.0 0.0 - 0.7 10e9/L    Absolute Basophils 0.1 0.0 - 0.2 10e9/L    Abs Immature Granulocytes 0.0 0 - 0.4 10e9/L    Absolute Nucleated RBC 0.0    Comprehensive metabolic panel    Collection Time: 05/10/18 11:11 AM   Result Value Ref Range    Sodium 140 133 - 144 mmol/L    Potassium 3.8 3.4 - 5.3 mmol/L    Chloride 110 (H) 94 - 109 mmol/L    Carbon Dioxide 23 20 - 32 mmol/L    Anion Gap 6 3 - 14 mmol/L    Glucose 97 70 - 99 mg/dL    Urea Nitrogen 7 7 - 30 mg/dL    Creatinine 0.53 0.52 - 1.04 mg/dL     GFR Estimate >90 >60 mL/min/1.7m2    GFR Estimate If Black >90 >60 mL/min/1.7m2    Calcium 8.6 8.5 - 10.1 mg/dL    Bilirubin Total 0.5 0.2 - 1.3 mg/dL    Albumin 3.4 3.4 - 5.0 g/dL    Protein Total 6.1 (L) 6.8 - 8.8 g/dL    Alkaline Phosphatase 62 40 - 150 U/L    ALT 19 0 - 50 U/L    AST 21 0 - 45 U/L               Follow-ups after your visit        Your next 10 appointments already scheduled     May 25, 2018  9:15 AM CDT   (Arrive by 9:00 AM)   New Patient Visit with Eugene Guzman MD   [facility] ([facility])    [address] [place] [address]  [address]  [facility] [address]   [phone]            Jun 04, 2018 11:00 AM CDT   (Arrive by 10:45 AM)   RETURN CANCER VISIT with Kaley Tidwell MD   [facility] ([facility])    [address] [place] [address]  [address]  [facility] [address]   [phone]            Jun 07, 2018  3:00 PM CDT   [facility] Lab Draw with  [employer] LAB DRAW   [facility] Lab Draw ([facility])    [address] [place] [address]  [address]  [facility] [address]   [phone]            Jun 07, 2018  3:30 PM CDT   (Arrive by 3:15 PM)   Return Visit with Jonathan Gay MD   [facility] ([facility])    [address] [place] [address]  [address]  [facility] [address] [phone]              Future tests that were ordered for you today     Open Standing Orders        Priority Remaining Interval Expires Ordered    CBC with platelets differential Routine 52/52 5/9/2019 5/9/2018    Comprehensive metabolic panel Routine 52/52 5/9/2019 5/9/2018          Open Future Orders        Priority Expected Expires Ordered    SURGICAL ONCOLOGIST REFERRAL BREAST CENTER Routine  11/6/2018 5/10/2018            Who to contact     If you have questions or need follow up information about today's clinic visit or your schedule please contact [facility] CANCER [facility] directly at  [phone].  Normal or non-critical lab and imaging results will be communicated to you by [employer], letter or phone within 4 business days after the clinic has received the results. If you do not hear from us within 7 days, please contact the clinic through [employer] or phone. If you have a critical or abnormal lab result, we will notify you by phone as soon as possible.  Submit refill requests through [employer] or call your pharmacy and they will forward the refill request to us. Please allow 3 business days for your refill to be completed.          Additional Information About Your Visit        [employer] Information     [employer] gives you secure access to your electronic health record. If you see a primary care provider, you can also send messages to your care team and make appointments. If you have questions, please call your primary care clinic.  If you do not have a primary care provider, please call [phone] and they will assist you.        Care EveryWhere ID     This is your Care EveryWhere ID. This could be used by other organizations to access your [place] medical records  [id number]         Blood Pressure from Last 3 Encounters:   05/10/18 112/66   04/19/18 111/70   03/29/18 126/73    Weight from Last 3 Encounters:   05/10/18 52.3 kg (115 lb 6.4 oz)   04/19/18 53.1 kg (117 lb)   03/29/18 54.2 kg (119 lb 6.4 oz)              We Performed the Following     CBC with platelets differential     Comprehensive metabolic panel          Today's Medication Changes          These changes are accurate as of 5/10/18  2:22 PM.  If you have any questions, ask your nurse or doctor.               Start taking these medicines.        Dose/Directions    DULoxetine 60 MG EC capsule   Commonly known as:  CYMBALTA   Used for:  Adjustment disorder with depressed mood, Neuropathy   Started by:  Jonathan Gay MD        Dose:  60 mg   Take 1 capsule (60 mg) by mouth daily   Quantity:  30 capsule   Refills:  3             Where to get your medicines      These medications were sent to [facility] [place], [place] - [address]  [address], [facility] [address]    Hours:  TRANSPLANT PHONE NUMBER [phone] Phone:  [phone]     DULoxetine 60 MG EC capsule                Primary Care Provider Office Phone # Fax #    Simona Tucker, APRN [phone]       [address]  [facility] [address]        Goals        General    Medical (pt-stated)     Notes - Note created  4/3/2018 12:31 PM by Trice Galaviz, RN    Goal Statement: I will manage stress associated with caring for my grandchildren  Measure of Success: verbalization of stress reduction  Supportive Steps to Achieve: outpatient counseling, support of RN CC  Barriers: none  Strengths: family support  Date to Achieve By: 6 months            Equal Access to Services     CARTER DEL RIO AH: Hadii india chong Sohussein, waaxda luqadaha, qaybta kaalmada ziayada, khadar hdez . So [facility] [phone].    ATENCIÓN: Si habla español, tiene a phillips disposición servicios gratuitos de asistencia lingüística. Llame al [phone].    We comply with applicable federal civil rights laws and [place] laws. We do not discriminate on the basis of race, color, national origin, age, disability, sex, sexual orientation, or gender identity.            Thank you!     Thank you for choosing [facility] CANCER [facility]  for your care. Our goal is always to provide you with excellent care. Hearing back from our patients is one way we can continue to improve our services. Please take a few minutes to complete the written survey that you may receive in the mail after your visit with us. Thank you!             Your Updated Medication List - Protect others around you: Learn how to safely use, store and throw away your medicines at www.disposemymeds.org.          This list is accurate as of  5/10/18  2:22 PM.  Always use your most recent med list.                   Brand Name Dispense Instructions for use Diagnosis    DULoxetine 60 MG EC capsule    CYMBALTA    30 capsule    Take 1 capsule (60 mg) by mouth daily    Adjustment disorder with depressed mood, Neuropathy       FISH OIL PO      Take 1 capsule by mouth daily.        levothyroxine 100 MCG tablet    SYNTHROID/LEVOTHROID    60 tablet    Take 1 tablet (100 mcg) by mouth daily Take by mouth daily    Hypothyroidism due to Hashimoto's thyroiditis       lidocaine-prilocaine cream    EMLA    30 g    Apply to port site one hour prior to access. May start using six days after port placement    Malignant neoplasm of upper outer quadrant of breast in female, estrogen receptor negative (H)       LORazepam 0.5 MG tablet    ATIVAN    30 tablet    Take 1 tablet (0.5 mg) by mouth every 4 hours as needed (Anxiety, Nausea/Vomiting or Sleep)    Encounter for other specified aftercare, Malignant neoplasm of upper-outer quadrant of right breast in female, estrogen receptor negative (H)       MULTIVITAMIN & MINERAL PO      Take 2 tablets by mouth daily.        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

## 2018-05-22 ASSESSMENT — ENCOUNTER SYMPTOMS
EYE IRRITATION: 1
CHILLS: 1
HYPERTENSION: 0
BACK PAIN: 1
MUSCLE WEAKNESS: 0
WEAKNESS: 1
EYE REDNESS: 0
STIFFNESS: 1
EYE PAIN: 1
EYE REDNESS: 0
POLYPHAGIA: 0
SLEEP DISTURBANCES DUE TO BREATHING: 0
SHORTNESS OF BREATH: 1
EYE IRRITATION: 1
NECK MASS: 0
ARTHRALGIAS: 1
SEIZURES: 0
COUGH: 1
NECK PAIN: 0
DIZZINESS: 0
BACK PAIN: 1
SNORES LOUDLY: 0
ALTERED TEMPERATURE REGULATION: 1
NECK MASS: 0
MEMORY LOSS: 0
COUGH DISTURBING SLEEP: 1
TASTE DISTURBANCE: 1
MUSCLE CRAMPS: 0
NIGHT SWEATS: 0
HALLUCINATIONS: 0
PALPITATIONS: 0
SPEECH CHANGE: 0
ARTHRALGIAS: 1
JOINT SWELLING: 1
EYE PAIN: 1
DECREASED APPETITE: 1
TASTE DISTURBANCE: 1
COUGH DISTURBING SLEEP: 1
SINUS CONGESTION: 1
HYPOTENSION: 1
WEIGHT LOSS: 1
ALTERED TEMPERATURE REGULATION: 1
STIFFNESS: 1
LEG PAIN: 0
TREMORS: 0
DISTURBANCES IN COORDINATION: 0
SPEECH CHANGE: 0
WHEEZING: 1
HALLUCINATIONS: 0
SORE THROAT: 0
NUMBNESS: 1
SPUTUM PRODUCTION: 0
TINGLING: 1
NIGHT SWEATS: 0
NECK PAIN: 0
LIGHT-HEADEDNESS: 0
PARALYSIS: 0
HEMOPTYSIS: 0
LIGHT-HEADEDNESS: 0
SPUTUM PRODUCTION: 0
SINUS CONGESTION: 1
WEAKNESS: 1
LOSS OF CONSCIOUSNESS: 0
TROUBLE SWALLOWING: 0
DOUBLE VISION: 0
HYPERTENSION: 0
DECREASED APPETITE: 1
PALPITATIONS: 0
WEIGHT LOSS: 1
POLYDIPSIA: 0
POLYDIPSIA: 0
WEIGHT GAIN: 0
FEVER: 0
SNORES LOUDLY: 0
FATIGUE: 1
SMELL DISTURBANCE: 0
TROUBLE SWALLOWING: 0
LOSS OF CONSCIOUSNESS: 0
SHORTNESS OF BREATH: 1
SMELL DISTURBANCE: 0
WHEEZING: 1
HEADACHES: 0
MYALGIAS: 1
MYALGIAS: 1
POLYPHAGIA: 0
SLEEP DISTURBANCES DUE TO BREATHING: 0
DYSPNEA ON EXERTION: 1
POSTURAL DYSPNEA: 1
HYPOTENSION: 1
FEVER: 0
TINGLING: 1
INCREASED ENERGY: 1
PARALYSIS: 0
TREMORS: 0
CHILLS: 1
FATIGUE: 1
ORTHOPNEA: 1
ORTHOPNEA: 1
SYNCOPE: 0
DYSPNEA ON EXERTION: 1
EYE WATERING: 0
SYNCOPE: 0
HEADACHES: 0
COUGH: 1
DOUBLE VISION: 0
POSTURAL DYSPNEA: 1
HOARSE VOICE: 0
MEMORY LOSS: 0
SINUS PAIN: 1
EXERCISE INTOLERANCE: 0
JOINT SWELLING: 1
MUSCLE WEAKNESS: 0
LEG PAIN: 0
HOARSE VOICE: 0
EYE WATERING: 0
MUSCLE CRAMPS: 0
NUMBNESS: 1
SORE THROAT: 0
EXERCISE INTOLERANCE: 0
DISTURBANCES IN COORDINATION: 0
SEIZURES: 0
HEMOPTYSIS: 0
SINUS PAIN: 1
DIZZINESS: 0
WEIGHT GAIN: 0
INCREASED ENERGY: 1

## 2018-05-25 ENCOUNTER — ONCOLOGY VISIT (OUTPATIENT)
Dept: ONCOLOGY | Facility: CLINIC | Age: 68
End: 2018-05-25
Attending: SURGERY
Payer: COMMERCIAL

## 2018-05-25 ENCOUNTER — TELEPHONE (OUTPATIENT)
Dept: FAMILY MEDICINE | Facility: CLINIC | Age: 68
End: 2018-05-25

## 2018-05-25 VITALS
HEART RATE: 92 BPM | HEIGHT: 60 IN | OXYGEN SATURATION: 99 % | SYSTOLIC BLOOD PRESSURE: 113 MMHG | DIASTOLIC BLOOD PRESSURE: 63 MMHG | RESPIRATION RATE: 16 BRPM | BODY MASS INDEX: 22.16 KG/M2 | WEIGHT: 112.9 LBS | TEMPERATURE: 97 F

## 2018-05-25 DIAGNOSIS — E03.9 HYPOTHYROIDISM, UNSPECIFIED TYPE: Primary | ICD-10-CM

## 2018-05-25 DIAGNOSIS — C50.911 MALIGNANT NEOPLASM OF RIGHT BREAST (H): Primary | ICD-10-CM

## 2018-05-25 PROCEDURE — G0463 HOSPITAL OUTPT CLINIC VISIT: HCPCS | Mod: ZF

## 2018-05-25 RX ORDER — DEXAMETHASONE 4 MG/1
TABLET ORAL
COMMUNITY
Start: 2018-05-10 | End: 2018-06-05

## 2018-05-25 RX ORDER — CEFAZOLIN SODIUM 1 G/50ML
1 INJECTION, SOLUTION INTRAVENOUS SEE ADMIN INSTRUCTIONS
Status: CANCELLED | OUTPATIENT
Start: 2018-05-25

## 2018-05-25 RX ORDER — PROCHLORPERAZINE MALEATE 5 MG
TABLET ORAL
COMMUNITY
Start: 2018-03-08 | End: 2018-05-31

## 2018-05-25 ASSESSMENT — PAIN SCALES - GENERAL: PAINLEVEL: NO PAIN (0)

## 2018-05-25 NOTE — TELEPHONE ENCOUNTER
Ava/Provider Pool:    Patient is requesting orders to have TSH drawn before appt on 05/31/18 (already has lab appt scheduled).    Lab cued.    Please review/sign or advise.    Adia Iraheta RN  [facility]

## 2018-05-25 NOTE — MR AVS SNAPSHOT
After Visit Summary   5/25/2018    Julieta Rievra    MRN: [id number]           Patient Information     Date Of Birth          1950        Visit Information        Provider Department      5/25/2018 9:15 AM Eugene Guzman MD [facility]        Today's Diagnoses     Malignant neoplasm of right breast (H)    -  1       Follow-ups after your visit        Your next 10 appointments already scheduled     May 31, 2018 10:45 AM CDT   LAB with RD LAB   [facility])    [address] [place] [address] [phone]           Please do not eat 10-12 hours before your appointment if you are coming in fasting for labs on lipids, cholesterol, or glucose (sugar). This does not apply to pregnant women. Water, hot tea and black coffee (with nothing added) are okay. Do not drink other fluids, diet soda or chew gum.            May 31, 2018 11:00 AM CDT   Pre-Op physical with JOSÉ LUIS Sol CNP   [facility] ([facility])    [address] [place] [address] [phone]            Jun 04, 2018 11:00 AM CDT   (Arrive by 10:45 AM)   RETURN CANCER VISIT with Kaley Tidwell MD   [facility] ([facility])    [address] [place] [address]  [address] [place] [id number] [phone]            Jun 07, 2018 11:00 AM CDT   MA BREAST WIRE PLACEMENT 1ST LESION RT with  Breast Rad, [email or web address],  BREAST NURSE   [facility] ([facility])    [address] [place] [address]  2nd Floor  [facility] [id number] [phone]           Do not use any powder, lotion or deodorant under your arms or on your breast. If you do, we will ask you to remove it before your exam.  Wear comfortable clothing and a well-fitted bra to the clinic. (A sports bra is best.) Bring a list of your medicines, including vitamins, minerals and  over-the-counter drugs. It is best to leave valuables at home.  Tell your care team if:   You have any allergies.   You are taking aspirin, ibuprofen, naproxen or any drug that could increase bleeding.  Bring any previous mammograms from other facilities or have them mailed to the breast center.    Stop taking Coumadin (warfarin) 5 days before treatment. Restart the day after treatment.   If you take Plavix, Ticlid, Pletal or Persantine, ask your doctor if you should stop these medicines. You may need extra tests on the morning of your scan.            Jun 07, 2018 11:00 AM CDT   US BREAST WIRE PLACEMENT RIGHT with  Breast Rad, [email or web address],  BREAST NURSE   [facility] ([facility])    [address] [place] [address] [phone]           Please bring a list of your medicines (including vitamins, minerals and over-the-counter drugs). Also, tell your doctor about any allergies you may have. Wear comfortable clothes and leave your valuables at home.  You do not need to do anything special to prepare for your exam.  Please call the Imaging Department at your exam site with any questions.            Jun 07, 2018 12:00 PM CDT   MA POST PROCEDURE RIGHT with [email or web address]   [facility] ([facility])    [address] [place] [id number] [phone]           Do not use any powder, lotion or deodorant under your arms or on your breast. If you do, we will ask you to remove it before your exam.  Wear comfortable, two-piece clothing.  If you have any allergies, tell your care team.  Bring any previous mammograms from other facilities or have them mailed to the breast center.              Future tests that were ordered for you today     Open Future Orders        Priority Expected Expires Ordered    MA Post Procedure Right Routine  5/30/2019 5/30/2018    US Breast Wire Placement Right Routine   5/30/2019 5/30/2018    MA Breast Specimen Right Routine  5/30/2019 5/30/2018            Who to contact     If you have questions or need follow up information about today's clinic visit or your schedule please contact [facility] directly at [phone].  Normal or non-critical lab and imaging results will be communicated to you by MyChart, letter or phone within 4 business days after the clinic has received the results. If you do not hear from us within 7 days, please contact the clinic through [employer] or phone. If you have a critical or abnormal lab result, we will notify you by phone as soon as possible.  Submit refill requests through [employer] or call your pharmacy and they will forward the refill request to us. Please allow 3 business days for your refill to be completed.          Additional Information About Your Visit        [employer] Information     [employer] gives you secure access to your electronic health record. If you see a primary care provider, you can also send messages to your care team and make appointments. If you have questions, please call your primary care clinic.  If you do not have a primary care provider, please call [phone] and they will assist you.        Care EveryWhere ID     This is your Care EveryWhere ID. This could be used by other organizations to access your [facility] medical records  [id number]        Your Vitals Were     Pulse Temperature Respirations Height Pulse Oximetry BMI (Body Mass Index)    92 97  F (36.1  C) (Oral) 16 1.524 m (5') 99% 22.05 kg/m2       Blood Pressure from Last 3 Encounters:   05/25/18 113/63   05/10/18 112/66   04/19/18 111/70    Weight from Last 3 Encounters:   05/25/18 51.2 kg (112 lb 14.4 oz)   05/10/18 52.3 kg (115 lb 6.4 oz)   04/19/18 53.1 kg (117 lb)              We Performed the Following     Sadie-Operative Worksheet (Breast [facility])          Today's Medication Changes          These changes are accurate as of 5/25/18 11:59  PM.  If you have any questions, ask your nurse or doctor.               Stop taking these medicines if you haven't already. Please contact your care team if you have questions.     DULoxetine 60 MG EC capsule   Commonly known as:  CYMBALTA   Stopped by:  Eugene Guzman MD                    Primary Care Provider Office Phone # Fax JOSÉ LUIS Sweeney [phone]       [address] [facility] [address]  [facility] [address]        Goals        General    Medical (pt-stated)     Notes - Note created  4/3/2018 12:31 PM by Trice Galaviz RN    Goal Statement: I will manage stress associated with caring for my grandchildren  Measure of Success: verbalization of stress reduction  Supportive Steps to Achieve: outpatient counseling, support of RN CC  Barriers: none  Strengths: family support  Date to Achieve By: 6 months            Equal Access to Services     CARTER DEL RIO : Hadii ind[place] Polk, waaxda luqadaha, qaybta kaalmada higinio, khadar hdez . So [facility] [place] [phone].    ATENCIÓN: Si habla español, tiene a phillips disposición servicios gratuitos de asistencia lingüística. [facility] [phone].    We comply with applicable federal civil rights laws and [place] laws. We do not discriminate on the basis of race, color, national origin, age, disability, sex, sexual orientation, or gender identity.            Thank you!     Thank you for choosing [facility]  for your care. Our goal is always to provide you with excellent care. Hearing back from our patients is one way we can continue to improve our services. Please take a few minutes to complete the written survey that you may receive in the mail after your visit with us. Thank you!             Your Updated Medication List - Protect others around you: Learn how to safely use, store and throw away your medicines at www.disposemymeds.org.          This list is accurate as of 5/25/18 11:59 PM.  Always use  your most recent med list.                   Brand Name Dispense Instructions for use Diagnosis    dexamethasone 4 MG tablet    DECADRON          FISH OIL PO      Take 1 capsule by mouth daily.        levothyroxine 100 MCG tablet    SYNTHROID/LEVOTHROID    60 tablet    Take 1 tablet (100 mcg) by mouth daily Take by mouth daily    Hypothyroidism due to Hashimoto's thyroiditis       lidocaine-prilocaine cream    EMLA    30 g    Apply to port site one hour prior to access. May start using six days after port placement    Malignant neoplasm of upper outer quadrant of breast in female, estrogen receptor negative (H)       LORazepam 0.5 MG tablet    ATIVAN    30 tablet    Take 1 tablet (0.5 mg) by mouth every 4 hours as needed (Anxiety, Nausea/Vomiting or Sleep)    Encounter for other specified aftercare, Malignant neoplasm of upper-outer quadrant of right breast in female, estrogen receptor negative (H)       MULTIVITAMIN & MINERAL PO      Take 2 tablets by mouth daily.        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 Read the directions carefully, and ask your doctor or other care provider to review them with you.

## 2018-05-25 NOTE — PROGRESS NOTES
HISTORY OF PRESENT ILLNESS:  Julieta Rivera is a 67-year-old woman I was asked to see at the request of Dr. Jonathan Gay for evaluation of a stage I breast cancer.  The patient underwent screening mammography which demonstrated a 9 mm lesion in her right breast.  She had an ultrasound that also showed the lesion to be about 9 mm.  She had no evidence of axillary lymphadenopathy.  She underwent a needle biopsy which demonstrated a grade 3 triple-negative breast cancer.  She saw Dr. Gay and has completed TC chemotherapy.  She has done well with that treatment.  She is now here to discuss treatment options.  She has not had any breast problems in the past.      PAST MEDICAL HISTORY:  Significant for hypothyroidism.      FAMILY HISTORY:  Significant for a sister who had endometrial cancer.  She has 2 paternal aunts with breast cancer.      PHYSICAL EXAMINATION:   GENERAL:  She is a well-appearing woman in no apparent distress.   BREASTS:  Bilateral breast examination reveals no dominant masses, skin changes, nipple changes or axillary lymphadenopathy.      IMPRESSION:  Stage I breast cancer.      PLAN:  I have talked about the various treatment options.  She is interested in breast-conserving surgery.  She knows she will need radiation therapy.  I have also talked to her about sentinel lymph node biopsy.  She wishes to have that as well.  We will schedule her for a wire localized lumpectomy and sentinel lymph node biopsy.  She did ask about seeing a genetic counselor.  I think that is reasonable as well.      TT:  40 minutes.  CT:  30 minutes.        cc:   Jonathan Gay MD     Physicians   [address], [facility] [address]   [place] [address]

## 2018-05-25 NOTE — NURSING NOTE
Oncology Rooming Note    May 25, 2018 9:35 AM   Julieta Rivera is a 67 year old female who presents for:    Chief Complaint   Patient presents with     Oncology Clinic Visit     New Breast Ca     Initial Vitals: /63  Pulse 92  Temp 97  F (36.1  C) (Oral)  Resp 16  Ht 1.524 m (5')  Wt 51.2 kg (112 lb 14.4 oz)  SpO2 99%  BMI 22.05 kg/m2 Estimated body mass index is 22.05 kg/(m^2) as calculated from the following:    Height as of this encounter: 1.524 m (5').    Weight as of this encounter: 51.2 kg (112 lb 14.4 oz). Body surface area is 1.47 meters squared.  No Pain (0) Comment: Data Unavailable   No LMP recorded. Patient is postmenopausal.  Allergies reviewed: Yes  Medications reviewed: Yes    Medications: Medication refills not needed today.  Pharmacy name entered into [employer]:    [id number] [place], [place] - [address] [facility] [place], [place] - [address] [place], [place] [id number] - [place], [place] - [address]    Clinical concerns: Yes, Patient complains of having chest pressure and coughing while lying down. Dr Guzman was notified.    10 minutes for nursing intake (face to face time)     NANETET RODRIGUEZ LPN

## 2018-05-30 NOTE — PATIENT INSTRUCTIONS
Shingrix after surgery - at a pharmacy    Before Your Surgery      Call your surgeon if there is any change in your health. This includes signs of a cold or flu (such as a sore throat, runny nose, cough, rash or fever).    Do not smoke, drink alcohol or take over the counter medicine (unless your surgeon or primary care doctor tells you to) for the 24 hours before and after surgery.    If you take prescribed drugs: Follow your doctor s orders about which medicines to take and which to stop until after surgery.    Eating and drinking prior to surgery: follow the instructions from your surgeon    Take a shower or bath the night before surgery. Use the soap your surgeon gave you to gently clean your skin. If you do not have soap from your surgeon, use your regular soap. Do not shave or scrub the surgery site.  Wear clean pajamas and have clean sheets on your bed.

## 2018-05-30 NOTE — PROGRESS NOTES
[address] [address]  [phone]  Dept: [phone]    PRE-OP EVALUATION:  Today's date: 2018    Julieta Rivera (: 1950) presents for pre-operative evaluation assessment as requested by Dr. Guzman.  She requires evaluation and anesthesia risk assessment prior to undergoing surgery/procedure for treatment of malignant right breast cancer .    Fax number for surgical facility: available electronically   Primary Physician: Simona Tucker  Type of Anesthesia Anticipated: General    Patient has a Health Care Directive or Living Will:  NO    Preop Questions 2018   Who is doing your surgery? Eugene Guzman M.D   What are you having done? Right breast cancer cell removal and removal of the sentinel lymph node on that side for inspection.   Date of Surgery/Procedure: 2018   Facility or Hospital where procedure/surgery will be performed: [facility]   1.  Do you have a history of Heart attack, stroke, stent, coronary bypass surgery, or other heart surgery? No   2.  Do you ever have any pain or discomfort in your chest? YES - chest pain    3.  Do you have a history of  Heart Failure? No   4.   Are you troubled by shortness of breath when:  walking on a level surface, or up a slight hill, or at night? YES - going from basement to second level of house will have shortness of breath    5.  Do you currently have a cold, bronchitis or other respiratory infection? UNKNOWN - coughing and not sure    6.  Do you have a cough, shortness of breath, or wheezing? YES - when laying down.   7.  Do you sometimes get pains in the calves of your legs when you walk? No   8. Do you or anyone in your family have previous history of blood clots? YES - family has it. Dad, daughter, and sister but not her self.    9.  Do you or does anyone in your family have a serious bleeding problem such as prolonged bleeding following  surgeries or cuts? No   10. Have you ever had problems with anemia or been told to take iron pills? No   11. Have you had any abnormal blood loss such as black, tarry or bloody stools, or abnormal vaginal bleeding? No   12. Have you ever had a blood transfusion? No   13. Have you or any of your relatives ever had problems with anesthesia? UNKNOWN - has not known anyone who has it but has a big family.    14. Do you have sleep apnea, excessive snoring or daytime drowsiness? No   15. Do you have any prosthetic heart valves? No   16. Do you have prosthetic joints? No   17. Is there any chance that you may be pregnant? No         HPI:     HPI related to upcoming procedure: right localized lumpectomy and right sentinel lymph node biopsy for breast cancer.  Has finished chemotherapy course.  Radiation will happen after surgery.    HYPOTHYROIDISM - Patient has a longstanding history of hypothyroidism. Patient has been doing well, noting no tremor, insomnia, hair loss or changes in skin texture. Continues to take medications as directed, without adverse reactions or side effects. Last TSH   Lab Results   Component Value Date    TSH 4.41 (H) 03/08/2018     HETEROZYGOUS FACTOR V LEIDEN MUTATION - no personal history of DVT or PE    MEDICAL HISTORY:     Patient Active Problem List    Diagnosis Date Noted     Encounter for other specified aftercare 03/08/2018     Priority: Medium     Malignant neoplasm of upper outer quadrant of breast in female, estrogen receptor negative (H) 02/25/2018     Priority: Medium     Hypothyroidism due to Hashimoto's thyroiditis 01/04/2017     Priority: Medium     Other specified hypothyroidism 10/09/2015     Priority: Medium     Tremor 10/09/2015     Priority: Medium     CARDIOVASCULAR SCREENING; LDL GOAL LESS THAN 130 04/23/2012     Priority: Medium     Adenoma of large intestine 11/02/2011     Priority: Medium     Heterozygous factor V Leiden mutation (H)      Priority: Medium      Past Medical  History:   Diagnosis Date     Abnormal Pap smear 1970s    normal since     Factor V Leiden (H)      Hypothyroidism fall      Past Surgical History:   Procedure Laterality Date      SECTION       COLPOSCOPY CERVIX, BIOPSY CERVIX, ENDOCERVICAL CURETTAGE, COMBINED       INSERT PORT VASCULAR ACCESS N/A 3/5/2018    Procedure: INSERT PORT VASCULAR ACCESS;  Single Lumen Chest Power Port Placement;  Surgeon: Brian Tolbert PA-C;  Location: [facility] OR     TONSILLECTOMY, ADENOIDECTOMY, COMBINED       Current Outpatient Prescriptions   Medication Sig Dispense Refill     dexamethasone (DECADRON) 4 MG tablet        levothyroxine (SYNTHROID/LEVOTHROID) 100 MCG tablet Take 1 tablet (100 mcg) by mouth daily Take by mouth daily 60 tablet 1     lidocaine-prilocaine (EMLA) cream Apply to port site one hour prior to access. May start using six days after port placement 30 g 1     LORazepam (ATIVAN) 0.5 MG tablet Take 1 tablet (0.5 mg) by mouth every 4 hours as needed (Anxiety, Nausea/Vomiting or Sleep) 30 tablet 3     Multiple Vitamins-Minerals (MULTIVITAMIN & MINERAL PO) Take 2 tablets by mouth daily.       Omega-3 Fatty Acids (FISH OIL PO) Take 1 capsule by mouth daily.       pyridoxine (VITAMIN B-6) 100 MG tablet Take 1 tablet (100 mg) by mouth daily 30 tablet 3     VITAMIN D, CHOLECALCIFEROL, PO Take 2,000 Units by mouth daily       OTC products: None, except as noted above    Allergies   Allergen Reactions     Seasonal Allergies       Latex Allergy: NO    Social History   Substance Use Topics     Smoking status: Former Smoker     Quit date: 1971     Smokeless tobacco: Never Used     Alcohol use No      Comment: none since last november.     History   Drug Use No       REVIEW OF SYSTEMS:   CONSTITUTIONAL: NEGATIVE for fever, chills, change in weight; fatigue all the time since last round of chemo  INTEGUMENTARY/SKIN: NEGATIVE for worrisome rashes, moles or lesions  EYES: NEGATIVE for vision changes or  irritation  ENT/MOUTH: NEGATIVE for ear, mouth and throat problems  RESP: breathless, coughing a lot and winded easily  BREAST: NEGATIVE for masses, tenderness or discharge  CV: NEGATIVE for chest pain, palpitations or peripheral edema; will have MRI with cardiology related to mild mitral valve prolapse for concern for 3 vs 4 chamber heart  GI: no nausea, appetite ok and taste buds coming back, diarrhea today and occasionally during chemo rounds  : NEGATIVE for frequency, dysuria, or hematuria  MUSCULOSKELETAL: NEGATIVE for significant arthralgias or myalgia; fall down stairs May 12 - hit back in three places, pain has improved, no associated weakness  NEURO: NEGATIVE for weakness, dizziness; neuropathy in fingers and feet bilaterally - tried cymbalta which was not helpful and has now stopped  ENDOCRINE: NEGATIVE for temperature intolerance, hair loss and rashes 2/2 chemotherapy  HEME: NEGATIVE for bleeding problems  PSYCHIATRIC: NEGATIVE for changes in mood or affect  PORT - last flush was May 10th    Wt Readings from Last 5 Encounters:   05/31/18 113 lb 8 oz (51.5 kg)   05/25/18 112 lb 14.4 oz (51.2 kg)   05/10/18 115 lb 6.4 oz (52.3 kg)   04/19/18 117 lb (53.1 kg)   03/29/18 119 lb 6.4 oz (54.2 kg)     EXAM:   /64  Pulse 67  Temp 97.2  F (36.2  C) (Oral)  Wt 113 lb 8 oz (51.5 kg)  SpO2 100%  BMI 22.17 kg/m2    GENERAL APPEARANCE: healthy, alert and no distress     EYES: EOMI, PERRL, sclera anicteric       HENT: ear canals and TM's normal and nose and mouth without ulcers or lesions     NECK: no adenopathy, no asymmetry, masses, or scars and thyroid normal to palpation     RESP: lungs clear to auscultation - no rales, rhonchi or wheezes     CV: regular rates and rhythm, normal S1 S2, no S3 or S4 and no murmur, click or rub     ABDOMEN:  soft, nontender, no HSM or masses and bowel sounds normal     MS: extremities normal- no gross deformities noted, no evidence of inflammation in joints, FROM in all  extremities.     SKIN: no suspicious lesions or rashes     NEURO: Normal strength and tone, sensory exam grossly normal, mentation intact and speech normal     PSYCH: mentation appears normal. and affect normal/bright     LYMPHATICS: No cervical or axillary adenopathy    DIAGNOSTICS:   EKG: Not indicated due to non-vascular surgery and last ekg on 3/19/18 (within 30 days for CAD history or last year for cardiac risk factors)    Recent Labs   Lab Test  05/10/18   1111  04/19/18   1140   03/05/18   1140  11/22/13   0900   12/17/12   0937   HGB  11.5*  11.9   < >  13.6  13.8   < >   --    PLT  215  229   < >  192  199   < >   --    INR   --    --    --   0.94   --    --    --    NA  140  140   < >   --   139   --    --    POTASSIUM  3.8  3.6   < >   --   4.1   --    --    CR  0.53  0.56   < >   --   0.69   --    --    A1C   --    --    --    --   5.1   --   5.2    < > = values in this interval not displayed.        IMPRESSION:   Reason for surgery/procedure: right breast lumpectomy and sentinel node biopsy  Diagnosis/reason for consult: pre-operative risk assessment    The proposed surgical procedure is considered INTERMEDIATE risk.    REVISED CARDIAC RISK INDEX  The patient has the following serious cardiovascular risks for perioperative complications such as (MI, PE, VFib and 3  AV Block):  No serious cardiac risks  INTERPRETATION: 1 risks: Class II (low risk - 0.9% complication rate)    The patient has the following additional risks for perioperative complications:  The 10-year ASCVD risk score (Eliezer CODY Jr, et al., 2013) is: 5.6%    Values used to calculate the score:      Age: 67 years      Sex: Female      Is Non- : No      Diabetic: No      Tobacco smoker: No      Systolic Blood Pressure: 114 mmHg      Is BP treated: No      HDL Cholesterol: 83 mg/dL      Total Cholesterol: 215 mg/dL     FACTOR V MUTATION      ICD-10-CM    1. Preop general physical exam Z01.818 CBC with platelets  differential     Comprehensive metabolic panel   2. Malignant neoplasm of upper-outer quadrant of right breast in female, estrogen receptor negative (H) C50.411 CBC with platelets differential    Z17.1 Comprehensive metabolic panel   3. Heterozygous factor V Leiden mutation (H) D68.51    4. Hypothyroidism due to Hashimoto's thyroiditis E03.8     E06.3        RECOMMENDATIONS:     --Because of DVT or PE history, consult with hematologist for option of low molecular weight heparin and wear compression hose before & after surgery.  Currently awaiting hematology return call.  ADDENDUM:  Dr. Hurd does not recommend anticoagulation for this patient based on Factor V Leiden alone.  Only neding anticoagulation if this is standard for lumpectomy.  See below for note.    --Patient is to take all scheduled medications on the day of surgery EXCEPT for modifications listed below.    NO further diagnostic evaluation required before surgery       Signed Electronically by: JOSÉ LUIS Parikh CNP    Copy of this evaluation report is provided to requesting physician.    [facility] Preop Guidelines    Revised Cardiac Risk Index    Hematology note from 6/1/18  Dr. Hurd saw Julitea Miguel as  New consult for FVL-heterozygous (no personal hx of clotting, only family) with Breast CA, chemo, port placement back in March of this year.  He did not recommend anticoagulation for her at this time.  Her PCP's office is now calling asking whether Dr. Hurd would recommend Lovenox after her upcoming Lumpectomy/lymph node biopsy 6/7/18.  Dr. Hurd was called and he would recommend anticoagulation if standard of care for lumpectomy.  Factor V Leiden is such a mild risk factor that this would not affect his decision about possible anticoagulation.  He is not opposed to the possibility of Lovenox 40 mg daily x 14 days, however.  Communicated the above the Vikki in Dr. Nieves's office (PCP).  Alisha Pascual, RN, MSN -Nurse Clinician, [facility] [phone]

## 2018-05-31 ENCOUNTER — OFFICE VISIT (OUTPATIENT)
Dept: FAMILY MEDICINE | Facility: CLINIC | Age: 68
End: 2018-05-31
Payer: COMMERCIAL

## 2018-05-31 VITALS
SYSTOLIC BLOOD PRESSURE: 114 MMHG | HEART RATE: 67 BPM | TEMPERATURE: 97.2 F | WEIGHT: 113.5 LBS | DIASTOLIC BLOOD PRESSURE: 64 MMHG | BODY MASS INDEX: 22.17 KG/M2 | OXYGEN SATURATION: 100 %

## 2018-05-31 DIAGNOSIS — C50.411 MALIGNANT NEOPLASM OF UPPER-OUTER QUADRANT OF RIGHT BREAST IN FEMALE, ESTROGEN RECEPTOR NEGATIVE (H): ICD-10-CM

## 2018-05-31 DIAGNOSIS — Z01.818 PREOP GENERAL PHYSICAL EXAM: Primary | ICD-10-CM

## 2018-05-31 DIAGNOSIS — D68.51 HETEROZYGOUS FACTOR V LEIDEN MUTATION (H): ICD-10-CM

## 2018-05-31 DIAGNOSIS — Z17.1 MALIGNANT NEOPLASM OF UPPER-OUTER QUADRANT OF RIGHT BREAST IN FEMALE, ESTROGEN RECEPTOR NEGATIVE (H): ICD-10-CM

## 2018-05-31 DIAGNOSIS — E06.3 HYPOTHYROIDISM DUE TO HASHIMOTO'S THYROIDITIS: ICD-10-CM

## 2018-05-31 LAB
ALBUMIN SERPL-MCNC: 3.3 G/DL (ref 3.4–5)
ALP SERPL-CCNC: 59 U/L (ref 40–150)
ALT SERPL W P-5'-P-CCNC: 19 U/L (ref 0–50)
ANION GAP SERPL CALCULATED.3IONS-SCNC: 11 MMOL/L (ref 3–14)
AST SERPL W P-5'-P-CCNC: 18 U/L (ref 0–45)
BASOPHILS # BLD AUTO: 0.1 10E9/L (ref 0–0.2)
BASOPHILS NFR BLD AUTO: 0.8 %
BILIRUB SERPL-MCNC: 0.5 MG/DL (ref 0.2–1.3)
BUN SERPL-MCNC: 9 MG/DL (ref 7–30)
CALCIUM SERPL-MCNC: 8.5 MG/DL (ref 8.5–10.1)
CHLORIDE SERPL-SCNC: 107 MMOL/L (ref 94–109)
CO2 SERPL-SCNC: 22 MMOL/L (ref 20–32)
CREAT SERPL-MCNC: 0.59 MG/DL (ref 0.52–1.04)
DIFFERENTIAL METHOD BLD: ABNORMAL
EOSINOPHIL # BLD AUTO: 0 10E9/L (ref 0–0.7)
EOSINOPHIL NFR BLD AUTO: 0.2 %
ERYTHROCYTE [DISTWIDTH] IN BLOOD BY AUTOMATED COUNT: 15.9 % (ref 10–15)
GFR SERPL CREATININE-BSD FRML MDRD: >90 ML/MIN/1.7M2
GLUCOSE SERPL-MCNC: 91 MG/DL (ref 70–99)
HCT VFR BLD AUTO: 32.5 % (ref 35–47)
HGB BLD-MCNC: 10.7 G/DL (ref 11.7–15.7)
LYMPHOCYTES # BLD AUTO: 1 10E9/L (ref 0.8–5.3)
LYMPHOCYTES NFR BLD AUTO: 16 %
MCH RBC QN AUTO: 31.6 PG (ref 26.5–33)
MCHC RBC AUTO-ENTMCNC: 32.9 G/DL (ref 31.5–36.5)
MCV RBC AUTO: 96 FL (ref 78–100)
MONOCYTES # BLD AUTO: 0.7 10E9/L (ref 0–1.3)
MONOCYTES NFR BLD AUTO: 11.3 %
NEUTROPHILS # BLD AUTO: 4.3 10E9/L (ref 1.6–8.3)
NEUTROPHILS NFR BLD AUTO: 71.7 %
PLATELET # BLD AUTO: 227 10E9/L (ref 150–450)
POTASSIUM SERPL-SCNC: 3.6 MMOL/L (ref 3.4–5.3)
PROT SERPL-MCNC: 5.9 G/DL (ref 6.8–8.8)
RBC # BLD AUTO: 3.39 10E12/L (ref 3.8–5.2)
SODIUM SERPL-SCNC: 140 MMOL/L (ref 133–144)
WBC # BLD AUTO: 6 10E9/L (ref 4–11)

## 2018-05-31 PROCEDURE — 36415 COLL VENOUS BLD VENIPUNCTURE: CPT | Performed by: NURSE PRACTITIONER

## 2018-05-31 PROCEDURE — 99214 OFFICE O/P EST MOD 30 MIN: CPT | Performed by: NURSE PRACTITIONER

## 2018-05-31 PROCEDURE — 80053 COMPREHEN METABOLIC PANEL: CPT | Performed by: NURSE PRACTITIONER

## 2018-05-31 PROCEDURE — 85025 COMPLETE CBC W/AUTO DIFF WBC: CPT | Performed by: NURSE PRACTITIONER

## 2018-05-31 NOTE — Clinical Note
Alisha (or covering RNs), Julieta would have typically seen Dr. Gay around now, but I understand that  is on vacation.  Julieta is concerned that she has her port flushed and hep locked more often than she will if we have this done at surgery next Thursday.  Could you let me know if we should arrange for a flush and hep lock before next week?  It was last flushed 5/10. Thank you for your help, FRED Lewis

## 2018-05-31 NOTE — Clinical Note
Dr. Hurd - I saw Julieta today for pre-op and discussed clotting risk factors jefferson-operatively.  She has Factor V mutation, but no personal history of PE or DVT.  I believe her risk is additionally increased by her chemo treatment and cancer diagnosis.  Do you feel the risk these confers warranted a LMWH bridge for the perioperative period?  Her surgery is one week from today.  Thank you for your input.  FRED Lewis

## 2018-05-31 NOTE — MR AVS SNAPSHOT
After Visit Summary   5/31/2018    Julieta Rivera    MRN: [id number]           Patient Information     Date Of Birth          1950        Visit Information        Provider Department      5/31/2018 11:00 AM Simona Tucker APRN [facility]        Today's Diagnoses     Preop general physical exam    -  1    Malignant neoplasm of upper-outer quadrant of right breast in female, estrogen receptor negative (H)        Heterozygous factor V Leiden mutation (H)        Hypothyroidism due to Hashimoto's thyroiditis          Care Instructions    Shingrix after surgery - at a pharmacy    Before Your Surgery      Call your surgeon if there is any change in your health. This includes signs of a cold or flu (such as a sore throat, runny nose, cough, rash or fever).    Do not smoke, drink alcohol or take over the counter medicine (unless your surgeon or primary care doctor tells you to) for the 24 hours before and after surgery.    If you take prescribed drugs: Follow your doctor s orders about which medicines to take and which to stop until after surgery.    Eating and drinking prior to surgery: follow the instructions from your surgeon    Take a shower or bath the night before surgery. Use the soap your surgeon gave you to gently clean your skin. If you do not have soap from your surgeon, use your regular soap. Do not shave or scrub the surgery site.  Wear clean pajamas and have clean sheets on your bed.           Follow-ups after your visit        Your next 10 appointments already scheduled     Jun 04, 2018 11:00 AM CDT   (Arrive by 10:45 AM)   RETURN CANCER VISIT with Kaley Tidwell MD   [facility] ([facility])    [address] [place] [address]  [address] [place] [id number] [phone]            Jun 07, 2018 11:00 AM CDT   MA BREAST WIRE PLACEMENT 1ST LESION RT with  Breast Rad, UCBCMA3,  BREAST NURSE   [facility] ([facility])    [address] [place] [id number] [phone]           Do not use any powder, lotion or deodorant under your arms or on your breast. If you do, we will ask you to remove it before your exam.  Wear comfortable clothing and a well-fitted bra to the clinic. (A sports bra is best.) Bring a list of your medicines, including vitamins, minerals and over-the-counter drugs. It is best to leave valuables at home.  Tell your care team if:   You have any allergies.   You are taking aspirin, ibuprofen, naproxen or any drug that could increase bleeding.  Bring any previous mammograms from other facilities or have them mailed to the breast center.    Stop taking Coumadin (warfarin) 5 days before treatment. Restart the day after treatment.   If you take Plavix, Ticlid, Pletal or Persantine, ask your doctor if you should stop these medicines. You may need extra tests on the morning of your scan.            Jun 07, 2018 11:00 AM CDT   US BREAST WIRE PLACEMENT RIGHT with Uc Breast Rad, UCBCUS1,  BREAST NURSE   [facility] ([facility])    [address] [place] [id number] [phone]           Please bring a list of your medicines (including vitamins, minerals and over-the-counter drugs). Also, tell your doctor about any allergies you may have. Wear comfortable clothes and leave your valuables at home.  You do not need to do anything special to prepare for your exam.  Please call the Imaging Department at your exam site with any questions.            Jun 07, 2018 12:00 PM CDT   MA POST PROCEDURE RIGHT with [id number]   [facility] ([facility])    [address] [place] [address] [phone]           Do not use any powder, lotion or deodorant under your arms or on your breast. If you do, we will ask you to remove it before your exam.   Wear comfortable, two-piece clothing.  If you have any allergies, tell your care team.  Bring any previous mammograms from other facilities or have them mailed to the breast center.            Jun 07, 2018  1:00 PM CDT   NM SENTINEL NODE INJECTION BILATERAL with UUNMINJ1   [facility] ([facility])    [address] [facility] [id number] [phone]           Please bring a list of your medicines to the exam. (Include vitamins, minerals and over-the-counter drugs.) You should wear comfortable clothes. Leave your valuables at home. Please bring related prior results and films.  Tell your doctor:   If you are breastfeeding or may be pregnant.   If you have had a barium test within the past few days. Barium may change the results of certain exams.   If you think you may need sedation (medicine to help you relax).  You may eat and drink as normal.  Please call your Imaging Department at your exam site with any questions.            Jun 07, 2018   Procedure with Eugene Guzman MD   [facility] ([facility])    [address] [place] [address]  [address]th [facility] [address] [phone]           Located in the Clinics and Surgery Center at [address] [place] [address].   parking is very convenient and highly recommended.  is a $6 flat rate fee.  Both  and self parkers should enter the main arrival plaza from [facility]; parking attendants will direct you based on your parking preference.              Future tests that were ordered for you today     Open Future Orders        Priority Expected Expires Ordered    MA Post Procedure Right Routine  5/30/2019 5/30/2018    US Breast Wire Placement Right Routine  5/30/2019 5/30/2018    MA Breast Specimen Right Routine  5/30/2019 5/30/2018            Who to contact     If you have questions or need follow up information about  today's clinic visit or your schedule please contact [facility] directly at [phone].  Normal or non-critical lab and imaging results will be communicated to you by MyChart, letter or phone within 4 business days after the clinic has received the results. If you do not hear from us within 7 days, please contact the clinic through G[employer] or phone. If you have a critical or abnormal lab result, we will notify you by phone as soon as possible.  Submit refill requests through [employer] or call your pharmacy and they will forward the refill request to us. Please allow 3 business days for your refill to be completed.          Additional Information About Your Visit        G[employer]Where Information     [employer] gives you secure access to your electronic health record. If you see a primary care provider, you can also send messages to your care team and make appointments. If you have questions, please call your primary care clinic.  If you do not have a primary care provider, please call [phone] and they will assist you.        Care EveryWhere ID     This is your Care EveryWhere ID. This could be used by other organizations to access your [facility] medical records  [id number]        Your Vitals Were     Pulse Temperature Pulse Oximetry BMI (Body Mass Index)          67 97.2  F (36.2  C) (Oral) 100% 22.17 kg/m2         Blood Pressure from Last 3 Encounters:   05/31/18 114/64   05/25/18 113/63   05/10/18 112/66    Weight from Last 3 Encounters:   05/31/18 113 lb 8 oz (51.5 kg)   05/25/18 112 lb 14.4 oz (51.2 kg)   05/10/18 115 lb 6.4 oz (52.3 kg)              We Performed the Following     CBC with platelets differential     Comprehensive metabolic panel        Primary Care Provider Office Phone # Fax #    JOSÉ LUIS Sol [phone]       [address] [address]        [facility]        General    Medical (pt-stated)     Notes - Note created  4/3/2018 12:31 PM by  Trice Galaviz, RN    Goal Statement: I will manage stress associated with caring for my grandchildren  Measure of Success: verbalization of stress reduction  Supportive Steps to Achieve: outpatient counseling, support of RN CC  Barriers: none  Strengths: family support  Date to Achieve By: 6 months            Equal Access to Services     CARTER DEL RIO AH: Hadii aad gabby castillo Sohussein, waaxda luqadaha, qaybta kaalmada adeegyada, khadar kristenin hayaavaibhav pinedaflor william ketty russ. So [facility] [phone].    ATENCIÓN: Si habla español, tiene a phillips disposición servicios gratuitos de asistencia lingüística. Llame al [phone].    We comply with applicable federal civil rights laws and [place] laws. We do not discriminate on the basis of race, color, national origin, age, disability, sex, sexual orientation, or gender identity.            Thank you!     Thank you for choosing [facility]  for your care. Our goal is always to provide you with excellent care. Hearing back from our patients is one way we can continue to improve our services. Please take a few minutes to complete the written survey that you may receive in the mail after your visit with us. Thank you!             Your Updated Medication List - Protect others around you: Learn how to safely use, store and throw away your medicines at www.disposemymeds.org.          This list is accurate as of 5/31/18 11:51 AM.  Always use your most recent med list.                   Brand Name Dispense Instructions for use Diagnosis    dexamethasone 4 MG tablet    DECADRON          FISH OIL PO      Take 1 capsule by mouth daily.        levothyroxine 100 MCG tablet    SYNTHROID/LEVOTHROID    60 tablet    Take 1 tablet (100 mcg) by mouth daily Take by mouth daily    Hypothyroidism due to Hashimoto's thyroiditis       lidocaine-prilocaine cream    EMLA    30 g    Apply to port site one hour prior to access. May start using six days after port placement    Malignant  neoplasm of upper outer quadrant of breast in female, estrogen receptor negative (H)       LORazepam 0.5 MG tablet    ATIVAN    30 tablet    Take 1 tablet (0.5 mg) by mouth every 4 hours as needed (Anxiety, Nausea/Vomiting or Sleep)    Encounter for other specified aftercare, Malignant neoplasm of upper-outer quadrant of right breast in female, estrogen receptor negative (H)       MULTIVITAMIN & MINERAL PO      Take 2 tablets by mouth daily.        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

## 2018-06-01 ENCOUNTER — CARE COORDINATION (OUTPATIENT)
Dept: ONCOLOGY | Facility: CLINIC | Age: 68
End: 2018-06-01

## 2018-06-01 ENCOUNTER — TELEPHONE (OUTPATIENT)
Dept: FAMILY MEDICINE | Facility: CLINIC | Age: 68
End: 2018-06-01

## 2018-06-01 ENCOUNTER — TELEPHONE (OUTPATIENT)
Dept: HEMATOLOGY | Facility: CLINIC | Age: 68
End: 2018-06-01

## 2018-06-01 NOTE — TELEPHONE ENCOUNTER
Please call the hematology clinic.  Patient has seen Dr. Hurd.  She has factor V mutation, no personal history of PE or DVT, but has just finished chemo for breast cancer.  She has a lumpectomy and lymph node biopsy coming up next week.  I would like hematology's opinion on if the would require heparin bridging for surgery.  She has not in the past, but new circumstances with chemo and cancer dx.  I had attempted to cc the chart to Dr. Hurd and got no answer.  Thanks.  FRED Lewis

## 2018-06-01 NOTE — PROGRESS NOTES
Spoke to patient with changing the appointment with Dr Gay currently scheduled on 6/7/18 and having surgery with Dr Guzman that day as well. Will send a message with Dr Gay for recommendations of re-scheduling appointment with him.  Answered all patient's questions and verbalized understanding. Tamanna Neff RN, BSN.

## 2018-06-01 NOTE — TELEPHONE ENCOUNTER
Spoke with Alisha, Nurse Clinician. She will touch base with Dr. Hurd and call back with update.    Vikki Carr RN  [facility]

## 2018-06-01 NOTE — TELEPHONE ENCOUNTER
Julieta Rivera  MRN: [id number]  Female, 67 year old, 1950        Dr. 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  He did not recommend anticoagulation for her at this time.  Her PCP's office is now calling asking whether Dr. Hurd would recommend Lovenox after her upcoming Lumpectomy/lymph node biopsy 6/7/18.  Dr. Hurd was called and he would recommend anticoagulation if standard of care for lumpectomy.  Factor V Leiden is such a mild risk factor that this would not affect his decision about possible anticoagulation.  He is not opposed to the possibility of Lovenox 40 mg daily x 14 days, however.  Communicated the above the Vikki in Dr. Nieves's office (PCP).  Alisha Pascual, RN, MSN -Nurse Clinician, [facility] [phone]

## 2018-06-04 ENCOUNTER — OFFICE VISIT (OUTPATIENT)
Dept: CARDIOLOGY | Facility: CLINIC | Age: 68
End: 2018-06-04
Attending: INTERNAL MEDICINE
Payer: COMMERCIAL

## 2018-06-04 VITALS
BODY MASS INDEX: 22.67 KG/M2 | HEART RATE: 75 BPM | WEIGHT: 115.45 LBS | DIASTOLIC BLOOD PRESSURE: 75 MMHG | OXYGEN SATURATION: 97 % | HEIGHT: 60 IN | SYSTOLIC BLOOD PRESSURE: 116 MMHG

## 2018-06-04 DIAGNOSIS — Q23.81 AORTIC STENOSIS WITH BICUSPID VALVE: Primary | ICD-10-CM

## 2018-06-04 DIAGNOSIS — Q23.0 AORTIC STENOSIS WITH BICUSPID VALVE: Primary | ICD-10-CM

## 2018-06-04 DIAGNOSIS — Z91.89 AT RISK FOR CARDIOMYOPATHY: ICD-10-CM

## 2018-06-04 DIAGNOSIS — Z17.1 MALIGNANT NEOPLASM OF UPPER-OUTER QUADRANT OF RIGHT BREAST IN FEMALE, ESTROGEN RECEPTOR NEGATIVE (H): ICD-10-CM

## 2018-06-04 DIAGNOSIS — C50.411 MALIGNANT NEOPLASM OF UPPER-OUTER QUADRANT OF RIGHT BREAST IN FEMALE, ESTROGEN RECEPTOR NEGATIVE (H): ICD-10-CM

## 2018-06-04 PROCEDURE — 93010 ELECTROCARDIOGRAM REPORT: CPT | Mod: ZP | Performed by: INTERNAL MEDICINE

## 2018-06-04 PROCEDURE — G0463 HOSPITAL OUTPT CLINIC VISIT: HCPCS | Mod: 25,ZF

## 2018-06-04 PROCEDURE — 93005 ELECTROCARDIOGRAM TRACING: CPT | Mod: ZF

## 2018-06-04 PROCEDURE — 99213 OFFICE O/P EST LOW 20 MIN: CPT | Mod: ZP | Performed by: INTERNAL MEDICINE

## 2018-06-04 ASSESSMENT — PAIN SCALES - GENERAL: PAINLEVEL: NO PAIN (0)

## 2018-06-04 NOTE — NURSING NOTE
Chief Complaint   Patient presents with     Follow Up For     manage at risk for cardiomyopathy     Vitals were taken and medications were reconciled. EKG was performed    Jocelyn GURROLA  11:10 AM

## 2018-06-04 NOTE — MR AVS SNAPSHOT
After Visit Summary   6/4/2018    Julieta Rivera    MRN: [id number]           Patient Information     Date Of Birth          1950        Visit Information        Provider Department      6/4/2018 11:00 AM Kaley Tidwell MD [facility]        Today's Diagnoses     Cardiomyopathy, unspecified type (H)    -  1      Care Instructions    Patient Instructions:  It was a pleasure to see you in the cardiology clinic today.      If you have any questions, call  Ami Dia RN, at [phone].  Press Option #1 for the [facility], and then press Option #3 for nursing.  We are encouraging the use of Mosaichart to communicate with your HealthCare Provider    Note the new medications: none  Stop the following medications: none    The results from today include: none  Please follow up with Dr. Kaley Tidwell in one year with an ECHO      If you have an urgent need after hours (8:00 am to 4:30 pm) please call [phone] and ask for the cardiology fellow on call.            Follow-ups after your visit        Additional Services     Follow-Up with Cardiologist                 Your next 10 appointments already scheduled     Jun 05, 2018  3:00 PM CDT   [facility] Lab Draw with  MAS[facility]IC LAB DRAW   [facility] ([facility])    [address]  [address]  [facility] [address] [phone]            Jun 05, 2018  3:30 PM CDT   (Arrive by 3:15 PM)   Return Visit with Jonathan Gay MD   [facility] ([facility])    [address]  [address]  [facility] [address] [phone]            Jun 07, 2018 11:00 AM CDT   MA BREAST WIRE PLACEMENT 1ST LESION RT with  Breast Rad, [id number],  BREAST NURSE   [facility] ([facility])    [address]  2nd Floor  [facility] [id number] [phone]           Do not use any  powder, lotion or deodorant under your arms or on your breast. If you do, we will ask you to remove it before your exam.  Wear comfortable clothing and a well-fitted bra to the clinic. (A sports bra is best.) Bring a list of your medicines, including vitamins, minerals and over-the-counter drugs. It is best to leave valuables at home.  Tell your care team if:   You have any allergies.   You are taking aspirin, ibuprofen, naproxen or any drug that could increase bleeding.  Bring any previous mammograms from other facilities or have them mailed to the breast center.    Stop taking Coumadin (warfarin) 5 days before treatment. Restart the day after treatment.   If you take Plavix, Ticlid, Pletal or Persantine, ask your doctor if you should stop these medicines. You may need extra tests on the morning of your scan.            Jun 07, 2018 11:00 AM CDT   US BREAST WIRE PLACEMENT RIGHT with  Breast Rad, UCBCUS1,  BREAST NURSE   [facility] ([facility])    [address] [place] [id number] [phone]           Please bring a list of your medicines (including vitamins, minerals and over-the-counter drugs). Also, tell your doctor about any allergies you may have. Wear comfortable clothes and leave your valuables at home.  You do not need to do anything special to prepare for your exam.  Please call the Imaging Department at your exam site with any questions.            Jun 07, 2018 12:00 PM CDT   MA POST PROCEDURE RIGHT with [id number]   [facility] ([facility])    [address] [place] [id number] [phone]           Do not use any powder, lotion or deodorant under your arms or on your breast. If you do, we will ask you to remove it before your exam.  Wear comfortable, two-piece clothing.  If you have any allergies, tell your care team.  Bring any previous mammograms from other  facilities or have them mailed to the breast center.            Jun 07, 2018  1:00 PM CDT   NM SENTINEL NODE INJECTION BILATERAL with [email or web address]   [facility] Medicine ([facility])    [address] [facility] [address] [phone]           Please bring a list of your medicines to the exam. (Include vitamins, minerals and over-the-counter drugs.) You should wear comfortable clothes. Leave your valuables at home. Please bring related prior results and films.  Tell your doctor:   If you are breastfeeding or may be pregnant.   If you have had a barium test within the past few days. Barium may change the results of certain exams.   If you think you may need sedation (medicine to help you relax).  You may eat and drink as normal.  Please call your Imaging Department at your exam site with any questions.              Future tests that were ordered for you today     Open Future Orders        Priority Expected Expires Ordered    Follow-Up with Cardiologist Routine 6/4/2019 6/5/2019 6/4/2018    Echo Complete Routine 6/4/2019 6/5/2019 6/4/2018            Who to contact     If you have questions or need follow up information about today's clinic visit or your schedule please contact [facility] directly at [phone].  Normal or non-critical lab and imaging results will be communicated to you by "[employer]"hart, letter or phone within 4 business days after the clinic has received the results. If you do not hear from us within 7 days, please contact the clinic through "[employer]"hart or phone. If you have a critical or abnormal lab result, we will notify you by phone as soon as possible.  Submit refill requests through [employer] or call your pharmacy and they will forward the refill request to us. Please allow 3 business days for your refill to be completed.          Additional Information About Your Visit        [employer] Information     [employer] gives you secure  access to your electronic health record. If you see a primary care provider, you can also send messages to your care team and make appointments. If you have questions, please call your primary care clinic.  If you do not have a primary care provider, please call [phone] and they will assist you.        Care EveryWhere ID     This is your Care EveryWhere ID. This could be used by other organizations to access your [facility] medical records  [id number]        Your Vitals Were     Pulse Height Pulse Oximetry BMI (Body Mass Index)          75 1.524 m (5') 97% 22.55 kg/m2         Blood Pressure from Last 3 Encounters:   06/04/18 116/75   05/31/18 114/64   05/25/18 113/63    Weight from Last 3 Encounters:   06/04/18 52.4 kg (115 lb 7.2 oz)   05/31/18 51.5 kg (113 lb 8 oz)   05/25/18 51.2 kg (112 lb 14.4 oz)              We Performed the Following     EKG 12-lead, tracing only (Same Day)     Follow-Up with Cardiologist        Primary Care Provider Office Phone # Fax #    Simona JOSÉ LUIS Ireland [phone]       [address]  [facility] [address]        Goals        General    Medical (pt-stated)     Notes - Note created  4/3/2018 12:31 PM by Trice Galaviz RN    Goal Statement: I will manage stress associated with caring for my grandchildren  Measure of Success: verbalization of stress reduction  Supportive Steps to Achieve: outpatient counseling, support of RN CC  Barriers: none  Strengths: family support  Date to Achieve By: 6 months            Equal Access to Services     CARTER DEL RIO AH: Hadii aad ku hadasho Sohussein, waaxda luqadaha, qaybta kaalmakhadar branch. So [facility] [phone].    ATENCIÓN: Si habla español, tiene a phillips disposición servicios gratuitos de asistencia lingüística. Llame al [phone].    We comply with applicable federal civil rights laws and [place] laws. We do not discriminate on the basis of race, color, national  origin, age, disability, sex, sexual orientation, or gender identity.            Thank you!     Thank you for choosing [facility]  for your care. Our goal is always to provide you with excellent care. Hearing back from our patients is one way we can continue to improve our services. Please take a few minutes to complete the written survey that you may receive in the mail after your visit with us. Thank you!             Your Updated Medication List - Protect others around you: Learn how to safely use, store and throw away your medicines at www.disposemymeds.org.          This list is accurate as of 6/4/18 11:41 AM.  Always use your most recent med list.                   Brand Name Dispense Instructions for use Diagnosis    dexamethasone 4 MG tablet    DECADRON          FISH OIL PO      Take 1 capsule by mouth daily.        levothyroxine 100 MCG tablet    SYNTHROID/LEVOTHROID    60 tablet    Take 1 tablet (100 mcg) by mouth daily Take by mouth daily    Hypothyroidism due to Hashimoto's thyroiditis       MULTIVITAMIN & MINERAL PO      Take 2 tablets by mouth daily.        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

## 2018-06-04 NOTE — LETTER
6/4/2018      RE: Julieta Rivera  [address] [place] [address]       Dear Colleague,    Thank you for the opportunity to participate in the care of your patient, Julieta Rivera, at the [facility]. Please see a copy of my visit note below.    Cardiology Clinic Note    HPI: Julieta Rivera is a 67-year-old woman  diagnosed with triple-negative invasive stage I ductal carcinoma of the right breast treated with docetaxel/cyclophosphamide  March - May 2018,  factor V Leiden mutation but has no history of thrombosis and is not on anticoagulation. She underwent an echocardiogram prior to initiation of chemotherapy in Feb 2018 and she has mild bicuspid aortic valvular stenosis with a peak velocity of 2.5 m/s and mean gradient of 13 mmHg.  She has a known bicuspid aortic valve diagnosed in 1999  without any significant stenosis or regurgitation. She  has no personal history of coronary artery disease, heart failure, arrhythmias, hyperlipidemia, tobacco use or diabetes. There is no family history of premature coronary artery disease. She denies symptoms of chest pain, dyspnea, palpitations, edema or syncope. She has mild cough. She is scheduled to undergo lumpectomy on 6/7/18.     Cardiac meds  none    PAST MEDICAL HISTORY:  Past Medical History:   Diagnosis Date     Abnormal Pap smear 1970s    normal since     Factor V Leiden (H)      Hypothyroidism fall 2000       CURRENT MEDICATIONS:  Current Outpatient Prescriptions   Medication Sig Dispense Refill     dexamethasone (DECADRON) 4 MG tablet        levothyroxine (SYNTHROID/LEVOTHROID) 100 MCG tablet Take 1 tablet (100 mcg) by mouth daily Take by mouth daily 60 tablet 1     lidocaine-prilocaine (EMLA) cream Apply to port site one hour prior to access. May start using six days after port placement 30 g 1     LORazepam (ATIVAN) 0.5 MG tablet Take 1 tablet (0.5 mg) by mouth every 4 hours as needed  (Anxiety, Nausea/Vomiting or Sleep) 30 tablet 3     Multiple Vitamins-Minerals (MULTIVITAMIN & MINERAL PO) Take 2 tablets by mouth daily.       Omega-3 Fatty Acids (FISH OIL PO) Take 1 capsule by mouth daily.       pyridoxine (VITAMIN B-6) 100 MG tablet Take 1 tablet (100 mg) by mouth daily 30 tablet 3     VITAMIN D, CHOLECALCIFEROL, PO Take 2,000 Units by mouth daily         PAST SURGICAL HISTORY:  Past Surgical History:   Procedure Laterality Date      SECTION       COLPOSCOPY CERVIX, BIOPSY CERVIX, ENDOCERVICAL CURETTAGE, COMBINED       INSERT PORT VASCULAR ACCESS N/A 3/5/2018    Procedure: INSERT PORT VASCULAR ACCESS;  Single Lumen Chest Power Port Placement;  Surgeon: Brian Tolbert PA-C;  Location: [facility] OR     TONSILLECTOMY, ADENOIDECTOMY, COMBINED         ALLERGIES     Allergies   Allergen Reactions     Seasonal Allergies        FAMILY HISTORY:  Family History   Problem Relation Age of Onset     HEART DISEASE Mother      HEART DISEASE Father      CEREBROVASCULAR DISEASE Father      DIABETES Father      DIABETES Brother      Hypertension Brother      Obesity Brother      Obesity Sister      CANCER Sister      Hypertension Sister        SOCIAL HISTORY:  Social History     Social History     Marital status:      Spouse name: N/A     Number of children: N/A     Years of education: N/A     Social History Main Topics     Smoking status: Former Smoker     Quit date: 1971     Smokeless tobacco: Never Used     Alcohol use No      Comment: none since last november.     Drug use: No     Sexual activity: Yes     Partners: Male     Other Topics Concern     Stress Concern No     Weight Concern No     Exercise Yes     yoga class weekly, walking     Seat Belt Yes     ROS:   Constitutional: No fever, chills, or sweats. No weight gain/loss   ENT: No visual disturbance, ear ache, epistaxis, sore throat  Allergies/Immunologic: Negative.   Respiratory: No cough, hemoptysia  Cardiovascular: As per  HPI  GI: No nausea, vomiting, hematemesis, melena, or hematochezia  : No urinary frequency, dysuria, or hematuria  Integument: Negative  Psychiatric: Negative  Neuro: Negative  Endocrinology: Negative   Musculoskeletal: Negative    EXAM:  /75 (BP Location: Left arm, Patient Position: Chair, Cuff Size: Adult Small)  Pulse 75  Ht 1.524 m (5')  Wt 52.4 kg (115 lb 7.2 oz)  SpO2 97%  BMI 22.55 kg/m2  In general, the patient is a pleasant female in no apparent distress.      HEENT: NC/AT.  PERRLA.  EOMI.  Sclerae white, not injected.    Neck: Carotids 2+ bilaterally without bruits.  No jugular venous distension.   Lymph: No cervical adenopathy. No thyromegaly.   Heart: RRR. Normal S1, S2 preserved. 2/6 systolic ejection murmur RUSB; no rub, click, or gallop. There is no heave.    Lungs: Clear bilaterally.  No rhonchi, wheezes, rales.   GI: Soft, nontender, nondistended.   Extremities: No edema.  The pulses are 2+at the radial and DP bilaterally.  Neuro: grossly non focal.   Skin: no rashes.  Musculoskeletal: normal muscle strength, no acute arthritis, gait normal.    Labs:  LIPID RESULTS:  Lab Results   Component Value Date    CHOL 215 (H) 12/16/2015    HDL 83 12/16/2015     (H) 12/16/2015    TRIG 89 12/16/2015    CHOLHDLRATIO 2.5 11/22/2013    NHDL 132 (H) 12/16/2015       LIVER ENZYME RESULTS:  Lab Results   Component Value Date    AST 18 05/31/2018    ALT 19 05/31/2018       CBC RESULTS:  Lab Results   Component Value Date    WBC 6.0 05/31/2018    RBC 3.39 (L) 05/31/2018    HGB 10.7 (L) 05/31/2018    HCT 32.5 (L) 05/31/2018    MCV 96 05/31/2018    MCH 31.6 05/31/2018    MCHC 32.9 05/31/2018    RDW 15.9 (H) 05/31/2018     05/31/2018       BMP RESULTS:  Lab Results   Component Value Date     05/31/2018    POTASSIUM 3.6 05/31/2018    CHLORIDE 107 05/31/2018    CO2 22 05/31/2018    ANIONGAP 11 05/31/2018    GLC 91 05/31/2018    BUN 9 05/31/2018    CR 0.59 05/31/2018    GFRESTIMATED >90  05/31/2018    GFRESTBLACK >90 05/31/2018    CINDI 8.5 05/31/2018        A1C RESULTS:  Lab Results   Component Value Date    A1C 5.1 11/22/2013       INR RESULTS:  Lab Results   Component Value Date    INR 0.94 03/05/2018       Cardiac data:    ECG today NSR, no acute ST-T changes      Echo 2/23/18 was personally reviewed   Left ventricular function, chamber size, wall motion, and wall thickness are normal. The EF is 60-65%.  Global peak LV longitudinal strain is averaged at -21.2%. This is within reported normal limits (normal <-18%).  The right ventricle is normal size. Global right ventricular function is normal.  Aortic valve is bicuspid. Mild aortic stenosis is present. Mean gradient is 13 mmHg.  No pericardial effusion is present.      Assessment and Plan:   67 year old woman  1. Right breast cancer, triple negative  2. Mild bicuspid aortic valve stenosis, mean gradient 13 mmHg  3. Preserved biventricular function     Julieta has no symptoms of angina or heart failure. Her exam today is consistent with mild aortic stenosis, euvolemia and normal heart rate and blood pressure. She reports an exercise tolerance of > 4 METS. She has tolerated chemotherapy well from a cardiac standpoint. She has a congenital bicuspid valve with mild stenosis that does not require any intervention at this point. Based on the limited views of the thoracic aorta on the Feb 2018 echo, she does not have aortic aneurysm and she can undergo the MRA after the completion of chemotherapy. She was advised to remain physically active and I will see her back in 12 mo with a limited echo.      Kaley Tidwell MD, MS  Staff Cardiologist, [facility]   Pager: [phone]      CC  Patient Care Team:  Simona Tucker APRN CNP as PCP - General (Nurse Practitioner - Family)  Tamanna Neff, RN as Nurse Coordinator (Breast Oncology)  Jonathan Gay MD as Referring Physician (Oncology)  Kaley Tidwell MD as MD (Cardiology)  Guillaume  Trice ROBERTSON RN as Clinic Care Coordinator (Primary Care - CC)  CHEVY ANTHONY      Please do not hesitate to contact me if you have any questions/concerns.     Sincerely,     Kaley Tidwell MD

## 2018-06-04 NOTE — NURSING NOTE
Cardiac Testing: Patient given instructions regarding  echocardiogram . Discussed purpose, preparation, procedure and when to expect results reported back to the patient. Patient demonstrated understanding of this information and agreed to call with further questions or concerns.  Med Reconcile: Reviewed and verified all current medications with the patient. The updated medication list was printed and given to the patient.  Return Appointment: Patient given instructions regarding scheduling next clinic visit. Patient demonstrated understanding of this information and agreed to call with further questions or concerns.  Patient stated she understood all health information given and agreed to call with further questions or concerns.

## 2018-06-04 NOTE — PROGRESS NOTES
Cardiology Clinic Note    HPI: Julieta Rivera is a 67-year-old woman  diagnosed with triple-negative invasive stage I ductal carcinoma of the right breast treated with docetaxel/cyclophosphamide  March - May 2018,  factor V Leiden mutation but has no history of thrombosis and is not on anticoagulation. She underwent an echocardiogram prior to initiation of chemotherapy in Feb 2018 and she has mild bicuspid aortic valvular stenosis with a peak velocity of 2.5 m/s and mean gradient of 13 mmHg.  She has a known bicuspid aortic valve diagnosed in 1999  without any significant stenosis or regurgitation. She  has no personal history of coronary artery disease, heart failure, arrhythmias, hyperlipidemia, tobacco use or diabetes. There is no family history of premature coronary artery disease. She denies symptoms of chest pain, dyspnea, palpitations, edema or syncope. She has mild cough. She is scheduled to undergo lumpectomy on 6/7/18.     Cardiac meds  none    PAST MEDICAL HISTORY:  Past Medical History:   Diagnosis Date     Abnormal Pap smear 1970s    normal since     Factor V Leiden (H)      Hypothyroidism fall 2000       CURRENT MEDICATIONS:  Current Outpatient Prescriptions   Medication Sig Dispense Refill     dexamethasone (DECADRON) 4 MG tablet        levothyroxine (SYNTHROID/LEVOTHROID) 100 MCG tablet Take 1 tablet (100 mcg) by mouth daily Take by mouth daily 60 tablet 1     lidocaine-prilocaine (EMLA) cream Apply to port site one hour prior to access. May start using six days after port placement 30 g 1     LORazepam (ATIVAN) 0.5 MG tablet Take 1 tablet (0.5 mg) by mouth every 4 hours as needed (Anxiety, Nausea/Vomiting or Sleep) 30 tablet 3     Multiple Vitamins-Minerals (MULTIVITAMIN & MINERAL PO) Take 2 tablets by mouth daily.       Omega-3 Fatty Acids (FISH OIL PO) Take 1 capsule by mouth daily.       pyridoxine (VITAMIN B-6) 100 MG tablet Take 1 tablet (100 mg) by mouth daily 30 tablet 3     VITAMIN D,  CHOLECALCIFEROL, PO Take 2,000 Units by mouth daily         PAST SURGICAL HISTORY:  Past Surgical History:   Procedure Laterality Date      SECTION       COLPOSCOPY CERVIX, BIOPSY CERVIX, ENDOCERVICAL CURETTAGE, COMBINED       INSERT PORT VASCULAR ACCESS N/A 3/5/2018    Procedure: INSERT PORT VASCULAR ACCESS;  Single Lumen Chest Power Port Placement;  Surgeon: Brian Tolbert PA-C;  Location: [facility] OR     TONSILLECTOMY, ADENOIDECTOMY, COMBINED         ALLERGIES     Allergies   Allergen Reactions     Seasonal Allergies        FAMILY HISTORY:  Family History   Problem Relation Age of Onset     HEART DISEASE Mother      HEART DISEASE Father      CEREBROVASCULAR DISEASE Father      DIABETES Father      DIABETES Brother      Hypertension Brother      Obesity Brother      Obesity Sister      CANCER Sister      Hypertension Sister        SOCIAL HISTORY:  Social History     Social History     Marital status:      Spouse name: N/A     Number of children: N/A     Years of education: N/A     Social History Main Topics     Smoking status: Former Smoker     Quit date: 1971     Smokeless tobacco: Never Used     Alcohol use No      Comment: none since last november.     Drug use: No     Sexual activity: Yes     Partners: Male     Other Topics Concern     Stress Concern No     Weight Concern No     Exercise Yes     yoga class weekly, walking     Seat Belt Yes     ROS:   Constitutional: No fever, chills, or sweats. No weight gain/loss   ENT: No visual disturbance, ear ache, epistaxis, sore throat  Allergies/Immunologic: Negative.   Respiratory: No cough, hemoptysia  Cardiovascular: As per HPI  GI: No nausea, vomiting, hematemesis, melena, or hematochezia  : No urinary frequency, dysuria, or hematuria  Integument: Negative  Psychiatric: Negative  Neuro: Negative  Endocrinology: Negative   Musculoskeletal: Negative    EXAM:  /75 (BP Location: Left arm, Patient Position: Chair, Cuff Size: Adult  Small)  Pulse 75  Ht 1.524 m (5')  Wt 52.4 kg (115 lb 7.2 oz)  SpO2 97%  BMI 22.55 kg/m2  In general, the patient is a pleasant female in no apparent distress.      HEENT: NC/AT.  PERRLA.  EOMI.  Sclerae white, not injected.    Neck: Carotids 2+ bilaterally without bruits.  No jugular venous distension.   Lymph: No cervical adenopathy. No thyromegaly.   Heart: RRR. Normal S1, S2 preserved. 2/6 systolic ejection murmur RUSB; no rub, click, or gallop. There is no heave.    Lungs: Clear bilaterally.  No rhonchi, wheezes, rales.   GI: Soft, nontender, nondistended.   Extremities: No edema.  The pulses are 2+at the radial and DP bilaterally.  Neuro: grossly non focal.   Skin: no rashes.  Musculoskeletal: normal muscle strength, no acute arthritis, gait normal.    Labs:  LIPID RESULTS:  Lab Results   Component Value Date    CHOL 215 (H) 12/16/2015    HDL 83 12/16/2015     (H) 12/16/2015    TRIG 89 12/16/2015    CHOLHDLRATIO 2.5 11/22/2013    NHDL 132 (H) 12/16/2015       LIVER ENZYME RESULTS:  Lab Results   Component Value Date    AST 18 05/31/2018    ALT 19 05/31/2018       CBC RESULTS:  Lab Results   Component Value Date    WBC 6.0 05/31/2018    RBC 3.39 (L) 05/31/2018    HGB 10.7 (L) 05/31/2018    HCT 32.5 (L) 05/31/2018    MCV 96 05/31/2018    MCH 31.6 05/31/2018    MCHC 32.9 05/31/2018    RDW 15.9 (H) 05/31/2018     05/31/2018       BMP RESULTS:  Lab Results   Component Value Date     05/31/2018    POTASSIUM 3.6 05/31/2018    CHLORIDE 107 05/31/2018    CO2 22 05/31/2018    ANIONGAP 11 05/31/2018    GLC 91 05/31/2018    BUN 9 05/31/2018    CR 0.59 05/31/2018    GFRESTIMATED >90 05/31/2018    GFRESTBLACK >90 05/31/2018    CINDI 8.5 05/31/2018        A1C RESULTS:  Lab Results   Component Value Date    A1C 5.1 11/22/2013       INR RESULTS:  Lab Results   Component Value Date    INR 0.94 03/05/2018       Cardiac data:    ECG today NSR, no acute ST-T changes      Echo 2/23/18 was personally reviewed    Left ventricular function, chamber size, wall motion, and wall thickness are normal. The EF is 60-65%.  Global peak LV longitudinal strain is averaged at -21.2%. This is within reported normal limits (normal <-18%).  The right ventricle is normal size. Global right ventricular function is normal.  Aortic valve is bicuspid. Mild aortic stenosis is present. Mean gradient is 13 mmHg.  No pericardial effusion is present.      Assessment and Plan:   67 year old woman  1. Right breast cancer, triple negative  2. Mild bicuspid aortic valve stenosis, mean gradient 13 mmHg  3. Preserved biventricular function     Julieta has no symptoms of angina or heart failure. Her exam today is consistent with mild aortic stenosis, euvolemia and normal heart rate and blood pressure. She reports an exercise tolerance of > 4 METS. She has tolerated chemotherapy well from a cardiac standpoint. She has a congenital bicuspid valve with mild stenosis that does not require any intervention at this point. Based on the limited views of the thoracic aorta on the Feb 2018 echo, she does not have aortic aneurysm and she can undergo the MRA after the completion of chemotherapy. She was advised to remain physically active and I will see her back in 12 mo with a limited echo.      Kaley Tidwell MD, MS  Staff Cardiologist, [facility]   Pager: [phone]      CC  Patient Care Team:  Simona Tucker APRN CNP as PCP - General (Nurse Practitioner - Family)  Tamanna Neff RN as Nurse Coordinator (Breast Oncology)  Chevy Gay MD as Referring Physician (Oncology)  Kaley Tidwell MD as MD (Cardiology)  Trice Galaviz RN as Clinic Care Coordinator (Primary Care - CC)  CHEVY GAY

## 2018-06-04 NOTE — PATIENT INSTRUCTIONS
Patient Instructions:  It was a pleasure to see you in the cardiology clinic today.      If you have any questions, call  Ami Dia RN, at [phone].  Press Option #1 for the [facility], and then press Option #3 for nursing.  We are encouraging the use of [employer] to communicate with your HealthCare Provider    Note the new medications: none  Stop the following medications: none    The results from today include: none  Please follow up with Dr. Kaley Tidwell in one year with an ECHO      If you have an urgent need after hours (8:00 am to 4:30 pm) please call [phone] and ask for the cardiology fellow on call.

## 2018-06-05 ENCOUNTER — APPOINTMENT (OUTPATIENT)
Dept: LAB | Facility: CLINIC | Age: 68
End: 2018-06-05
Attending: INTERNAL MEDICINE
Payer: COMMERCIAL

## 2018-06-05 ENCOUNTER — ONCOLOGY VISIT (OUTPATIENT)
Dept: ONCOLOGY | Facility: CLINIC | Age: 68
End: 2018-06-05
Attending: INTERNAL MEDICINE
Payer: COMMERCIAL

## 2018-06-05 VITALS
BODY MASS INDEX: 22.5 KG/M2 | WEIGHT: 115.2 LBS | RESPIRATION RATE: 16 BRPM | HEART RATE: 96 BPM | TEMPERATURE: 98.7 F | SYSTOLIC BLOOD PRESSURE: 98 MMHG | DIASTOLIC BLOOD PRESSURE: 67 MMHG | OXYGEN SATURATION: 98 %

## 2018-06-05 DIAGNOSIS — C50.411 MALIGNANT NEOPLASM OF UPPER-OUTER QUADRANT OF RIGHT BREAST IN FEMALE, ESTROGEN RECEPTOR NEGATIVE (H): Primary | ICD-10-CM

## 2018-06-05 DIAGNOSIS — Z17.1 MALIGNANT NEOPLASM OF UPPER-OUTER QUADRANT OF RIGHT BREAST IN FEMALE, ESTROGEN RECEPTOR NEGATIVE (H): ICD-10-CM

## 2018-06-05 DIAGNOSIS — Z17.1 MALIGNANT NEOPLASM OF UPPER-OUTER QUADRANT OF RIGHT BREAST IN FEMALE, ESTROGEN RECEPTOR NEGATIVE (H): Primary | ICD-10-CM

## 2018-06-05 DIAGNOSIS — C50.411 MALIGNANT NEOPLASM OF UPPER-OUTER QUADRANT OF RIGHT BREAST IN FEMALE, ESTROGEN RECEPTOR NEGATIVE (H): ICD-10-CM

## 2018-06-05 LAB
ALBUMIN SERPL-MCNC: 3.3 G/DL (ref 3.4–5)
ALP SERPL-CCNC: 62 U/L (ref 40–150)
ALT SERPL W P-5'-P-CCNC: 18 U/L (ref 0–50)
ANION GAP SERPL CALCULATED.3IONS-SCNC: 8 MMOL/L (ref 3–14)
AST SERPL W P-5'-P-CCNC: 22 U/L (ref 0–45)
BASOPHILS # BLD AUTO: 0 10E9/L (ref 0–0.2)
BASOPHILS NFR BLD AUTO: 0.6 %
BILIRUB SERPL-MCNC: 0.4 MG/DL (ref 0.2–1.3)
BUN SERPL-MCNC: 10 MG/DL (ref 7–30)
CALCIUM SERPL-MCNC: 8.6 MG/DL (ref 8.5–10.1)
CHLORIDE SERPL-SCNC: 106 MMOL/L (ref 94–109)
CO2 SERPL-SCNC: 24 MMOL/L (ref 20–32)
CREAT SERPL-MCNC: 0.6 MG/DL (ref 0.52–1.04)
DIFFERENTIAL METHOD BLD: ABNORMAL
EOSINOPHIL # BLD AUTO: 0.1 10E9/L (ref 0–0.7)
EOSINOPHIL NFR BLD AUTO: 1.8 %
ERYTHROCYTE [DISTWIDTH] IN BLOOD BY AUTOMATED COUNT: 15.2 % (ref 10–15)
GFR SERPL CREATININE-BSD FRML MDRD: >90 ML/MIN/1.7M2
GLUCOSE SERPL-MCNC: 105 MG/DL (ref 70–99)
HCT VFR BLD AUTO: 32.9 % (ref 35–47)
HGB BLD-MCNC: 10.9 G/DL (ref 11.7–15.7)
IMM GRANULOCYTES # BLD: 0 10E9/L (ref 0–0.4)
IMM GRANULOCYTES NFR BLD: 0.2 %
INTERPRETATION ECG - MUSE: NORMAL
LYMPHOCYTES # BLD AUTO: 1.2 10E9/L (ref 0.8–5.3)
LYMPHOCYTES NFR BLD AUTO: 22.5 %
MCH RBC QN AUTO: 31.8 PG (ref 26.5–33)
MCHC RBC AUTO-ENTMCNC: 33.1 G/DL (ref 31.5–36.5)
MCV RBC AUTO: 96 FL (ref 78–100)
MONOCYTES # BLD AUTO: 0.5 10E9/L (ref 0–1.3)
MONOCYTES NFR BLD AUTO: 9.9 %
NEUTROPHILS # BLD AUTO: 3.5 10E9/L (ref 1.6–8.3)
NEUTROPHILS NFR BLD AUTO: 65 %
NRBC # BLD AUTO: 0 10*3/UL
NRBC BLD AUTO-RTO: 0 /100
PLATELET # BLD AUTO: 179 10E9/L (ref 150–450)
POTASSIUM SERPL-SCNC: 3.9 MMOL/L (ref 3.4–5.3)
PROT SERPL-MCNC: 6.2 G/DL (ref 6.8–8.8)
RBC # BLD AUTO: 3.43 10E12/L (ref 3.8–5.2)
SODIUM SERPL-SCNC: 138 MMOL/L (ref 133–144)
WBC # BLD AUTO: 5.4 10E9/L (ref 4–11)

## 2018-06-05 PROCEDURE — 25000128 H RX IP 250 OP 636: Mod: ZF | Performed by: INTERNAL MEDICINE

## 2018-06-05 PROCEDURE — 85025 COMPLETE CBC W/AUTO DIFF WBC: CPT | Performed by: INTERNAL MEDICINE

## 2018-06-05 PROCEDURE — 80053 COMPREHEN METABOLIC PANEL: CPT | Performed by: INTERNAL MEDICINE

## 2018-06-05 PROCEDURE — 36591 DRAW BLOOD OFF VENOUS DEVICE: CPT

## 2018-06-05 PROCEDURE — G0463 HOSPITAL OUTPT CLINIC VISIT: HCPCS | Mod: ZF

## 2018-06-05 PROCEDURE — 99214 OFFICE O/P EST MOD 30 MIN: CPT | Mod: ZP | Performed by: INTERNAL MEDICINE

## 2018-06-05 RX ORDER — HEPARIN SODIUM (PORCINE) LOCK FLUSH IV SOLN 100 UNIT/ML 100 UNIT/ML
500 SOLUTION INTRAVENOUS DAILY PRN
Status: DISCONTINUED | OUTPATIENT
Start: 2018-06-05 | End: 2018-06-06 | Stop reason: HOSPADM

## 2018-06-05 RX ADMIN — SODIUM CHLORIDE, PRESERVATIVE FREE 500 UNITS: 5 INJECTION INTRAVENOUS at 15:11

## 2018-06-05 ASSESSMENT — PAIN SCALES - GENERAL: PAINLEVEL: MILD PAIN (2)

## 2018-06-05 NOTE — MR AVS SNAPSHOT
After Visit Summary   6/5/2018    Julieta Rivera    MRN: [id number]           Patient Information     Date Of Birth          1950        Visit Information        Provider Department      6/5/2018 3:30 PM Jonathan Gay MD [facility]        Today's Diagnoses     Malignant neoplasm of upper-outer quadrant of right breast in female, estrogen receptor negative (H)    -  1       Follow-ups after your visit        Follow-up notes from your care team     Return in about 2 weeks (around 6/19/2018).      Your next 10 appointments already scheduled     Jun 07, 2018 11:00 AM CDT   MA BREAST WIRE PLACEMENT 1ST LESION RT with  Breast Rad, [id number],  BREAST NURSE   [facility] ([facility] [place])    [address] [place] [id number] [phone]           Do not use any powder, lotion or deodorant under your arms or on your breast. If you do, we will ask you to remove it before your exam.  Wear comfortable clothing and a well-fitted bra to the clinic. (A sports bra is best.) Bring a list of your medicines, including vitamins, minerals and over-the-counter drugs. It is best to leave valuables at home.  Tell your care team if:   You have any allergies.   You are taking aspirin, ibuprofen, naproxen or any drug that could increase bleeding.  Bring any previous mammograms from other facilities or have them mailed to the breast center.    Stop taking Coumadin (warfarin) 5 days before treatment. Restart the day after treatment.   If you take Plavix, Ticlid, Pletal or Persantine, ask your doctor if you should stop these medicines. You may need extra tests on the morning of your scan.            Jun 07, 2018 11:00 AM CDT   US BREAST WIRE PLACEMENT RIGHT with Uc Breast Rad, [email or web address], UC BREAST NURSE   [facility] ([facility] [place])    [address] [place] [address]  [id number] [phone]           Please bring a list of your medicines (including vitamins, minerals and over-the-counter drugs). Also, tell your doctor about any allergies you may have. Wear comfortable clothes and leave your valuables at home.  You do not need to do anything special to prepare for your exam.  Please call the Imaging Department at your exam site with any questions.            Jun 07, 2018 12:00 PM CDT   MA POST PROCEDURE RIGHT with [email or web address]   [facility] ([facility] [place])    [address] [facility]  [address]nd [facility] [id number] [phone]           Do not use any powder, lotion or deodorant under your arms or on your breast. If you do, we will ask you to remove it before your exam.  Wear comfortable, two-piece clothing.  If you have any allergies, tell your care team.  Bring any previous mammograms from other facilities or have them mailed to the breast center.            Jun 07, 2018  1:00 PM CDT   NM SENTINEL NODE INJECTION BILATERAL with [email or web address]   [facility] [place] ([facility])    [address] [facility] [id number] [phone]           Please bring a list of your medicines to the exam. (Include vitamins, minerals and over-the-counter drugs.) You should wear comfortable clothes. Leave your valuables at home. Please bring related prior results and films.  Tell your doctor:   If you are breastfeeding or may be pregnant.   If you have had a barium test within the past few days. Barium may change the results of certain exams.   If you think you may need sedation (medicine to help you relax).  You may eat and drink as normal.  Please call your Imaging Department at your exam site with any questions.            Jun 07, 2018   Procedure with Eugene Guzman MD   [facility] ([facility])    [address] [place] [address]  5th  Floor  [facility] [address] [phone]           Located in the Clinics and Surgery Center at [address] [facility] [place] [address].   parking is very convenient and highly recommended.  is a $6 flat rate fee.  Both  and self parkers should enter the main arrival plaza from [facility]; parking attendants will direct you based on your parking preference.            Jun 07, 2018  3:35 PM CDT   MA BREAST SPECIMEN RIGHT with [email or web address]   [facility] ([facility])    [address] [facility]  2nd Floor  [facility] [address] [phone]           Do not use any powder, lotion or deodorant under your arms or on your breast. If you do, we will ask you to remove it before your exam.  Wear comfortable, two-piece clothing.  If you have any allergies, tell your care team.  Bring any previous mammograms from other facilities or have them mailed to the breast center.              Future tests that were ordered for you today     Open Standing Orders        Priority Remaining Interval Expires Ordered    CBC with platelets differential Routine 52/52 6/6/2019 6/6/2018    Comprehensive metabolic panel Routine 52/52 6/6/2019 6/6/2018            Who to contact     If you have questions or need follow up information about today's clinic visit or your schedule please contact [facility] directly at [phone].  Normal or non-critical lab and imaging results will be communicated to you by MyChart, letter or phone within 4 business days after the clinic has received the results. If you do not hear from us within 7 days, please contact the clinic through MyChart or phone. If you have a critical or abnormal lab result, we will notify you by phone as soon as possible.  Submit refill requests through [facility] or call your pharmacy and they will forward the refill request to us. Please allow 3 business days for your refill to be completed.           Additional Information About Your Visit        Printland[employer] Information     Shoot it! gives you secure access to your electronic health record. If you see a primary care provider, you can also send messages to your care team and make appointments. If you have questions, please call your primary care clinic.  If you do not have a primary care provider, please call [phone] and they will assist you.        Care EveryWhere ID     This is your Care EveryWhere ID. This could be used by other organizations to access your [facility] medical records  [id number]        Your Vitals Were     Pulse Temperature Respirations Pulse Oximetry BMI (Body Mass Index)       96 98.7  F (37.1  C) (Oral) 16 98% 22.5 kg/m2        Blood Pressure from Last 3 Encounters:   06/05/18 98/67   06/04/18 116/75   05/31/18 114/64    Weight from Last 3 Encounters:   06/05/18 52.3 kg (115 lb 3.2 oz)   06/04/18 52.4 kg (115 lb 7.2 oz)   05/31/18 51.5 kg (113 lb 8 oz)               Primary Care Provider Office Phone # Fax #    Simona CHARLIE JOSÉ LUIS [facility] [phone]       [address]  [facility] [address]        Goals        General    Medical (pt-stated)     Notes - Note created  4/3/2018 12:31 PM by Trice Galaviz RN    Goal Statement: I will manage stress associated with caring for my grandchildren  Measure of Success: verbalization of stress reduction  Supportive Steps to Achieve: outpatient counseling, support of RN CC  Barriers: none  Strengths: family support  Date to Achieve By: 6 months            Equal Access to Services     CARTER DEL RIO AH: Hadii aad ku hadasho Sosaraali, waaxda luqadaha, qaybta kaalmada adeegyada, waxay idiin hayaan adeeg kharash la'aan . So [facility] [phone].    ATENCIÓN: Si habla español, tiene a phillips disposición servicios gratuitos de asistencia lingüística. Llame al [phone].    We comply with applicable federal civil rights laws and [place] laws. We do not discriminate on the basis of race,  color, national origin, age, disability, sex, sexual orientation, or gender identity.            Thank you!     Thank you for choosing [facility]  for your care. Our goal is always to provide you with excellent care. Hearing back from our patients is one way we can continue to improve our services. Please take a few minutes to complete the written survey that you may receive in the mail after your visit with us. Thank you!             Your Updated Medication List - Protect others around you: Learn how to safely use, store and throw away your medicines at www.disposemymeds.org.          This list is accurate as of 6/5/18 11:59 PM.  Always use your most recent med list.                   Brand Name Dispense Instructions for use Diagnosis    FISH OIL PO      Take 1 capsule by mouth daily.        levothyroxine 100 MCG tablet    SYNTHROID/LEVOTHROID    60 tablet    Take 1 tablet (100 mcg) by mouth daily Take by mouth daily    Hypothyroidism due to Hashimoto's thyroiditis       MULTIVITAMIN & MINERAL PO      Take 2 tablets by mouth daily.        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

## 2018-06-05 NOTE — LETTER
6/5/2018       RE: Julieta Rivera  [address] [place] [address]     Dear Colleague,    Thank you for referring your patient, Julieta Rivera, to the [facility]. Please see a copy of my visit note below.    Dr. Eugene Guzman  Professor  Department of Surgery  [facility]  [address] [place] [address]      June 5, 2018      Dear Dr. Guzman,     Thank you for referring Julieta Rivera to our clinic for recommendations for her new diagnosis of triple negative breast cancer.       HISTORY OF PRESENT ILLNESS:  Julieta Rivera is a 67-year-old woman who was referred to our clinic with a new diagnosis of right triple-negative breast cancer.  Julieta was followed by routine screening mammography, when she was discovered to have a 7 x 6 x 9-mm mass at the 12 o'clock position of the right breast 6 cm from the nipple-areolar complex.  She did undergo a biopsy of this mass which showed an ER-negative, NC-negative, HER2-nonamplified, invasive mammary carcinoma of no special type, invasive ductal carcinoma, Jeff grade 3.  Ductal carcinoma in situ was also noted.  Nuclear grade 2 solid type.  HER2 FISH showed no amplification.  She now comes to our clinic for recommendations.       She has hypothyroidism and is on levothyroxine 88 alternating with 100 mcg daily.  She has no pain.  She has fatigue related to the care of a grandson with esophageal atresia at home.  She has no depression and no anxiety.  She has no weight loss.  Diet has not changed.  She has no loss of energy.  She does not sleep during the day.  She can perform all of her household chores.  She has noticed no abnormality of either breast.         PAST MEDICAL HISTORY:  She has no history of breast surgery in the past or breast cancer in the past.  She has no history of radiation of any kind.  She has no history of tumor of any kind.  She may have a history of a heart problem with mitral prolapse and a  murmur.  The last echo we have on record is from .  She has no history of heart attack, breathing problems, blood clots, seizures, arthritis, peptic ulcer disease, osteoporosis or bone fractures.  She is not currently participating in a clinical trial and has not had any significant weight loss.  She has no history of hypertension, but she does have a history of factor V Leiden because her sister was diagnosed with factor V Leiden and Julieta was tested, although Julieta herself has had no blood clots or pulmonary emboli.       FAMILY HISTORY:  There is a history of breast cancer in two paternal aunts, but no first-degree relatives.  One of her aunts was diagnosed in her 50s, the other in her 60s.  She has no male relatives with breast cancer.  The remainder of her family history was negative.        PAST MENSTRUAL HISTORY:  First period was at age 12-.  Last menstrual period was in 2003.  She has been pregnant twice at age 26 and 30 with two live births and no miscarriages or abortions.  She used oral contraceptives only once or twice.  Uterus and ovaries are in place.  She has no history of hormone replacement therapy.        ALLERGIES:  She has no allergy to seafood, iodine or contrast dye.  She does not take aspirin.       HABITS:  She did smoke 1 pack per day in college for 3 years from age 17 to 20 and has not smoked since.  She does not drink significant alcohol and has no heavy alcohol history in the past.        PERSONAL AND SOCIAL HISTORY:  She does have a history of being a .  Her  is 80 years old but is able to take care of his activities of daily living.  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        PAST MEDICAL HISTORY:  Julieta has no history of angina.  She has no history of hypertension.  Her cholesterol has generally  been in acceptable range, although slightly over 200 recently.       FAMILY HISTORY:  Positive for heart disease.  Father had an MI in his 50s and had a bypass and  at age 78.  Mother had rheumatic heart disease at age 38 and  at age 42.     Julieta returns to clinic after having completed 4 cycles of TC chemotherapy.  She has some pain in her hands, which is neuropathic.  No other pain, no fatigue, no depression, no anxiety.  Her surgery is scheduled for Thursday, .      REVIEW OF SYSTEMS:  She denies fevers or chills, cough, chest pain, shortness of breath, nausea, vomiting, constipation, diarrhea, bone pain, back pain or headache.  She does have a nonproductive cough, but no chest pain or shortness of breath.  Mild dyspnea on exertion.  No nausea or vomiting, constipation or diarrhea, bone pain, back pain or headache.  The remainder of a 10 -point review of systems is negative.      PHYSICAL EXAMINATION:   VITAL SIGNS:  Blood pressure 98/67, temperature 98.7, pulse 96, respirations 16, O2 sat 98% on room air and weight 52.3 kg.   GENERAL:  Julieta appears generally well.  She has complete alopecia.  No lesions in the oropharynx.   LYMPH:  There is no palpable cervical, supraclavicular, subclavicular or axillary lymphadenopathy.   BREASTS:  Examination of the right breast reveals no masses.  Examination of the left breast reveals no masses.   LUNGS:  Clear to percussion and auscultation.   HEART:  There is a regular rate and rhythm, a 2/6 systolic murmur at the left sternal border.   ABDOMEN:  Soft and nontender without hepatosplenomegaly.   EXTREMITIES:  Without edema.   PSYCHIATRIC:  Mood and affect are normal.      LABORATORY DATA:  The CMP shows an albumin of 3.3 and a total protein of 6.2, both of which are slightly low.  The remainder of the CMP is within normal limits.  CBC showed a WBC of 5.4, hemoglobin 10.9, platelets 179,000 and absolute neutrophil count 3500.      ASSESSMENT AND PLAN:     1.   Julieta Rivera is a 67-year-old woman with a history of a stage I, clinical [id number], triple-negative invasive ductal carcinoma of the right breast measuring maximum dimension about 9 mm, grade 3.  She comes in today for a preop visit after having completed 4 cycles of docetaxel and cyclophosphamide.  There is no evidence of any abnormality on examination of either breast.  The plan is for her to go on to have a lumpectomy followed by post lumpectomy radiation.  All of her questions were answered.  She is going to the OR for a lumpectomy with Dr. Guzman on Thursday.   2.  Factor V Leiden.  She does have a port in place.  She has not required anticoagulation.  She has had no history of thrombosis.   3.  Cardiology evaluation with Dr. Tidwell for valve disease.  The patient has mild aortic stenosis.  She was seen by Dr. Tidwell on Monday.   4.  She has a mild URI but has clear lungs on exam and cough is mild.  She is able to walk without difficulty.  O2 sats are normal.   5.  Clearance for surgery.  Julieta Rivera is cleared for surgery.   6.  Followup plan.  We will see Julieta in followup in our clinic June 19. Follow up with me 6-19-18.  CBC, CMP.       Thank you for allowing us to continue to participate in Julieta Rivera's care.      Jonathan Gay MD      [facility]       ADDENDUM:  I had a discussion on 6-6-18 at 5:10 PM regarding the possibility of prophylactic Lovenox 40 mg SC starting on post-op day 1 and continued for 7 to 10 days.  Her sister probably had a provoked clot after a year before the PE.  She fell down stairs and injured her ankle a year before the PE was diagnosed.  Her daughter had a blood clot, but was a smoker and was pregnant with twins. Both clotting events appear to be provoked and the patient is a non-smoker for many years.  The surgery for a 9 mm tumor in a small breast is not likely to be major surgery and she would not be immobile afterward.   The benefits is possible clot prophylaxis and the risk is some degree of bleeding post-operatively.  Julieta is on the fence on this one and would go either way. We will defer to surgery on this question.       I spent 30 minutes with the patient more than 50% of which was in counseling and coordination of care.       Again, thank you for allowing me to participate in the care of your patient.      Sincerely,    Jonathan Gay MD

## 2018-06-05 NOTE — PROGRESS NOTES
Dr. Eugene Guzman  Professor  Department of Surgery  [address] [address]      June 5, 2018      Dear Dr. Guzman,     Thank you for referring Julieta Rivera to our clinic for recommendations for her new diagnosis of triple negative breast cancer.       HISTORY OF PRESENT ILLNESS:  Julieta Rivera is a 67-year-old woman who was referred to our clinic with a new diagnosis of right triple-negative breast cancer.  Julieta was followed by routine screening mammography, when she was discovered to have a 7 x 6 x 9-mm mass at the 12 o'clock position of the right breast 6 cm from the nipple-areolar complex.  She did undergo a biopsy of this mass which showed an ER-negative, OK-negative, HER2-nonamplified, invasive mammary carcinoma of no special type, invasive ductal carcinoma, [place] grade 3.  Ductal carcinoma in situ was also noted.  Nuclear grade 2 solid type.  HER2 FISH showed no amplification.  She now comes to our clinic for recommendations.       She has hypothyroidism and is on levothyroxine 88 alternating with 100 mcg daily.  She has no pain.  She has fatigue related to the care of a grandson with esophageal atresia at home.  She has no depression and no anxiety.  She has no weight loss.  Diet has not changed.  She has no loss of energy.  She does not sleep during the day.  She can perform all of her household chores.  She has noticed no abnormality of either breast.         PAST MEDICAL HISTORY:  She has no history of breast surgery in the past or breast cancer in the past.  She has no history of radiation of any kind.  She has no history of tumor of any kind.  She may have a history of a heart problem with mitral prolapse and a murmur.  The last echo we have on record is from 1996.  She has no history of heart attack, breathing problems, blood clots, seizures, arthritis, peptic ulcer disease, osteoporosis or bone fractures.  She is not currently participating in a  clinical trial and has not had any significant weight loss.  She has no history of hypertension, but she does have a history of factor V Leiden because her sister was diagnosed with factor V Leiden and Julieta was tested, although Julieta herself has had no blood clots or pulmonary emboli.       FAMILY HISTORY:  There is a history of breast cancer in two paternal aunts, but no first-degree relatives.  One of her aunts was diagnosed in her 50s, the other in her 60s.  She has no male relatives with breast cancer.  The remainder of her family history was negative.        PAST MENSTRUAL HISTORY:  First period was at age 12-.  Last menstrual period was in 2003.  She has been pregnant twice at age 26 and 30 with two live births and no miscarriages or abortions.  She used oral contraceptives only once or twice.  Uterus and ovaries are in place.  She has no history of hormone replacement therapy.        ALLERGIES:  She has no allergy to seafood, iodine or contrast dye.  She does not take aspirin.       HABITS:  She did smoke 1 pack per day in college for 3 years from age 17 to 20 and has not smoked since.  She does not drink significant alcohol and has no heavy alcohol history in the past.        PERSONAL AND SOCIAL HISTORY:  She does have a history of being a .  Her  is 80 years old but is able to take care of his activities of daily living.  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        PAST MEDICAL HISTORY:  Julieta has no history of angina.  She has no history of hypertension.  Her cholesterol has generally been in acceptable range, although slightly over 200 recently.       FAMILY HISTORY:  Positive for heart disease.  Father had an MI in his 50s and had a bypass and  at age 78.  Mother had rheumatic heart disease at age 38 and  at  age 42.     Julieta returns to clinic after having completed 4 cycles of TC chemotherapy.  She has some pain in her hands, which is neuropathic.  No other pain, no fatigue, no depression, no anxiety.  Her surgery is scheduled for Thursday, 06/07.      REVIEW OF SYSTEMS:  She denies fevers or chills, cough, chest pain, shortness of breath, nausea, vomiting, constipation, diarrhea, bone pain, back pain or headache.  She does have a nonproductive cough, but no chest pain or shortness of breath.  Mild dyspnea on exertion.  No nausea or vomiting, constipation or diarrhea, bone pain, back pain or headache.  The remainder of a 10 -point review of systems is negative.      PHYSICAL EXAMINATION:   VITAL SIGNS:  Blood pressure 98/67, temperature 98.7, pulse 96, respirations 16, O2 sat 98% on room air and weight 52.3 kg.   GENERAL:  Julieta appears generally well.  She has complete alopecia.  No lesions in the oropharynx.   LYMPH:  There is no palpable cervical, supraclavicular, subclavicular or axillary lymphadenopathy.   BREASTS:  Examination of the right breast reveals no masses.  Examination of the left breast reveals no masses.   LUNGS:  Clear to percussion and auscultation.   HEART:  There is a regular rate and rhythm, a 2/6 systolic murmur at the left sternal border.   ABDOMEN:  Soft and nontender without hepatosplenomegaly.   EXTREMITIES:  Without edema.   PSYCHIATRIC:  Mood and affect are normal.      LABORATORY DATA:  The CMP shows an albumin of 3.3 and a total protein of 6.2, both of which are slightly low.  The remainder of the CMP is within normal limits.  CBC showed a WBC of 5.4, hemoglobin 10.9, platelets 179,000 and absolute neutrophil count 3500.      ASSESSMENT AND PLAN:     1.  Julieta Rivera is a 67-year-old woman with a history of a stage I, clinical [id number], triple-negative invasive ductal carcinoma of the right breast measuring maximum dimension about 9 mm, grade 3.  She comes in today for a preop visit after  having completed 4 cycles of docetaxel and cyclophosphamide.  There is no evidence of any abnormality on examination of either breast.  The plan is for her to go on to have a lumpectomy followed by post lumpectomy radiation.  All of her questions were answered.  She is going to the OR for a lumpectomy with Dr. Guzman on Thursday.   2.  Factor V Leiden.  She does have a port in place.  She has not required anticoagulation.  She has had no history of thrombosis.   3.  Cardiology evaluation with Dr. Tidwell for valve disease.  The patient has mild aortic stenosis.  She was seen by Dr. Tidwell on Monday.   4.  She has a mild URI but has clear lungs on exam and cough is mild.  She is able to walk without difficulty.  O2 sats are normal.   5.  Clearance for surgery.  Julieta Rivera is cleared for surgery.   6.  Followup plan.  We will see Julieta in followup in our clinic June 19. Follow up with me 6-19-18.  CBC, CMP.       Thank you for allowing us to continue to participate in Julieta Rivera's care.      Jonathan Gay MD      [facility]       ADDENDUM:  I had a discussion on 6-6-18 at 5:10 PM regarding the possibility of prophylactic Lovenox 40 mg SC starting on post-op day 1 and continued for 7 to 10 days.  Her sister probably had a provoked clot after a year before the PE.  She fell down stairs and injured her ankle a year before the PE was diagnosed.  Her daughter had a blood clot, but was a smoker and was pregnant with twins. Both clotting events appear to be provoked and the patient is a non-smoker for many years.  The surgery for a 9 mm tumor in a small breast is not likely to be major surgery and she would not be immobile afterward.  The benefits is possible clot prophylaxis and the risk is some degree of bleeding post-operatively.  Julieta is on the fence on this one and would go either way. We will defer to surgery on this question.       I spent 30 minutes with the  patient more than 50% of which was in counseling and coordination of care.

## 2018-06-05 NOTE — NURSING NOTE
Oncology Rooming Note    June 5, 2018 3:31 PM   Julieta Rivera is a 67 year old female who presents for:    Chief Complaint   Patient presents with     Port Draw     Oncology Clinic Visit     return - breast cancer      Initial Vitals: BP 98/67 (BP Location: Left arm, Patient Position: Sitting, Cuff Size: Adult Regular)  Pulse 96  Temp 98.7  F (37.1  C) (Oral)  Resp 16  Wt 52.3 kg (115 lb 3.2 oz)  SpO2 98%  BMI 22.5 kg/m2 Estimated body mass index is 22.5 kg/(m^2) as calculated from the following:    Height as of 6/4/18: 1.524 m (5').    Weight as of this encounter: 52.3 kg (115 lb 3.2 oz). Body surface area is 1.49 meters squared.  Mild Pain (2) Comment: Data Unavailable   No LMP recorded. Patient is postmenopausal.  Allergies reviewed: Yes  Medications reviewed: Yes    Medications: Medication refills not needed today.  Pharmacy name entered into [employer]:    [id number] [place] [place] - [address] [facility] [place] - [place], [place] - [address]  [id number] - [place], [place] - [address]    Clinical concerns: no new concern,      6 minutes for nursing intake (face to face time)     Dee Cabezas [facility]

## 2018-06-05 NOTE — NURSING NOTE
Chief Complaint   Patient presents with     Port Draw       Vitals taken, port accessed.  Labs collected from port.  Line flushed with NS and Heparin.  Pt tolerated procedure.  Checked in for next appt.    Estrellita Lawrence RN

## 2018-06-06 ENCOUNTER — MYC MEDICAL ADVICE (OUTPATIENT)
Dept: FAMILY MEDICINE | Facility: CLINIC | Age: 68
End: 2018-06-06

## 2018-06-06 ENCOUNTER — TELEPHONE (OUTPATIENT)
Dept: ONCOLOGY | Facility: CLINIC | Age: 68
End: 2018-06-06

## 2018-06-06 ENCOUNTER — ANESTHESIA EVENT (OUTPATIENT)
Dept: SURGERY | Facility: AMBULATORY SURGERY CENTER | Age: 68
End: 2018-06-06

## 2018-06-06 ENCOUNTER — CARE COORDINATION (OUTPATIENT)
Dept: ONCOLOGY | Facility: CLINIC | Age: 68
End: 2018-06-06

## 2018-06-06 NOTE — PROGRESS NOTES
I have discussed this situation with Dr. Feliciano.  We can underestimate risk of bleeding and overestimate risk of clotting.  There are two issues: what were the conditions under which the sister had a clot and the second is whether anticoagulation would normally be offered following lumpectomy and lymph node procedure.  I defer to Dr. Feliciano.    Jonathan    Sent from my iPhone    Telephone encounter message sent to Dr. Jonathan Gay and Dr. Eugene Guzman.      Adia Iraheta RN      []Hide copied text  []Hover for attribution information  Patient returned call, notified of provider message for no bridging, but waiting to hear back from [facility] regarding standard anticoagulation.     Adia Iraheta RN  [facility]      Electronically signed by Adia Iraheta RN at 6/6/2018 10:01 AM               Mlila Acosta RNCC BSN CBCN        6/1/18  Patient returned call, notified of provider message for no bridging, but waiting to hear back from [facility] regarding standard anticoagulation.     Adia Iraheta RN  [facility]

## 2018-06-06 NOTE — TELEPHONE ENCOUNTER
I had a discussion on 6-6-18 at 5:10 PM regarding the possibility of prophylactic Lovenox 40 mg SC starting on post-op day 1 and continued for 7 to 10 days.  Her sister probably had a provoked clot after a year before the PE.  She fell down stairs and injured her ankle a year before the PE was diagnosed.  Her daughter had a blood clot, but was a smoker and was pregnant with twins. Both clotting events appear to be provoked and the patient is a non-smoker for many years.  The surgery for a 9 mm tumor in a small breast is not likely to be major surgery and she would not be immobile afterward.  The benefits is possible clot prophylaxis and the risk is some degree of bleeding post-operatively.  Julieta is on the fence on this one and would go either way. We will defer to surgery on this question.     Jonathan Gay MD

## 2018-06-06 NOTE — PROGRESS NOTES
Julieta,    I also don't see the TSH and T4.  They were ordered, but I wonder if because they were ordered ahead of the office visit that the lab didn't realize to draw them.  I'll see if they can add it to the samples from last week.  Hemoglobin is low, but not surprising and is ok for surgery though I would discuss with oncology what treatment, if any, they suggest for anemia secondary to chemotherapy.  The low albumin and protein are a reflection of nutrition which is also not surprising in the current context.  Kidneys and liver chemistries looked great.  If you have any questions, please feel free to contact the clinic.    FRED Lewis

## 2018-06-06 NOTE — TELEPHONE ENCOUNTER
Can you please let Julieta know what hematology said and that we are not doing heparin bridge.  Also call surgeon's office to confirm that anticoagulation is not standard for lumpectomy.  FRED Lewis

## 2018-06-06 NOTE — TELEPHONE ENCOUNTER
Patient returned call, notified of provider message for no bridging, but waiting to hear back from [facility] regarding standard anticoagulation.    Adia Iraheta RN  [facility]

## 2018-06-07 ENCOUNTER — RADIANT APPOINTMENT (OUTPATIENT)
Dept: MAMMOGRAPHY | Facility: CLINIC | Age: 68
End: 2018-06-07
Attending: SURGERY
Payer: COMMERCIAL

## 2018-06-07 ENCOUNTER — HOSPITAL ENCOUNTER (OUTPATIENT)
Dept: NUCLEAR MEDICINE | Facility: CLINIC | Age: 68
Setting detail: NUCLEAR MEDICINE
Discharge: HOME | End: 2018-06-07
Attending: SURGERY | Admitting: SURGERY
Payer: COMMERCIAL

## 2018-06-07 ENCOUNTER — SURGERY (OUTPATIENT)
Age: 68
End: 2018-06-07

## 2018-06-07 ENCOUNTER — PATIENT OUTREACH (OUTPATIENT)
Dept: CARE COORDINATION | Facility: CLINIC | Age: 68
End: 2018-06-07

## 2018-06-07 ENCOUNTER — HOSPITAL ENCOUNTER (OUTPATIENT)
Facility: AMBULATORY SURGERY CENTER | Age: 68
End: 2018-06-07
Attending: SURGERY
Payer: COMMERCIAL

## 2018-06-07 ENCOUNTER — ANESTHESIA (OUTPATIENT)
Dept: SURGERY | Facility: AMBULATORY SURGERY CENTER | Age: 68
End: 2018-06-07

## 2018-06-07 VITALS
DIASTOLIC BLOOD PRESSURE: 57 MMHG | OXYGEN SATURATION: 98 % | BODY MASS INDEX: 22.64 KG/M2 | RESPIRATION RATE: 16 BRPM | WEIGHT: 115.3 LBS | SYSTOLIC BLOOD PRESSURE: 98 MMHG | HEIGHT: 60 IN | TEMPERATURE: 97.6 F

## 2018-06-07 DIAGNOSIS — C50.919 INVASIVE CARCINOMA OF BREAST (H): ICD-10-CM

## 2018-06-07 DIAGNOSIS — C50.911 MALIGNANT NEOPLASM OF RIGHT BREAST (H): ICD-10-CM

## 2018-06-07 DIAGNOSIS — G89.18 POST-OP PAIN: Primary | ICD-10-CM

## 2018-06-07 PROCEDURE — 38792 RA TRACER ID OF SENTINL NODE: CPT

## 2018-06-07 RX ORDER — ACETAMINOPHEN 325 MG/1
650 TABLET ORAL
Status: DISCONTINUED | OUTPATIENT
Start: 2018-06-07 | End: 2018-06-08 | Stop reason: HOSPADM

## 2018-06-07 RX ORDER — ACETAMINOPHEN 325 MG/1
975 TABLET ORAL ONCE
Status: COMPLETED | OUTPATIENT
Start: 2018-06-07 | End: 2018-06-07

## 2018-06-07 RX ORDER — LIDOCAINE 40 MG/G
CREAM TOPICAL
Status: DISCONTINUED | OUTPATIENT
Start: 2018-06-07 | End: 2018-06-07 | Stop reason: HOSPADM

## 2018-06-07 RX ORDER — SODIUM CHLORIDE, SODIUM LACTATE, POTASSIUM CHLORIDE, CALCIUM CHLORIDE 600; 310; 30; 20 MG/100ML; MG/100ML; MG/100ML; MG/100ML
INJECTION, SOLUTION INTRAVENOUS CONTINUOUS
Status: DISCONTINUED | OUTPATIENT
Start: 2018-06-07 | End: 2018-06-07 | Stop reason: HOSPADM

## 2018-06-07 RX ORDER — SODIUM CHLORIDE, SODIUM LACTATE, POTASSIUM CHLORIDE, CALCIUM CHLORIDE 600; 310; 30; 20 MG/100ML; MG/100ML; MG/100ML; MG/100ML
INJECTION, SOLUTION INTRAVENOUS CONTINUOUS
Status: DISCONTINUED | OUTPATIENT
Start: 2018-06-07 | End: 2018-06-08 | Stop reason: HOSPADM

## 2018-06-07 RX ORDER — DEXAMETHASONE SODIUM PHOSPHATE 4 MG/ML
INJECTION, SOLUTION INTRA-ARTICULAR; INTRALESIONAL; INTRAMUSCULAR; INTRAVENOUS; SOFT TISSUE PRN
Status: DISCONTINUED | OUTPATIENT
Start: 2018-06-07 | End: 2018-06-07

## 2018-06-07 RX ORDER — PROPOFOL 10 MG/ML
INJECTION, EMULSION INTRAVENOUS CONTINUOUS PRN
Status: DISCONTINUED | OUTPATIENT
Start: 2018-06-07 | End: 2018-06-07

## 2018-06-07 RX ORDER — ACETAMINOPHEN 325 MG/1
650 TABLET ORAL EVERY 4 HOURS PRN
Qty: 100 TABLET | Refills: 0 | COMMUNITY
Start: 2018-06-07 | End: 2018-10-30

## 2018-06-07 RX ORDER — BUPIVACAINE HYDROCHLORIDE 2.5 MG/ML
INJECTION, SOLUTION INFILTRATION; PERINEURAL PRN
Status: DISCONTINUED | OUTPATIENT
Start: 2018-06-07 | End: 2018-06-07 | Stop reason: HOSPADM

## 2018-06-07 RX ORDER — ONDANSETRON 2 MG/ML
INJECTION INTRAMUSCULAR; INTRAVENOUS PRN
Status: DISCONTINUED | OUTPATIENT
Start: 2018-06-07 | End: 2018-06-07

## 2018-06-07 RX ORDER — ONDANSETRON 4 MG/1
4 TABLET, ORALLY DISINTEGRATING ORAL EVERY 30 MIN PRN
Status: DISCONTINUED | OUTPATIENT
Start: 2018-06-07 | End: 2018-06-08 | Stop reason: HOSPADM

## 2018-06-07 RX ORDER — NALOXONE HYDROCHLORIDE 0.4 MG/ML
.1-.4 INJECTION, SOLUTION INTRAMUSCULAR; INTRAVENOUS; SUBCUTANEOUS
Status: DISCONTINUED | OUTPATIENT
Start: 2018-06-07 | End: 2018-06-08 | Stop reason: HOSPADM

## 2018-06-07 RX ORDER — EPHEDRINE SULFATE 50 MG/ML
INJECTION, SOLUTION INTRAMUSCULAR; INTRAVENOUS; SUBCUTANEOUS PRN
Status: DISCONTINUED | OUTPATIENT
Start: 2018-06-07 | End: 2018-06-07

## 2018-06-07 RX ORDER — FENTANYL CITRATE 50 UG/ML
25-50 INJECTION, SOLUTION INTRAMUSCULAR; INTRAVENOUS
Status: DISCONTINUED | OUTPATIENT
Start: 2018-06-07 | End: 2018-06-07 | Stop reason: HOSPADM

## 2018-06-07 RX ORDER — ISOSULFAN BLUE 50 MG/5ML
INJECTION, SOLUTION SUBCUTANEOUS PRN
Status: DISCONTINUED | OUTPATIENT
Start: 2018-06-07 | End: 2018-06-07 | Stop reason: HOSPADM

## 2018-06-07 RX ORDER — OXYCODONE HYDROCHLORIDE 5 MG/1
5-10 TABLET ORAL
Qty: 6 TABLET | Refills: 0 | Status: SHIPPED | OUTPATIENT
Start: 2018-06-07 | End: 2018-06-26

## 2018-06-07 RX ORDER — LIDOCAINE HYDROCHLORIDE AND EPINEPHRINE 10; 10 MG/ML; UG/ML
INJECTION, SOLUTION INFILTRATION; PERINEURAL PRN
Status: DISCONTINUED | OUTPATIENT
Start: 2018-06-07 | End: 2018-06-07 | Stop reason: HOSPADM

## 2018-06-07 RX ORDER — LIDOCAINE HYDROCHLORIDE 20 MG/ML
INJECTION, SOLUTION INFILTRATION; PERINEURAL PRN
Status: DISCONTINUED | OUTPATIENT
Start: 2018-06-07 | End: 2018-06-07

## 2018-06-07 RX ORDER — OXYCODONE HYDROCHLORIDE 5 MG/1
5 TABLET ORAL
Status: DISCONTINUED | OUTPATIENT
Start: 2018-06-07 | End: 2018-06-08 | Stop reason: HOSPADM

## 2018-06-07 RX ORDER — OXYCODONE HYDROCHLORIDE 5 MG/1
5 TABLET ORAL EVERY 4 HOURS PRN
Status: DISCONTINUED | OUTPATIENT
Start: 2018-06-07 | End: 2018-06-08 | Stop reason: HOSPADM

## 2018-06-07 RX ORDER — PROPOFOL 10 MG/ML
INJECTION, EMULSION INTRAVENOUS PRN
Status: DISCONTINUED | OUTPATIENT
Start: 2018-06-07 | End: 2018-06-07

## 2018-06-07 RX ORDER — ONDANSETRON 2 MG/ML
4 INJECTION INTRAMUSCULAR; INTRAVENOUS EVERY 30 MIN PRN
Status: DISCONTINUED | OUTPATIENT
Start: 2018-06-07 | End: 2018-06-08 | Stop reason: HOSPADM

## 2018-06-07 RX ADMIN — PROPOFOL 50 MG: 10 INJECTION, EMULSION INTRAVENOUS at 13:31

## 2018-06-07 RX ADMIN — ACETAMINOPHEN 975 MG: 325 TABLET ORAL at 10:12

## 2018-06-07 RX ADMIN — Medication 100 MCG: at 14:27

## 2018-06-07 RX ADMIN — PROPOFOL 125 MCG/KG/MIN: 10 INJECTION, EMULSION INTRAVENOUS at 13:34

## 2018-06-07 RX ADMIN — SODIUM CHLORIDE, SODIUM LACTATE, POTASSIUM CHLORIDE, CALCIUM CHLORIDE: 600; 310; 30; 20 INJECTION, SOLUTION INTRAVENOUS at 13:21

## 2018-06-07 RX ADMIN — PROPOFOL 20 MG: 10 INJECTION, EMULSION INTRAVENOUS at 13:33

## 2018-06-07 RX ADMIN — PROPOFOL 20 MG: 10 INJECTION, EMULSION INTRAVENOUS at 13:34

## 2018-06-07 RX ADMIN — BUPIVACAINE HYDROCHLORIDE 18.5 ML: 2.5 INJECTION, SOLUTION INFILTRATION; PERINEURAL at 13:46

## 2018-06-07 RX ADMIN — Medication 100 MCG: at 14:02

## 2018-06-07 RX ADMIN — DEXAMETHASONE SODIUM PHOSPHATE 4 MG: 4 INJECTION, SOLUTION INTRA-ARTICULAR; INTRALESIONAL; INTRAMUSCULAR; INTRAVENOUS; SOFT TISSUE at 13:42

## 2018-06-07 RX ADMIN — PROPOFOL 30 MG: 10 INJECTION, EMULSION INTRAVENOUS at 14:25

## 2018-06-07 RX ADMIN — Medication 100 MCG: at 14:21

## 2018-06-07 RX ADMIN — LIDOCAINE HYDROCHLORIDE 10 ML: 10 INJECTION, SOLUTION EPIDURAL; INFILTRATION; INTRACAUDAL; PERINEURAL at 11:38

## 2018-06-07 RX ADMIN — EPHEDRINE SULFATE 5 MG: 50 INJECTION, SOLUTION INTRAMUSCULAR; INTRAVENOUS; SUBCUTANEOUS at 13:58

## 2018-06-07 RX ADMIN — LIDOCAINE HYDROCHLORIDE 40 MG: 20 INJECTION, SOLUTION INFILTRATION; PERINEURAL at 13:29

## 2018-06-07 RX ADMIN — PROPOFOL 20 MG: 10 INJECTION, EMULSION INTRAVENOUS at 14:16

## 2018-06-07 RX ADMIN — PROPOFOL 20 MG: 10 INJECTION, EMULSION INTRAVENOUS at 13:45

## 2018-06-07 RX ADMIN — EPHEDRINE SULFATE 5 MG: 50 INJECTION, SOLUTION INTRAMUSCULAR; INTRAVENOUS; SUBCUTANEOUS at 13:50

## 2018-06-07 RX ADMIN — LIDOCAINE HYDROCHLORIDE AND EPINEPHRINE 18.5 ML: 10; 10 INJECTION, SOLUTION INFILTRATION; PERINEURAL at 13:46

## 2018-06-07 RX ADMIN — ISOSULFAN BLUE 3 ML: 50 INJECTION, SOLUTION SUBCUTANEOUS at 13:35

## 2018-06-07 RX ADMIN — ONDANSETRON 4 MG: 2 INJECTION INTRAMUSCULAR; INTRAVENOUS at 13:42

## 2018-06-07 RX ADMIN — PROPOFOL 20 MG: 10 INJECTION, EMULSION INTRAVENOUS at 14:12

## 2018-06-07 NOTE — ANESTHESIA PREPROCEDURE EVALUATION
Physical Exam  Normal systems: pulmonary and dental    Airway   Mallampati: II  TM distance: >3 FB  Neck ROM: full    Dental     Cardiovascular   Rhythm and rate: regular and normal      Pulmonary    breath sounds clear to auscultation                    Anesthesia Plan      History & Physical Review  History and physical reviewed and following examination; no interval change.    ASA Status:  2 .    NPO Status:  > 8 hours    Plan for MAC with Intravenous and Propofol induction. Maintenance will be TIVA.  Reason for MAC:  Procedure to face, neck, head or breast  PONV prophylaxis:  Ondansetron (or other 5HT-3) and Dexamethasone or Solumedrol       Postoperative Care  Postoperative pain management:  IV analgesics, Oral pain medications and Multi-modal analgesia.      Consents  Anesthetic plan, risks, benefits and alternatives discussed with:  Patient..          ANESTHESIA PREOP EVALUATION    PROCEDURE: Procedure(s):  Right Wire Localized Lumpectomy And Right [place] Lymph Node Biopsy - Wound Class:     HPI: Julieta Rivera is a 67 year old female who presents for above procedure.    PAST MEDICAL HISTORY:    Past Medical History:   Diagnosis Date     Abnormal Pap smear     normal since     Factor V Leiden (H)      Hypothyroidism 2000       PAST SURGICAL HISTORY:    Past Surgical History:   Procedure Laterality Date      SECTION       COLPOSCOPY CERVIX, BIOPSY CERVIX, ENDOCERVICAL CURETTAGE, COMBINED       INSERT PORT VASCULAR ACCESS N/A 3/5/2018    Procedure: INSERT PORT VASCULAR ACCESS;  Single Lumen Chest Power Port Placement;  Surgeon: Brian Tolbert PA-C;  Location:  OR     TONSILLECTOMY, ADENOIDECTOMY, COMBINED         PAST ANESTHESIA HISTORY:     No personal or family h/o anesthesia problems    SOCIAL HISTORY:       Social History   Substance Use Topics     Smoking status: Former Smoker     Quit date: 1971     Smokeless tobacco: Never Used     Alcohol use No       Comment: No alcohol since 11/2017       ALLERGIES:     Allergies   Allergen Reactions     Seasonal Allergies        MEDICATIONS:       (Not in a hospital admission)    Current Outpatient Prescriptions   Medication Sig Dispense Refill     levothyroxine (SYNTHROID/LEVOTHROID) 100 MCG tablet Take 1 tablet (100 mcg) by mouth daily Take by mouth daily 60 tablet 1     Multiple Vitamins-Minerals (MULTIVITAMIN & MINERAL PO) Take 2 tablets by mouth daily.       Omega-3 Fatty Acids (FISH OIL PO) Take 1 capsule by mouth daily.       pyridoxine (VITAMIN B-6) 100 MG tablet Take 1 tablet (100 mg) by mouth daily 30 tablet 3     VITAMIN D, CHOLECALCIFEROL, PO Take 2,000 Units by mouth daily         Current Outpatient Prescriptions Ordered in [facility]   Medication Sig Dispense Refill     levothyroxine (SYNTHROID/LEVOTHROID) 100 MCG tablet Take 1 tablet (100 mcg) by mouth daily Take by mouth daily 60 tablet 1     Multiple Vitamins-Minerals (MULTIVITAMIN & MINERAL PO) Take 2 tablets by mouth daily.       Omega-3 Fatty Acids (FISH OIL PO) Take 1 capsule by mouth daily.       pyridoxine (VITAMIN B-6) 100 MG tablet Take 1 tablet (100 mg) by mouth daily 30 tablet 3     VITAMIN D, CHOLECALCIFEROL, PO Take 2,000 Units by mouth daily       No current [facility]-ordered facility-administered medications on file.        PHYSICAL EXAM:    Vitals: T Data Unavailable, P Data Unavailable, BP Data Unavailable, R Data Unavailable, SpO2  , Weight Wt Readings from Last 2 Encounters:   06/05/18 52.3 kg (115 lb 3.2 oz)   06/04/18 52.4 kg (115 lb 7.2 oz)       See doc flowsheet      No Data Recorded    NPO STATUS: see doc flowsheet    LABS:    BMP:  Recent Labs   Lab Test  06/05/18   1513   NA  138   POTASSIUM  3.9   CHLORIDE  106   CO2  24   BUN  10   CR  0.60   GLC  105*   CINDI  8.6       LFTs:   Recent Labs   Lab Test  06/05/18   1513   PROTTOTAL  6.2*   ALBUMIN  3.3*   BILITOTAL  0.4   ALKPHOS  62   AST  22   ALT  18       CBC:   Recent Labs   Lab Test   06/05/18   1513   WBC  5.4   RBC  3.43*   HGB  10.9*   HCT  32.9*   MCV  96   MCH  31.8   MCHC  33.1   RDW  15.2*   PLT  179       Coags:  Recent Labs   Lab Test  03/05/18   1140   INR  0.94       Imaging:  No orders to display       Samy James MD  Anesthesiology Staff  Pager [phone]    6/6/2018  9:50 PM                    .

## 2018-06-07 NOTE — IP AVS SNAPSHOT
MRN:[id number]                      After Visit Summary   6/7/2018    Julieta Rivera    MRN: [id number]           Thank you!     Thank you for choosing [facility] for your care. Our goal is always to provide you with excellent care. Hearing back from our patients is one way we can continue to improve our services. Please take a few minutes to complete the written survey that you may receive in the mail after you visit with us. Thank you!        Patient Information     Date Of Birth          1950        About your hospital stay     You were admitted on:  June 7, 2018 [facility]    You were discharged on:  June 7, 2018       Who to Call     For medical emergencies, please call 911.  For non-urgent questions about your medical care, please call your primary care provider or clinic, [phone]  For questions related to your surgery, please call your surgery clinic        Attending Provider     Provider Eugene Gonzalez MD General Surgery       Primary Care Provider Office Phone # Fax #    JOSÉ LUIS [facility] [phone]      After Care Instructions     Diet Instructions       Resume pre-procedure diet            Discharge Instructions       If your travel plans upon discharge include prolonged periods of sitting (a lengthy car or plane ride), it is highly beneficial to get up and walk at least once per hour to help prevent swelling and blood clots.     You may shower after operation, let warm soapy water run over incision and pat dry. Do not submerge, soak, or scrub incision.    Take incentive spirometer home for continued frequent use    You will be discharged with narcotic pain medications. Please take them only as needed and do not operate a car or heavy machinery for 24 hours after taking them.  Narcotic pain medications like oxycodone are constipating. Please ensure that you are drinking adequate  "amounts of fluids and taking stool softeners while you are taking narcotics. Take Miralax as needed for constipation.     Do not drive until you can with stand pressing the brake pedal quickly and fully without pain and you are not distracted by pain.     Call clinic [phone] for fever greater than 101.5, chills, red skin around incision, drainage from incision, increased swelling from the incision, bleeding from the incision that is not controlled with light pressure, inability to tolerate diet,new nausea/vomiting or other new/worsening symptoms.    For after hours questions or concerns you can contact the on-call surgical oncology resident (nights and weekends [phone] and ask \"I would like to page the Surgical Oncology Resident on call.\"). In emergencies, call [phone]    Follow Up:  -Follow up in clinic with Dr Guzman in 2 weeks. You should be called to make an appointment within 3 business days. If you are not contacted, call [phone] to make an appointment.            No Alcohol       For 24 hours post procedure            No driving or operating machinery        until the day after procedure            No lifting        No lifting over 10 lbs and no strenuous physical activity for 3 weeks            No weight bearing           Shower       No shower for 24 hours post procedure. May shower Postoperative Day (POD)  1                  Your next 10 appointments already scheduled     Jun 07, 2018  3:35 PM CDT   MA BREAST SPECIMEN RIGHT with [id number]   [facility] ([facility])    [address] [address] [phone]           Do not use any powder, lotion or deodorant under your arms or on your breast. If you do, we will ask you to remove it before your exam.  Wear comfortable, two-piece clothing.  If you have any allergies, tell your care team.  Bring any previous mammograms from other facilities or have them mailed to the " breast center.            Jun 26, 2018  1:45 PM CDT   [facility] Draw with  [facility] DRAW   [facility])    [address]  [address]  [place] [facility] [address]   [phone]            Jun 26, 2018  2:30 PM CDT   (Arrive by 2:15 PM)   Return Visit with Jonathan Gay MD   [facility] ([facility])    [address]  [address]  [place] [facility] [address]   [phone]            Jun 29, 2018  9:45 AM CDT   (Arrive by 9:30 AM)   Post-Op with Eugene Guzman MD   [facility] ([facility])    [address]  [address]  [place] [facility] [address]   [phone]              Further instructions from your care team       [facility]  Home Care Following Anesthesia  For 24 hours after surgery:  1. Get plenty of rest.  A responsible adult must stay with you for at least 24 hours after you leave the surgery center.  2. Do not drive or use heavy equipment.  If you have weakness or tingling, don't drive or use heavy equipment until this feeling goes away.   3. Do not drink alcohol.   4. Avoid strenuous or risky activities.  Ask for help when climbing stairs.  5. You may feel lightheaded.  IF so, sit for a few minutes before standing.  Have someone help you get up.   6. If you have nausea (feel sick to your stomach): Drink only clear liquids such as apple juice, ginger ale, broth or 7-Up.  Rest may also help.  Be sure to drink enough fluids.  Move to a regular diet as you feel able.   7. You may have a slight fever.  Call the doctor if your fever is over 100 F (37.7 C) (taken under the tongue) or lasts longer than 24 hours.  8. You may have a dry mouth, a sore throat, muscle aches or trouble sleeping. These should go away after 24 hours.  9. Do not make important or legal decisions.               Tips for taking pain medications  To  get the best pain relief possible, remember these points:    Take pain medications as directed, before pain becomes severe.    Pain medication can upset your stomach: taking it with food may help.    Constipation is a common side effect of pain medication. Drink plenty of  fluids.    Eat foods high in fiber. Take a stool softener if recommended by your doctor or pharmacist.    Do not drink alcohol, drive or operate machinery while taking pain medications.    Ask about other ways to control pain, such as with heat, ice or relaxation.    Tylenol/Acetaminophen Consumption  To help encourage the safe use of acetaminophen, the makers of TYLENOL  have lowered the maximum daily dose for single-ingredient Extra Strength TYLENOL  (acetaminophen) products sold in the [place] from 8 pills per day (4,000 mg) to 6 pills per day (3,000 mg). The dosing interval has also changed from 2 pills every 4-6 hours to 2 pills every 6 hours.    If you feel your pain relief is insufficient, you may take Tylenol/Acetaminophen in addition to your narcotic pain medication.     Be careful not to exceed 3,000 mg of Tylenol/Acetaminophen in a 24 hour period from all sources.    If you are taking extra strength Tylenol/acetaminophen (500 mg), the maximum dose is 6 tablets in 24 hours.    If you are taking regular strength acetaminophen (325 mg), the maximum dose is 9 tablets in 24 hours.    Call a doctor for any of the followin. Signs of infection (fever, growing tenderness at the surgery site, a large amount of drainage or bleeding, severe pain, foul-smelling drainage, redness, swelling).  2. It has been over 8 to 10 hours since surgery and you are still not able to urinate (pass water).  3. Headache for over 24 hours.  4. Numbness, tingling or weakness the day after surgery (if you had spinal anesthesia).  Your doctor is:       Dr. Eugene Guzman, [facility]: [phone]               Or dial [phone] and ask for the resident on call for:   Breast Center  For emergency care, call the:  [place] [facility]:  [phone] (TTY for hearing impaired: [phone])                Pending Results     Date and Time Order Name Status Description    6/7/2018 1405 Surgical pathology exam In process     6/7/2018 1218 NM Lymphoscintigraphy Injection only In process     6/7/2018 1151 MA BREAST SPECIMEN RIGHT In process     6/7/2018 1146 MA POST PROCEDURE RIGHT In process     6/7/2018 1122 US BREAST WIRE PLACEMENT , 1ST LESION, RIGHT In process             Admission Information     Date & Time Provider Department Dept. Phone    6/7/2018 Eugene Guzman MD [facility] [phone]      Your Vitals Were     Blood Pressure Temperature Respirations Height Weight Pulse Oximetry    123/64 97.5  F (36.4  C) (Oral) 16 1.524 m (5') 52.3 kg (115 lb 4.8 oz) 99%    BMI (Body Mass Index)                   22.52 kg/m2           Swift[employer] Information     [employer] gives you secure access to your electronic health record. If you see a primary care provider, you can also send messages to your care team and make appointments. If you have questions, please call your primary care clinic.  If you do not have a primary care provider, please call [phone] and they will assist you.      Swift[employer] is an electronic gateway that provides easy, online access to your medical records. With [employer], you can request a clinic appointment, read your test results, renew a prescription or communicate with your care team.     To access your existing account, please contact your [facility] or call [phone] for assistance.        Care EveryWhere ID     This is your Care EveryWhere ID. This could be used by other organizations to access your [place] medical records  [id number]        Equal Access to Services     CARTER DEL RIO AH: katja Keita, khadar borrego  laerik ahBart So [facility] [phone].    ATENCIÓN: Si donnala thanh, tiene a phillips disposición servicios gratuitos de asistencia lingüística. Kvng norton [phone].    We comply with applicable federal civil rights laws and [place] laws. We do not discriminate on the basis of race, color, national origin, age, disability, sex, sexual orientation, or gender identity.               Review of your medicines      START taking        Dose / Directions    acetaminophen 325 MG tablet   Commonly known as:  TYLENOL   Used for:  Post-op pain        Dose:  650 mg   Take 2 tablets (650 mg) by mouth every 4 hours as needed for other (mild pain)   Quantity:  100 tablet   Refills:  0       oxyCODONE IR 5 MG tablet   Commonly known as:  ROXICODONE   Used for:  Post-op pain        Dose:  5-10 mg   Take 1-2 tablets (5-10 mg) by mouth every 3 hours as needed for pain or other (Moderate to Severe)   Quantity:  6 tablet   Refills:  0         CONTINUE these medicines which have NOT CHANGED        Dose / Directions    FISH OIL PO        Dose:  1 capsule   Take 1 capsule by mouth daily.   Refills:  0       levothyroxine 100 MCG tablet   Commonly known as:  SYNTHROID/LEVOTHROID   Used for:  Hypothyroidism due to Hashimoto's thyroiditis        Dose:  100 mcg   Take 1 tablet (100 mcg) by mouth daily Take by mouth daily   Quantity:  60 tablet   Refills:  1       MULTIVITAMIN & MINERAL PO        Dose:  2 tablet   Take 2 tablets by mouth daily.   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 Ask your nurse if you have questions.      Bring a paper prescription for each of these medications     oxyCODONE IR 5 MG tablet       You don't need a prescription for these medications     acetaminophen 325 MG tablet                Protect others around you: Learn how to safely use, store and throw away your medicines at www.disposemymeds.org.        Information about OPIOIDS     PRESCRIPTION OPIOIDS: WHAT YOU NEED TO KNOW   You have a prescription for an opioid (narcotic) pain medicine. Opioids can cause addiction. If you have a history of chemical dependency of any type, you are at a higher risk of becoming addicted to opioids. Only take this medicine after all other options have been tried. Take it for as short a time and as few doses as possible.     Do not:    Drive. If you drive while taking these medicines, you could be arrested for driving under the influence (DUI).    Operate heavy machinery    Do any other dangerous activities while taking these medicines.     Drink any alcohol while taking these medicines.      Take with any other medicines that contain acetaminophen. Read all labels carefully. Look for the word  acetaminophen  or  Tylenol.  Ask your pharmacist if you have questions or are unsure.    Store your pills in a secure place, locked if possible. We will not replace any lost or stolen medicine. If you don t finish your medicine, please throw away (dispose) as directed by your pharmacist. The [place] Pollution Control Agency has more information about safe disposal: https://www.pca.[facility].mn.us/living-green/managing-unwanted-medications    All opioids tend to cause constipation. Drink plenty of water and eat foods that have a lot of fiber, such as fruits, vegetables, prune juice, apple juice and high-fiber cereal. Take a laxative (Miralax, milk of magnesia, Colace, Senna) if you don t move your bowels at least every other day.              Medication List: This is a list of all your medications and when to take them. Check marks  below indicate your daily home schedule. Keep this list as a reference.      Medications           Morning Afternoon Evening Bedtime As Needed    acetaminophen 325 MG tablet   Commonly known as:  TYLENOL   Take 2 tablets (650 mg) by mouth every 4 hours as needed for other (mild pain)   Last time this was given:  975 mg on 6/7/2018 10:12 AM                                FISH OIL PO   Take 1 capsule by mouth daily.                                levothyroxine 100 MCG tablet   Commonly known as:  SYNTHROID/LEVOTHROID   Take 1 tablet (100 mcg) by mouth daily Take by mouth daily                                MULTIVITAMIN & MINERAL PO   Take 2 tablets by mouth daily.                                oxyCODONE IR 5 MG tablet   Commonly known as:  ROXICODONE   Take 1-2 tablets (5-10 mg) by mouth every 3 hours as needed for pain or other (Moderate to Severe)                                pyridoxine 100 MG tablet   Commonly known as:  VITAMIN B-6   Take 1 tablet (100 mg) by mouth daily                                VITAMIN D (CHOLECALCIFEROL) PO   Take 2,000 Units by mouth daily

## 2018-06-07 NOTE — DISCHARGE INSTRUCTIONS
[facility]  Home Care Following Anesthesia  For 24 hours after surgery:  1. Get plenty of rest.  A responsible adult must stay with you for at least 24 hours after you leave the surgery center.  2. Do not drive or use heavy equipment.  If you have weakness or tingling, don't drive or use heavy equipment until this feeling goes away.   3. Do not drink alcohol.   4. Avoid strenuous or risky activities.  Ask for help when climbing stairs.  5. You may feel lightheaded.  IF so, sit for a few minutes before standing.  Have someone help you get up.   6. If you have nausea (feel sick to your stomach): Drink only clear liquids such as apple juice, ginger ale, broth or 7-Up.  Rest may also help.  Be sure to drink enough fluids.  Move to a regular diet as you feel able.   7. You may have a slight fever.  Call the doctor if your fever is over 100 F (37.7 C) (taken under the tongue) or lasts longer than 24 hours.  8. You may have a dry mouth, a sore throat, muscle aches or trouble sleeping. These should go away after 24 hours.  9. Do not make important or legal decisions.               Tips for taking pain medications  To get the best pain relief possible, remember these points:    Take pain medications as directed, before pain becomes severe.    Pain medication can upset your stomach: taking it with food may help.    Constipation is a common side effect of pain medication. Drink plenty of  fluids.    Eat foods high in fiber. Take a stool softener if recommended by your doctor or pharmacist.    Do not drink alcohol, drive or operate machinery while taking pain medications.    Ask about other ways to control pain, such as with heat, ice or relaxation.    Tylenol/Acetaminophen Consumption  To help encourage the safe use of acetaminophen, the makers of TYLENOL  have lowered the maximum daily dose for single-ingredient Extra Strength TYLENOL  (acetaminophen) products sold in the [place] from 8  pills per day (4,000 mg) to 6 pills per day (3,000 mg). The dosing interval has also changed from 2 pills every 4-6 hours to 2 pills every 6 hours.    If you feel your pain relief is insufficient, you may take Tylenol/Acetaminophen in addition to your narcotic pain medication.     Be careful not to exceed 3,000 mg of Tylenol/Acetaminophen in a 24 hour period from all sources.    If you are taking extra strength Tylenol/acetaminophen (500 mg), the maximum dose is 6 tablets in 24 hours.    If you are taking regular strength acetaminophen (325 mg), the maximum dose is 9 tablets in 24 hours.    Call a doctor for any of the followin. Signs of infection (fever, growing tenderness at the surgery site, a large amount of drainage or bleeding, severe pain, foul-smelling drainage, redness, swelling).  2. It has been over 8 to 10 hours since surgery and you are still not able to urinate (pass water).  3. Headache for over 24 hours.  4. Numbness, tingling or weakness the day after surgery (if you had spinal anesthesia).  Your doctor is:       Dr. Eugene Guzman, [facility]: [phone]               Or dial [phone] and ask for the resident on call for:  [facility]  For emergency care, call the:  [place] [facility] Department:  [phone] (TTY for hearing impaired: [phone])

## 2018-06-07 NOTE — PROGRESS NOTES
SBAR Wire Localization     SITUATION:  Patient to breast imaging center for imaging guided wire localizations before breast lumpectomy or excision biopsy without sentinel node injection.    BACKGROUND:  Breast imaging cancer, breast abnormality  Ordered procedure completed: Yes  Special needs identified: Yes     ASSESSMENT:  SBAR report called to patient care unit because of unexpected event in radiology: No  Allergies and medication list reviewed prior to procedure. Yes  Skin cleansed with ChloraPrep One-Step.  Anesthesia: approximately 4ml of 1% Lidocaine injection subcutaneous before wire insertion administered by the radiologist.   Gauze dressing over insertion site(s).  Post procedure mammogram completed: Yes    Patient tolerance:Tolerated procedure without issue    RECOMMENDATIONS:  Patient transferred to Same Day Surgery in stable condition via wheelchair with Breast Imaging Staff.  Copy of note given to patient and instructions to hand this note to surgery staff.    Please call Breast Center at [facility] [phone] if there are any questions.

## 2018-06-07 NOTE — ANESTHESIA CARE TRANSFER NOTE
Patient: Julieta Rivera    Procedure(s):  Right Wire Localized Lumpectomy And Right [place] Lymph Node Biopsy - Wound Class: I-Clean    Diagnosis: Malignant Neoplasm Of Right Breast   Diagnosis Additional Information: No value filed.    Anesthesia Type:   MAC     Note:  Airway :Room Air  Patient transferred to:Phase II  Comments: Report to rN    97.5, 82/51, 16, 76, 98%Handoff Report: Identifed the Patient, Identified the Reponsible Provider, Reviewed the pertinent medical history, Discussed the surgical course, Reviewed Intra-OP anesthesia mangement and issues during anesthesia, Set expectations for post-procedure period and Allowed opportunity for questions and acknowledgement of understanding      Vitals: (Last set prior to Anesthesia Care Transfer)    CRNA VITALS  6/7/2018 1417 - 6/7/2018 1453      6/7/2018             Resp Rate (set): 10                Electronically Signed By: JOSÉ LUIS Arriaga CRNA  June 7, 2018  2:53 PM

## 2018-06-07 NOTE — ANESTHESIA POSTPROCEDURE EVALUATION
Patient: Julieta Rivera    Procedure(s):  Right Wire Localized Lumpectomy And Right [place] Lymph Node Biopsy - Wound Class: I-Clean    Diagnosis:Malignant Neoplasm Of Right Breast   Diagnosis Additional Information: No value filed.    Anesthesia Type:  MAC    Note:  Anesthesia Post Evaluation    Patient location during evaluation: Phase 2  Patient participation: Able to fully participate in evaluation  Level of consciousness: awake and alert  Pain management: adequate  Airway patency: patent  Cardiovascular status: acceptable  Respiratory status: acceptable  Hydration status: acceptable  PONV: none     Anesthetic complications: None          Last vitals:  Vitals:    06/07/18 1442 06/07/18 1505 06/07/18 1512   BP: (!) 86/46 92/60 91/43   Resp: 14 16 16   Temp: 36.4  C (97.5  F) 36.4  C (97.6  F)    SpO2: 98% 99% 100%         Electronically Signed By: Samy James MD  June 7, 2018  3:16 PM

## 2018-06-07 NOTE — IP AVS SNAPSHOT
[facility]    [address] [place] [address]    Phone:  [phone]    Fax:  [phone]                                       After Visit Summary   6/7/2018    Julieta Rivera    MRN: [id number]           After Visit Summary Signature Page     I have received my discharge instructions, and my questions have been answered. I have discussed any challenges I see with this plan with the nurse or doctor.    ..........................................................................................................................................  Patient/Patient Representative Signature      ..........................................................................................................................................  Patient Representative Print Name and Relationship to Patient    ..................................................               ................................................  Date                                            Time    ..........................................................................................................................................  Reviewed by Signature/Title    ...................................................              ..............................................  Date                                                            Time

## 2018-06-07 NOTE — OP NOTE
Procedure Date: 06/07/2018      PREOPERATIVE DIAGNOSIS:  Right breast cancer.      POSTOPERATIVE DIAGNOSIS:  Right breast cancer.      PROCEDURE:  Lymphatic mapping, right wire localized lumpectomy and right axillary sentinel lymph node biopsy x4.      ATTENDING SURGEON:  Eugene Guzman MD      RESIDENT SURGEON:  Eh Hancock MD       ANESTHESIA:  Local with IV sedation.      INDICATIONS FOR SURGERY:  The patient is a 67-year-old woman who was diagnosed with a stage I, triple-negative breast cancer.  She received preoperative chemotherapy and now presents for surgical treatment.      PROCEDURE IN DETAIL:  After informed consent, the patient was brought to the operating room and given IV sedation.  I injected technetium sulfur colloid into the subareolar area and isosulfan blue in the peritumoral location.  The patient was then prepped and draped in the usual fashion.  We started with the lumpectomy first.  A local anesthetic was administered to the upper outer portion of the right breast.  A curvilinear incision was made with a scalpel.  The Bovie cautery was used to incise the subcutaneous tissues.  Dissection initially proceeded inferiorly.  The guidewire was intercepted.  We then dissected down to the thick portion of the guidewire and then circumferentially dissected around the guidewire.  The specimen was removed, oriented and sent to the Breast Center.  Specimen radiograph demonstrated complete excision of the target lesion, the clip and the guidewire.  Surgical clips were placed in all 4 quadrants of the resection bed to facilitate radiation therapy.  Next, we identified a transcutaneous hot spot in the right axilla.  A transverse axillary incision was made with a scalpel.  The Bovie cautery was used to incise the subcutaneous tissues.  The clavipectoral fascia was incised.  We identified a blue-stained lymph node.  [place] lymph node #1 was removed, but was not radioactive.  We did identify a firm node  next to it, which was not blue and not radioactive; that was called #2.  [place] lymph node #3 was not blue, but it was radioactive.  It was removed and had ex vivo counts of 350 counts per second.  We found a 4th sentinel lymph node that was not blue, but radioactive and had ex vivo counts of 250 counts per second.  After removal of the 4th sentinel lymph node, there were no additional blue radioactive or palpable lymph nodes.  Both incisions were closed with interrupted 3-0 Vicryl suture for the dermal layer and a running 4-0 PDS subcuticular stitch for the skin.  Dermabond was placed, and the patient was taken to the recovery room in stable condition.         SABI BARRIOS MD             D: 2018   T: 2018   MT: emmie      Name:     FAIZA BURGOS   MRN:      [id number]        Account:        [id number]   :      1950           Procedure Date: 2018      Document: [id number]

## 2018-06-07 NOTE — BRIEF OP NOTE
[facility]    Brief Operative Note    Pre-operative diagnosis: Malignant Neoplasm Of Right Breast   Post-operative diagnosis * No post-op diagnosis entered *  Procedure: Procedure(s):  Right Wire Localized Lumpectomy And Right [place] Lymph Node Biopsy - Wound Class: I-Clean  Surgeon: Surgeon(s) and Role:     * Eugene Guzman MD - Primary  Anesthesia: Monitor Anesthesia Care   Estimated blood loss: 1cc  Drains: None  Specimens:   ID Type Source Tests Collected by Time Destination   A : Right breast lumpectomy (OR 4, #[phone]) Tissue Breast, Right SURGICAL PATHOLOGY EXAM Eugene Guzman MD 6/7/2018  1:58 PM    B : Sentinal node right axillary #1 Tissue Lymph Node, [place] SURGICAL PATHOLOGY EXAM Eugene Guzman MD 6/7/2018  2:08 PM    C : Sentinal lymph node right axillary #2 Tissue Lymph Node, [place] SURGICAL PATHOLOGY EXAM Eugene Guzman MD 6/7/2018  2:19 PM    D : Right axillary sentinal lymph node #3 Tissue Lymph Node, [place] SURGICAL PATHOLOGY EXAM Moni Simon RN 6/7/2018  2:22 PM    E : Right axillary sentinal lymph node #4 Tissue Lymph Node, [place] SURGICAL PATHOLOGY EXAM Moni Simon, RN 6/7/2018  2:27 PM      Findings:   R breast lumpectomy, sentinel node biopsy x4  Complications: None.  Implants: None.

## 2018-06-14 LAB — COPATH REPORT: NORMAL

## 2018-06-15 ENCOUNTER — OFFICE VISIT (OUTPATIENT)
Dept: ONCOLOGY | Facility: CLINIC | Age: 68
End: 2018-06-15
Attending: SURGERY
Payer: COMMERCIAL

## 2018-06-15 VITALS
WEIGHT: 113.38 LBS | TEMPERATURE: 97.4 F | SYSTOLIC BLOOD PRESSURE: 94 MMHG | BODY MASS INDEX: 22.26 KG/M2 | RESPIRATION RATE: 16 BRPM | DIASTOLIC BLOOD PRESSURE: 62 MMHG | HEART RATE: 80 BPM | HEIGHT: 60 IN | OXYGEN SATURATION: 99 %

## 2018-06-15 DIAGNOSIS — Z17.1 MALIGNANT NEOPLASM OF UPPER-OUTER QUADRANT OF RIGHT BREAST IN FEMALE, ESTROGEN RECEPTOR NEGATIVE (H): Primary | ICD-10-CM

## 2018-06-15 DIAGNOSIS — C50.411 MALIGNANT NEOPLASM OF UPPER-OUTER QUADRANT OF RIGHT BREAST IN FEMALE, ESTROGEN RECEPTOR NEGATIVE (H): Primary | ICD-10-CM

## 2018-06-15 PROCEDURE — G0463 HOSPITAL OUTPT CLINIC VISIT: HCPCS | Mod: ZF

## 2018-06-15 ASSESSMENT — PAIN SCALES - GENERAL: PAINLEVEL: EXTREME PAIN (8)

## 2018-06-15 NOTE — LETTER
6/15/2018      RE: Julieta Rivera  [address]  [place] [address]       HISTORY OF PRESENT ILLNESS:  Julieta Rivera is here for a postoperative visit after undergoing a right lumpectomy and sentinel lymph node biopsy.  Her final pathology report revealed a 2.5 mm residual invasive disease.  Her margins were negative.  She had DCIS and those margins were negative as well.  Her sentinel nodes were all negative.      PHYSICAL EXAMINATION:  Both her incisions are healing well with no evidence of infection.  She has a fairly large seroma in her right axilla.  This was cleaned, anesthetized and aspirated.      IMPRESSION:  Postop check.      PLAN:  She is going to follow up with Dr. Gay.  We are going to try to make arrangements for her to have radiation in [place].  I will see her in the future if any problems arise.      cc:   Jonathan Gay MD     Physicians   [address] [facility], [facility] [address]   [place] [address]         Eugene Guzman MD

## 2018-06-15 NOTE — PROGRESS NOTES
HISTORY OF PRESENT ILLNESS:  Julieta Rivera is here for a postoperative visit after undergoing a right lumpectomy and sentinel lymph node biopsy.  Her final pathology report revealed a 2.5 mm residual invasive disease.  Her margins were negative.  She had DCIS and those margins were negative as well.  Her sentinel nodes were all negative.      PHYSICAL EXAMINATION:  Both her incisions are healing well with no evidence of infection.  She has a fairly large seroma in her right axilla.  This was cleaned, anesthetized and aspirated.      IMPRESSION:  Postop check.      PLAN:  She is going to follow up with Dr. Gay.  We are going to try to make arrangements for her to have radiation in [place].  I will see her in the future if any problems arise.      cc:   Jonathan Gay MD     Physicians   [address] [facility], [facility] [address]   [place] [address]

## 2018-06-15 NOTE — NURSING NOTE
Oncology Rooming Note    Lidya 15, 2018 9:42 AM   Julieta Rivera is a 67 year old female who presents for:    Chief Complaint   Patient presents with     Oncology Clinic Visit     Return: Post Op - Mass formed under incision where sentinal nodes where removed     Initial Vitals: BP 94/62  Pulse 80  Temp 97.4  F (36.3  C) (Oral)  Resp 16  Ht 1.524 m (5')  Wt 51.4 kg (113 lb 6 oz)  SpO2 99%  BMI 22.14 kg/m2 Estimated body mass index is 22.14 kg/(m^2) as calculated from the following:    Height as of this encounter: 1.524 m (5').    Weight as of this encounter: 51.4 kg (113 lb 6 oz). Body surface area is 1.48 meters squared.  Extreme Pain (8) Comment: under armpit on right side   No LMP recorded. Patient is postmenopausal.  Allergies reviewed: Yes  Medications reviewed: Yes    Medications: Medication refills not needed today.  Pharmacy name entered into [employer]:    [id number] [place], [place] - [address] [facility] [place], [place] - [address] [place] [address] & [id number] - [place], [place] - [address]    Clinical concerns: new concerns are that she has formed a large mass under her right armpit where the sentinal nodes where removed.  It is very painful, an 8 on the pain scale and she noticed it on Monday and it has continued to get bigger since then.  Dr. Guzman was notified.    10 minutes for nursing intake (face to face time)     Yoko Eller CMA

## 2018-06-15 NOTE — LETTER
6/15/2018       RE: Julieta Rivera  [address]  [place] [address]     Dear Colleague,    Thank you for referring your patient, Julieta Rivera, to the [facility] at [facility]. Please see a copy of my visit note below.    HISTORY OF PRESENT ILLNESS:  Julieta Rivera is here for a postoperative visit after undergoing a right lumpectomy and sentinel lymph node biopsy.  Her final pathology report revealed a 2.5 mm residual invasive disease.  Her margins were negative.  She had DCIS and those margins were negative as well.  Her sentinel nodes were all negative.      PHYSICAL EXAMINATION:  Both her incisions are healing well with no evidence of infection.  She has a fairly large seroma in her right axilla.  This was cleaned, anesthetized and aspirated.      IMPRESSION:  Postop check.      PLAN:  She is going to follow up with Dr. Gay.  We are going to try to make arrangements for her to have radiation in [place].  I will see her in the future if any problems arise.      cc:   Jonathan Gay MD     Physicians   [address] [facility], [facility] [address]   [place] [address]         Again, thank you for allowing me to participate in the care of your patient.      Sincerely,    Eugene Guzman MD

## 2018-06-20 DIAGNOSIS — L76.34 POSTOPERATIVE SEROMA OF SKIN AFTER NON-DERMATOLOGIC PROCEDURE: Primary | ICD-10-CM

## 2018-06-21 DIAGNOSIS — C50.911 MALIGNANT NEOPLASM OF RIGHT BREAST (H): Primary | ICD-10-CM

## 2018-06-22 ENCOUNTER — RADIANT APPOINTMENT (OUTPATIENT)
Dept: MAMMOGRAPHY | Facility: CLINIC | Age: 68
End: 2018-06-22
Attending: SURGERY
Payer: COMMERCIAL

## 2018-06-22 DIAGNOSIS — C50.911 MALIGNANT NEOPLASM OF RIGHT BREAST (H): ICD-10-CM

## 2018-06-22 RX ORDER — LIDOCAINE HYDROCHLORIDE 10 MG/ML
10 INJECTION, SOLUTION EPIDURAL; INFILTRATION; INTRACAUDAL; PERINEURAL ONCE
Status: COMPLETED | OUTPATIENT
Start: 2018-06-22 | End: 2018-06-22

## 2018-06-22 RX ADMIN — LIDOCAINE HYDROCHLORIDE 10 ML: 10 INJECTION, SOLUTION EPIDURAL; INFILTRATION; INTRACAUDAL; PERINEURAL at 11:31

## 2018-06-24 NOTE — PROGRESS NOTES
Dr. Eugene Guzman  Professor  Department of Surgery  [address] [address]      June 5, 2018      Dear Dr. Guzman,     Thank you for referring Julieta Rivera to our clinic for recommendations for her new diagnosis of triple negative breast cancer.       HISTORY OF PRESENT ILLNESS:  Julieta Rivera is a 67-year-old woman who was referred to our clinic with a new diagnosis of right triple-negative breast cancer.  Julieta was followed by routine screening mammography, when she was discovered to have a 7 x 6 x 9-mm mass at the 12 o'clock position of the right breast 6 cm from the nipple-areolar complex.  She did undergo a biopsy of this mass which showed an ER-negative, AZ-negative, HER2-nonamplified, invasive mammary carcinoma of no special type, invasive ductal carcinoma, [place] grade 3.  Ductal carcinoma in situ was also noted.  Nuclear grade 2 solid type.  HER2 FISH showed no amplification.  She now comes to our clinic for recommendations.       She has hypothyroidism and is on levothyroxine 88 alternating with 100 mcg daily.  She has no pain.  She has fatigue related to the care of a grandson with esophageal atresia at home.  She has no depression and no anxiety.  She has no weight loss.  Diet has not changed.  She has no loss of energy.  She does not sleep during the day.  She can perform all of her household chores.  She has noticed no abnormality of either breast.         PAST MEDICAL HISTORY:  She has no history of breast surgery in the past or breast cancer in the past.  She has no history of radiation of any kind.  She has no history of tumor of any kind.  She may have a history of a heart problem with mitral prolapse and a murmur.  The last echo we have on record is from 1996.  She has no history of heart attack, breathing problems, blood clots, seizures, arthritis, peptic ulcer disease, osteoporosis or bone fractures.  She is not currently participating in a  clinical trial and has not had any significant weight loss.  She has no history of hypertension, but she does have a history of factor V Leiden because her sister was diagnosed with factor V Leiden and Julieta was tested, although Julieta herself has had no blood clots or pulmonary emboli.       FAMILY HISTORY:  There is a history of breast cancer in two paternal aunts, but no first-degree relatives.  One of her aunts was diagnosed in her 50s, the other in her 60s.  She has no male relatives with breast cancer.  The remainder of her family history was negative.        PAST MENSTRUAL HISTORY:  First period was at age 12-.  Last menstrual period was in 2003.  She has been pregnant twice at age 26 and 30 with two live births and no miscarriages or abortions.  She used oral contraceptives only once or twice.  Uterus and ovaries are in place.  She has no history of hormone replacement therapy.        ALLERGIES:  She has no allergy to seafood, iodine or contrast dye.  She does not take aspirin.       HABITS:  She did smoke 1 pack per day in college for 3 years from age 17 to 20 and has not smoked since.  She does not drink significant alcohol and has no heavy alcohol history in the past.        PERSONAL AND SOCIAL HISTORY:  She does have a history of being a .  Her  is 80 years old but is able to take care of his activities of daily living.  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        PAST MEDICAL HISTORY:  Julieta has no history of angina.  She has no history of hypertension.  Her cholesterol has generally been in acceptable range, although slightly over 200 recently.       FAMILY HISTORY:  Positive for heart disease.  Father had an MI in his 50s and had a bypass and  at age 78.  Mother had rheumatic heart disease at age 38 and  at  age 42.       INTERVAL HISTORY:  Julieta returns to clinic after having completed 4 cycles of TC chemotherapy.  She has some pain in her hands, which is neuropathic.  No other pain, no fatigue, no depression, no anxiety.  Her surgery is scheduled for Thursday, 06/07. Julieta Rivear returns to clinic after her surgery feeling reasonably well.  She does have 6-7/10 pain in the right axilla related to her seroma.  I did emphasize to her that she can take Tylenol 500 mg every 6 hours for this pain.  She is able to sleep at night.  She was reluctant to take Tylenol but she is willing to consider it.  She has no fatigue, no depression, no anxiety.      REVIEW OF SYSTEMS:  She denies fevers or chills, cough, chest pain, shortness of breath, nausea, vomiting, constipation, diarrhea, bone pain, back pain or headache.  The remainder of a 10-point systems is negative.  She has had no swelling in her legs and no shortness of breath with exertion or pleuritic chest pain.      She does have a complaint of swelling in the right armpit and a seroma there.      She is eating a healthful diet.  She is beginning to do some exercise.  She takes calcium and vitamin D.      PHYSICAL EXAMINATION:   VITAL SIGNS:  Blood pressure 95/58, temperature 97.9, pulse 80, respirations 16, O2 sat 98% on room air, weight 50.4 kg.   GENERAL:  Julieta appeared generally well.  She has no lesions in the oropharynx.  She does have alopecia and is wearing a headscarf.   HEENT:  No lesions in the oropharynx.  II/VI systolic murmur at the LSB radiating to the apex.   LYMPH:  There is no palpable cervical, supraclavicular, subclavicular or left axillary lymphadenopathy.  She does have swelling in the right arm had consistent with a seroma.  She has a well-healed incision at the 10 o'clock position of the right breast, 3 fingerbreadths from the nipple-areolar complex, without erythema or masses.  No masses in the right breast.  No masses in the left breast.   LUNGS:   Clear to percussion and auscultation.   HEART:  Regular rate and rhythm, S1, S2.   ABDOMEN:  Soft and nontender, without hepatosplenomegaly.   EXTREMITIES:  Without edema.   PSYCHIATRIC:  Mood and affect were normal.      LABORATORY DATA:  The CMP is within normal limits.  TSH 2.79.  The CBC is within normal limits.      IMAGING:  Ultrasound drainage was performed with a seroma on 06/22/2018.      ASSESSMENT AND PLAN:    1.  Julieta Rivera is a 67-year-old woman with a history of a stage I clinical T1b N0 MX, invasive ductal carcinoma of the right breast, triple negative in histology.  Maximum dimension at diagnosis 9 mm, grade 3.  The pathology showed an RCB1 response with significant reduction of the tumor after neoadjuvant docetaxel and cyclophosphamide.  She underwent the lumpectomy without difficulty.  She had no complications that would be suggestive of any thrombotic events.  The pathology showed remaining tumor of 2.5 mm in 1 focus of Jeff grade 2 in 1 focus of microinvasive ductal carcinoma 0.3 mm residual after neoadjuvant chemotherapy.  There was DCIS, nuclear grade 3, solid type, size 2 mm adjacent to the invasive carcinoma.  No lymphovascular invasion was detected.  Margins were uninvolved by invasive carcinoma and uninvolved by DCIS.  The invasive carcinoma is estrogen receptor and progesterone receptor negative by immunohistochemistry and HER2 was non-amplified by FISH with a ratio of 1.2.  The final stage was [id number].  A total of 8 sentinel nodes were examined, which were negative for cancer.   2.  Referral to Cardiology was completed.  Dr. Tidwell saw the patient and the patient has mild aortic stenosis.   3.  Factor V Leiden without history of clot.  She does have a port in place.  I did recommend keeping the port in place through radiation and then the port can be removed.  I discussed with her is probably better to take care of one thing at a time.   4.  Referral to Dr. Shy Conner for  postoperative radiation therapy.  A referral has been placed.   5.  Followup.  We will see Julieta in followup in our clinic in 1 month.  Appointment with Dr. Shy Conner within 2 weeks.  Follow up with me 7-24 with CBC, CMP.         All of Julieta's questions were answered.  I did encourage her to take Tylenol for pain.  We will continue to monitor the seroma.  All of her questions were answered.      Thank you, Dr. Guzman, for allowing us to continue to participate in Julieta Rivera's care.     Sincerely,    Jonathan Gay M.D.      Division of Hematology, Oncology, Transplant   Department of Medicine   [facility]   [phone]     I spent 30 minutes with the patient more than 50% of which was in counseling and coordination of care.

## 2018-06-26 ENCOUNTER — APPOINTMENT (OUTPATIENT)
Dept: LAB | Facility: CLINIC | Age: 68
End: 2018-06-26
Attending: INTERNAL MEDICINE
Payer: COMMERCIAL

## 2018-06-26 ENCOUNTER — ONCOLOGY VISIT (OUTPATIENT)
Dept: ONCOLOGY | Facility: CLINIC | Age: 68
End: 2018-06-26
Attending: INTERNAL MEDICINE
Payer: COMMERCIAL

## 2018-06-26 VITALS
DIASTOLIC BLOOD PRESSURE: 58 MMHG | WEIGHT: 111.2 LBS | SYSTOLIC BLOOD PRESSURE: 95 MMHG | BODY MASS INDEX: 21.72 KG/M2 | TEMPERATURE: 97.9 F | HEART RATE: 80 BPM | RESPIRATION RATE: 16 BRPM | OXYGEN SATURATION: 98 %

## 2018-06-26 DIAGNOSIS — Z17.1 MALIGNANT NEOPLASM OF UPPER-OUTER QUADRANT OF RIGHT BREAST IN FEMALE, ESTROGEN RECEPTOR NEGATIVE (H): Primary | ICD-10-CM

## 2018-06-26 DIAGNOSIS — E03.9 HYPOTHYROIDISM, UNSPECIFIED TYPE: ICD-10-CM

## 2018-06-26 DIAGNOSIS — C50.411 MALIGNANT NEOPLASM OF UPPER-OUTER QUADRANT OF RIGHT BREAST IN FEMALE, ESTROGEN RECEPTOR NEGATIVE (H): Primary | ICD-10-CM

## 2018-06-26 LAB
ALBUMIN SERPL-MCNC: 3.6 G/DL (ref 3.4–5)
ALP SERPL-CCNC: 57 U/L (ref 40–150)
ALT SERPL W P-5'-P-CCNC: 19 U/L (ref 0–50)
ANION GAP SERPL CALCULATED.3IONS-SCNC: 12 MMOL/L (ref 3–14)
AST SERPL W P-5'-P-CCNC: 19 U/L (ref 0–45)
BASOPHILS # BLD AUTO: 0 10E9/L (ref 0–0.2)
BASOPHILS NFR BLD AUTO: 0.4 %
BILIRUB SERPL-MCNC: 0.5 MG/DL (ref 0.2–1.3)
BUN SERPL-MCNC: 11 MG/DL (ref 7–30)
CALCIUM SERPL-MCNC: 8.6 MG/DL (ref 8.5–10.1)
CHLORIDE SERPL-SCNC: 105 MMOL/L (ref 94–109)
CO2 SERPL-SCNC: 21 MMOL/L (ref 20–32)
CREAT SERPL-MCNC: 0.62 MG/DL (ref 0.52–1.04)
DIFFERENTIAL METHOD BLD: ABNORMAL
EOSINOPHIL # BLD AUTO: 0.1 10E9/L (ref 0–0.7)
EOSINOPHIL NFR BLD AUTO: 1.6 %
ERYTHROCYTE [DISTWIDTH] IN BLOOD BY AUTOMATED COUNT: 13.2 % (ref 10–15)
GFR SERPL CREATININE-BSD FRML MDRD: >90 ML/MIN/1.7M2
GLUCOSE SERPL-MCNC: 113 MG/DL (ref 70–99)
HCT VFR BLD AUTO: 36 % (ref 35–47)
HGB BLD-MCNC: 11.8 G/DL (ref 11.7–15.7)
IMM GRANULOCYTES # BLD: 0 10E9/L (ref 0–0.4)
IMM GRANULOCYTES NFR BLD: 0.4 %
LYMPHOCYTES # BLD AUTO: 1 10E9/L (ref 0.8–5.3)
LYMPHOCYTES NFR BLD AUTO: 19.7 %
MCH RBC QN AUTO: 31.3 PG (ref 26.5–33)
MCHC RBC AUTO-ENTMCNC: 32.8 G/DL (ref 31.5–36.5)
MCV RBC AUTO: 96 FL (ref 78–100)
MONOCYTES # BLD AUTO: 0.3 10E9/L (ref 0–1.3)
MONOCYTES NFR BLD AUTO: 7 %
NEUTROPHILS # BLD AUTO: 3.5 10E9/L (ref 1.6–8.3)
NEUTROPHILS NFR BLD AUTO: 70.9 %
NRBC # BLD AUTO: 0 10*3/UL
NRBC BLD AUTO-RTO: 0 /100
PLATELET # BLD AUTO: 170 10E9/L (ref 150–450)
POTASSIUM SERPL-SCNC: 3.8 MMOL/L (ref 3.4–5.3)
PROT SERPL-MCNC: 6.5 G/DL (ref 6.8–8.8)
RBC # BLD AUTO: 3.77 10E12/L (ref 3.8–5.2)
SODIUM SERPL-SCNC: 139 MMOL/L (ref 133–144)
TSH SERPL DL<=0.005 MIU/L-ACNC: 2.79 MU/L (ref 0.4–4)
WBC # BLD AUTO: 4.9 10E9/L (ref 4–11)

## 2018-06-26 PROCEDURE — 99214 OFFICE O/P EST MOD 30 MIN: CPT | Mod: ZP | Performed by: INTERNAL MEDICINE

## 2018-06-26 PROCEDURE — 36591 DRAW BLOOD OFF VENOUS DEVICE: CPT

## 2018-06-26 PROCEDURE — 84443 ASSAY THYROID STIM HORMONE: CPT | Performed by: INTERNAL MEDICINE

## 2018-06-26 PROCEDURE — 85025 COMPLETE CBC W/AUTO DIFF WBC: CPT | Performed by: INTERNAL MEDICINE

## 2018-06-26 PROCEDURE — 80053 COMPREHEN METABOLIC PANEL: CPT | Performed by: INTERNAL MEDICINE

## 2018-06-26 PROCEDURE — 25000128 H RX IP 250 OP 636: Mod: ZF | Performed by: INTERNAL MEDICINE

## 2018-06-26 PROCEDURE — G0463 HOSPITAL OUTPT CLINIC VISIT: HCPCS | Mod: ZF

## 2018-06-26 RX ORDER — HEPARIN SODIUM (PORCINE) LOCK FLUSH IV SOLN 100 UNIT/ML 100 UNIT/ML
5 SOLUTION INTRAVENOUS
Status: COMPLETED | OUTPATIENT
Start: 2018-06-26 | End: 2018-06-26

## 2018-06-26 RX ADMIN — SODIUM CHLORIDE, PRESERVATIVE FREE 5 ML: 5 INJECTION INTRAVENOUS at 14:41

## 2018-06-26 ASSESSMENT — PAIN SCALES - GENERAL: PAINLEVEL: SEVERE PAIN (6)

## 2018-06-26 NOTE — MR AVS SNAPSHOT
After Visit Summary   6/26/2018    Julieta Rivera    MRN: [id number]           Patient Information     Date Of Birth          1950        Visit Information        Provider Department      6/26/2018 2:30 PM Jonathan Gay MD [facility]        Today's Diagnoses     Malignant neoplasm of upper-outer quadrant of right breast in female, estrogen receptor negative (H)    -  1    Hypothyroidism, unspecified type           Follow-ups after your visit        Additional Services     RADIATION THERAPY REFERRAL       Dr. Shy Conner                  Follow-up notes from your care team     Return in about 4 weeks (around 7/24/2018).      Who to contact     If you have questions or need follow up information about today's clinic visit or your schedule please contact [facility] directly at [phone].  Normal or non-critical lab and imaging results will be communicated to you by MyChart, letter or phone within 4 business days after the clinic has received the results. If you do not hear from us within 7 days, please contact the clinic through MyChart or phone. If you have a critical or abnormal lab result, we will notify you by phone as soon as possible.  Submit refill requests through [employer] or call your pharmacy and they will forward the refill request to us. Please allow 3 business days for your refill to be completed.          Additional Information About Your Visit        MyChart Information     [employer] gives you secure access to your electronic health record. If you see a primary care provider, you can also send messages to your care team and make appointments. If you have questions, please call your primary care clinic.  If you do not have a primary care provider, please call [phone] and they will assist you.        Care EveryWhere ID     This is your Care EveryWhere ID. This could be used by other organizations to access your [facility]  records  [id number]        Your Vitals Were     Pulse Temperature Respirations Pulse Oximetry BMI (Body Mass Index)       80 97.9  F (36.6  C) (Oral) 16 98% 21.72 kg/m2        Blood Pressure from Last 3 Encounters:   06/26/18 95/58   06/15/18 94/62   06/07/18 98/57    Weight from Last 3 Encounters:   06/26/18 50.4 kg (111 lb 3.2 oz)   06/15/18 51.4 kg (113 lb 6 oz)   06/07/18 52.3 kg (115 lb 4.8 oz)              We Performed the Following     **TSH with free T4 reflex FUTURE anytime     CBC with platelets differential     Comprehensive metabolic panel        Primary Care Provider Office Phone # Fax #    Simona GUERRA Tucker APRFRANKY [facility] [phone]       [address]  [facility] [address]        Goals        General    Medical (pt-stated)     Notes - Note created  4/3/2018 12:31 PM by Trice Galaviz, RN    Goal Statement: I will manage stress associated with caring for my grandchildren  Measure of Success: verbalization of stress reduction  Supportive Steps to Achieve: outpatient counseling, support of RN CC  Barriers: none  Strengths: family support  Date to Achieve By: 6 months            Equal Access to Services     CARTER DEL RIO AH: Hadii india pierre hadnomio Sosaraali, waaxda luqadaha, qaybta kaalmada adeegyada, khadar solano. So [facility] [phone].    ATENCIÓN: Si habla español, tiene a phillips disposición servicios gratuitos de asistencia lingüística. Llame al [phone].    We comply with applicable federal civil rights laws and [place] laws. We do not discriminate on the basis of race, color, national origin, age, disability, sex, sexual orientation, or gender identity.            Thank you!     Thank you for choosing [facility]  for your care. Our goal is always to provide you with excellent care. Hearing back from our patients is one way we can continue to improve our services. Please take a few minutes to complete the written survey that you may  receive in the mail after your visit with us. Thank you!             Your Updated Medication List - Protect others around you: Learn how to safely use, store and throw away your medicines at www.disposemymeds.org.          This list is accurate as of 6/26/18 11:59 PM.  Always use your most recent med list.                   Brand Name Dispense Instructions for use Diagnosis    acetaminophen 325 MG tablet    TYLENOL    100 tablet    Take 2 tablets (650 mg) by mouth every 4 hours as needed for other (mild pain)    Post-op pain       FISH OIL PO      Take 1 capsule by mouth daily.        levothyroxine 100 MCG tablet    SYNTHROID/LEVOTHROID    60 tablet    Take 1 tablet (100 mcg) by mouth daily Take by mouth daily    Hypothyroidism due to Hashimoto's thyroiditis       MULTIVITAMIN & MINERAL PO      Take 2 tablets by mouth daily.        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

## 2018-06-26 NOTE — LETTER
6/26/2018       RE: Julieta Rivera  [address] [place] [address]     Dear Colleague,    Thank you for referring your patient, Julieta Rivera, to the [facility]. Please see a copy of my visit note below.    Dr. Eugene Guzman  Professor  Department of Surgery  [facility]  [address] [place] [address]      June 5, 2018      Dear Dr. Guzman,     Thank you for referring Julieta Rivera to our clinic for recommendations for her new diagnosis of triple negative breast cancer.       HISTORY OF PRESENT ILLNESS:  Julieta Rivera is a 67-year-old woman who was referred to our clinic with a new diagnosis of right triple-negative breast cancer.  Julieta was followed by routine screening mammography, when she was discovered to have a 7 x 6 x 9-mm mass at the 12 o'clock position of the right breast 6 cm from the nipple-areolar complex.  She did undergo a biopsy of this mass which showed an ER-negative, IL-negative, HER2-nonamplified, invasive mammary carcinoma of no special type, invasive ductal carcinoma, [place] grade 3.  Ductal carcinoma in situ was also noted.  Nuclear grade 2 solid type.  HER2 FISH showed no amplification.  She now comes to our clinic for recommendations.       She has hypothyroidism and is on levothyroxine 88 alternating with 100 mcg daily.  She has no pain.  She has fatigue related to the care of a grandson with esophageal atresia at home.  She has no depression and no anxiety.  She has no weight loss.  Diet has not changed.  She has no loss of energy.  She does not sleep during the day.  She can perform all of her household chores.  She has noticed no abnormality of either breast.         PAST MEDICAL HISTORY:  She has no history of breast surgery in the past or breast cancer in the past.  She has no history of radiation of any kind.  She has no history of tumor of any kind.  She may have a history of a heart problem with mitral prolapse and a  murmur.  The last echo we have on record is from .  She has no history of heart attack, breathing problems, blood clots, seizures, arthritis, peptic ulcer disease, osteoporosis or bone fractures.  She is not currently participating in a clinical trial and has not had any significant weight loss.  She has no history of hypertension, but she does have a history of factor V Leiden because her sister was diagnosed with factor V Leiden and Julieta was tested, although Julieta herself has had no blood clots or pulmonary emboli.       FAMILY HISTORY:  There is a history of breast cancer in two paternal aunts, but no first-degree relatives.  One of her aunts was diagnosed in her 50s, the other in her 60s.  She has no male relatives with breast cancer.  The remainder of her family history was negative.        PAST MENSTRUAL HISTORY:  First period was at age 12-.  Last menstrual period was in 2003.  She has been pregnant twice at age 26 and 30 with two live births and no miscarriages or abortions.  She used oral contraceptives only once or twice.  Uterus and ovaries are in place.  She has no history of hormone replacement therapy.        ALLERGIES:  She has no allergy to seafood, iodine or contrast dye.  She does not take aspirin.       HABITS:  She did smoke 1 pack per day in college for 3 years from age 17 to 20 and has not smoked since.  She does not drink significant alcohol and has no heavy alcohol history in the past.        PERSONAL AND SOCIAL HISTORY:  She does have a history of being a .  Her  is 80 years old but is able to take care of his activities of daily living.  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        PAST MEDICAL HISTORY:  Julieta has no history of angina.  She has no history of hypertension.  Her cholesterol has generally  been in acceptable range, although slightly over 200 recently.       FAMILY HISTORY:  Positive for heart disease.  Father had an MI in his 50s and had a bypass and  at age 78.  Mother had rheumatic heart disease at age 38 and  at age 42.       INTERVAL HISTORY:  Julieta returns to clinic after having completed 4 cycles of TC chemotherapy.  She has some pain in her hands, which is neuropathic.  No other pain, no fatigue, no depression, no anxiety.  Her surgery is scheduled for Thursday, . Julieta Rivera returns to clinic after her surgery feeling reasonably well.  She does have 6-7/10 pain in the right axilla related to her seroma.  I did emphasize to her that she can take Tylenol 500 mg every 6 hours for this pain.  She is able to sleep at night.  She was reluctant to take Tylenol but she is willing to consider it.  She has no fatigue, no depression, no anxiety.      REVIEW OF SYSTEMS:  She denies fevers or chills, cough, chest pain, shortness of breath, nausea, vomiting, constipation, diarrhea, bone pain, back pain or headache.  The remainder of a 10-point systems is negative.  She has had no swelling in her legs and no shortness of breath with exertion or pleuritic chest pain.      She does have a complaint of swelling in the right armpit and a seroma there.      She is eating a healthful diet.  She is beginning to do some exercise.  She takes calcium and vitamin D.      PHYSICAL EXAMINATION:   VITAL SIGNS:  Blood pressure 95/58, temperature 97.9, pulse 80, respirations 16, O2 sat 98% on room air, weight 50.4 kg.   GENERAL:  Julieta appeared generally well.  She has no lesions in the oropharynx.  She does have alopecia and is wearing a headscarf.   HEENT:  No lesions in the oropharynx.  II/VI systolic murmur at the LSB radiating to the apex.   LYMPH:  There is no palpable cervical, supraclavicular, subclavicular or left axillary lymphadenopathy.  She does have swelling in the right arm had consistent with a  seroma.  She has a well-healed incision at the 10 o'clock position of the right breast, 3 fingerbreadths from the nipple-areolar complex, without erythema or masses.  No masses in the right breast.  No masses in the left breast.   LUNGS:  Clear to percussion and auscultation.   HEART:  Regular rate and rhythm, S1, S2.   ABDOMEN:  Soft and nontender, without hepatosplenomegaly.   EXTREMITIES:  Without edema.   PSYCHIATRIC:  Mood and affect were normal.      LABORATORY DATA:  The CMP is within normal limits.  TSH 2.79.  The CBC is within normal limits.      IMAGING:  Ultrasound drainage was performed with a seroma on 06/22/2018.      ASSESSMENT AND PLAN:    1.  Julieta Rivera is a 67-year-old woman with a history of a stage I clinical T1b N0 MX, invasive ductal carcinoma of the right breast, triple negative in histology.  Maximum dimension at diagnosis 9 mm, grade 3.  The pathology showed an RCB1 response with significant reduction of the tumor after neoadjuvant docetaxel and cyclophosphamide.  She underwent the lumpectomy without difficulty.  She had no complications that would be suggestive of any thrombotic events.  The pathology showed remaining tumor of 2.5 mm in 1 focus of [place] grade 2 in 1 focus of microinvasive ductal carcinoma 0.3 mm residual after neoadjuvant chemotherapy.  There was DCIS, nuclear grade 3, solid type, size 2 mm adjacent to the invasive carcinoma.  No lymphovascular invasion was detected.  Margins were uninvolved by invasive carcinoma and uninvolved by DCIS.  The invasive carcinoma is estrogen receptor and progesterone receptor negative by immunohistochemistry and HER2 was non-amplified by FISH with a ratio of 1.2.  The final stage was [id number].  A total of 8 sentinel nodes were examined, which were negative for cancer.   2.  Referral to Cardiology was completed.  Dr. Tidwell saw the patient and the patient has mild aortic stenosis.   3.  Factor V Leiden without history of clot.  She  does have a port in place.  I did recommend keeping the port in place through radiation and then the port can be removed.  I discussed with her is probably better to take care of one thing at a time.   4.  Referral to Dr. Shy Conner for postoperative radiation therapy.  A referral has been placed.   5.  Followup.  We will see Julieta in followup in our clinic in 1 month.  Appointment with Dr. Shy Conner within 2 weeks.  Follow up with me 7-24 with CBC, CMP.         All of Julieta's questions were answered.  I did encourage her to take Tylenol for pain.  We will continue to monitor the seroma.  All of her questions were answered.      Thank you, Dr. Guzman, for allowing us to continue to participate in Julieta Rivera's care.     Sincerely,    Jonathan Gay M.D.      Division of Hematology, Oncology, Transplant   Department of Medicine   [employer] [facility] [employer]   [phone]     I spent 30 minutes with the patient more than 50% of which was in counseling and coordination of care.       Julieta,    Your thyroid lab was normal.  Do you need refills?  If you have any questions, please feel free to contact the clinic.   FRED Lewis    Again, thank you for allowing me to participate in the care of your patient.      Sincerely,    Jonathan Gay MD

## 2018-06-26 NOTE — NURSING NOTE
Oncology Rooming Note    June 26, 2018 2:55 PM   Julieta Rivera is a 67 year old female who presents for:    Chief Complaint   Patient presents with     Port Draw     port accessed and labs drawn by rn.  vs taken.     Oncology Clinic Visit     return - breast ca      Initial Vitals: BP 95/58 (BP Location: Right arm, Patient Position: Sitting, Cuff Size: Adult Regular)  Pulse 80  Temp 97.9  F (36.6  C) (Oral)  Resp 16  Wt 50.4 kg (111 lb 3.2 oz)  SpO2 98%  BMI 21.72 kg/m2 Estimated body mass index is 21.72 kg/(m^2) as calculated from the following:    Height as of 6/15/18: 1.524 m (5').    Weight as of this encounter: 50.4 kg (111 lb 3.2 oz). Body surface area is 1.46 meters squared.  Severe Pain (6) Comment: Data Unavailable   No LMP recorded. Patient is postmenopausal.  Allergies reviewed: Yes  Medications reviewed: Yes    Medications: Medication refills not needed today.  Pharmacy name entered into Explore Engage:    [facility] [place], [place] - [address] [facility] [place], [place] - [address]  [id number] - [place], [place] - [address]    Clinical concerns: Would like to discuss the pathology report and armpit pain     6 minutes for nursing intake (face to face time)     Dee Cabezas CMA

## 2018-06-26 NOTE — NURSING NOTE
"Chief Complaint   Patient presents with     Port Draw     port accessed and labs drawn by rn.  vs taken.     Port accessed with 20g 3/4\" gripper needle, labs drawn, port flushed with saline and heparin, port de-accessed.  vitals checked, pt checked in for next appointment.  Suly Samayoa RN    "

## 2018-06-27 NOTE — PROGRESS NOTES
Julieta,    Your thyroid lab was normal.  Do you need refills?  If you have any questions, please feel free to contact the clinic.    FRED Lewis

## 2018-07-12 ENCOUNTER — PRE VISIT (OUTPATIENT)
Dept: RADIATION ONCOLOGY | Facility: CLINIC | Age: 68
End: 2018-07-12

## 2018-07-16 ENCOUNTER — OFFICE VISIT (OUTPATIENT)
Dept: RADIATION ONCOLOGY | Facility: CLINIC | Age: 68
End: 2018-07-16
Attending: RADIOLOGY
Payer: COMMERCIAL

## 2018-07-16 VITALS
DIASTOLIC BLOOD PRESSURE: 57 MMHG | BODY MASS INDEX: 21.76 KG/M2 | OXYGEN SATURATION: 99 % | SYSTOLIC BLOOD PRESSURE: 116 MMHG | HEART RATE: 78 BPM | WEIGHT: 111.4 LBS

## 2018-07-16 DIAGNOSIS — C50.411 MALIGNANT NEOPLASM OF UPPER-OUTER QUADRANT OF RIGHT BREAST IN FEMALE, ESTROGEN RECEPTOR NEGATIVE (H): ICD-10-CM

## 2018-07-16 DIAGNOSIS — Z17.1 MALIGNANT NEOPLASM OF UPPER-OUTER QUADRANT OF RIGHT BREAST IN FEMALE, ESTROGEN RECEPTOR NEGATIVE (H): ICD-10-CM

## 2018-07-16 PROCEDURE — G0463 HOSPITAL OUTPT CLINIC VISIT: HCPCS | Performed by: RADIOLOGY

## 2018-07-16 ASSESSMENT — ENCOUNTER SYMPTOMS
INSOMNIA: 1
BRUISES/BLEEDS EASILY: 0
NERVOUS/ANXIOUS: 1
SHORTNESS OF BREATH: 0
COUGH: 0
FALLS: 0
EYE PAIN: 0
NAUSEA: 0
FREQUENCY: 0
DEPRESSION: 0
HEMATURIA: 0
CHILLS: 0
DOUBLE VISION: 0
NECK PAIN: 0
WEIGHT LOSS: 1
HEADACHES: 0
FEVER: 0
DYSURIA: 0
CONSTIPATION: 0
DIAPHORESIS: 0
HEARTBURN: 0
DIARRHEA: 0
BLOOD IN STOOL: 0
SEIZURES: 0
BACK PAIN: 0
BLURRED VISION: 0
TINGLING: 1
VOMITING: 0
DIZZINESS: 0
SORE THROAT: 0

## 2018-07-16 NOTE — MR AVS SNAPSHOT
After Visit Summary   7/16/2018    Julieta Rivera    MRN: [id number]           Patient Information     Date Of Birth          1950        Visit Information        Provider Department      7/16/2018 12:30 PM Shy Conner MD Radiation Oncology Clinic        Today's Diagnoses     Malignant neoplasm of upper-outer quadrant of right breast in female, estrogen receptor negative (H)           Follow-ups after your visit        Your next 10 appointments already scheduled     Jul 20, 2018  1:00 PM CDT   TCT/SIM Suite Visit with Shy Conner MD   Radiation Oncology Clinic ([facility])    [facility]  1st Floor  [address] [facility] [address]   [phone]            Jul 24, 2018 12:30 PM CDT   [facility] Lab Draw with  [facility] LAB DRAW   [facility] Lab Draw ([facility])    [address] [place] [address]  [address] [place] [address] [id number] [phone]            Jul 24, 2018  1:00 PM CDT   (Arrive by 12:45 PM)   Return Visit with Jonathan Gay MD   [facility] ([facility])    [address] [place] [address]  [address] [place] [id number] [phone]              Who to contact     Please call your clinic at [phone] to:    Ask questions about your health    Make or cancel appointments    Discuss your medicines    Learn about your test results    Speak to your doctor            Additional Information About Your Visit        MyChart Information     [employer] gives you secure access to your electronic health record. If you see a primary care provider, you can also send messages to your care team and make appointments. If you have questions, please call your primary care clinic.  If you do not have a primary care provider, please call [phone] and they will assist you.      io[employer] is an electronic gateway that provides easy, online access to your medical records. With  [facility]uan [facility], you can request a clinic appointment, read your test results, renew a prescription or communicate with your care team.     To access your existing account, please contact your [facility] or call [phone] for assistance.        Care EveryWhere ID     This is your Care EveryWhere ID. This could be used by other organizations to access your [place] medical records  [id number]        Your Vitals Were     Pulse Pulse Oximetry BMI (Body Mass Index)             78 99% 21.76 kg/m2          Blood Pressure from Last 3 Encounters:   07/16/18 116/57   06/26/18 95/58   06/15/18 94/62    Weight from Last 3 Encounters:   07/16/18 50.5 kg (111 lb 6.4 oz)   06/26/18 50.4 kg (111 lb 3.2 oz)   06/15/18 51.4 kg (113 lb 6 oz)              We Performed the Following     RADIATION THERAPY REFERRAL        Primary Care Provider Office Phone # Fax #    Simona GUERRA JOSÉ LUIS [facility] [phone]       [address] [facility] [address]  [facility] [address]        Goals        General    Medical (pt-stated)     Notes - Note created  4/3/2018 12:31 PM by Trice Galaviz, RN    Goal Statement: I will manage stress associated with caring for my grandchildren  Measure of Success: verbalization of stress reduction  Supportive Steps to Achieve: outpatient counseling, support of RN CC  Barriers: none  Strengths: family support  Date to Achieve By: 6 months            Equal Access to Services     CARTER DEL RIO AH: Hadii aad ku hadasho Sosaraali, waaxda luqadaha, qaybta kaalmada adeegyada, khadar solano. So [facility] [phone].    ATENCIÓN: Si habla español, tiene a phillips disposición servicios gratuitos de asistencia lingüística. Llame al [phone].    We comply with applicable federal civil rights laws and [place] laws. We do not discriminate on the basis of race, color, national origin, age, disability, sex, sexual orientation, or gender identity.            Thank you!     Thank  you for choosing RADIATION ONCOLOGY CLINIC  for your care. Our goal is always to provide you with excellent care. Hearing back from our patients is one way we can continue to improve our services. Please take a few minutes to complete the written survey that you may receive in the mail after your visit with us. Thank you!             Your Updated Medication List - Protect others around you: Learn how to safely use, store and throw away your medicines at www.disposemymeds.org.          This list is accurate as of 7/16/18  8:47 PM.  Always use your most recent med list.                   Brand Name Dispense Instructions for use Diagnosis    acetaminophen 325 MG tablet    TYLENOL    100 tablet    Take 2 tablets (650 mg) by mouth every 4 hours as needed for other (mild pain)    Post-op pain       FISH OIL PO      Take 1 capsule by mouth daily.        levothyroxine 100 MCG tablet    SYNTHROID/LEVOTHROID    60 tablet    Take 1 tablet (100 mcg) by mouth daily Take by mouth daily    Hypothyroidism due to Hashimoto's thyroiditis       MULTIVITAMIN & MINERAL PO      Take 2 tablets by mouth daily.        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

## 2018-07-16 NOTE — PROGRESS NOTES
Department of Therapeutic Radiology--Radiation Oncology                   [place] [address]  [address] [place]  [address]  Office:  [phone]  Fax:  [phone]   Radiation Oncology Clinic  [address] [place] [address]  Phone:  [phone]  Fax:  [phone]     RE: Julieta Rivera : 1950   MRN: [id number] RHONDA: 2018     OUTPATIENT VISIT NOTE       PROBLEM: Clinical stage IA [id number] invasive ductal carcinoma of the right breast, triple negative histology, status post neoadjuvant chemotherapy, status post lumpectomy and sentinel lymph node biopsy, [id number]     was seen for initial consultation in the Dept of Therapeutic Radiology on 2018 at the request of Dr. Jonathan Gay    HISTORY OF PRESENT ILLNESS:   Mrs. Rivera is a 66 yo female who was found to have a 7 x 6 x 9 mm mass in the RIGHT breast detected on screening mammography on 2018.  This was confirmed on diagnostic mammography on 2018 and was BI-RADS Category 4.  Ultrasound-guided biopsy performed on 2018 showed a grade 3/3  ER negative, OK negative, HER2-nonamplified invasive ductal carcinoma with adjacent DCIS.  She then was referred to Dr. Gay medical oncology by Dr. Guzman in surgery and decided to undergo neoadjuvant chemotherapy prior to breast conserving surgery.  She was treated with 4 cycles of Taxotere and cyclophosphamide and underwent lumpectomy and left sentinel lymph node biopsy on 2018.  Pathology showed a 2.5 mm invasive ductal carcinoma, grade 2, and one focus of microinvasive ductal carcinoma measuring 0.3 mm .  Margins were at least 6 mm.  There was also one 2 mm focus of grade 3 DCIS that did not involve the margins (4 mm from the nearest inferior margin).  Eight sentinel lymph nodes were removed, though at least one of them was neither blue nor radioactive.  Nevertheless, all  8/8 were negative for cancer.  Final pathology [id number]. Her  postoperative course was complicated by a painful seroma in her right axilla that she had drained on multiple occasions.    Currently, the patient complains of pain in her axilla that somewhat limits movement which she attributes to her seroma.  She reports that it is aching, sometimes sharp and can radiate down her right arm.  She also notes a similar sensation on her left side occasionally, this side was not surgerized.  She also complains of neuropathy in her fingers and feet bilaterally.  Denies additional weight loss since finishing chemo, bony pains, other concerning symptoms.    PAST MEDICAL HISTORY:   Hypothyroidism  Mitral valve prolapse  Factor V Leiden (no history of DVT or PE)    CHEMOTHERAPY HISTORY: Denies    PAST RADIATION THERAPY HISTORY: Denies    MEDICATIONS:   Tylenol  Levothyroxine  Multivitamin  Omega-3 fatty acid  Pyridoxine  Vitamin D    ALLERGIES: NKDA    SOCIAL HISTORY:   , lives with her .  She takes care of her toddler grandson who has a history of  esophageal atresia repair that causes her significant stress.  She smoked about 1 pack per day for 3 years in college.       FAMILY HISTORY:   2 paternal aunts with breast cancer in their 50s and 60s  Endometrial cancer in her sister at age 48    PERFORMANCE STATUS: ECOG 0    REVIEW OF SYMPTOMS:    Performed by the nurse at this visit.  Pertinent negative and positives were reviewed.  Constitutional: Positive for weight loss (Lost about 10 lbs with chemo). Negative for chills, diaphoresis, fever and malaise/fatigue.   HENT: Negative for ear pain, hearing loss, nosebleeds and sore throat.    Eyes: Negative for blurred vision, double vision and pain.   Respiratory: Negative for cough and shortness of breath.    Cardiovascular: Negative for chest pain and leg swelling.   Gastrointestinal: Negative for blood in stool, constipation, diarrhea, heartburn, nausea and vomiting.   Genitourinary: Negative for dysuria, frequency,  hematuria and urgency.   Musculoskeletal: Negative for back pain, falls, joint pain and neck pain.   Skin: Negative for rash.   Neurological: Positive for tingling (Bilateral finger tips, bilateral feet + toes). Negative for dizziness, seizures and headaches.   Endo/Heme/Allergies: Does not bruise/bleed easily (Factor V).   Psychiatric/Behavioral: Negative for depression (Pt feels she has contol of her depression no medication needed). The patient is nervous/anxious (with appointments and dx) and has insomnia (diff. falling asleep, brain does not shut off.).      PHYSICAL EXAMINATION:    /57  Pulse 78  Wt 50.5 kg (111 lb 6.4 oz)  SpO2 99%  BMI 21.76 kg/m2  PHYSICAL EXAM:  Gen: Alert, in NAD  Eyes: EOMI, sclera anicteric  HENT      Head: NC/AT     Ears: No external auricular lesions     Nose/sinus: No rhinorrhea or epistaxis     Oral Cavity/Oropharynx: MMM, no thrush noted  Pulm: Breathing comfortably on room air, no audible wheezes or ronchi  CV: Well-perfused, no cyanosis  Abdominal: Soft, nondistended  Skin: Normal color and turgor  Neurologic/MSK: motor grossly intact, normal gait  Lymph: no lymphadenopathy  Breast: Mild tenderness over right axillary incision site with some palpable fluid consistent with a seroma.  Nontender seroma in right breast incision site.  Incisions are clean and well-healing.  Psych: Mildly anxious, appropriate mood and affect      ASSESSMENT AND PLAN:   This is a 67-year-old woman with a history of stage IA [id number] triple negative invasive ductal carcinoma of the right breast, status post neoadjuvant chemotherapy followed by lumpectomy with sentinel lymph node biopsy presenting for consideration of postoperative radiotherapy.  Her surgical pathology showed good response to neoadjuvant docetaxel and cyclophosphamide with reduction in tumor size from 9 mm to 2.5 mm, surgical staging [id number](sn).      Discussed with the patient the risks, side effects, benefits, and alternatives  to radiation therapy.  We educated her that the goal of RT would be to reduce the risk of local recurrence.  Given her triple negative status, we also recommended a boost to the lumpectomy cavity in addition to whole breast RT. she was encouraged to ask questions, and these were answered to the best of our ability.  She was consented for treatment and scheduled for simulation on 7/20/2018.    Tentatively will plan on treating the whole breast to 4240 cGy in 16 fractions with an additional lumpectomy boost to 1000 cGy in 4 fractions.    Thank you for allowing us to participate in this patient's care.  Please feel free to call with any questions or concerns.     The patient was seen and examined with Dr. Conner, who agrees with the above assessment and plan.    Amador Gonzales MD PGY-2   Radiation Oncology, [facility]  [phone] clinic  Pager [phone]         I saw and examined the patient with the resident.  I have reviewed and agree with the resident's note and plan of care.      Shy Conner MD

## 2018-07-16 NOTE — PROGRESS NOTES
HPI    INITIAL PATIENT ASSESSMENT    Diagnosis: Breast cancer, Rt lumpectomy 6/7/18     Prior radiation therapy: None    Prior chemotherapy:   Protocol: Cytoxan + Taxol  Facility: Dr. Deidra Valentine  Dates: 3/8/18-5/10/18      Prior hormonal therapy:No    Pain Eval:  Current history of pain associated with this visit:   Intensity: 6/10  Current: sharp, aching and stabbing  Location: Ax, known seroma  Treatment: rest and decreased use    Psychosocial  Living arrangements: Family: , daughter and twin grandsons  Fall Risk: independent   referral needs: Not needed    Advanced Directive: No  Implantable Cardiac Device? No    Onset of menarche: 12 1/2  LMP: No LMP recorded. Patient is postmenopausal.  Onset of menopause: 5/2003  Abnormal vaginal bleeding/discharge: No  Are you pregnant? No  Reproductive note: 2 children      Review of Systems   Constitutional: Positive for weight loss (Lost about 10 lbs with chemo). Negative for chills, diaphoresis, fever and malaise/fatigue.   HENT: Negative for ear pain, hearing loss, nosebleeds and sore throat.    Eyes: Negative for blurred vision, double vision and pain.   Respiratory: Negative for cough and shortness of breath.    Cardiovascular: Negative for chest pain and leg swelling.   Gastrointestinal: Negative for blood in stool, constipation, diarrhea, heartburn, nausea and vomiting.   Genitourinary: Negative for dysuria, frequency, hematuria and urgency.   Musculoskeletal: Negative for back pain, falls, joint pain and neck pain.   Skin: Negative for rash.   Neurological: Positive for tingling (Bilateral finger tips, bilateral feet + toes). Negative for dizziness, seizures and headaches.   Endo/Heme/Allergies: Does not bruise/bleed easily (Factor V).   Psychiatric/Behavioral: Negative for depression (Pt feels she has contol of her depression no medication needed). The patient is nervous/anxious (with appointments and dx) and has insomnia (diff. falling  asleep, brain does not shut off.).

## 2018-07-16 NOTE — LETTER
7/16/2018       RE: Julieta Rivera  [address] [place] [address]     Dear Colleague,    Thank you for referring your patient, Julieta Rivera, to the [facility]. Please see a copy of my visit note below.      HPI    INITIAL PATIENT ASSESSMENT    Diagnosis: Breast cancer, Rt lumpectomy 6/7/18     Prior radiation therapy: None    Prior chemotherapy:   Protocol: Cytoxan + Taxol  Facility: Dr. Deidra Valentine  Dates: 3/8/18-5/10/18      Prior hormonal therapy:No    Pain Eval:  Current history of pain associated with this visit:   Intensity: 6/10  Current: sharp, aching and stabbing  Location: Ax, known seroma  Treatment: rest and decreased use    Psychosocial  Living arrangements: Family: , daughter and twin grandsons  Fall Risk: independent   referral needs: Not needed    Advanced Directive: No  Implantable Cardiac Device? No    Onset of menarche: 13 1/2  LMP: No LMP recorded. Patient is postmenopausal.  Onset of menopause: 5/2003  Abnormal vaginal bleeding/discharge: No  Are you pregnant? No  Reproductive note: 2 children      Review of Systems   Constitutional: Positive for weight loss (Lost about 10 lbs with chemo). Negative for chills, diaphoresis, fever and malaise/fatigue.   HENT: Negative for ear pain, hearing loss, nosebleeds and sore throat.    Eyes: Negative for blurred vision, double vision and pain.   Respiratory: Negative for cough and shortness of breath.    Cardiovascular: Negative for chest pain and leg swelling.   Gastrointestinal: Negative for blood in stool, constipation, diarrhea, heartburn, nausea and vomiting.   Genitourinary: Negative for dysuria, frequency, hematuria and urgency.   Musculoskeletal: Negative for back pain, falls, joint pain and neck pain.   Skin: Negative for rash.   Neurological: Positive for tingling (Bilateral finger tips, bilateral feet + toes). Negative for dizziness, seizures and headaches.   Endo/Heme/Allergies: Does not  bruise/bleed easily (Factor V).   Psychiatric/Behavioral: Negative for depression (Pt feels she has contol of her depression no medication needed). The patient is nervous/anxious (with appointments and dx) and has insomnia (diff. falling asleep, brain does not shut off.).                    Department of Therapeutic Radiology--Radiation Oncology                   [place] [address]  [address] [place]  [address]  Office:  [phone]  Fax:  [phone]   Rad[facility] Oncology Clinic  [address] [place] [address]  Phone:  [phone]  Fax:  [phone]     RE: Julieta Rivera : 1950   MRN: [id number] RHONDA: 2018     OUTPATIENT VISIT NOTE       PROBLEM: Clinical stage IA [id number] invasive ductal carcinoma of the right breast, triple negative histology, status post neoadjuvant chemotherapy, status post lumpectomy and sentinel lymph node biopsy, [id number]     was seen for initial consultation in the Dept of Therapeutic Radiology on 2018 at the request of Dr. Jonathan Gay    HISTORY OF PRESENT ILLNESS:   Mrs. Rivera is a 66 yo female who was found to have a 7 x 6 x 9 mm mass in the RIGHT breast detected on screening mammography on 2018.  This was confirmed on diagnostic mammography on 2018 and was BI-RADS Category 4.  Ultrasound-guided biopsy performed on 2018 showed a grade 3/3  ER negative, ND negative, HER2-nonamplified invasive ductal carcinoma with adjacent DCIS.  She then was referred to Dr. Gay medical oncology by Dr. Guzman in surgery and decided to undergo neoadjuvant chemotherapy prior to breast conserving surgery.  She was treated with 4 cycles of Taxotere and cyclophosphamide and underwent lumpectomy and left sentinel lymph node biopsy on 2018.  Pathology showed a 2.5 mm invasive ductal carcinoma, grade 2, and one focus of microinvasive ductal carcinoma measuring 0.3 mm .  Margins were at least 6 mm.  There was also one 2 mm  focus of grade 3 DCIS that did not involve the margins (4 mm from the nearest inferior margin).  Eight sentinel lymph nodes were removed, though at least one of them was neither blue nor radioactive.  Nevertheless, all  8/8 were negative for cancer.  Final pathology [id number]. Her postoperative course was complicated by a painful seroma in her right axilla that she had drained on multiple occasions.    Currently, the patient complains of pain in her axilla that somewhat limits movement which she attributes to her seroma.  She reports that it is aching, sometimes sharp and can radiate down her right arm.  She also notes a similar sensation on her left side occasionally, this side was not surgerized.  She also complains of neuropathy in her fingers and feet bilaterally.  Denies additional weight loss since finishing chemo, bony pains, other concerning symptoms.    PAST MEDICAL HISTORY:   Hypothyroidism  Mitral valve prolapse  Factor V Leiden (no history of DVT or PE)    CHEMOTHERAPY HISTORY: Denies    PAST RADIATION THERAPY HISTORY: Denies    MEDICATIONS:   Tylenol  Levothyroxine  Multivitamin  Omega-3 fatty acid  Pyridoxine  Vitamin D    ALLERGIES: NKDA    SOCIAL HISTORY:   , lives with her .  She takes care of her toddler grandson who has a history of  esophageal atresia repair that causes her significant stress.  She smoked about 1 pack per day for 3 years in college.       FAMILY HISTORY:   2 paternal aunts with breast cancer in their 50s and 60s  Endometrial cancer in her sister at age 48    PERFORMANCE STATUS: ECOG 0    REVIEW OF SYMPTOMS:    Performed by the nurse at this visit.  Pertinent negative and positives were reviewed.  Constitutional: Positive for weight loss (Lost about 10 lbs with chemo). Negative for chills, diaphoresis, fever and malaise/fatigue.   HENT: Negative for ear pain, hearing loss, nosebleeds and sore throat.    Eyes: Negative for blurred vision, double vision and  pain.   Respiratory: Negative for cough and shortness of breath.    Cardiovascular: Negative for chest pain and leg swelling.   Gastrointestinal: Negative for blood in stool, constipation, diarrhea, heartburn, nausea and vomiting.   Genitourinary: Negative for dysuria, frequency, hematuria and urgency.   Musculoskeletal: Negative for back pain, falls, joint pain and neck pain.   Skin: Negative for rash.   Neurological: Positive for tingling (Bilateral finger tips, bilateral feet + toes). Negative for dizziness, seizures and headaches.   Endo/Heme/Allergies: Does not bruise/bleed easily (Factor V).   Psychiatric/Behavioral: Negative for depression (Pt feels she has contol of her depression no medication needed). The patient is nervous/anxious (with appointments and dx) and has insomnia (diff. falling asleep, brain does not shut off.).      PHYSICAL EXAMINATION:    /57  Pulse 78  Wt 50.5 kg (111 lb 6.4 oz)  SpO2 99%  BMI 21.76 kg/m2  PHYSICAL EXAM:  Gen: Alert, in NAD  Eyes: EOMI, sclera anicteric  HENT      Head: NC/AT     Ears: No external auricular lesions     Nose/sinus: No rhinorrhea or epistaxis     Oral Cavity/Oropharynx: MMM, no thrush noted  Pulm: Breathing comfortably on room air, no audible wheezes or ronchi  CV: Well-perfused, no cyanosis  Abdominal: Soft, nondistended  Skin: Normal color and turgor  Neurologic/MSK: motor grossly intact, normal gait  Lymph: no lymphadenopathy  Breast: Mild tenderness over right axillary incision site with some palpable fluid consistent with a seroma.  Nontender seroma in right breast incision site.  Incisions are clean and well-healing.  Psych: Mildly anxious, appropriate mood and affect      ASSESSMENT AND PLAN:   This is a 67-year-old woman with a history of stage IA [id number] triple negative invasive ductal carcinoma of the right breast, status post neoadjuvant chemotherapy followed by lumpectomy with sentinel lymph node biopsy presenting for consideration of  postoperative radiotherapy.  Her surgical pathology showed good response to neoadjuvant docetaxel and cyclophosphamide with reduction in tumor size from 9 mm to 2.5 mm, surgical staging [id number](sn).      Discussed with the patient the risks, side effects, benefits, and alternatives to radiation therapy.  We educated her that the goal of RT would be to reduce the risk of local recurrence.  Given her triple negative status, we also recommended a boost to the lumpectomy cavity in addition to whole breast RT. she was encouraged to ask questions, and these were answered to the best of our ability.  She was consented for treatment and scheduled for simulation on 7/20/2018.    Tentatively will plan on treating the whole breast to 4240 cGy in 16 fractions with an additional lumpectomy boost to 1000 cGy in 4 fractions.    Thank you for allowing us to participate in this patient's care.  Please feel free to call with any questions or concerns.     The patient was seen and examined with Dr. Conner, who agrees with the above assessment and plan.    Amador Gonzales MD PGY-2   Radiation Oncology, [facility]  [phone] clinic  Pager [phone]         I saw and examined the patient with the resident.  I have reviewed and agree with the resident's note and plan of care.      Shy Conner MD

## 2018-07-20 ENCOUNTER — ALLIED HEALTH/NURSE VISIT (OUTPATIENT)
Dept: RADIATION ONCOLOGY | Facility: CLINIC | Age: 68
End: 2018-07-20
Attending: RADIOLOGY
Payer: COMMERCIAL

## 2018-07-20 DIAGNOSIS — C50.411 MALIGNANT NEOPLASM OF UPPER-OUTER QUADRANT OF RIGHT BREAST IN FEMALE, ESTROGEN RECEPTOR NEGATIVE (H): Primary | ICD-10-CM

## 2018-07-20 DIAGNOSIS — Z17.1 MALIGNANT NEOPLASM OF UPPER-OUTER QUADRANT OF RIGHT BREAST IN FEMALE, ESTROGEN RECEPTOR NEGATIVE (H): Primary | ICD-10-CM

## 2018-07-20 PROCEDURE — 77334 RADIATION TREATMENT AID(S): CPT | Performed by: RADIOLOGY

## 2018-07-20 PROCEDURE — 77332 RADIATION TREATMENT AID(S): CPT | Mod: XU | Performed by: RADIOLOGY

## 2018-07-20 PROCEDURE — 77290 THER RAD SIMULAJ FIELD CPLX: CPT | Performed by: RADIOLOGY

## 2018-07-20 NOTE — MR AVS SNAPSHOT
After Visit Summary   7/20/2018    Julieta Rivera    MRN: [id number]           Patient Information     Date Of Birth          1950        Visit Information        Provider Department      7/20/2018 1:00 PM Shy Conner MD Radiation Oncology Clinic        Today's Diagnoses     Malignant neoplasm of upper-outer quadrant of right breast in female, estrogen receptor negative (H)    -  1       Follow-ups after your visit        Your next 10 appointments already scheduled     Jul 24, 2018 12:30 PM CDT   [facility] Draw with  [facility]   [facility] ([facility])    [address]  [address]  [facility] [address]   [phone]            Jul 24, 2018  1:00 PM CDT   (Arrive by 12:45 PM)   Return Visit with Jonathan Gay MD   [facility] ([facility])    [address]  [address]  [place] [address]   [phone]            Jul 27, 2018  2:45 PM CDT   EXTERNAL RADIATION TREATMENT with UMP RAD ONC VARIAN   Radiation Oncology Clinic ([facility])    [facility]  1st Floor  [address] [address]   [phone]            Jul 27, 2018  3:15 PM CDT   ON TREATMENT VISIT with Shy Conner MD   Radiation Oncology Clinic ([facility])    [facility]  1st Floor  [address] [facility] [address]   [phone]            Jul 30, 2018  1:30 PM CDT   EXTERNAL RADIATION TREATMENT with UMP RAD ONC VARIAN   Radiation Oncology Clinic ([facility])    [facility]  1st Floor  [address] [facility] [address]   [phone]            Jul 31, 2018  1:30 PM CDT   EXTERNAL RADIATION TREATMENT with UMP RAD ONC VARIAN   Radiation Oncology Clinic ([facility])    [facility]  1st Floor  [address]  [address]   [phone]            Aug 01, 2018  1:30 PM CDT   EXTERNAL RADIATION TREATMENT with UMP RAD ONC VARIAN   Radiation Oncology Clinic ([facility])    [facility]  1st Floor  [address] [address]   [phone]            Aug 02, 2018  1:30 PM CDT   EXTERNAL RADIATION TREATMENT with UMP RAD ONC VARIAN   Radiation Oncology Clinic ([facility])    [facility]  1st Floor  [address] [address]   [phone]            Aug 03, 2018  1:30 PM CDT   EXTERNAL RADIATION TREATMENT with UMP RAD ONC VARIAN   Radiation Oncology Clinic ([facility])    [facility]  1st Floor  [address] [address]   [phone]            Aug 03, 2018  1:45 PM CDT   ON TREATMENT VISIT with Shy Conner MD   Radiation Oncology Clinic ([facility])    [facility]  1st Floor  [address] [facility] [address]   [phone]              Who to contact     Please call your clinic at [phone] to:    Ask questions about your health    Make or cancel appointments    Discuss your medicines    Learn about your test results    Speak to your doctor            Additional Information About Your Visit        [employer] Information     [employer] gives you secure access to your electronic health record. If you see a primary care provider, you can also send messages to your care team and make appointments. If you have questions, please call your primary care clinic.  If you do not have a primary care provider, please call [phone] and they will assist you.      [employer] is an electronic gateway that provides easy, online access to your medical records. With [employer], you can request a clinic appointment, read your test results, renew a prescription or communicate with your care team.     To access your existing account, please contact your  [facility] or call [phone] for assistance.        Care EveryWhere ID     This is your Care EveryWhere ID. This could be used by other organizations to access your [facility] medical records  [id number]         Blood Pressure from Last 3 Encounters:   07/16/18 116/57   06/26/18 95/58   06/15/18 94/62    Weight from Last 3 Encounters:   07/16/18 50.5 kg (111 lb 6.4 oz)   06/26/18 50.4 kg (111 lb 3.2 oz)   06/15/18 51.4 kg (113 lb 6 oz)              Today, you had the following     No orders found for display       Primary Care Provider Office Phone # Fax #    Simona GUERRA Tucker JOSÉ LUIS [facility] [phone]       [address] [facility] [address]  [facility] [address]        Goals        General    Medical (pt-stated)     Notes - Note created  4/3/2018 12:31 PM by Trice Galaviz, RN    Goal Statement: I will manage stress associated with caring for my grandchildren  Measure of Success: verbalization of stress reduction  Supportive Steps to Achieve: outpatient counseling, support of RN CC  Barriers: none  Strengths: family support  Date to Achieve By: 6 months            Equal Access to Services     CARTER DEL RIO AH: Luis chong Sohussein, warogelioda annelieseadaha, qaybta kaalmada amorda, khadar solano. So [facility] [place] [phone].    ATENCIÓN: Si habla español, tiene a phillips disposición servicios gratuitos de asistencia lingüística. Llame al [phone].    We comply with applicable federal civil rights laws and [place] laws. We do not discriminate on the basis of race, color, national origin, age, disability, sex, sexual orientation, or gender identity.            Thank you!     Thank you for choosing [facility]  for your care. Our goal is always to provide you with excellent care. Hearing back from our patients is one way we can continue to improve our services. Please take a few minutes to complete the written survey that you may receive in the  mail after your visit with us. Thank you!             Your Updated Medication List - Protect others around you: Learn how to safely use, store and throw away your medicines at www.disposemymeds.org.          This list is accurate as of 7/20/18 11:59 PM.  Always use your most recent med list.                   Brand Name Dispense Instructions for use Diagnosis    acetaminophen 325 MG tablet    TYLENOL    100 tablet    Take 2 tablets (650 mg) by mouth every 4 hours as needed for other (mild pain)    Post-op pain       FISH OIL PO      Take 1 capsule by mouth daily.        levothyroxine 100 MCG tablet    SYNTHROID/LEVOTHROID    60 tablet    Take 1 tablet (100 mcg) by mouth daily Take by mouth daily    Hypothyroidism due to Hashimoto's thyroiditis       MULTIVITAMIN & MINERAL PO      Take 2 tablets by mouth daily.        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

## 2018-07-20 NOTE — PROGRESS NOTES
Radiation Therapy Patient Education    Person involved with teaching: Patient    Patient educational needs for self management of treatment-related side effects assessment completed.  [facility] Patient Ed tab contains Patient Learning Assessment    Education Materials Given  Skin Care During Radiation Treatment and Sim pamphlet    Educational Topics Discussed  Side effects expected, Pain management, Skin care, Activity, Nutrition and weight loss and When to call MD/RN    Response To Teaching  Verbalizes understanding    Referrals sent: Lymphedema per Dr. Conner?    Chemotherapy?  No

## 2018-07-24 ENCOUNTER — APPOINTMENT (OUTPATIENT)
Dept: LAB | Facility: CLINIC | Age: 68
End: 2018-07-24
Attending: INTERNAL MEDICINE
Payer: COMMERCIAL

## 2018-07-24 ENCOUNTER — ONCOLOGY VISIT (OUTPATIENT)
Dept: ONCOLOGY | Facility: CLINIC | Age: 68
End: 2018-07-24
Attending: INTERNAL MEDICINE
Payer: COMMERCIAL

## 2018-07-24 VITALS
TEMPERATURE: 98.1 F | SYSTOLIC BLOOD PRESSURE: 116 MMHG | RESPIRATION RATE: 16 BRPM | DIASTOLIC BLOOD PRESSURE: 76 MMHG | BODY MASS INDEX: 21.58 KG/M2 | HEART RATE: 70 BPM | WEIGHT: 110.5 LBS | OXYGEN SATURATION: 98 %

## 2018-07-24 DIAGNOSIS — Z17.1 MALIGNANT NEOPLASM OF UPPER-OUTER QUADRANT OF RIGHT BREAST IN FEMALE, ESTROGEN RECEPTOR NEGATIVE (H): ICD-10-CM

## 2018-07-24 DIAGNOSIS — C50.411 MALIGNANT NEOPLASM OF UPPER-OUTER QUADRANT OF RIGHT BREAST IN FEMALE, ESTROGEN RECEPTOR NEGATIVE (H): ICD-10-CM

## 2018-07-24 LAB
ALBUMIN SERPL-MCNC: 3.6 G/DL (ref 3.4–5)
ALP SERPL-CCNC: 59 U/L (ref 40–150)
ALT SERPL W P-5'-P-CCNC: 20 U/L (ref 0–50)
ANION GAP SERPL CALCULATED.3IONS-SCNC: 8 MMOL/L (ref 3–14)
AST SERPL W P-5'-P-CCNC: 15 U/L (ref 0–45)
BASOPHILS # BLD AUTO: 0 10E9/L (ref 0–0.2)
BASOPHILS NFR BLD AUTO: 0.7 %
BILIRUB SERPL-MCNC: 0.5 MG/DL (ref 0.2–1.3)
BUN SERPL-MCNC: 14 MG/DL (ref 7–30)
CALCIUM SERPL-MCNC: 8.6 MG/DL (ref 8.5–10.1)
CHLORIDE SERPL-SCNC: 104 MMOL/L (ref 94–109)
CO2 SERPL-SCNC: 24 MMOL/L (ref 20–32)
CREAT SERPL-MCNC: 0.52 MG/DL (ref 0.52–1.04)
DIFFERENTIAL METHOD BLD: NORMAL
EOSINOPHIL # BLD AUTO: 0.1 10E9/L (ref 0–0.7)
EOSINOPHIL NFR BLD AUTO: 1.9 %
ERYTHROCYTE [DISTWIDTH] IN BLOOD BY AUTOMATED COUNT: 11.6 % (ref 10–15)
GFR SERPL CREATININE-BSD FRML MDRD: >90 ML/MIN/1.7M2
GLUCOSE SERPL-MCNC: 154 MG/DL (ref 70–99)
HCT VFR BLD AUTO: 36.8 % (ref 35–47)
HGB BLD-MCNC: 12.2 G/DL (ref 11.7–15.7)
IMM GRANULOCYTES # BLD: 0 10E9/L (ref 0–0.4)
IMM GRANULOCYTES NFR BLD: 0.2 %
LYMPHOCYTES # BLD AUTO: 1.2 10E9/L (ref 0.8–5.3)
LYMPHOCYTES NFR BLD AUTO: 27.9 %
MCH RBC QN AUTO: 30.7 PG (ref 26.5–33)
MCHC RBC AUTO-ENTMCNC: 33.2 G/DL (ref 31.5–36.5)
MCV RBC AUTO: 93 FL (ref 78–100)
MONOCYTES # BLD AUTO: 0.3 10E9/L (ref 0–1.3)
MONOCYTES NFR BLD AUTO: 7.7 %
NEUTROPHILS # BLD AUTO: 2.6 10E9/L (ref 1.6–8.3)
NEUTROPHILS NFR BLD AUTO: 61.6 %
NRBC # BLD AUTO: 0 10*3/UL
NRBC BLD AUTO-RTO: 0 /100
PLATELET # BLD AUTO: 152 10E9/L (ref 150–450)
POTASSIUM SERPL-SCNC: 3.9 MMOL/L (ref 3.4–5.3)
PROT SERPL-MCNC: 6.7 G/DL (ref 6.8–8.8)
RBC # BLD AUTO: 3.98 10E12/L (ref 3.8–5.2)
SODIUM SERPL-SCNC: 136 MMOL/L (ref 133–144)
WBC # BLD AUTO: 4.2 10E9/L (ref 4–11)

## 2018-07-24 PROCEDURE — 80053 COMPREHEN METABOLIC PANEL: CPT | Performed by: INTERNAL MEDICINE

## 2018-07-24 PROCEDURE — G0463 HOSPITAL OUTPT CLINIC VISIT: HCPCS | Mod: ZF

## 2018-07-24 PROCEDURE — 25000128 H RX IP 250 OP 636: Mod: ZF | Performed by: INTERNAL MEDICINE

## 2018-07-24 PROCEDURE — 36591 DRAW BLOOD OFF VENOUS DEVICE: CPT

## 2018-07-24 PROCEDURE — 99214 OFFICE O/P EST MOD 30 MIN: CPT | Mod: ZP | Performed by: INTERNAL MEDICINE

## 2018-07-24 PROCEDURE — 85025 COMPLETE CBC W/AUTO DIFF WBC: CPT | Performed by: INTERNAL MEDICINE

## 2018-07-24 RX ORDER — HEPARIN SODIUM (PORCINE) LOCK FLUSH IV SOLN 100 UNIT/ML 100 UNIT/ML
500 SOLUTION INTRAVENOUS DAILY PRN
Status: DISCONTINUED | OUTPATIENT
Start: 2018-07-24 | End: 2018-07-28 | Stop reason: HOSPADM

## 2018-07-24 RX ADMIN — SODIUM CHLORIDE, PRESERVATIVE FREE 500 UNITS: 5 INJECTION INTRAVENOUS at 12:55

## 2018-07-24 ASSESSMENT — PAIN SCALES - GENERAL: PAINLEVEL: NO PAIN (0)

## 2018-07-24 NOTE — LETTER
7/24/2018       RE: Julieta Rivera  [address] [place] [address]     Dear Colleague,    Thank you for referring your patient, Julieta Rivera, to the [facility]. Please see a copy of my visit note below.    Dr. Eugene Guzman  Professor  Department of Surgery  [facility]  [address] [place] [address]      June 5, 2018      Dear Dr. Guzman,     Thank you for referring Julieta Rivera to our clinic for recommendations for her new diagnosis of triple negative breast cancer.       HISTORY OF PRESENT ILLNESS:  Julieta Rivera is a 67-year-old woman who was referred to our clinic with a new diagnosis of right triple-negative breast cancer.  Julieta was followed by routine screening mammography, when she was discovered to have a 7 x 6 x 9-mm mass at the 12 o'clock position of the right breast 6 cm from the nipple-areolar complex.  She did undergo a biopsy of this mass which showed an ER-negative, ND-negative, HER2-nonamplified, invasive mammary carcinoma of no special type, invasive ductal carcinoma, Delhi grade 3.  Ductal carcinoma in situ was also noted.  Nuclear grade 2 solid type.  HER2 FISH showed no amplification.  She now comes to our clinic for recommendations.       She has hypothyroidism and is on levothyroxine 88 alternating with 100 mcg daily.  She has no pain.  She has fatigue related to the care of a grandson with esophageal atresia at home.  She has no depression and no anxiety.  She has no weight loss.  Diet has not changed.  She has no loss of energy.  She does not sleep during the day.  She can perform all of her household chores.  She has noticed no abnormality of either breast.         PAST MEDICAL HISTORY:  She has no history of breast surgery in the past or breast cancer in the past.  She has no history of radiation of any kind.  She has no history of tumor of any kind.  She may have a history of a heart problem with mitral prolapse and a  murmur.  The last echo we have on record is from .  She has no history of heart attack, breathing problems, blood clots, seizures, arthritis, peptic ulcer disease, osteoporosis or bone fractures.  She is not currently participating in a clinical trial and has not had any significant weight loss.  She has no history of hypertension, but she does have a history of factor V Leiden because her sister was diagnosed with factor V Leiden and Julieta was tested, although Julieta herself has had no blood clots or pulmonary emboli.       FAMILY HISTORY:  There is a history of breast cancer in two paternal aunts, but no first-degree relatives.  One of her aunts was diagnosed in her 50s, the other in her 60s.  She has no male relatives with breast cancer.  The remainder of her family history was negative.        PAST MENSTRUAL HISTORY:  First period was at age 12-.  Last menstrual period was in 2003.  She has been pregnant twice at age 26 and 30 with two live births and no miscarriages or abortions.  She used oral contraceptives only once or twice.  Uterus and ovaries are in place.  She has no history of hormone replacement therapy.        ALLERGIES:  She has no allergy to seafood, iodine or contrast dye.  She does not take aspirin.       HABITS:  She did smoke 1 pack per day in college for 3 years from age 17 to 20 and has not smoked since.  She does not drink significant alcohol and has no heavy alcohol history in the past.        PERSONAL AND SOCIAL HISTORY:  She does have a history of being a .  Her  is 80 years old but is able to take care of his activities of daily living.  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        PAST MEDICAL HISTORY:  Julieta has no history of angina.  She has no history of hypertension.  Her cholesterol has generally  been in acceptable range, although slightly over 200 recently.       FAMILY HISTORY:  Positive for heart disease.  Father had an MI in his 50s and had a bypass and  at age 78.  Mother had rheumatic heart disease at age 38 and  at age 42.       HISTORY OF PRESENT ILLNESS:  Julieta returns to clinic following her right lumpectomy and axillary lymph node sampling.  She has a right axillary seroma that has been drained a couple times.  The seroma is now smaller and measures about 3 x 3 cm.  She has no fatigue, mild depression and no anxiety.  She is starting radiation with Dr. Shy Conner on Monday.  Simulation has been done.  Her main complaint is the right axillary seroma and a developing web in the right axilla.      REVIEW OF SYSTEMS:  She has alopecia, but her hair is growing back.  No fevers or chills, no cough, chest pain, shortness of breath, nausea, vomiting, constipation or diarrhea, bone pain, back pain or headache.  The remainder of a 10-point review of systems is negative.      Notably, her shortness of breath has improved since her chemotherapy was completed.  She has a good diet, Mediterranean style.  She has been more physically active.  She is taking vitamin D and calcium.      PHYSICAL EXAMINATION:   VITAL SIGNS:  Blood pressure 116/76, temperature 98.1, pulse 70, respirations 16, O2 sat 98% on room air, weight 50.1 kg, which is stable.     GENERAL: Julieta appeared generally well.  She has alopecia and is wearing a headscarf.   HEENT:  No lesions in the oropharynx.   LYMPH:  There is no palpable cervical, supraclavicular, subclavicular or left axillary lymphadenopathy.  In the right axilla, there is a 3 cm seroma which is smaller than the previous exam.  It underlies the axillary sampling incision.  The right breast is well healed and has a well-healed incision at the 11 o'clock position, 2 fingerbreadths above the nipple-areolar complex.  The incision is well healed without erythema or masses.   No masses in the right breast.  No masses in the left breast.   LUNGS:  Clear to percussion and auscultation.   HEART:  There is a regular rate and rhythm, S1, S2.  There is a 2/6 systolic murmur left sternal border.   ABDOMEN:  Soft and nontender, without hepatosplenomegaly.   EXTREMITIES:  Without edema.   PSYCHIATRIC:  Mood and affect were normal.        LABORATORY DATA:  The CBC and CMP were within normal limits except for a spot glucose of 154.      ASSESSMENT AND PLAN:    1.  Julieta Rivera is a 67-year-old woman with a history of a stage I clinical T1b N0 MX, invasive ductal carcinoma of the right breast, triple negative in histology, maximum dimension at diagnosis was 9 mm, grade 3.  The pathology showed an rcb1 response with significant reduction in tumor related to the neoadjuvant docetaxel and cyclophosphamide.  She underwent a lumpectomy without difficulty with clear margins.  The margins were clear for DCIS and invasive cancer.  She had no complications of her surgery that would be suggestive of any thrombotic events.  The final pathology showed a remaining tumor 2.5 mm in 1 focus, Gilliam grade 2 and 1 focus of microinvasive ductal carcinoma 0.3 cm residual after neoadjuvant chemotherapy.  There was DCIS, nuclear grade 3, solid type, size 2 mm adjacent to the invasive carcinoma.  No lymphovascular invasion was detected.  The margins were uninvolved by invasive carcinoma and uninvolved by DCIS.  The invasive carcinoma is estrogen receptor negative and progesterone receptor negative by immunohistochemistry and HER2 was nonamplified by FISH with a ratio 1.2.  The final stage was ypT1a N0 SN.  A total of 8 sentinel nodes were examined, which were negative for cancer.   1.  No role for hormonal therapy.  The tumor is estrogen receptor and progesterone receptor negative.   2.  Factor V Leiden without history of clot.  She does have a port in place.  The plan is to remove it after radiation is completed.    3.  She will begin postoperative radiation therapy with Dr. Shy Conner beginning on Monday.   4.  Followup.  We will see Julieta in followup in our clinic in 3 months.  Questions were answered.   5.  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  She has no lymphedema. Lymphedema clinic referral for physical therapy.  Follow up with me 10-23-18 with CBC, CMP.         Thank you for allowing us to continue to participate in Julieta Rivera's care.     Sincerely,    Jonathan Gay M.D.      Division of Hematology, Oncology, Transplant   Department of Medicine   [facility] [email or web address]   [phone]       I spent 30 minutes with the patient more than 50% of which was in counseling and coordination of care.     Again, thank you for allowing me to participate in the care of your patient.      Sincerely,    Jonathan Gay MD

## 2018-07-24 NOTE — NURSING NOTE
Oncology Rooming Note    July 24, 2018 1:36 PM   Julieta Rivera is a 67 year old female who presents for:    Chief Complaint   Patient presents with     Port Draw     Oncology Clinic Visit     return - CA      Initial Vitals: /76  Pulse 70  Temp 98.1  F (36.7  C) (Oral)  Resp 16  Wt 50.1 kg (110 lb 8 oz)  SpO2 98%  BMI 21.58 kg/m2 Estimated body mass index is 21.58 kg/(m^2) as calculated from the following:    Height as of 6/15/18: 1.524 m (5').    Weight as of this encounter: 50.1 kg (110 lb 8 oz). Body surface area is 1.46 meters squared.  No Pain (0) Comment: Data Unavailable   No LMP recorded. Patient is postmenopausal.  Allergies reviewed: Yes  Medications reviewed: Yes    Medications: Medication refills not needed today.  Pharmacy name entered into [employer]:    [id number] [place], [place] - [address] [facility] [place], [place] - [address] [id number] - [place], [place] - [address]    Clinical concerns:  Pain under right armpit     6 minutes for nursing intake (face to face time)     Dee Cabezas [facility]

## 2018-07-24 NOTE — MR AVS SNAPSHOT
"              After Visit Summary   7/24/2018    Julieta Rivera    MRN: [id number]           Patient Information     Date Of Birth          1950        Visit Information        Provider Department      7/24/2018 1:00 PM Jonathan Gay MD [facility]        Today's Diagnoses     Malignant neoplasm of upper-outer quadrant of right breast in female, estrogen receptor negative (H)           Follow-ups after your visit        Additional Services     LYMPHEDEMA THERAPY REFERRAL       *This therapy referral will be filtered to a centralized scheduling office at [facility] and the patient will receive a call to schedule an appointment at a [place] location most convenient for them. *   If you have not heard from the scheduling office within 2 business days, please call [phone] for all locations, with the exception of [place], please call [phone]. Physical therapy.     Treatment: PT or OT Evaluation & Treatment  Special Instructions: Lymphedema  PT/OT Treatment Diagnosis: Impaired [place] of Motion    Please be aware that coverage of these services is subject to the terms and limitations of your health insurance plan.  Call member services at your health plan with any benefit or coverage questions.      **Note to Provider:  If you are referring outside of [place] for the therapy appointment, please list the name of the location in the \"special instructions\" above, print the referral and give to the patient to schedule the appointment.                  Follow-up notes from your care team     Return in about 3 months (around 10/23/2018).      Your next 10 appointments already scheduled     Jul 30, 2018  1:30 PM CDT   EXTERNAL RADIATION TREATMENT with [facility] RAD ONC VARIAN   Radiation Oncology Clinic ([facility])    [facility]  1st Floor  [address] [facility] [address]   [phone]            Jul 31, 2018  1:30 PM " CDT   EXTERNAL RADIATION TREATMENT with [facility] RAD ONC VARIAN   Radiation Oncology Clinic ([facility])    [facility]  1st Floor  [address] [facility] [address]   [phone]            Aug 01, 2018  1:30 PM CDT   EXTERNAL RADIATION TREATMENT with [facility] RAD ONC VARIAN   Radiation Oncology Clinic ([facility])    [facility]  1st Floor  [address] [facility] [address]   [phone]            Aug 02, 2018  1:30 PM CDT   EXTERNAL RADIATION TREATMENT with [facility] RAD ONC VARIAN   Radiation Oncology Clinic ([facility])    [facility]  1st Floor  [address] [facility] [address]   [phone]            Aug 03, 2018  1:30 PM CDT   EXTERNAL RADIATION TREATMENT with [facility] RAD ONC VARIAN   Radiation Oncology Clinic ([facility])    [facility]  1st Floor  [address] [facility] [address]   [phone]            Aug 03, 2018  1:45 PM CDT   ON TREATMENT VISIT with Shy Conner MD   Radiation Oncology Clinic ([facility])    [facility]  1st Floor  [address] [facility] [address]   [phone]            Aug 06, 2018 11:00 AM CDT   Lymphedema Evaluation with Margy Chong, PT   [facility] Lymphedema ([facility])    [address] [place] [address]  1st Floor  [address]  [facility] [address]   [phone]            Aug 06, 2018  1:30 PM CDT   EXTERNAL RADIATION TREATMENT with [facility] RAD ONC VARIAN   Radiation Oncology Clinic ([facility])    [facility]  1st Floor  [address] [facility] [address]   [phone]            Aug 07, 2018  1:30 PM CDT   EXTERNAL RADIATION TREATMENT with [facility] RAD ONC VARIAN   Radiation Oncology Clinic ([facility])    [facility]  1st Floor  [address]  [facility] [address] [phone]            Aug 08, 2018  1:30 PM CDT   EXTERNAL RADIATION TREATMENT with [facility] RAD ONC VARIAN   Radiation Oncology Clinic ([facility])    [facility]  1st Floor  [address] [facility] [address]   [phone]              Who to contact     If you have questions or need follow up information about today's clinic visit or your schedule please contact [facility] directly at [phone].  Normal or non-critical lab and imaging results will be communicated to you by [email or web address], letter or phone within 4 business days after the clinic has received the results. If you do not hear from us within 7 days, please contact the clinic through [employer] or phone. If you have a critical or abnormal lab result, we will notify you by phone as soon as possible.  Submit refill requests through [employer] or call your pharmacy and they will forward the refill request to us. Please allow 3 business days for your refill to be completed.          Additional Information About Your Visit        [email or web address] Information     [employer] gives you secure access to your electronic health record. If you see a primary care provider, you can also send messages to your care team and make appointments. If you have questions, please call your primary care clinic.  If you do not have a primary care provider, please call [phone] and they will assist you.        Care EveryWhere ID     This is your Care EveryWhere ID. This could be used by other organizations to access your [facility] medical records  [id number]        Your Vitals Were     Pulse Temperature Respirations Pulse Oximetry BMI (Body Mass Index)       70 98.1  F (36.7  C) (Oral) 16 98% 21.58 kg/m2        Blood Pressure from Last 3 Encounters:   07/24/18 116/76   07/16/18 116/57   06/26/18 95/58    Weight from Last 3 Encounters:   07/24/18 50.1 kg (110 lb 8 oz)   07/16/18 50.5 kg (111 lb 6.4  oz)   06/26/18 50.4 kg (111 lb 3.2 oz)              We Performed the Following     CBC with platelets differential     Comprehensive metabolic panel     LYMPHEDEMA THERAPY REFERRAL        Primary Care Provider Office Phone # Fax #    JOSÉ LUIS Sol [facility] [phone]       [address] [facility] [address]  [facility] [address]        Goals        General    Medical (pt-stated)     Notes - Note created  4/3/2018 12:31 PM by Trice Galaviz, RN    Goal Statement: I will manage stress associated with caring for my grandchildren  Measure of Success: verbalization of stress reduction  Supportive Steps to Achieve: outpatient counseling, support of RN CC  Barriers: none  Strengths: family support  Date to Achieve By: 6 months            Equal Access to Services     CARTER DEL RIO AH: Luis Polk, warogelioda annelieseadaha, qaybta kaalmada higinio, khadar hdez . So [facility] [phone].    ATENCIÓN: Si habla español, tiene a [facility] disposición servicios gratuitos de asistencia lingüística. Llame al [phone].    We comply with applicable federal civil rights laws and [place] laws. We do not discriminate on the basis of race, color, national origin, age, disability, sex, sexual orientation, or gender identity.            Thank you!     Thank you for choosing [facility]  for your care. Our goal is always to provide you with excellent care. Hearing back from our patients is one way we can continue to improve our services. Please take a few minutes to complete the written survey that you may receive in the mail after your visit with us. Thank you!             Your Updated Medication List - Protect others around you: Learn how to safely use, store and throw away your medicines at www.disposemymeds.org.          This list is accurate as of 7/24/18 11:59 PM.  Always use your most recent med list.                   Brand Name Dispense Instructions for use Diagnosis     acetaminophen 325 MG tablet    TYLENOL    100 tablet    Take 2 tablets (650 mg) by mouth every 4 hours as needed for other (mild pain)    Post-op pain       FISH OIL PO      Take 1 capsule by mouth daily.        levothyroxine 100 MCG tablet    SYNTHROID/LEVOTHROID    60 tablet    Take 1 tablet (100 mcg) by mouth daily Take by mouth daily    Hypothyroidism due to Hashimoto's thyroiditis       MULTIVITAMIN & MINERAL PO      Take 2 tablets by mouth daily.        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

## 2018-07-27 ENCOUNTER — APPOINTMENT (OUTPATIENT)
Dept: RADIATION ONCOLOGY | Facility: CLINIC | Age: 68
End: 2018-07-27
Attending: RADIOLOGY
Payer: COMMERCIAL

## 2018-07-27 PROCEDURE — 77280 THER RAD SIMULAJ FIELD SMPL: CPT | Performed by: RADIOLOGY

## 2018-07-30 ENCOUNTER — APPOINTMENT (OUTPATIENT)
Dept: RADIATION ONCOLOGY | Facility: CLINIC | Age: 68
End: 2018-07-30
Attending: RADIOLOGY
Payer: COMMERCIAL

## 2018-07-30 PROCEDURE — 77412 RADIATION TX DELIVERY LVL 3: CPT | Performed by: RADIOLOGY

## 2018-07-31 ENCOUNTER — APPOINTMENT (OUTPATIENT)
Dept: RADIATION ONCOLOGY | Facility: CLINIC | Age: 68
End: 2018-07-31
Attending: RADIOLOGY
Payer: COMMERCIAL

## 2018-07-31 PROCEDURE — 77412 RADIATION TX DELIVERY LVL 3: CPT | Performed by: RADIOLOGY

## 2018-07-31 PROCEDURE — 77417 THER RADIOLOGY PORT IMAGE(S): CPT | Performed by: RADIOLOGY

## 2018-08-01 ENCOUNTER — APPOINTMENT (OUTPATIENT)
Dept: RADIATION ONCOLOGY | Facility: CLINIC | Age: 68
End: 2018-08-01
Attending: RADIOLOGY
Payer: COMMERCIAL

## 2018-08-01 PROCEDURE — 77412 RADIATION TX DELIVERY LVL 3: CPT | Performed by: RADIOLOGY

## 2018-08-02 ENCOUNTER — APPOINTMENT (OUTPATIENT)
Dept: RADIATION ONCOLOGY | Facility: CLINIC | Age: 68
End: 2018-08-02
Attending: RADIOLOGY
Payer: COMMERCIAL

## 2018-08-02 PROCEDURE — 77412 RADIATION TX DELIVERY LVL 3: CPT | Performed by: RADIOLOGY

## 2018-08-03 ENCOUNTER — APPOINTMENT (OUTPATIENT)
Dept: RADIATION ONCOLOGY | Facility: CLINIC | Age: 68
End: 2018-08-03
Attending: RADIOLOGY
Payer: COMMERCIAL

## 2018-08-03 VITALS — BODY MASS INDEX: 21.72 KG/M2 | WEIGHT: 111.2 LBS

## 2018-08-03 DIAGNOSIS — Z17.1 MALIGNANT NEOPLASM OF UPPER-OUTER QUADRANT OF RIGHT BREAST IN FEMALE, ESTROGEN RECEPTOR NEGATIVE (H): Primary | ICD-10-CM

## 2018-08-03 DIAGNOSIS — C50.411 MALIGNANT NEOPLASM OF UPPER-OUTER QUADRANT OF RIGHT BREAST IN FEMALE, ESTROGEN RECEPTOR NEGATIVE (H): Primary | ICD-10-CM

## 2018-08-03 PROCEDURE — 77336 RADIATION PHYSICS CONSULT: CPT | Performed by: RADIOLOGY

## 2018-08-03 PROCEDURE — 77412 RADIATION TX DELIVERY LVL 3: CPT | Performed by: RADIOLOGY

## 2018-08-03 NOTE — MR AVS SNAPSHOT
After Visit Summary   8/3/2018    Julieta Rivera    MRN: [id number]           Patient Information     Date Of Birth          1950        Visit Information        Provider Department      8/3/2018 1:45 PM Shy Conner MD Radiation Oncology Clinic        Today's Diagnoses     Malignant neoplasm of upper-outer quadrant of right breast in female, estrogen receptor negative (H)    -  1       Follow-ups after your visit        Your next 10 appointments already scheduled     Aug 06, 2018 11:00 AM CDT   Lymphedema Evaluation with Margy Chong PT   [facility] [place] [facility] ([facility])    [facility] [address]  1st Floor  [address]  [facility] [address]   [phone]            Aug 06, 2018  1:30 PM CDT   EXTERNAL RADIATION TREATMENT with P RAD ONC VARIAN   Radiation Oncology Clinic ([facility])    [facility]  1st Floor  [address] [facility] [address]   [phone]            Aug 07, 2018  1:30 PM CDT   EXTERNAL RADIATION TREATMENT with P RAD ONC VARIAN   Radiation Oncology Clinic ([facility])    [facility]  1st Floor  [address] [facility] [address] [address]   [phone]            Aug 08, 2018  1:30 PM CDT   EXTERNAL RADIATION TREATMENT with UMP RAD ONC VARIAN   Radiation Oncology Clinic ([facility])    [facility]  1st Floor  [address] [facility] [address] [address]   [phone]            Aug 09, 2018  1:30 PM CDT   EXTERNAL RADIATION TREATMENT with UMP RAD ONC VARIAN   Radiation Oncology Clinic ([facility])    [facility]  1st Floor  [address] [address] [facility] [address]   [phone]            Aug 10, 2018  1:30 PM CDT   EXTERNAL RADIATION TREATMENT with UMP RAD ONC VARIAN   Radiation Oncology Clinic ([facility])    [facility]  Ctr  1st Floor  [address] [facility] [address]   [phone]            Aug 10, 2018  1:45 PM CDT   ON TREATMENT VISIT with Shy Conner MD   Radiation Oncology Clinic ([facility])    [facility]  1st Floor  [address] [facility] [address] [facility] [address]   [phone]            Aug 13, 2018  1:30 PM CDT   EXTERNAL RADIATION TREATMENT with UMP RAD ONC VARIAN   Radiation Oncology Clinic ([facility])    [facility]  1st Floor  [address] [facility] [address] [facility] [address]   [phone]            Aug 14, 2018  1:30 PM CDT   EXTERNAL RADIATION TREATMENT with UMP RAD ONC VARIAN   Radiation Oncology Clinic ([facility])    [facility]  1st Floor  [address] [facility] [address]   [phone]            Aug 15, 2018  1:30 PM CDT   EXTERNAL RADIATION TREATMENT with UMP RAD ONC VARIAN   Radiation Oncology Clinic ([facility])    [facility]  1st Floor  [address] [facility] [address] [phone]              Who to contact     Please call your clinic at [phone] to:    Ask questions about your health    Make or cancel appointments    Discuss your medicines    Learn about your test results    Speak to your doctor            Additional Information About Your Visit        [employer] Information     [employer] gives you secure access to your electronic health record. If you see a primary care provider, you can also send messages to your care team and make appointments. If you have questions, please call your primary care clinic.  If you do not have a primary care provider, please call [phone] and they will assist you.      [employer] is an electronic gateway that provides easy, online access to your medical records. With [employer], you can request a clinic appointment, read your test results, renew a prescription or communicate with your care team.      To access your existing account, please contact your [facility] or call [phone] for assistance.        Care EveryWhere ID     This is your Care EveryWhere ID. This could be used by other organizations to access your [place] medical records  [id number]        Your Vitals Were     BMI (Body Mass Index)                   21.72 kg/m2            Blood Pressure from Last 3 Encounters:   07/24/18 116/76   07/16/18 116/57   06/26/18 95/58    Weight from Last 3 Encounters:   08/03/18 50.4 kg (111 lb 3.2 oz)   07/24/18 50.1 kg (110 lb 8 oz)   07/16/18 50.5 kg (111 lb 6.4 oz)              Today, you had the following     No orders found for display       Primary Care Provider Office Phone # Fax #    Simona GUERRA TuckerJOSÉ LUIS raines [facility] [phone]       [address]  [facility] [address]        Goals        General    Medical (pt-stated)     Notes - Note created  4/3/2018 12:31 PM by Trice Galaviz, RN    Goal Statement: I will manage stress associated with caring for my grandchildren  Measure of Success: verbalization of stress reduction  Supportive Steps to Achieve: outpatient counseling, support of RN CC  Barriers: none  Strengths: family support  Date to Achieve By: 6 months            Equal Access to Services     CARTER DEL RIO AH: Hadii india pierre hadnomio Sohussein, waaxda luqadaha, qaybta kaalmada higinio, khadar solano. So [facility] [phone].    ATENCIÓN: Si habla español, tiene a phillips disposición servicios gratuitos de asistencia lingüística. Llame al [phone].    We comply with applicable federal civil rights laws and [place] laws. We do not discriminate on the basis of race, color, national origin, age, disability, sex, sexual orientation, or gender identity.            Thank you!     Thank you for choosing RADIATION ONCOLOGY CLINIC  for your care. Our goal is always to provide you with excellent care. Hearing back from our patients  is one way we can continue to improve our services. Please take a few minutes to complete the written survey that you may receive in the mail after your visit with us. Thank you!             Your Updated Medication List - Protect others around you: Learn how to safely use, store and throw away your medicines at www.disposemymeds.org.          This list is accurate as of 8/3/18  3:16 PM.  Always use your most recent med list.                   Brand Name Dispense Instructions for use Diagnosis    acetaminophen 325 MG tablet    TYLENOL    100 tablet    Take 2 tablets (650 mg) by mouth every 4 hours as needed for other (mild pain)    Post-op pain       FISH OIL PO      Take 1 capsule by mouth daily.        levothyroxine 100 MCG tablet    SYNTHROID/LEVOTHROID    60 tablet    Take 1 tablet (100 mcg) by mouth daily Take by mouth daily    Hypothyroidism due to Hashimoto's thyroiditis       MULTIVITAMIN & MINERAL PO      Take 2 tablets by mouth daily.        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

## 2018-08-03 NOTE — PROGRESS NOTES
"[facility] PHYSICIANS  SPECIALIZING IN BREAKTHROUGHS  Radiation Oncology    On Treatment Visit Note      Julieta Rivera      Date: 8/3/2018   MRN: [id number]   : 1950  Diagnosis: breast cancer      Reason for Visit:  On Radiation Treatment Visit     Treatment Summary to Date  Treatment Site: R breast Current Dose: 1325/5240cGy cGy Fractions: 5/20fx           Subjective:  Julieta is doing well today. She has not yet started skin care. She is stressed about her grandson's health but looks forward to her radiation treatments as \"rest\" from home. She also asked some further questions about      Nursing ROS:       Skin  Skin Reaction: 0 - No changes  Skin Note: has not started skin lotions yet- discussed starting to use lotion now           Gastrointestinal  Nausea: 0 - None        Pain Assessment  0-10 Pain Scale: 0  Pain Note: some axillary pain due to seroma      Objective:   Wt 50.4 kg (111 lb 3.2 oz)  BMI 21.72 kg/m2  Gen: Appears well, in no acute distress  Skin: Mild diffuse erythema over treatment field    Labs:  CBC RESULTS:   Recent Labs   Lab Test  18   1257   WBC  4.2   RBC  3.98   HGB  12.2   HCT  36.8   MCV  93   MCH  30.7   MCHC  33.2   RDW  11.6   PLT  152     ELECTROLYTES:  Recent Labs   Lab Test  18   1257   NA  136   POTASSIUM  3.9   CHLORIDE  104   CINDI  8.6   CO2  24   BUN  14   CR  0.52   GLC  154*       Assessment:    Tolerating radiation therapy well.  All questions and concerns addressed.    Toxicities:  Dermatitis: Grade 0: No toxicity     Plan:   1. Continue current therapy.        Mosaiq chart and setup information reviewed  Ports checked              The patient was seen and examined with Dr. Conner, who agrees with the above assessment and plan.    Amador Gonzales MD PGY-2   Radiation Oncology, [facility]  [phone] clinic  Pager [phone]      I saw and examined the patient with the resident.  I have reviewed and edited the resident's note " and agree with the plan of care.      Shy Conner MD

## 2018-08-03 NOTE — LETTER
"8/3/2018       RE: Julieta Rivera  [address] [place] [address]     Dear Colleague,    Thank you for referring your patient, Julieta Rivera, to the RADIATION ONCOLOGY CLINIC. Please see a copy of my visit note below.    [facility] PHYSICIANS  SPECIALIZING IN BREAKTHROUGHS  Radiation Oncology    On Treatment Visit Note      Julieta Rivera      Date: 8/3/2018   MRN: [id number]   : 1950  Diagnosis: breast cancer      Reason for Visit:  On Radiation Treatment Visit     Treatment Summary to Date  Treatment Site: R breast Current Dose: 1325/5240cGy cGy Fractions: fx           Subjective:  Julieta is doing well today. She has not yet started skin care. She is stressed about her grandson's health but looks forward to her radiation treatments as \"rest\" from home. She also asked some further questions about      Nursing ROS:       Skin  Skin Reaction: 0 - No changes  Skin Note: has not started skin lotions yet- discussed starting to use lotion now           Gastrointestinal  Nausea: 0 - None        Pain Assessment  0-10 Pain Scale: 0  Pain Note: some axillary pain due to seroma      Objective:   Wt 50.4 kg (111 lb 3.2 oz)  BMI 21.72 kg/m2  Gen: Appears well, in no acute distress  Skin: Mild diffuse erythema over treatment field    Labs:  CBC RESULTS:   Recent Labs   Lab Test  18   1257   WBC  4.2   RBC  3.98   HGB  12.2   HCT  36.8   MCV  93   MCH  30.7   MCHC  33.2   RDW  11.6   PLT  152     ELECTROLYTES:  Recent Labs   Lab Test  18   1257   NA  136   POTASSIUM  3.9   CHLORIDE  104   CINDI  8.6   CO2  24   BUN  14   CR  0.52   GLC  154*       Assessment:    Tolerating radiation therapy well.  All questions and concerns addressed.    Toxicities:  Dermatitis: Grade 0: No toxicity     Plan:   1. Continue current therapy.        Mosaiq chart and setup information reviewed  Ports checked              The patient was seen and examined with Dr. Conner, who agrees with the above " assessment and plan.    Amador Gonzales MD PGY-2   Radiation Oncology, [facility] [place]  [phone] clinic  Pager [phone]      I saw and examined the patient with the resident.  I have reviewed and edited the resident's note and agree with the plan of care.      Shy Conner MD

## 2018-08-05 ENCOUNTER — MYC REFILL (OUTPATIENT)
Dept: FAMILY MEDICINE | Facility: CLINIC | Age: 68
End: 2018-08-05

## 2018-08-05 DIAGNOSIS — E06.3 HYPOTHYROIDISM DUE TO HASHIMOTO'S THYROIDITIS: ICD-10-CM

## 2018-08-06 ENCOUNTER — APPOINTMENT (OUTPATIENT)
Dept: RADIATION ONCOLOGY | Facility: CLINIC | Age: 68
End: 2018-08-06
Attending: RADIOLOGY
Payer: COMMERCIAL

## 2018-08-06 ENCOUNTER — HOSPITAL ENCOUNTER (OUTPATIENT)
Dept: PHYSICAL THERAPY | Facility: CLINIC | Age: 68
Setting detail: THERAPIES SERIES
End: 2018-08-06
Attending: INTERNAL MEDICINE
Payer: COMMERCIAL

## 2018-08-06 PROCEDURE — 40000449 ZZHC STATISTIC PT VISIT, LYMPHEDEMA: Performed by: PHYSICAL THERAPIST

## 2018-08-06 PROCEDURE — G8988 SELF CARE GOAL STATUS: HCPCS | Mod: GP,CJ | Performed by: PHYSICAL THERAPIST

## 2018-08-06 PROCEDURE — 97110 THERAPEUTIC EXERCISES: CPT | Mod: GP | Performed by: PHYSICAL THERAPIST

## 2018-08-06 PROCEDURE — 97535 SELF CARE MNGMENT TRAINING: CPT | Mod: GP | Performed by: PHYSICAL THERAPIST

## 2018-08-06 PROCEDURE — 97140 MANUAL THERAPY 1/> REGIONS: CPT | Mod: GP | Performed by: PHYSICAL THERAPIST

## 2018-08-06 PROCEDURE — 77412 RADIATION TX DELIVERY LVL 3: CPT | Performed by: RADIOLOGY

## 2018-08-06 PROCEDURE — 97162 PT EVAL MOD COMPLEX 30 MIN: CPT | Mod: GP | Performed by: PHYSICAL THERAPIST

## 2018-08-06 PROCEDURE — G8987 SELF CARE CURRENT STATUS: HCPCS | Mod: GP,CK | Performed by: PHYSICAL THERAPIST

## 2018-08-07 RX ORDER — LEVOTHYROXINE SODIUM 100 UG/1
100 TABLET ORAL DAILY
Qty: 90 TABLET | Refills: 2 | Status: SHIPPED | OUTPATIENT
Start: 2018-08-07 | End: 2019-04-24

## 2018-08-07 NOTE — TELEPHONE ENCOUNTER
Message from Gabrielhart:  Original authorizing provider: JOSÉ LUIS Parikh CNP would like a refill of the following medications:  levothyroxine (SYNTHROID/LEVOTHROID) 100 MCG tablet [JOSÉ LUIS Parikh CNP]    Preferred pharmacy: [facility] [address]    Comment:

## 2018-08-07 NOTE — TELEPHONE ENCOUNTER
"Requested Prescriptions   Pending Prescriptions Disp Refills     levothyroxine (SYNTHROID/LEVOTHROID) 100 MCG tablet    Last Written Prescription Date:  4-6-18  Last Fill Quantity: 60,  # refills: 1   Last office visit: 5/31/2018 with prescribing provider:  5-31-18   Future Office Visit:     60 tablet 1     Sig: Take 1 tablet (100 mcg) by mouth daily Take by mouth daily    Thyroid Protocol Passed    8/7/2018 12:00 PM       Passed - Patient is 12 years or older       Passed - Recent (12 mo) or future (30 days) visit within the authorizing provider's specialty    Patient had office visit in the last 12 months or has a visit in the next 30 days with authorizing provider or within the authorizing provider's specialty.  See \"Patient Info\" tab in inbasket, or \"Choose Columns\" in Meds & Orders section of the refill encounter.           Passed - Normal TSH on file in past 12 months    Recent Labs   Lab Test  06/26/18   1450   TSH  2.79             Passed - No active pregnancy on record    If patient is pregnant or has had a positive pregnancy test, please check TSH.         Passed - No positive pregnancy test in past 12 months    If patient is pregnant or has had a positive pregnancy test, please check TSH.            "

## 2018-08-07 NOTE — TELEPHONE ENCOUNTER
LOV: 5/31/2018    TSH on 6/26/2018 wnl    Prescription approved per [facility] Refill Protocol.  Thanks! Flaquita Boggs RN

## 2018-08-07 NOTE — PROGRESS NOTES
08/06/18 1300   Rehab Discipline   Discipline PT   Type of Visit   Type of visit Initial Edema Evaluation   General Information   Start of care 08/06/18   Referring physician Dr. Jonathan Gay   Orders Evaluate and treat as indicated   Order date 07/24/18   Medical diagnosis malignant neoplasm of upper outer quadrant of right breast in female, estrogen receptor negative.    Onset of illness / date of surgery 06/07/18   Affected body parts RUE;Trunk   Edema etiology comments patient with right breast cancer, lumpectomy with sentinel LND (8 LND). no edema noted right UE/trunk. seroma right axilla and axillary web syndrome right axilla.    Pertinent history of current problem (PT: include personal factors and/or comorbidities that impact the POC; OT: include additional occupational profile info) moderate complexity: pain, numbness, decreased mobility/activity level. PMH includes: right breast cancer dx 2/25/28, hypothyroidism due to hashimotos thyroiditis, adenoma of large intestine 11/2/11, heterozygous factor V Leiden mutation. PSH includes: right lumpectomy with LND 6/7/18, colposcopy cervix biopsy cervix 1973, insert port 3/5/18.    Surgical / medical history reviewed Yes   Prior level of functional mobility independent without problems   Prior treatment Other  (seroma drained but continues to refill)   Community support Family / friend caregiver   Patient role / employment history Retired  (previous worked at [employer])   Psychosocial concerns Depression;Impaired coping;Impaired sleep   Living environment [place] [facility]   Fall Risk Screen   Fall screen comments patient reporting fall 5/12/18 due to medication, fell down stairs, assess TUG next visit   Functional Scales   Shoulder Pain and Disability Index (score out of 100). A higher score indicates greater impairment. 42   Subjective Report   Patient report of symptoms SPADI: score 42/130. SEVERITY OF PAIN: at its worst 8/10, when lying on  involved side 4/10, reaching for something on a high shelf 8/10, touching back of neck 2/10, pushing with involved arm 2/10. DIFFICULTY WITH ACTIVITY: washing hair 1/10, washing back 2/10, putting on undershirt or pullover sweater 2/10, putting on shirt that buttons down the front 8/10, putting on pants 1/10, placing an object on a high shelf 8/10, carrying heavy object of 10 pounds 2/10, removing something from back pocket 2/10.    Patient / Family Goals   Patient / family goals statement decrease pain, increase activity level, prevent problems   Pain   Patient currently in pain Yes   Pain location right UE   Pain rating 2-8/10   Pain description Burning  (shooting pain)   Additional pain locations? Pain location 2   Pain location 2 right groin   Vitals Signs   Vital Signs Comments BMI: 21.72   Cognitive Status   Orientation Orientation to person, place and time   Level of consciousness Alert   Follows commands and answers questions 100% of the time   Personal safety and judgement Intact   Memory Intact   Edema Exam / Assessment   Skin condition Axillary web cording;Intact   Skin condition comments seroma right axilla, firm    Scar Yes   Location right breast lumpectomy, right axilla   Mobility mobile   Stemmer sign Negative   Girth Measurements   Girth Measurements Refer to separate girth measurement flowsheet   Volume UE   Right UE (mL) 1231   Left UE (mL) 1259   UE volume comparison LUE volume greater than RUE volume   % difference 2.3   Range of Motion   ROM comments shoulder ROM WFL   Strength   Right hand  (pounds) 46.6   Left hand  (pounds) 47.3   Sensory   Sensory perception comments reporting numbness/tingling down arm, numbness due to neuropathy right/left fingers and feet   Planned Edema Interventions   Planned edema interventions Manual lymph drainage;Fit for compression garment;Exercises;Precautions to prevent infection / exacerbation;Education;Manual therapy;Skin care / precautions;Soft tissue  mobilization;Home management program development   Clinical Impression   Criteria for skilled therapeutic intervention met Yes   Therapy diagnosis axillary web syndrome, pain   Influenced by the following impairments / conditions Axillary Web Syndrome   Functional limitations due to impairments / conditions pain, decreased mobility/activity level   Clinical Presentation Evolving/Changing   Clinical Presentation Rationale SPADI   Clinical Decision Making (Complexity) Moderate complexity   Treatment frequency 3 times / week   Treatment duration 3 weeks, then 1x/month for 2 months to assess home program and need for further treatment   Patient / family and/or staff in agreement with plan of care Yes   Risks and benefits of therapy have been explained Yes   Clinical impression comments patient with dx right breast cancer, lumpectomy with LND performed 6/7/18. developed axillary web syndrome following surgery, complains of pain/numbness/tingling right UE. decreased mobility. recommending treatment to improve axillary web syndrome, decrease pain 50% and increase activity level. establish home program to prevent increased problems.    Education Assessment   Preferred learning style Listening;Reading;Demonstration;Pictures / video   Barriers to learning No barriers   Goals   Edema Eval Goals 1;2   Goal 1   Goal identifier HOME PROGRAM   Goal description patient to demonstrate understanding of home program to decrease pain, improve axillary web syndrome, increase mobility.    Target date 11/03/18   Goal 2   Goal identifier SPADI   Goal description SPADI to show 4+ improvement in reduction of pain by 50% and increase mobility/activity level.    Target date 11/03/18   Total Evaluation Time   Total evaluation time 30

## 2018-08-08 ENCOUNTER — APPOINTMENT (OUTPATIENT)
Dept: RADIATION ONCOLOGY | Facility: CLINIC | Age: 68
End: 2018-08-08
Attending: RADIOLOGY
Payer: COMMERCIAL

## 2018-08-08 PROCEDURE — 77412 RADIATION TX DELIVERY LVL 3: CPT | Performed by: RADIOLOGY

## 2018-08-08 PROCEDURE — 77417 THER RADIOLOGY PORT IMAGE(S): CPT | Performed by: RADIOLOGY

## 2018-08-09 ENCOUNTER — MYC REFILL (OUTPATIENT)
Dept: FAMILY MEDICINE | Facility: CLINIC | Age: 68
End: 2018-08-09

## 2018-08-09 ENCOUNTER — APPOINTMENT (OUTPATIENT)
Dept: RADIATION ONCOLOGY | Facility: CLINIC | Age: 68
End: 2018-08-09
Attending: RADIOLOGY
Payer: COMMERCIAL

## 2018-08-09 DIAGNOSIS — E06.3 HYPOTHYROIDISM DUE TO HASHIMOTO'S THYROIDITIS: ICD-10-CM

## 2018-08-09 PROCEDURE — 77412 RADIATION TX DELIVERY LVL 3: CPT | Performed by: RADIOLOGY

## 2018-08-09 RX ORDER — LEVOTHYROXINE SODIUM 100 UG/1
100 TABLET ORAL DAILY
Qty: 90 TABLET | Refills: 2 | Status: CANCELLED | OUTPATIENT
Start: 2018-08-09

## 2018-08-09 NOTE — TELEPHONE ENCOUNTER
Patient has sent three separate requests regarding this medication - it has been approved    Closing encounter - no further actions needed at this time    Temo Gill RN

## 2018-08-09 NOTE — TELEPHONE ENCOUNTER
"Requested Prescriptions   Pending Prescriptions Disp Refills     levothyroxine (SYNTHROID/LEVOTHROID) 100 MCG tablet    Last Written Prescription Date:  8-7-18  Last Fill Quantity: 90,  # refills: 2   Last office visit: 5/31/2018 with prescribing provider:  Simona uTcker    Future Office Visit:     90 tablet 2     Sig: Take 1 tablet (100 mcg) by mouth daily Take by mouth daily    Thyroid Protocol Passed    8/9/2018  8:38 AM       Passed - Patient is 12 years or older       Passed - Recent (12 mo) or future (30 days) visit within the authorizing provider's specialty    Patient had office visit in the last 12 months or has a visit in the next 30 days with authorizing provider or within the authorizing provider's specialty.  See \"Patient Info\" tab in inbasket, or \"Choose Columns\" in Meds & Orders section of the refill encounter.           Passed - Normal TSH on file in past 12 months    Recent Labs   Lab Test  06/26/18   1450   TSH  2.79             Passed - No active pregnancy on record    If patient is pregnant or has had a positive pregnancy test, please check TSH.         Passed - No positive pregnancy test in past 12 months    If patient is pregnant or has had a positive pregnancy test, please check TSH.            "

## 2018-08-09 NOTE — TELEPHONE ENCOUNTER
Message from Gabrielhart:  Original authorizing provider: JOSÉ LUIS Parikh CNP would like a refill of the following medications:  levothyroxine (SYNTHROID/LEVOTHROID) 100 MCG tablet [JOSÉ LUIS Parikh CNP]    Preferred pharmacy: [facility] [address]    Comment:

## 2018-08-10 ENCOUNTER — APPOINTMENT (OUTPATIENT)
Dept: RADIATION ONCOLOGY | Facility: CLINIC | Age: 68
End: 2018-08-10
Attending: RADIOLOGY
Payer: COMMERCIAL

## 2018-08-10 VITALS — BODY MASS INDEX: 21.62 KG/M2 | WEIGHT: 110.7 LBS

## 2018-08-10 DIAGNOSIS — C50.411 MALIGNANT NEOPLASM OF UPPER-OUTER QUADRANT OF RIGHT BREAST IN FEMALE, ESTROGEN RECEPTOR NEGATIVE (H): Primary | ICD-10-CM

## 2018-08-10 DIAGNOSIS — Z17.1 MALIGNANT NEOPLASM OF UPPER-OUTER QUADRANT OF RIGHT BREAST IN FEMALE, ESTROGEN RECEPTOR NEGATIVE (H): Primary | ICD-10-CM

## 2018-08-10 PROCEDURE — 77412 RADIATION TX DELIVERY LVL 3: CPT | Performed by: RADIOLOGY

## 2018-08-10 NOTE — PROGRESS NOTES
"[facility] PHYSICIANS  SPECIALIZING IN BREAKTHROUGHS  Radiation Oncology    On Treatment Visit Note      Julieta Rivera      Date: 8/10/2018   MRN: [id number]   : 1950  Diagnosis: breast cancer      Reason for Visit:  On Radiation Treatment Visit     Treatment Summary to Date  Treatment Site: R breast Current Dose: 2385/5240cGy cGy Fractions: fx           Subjective: Julieta is doing well today.  She has noted some erythema of her right breast, as well as some occasional pains lateral to her right breast and under her arm.  She is slightly concerned because she feels like she has noticed some \"bumps \" lateral to her breast that were not there previously.    Nursing ROS:      Skin  Skin Reaction: 1 - Faint erythema or dry desquamation  Skin Note: lotion-\"purity\" lotion           Gastrointestinal  Nausea: 0 - None        Pain Assessment  0-10 Pain Scale: 0  Pain Note: skin sensitivity      Objective:   Wt 50.2 kg (110 lb 11.2 oz)  BMI 21.62 kg/m2  Gen: Appears well, in no acute distress  Skin: Mild diffuse erythema over treatment field  Breast: Well-healing surgical incision scar over right breast.  Small amount of cording present in right axilla, as well as some excess skin and bulge, consistent with previous exam and correlating with her seroma.  No abnormal palpable masses over her right breast or in her right axilla other than as noted above, specifically none in the region that the patient was concerned about.    Labs:  CBC RESULTS:   Recent Labs   Lab Test  18   1257   WBC  4.2   RBC  3.98   HGB  12.2   HCT  36.8   MCV  93   MCH  30.7   MCHC  33.2   RDW  11.6   PLT  152     ELECTROLYTES:  Recent Labs   Lab Test  18   1257   NA  136   POTASSIUM  3.9   CHLORIDE  104   CINDI  8.6   CO2  24   BUN  14   CR  0.52   GLC  154*       Assessment:    Tolerating radiation therapy well.  All questions and concerns addressed.    Toxicities:  Pain: Grade 1: Mild pain  Dermatitis: Grade 1: " Faint erythema or dry desquamation     Plan:   1. Continue current therapy.        Mosaiq chart and setup information reviewed  Ports checked         Educational Topic Discussed  Education Instructions: reviewed    The patient was seen and examined with Dr. Conner, who agrees with the above assessment and plan.    Amador Gonzales MD PGY-2   Radiation Oncology, [facility]  [phone] clinic  Pager [phone]      I saw and examined the patient with the resident.  I have reviewed and edited the resident's note and agree with the plan of care.      Shy Conner MD

## 2018-08-10 NOTE — MR AVS SNAPSHOT
After Visit Summary   8/10/2018    Julieta Rivera    MRN: [id number]           Patient Information     Date Of Birth          1950        Visit Information        Provider Department      8/10/2018 1:45 PM Shy Conner MD Radiation Oncology Clinic        Today's Diagnoses     Malignant neoplasm of upper-outer quadrant of right breast in female, estrogen receptor negative (H)    -  1       Follow-ups after your visit        Your next 10 appointments already scheduled     Aug 13, 2018  1:30 PM CDT   EXTERNAL RADIATION TREATMENT with UMP RAD ONC VARIAN   Radiation Oncology Clinic ([facility])    [facility]  1st Floor  [address] [facility] [address]   [phone]            Aug 14, 2018  1:30 PM CDT   EXTERNAL RADIATION TREATMENT with UMP RAD ONC VARIAN   Radiation Oncology Clinic ([facility])    [facility]  1st Floor  [address] [facility] [address]   [phone]            Aug 15, 2018  1:30 PM CDT   EXTERNAL RADIATION TREATMENT with UMP RAD ONC VARIAN   Radiation Oncology Clinic ([facility])    [facility]  1st Floor  [address] [facility] [address]   [phone]            Aug 16, 2018  1:30 PM CDT   EXTERNAL RADIATION TREATMENT with UMP RAD ONC VARIAN   Radiation Oncology Clinic ([facility])    [facility]  1st Floor  [address] [facility] [address]   [phone]            Aug 17, 2018  1:30 PM CDT   EXTERNAL RADIATION TREATMENT with UMP RAD ONC VARIAN   Radiation Oncology Clinic ([facility])    [facility]  1st Floor  [address] [facility] [address]   [phone]            Aug 17, 2018  1:45 PM CDT   ON TREATMENT VISIT with Shy Conner MD   Radiation Oncology Clinic ([facility])    [facility]  1st Floor  [address]  [facility] [address]   [phone]            Aug 20, 2018  1:30 PM CDT   EXTERNAL RADIATION TREATMENT with UMP RAD ONC VARIAN   Radiation Oncology Clinic ([facility])    [facility]  1st Floor  [address] [facility] [address]   [phone]            Aug 21, 2018  1:30 PM CDT   EXTERNAL RADIATION TREATMENT with UMP RAD ONC VARIAN   Radiation Oncology Clinic ([facility])    [facility]  1st Floor  [address] [facility] [address]   [phone]            Aug 22, 2018  1:30 PM CDT   EXTERNAL RADIATION TREATMENT with UMP RAD ONC VARIAN   Radiation Oncology Clinic ([facility])    [facility]  1st Floor  [address] [facility] [address]   [phone]            Aug 23, 2018  1:30 PM CDT   EXTERNAL RADIATION TREATMENT with UMP RAD ONC VARIAN   Radiation Oncology Clinic ([facility])    [facility]  1st Floor  [address] [facility] [address]   [phone]              Who to contact     Please call your clinic at [phone] to:    Ask questions about your health    Make or cancel appointments    Discuss your medicines    Learn about your test results    Speak to your doctor            Additional Information About Your Visit        [employer] Information     [employer] gives you secure access to your electronic health record. If you see a primary care provider, you can also send messages to your care team and make appointments. If you have questions, please call your primary care clinic.  If you do not have a primary care provider, please call [phone] and they will assist you.      [employer] is an electronic gateway that provides easy, online access to your medical records. With [employer], you can request a clinic appointment, read your test results, renew a prescription or communicate with your care team.     To access your  existing account, please contact your [facility] or call [phone] for assistance.        Care EveryWhere ID     This is your Care EveryWhere ID. This could be used by other organizations to access your [facility] medical records  [id number]        Your Vitals Were     BMI (Body Mass Index)                   21.62 kg/m2            Blood Pressure from Last 3 Encounters:   07/24/18 116/76   07/16/18 116/57   06/26/18 95/58    Weight from Last 3 Encounters:   08/10/18 50.2 kg (110 lb 11.2 oz)   08/03/18 50.4 kg (111 lb 3.2 oz)   07/24/18 50.1 kg (110 lb 8 oz)              Today, you had the following     No orders found for display       Primary Care Provider Office Phone # Fax #    Simona GUERRA Tucker JOSÉ LUIS [facility] [phone]       [address]  [facility] [address]        Goals        General    Medical (pt-stated)     Notes - Note created  4/3/2018 12:31 PM by Trice Galaviz, RN    Goal Statement: I will manage stress associated with caring for my grandchildren  Measure of Success: verbalization of stress reduction  Supportive Steps to Achieve: outpatient counseling, support of RN CC  Barriers: none  Strengths: family support  Date to Achieve By: 6 months            Equal Access to Services     CARTER DEL RIO AH: Hadii india pierre hadnomio Sohussein, waaxda luqadaha, qaybta kaalmada khadar sylvester. So [facility] [phone].    ATENCIÓN: Si habla español, tiene a phillips disposición servicios gratuitos de asistencia lingüística. Llame al [phone].    We comply with applicable federal civil rights laws and [place] laws. We do not discriminate on the basis of race, color, national origin, age, disability, sex, sexual orientation, or gender identity.            Thank you!     Thank you for choosing [facility]COL[facility]Y CLINIC  for your care. Our goal is always to provide you with excellent care. Hearing back from our patients is one way we can  continue to improve our services. Please take a few minutes to complete the written survey that you may receive in the mail after your visit with us. Thank you!             Your Updated Medication List - Protect others around you: Learn how to safely use, store and throw away your medicines at www.disposemymeds.org.          This list is accurate as of 8/10/18  4:33 PM.  Always use your most recent med list.                   Brand Name Dispense Instructions for use Diagnosis    acetaminophen 325 MG tablet    TYLENOL    100 tablet    Take 2 tablets (650 mg) by mouth every 4 hours as needed for other (mild pain)    Post-op pain       FISH OIL PO      Take 1 capsule by mouth daily.        levothyroxine 100 MCG tablet    SYNTHROID/LEVOTHROID    90 tablet    Take 1 tablet (100 mcg) by mouth daily Take by mouth daily    Hypothyroidism due to Hashimoto's thyroiditis       MULTIVITAMIN & MINERAL PO      Take 2 tablets by mouth daily.        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

## 2018-08-10 NOTE — LETTER
"8/10/2018       RE: Julieta Rivera  [address] [place] [address]     Dear Colleague,    Thank you for referring your patient, Julieta Rivera, to the RADIATION ONCOLOGY CLINIC. Please see a copy of my visit note below.    [facility] PHYSICIANS  SPECIALIZING IN BREAKTHROUGHS  Radiation Oncology    On Treatment Visit Note      Julieta Rivera      Date: 8/10/2018   MRN: [id number]   : 1950  Diagnosis: breast cancer      Reason for Visit:  On Radiation Treatment Visit     Treatment Summary to Date  Treatment Site: R breast Current Dose: 2385/5240cGy cGy Fractions:            Subjective: Julieta is doing well today.  She has noted some erythema of her right breast, as well as some occasional pains lateral to her right breast and under her arm.  She is slightly concerned because she feels like she has noticed some \"bumps \" lateral to her breast that were not there previously.    Nursing ROS:      Skin  Skin Reaction: 1 - Faint erythema or dry desquamation  Skin Note: lotion-\"purity\" lotion           Gastrointestinal  Nausea: 0 - None        Pain Assessment  0-10 Pain Scale: 0  Pain Note: skin sensitivity      Objective:   Wt 50.2 kg (110 lb 11.2 oz)  BMI 21.62 kg/m2  Gen: Appears well, in no acute distress  Skin: Mild diffuse erythema over treatment field  Breast: Well-healing surgical incision scar over right breast.  Small amount of cording present in right axilla, as well as some excess skin and bulge, consistent with previous exam and correlating with her seroma.  No abnormal palpable masses over her right breast or in her right axilla other than as noted above, specifically none in the region that the patient was concerned about.    Labs:  CBC RESULTS:   Recent Labs   Lab Test  18   1257   WBC  4.2   RBC  3.98   HGB  12.2   HCT  36.8   MCV  93   MCH  30.7   MCHC  33.2   RDW  11.6   PLT  152     ELECTROLYTES:  Recent Labs   Lab Test  18   1257   NA  136   POTASSIUM "  3.9   CHLORIDE  104   CINDI  8.6   CO2  24   BUN  14   CR  0.52   GLC  154*       Assessment:    Tolerating radiation therapy well.  All questions and concerns addressed.    Toxicities:  Pain: Grade 1: Mild pain  Dermatitis: Grade 1: Faint erythema or dry desquamation     Plan:   1. Continue current therapy.        Mosaiq chart and setup information reviewed  Ports checked         Educational Topic Discussed  Education Instructions: reviewed    The patient was seen and examined with Dr. Conner, who agrees with the above assessment and plan.    Amador Gonzales MD PGY-2   Radiation Oncology, [facility]  [phone] clinic  Pager [phone]      I saw and examined the patient with the resident.  I have reviewed and edited the resident's note and agree with the plan of care.      Shy Conner MD    Again, thank you for allowing me to participate in the care of your patient.      Sincerely,    Shy Conner MD

## 2018-08-10 NOTE — LETTER
Date:August 13, 2018      Provider requested that no letter be sent. Do not send.       [facility] Health Information

## 2018-08-13 ENCOUNTER — APPOINTMENT (OUTPATIENT)
Dept: RADIATION ONCOLOGY | Facility: CLINIC | Age: 68
End: 2018-08-13
Attending: RADIOLOGY
Payer: COMMERCIAL

## 2018-08-13 PROCEDURE — 77336 RADIATION PHYSICS CONSULT: CPT | Performed by: RADIOLOGY

## 2018-08-13 PROCEDURE — 77412 RADIATION TX DELIVERY LVL 3: CPT | Performed by: RADIOLOGY

## 2018-08-14 ENCOUNTER — APPOINTMENT (OUTPATIENT)
Dept: RADIATION ONCOLOGY | Facility: CLINIC | Age: 68
End: 2018-08-14
Attending: RADIOLOGY
Payer: COMMERCIAL

## 2018-08-14 PROCEDURE — 77412 RADIATION TX DELIVERY LVL 3: CPT | Performed by: RADIOLOGY

## 2018-08-15 ENCOUNTER — APPOINTMENT (OUTPATIENT)
Dept: RADIATION ONCOLOGY | Facility: CLINIC | Age: 68
End: 2018-08-15
Attending: RADIOLOGY
Payer: COMMERCIAL

## 2018-08-15 PROCEDURE — 77417 THER RADIOLOGY PORT IMAGE(S): CPT | Performed by: RADIOLOGY

## 2018-08-15 PROCEDURE — 77412 RADIATION TX DELIVERY LVL 3: CPT | Performed by: RADIOLOGY

## 2018-08-16 ENCOUNTER — APPOINTMENT (OUTPATIENT)
Dept: RADIATION ONCOLOGY | Facility: CLINIC | Age: 68
End: 2018-08-16
Attending: RADIOLOGY
Payer: COMMERCIAL

## 2018-08-16 PROCEDURE — 77412 RADIATION TX DELIVERY LVL 3: CPT | Performed by: RADIOLOGY

## 2018-08-16 PROCEDURE — 77334 RADIATION TREATMENT AID(S): CPT | Performed by: RADIOLOGY

## 2018-08-16 PROCEDURE — 77307 TELETHX ISODOSE PLAN CPLX: CPT | Performed by: RADIOLOGY

## 2018-08-17 ENCOUNTER — OFFICE VISIT (OUTPATIENT)
Dept: RADIATION ONCOLOGY | Facility: CLINIC | Age: 68
End: 2018-08-17
Attending: RADIOLOGY
Payer: COMMERCIAL

## 2018-08-17 VITALS — WEIGHT: 109 LBS | BODY MASS INDEX: 21.29 KG/M2

## 2018-08-17 DIAGNOSIS — Z17.1 MALIGNANT NEOPLASM OF UPPER-OUTER QUADRANT OF RIGHT BREAST IN FEMALE, ESTROGEN RECEPTOR NEGATIVE (H): Primary | ICD-10-CM

## 2018-08-17 DIAGNOSIS — C50.411 MALIGNANT NEOPLASM OF UPPER-OUTER QUADRANT OF RIGHT BREAST IN FEMALE, ESTROGEN RECEPTOR NEGATIVE (H): Primary | ICD-10-CM

## 2018-08-17 PROCEDURE — 77412 RADIATION TX DELIVERY LVL 3: CPT | Performed by: RADIOLOGY

## 2018-08-17 NOTE — MR AVS SNAPSHOT
After Visit Summary   8/17/2018    Julieta Rivera    MRN: [id number]           Patient Information     Date Of Birth          1950        Visit Information        Provider Department      8/17/2018 1:45 PM Shy Conner MD Radiation Oncology Clinic        Today's Diagnoses     Malignant neoplasm of upper-outer quadrant of right breast in female, estrogen receptor negative (H)    -  1       Follow-ups after your visit        Your next 10 appointments already scheduled     Aug 20, 2018  1:30 PM CDT   EXTERNAL RADIATION TREATMENT with UMP RAD ONC VARIAN   Radiation Oncology Clinic ([facility])    [facility]  1st Floor  [address] [facility] [address]   [phone]            Aug 21, 2018  1:30 PM CDT   EXTERNAL RADIATION TREATMENT with UMP RAD ONC VARIAN   Radiation Oncology Clinic ([facility])    [facility]  1st Floor  [address] [facility] [address]   [phone]            Aug 22, 2018  1:30 PM CDT   EXTERNAL RADIATION TREATMENT with UMP RAD ONC VARIAN   Radiation Oncology Clinic ([facility])    [facility]  1st Floor  [address] [facility] [address]   [phone]            Aug 23, 2018  1:30 PM CDT   EXTERNAL RADIATION TREATMENT with UMP RAD ONC VARIAN   Radiation Oncology Clinic ([facility])    [facility]  1st Floor  [address] [facility] [address]   [phone]            Aug 24, 2018  1:30 PM CDT   EXTERNAL RADIATION TREATMENT with UMP RAD ONC VARIAN   Radiation Oncology Clinic ([facility])    [facility]  1st Floor  [address] [facility] [address]   [phone]            Aug 24, 2018  1:45 PM CDT   ON TREATMENT VISIT with Shy Conner MD   Radiation Oncology Clinic ([facility])    [facility]  1st Floor  [address]  [facility] [address]   [phone]            Aug 27, 2018  1:30 PM CDT   EXTERNAL RADIATION TREATMENT with [facility] RAD ONC VARIAN   Radiation Oncology Clinic ([facility])    [facility]  1st Floor  [address] [facility] [address]   [phone]            Aug 28, 2018 11:45 AM CDT   Lymphedema Treatment with Margy Chong, PT   [facility])    [address] [place] [address]. [facility]  1st Floor  [address]  [facility] [address]   [phone]            Aug 30, 2018  1:15 PM CDT   Lymphedema Treatment with Ava Álvarez, PT   [facility] ([facility])    [address] [place] [address]. [facility]  1st Floor  [address]  [facility] [address]   [phone]            Aug 31, 2018 12:00 PM CDT   Lymphedema Treatment with Margy Chong, PT   [facility] ([facility])    [address] [place] [address]. [facility]  [id number]  [facility] [address]   [phone]              Who to contact     Please call your clinic at [phone] to:    Ask questions about your health    Make or cancel appointments    Discuss your medicines    Learn about your test results    Speak to your doctor            Additional Information About Your Visit        [employer] Information     [employer] gives you secure access to your electronic health record. If you see a primary care provider, you can also send messages to your care team and make appointments. If you have questions, please call your primary care clinic.  If you do not have a primary care provider, please call [phone] and they will assist you.      [employer] is an electronic gateway that provides easy, online access to your medical records. With [employer], you can request a clinic appointment, read your test results, renew a prescription or  communicate with your care team.     To access your existing account, please contact your [facility] or call [phone] for assistance.        Care EveryWhere ID     This is your Care EveryWhere ID. This could be used by other organizations to access your [facility] medical records  [id number]        Your Vitals Were     BMI (Body Mass Index)                   21.29 kg/m2            Blood Pressure from Last 3 Encounters:   07/24/18 116/76   07/16/18 116/57   06/26/18 95/58    Weight from Last 3 Encounters:   08/17/18 49.4 kg (109 lb)   08/10/18 50.2 kg (110 lb 11.2 oz)   08/03/18 50.4 kg (111 lb 3.2 oz)              Today, you had the following     No orders found for display       Primary Care Provider Office Phone # Fax #    JOSÉ LUIS Sol [facility] [phone]       [address]  [facility] [address]        Goals        General    Medical (pt-stated)     Notes - Note created  4/3/2018 12:31 PM by Trice Galaviz, RN    Goal Statement: I will manage stress associated with caring for my grandchildren  Measure of Success: verbalization of stress reduction  Supportive Steps to Achieve: outpatient counseling, support of RN CC  Barriers: none  Strengths: family support  Date to Achieve By: 6 months            Equal Access to Services     CARTER DEL RIO AH: Hadii india pierre hadasho Sosaraali, waaxda luqadaha, qaybta kaalmada khadar sylvester. So [facility] [phone].    ATENCIÓN: Si habla español, tiene a phillips disposición servicios gratuitos de asistencia lingüística. Llame al [phone].    We comply with applicable federal civil rights laws and [place] laws. We do not discriminate on the basis of race, color, national origin, age, disability, sex, sexual orientation, or gender identity.            Thank you!     Thank you for choosing [facility] CL[facility]  for your care. Our goal is always to provide you with excellent care.  Hearing back from our patients is one way we can continue to improve our services. Please take a few minutes to complete the written survey that you may receive in the mail after your visit with us. Thank you!             Your Updated Medication List - Protect others around you: Learn how to safely use, store and throw away your medicines at www.disposemymeds.org.          This list is accurate as of 8/17/18  2:38 PM.  Always use your most recent med list.                   Brand Name Dispense Instructions for use Diagnosis    acetaminophen 325 MG tablet    TYLENOL    100 tablet    Take 2 tablets (650 mg) by mouth every 4 hours as needed for other (mild pain)    Post-op pain       FISH OIL PO      Take 1 capsule by mouth daily.        levothyroxine 100 MCG tablet    SYNTHROID/LEVOTHROID    90 tablet    Take 1 tablet (100 mcg) by mouth daily Take by mouth daily    Hypothyroidism due to Hashimoto's thyroiditis       MULTIVITAMIN & MINERAL PO      Take 2 tablets by mouth daily.        VITAMIN D (CHOLECALCIFEROL) PO      Take 2,000 Units by mouth daily

## 2018-08-17 NOTE — LETTER
2018     RE: Julieta Rivera  [address] [place] [address]     Dear Colleague,    Thank you for referring your patient, Julieta Rivera, to the RADIATION ONCOLOGY CLINIC. Please see a copy of my visit note below.    [facility] PHYSICIANS  SPECIALIZING IN BREAKTHROUGHS  Radiation Oncology    On Treatment Visit Note      Julieta Rivera      Date: 2018   MRN: [id number]   : 1950  Diagnosis: breast cancer      Reason for Visit:  On Radiation Treatment Visit     Treatment Summary to Date  Treatment Site: R breast Current Dose: 3710/5240cGy cGy Fractions:            Subjective: Julieta today is frustrated by her ongoing seroma and the pain it causes in her right axilla.  She does feel like it has decreased slightly in size recently but it is still frustrating for her.  She went to her lymphedema appointment for measurements, and she is going to begin exercises at next visit.  She has tried taking Tylenol with little relief in the pain.  She also notes some increased skin irritation and sensitivity particularly on her lateral side.  Also notes some fatigue, relieved by rest.    Nursing ROS:      Skin  Skin Reaction: 0 - No changes  Skin Note: purity-organic lotion           Gastrointestinal  Nausea: 0 - None        Pain Assessment  0-10 Pain Scale: 4  Pain Note: some axillary pain due to seroma      Objective:   Wt 49.4 kg (109 lb)  BMI 21.29 kg/m2  Gen: Appears well, in no acute distress  Skin: Mild diffuse erythema over treatment field    Labs:  CBC RESULTS:   Recent Labs   Lab Test  18   1257   WBC  4.2   RBC  3.98   HGB  12.2   HCT  36.8   MCV  93   MCH  30.7   MCHC  33.2   RDW  11.6   PLT  152     ELECTROLYTES:  Recent Labs   Lab Test  18   1257   NA  136   POTASSIUM  3.9   CHLORIDE  104   CINDI  8.6   CO2  24   BUN  14   CR  0.52   GLC  154*       Assessment:    Tolerating radiation therapy well.  All questions and concerns addressed.  We reassured her  that her seroma may take some time to heal but in general, they almost universally improved.  We recommended ibuprofen use for her pain associated with the seroma and offered to prescribe her additional medication if necessary, though she does not seem to have an indication for narcotics at this time.    Toxicities:  Fatigue: Grade 2: Fatigue not relieved by rest; limiting instrumental ADL  Dermatitis: Grade 1: Faint erythema or dry desquamation     Plan:   1. Continue current therapy.        Mosaiq chart and setup information reviewed  Ports checked         The patient was seen and examined with Dr. Conner, who agrees with the above assessment and plan.    Amador Gonzales MD PGY-2   Radiation Oncology, [facility] [place]  [phone] clinic  Pager [phone]      I saw and examined the patient with the resident.  I have reviewed and edited the resident's note and agree with the plan of care.      Shy Conner MD    Again, thank you for allowing me to participate in the care of your patient.      Sincerely,    Shy Conner MD

## 2018-08-17 NOTE — PROGRESS NOTES
[facility] PHYSICIANS  SPECIALIZING IN BREAKTHROUGHS  Radiation Oncology    On Treatment Visit Note      Julieta Rivera      Date: 2018   MRN: [id number]   : 1950  Diagnosis: breast cancer      Reason for Visit:  On Radiation Treatment Visit     Treatment Summary to Date  Treatment Site: R breast Current Dose: 3710/5240cGy cGy Fractions: fx           Subjective: Julieta today is frustrated by her ongoing seroma and the pain it causes in her right axilla.  She does feel like it has decreased slightly in size recently but it is still frustrating for her.  She went to her lymphedema appointment for measurements, and she is going to begin exercises at next visit.  She has tried taking Tylenol with little relief in the pain.  She also notes some increased skin irritation and sensitivity particularly on her lateral side.  Also notes some fatigue, relieved by rest.    Nursing ROS:      Skin  Skin Reaction: 0 - No changes  Skin Note: purity-organic lotion           Gastrointestinal  Nausea: 0 - None        Pain Assessment  0-10 Pain Scale: 4  Pain Note: some axillary pain due to seroma      Objective:   Wt 49.4 kg (109 lb)  BMI 21.29 kg/m2  Gen: Appears well, in no acute distress  Skin: Mild diffuse erythema over treatment field    Labs:  CBC RESULTS:   Recent Labs   Lab Test  18   1257   WBC  4.2   RBC  3.98   HGB  12.2   HCT  36.8   MCV  93   MCH  30.7   MCHC  33.2   RDW  11.6   PLT  152     ELECTROLYTES:  Recent Labs   Lab Test  18   1257   NA  136   POTASSIUM  3.9   CHLORIDE  104   CINDI  8.6   CO2  24   BUN  14   CR  0.52   GLC  154*       Assessment:    Tolerating radiation therapy well.  All questions and concerns addressed.  We reassured her that her seroma may take some time to heal but in general, they almost universally improved.  We recommended ibuprofen use for her pain associated with the seroma and offered to prescribe her additional medication if necessary, though  she does not seem to have an indication for narcotics at this time.    Toxicities:  Fatigue: Grade 2: Fatigue not relieved by rest; limiting instrumental ADL  Dermatitis: Grade 1: Faint erythema or dry desquamation     Plan:   1. Continue current therapy.        Mosaiq chart and setup information reviewed  Ports checked         The patient was seen and examined with Dr. Conner, who agrees with the above assessment and plan.    Amador Gonzales MD PGY-2   Radiation Oncology, [facility] [place]  [phone] clinic  Pager [phone]      I saw and examined the patient with the resident.  I have reviewed and edited the resident's note and agree with the plan of care.      Shy Conner MD

## 2018-08-20 ENCOUNTER — APPOINTMENT (OUTPATIENT)
Dept: RADIATION ONCOLOGY | Facility: CLINIC | Age: 68
End: 2018-08-20
Attending: RADIOLOGY
Payer: COMMERCIAL

## 2018-08-20 PROCEDURE — 77280 THER RAD SIMULAJ FIELD SMPL: CPT | Performed by: RADIOLOGY

## 2018-08-20 PROCEDURE — 77336 RADIATION PHYSICS CONSULT: CPT | Mod: XU | Performed by: RADIOLOGY

## 2018-08-20 PROCEDURE — 77412 RADIATION TX DELIVERY LVL 3: CPT | Performed by: RADIOLOGY

## 2018-08-21 ENCOUNTER — APPOINTMENT (OUTPATIENT)
Dept: RADIATION ONCOLOGY | Facility: CLINIC | Age: 68
End: 2018-08-21
Attending: RADIOLOGY
Payer: COMMERCIAL

## 2018-08-21 PROCEDURE — 77412 RADIATION TX DELIVERY LVL 3: CPT | Performed by: RADIOLOGY

## 2018-08-22 ENCOUNTER — APPOINTMENT (OUTPATIENT)
Dept: RADIATION ONCOLOGY | Facility: CLINIC | Age: 68
End: 2018-08-22
Attending: RADIOLOGY
Payer: COMMERCIAL

## 2018-08-22 PROCEDURE — 77332 RADIATION TREATMENT AID(S): CPT | Performed by: RADIOLOGY

## 2018-08-22 PROCEDURE — 77412 RADIATION TX DELIVERY LVL 3: CPT | Performed by: RADIOLOGY

## 2018-08-23 ENCOUNTER — APPOINTMENT (OUTPATIENT)
Dept: RADIATION ONCOLOGY | Facility: CLINIC | Age: 68
End: 2018-08-23
Attending: RADIOLOGY
Payer: COMMERCIAL

## 2018-08-23 PROCEDURE — 77412 RADIATION TX DELIVERY LVL 3: CPT | Performed by: RADIOLOGY

## 2018-08-24 ENCOUNTER — OFFICE VISIT (OUTPATIENT)
Dept: RADIATION ONCOLOGY | Facility: CLINIC | Age: 68
End: 2018-08-24
Attending: RADIOLOGY
Payer: COMMERCIAL

## 2018-08-24 VITALS — BODY MASS INDEX: 21.33 KG/M2 | WEIGHT: 109.2 LBS

## 2018-08-24 DIAGNOSIS — C50.411 MALIGNANT NEOPLASM OF UPPER-OUTER QUADRANT OF RIGHT BREAST IN FEMALE, ESTROGEN RECEPTOR NEGATIVE (H): Primary | ICD-10-CM

## 2018-08-24 DIAGNOSIS — Z17.1 MALIGNANT NEOPLASM OF UPPER-OUTER QUADRANT OF RIGHT BREAST IN FEMALE, ESTROGEN RECEPTOR NEGATIVE (H): Primary | ICD-10-CM

## 2018-08-24 PROCEDURE — 77412 RADIATION TX DELIVERY LVL 3: CPT | Performed by: RADIOLOGY

## 2018-08-24 NOTE — PROGRESS NOTES
[facility] PHYSICIANS  SPECIALIZING IN BREAKTHROUGHS  Radiation Oncology    On Treatment Visit Note      Julieta Rivera      Date: 2018   MRN: [id number]   : 1950  Diagnosis: breast cancer      Reason for Visit:  On Radiation Treatment Visit     Treatment Summary to Date  Treatment Site: R breast Current Dose: 4490/5240cGy cGy Fractions: /20fx      Chemotherapy  Chemo concurrent with radx?: No    Subjective: Julieta is in a better mood today.  She states that she has had some difficulty sleeping because of pain associated with her seroma, however her sleeping environment is not the best anyways.  She says that she feels more fatigued at the end of the day.  Additionally, Julieta endorsed being very busy at home with taking care of her grandchildren.  She has tried taking ibuprofen 400 mg at night with good effect on pain relief.    Nursing ROS:   Nutrition Alteration  Diet Type: Patient's Preference  Skin  Skin Reaction: 1 - Faint erythema or dry desquamation  Skin Note: purity-organic lotion           Gastrointestinal  Nausea: 0 - None     Psychosocial  Pyschosocial Note: fatigued at times  Pain Assessment  0-10 Pain Scale:  (See above)  Pain Note: some axillary pain due to seroma (Took some advil at noc to help with sleep)      Objective:   Wt 49.5 kg (109 lb 3.2 oz)  BMI 21.33 kg/m2  Gen: Appears well, in no acute distress  Skin: Mild diffuse erythema over treatment field    Labs:  CBC RESULTS:   Recent Labs   Lab Test  18   1257   WBC  4.2   RBC  3.98   HGB  12.2   HCT  36.8   MCV  93   MCH  30.7   MCHC  33.2   RDW  11.6   PLT  152     ELECTROLYTES:  Recent Labs   Lab Test  18   1257   NA  136   POTASSIUM  3.9   CHLORIDE  104   CINDI  8.6   CO2  24   BUN  14   CR  0.52   GLC  154*       Assessment:    Tolerating radiation therapy well.  All questions and concerns addressed.     Toxicities:  Fatigue: Grade 1: Fatigue relieved by rest  Dermatitis: Grade 1: Faint erythema or dry  desquamation     Plan:   1. Continue current therapy.    2. Follow-up in 1 month.  3. Lymphedema therapy as previously scheduled.        Mosaiq chart and setup information reviewed  Ports checked         Educational Topic Discussed  Additional Instructions: D/C instructions reviewed with pt  Education Instructions: reviewed    The patient was seen and examined with Dr. Conner, who agrees with the above assessment and plan.    Amador Gonzales MD PGY-2   Radiation Oncology, [facility]  [phone] clinic  Pager [phone]      I saw and examined the patient with the resident.  I have reviewed and edited the resident's note and agree with the plan of care.      Shy Conner MD

## 2018-08-24 NOTE — LETTER
Date:August 27, 2018      Provider requested that no letter be sent. Do not send.       [facility] Health Information

## 2018-08-24 NOTE — LETTER
2018       RE: Julieta Rivera  [address] [place] [address]     Dear Colleague,    Thank you for referring your patient, Julieta Rivera, to the RADIATION ONCOLOGY CLINIC. Please see a copy of my visit note below.    [facility] PHYSICIANS  SPECIALIZING IN BREAKTHROUGHS  Radiation Oncology    On Treatment Visit Note      Julieta Rivera      Date: 2018   MRN: [id number]   : 1950  Diagnosis: breast cancer      Reason for Visit:  On Radiation Treatment Visit     Treatment Summary to Date  Treatment Site: R breast Current Dose: 4490/5240cGy cGy Fractions:       Chemotherapy  Chemo concurrent with radx?: No    Subjective: Julieta is in a better mood today.  She states that she has had some difficulty sleeping because of pain associated with her seroma, however her sleeping environment is not the best anyways.  She says that she feels more fatigued at the end of the day.  Additionally, Julieta endorsed being very busy at home with taking care of her grandchildren.  She has tried taking ibuprofen 400 mg at night with good effect on pain relief.    Nursing ROS:   Nutrition Alteration  Diet Type: Patient's Preference  Skin  Skin Reaction: 1 - Faint erythema or dry desquamation  Skin Note: purity-organic lotion           Gastrointestinal  Nausea: 0 - None     Psychosocial  Pyschosocial Note: fatigued at times  Pain Assessment  0-10 Pain Scale:  (See above)  Pain Note: some axillary pain due to seroma (Took some advil at noc to help with sleep)      Objective:   Wt 49.5 kg (109 lb 3.2 oz)  BMI 21.33 kg/m2  Gen: Appears well, in no acute distress  Skin: Mild diffuse erythema over treatment field    Labs:  CBC RESULTS:   Recent Labs   Lab Test  18   1257   WBC  4.2   RBC  3.98   HGB  12.2   HCT  36.8   MCV  93   MCH  30.7   MCHC  33.2   RDW  11.6   PLT  152     ELECTROLYTES:  Recent Labs   Lab Test  18   1257   NA  136   POTASSIUM  3.9   CHLORIDE  104   CINDI  8.6   CO2   24   BUN  14   CR  0.52   GLC  154*       Assessment:    Tolerating radiation therapy well.  All questions and concerns addressed.     Toxicities:  Fatigue: Grade 1: Fatigue relieved by rest  Dermatitis: Grade 1: Faint erythema or dry desquamation     Plan:   1. Continue current therapy.    2. Follow-up in 1 month.  3. Lymphedema therapy as previously scheduled.        Mosaiq chart and setup information reviewed  Ports checked         Educational Topic Discussed  Additional Instructions: D/C instructions reviewed with pt  Education Instructions: reviewed    The patient was seen and examined with Dr. Conner, who agrees with the above assessment and plan.    Amador Gonzales MD PGY-2   Radiation Oncology, [facility]  [phone] clinic  Pager [phone]      I saw and examined the patient with the resident.  I have reviewed and edited the resident's note and agree with the plan of care.      Shy Conner MD    Again, thank you for allowing me to participate in the care of your patient.      Sincerely,    Shy Conner MD

## 2018-08-24 NOTE — PATIENT INSTRUCTIONS
Continuing Management of the Effects of Radiation Treatment    The side effects of radiation therapy should gradually decrease in 2 to 3 weeks after you have finished radiation.  Some effects take longer to resolve.    Skin reactions:  Skin changes (such as redness or irritation) should begin to get better gradually.  Some people will have a permanent change in skin color.  Their skin may be more pink or  tan  than the untreated skin.  The skin may be thinner or more fragile than before treatment.  Continue to use a gentle moisturizing lotion for several months.  You should always protect the skin in the area that was treated by using sunscreen of spf 30 or higher.      Other skin care instructions:    Fatigue caused by radiation therapy will decrease and your energy will improve.    For concerns or questions call Department of Therapeutic Radiology [phone]

## 2018-08-24 NOTE — MR AVS SNAPSHOT
After Visit Summary   8/24/2018    Julieta Rivera    MRN: [id number]           Patient Information     Date Of Birth          1950        Visit Information        Provider Department      8/24/2018 1:45 PM Shy Conner MD Radiation Oncology Clinic        Today's Diagnoses     Malignant neoplasm of upper-outer quadrant of right breast in female, estrogen receptor negative (H)    -  1      Care Instructions    Continuing Management of the Effects of Radiation Treatment    The side effects of radiation therapy should gradually decrease in 2 to 3 weeks after you have finished radiation.  Some effects take longer to resolve.    Skin reactions:  Skin changes (such as redness or irritation) should begin to get better gradually.  Some people will have a permanent change in skin color.  Their skin may be more pink or  tan  than the untreated skin.  The skin may be thinner or more fragile than before treatment.  Continue to use a gentle moisturizing lotion for several months.  You should always protect the skin in the area that was treated by using sunscreen of spf 30 or higher.      Other skin care instructions:    Fatigue caused by radiation therapy will decrease and your energy will improve.    For concerns or questions call Department of Therapeutic Radiology [phone]          Follow-ups after your visit        Follow-up notes from your care team     Return in about 4 weeks (around 9/21/2018).      Your next 10 appointments already scheduled     Aug 27, 2018  1:30 PM CDT   EXTERNAL RADIATION TREATMENT with [facility] RAD ONC VARIAN   Radiation Oncology Clinic ([facility])    [facility]  1st Floor  [address] [facility] [address]   [phone]            Aug 28, 2018 11:45 AM CDT   Lymphedema Treatment with Margy Chong, PT   [facility] [place] [facility]hedema ([facility])    [address] [place] [address]  Floor  [address]  [facility] [address]   [phone]            Aug 30, 2018  1:15 PM CDT   Lymphedema Treatment with Ava Álvarez, PT   [facility] [place] [facility] ([facility])    [address]  [facility]  1st Floor  [address]  [facility] [address]   [phone]            Aug 31, 2018 12:00 PM CDT   Lymphedema Treatment with Margy Chong, PT   [facility] [place] [facility] ([facility])    [address]  [facility]  1st Floor  [address]  [facility] [address]   [phone]            Sep 04, 2018 11:30 AM CDT   Lymphedema Treatment with Ava Álvarez, PT   [facility] [place] [facility] ([facility])    [address] [facility]  1st Floor  [address]  [facility] [address]   [phone]            Sep 06, 2018  1:15 PM CDT   Lymphedema Treatment with Ava Álvarez, PT   [facility] [place] [facility] ([facility])    [address] [facility]  1st Floor  [address]  [facility] [address]   [phone]            Sep 07, 2018 12:00 PM CDT   Lymphedema Treatment with Margy Chong, PT   [facility] [place] [facility] ([facility])    [address] [facility]  1st Floor  [address]  [facility] [address]   [phone]            Sep 10, 2018 12:00 PM CDT   Lymphedema Treatment with Margy Chong, PT   [facility] [place] [facility] ([facility])    [address] [facility]  1st Floor  [address]  [facility] [address]   [phone]            Sep 12, 2018 12:00 PM CDT   Lymphedema Treatment with Margy Chong, PT   [facility] [place] [facility] ([facility])    [address] [facility]  1st Floor  [address]  [facility] [address]   [phone]             Sep 14, 2018 12:00 PM CDT   Lymphedema Treatment with Margy Chong, PT   [facility] [place] [facility] ([facility])    [address] [facility] [facility]  1st Floor  [address]  [facility] [address] [phone]              Who to contact     Please call your clinic at [phone] to:    Ask questions about your health    Make or cancel appointments    Discuss your medicines    Learn about your test results    Speak to your doctor            Additional Information About Your Visit        [employer] Information     [employer] gives you secure access to your electronic health record. If you see a primary care provider, you can also send messages to your care team and make appointments. If you have questions, please call your primary care clinic.  If you do not have a primary care provider, please call [phone] and they will assist you.      [employer] is an electronic gateway that provides easy, online access to your medical records. With [employer], you can request a clinic appointment, read your test results, renew a prescription or communicate with your care team.     To access your existing account, please contact your [facility] or call [phone] for assistance.        Care EveryWhere ID     This is your Care EveryWhere ID. This could be used by other organizations to access your [place] medical records  [id number]        Your Vitals Were     BMI (Body Mass Index)                   21.33 kg/m2            Blood Pressure from Last 3 Encounters:   07/24/18 116/76   07/16/18 116/57   06/26/18 95/58    Weight from Last 3 Encounters:   08/24/18 49.5 kg (109 lb 3.2 oz)   08/17/18 49.4 kg (109 lb)   08/10/18 50.2 kg (110 lb 11.2 oz)              Today, you had the following     No orders found for display       Primary Care Provider Office Phone # Fax #    JOSÉ LUIS Sol [facility] [phone]       [address] [facility] [address]  [facility] [address]        Goals        General    Medical (pt-stated)     Notes - Note created  4/3/2018 12:31 PM by Trice Galaviz, RN    Goal Statement: I will manage stress associated with caring for my grandchildren  Measure of Success: verbalization of stress reduction  Supportive Steps to Achieve: outpatient counseling, support of RN CC  Barriers: none  Strengths: family support  Date to Achieve By: 6 months            Equal Access to Services     CARTER DEL RIO AH: Hadii aad ku hadasho Soomaali, waaxda luqadaha, qaybta kaalmada adeegyada, waxay idiin hayaan adeflor william laJackiaan . So [facility] [phone].    ATENCIÓN: Si habla español, tiene a phillips disposición servicios gratuitos de asistencia lingüística. Blancoame al [phone].    We comply with applicable federal civil rights laws and [place] laws. We do not discriminate on the basis of race, color, national origin, age, disability, sex, sexual orientation, or gender identity.            Thank you!     Thank you for choosing RADIATION ONCOLOGY CLINIC  for your care. Our goal is always to provide you with excellent care. Hearing back from our patients is one way we can continue to improve our services. Please take a few minutes to complete the written survey that you may receive in the mail after your visit with us. Thank you!             Your Updated Medication List - Protect others around you: Learn how to safely use, store and throw away your medicines at www.disposemymeds.org.          This list is accurate as of 8/24/18  2:16 PM.  Always use your most recent med list.                   Brand Name Dispense Instructions for use Diagnosis    acetaminophen 325 MG tablet    TYLENOL    100 tablet    Take 2 tablets (650 mg) by mouth every 4 hours as needed for other (mild pain)    Post-op pain       FISH OIL PO      Take 1 capsule by mouth daily.        levothyroxine 100 MCG tablet    SYNTHROID/LEVOTHROID    90 tablet    Take 1 tablet (100 mcg) by mouth  daily Take by mouth daily    Hypothyroidism due to Hashimoto's thyroiditis       MULTIVITAMIN & MINERAL PO      Take 2 tablets by mouth daily.        VITAMIN D (CHOLECALCIFEROL) PO      Take 2,000 Units by mouth daily

## 2018-08-27 ENCOUNTER — APPOINTMENT (OUTPATIENT)
Dept: RADIATION ONCOLOGY | Facility: CLINIC | Age: 68
End: 2018-08-27
Attending: RADIOLOGY
Payer: COMMERCIAL

## 2018-08-27 PROCEDURE — 77412 RADIATION TX DELIVERY LVL 3: CPT | Performed by: RADIOLOGY

## 2018-08-27 PROCEDURE — 77336 RADIATION PHYSICS CONSULT: CPT | Performed by: RADIOLOGY

## 2018-08-28 ENCOUNTER — HOSPITAL ENCOUNTER (OUTPATIENT)
Dept: PHYSICAL THERAPY | Facility: CLINIC | Age: 68
Setting detail: THERAPIES SERIES
End: 2018-08-28
Attending: INTERNAL MEDICINE
Payer: COMMERCIAL

## 2018-08-28 ENCOUNTER — ONCOLOGY VISIT (OUTPATIENT)
Dept: RADIATION ONCOLOGY | Facility: CLINIC | Age: 68
End: 2018-08-28

## 2018-08-28 PROCEDURE — 97140 MANUAL THERAPY 1/> REGIONS: CPT | Mod: GP | Performed by: PHYSICAL THERAPIST

## 2018-08-28 PROCEDURE — 40000449 ZZHC STATISTIC PT VISIT, LYMPHEDEMA: Performed by: PHYSICAL THERAPIST

## 2018-08-28 NOTE — MR AVS SNAPSHOT
After Visit Summary   8/28/2018    Julieta Rivera    MRN: [id number]           Patient Information     Date Of Birth          1950        Visit Information        Provider Department      8/28/2018 10:00 PM Shy Conner MD Radiation Oncology Clinic         Follow-ups after your visit        Your next 10 appointments already scheduled     Aug 30, 2018  1:15 PM CDT   Lymphedema Treatment with Ava Álvarez, PT   [facility]    [address] [facility]  1st Floor  [address] [address]   [phone]            Aug 31, 2018 12:00 PM CDT   Lymphedema Treatment with Margy Chong, PT   [facility] [place] [facility] ([facility])    [address] [facility]  1st Floor  [address] [address]   [phone]            Sep 04, 2018 11:30 AM CDT   Lymphedema Treatment with Ava Álvarez, PT   [facility]    [facility] [address] [facility]  [address]st [address] [address]   [phone]            Sep 06, 2018  1:15 PM CDT   Lymphedema Treatment with Ava Álvarez, PT   [facility]    [address] [facility]  1st Floor  [address] [address]   [phone]            Sep 07, 2018 12:00 PM CDT   Lymphedema Treatment with Margy Chong PT   [facility]    [facility]. [address]  [address] [address]   [phone]            Sep 10, 2018 12:00 PM CDT   Lymphedema Treatment with Margy Chong PT   [facility])    [facility] [address]  [address]  [address]   [phone]            Sep 12, 2018 12:00 PM CDT   Lymphedema Treatment with Margy Chong PT   [facility] [place] [facility] ([facility])    [address] [facility]  1st Floor  [address]  [facility] [address]   [phone]            Sep 14, 2018 12:00 PM CDT   Lymphedema Treatment with Margy Chong PT   [facility] [place] [facility]ymphedema ([facility])    [facility] [address] [facility]  1st Floor  [address]  [facility] [address]   [phone]            Sep 27, 2018  1:30 PM CDT   Return Visit with Shy Conner MD   Radiation Oncology Clinic ([facility])    [facility]  1st Floor  [address] [facility] [address] [phone]            Oct 23, 2018 12:30 PM CDT   (Arrive by 12:15 PM)   Return Visit with Jonathan Gay MD   [facility] ([facility])    [address] [facility]  [address]  [facility] [address] [phone]              Who to contact     Please call your clinic at [phone] to:    Ask questions about your health    Make or cancel appointments    Discuss your medicines    Learn about your test results    Speak to your doctor            Additional Information About Your Visit        [email or web address] Information     [employer] gives you secure access to your electronic health record. If you see a primary care provider, you can also send messages to your care team and make appointments. If you have questions, please call your primary care clinic.  If you do not have a primary care provider, please call [phone] and they will assist you.      [employer] is an electronic gateway that provides easy, online access to your medical records. With [employer], you can request a clinic appointment, read your test results, renew a prescription or communicate with your care team.     To access your existing  account, please contact your [facility] or call [phone] for assistance.        Care EveryWhere ID     This is your Care EveryWhere ID. This could be used by other organizations to access your [place] medical records  [id number]         Blood Pressure from Last 3 Encounters:   No data found for BP    Weight from Last 3 Encounters:   No data found for Wt              Today, you had the following     No orders found for display       Primary Care Provider Office Phone # Fax #    JOSÉ LUIS Sol [facility] [phone]       [address] [facility] [address]  [facility] [address]        Goals        General    Medical (pt-stated)     Notes - Note created  4/3/2018 12:31 PM by Trice Galaviz, RN    Goal Statement: I will manage stress associated with caring for my grandchildren  Measure of Success: verbalization of stress reduction  Supportive Steps to Achieve: outpatient counseling, support of RN CC  Barriers: none  Strengths: family support  Date to Achieve By: 6 months            Equal Access to Services     CARTER DEL [employer] AH: Hadii ind[place] pierre hadasho Sosaraali, waaxda luqadaha, qaybta kaalmada adeegyada, khadar hdez . So [facility] [phone].    ATENCIÓN: Si habla español, tiene a phillips disposición servicios gratuitos de asistencia lingüística. Llame al [phone].    We comply with applicable federal civil rights laws and [place] laws. We do not discriminate on the basis of race, color, national origin, age, disability, sex, sexual orientation, or gender identity.            Thank you!     Thank you for choosing RADIATION ONCOLOGY CLINIC  for your care. Our goal is always to provide you with excellent care. Hearing back from our patients is one way we can continue to improve our services. Please take a few minutes to complete the written survey that you may receive in the mail after your visit with us. Thank you!             Your Updated Medication List  - Protect others around you: Learn how to safely use, store and throw away your medicines at www.disposemymeds.org.          This list is accurate as of 8/28/18 11:59 PM.  Always use your most recent med list.                   Brand Name Dispense Instructions for use Diagnosis    acetaminophen 325 MG tablet    TYLENOL    100 tablet    Take 2 tablets (650 mg) by mouth every 4 hours as needed for other (mild pain)    Post-op pain       FISH OIL PO      Take 1 capsule by mouth daily.        levothyroxine 100 MCG tablet    SYNTHROID/LEVOTHROID    90 tablet    Take 1 tablet (100 mcg) by mouth daily Take by mouth daily    Hypothyroidism due to Hashimoto's thyroiditis       MULTIVITAMIN & MINERAL PO      Take 2 tablets by mouth daily.        VITAMIN D (CHOLECALCIFEROL) PO      Take 2,000 Units by mouth daily

## 2018-08-30 ENCOUNTER — HOSPITAL ENCOUNTER (OUTPATIENT)
Dept: PHYSICAL THERAPY | Facility: CLINIC | Age: 68
Setting detail: THERAPIES SERIES
End: 2018-08-30
Attending: INTERNAL MEDICINE
Payer: COMMERCIAL

## 2018-08-30 PROCEDURE — 40000449 ZZHC STATISTIC PT VISIT, LYMPHEDEMA: Performed by: PHYSICAL THERAPIST

## 2018-08-30 PROCEDURE — 97110 THERAPEUTIC EXERCISES: CPT | Mod: GP | Performed by: PHYSICAL THERAPIST

## 2018-08-30 PROCEDURE — 97140 MANUAL THERAPY 1/> REGIONS: CPT | Mod: GP | Performed by: PHYSICAL THERAPIST

## 2018-08-30 NOTE — PROCEDURES
Radiotherapy Treatment Summary          Date of Report: 2018     PATIENT: FAIZA RIVERA  MEDICAL RECORD NO: [id number]  : 1950     DIAGNOSIS: C50.411 Malignant neoplasm of upper-outer quadrant of right female breast  INTENT OF RADIOTHERAPY: Cure  PATHOLOGY: invasive ductal carcinoma of the right breast, triple negative histology                                   STAGE: Clinical stage IA [id number], surgical staging guZ2oE8(sn).                            Details of the treatments summarized below are found in records kept in the Department of Radiation Oncology at [facility].     Treatment Summary:  Radiation Oncology - Course: 1 Protocol:   Treatment Site Current Dose Modality From To Elapsed Days Fx.  1 R Breast  4,240 cGy 06 X  7/30/2018  2018  22 16  1 R Breast Boost  1,000 cGy e09  2018   5  4          Dose per Fraction:       Whole right breast: 265 cGy  Right lumpectomy cavity: 250 cGy  Total Dose:        Whole right breast: 4240 cGy  Right lumpectomy cavity: 5240 cGy        COMMENTS:                     Ms. Rivera is a 67-year-old woman with stage IA [id number] triple negative invasive ductal carcinoma of   the right breast, status post neoadjuvant chemotherapy followed by lumpectomy with sentinel lymph node   biopsy.  Her surgical pathology showed good response to neoadjuvant docetaxel and cyclophosphamide with   reduction in tumor size from 9 mm to 2.5 mm, surgical staging [id number](sn).       Ms. Rivera was treated with radiotherapy with curative intent. Her entire right breast received 4240 cGy in   16 daily fractions followed by a 1000 cGy boost in 4 daily fractions to the right lumpectomy cavity. She   tolerated treatment well without any unexpected treatment breaks, hospitalizations, or ED visits. She did   develop some mild fatigue and dermatitis; the latter was treated with Purity Organic lotion per patient   preference. She also complained of some pain  associated with her right axillary seroma. She reported that this   gradually improved over the course of treatment but did require OTC pain medication on occasion.       (Acute Toxicity Profile by CTC v5.0)  Fatigue: Grade 1: Fatigue relieved by rest  Dermatitis: Grade 1: Faint erythema or dry desquamation  PAIN MANAGEMENT:  Recommended Tylenol 650 mg every 6 hours as needed for pain as well as   ibuprofen 400 mg every 8 hours as needed for pain. Patient instructed to contact Dr. Guzman's office is she   continues to have persistent pain after several more weeks.                             FOLLOW UP PLAN:    1. Follow up with Dr. Conner in radiation oncology scheduled for 9/27/2018  2. Follow up with Dr. Gay in medical oncology as currently scheduled on 10/23/2018                          Staff Physician: Shy Conner M.D.  Physicist: Saad Hurtado, Ph.D.     CC: MD Jonathan Phoenix MD Kerri Simmons, [facility]           Radiation Oncology [address] [address] [address] Phone: [phone]

## 2018-08-31 ENCOUNTER — HOSPITAL ENCOUNTER (OUTPATIENT)
Dept: PHYSICAL THERAPY | Facility: CLINIC | Age: 68
Setting detail: THERAPIES SERIES
End: 2018-08-31
Attending: INTERNAL MEDICINE
Payer: COMMERCIAL

## 2018-08-31 PROCEDURE — 97140 MANUAL THERAPY 1/> REGIONS: CPT | Mod: GP | Performed by: PHYSICAL THERAPIST

## 2018-08-31 PROCEDURE — 40000449 ZZHC STATISTIC PT VISIT, LYMPHEDEMA: Performed by: PHYSICAL THERAPIST

## 2018-09-04 ENCOUNTER — HOSPITAL ENCOUNTER (OUTPATIENT)
Dept: PHYSICAL THERAPY | Facility: CLINIC | Age: 68
Setting detail: THERAPIES SERIES
End: 2018-09-04
Attending: INTERNAL MEDICINE
Payer: COMMERCIAL

## 2018-09-04 PROCEDURE — 97110 THERAPEUTIC EXERCISES: CPT | Mod: GP | Performed by: PHYSICAL THERAPIST

## 2018-09-04 PROCEDURE — 40000449 ZZHC STATISTIC PT VISIT, LYMPHEDEMA: Performed by: PHYSICAL THERAPIST

## 2018-09-04 PROCEDURE — 97140 MANUAL THERAPY 1/> REGIONS: CPT | Mod: GP | Performed by: PHYSICAL THERAPIST

## 2018-09-06 ENCOUNTER — HOSPITAL ENCOUNTER (OUTPATIENT)
Dept: PHYSICAL THERAPY | Facility: CLINIC | Age: 68
Setting detail: THERAPIES SERIES
End: 2018-09-06
Attending: INTERNAL MEDICINE
Payer: COMMERCIAL

## 2018-09-06 PROCEDURE — 40000449 ZZHC STATISTIC PT VISIT, LYMPHEDEMA: Performed by: PHYSICAL THERAPIST

## 2018-09-06 PROCEDURE — 97110 THERAPEUTIC EXERCISES: CPT | Mod: GP | Performed by: PHYSICAL THERAPIST

## 2018-09-06 PROCEDURE — 96523 IRRIG DRUG DELIVERY DEVICE: CPT

## 2018-09-06 PROCEDURE — 97140 MANUAL THERAPY 1/> REGIONS: CPT | Mod: GP | Performed by: PHYSICAL THERAPIST

## 2018-09-06 PROCEDURE — 25000128 H RX IP 250 OP 636: Performed by: INTERNAL MEDICINE

## 2018-09-06 RX ORDER — HEPARIN SODIUM (PORCINE) LOCK FLUSH IV SOLN 100 UNIT/ML 100 UNIT/ML
5 SOLUTION INTRAVENOUS EVERY 8 HOURS PRN
Status: DISCONTINUED | OUTPATIENT
Start: 2018-09-06 | End: 2018-09-14 | Stop reason: HOSPADM

## 2018-09-06 RX ADMIN — Medication 5 ML: at 12:27

## 2018-09-06 NOTE — NURSING NOTE
Chief Complaint   Patient presents with     Port Flush     Port flushed and hep locked by RN. Lab only appointment.     Romy Mcmillan RN

## 2018-09-07 ENCOUNTER — HOSPITAL ENCOUNTER (OUTPATIENT)
Dept: PHYSICAL THERAPY | Facility: CLINIC | Age: 68
Setting detail: THERAPIES SERIES
End: 2018-09-07
Attending: INTERNAL MEDICINE
Payer: COMMERCIAL

## 2018-09-07 PROCEDURE — 40000449 ZZHC STATISTIC PT VISIT, LYMPHEDEMA: Performed by: PHYSICAL THERAPIST

## 2018-09-07 PROCEDURE — 97140 MANUAL THERAPY 1/> REGIONS: CPT | Mod: GP | Performed by: PHYSICAL THERAPIST

## 2018-09-07 PROCEDURE — 97110 THERAPEUTIC EXERCISES: CPT | Mod: GP | Performed by: PHYSICAL THERAPIST

## 2018-09-10 ENCOUNTER — HOSPITAL ENCOUNTER (OUTPATIENT)
Dept: PHYSICAL THERAPY | Facility: CLINIC | Age: 68
Setting detail: THERAPIES SERIES
End: 2018-09-10
Attending: INTERNAL MEDICINE
Payer: COMMERCIAL

## 2018-09-10 PROCEDURE — 40000449 ZZHC STATISTIC PT VISIT, LYMPHEDEMA: Performed by: PHYSICAL THERAPIST

## 2018-09-10 PROCEDURE — 97140 MANUAL THERAPY 1/> REGIONS: CPT | Mod: GP | Performed by: PHYSICAL THERAPIST

## 2018-09-10 PROCEDURE — 97110 THERAPEUTIC EXERCISES: CPT | Mod: GP | Performed by: PHYSICAL THERAPIST

## 2018-09-12 ENCOUNTER — HOSPITAL ENCOUNTER (OUTPATIENT)
Dept: PHYSICAL THERAPY | Facility: CLINIC | Age: 68
Setting detail: THERAPIES SERIES
End: 2018-09-12
Attending: INTERNAL MEDICINE
Payer: COMMERCIAL

## 2018-09-12 PROCEDURE — 97140 MANUAL THERAPY 1/> REGIONS: CPT | Mod: GP | Performed by: PHYSICAL THERAPIST

## 2018-09-12 PROCEDURE — 97110 THERAPEUTIC EXERCISES: CPT | Mod: GP | Performed by: PHYSICAL THERAPIST

## 2018-09-12 PROCEDURE — 40000449 ZZHC STATISTIC PT VISIT, LYMPHEDEMA: Performed by: PHYSICAL THERAPIST

## 2018-09-14 ENCOUNTER — HOSPITAL ENCOUNTER (OUTPATIENT)
Dept: PHYSICAL THERAPY | Facility: CLINIC | Age: 68
Setting detail: THERAPIES SERIES
End: 2018-09-14
Attending: INTERNAL MEDICINE
Payer: COMMERCIAL

## 2018-09-14 PROCEDURE — 40000449 ZZHC STATISTIC PT VISIT, LYMPHEDEMA: Performed by: PHYSICAL THERAPIST

## 2018-09-14 PROCEDURE — G8987 SELF CARE CURRENT STATUS: HCPCS | Mod: GP,CJ | Performed by: PHYSICAL THERAPIST

## 2018-09-14 PROCEDURE — G8988 SELF CARE GOAL STATUS: HCPCS | Mod: GP,CI | Performed by: PHYSICAL THERAPIST

## 2018-09-14 PROCEDURE — 97110 THERAPEUTIC EXERCISES: CPT | Mod: GP | Performed by: PHYSICAL THERAPIST

## 2018-09-14 PROCEDURE — 97140 MANUAL THERAPY 1/> REGIONS: CPT | Mod: GP | Performed by: PHYSICAL THERAPIST

## 2018-09-15 NOTE — PROGRESS NOTES
Outpatient Physical Therapy Progress Note     Patient: Julieta Rivera  : 1950    Beginning/End Dates of Reporting Period:  2018 to 2018    Referring Provider: Dr. Jonathan Gay    Therapy Diagnosis: axillary web syndrome, pain     Client Self Report:      Objective Measurements:  Objective Measure: MEASUREMENTS  Details: right UE 1220mL, left UE 1239 mL, left > right 1.6%, right UE decreased 0.89%, left UE decreased 1.6%  Objective Measure: SPADI  Details: SPADI: score  (initially 42/130). SEVERITY OF PAIN: at its worse 3/10, lying on involved side 1/10, reaching for something on a high shelf 10, touching back of neck 0/10, pushing with involved arm 2/10. DIFFICULTY WITH ACTIVITY: washing hair 0/10, washing back 1/10, putting on undershirt or pullover sweater 1/10, putting on shirt with buttons down the front 0/10, putting on pants 0/10, placing an object on a high shelf 10, carrying a heavy object of 10 pounds 1/10, removing something from back pocket 0/10.           Outcome Measures (most recent score): SPADI:  (initially 42/130)       Goals:  Goal Identifier HOME PROGRAM   Goal Description patient to demonstrate understanding of home program to decrease pain, improve axillary web syndrome, increase mobility.    Target Date 18   Date Met      Progress:continue to assess      Goal Identifier SPADI   Goal Description SPADI to show 4+ improvement in reduction of pain by 50% and increase mobility/activity level.    Target Date 18   Date Met   18   Progress:improved 31 points, will continue to assess         Progress Toward Goals:   Progress this reporting period:  Patient seen for 10 visits with instruction in home program which included: self manual lymphatic drainage, UE strengthening exercises, recommendation for sleeve/gauntlet. Patient received manual lymphatic drainage and manual technique. Currently no cording noted right axilla, seroma right axilla improving  with increased softening. Decreased c/o pain, initially pain 2-8/10 with patient reporting constant pain. Currently pain intermittent 0-3/10. Patient scheduled for follow up visit to assess home program.       Plan:  Continue therapy per current plan of care.    Discharge:  No

## 2018-09-27 ENCOUNTER — OFFICE VISIT (OUTPATIENT)
Dept: RADIATION ONCOLOGY | Facility: CLINIC | Age: 68
End: 2018-09-27
Attending: RADIOLOGY
Payer: COMMERCIAL

## 2018-09-27 VITALS
HEART RATE: 84 BPM | SYSTOLIC BLOOD PRESSURE: 117 MMHG | WEIGHT: 107 LBS | OXYGEN SATURATION: 99 % | BODY MASS INDEX: 20.9 KG/M2 | DIASTOLIC BLOOD PRESSURE: 75 MMHG

## 2018-09-27 DIAGNOSIS — C50.411 MALIGNANT NEOPLASM OF UPPER-OUTER QUADRANT OF RIGHT BREAST IN FEMALE, ESTROGEN RECEPTOR NEGATIVE (H): Primary | ICD-10-CM

## 2018-09-27 DIAGNOSIS — Z17.1 MALIGNANT NEOPLASM OF UPPER-OUTER QUADRANT OF RIGHT BREAST IN FEMALE, ESTROGEN RECEPTOR NEGATIVE (H): Primary | ICD-10-CM

## 2018-09-27 PROCEDURE — G0463 HOSPITAL OUTPT CLINIC VISIT: HCPCS | Performed by: RADIOLOGY

## 2018-09-27 ASSESSMENT — PAIN SCALES - GENERAL: PAINLEVEL: NO PAIN (0)

## 2018-09-27 NOTE — MR AVS SNAPSHOT
After Visit Summary   9/27/2018    Julieta Rivera    MRN: [id number]           Patient Information     Date Of Birth          1950        Visit Information        Provider Department      9/27/2018 1:30 PM Shy Conner MD Radiation Oncology Clinic        Today's Diagnoses     Malignant neoplasm of upper-outer quadrant of right breast in female, estrogen receptor negative (H)    -  1       Follow-ups after your visit        Additional Services     ONC/HEME ADULT REFERRAL       Your provider has referred you to: [facility]: Cancer Survivor Program [phone]   [email or web address]    Please be aware that coverage of these services is subject to the terms and limitations of your health insurance plan.  Call member services at your health plan with any benefit or coverage questions.      Please bring the following with you to your appointment:    (1) Any X-Rays, CTs or MRIs which have been performed.  Contact the facility where they were done to arrange for  prior to your scheduled appointment.   (2) List of current medications  (3) This referral request   (4) Any documents/labs given to you for this referral                  Your next 10 appointments already scheduled     Oct 15, 2018  1:45 PM CDT   Lymphedema Treatment with Margy Chong, PT   [facility] ([facility])    [address] [place], [place] [id number] [address] [address] [phone]            Oct 23, 2018 12:00 PM CDT   [facility] Draw with  [facility] DRAW   [facility] ([facility])    [address] [place] [address]  [address]  [facility] [address] [phone]            Oct 23, 2018 12:30 PM CDT   (Arrive by 12:15 PM)   Return Visit with Jonathan Gay MD   [facility] ([facility])    [address] [place] [address]  [place]  [address]  [facility] [address] [phone]            Mar 25, 2019  1:30 PM CDT   Return Visit with Shy Conner MD   Radiation Oncology Clinic ([facility])    [facility]  1st Floor  [address] [facility] [address] [phone]              Who to contact     Please call your clinic at [phone] to:    Ask questions about your health    Make or cancel appointments    Discuss your medicines    Learn about your test results    Speak to your doctor            Additional Information About Your Visit        [employer] Information     [employer] gives you secure access to your electronic health record. If you see a primary care provider, you can also send messages to your care team and make appointments. If you have questions, please call your primary care clinic.  If you do not have a primary care provider, please call [phone] and they will assist you.      [employer] is an electronic gateway that provides easy, online access to your medical records. With [employer], you can request a clinic appointment, read your test results, renew a prescription or communicate with your care team.     To access your existing account, please contact your [facility] or call [phone] for assistance.        Care EveryWhere ID     This is your Care EveryWhere ID. This could be used by other organizations to access your [facility] medical records  [id number]        Your Vitals Were     Pulse Pulse Oximetry BMI (Body Mass Index)             84 99% 20.9 kg/m2          Blood Pressure from Last 3 Encounters:   09/27/18 117/75   07/24/18 116/76   07/16/18 116/57    Weight from Last 3 Encounters:   09/27/18 48.5 kg (107 lb)   08/24/18 49.5 kg (109 lb 3.2 oz)   08/17/18 49.4 kg (109 lb)              We Performed the Following     ONC/HEME ADULT REFERRAL        Primary Care Provider Office Phone # Fax #    JOSÉ LUIS Sol [phone]  [phone]       [address] [facility] [address]  [facility] [address]        Goals        General    Medical (pt-stated)     Notes - Note created  4/3/2018 12:31 PM by Trice Galaviz, RN    Goal Statement: I will manage stress associated with caring for my grandchildren  Measure of Success: verbalization of stress reduction  Supportive Steps to Achieve: outpatient counseling, support of RN CC  Barriers: none  Strengths: family support  Date to Achieve By: 6 months            Equal Access to Services     CARTER DEL RIO AH: Hadii aad ku hadasho Soomaali, waaxda luqadaha, qaybta kaalmada adeegyada, waxay idiin hayaan adeeg khjacquelinesh la'aan . So [facility] [phone].    ATENCIÓN: Si habla español, tiene a phillips disposición servicios gratuitos de asistencia lingüística. [facility] [phone].    We comply with applicable federal civil rights laws and [place] laws. We do not discriminate on the basis of race, color, national origin, age, disability, sex, sexual orientation, or gender identity.            Thank you!     Thank you for choosing RADIATION ONCOLOGY CLINIC  for your care. Our goal is always to provide you with excellent care. Hearing back from our patients is one way we can continue to improve our services. Please take a few minutes to complete the written survey that you may receive in the mail after your visit with us. Thank you!             Your Updated Medication List - Protect others around you: Learn how to safely use, store and throw away your medicines at www.disposemymeds.org.          This list is accurate as of 9/27/18  3:54 PM.  Always use your most recent med list.                   Brand Name Dispense Instructions for use Diagnosis    acetaminophen 325 MG tablet    TYLENOL    100 tablet    Take 2 tablets (650 mg) by mouth every 4 hours as needed for other (mild pain)    Post-op pain       FISH OIL PO      Take 1 capsule by mouth daily.        levothyroxine 100 MCG tablet    SYNTHROID/LEVOTHROID    90  tablet    Take 1 tablet (100 mcg) by mouth daily Take by mouth daily    Hypothyroidism due to Hashimoto's thyroiditis       MULTIVITAMIN & MINERAL PO      Take 2 tablets by mouth daily.        VITAMIN D (CHOLECALCIFEROL) PO      Take 2,000 Units by mouth daily

## 2018-09-27 NOTE — PROGRESS NOTES
FOLLOW-UP VISIT    Patient Name: Julieta Rivera      : 1950     Age: 67 year old        ______________________________________________________________________________     Chief Complaint   Patient presents with     Cancer     Follow up      /75  Pulse 84  Wt 48.5 kg (107 lb)  SpO2 99%  BMI 20.9 kg/m2     Date Radiation Completed: 18    Pain  Denies    Labs  Other Labs: No    Imaging  None          Other Appointments:     MD Name:  Appointment Date:    MD Name: Appointment Date:   MD Name: Appointment Date:   Other Appointment Notes:     Residual Radiation side effect: no side effects      Additional Instructions:     Nurse face-to-face time: Level 2:  5 min face to face time

## 2018-09-27 NOTE — PROGRESS NOTES
[facility] [place]  Radiation Oncology Follow-up Note  2018    Julieta Rivera   MRN: [id number]  : 1950     DISEASE TREATED: clinical stage IA [id number] triple negative invasive ductal carcinoma of the right breast     INTERVAL SINCE COMPLETION OF RADIATION THERAPY: 1 month; completed treatment on 2018.     TYPE OF RADIATION THERAPY ADMINISTERED: 4240 cGy in 16 fractions followed by a 1000 cGy boost in 4 fractions to her right lumpectomy cavity     SUBJECTIVE:   Ms. Rivera is a 67 year old woman with clinical stage IA [id number] triple negative invasive ductal carcinoma of the right breast, status post neoadjuvant chemotherapy followed by lumpectomy with sentinel lymph node biopsy and adjuvant radiotherapy.  She was detected on screening mammogram and then underwent 4 cycles of docetaxol and cyclophosphamide. Her surgical pathology from 2018 showed good response to neoadjuvant chemotherapy with reduction in tumor size from 9 mm to 2.5 mm, surgical staging [id number](sn). 8/8 sentinel lymph nodes were positive for carcinoma.         Ms. Rivera was treated with radiotherapy with curative intent. Her entire right breast received 4240 cGy in 16 daily fractions followed by a 1000 cGy boost in 4 daily fractions to the right lumpectomy cavity. She tolerated treatment well without any unexpected treatment breaks, hospitalizations, or ED visits. She did develop some mild fatigue and dermatitis; the latter was treated with Purity Organic lotion per patient preference. She also complained of some pain associated with her right axillary seroma. She reported that this gradually improved over the course of treatment but did require OTC pain medication on occasion.           Since completion of radiotherapy, Ms. Rivera has continued working with lymphedema clinic and had resolution of her right axillary cording and improvement in her axillary seroma.  She has  follow up with lymphedema clininc on 10/15/2018 and with Dr. Gay in medical oncology on 10/23/2018.  She had significant peeling of her right superior breast and some in her inframammary fold and right axilla about 2 weeks after finishing radiation, but she says that it is mostly healed at this time.                OBJECTIVE:   /75  Pulse 84  Wt 48.5 kg (107 lb)  SpO2 99%  BMI 20.9 kg/m2    PHYSICAL EXAM:  Gen: Alert, in NAD  Eyes: EOMI, sclera anicteric  HENT      Head: NC/AT     Ears: No external auricular lesions     Nose/sinus: No rhinorrhea or epistaxis     Oral Cavity/Oropharynx: MMM, no thrush noted  Pulm: Breathing comfortably on room air, no audible wheezes or ronchi  CV: Well-perfused, no cyanosis  Abdominal: Soft, nondistended  Skin: Mild hyperpigmentation of right breast.  There is some desquamation over her right superior breast  Neurologic/MSK: motor grossly intact, normal gait  Extremities: No upper extremity edema  Breast: Status post lumpectomy with well-healed incision over the right breast.  There is mild hyperpigmentation of the right breast.  No significant swelling appreciated in her breasts appear symmetric.  Psych: Appropriate mood and affect         IMPRESSION: This is a 67-year-old woman with clinical stage IA [id number] triple negative invasive ductal carcinoma of the right breast, presenting for follow-up 4 weeks after completion of adjuvant radiotherapy to her right breast.  She has some residual dermatitis from treatment but this is not concerning and resolving.  She is encouraged by the improved range of motion, decreased seroma size, and decreased pain that has occurred in the last month with treatment from the lymphedema clinic.     RECOMMENDATIONS:    1. Pt will followup in 6 months with Dr. Conner in radiation oncology.    2. Follow-up with lymphedema clinic is currently scheduled on 10/15/2018   3. Follow up with Dr. Gay in medical oncology as currently scheduled on  10/23/2018.  Dr. Gay will manage surveillance imaging.  4. Referral to cancer survivorship clinic    The patient was seen and examined with Dr. Conner, who agrees with the above assessment and plan.    Amador Gonzales MD PGY-2   Radiation Oncology, [facility]  [phone] clinic  Pager [phone]      I saw and examined the patient with the resident.  I have reviewed and edited the resident's note and agree with the plan of care.      Shy Conner MD

## 2018-09-27 NOTE — LETTER
2018       RE: Julieta Rivera  [address] [place] [address]     Dear Colleague,    Thank you for referring your patient, Julieta Rivera, to the RADIATION ONCOLOGY CLINIC. Please see a copy of my visit note below.    FOLLOW-UP VISIT    Patient Name: Julieta Rivera      : 1950     Age: 67 year old        ______________________________________________________________________________     Chief Complaint   Patient presents with     Cancer     Follow up      /75  Pulse 84  Wt 48.5 kg (107 lb)  SpO2 99%  BMI 20.9 kg/m2     Date Radiation Completed: 18    Pain  Denies    Labs  Other Labs: No    Imaging  None          Other Appointments:     MD Name:  Appointment Date:    MD Name: Appointment Date:   MD Name: Appointment Date:   Other Appointment Notes:     Residual Radiation side effect: no side effects      Additional Instructions:     Nurse face-to-face time: Level 2:  5 min face to face time          [facility] [place]  Radiation Oncology Follow-up Note  2018    Julieta Rivera   MRN: [id number]  : 1950     DISEASE TREATED: clinical stage IA [id number] triple negative invasive ductal carcinoma of the right breast     INTERVAL SINCE COMPLETION OF RADIATION THERAPY: 1 month; completed treatment on 2018.     TYPE OF RADIATION THERAPY ADMINISTERED: 4240 cGy in 16 fractions followed by a 1000 cGy boost in 4 fractions to her right lumpectomy cavity     SUBJECTIVE:   Ms. Rivera is a 67 year old woman with clinical stage IA [id number] triple negative invasive ductal carcinoma of the right breast, status post neoadjuvant chemotherapy followed by lumpectomy with sentinel lymph node biopsy and adjuvant radiotherapy.  She was detected on screening mammogram and then underwent 4 cycles of docetaxol and cyclophosphamide. Her surgical pathology from 2018 showed good response to neoadjuvant chemotherapy with reduction in tumor  size from 9 mm to 2.5 mm, surgical staging dtS2jU0(sn). 8/8 sentinel lymph nodes were positive for carcinoma.         Ms. Rivera was treated with radiotherapy with curative intent. Her entire right breast received 4240 cGy in 16 daily fractions followed by a 1000 cGy boost in 4 daily fractions to the right lumpectomy cavity. She tolerated treatment well without any unexpected treatment breaks, hospitalizations, or ED visits. She did develop some mild fatigue and dermatitis; the latter was treated with Purity Organic lotion per patient preference. She also complained of some pain associated with her right axillary seroma. She reported that this gradually improved over the course of treatment but did require OTC pain medication on occasion.           Since completion of radiotherapy, Ms. Rivera has continued working with lymphedema clinic and had resolution of her right axillary cording and improvement in her axillary seroma.  She has follow up with lymphedema clininc on 10/15/2018 and with Dr. Gay in medical oncology on 10/23/2018.  She had significant peeling of her right superior breast and some in her inframammary fold and right axilla about 2 weeks after finishing radiation, but she says that it is mostly healed at this time.                OBJECTIVE:   /75  Pulse 84  Wt 48.5 kg (107 lb)  SpO2 99%  BMI 20.9 kg/m2    PHYSICAL EXAM:  Gen: Alert, in NAD  Eyes: EOMI, sclera anicteric  HENT      Head: NC/AT     Ears: No external auricular lesions     Nose/sinus: No rhinorrhea or epistaxis     Oral Cavity/Oropharynx: MMM, no thrush noted  Pulm: Breathing comfortably on room air, no audible wheezes or ronchi  CV: Well-perfused, no cyanosis  Abdominal: Soft, nondistended  Skin: Mild hyperpigmentation of right breast.  There is some desquamation over her right superior breast  Neurologic/MSK: motor grossly intact, normal gait  Extremities: No upper extremity edema  Breast: Status post lumpectomy with  well-healed incision over the right breast.  There is mild hyperpigmentation of the right breast.  No significant swelling appreciated in her breasts appear symmetric.  Psych: Appropriate mood and affect         IMPRESSION: This is a 67-year-old woman with clinical stage IA [id number] triple negative invasive ductal carcinoma of the right breast, presenting for follow-up 4 weeks after completion of adjuvant radiotherapy to her right breast.  She has some residual dermatitis from treatment but this is not concerning and resolving.  She is encouraged by the improved range of motion, decreased seroma size, and decreased pain that has occurred in the last month with treatment from the lymphedema clinic.     RECOMMENDATIONS:    1. Pt will followup in 6 months with Dr. Conner in radiation oncology.    2. Follow-up with lymphedema clinic is currently scheduled on 10/15/2018   3. Follow up with Dr. Gay in medical oncology as currently scheduled on 10/23/2018.  Dr. Gay will manage surveillance imaging.  4. Referral to cancer survivorship clinic    The patient was seen and examined with Dr. Conner, who agrees with the above assessment and plan.    Amador Gonzales MD PGY-2   Radiation Oncology, [facility]  [phone] clinic  Pager [phone]      I saw and examined the patient with the resident.  I have reviewed and edited the resident's note and agree with the plan of care.      Shy Conner MD

## 2018-10-17 ENCOUNTER — HOSPITAL ENCOUNTER (OUTPATIENT)
Dept: PHYSICAL THERAPY | Facility: CLINIC | Age: 68
Setting detail: THERAPIES SERIES
End: 2018-10-17
Attending: INTERNAL MEDICINE
Payer: COMMERCIAL

## 2018-10-17 DIAGNOSIS — Z17.1 MALIGNANT NEOPLASM OF UPPER-OUTER QUADRANT OF RIGHT BREAST IN FEMALE, ESTROGEN RECEPTOR NEGATIVE (H): Primary | ICD-10-CM

## 2018-10-17 DIAGNOSIS — C50.411 MALIGNANT NEOPLASM OF UPPER-OUTER QUADRANT OF RIGHT BREAST IN FEMALE, ESTROGEN RECEPTOR NEGATIVE (H): Primary | ICD-10-CM

## 2018-10-17 PROCEDURE — 40000449 ZZHC STATISTIC PT VISIT, LYMPHEDEMA: Performed by: PHYSICAL THERAPIST

## 2018-10-17 PROCEDURE — 97140 MANUAL THERAPY 1/> REGIONS: CPT | Mod: GP | Performed by: PHYSICAL THERAPIST

## 2018-10-17 PROCEDURE — 97535 SELF CARE MNGMENT TRAINING: CPT | Mod: GP | Performed by: PHYSICAL THERAPIST

## 2018-10-21 NOTE — PROGRESS NOTES
Dr. Eugene Guzman  Professor  Department of Surgery  [address] [address]      June 5, 2018      Dear Dr. Guzman,     Thank you for referring Julieta Rivera to our clinic for recommendations for her new diagnosis of triple negative breast cancer.       HISTORY OF PRESENT ILLNESS:  Julieta Rivera is a 67-year-old woman who was referred to our clinic with a new diagnosis of right triple-negative breast cancer.  Julieta was followed by routine screening mammography, when she was discovered to have a 7 x 6 x 9-mm mass at the 12 o'clock position of the right breast 6 cm from the nipple-areolar complex.  She did undergo a biopsy of this mass which showed an ER-negative, ID-negative, HER2-nonamplified, invasive mammary carcinoma of no special type, invasive ductal carcinoma, [place] grade 3.  Ductal carcinoma in situ was also noted.  Nuclear grade 2 solid type.  HER2 FISH showed no amplification.  She now comes to our clinic for recommendations.       She has hypothyroidism and is on levothyroxine 88 alternating with 100 mcg daily.  She has no pain.  She has fatigue related to the care of a grandson with esophageal atresia at home.  She has no depression and no anxiety.  She has no weight loss.  Diet has not changed.  She has no loss of energy.  She does not sleep during the day.  She can perform all of her household chores.  She has noticed no abnormality of either breast.         PAST MEDICAL HISTORY:  She has no history of breast surgery in the past or breast cancer in the past.  She has no history of radiation of any kind.  She has no history of tumor of any kind.  She may have a history of a heart problem with mitral prolapse and a murmur.  The last echo we have on record is from 1996.  She has no history of heart attack, breathing problems, blood clots, seizures, arthritis, peptic ulcer disease, osteoporosis or bone fractures.  She is not currently participating in a  clinical trial and has not had any significant weight loss.  She has no history of hypertension, but she does have a history of factor V Leiden because her sister was diagnosed with factor V Leiden and Julieta was tested, although Julieta herself has had no blood clots or pulmonary emboli.       FAMILY HISTORY:  There is a history of breast cancer in two paternal aunts, but no first-degree relatives.  One of her aunts was diagnosed in her 50s, the other in her 60s.  She has no male relatives with breast cancer.  The remainder of her family history was negative.        PAST MENSTRUAL HISTORY:  First period was at age 12-.  Last menstrual period was in 2003.  She has been pregnant twice at age 26 and 30 with two live births and no miscarriages or abortions.  She used oral contraceptives only once or twice.  Uterus and ovaries are in place.  She has no history of hormone replacement therapy.        ALLERGIES:  She has no allergy to seafood, iodine or contrast dye.  She does not take aspirin.       HABITS:  She did smoke 1 pack per day in college for 3 years from age 17 to 20 and has not smoked since.  She does not drink significant alcohol and has no heavy alcohol history in the past.        PERSONAL AND SOCIAL HISTORY:  She does have a history of being a .  Her  is 80 years old but is able to take care of his activities of daily living.  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        PAST MEDICAL HISTORY:  Julieta has no history of angina.  She has no history of hypertension.  Her cholesterol has generally been in acceptable range, although slightly over 200 recently.       FAMILY HISTORY:  Positive for heart disease.  Father had an MI in his 50s and had a bypass and  at age 78.  Mother had rheumatic heart disease at age 38 and  at  age 42.     FOLLOWUP NOTE      INTERVAL HISTORY:  Julieta returns to clinic.  She completed her radiation at the end of August, 08/31.  She then had some continued peeling and redness of the right breast and then had significant improvement by the end of September.  The seroma in the right axilla has cleared up, as has the cording.  She has had lymphedema treatment.  Today she has no pain, fatigue, depression or anxiety.  She has been quite exhausted.  Her daughter has 3-year-old twins, one of whom has esophageal atresia and this has taken a tremendous amount of work on the part of the patient's daughter and the patient to take care of this grandchild.      REVIEW OF SYSTEMS:  A 10-point review of systems is entirely negative.      She did have a fall at the end of her chemotherapy.  She has had no problems with it, although recently she has had low back discomfort in the mornings, which clears up during the day.  She will come back to clinic for further evaluation if this problem persists.  She has had this problem for about 2 weeks.      She has some issues with being able to exercise.  I did recommend that she try to get back to an exercise schedule.  She has been eating a healthful diet.      PHYSICAL EXAMINATION:   VITAL SIGNS:  Blood pressure 114/64, temperature 97.8, pulse 64, respirations 16, O2 sat 97% on room air and weight 50.4 kg.   GENERAL:  Julieta appeared generally well.  She has no alopecia.   HEENT:  Oropharynx is without lesions.   LYMPH:  There is no palpable cervical, supraclavicular, subclavicular or axillary lymphadenopathy.   BREASTS:  Right breast reveals a well-healed incision at the 11:30 position, 3 fingerbreadths above the nipple-areolar complex, without erythema or masses.  No masses in the right breast.  Right axillary incision is well healed without erythema or masses.  The seroma underlying the incision is gradually resolving and measures about 1 cm in size at this time.  Examination of  the left breast is without masses.   LUNGS:  Clear to percussion and auscultation.   HEART:  Regular rate and rhythm, S1, S2.   ABDOMEN:  Soft and nontender without hepatosplenomegaly.   EXTREMITIES:  Without edema.   PSYCHIATRIC:  Mood and affect were normal.      LABORATORY DATA:  The CBC and CMP were within normal limits.  Glucose was 147.  It was nonfasting.      ASSESSMENT AND PLAN:     1.  Julieta Rivera is a 67-year-old woman with a history of a stage I, clinical [id number], invasive ductal carcinoma of the right breast, triple negative in histology, maximum dimension at diagnosis was 9 mm, grade 3.  Pathology showed an RCB-1 response with significant reduction in tumor related to neoadjuvant TC.  She underwent lumpectomy without difficulty with clear margins.  Margins were clear for DCIS and invasive cancer.  She had no complications of surgery other than a small right axillary seroma which has gradually resolved.  The final pathology showed the remaining tumor 2.5 mm in 1 focus, Jeff grade 2 with 1 focus of microinvasive ductal carcinoma 0.3 cm after neoadjuvant chemotherapy.  DCIS was nuclear grade 3, solid type, size 2 mm and was adjacent to the invasive carcinoma.  No lymphovascular invasion was detected.  Margins were uninvolved by invasive carcinoma and uninvolved by DCIS.  She underwent post-lumpectomy radiation.  The final tumor stage was [id number].  Total of 8 sentinel nodes were examined, which were negative for cancer.   2.  No role for hormonal therapy.   3.  Factor V Leiden history without history of clot.   4.  Radiation therapy is completed.   5.  Followup.  We will see Julieta in followup in our clinic in 4 months with a postradiation mammogram which will be at a 6-month interval.  All of her questions were answered. Flu shot today. Flu shot.  Follow up with Valentine Blevins March 5 with bilateral tomomammogram.  CBC, CMP.       Thank you for allowing us to continue to participate in Julieta  Miguel's care.      Jonathan Gay MD        [facility]     ADDENDUM:  Dexa scan added to be done in the next 2 weeks.     I spent 30 minutes with the patient more than 50% of which was in counseling and coordination of care.

## 2018-10-23 ENCOUNTER — APPOINTMENT (OUTPATIENT)
Dept: LAB | Facility: CLINIC | Age: 68
End: 2018-10-23
Attending: INTERNAL MEDICINE
Payer: COMMERCIAL

## 2018-10-23 ENCOUNTER — ONCOLOGY VISIT (OUTPATIENT)
Dept: ONCOLOGY | Facility: CLINIC | Age: 68
End: 2018-10-23
Attending: INTERNAL MEDICINE
Payer: COMMERCIAL

## 2018-10-23 VITALS
TEMPERATURE: 97.8 F | OXYGEN SATURATION: 97 % | SYSTOLIC BLOOD PRESSURE: 114 MMHG | RESPIRATION RATE: 18 BRPM | HEART RATE: 64 BPM | WEIGHT: 111.1 LBS | DIASTOLIC BLOOD PRESSURE: 64 MMHG | BODY MASS INDEX: 21.7 KG/M2

## 2018-10-23 DIAGNOSIS — Z17.1 MALIGNANT NEOPLASM OF UPPER-OUTER QUADRANT OF RIGHT BREAST IN FEMALE, ESTROGEN RECEPTOR NEGATIVE (H): ICD-10-CM

## 2018-10-23 DIAGNOSIS — C50.411 MALIGNANT NEOPLASM OF UPPER-OUTER QUADRANT OF RIGHT BREAST IN FEMALE, ESTROGEN RECEPTOR NEGATIVE (H): ICD-10-CM

## 2018-10-23 DIAGNOSIS — Z78.0 ASYMPTOMATIC POSTMENOPAUSAL STATUS: Primary | ICD-10-CM

## 2018-10-23 LAB
ALBUMIN SERPL-MCNC: 3.7 G/DL (ref 3.4–5)
ALP SERPL-CCNC: 54 U/L (ref 40–150)
ALT SERPL W P-5'-P-CCNC: 25 U/L (ref 0–50)
ANION GAP SERPL CALCULATED.3IONS-SCNC: 7 MMOL/L (ref 3–14)
AST SERPL W P-5'-P-CCNC: 22 U/L (ref 0–45)
BASOPHILS # BLD AUTO: 0 10E9/L (ref 0–0.2)
BASOPHILS NFR BLD AUTO: 0.2 %
BILIRUB SERPL-MCNC: 0.6 MG/DL (ref 0.2–1.3)
BUN SERPL-MCNC: 13 MG/DL (ref 7–30)
CALCIUM SERPL-MCNC: 8.7 MG/DL (ref 8.5–10.1)
CHLORIDE SERPL-SCNC: 105 MMOL/L (ref 94–109)
CO2 SERPL-SCNC: 24 MMOL/L (ref 20–32)
CREAT SERPL-MCNC: 0.6 MG/DL (ref 0.52–1.04)
DIFFERENTIAL METHOD BLD: NORMAL
EOSINOPHIL # BLD AUTO: 0.1 10E9/L (ref 0–0.7)
EOSINOPHIL NFR BLD AUTO: 0.9 %
ERYTHROCYTE [DISTWIDTH] IN BLOOD BY AUTOMATED COUNT: 13.6 % (ref 10–15)
GFR SERPL CREATININE-BSD FRML MDRD: >90 ML/MIN/1.7M2
GLUCOSE SERPL-MCNC: 147 MG/DL (ref 70–99)
HCT VFR BLD AUTO: 36.7 % (ref 35–47)
HGB BLD-MCNC: 12.6 G/DL (ref 11.7–15.7)
IMM GRANULOCYTES # BLD: 0 10E9/L (ref 0–0.4)
IMM GRANULOCYTES NFR BLD: 0.3 %
LYMPHOCYTES # BLD AUTO: 1 10E9/L (ref 0.8–5.3)
LYMPHOCYTES NFR BLD AUTO: 17.9 %
MCH RBC QN AUTO: 30.5 PG (ref 26.5–33)
MCHC RBC AUTO-ENTMCNC: 34.3 G/DL (ref 31.5–36.5)
MCV RBC AUTO: 89 FL (ref 78–100)
MONOCYTES # BLD AUTO: 0.4 10E9/L (ref 0–1.3)
MONOCYTES NFR BLD AUTO: 6.2 %
NEUTROPHILS # BLD AUTO: 4.3 10E9/L (ref 1.6–8.3)
NEUTROPHILS NFR BLD AUTO: 74.5 %
NRBC # BLD AUTO: 0 10*3/UL
NRBC BLD AUTO-RTO: 0 /100
PLATELET # BLD AUTO: 209 10E9/L (ref 150–450)
POTASSIUM SERPL-SCNC: 4 MMOL/L (ref 3.4–5.3)
PROT SERPL-MCNC: 7.1 G/DL (ref 6.8–8.8)
RBC # BLD AUTO: 4.13 10E12/L (ref 3.8–5.2)
SODIUM SERPL-SCNC: 136 MMOL/L (ref 133–144)
WBC # BLD AUTO: 5.8 10E9/L (ref 4–11)

## 2018-10-23 PROCEDURE — 80053 COMPREHEN METABOLIC PANEL: CPT | Performed by: INTERNAL MEDICINE

## 2018-10-23 PROCEDURE — 25000128 H RX IP 250 OP 636: Mod: ZF | Performed by: INTERNAL MEDICINE

## 2018-10-23 PROCEDURE — 36591 DRAW BLOOD OFF VENOUS DEVICE: CPT

## 2018-10-23 PROCEDURE — 85025 COMPLETE CBC W/AUTO DIFF WBC: CPT | Performed by: INTERNAL MEDICINE

## 2018-10-23 PROCEDURE — G0008 ADMIN INFLUENZA VIRUS VAC: HCPCS

## 2018-10-23 PROCEDURE — G0463 HOSPITAL OUTPT CLINIC VISIT: HCPCS | Mod: ZF

## 2018-10-23 PROCEDURE — 90662 IIV NO PRSV INCREASED AG IM: CPT | Mod: ZF | Performed by: INTERNAL MEDICINE

## 2018-10-23 PROCEDURE — 99214 OFFICE O/P EST MOD 30 MIN: CPT | Mod: ZP | Performed by: INTERNAL MEDICINE

## 2018-10-23 RX ORDER — HEPARIN SODIUM (PORCINE) LOCK FLUSH IV SOLN 100 UNIT/ML 100 UNIT/ML
5 SOLUTION INTRAVENOUS EVERY 8 HOURS PRN
Status: DISCONTINUED | OUTPATIENT
Start: 2018-10-23 | End: 2018-10-27 | Stop reason: HOSPADM

## 2018-10-23 RX ADMIN — INFLUENZA A VIRUS A/MICHIGAN/45/2015 X-275 (H1N1) ANTIGEN (FORMALDEHYDE INACTIVATED), INFLUENZA A VIRUS A/SINGAPORE/INFIMH-16-0019/2016 IVR-186 (H3N2) ANTIGEN (FORMALDEHYDE INACTIVATED), AND INFLUENZA B VIRUS B/MARYLAND/15/2016 BX-69A (A B/COLORADO/6/2017-LIKE VIRUS) ANTIGEN (FORMALDEHYDE INACTIVATED) 0.5 ML: 60; 60; 60 INJECTION, SUSPENSION INTRAMUSCULAR at 13:58

## 2018-10-23 RX ADMIN — Medication 5 ML: at 12:26

## 2018-10-23 ASSESSMENT — PAIN SCALES - GENERAL: PAINLEVEL: NO PAIN (1)

## 2018-10-23 NOTE — LETTER
10/23/2018       RE: Julieta Rivera  [address] [place] [address]     Dear Colleague,    Thank you for referring your patient, Julieta Rivera, to the [facility]. Please see a copy of my visit note below.    Dr. Eugene Guzman  Professor  Department of Surgery  [facility]  [address] [place] [address]      June 5, 2018      Dear Dr. Guzman,     Thank you for referring Julieta Rivera to our clinic for recommendations for her new diagnosis of triple negative breast cancer.       HISTORY OF PRESENT ILLNESS:  Julieta Rivera is a 67-year-old woman who was referred to our clinic with a new diagnosis of right triple-negative breast cancer.  Julieta was followed by routine screening mammography, when she was discovered to have a 7 x 6 x 9-mm mass at the 12 o'clock position of the right breast 6 cm from the nipple-areolar complex.  She did undergo a biopsy of this mass which showed an ER-negative, MO-negative, HER2-nonamplified, invasive mammary carcinoma of no special type, invasive ductal carcinoma, Jeff grade 3.  Ductal carcinoma in situ was also noted.  Nuclear grade 2 solid type.  HER2 FISH showed no amplification.  She now comes to our clinic for recommendations.       She has hypothyroidism and is on levothyroxine 88 alternating with 100 mcg daily.  She has no pain.  She has fatigue related to the care of a grandson with esophageal atresia at home.  She has no depression and no anxiety.  She has no weight loss.  Diet has not changed.  She has no loss of energy.  She does not sleep during the day.  She can perform all of her household chores.  She has noticed no abnormality of either breast.         PAST MEDICAL HISTORY:  She has no history of breast surgery in the past or breast cancer in the past.  She has no history of radiation of any kind.  She has no history of tumor of any kind.  She may have a history of a heart problem with mitral prolapse and a  murmur.  The last echo we have on record is from .  She has no history of heart attack, breathing problems, blood clots, seizures, arthritis, peptic ulcer disease, osteoporosis or bone fractures.  She is not currently participating in a clinical trial and has not had any significant weight loss.  She has no history of hypertension, but she does have a history of factor V Leiden because her sister was diagnosed with factor V Leiden and Julieta was tested, although Julieta herself has had no blood clots or pulmonary emboli.       FAMILY HISTORY:  There is a history of breast cancer in two paternal aunts, but no first-degree relatives.  One of her aunts was diagnosed in her 50s, the other in her 60s.  She has no male relatives with breast cancer.  The remainder of her family history was negative.        PAST MENSTRUAL HISTORY:  First period was at age 12-.  Last menstrual period was in 2003.  She has been pregnant twice at age 26 and 30 with two live births and no miscarriages or abortions.  She used oral contraceptives only once or twice.  Uterus and ovaries are in place.  She has no history of hormone replacement therapy.        ALLERGIES:  She has no allergy to seafood, iodine or contrast dye.  She does not take aspirin.       HABITS:  She did smoke 1 pack per day in college for 3 years from age 17 to 20 and has not smoked since.  She does not drink significant alcohol and has no heavy alcohol history in the past.        PERSONAL AND SOCIAL HISTORY:  She does have a history of being a .  Her  is 80 years old but is able to take care of his activities of daily living.  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        PAST MEDICAL HISTORY:  Julieta has no history of angina.  She has no history of hypertension.  Her cholesterol has generally  been in acceptable range, although slightly over 200 recently.       FAMILY HISTORY:  Positive for heart disease.  Father had an MI in his 50s and had a bypass and  at age 78.  Mother had rheumatic heart disease at age 38 and  at age 42.     FOLLOWUP NOTE      INTERVAL HISTORY:  Julieta returns to clinic.  She completed her radiation at the end of .  She then had some continued peeling and redness of the right breast and then had significant improvement by the end of September.  The seroma in the right axilla has cleared up, as has the cording.  She has had lymphedema treatment.  Today she has no pain, fatigue, depression or anxiety.  She has been quite exhausted.  Her daughter has 3-year-old twins, one of whom has esophageal atresia and this has taken a tremendous amount of work on the part of the patient's daughter and the patient to take care of this grandchild.      REVIEW OF SYSTEMS:  A 10-point review of systems is entirely negative.      She did have a fall at the end of her chemotherapy.  She has had no problems with it, although recently she has had low back discomfort in the mornings, which clears up during the day.  She will come back to clinic for further evaluation if this problem persists.  She has had this problem for about 2 weeks.      She has some issues with being able to exercise.  I did recommend that she try to get back to an exercise schedule.  She has been eating a healthful diet.      PHYSICAL EXAMINATION:   VITAL SIGNS:  Blood pressure 114/64, temperature 97.8, pulse 64, respirations 16, O2 sat 97% on room air and weight 50.4 kg.   GENERAL:  Julieta appeared generally well.  She has no alopecia.   HEENT:  Oropharynx is without lesions.   LYMPH:  There is no palpable cervical, supraclavicular, subclavicular or axillary lymphadenopathy.   BREASTS:  Right breast reveals a well-healed incision at the 11:30 position, 3 fingerbreadths above the nipple-areolar complex, without  erythema or masses.  No masses in the right breast.  Right axillary incision is well healed without erythema or masses.  The seroma underlying the incision is gradually resolving and measures about 1 cm in size at this time.  Examination of the left breast is without masses.   LUNGS:  Clear to percussion and auscultation.   HEART:  Regular rate and rhythm, S1, S2.   ABDOMEN:  Soft and nontender without hepatosplenomegaly.   EXTREMITIES:  Without edema.   PSYCHIATRIC:  Mood and affect were normal.      LABORATORY DATA:  The CBC and CMP were within normal limits.  Glucose was 147.  It was nonfasting.      ASSESSMENT AND PLAN:     1.  Julieta Rivera is a 67-year-old woman with a history of a stage I, clinical [id number], invasive ductal carcinoma of the right breast, triple negative in histology, maximum dimension at diagnosis was 9 mm, grade 3.  Pathology showed an RCB-1 response with significant reduction in tumor related to neoadjuvant TC.  She underwent lumpectomy without difficulty with clear margins.  Margins were clear for DCIS and invasive cancer.  She had no complications of surgery other than a small right axillary seroma which has gradually resolved.  The final pathology showed the remaining tumor 2.5 mm in 1 focus, [place] grade 2 with 1 focus of microinvasive ductal carcinoma 0.3 cm after neoadjuvant chemotherapy.  DCIS was nuclear grade 3, solid type, size 2 mm and was adjacent to the invasive carcinoma.  No lymphovascular invasion was detected.  Margins were uninvolved by invasive carcinoma and uninvolved by DCIS.  She underwent post-lumpectomy radiation.  The final tumor stage was [id number].  Total of 8 sentinel nodes were examined, which were negative for cancer.   2.  No role for hormonal therapy.   3.  Factor V Leiden history without history of clot.   4.  Radiation therapy is completed.   5.  Followup.  We will see Julieta in followup in our clinic in 4 months with a postradiation mammogram which will  be at a 6-month interval.  All of her questions were answered. Flu shot today. Flu shot.  Follow up with Valentine Blevins March 5 with bilateral tomomammogram.  CBC, CMP.       Thank you for allowing us to continue to participate in Julieta Rivera's care.      Jonathan Gay MD        [facility]         I spent 30 minutes with the patient more than 50% of which was in counseling and coordination of care.       Again, thank you for allowing me to participate in the care of your patient.      Sincerely,    Jonathan Gay MD

## 2018-10-23 NOTE — NURSING NOTE
"Injectable Influenza Immunization Documentation    1.  Has the patient received the information for the injectable influenza vaccine? YES     2. Is the patient 6 months of age or older? YES     3. Does the patient have any of the following contraindications?         Severe allergy to eggs? No     Severe allergic reaction to previous influenza vaccines? No   Severe allergy to latex? No       History of Guillain-[place] syndrome? No     Currently have a temperature greater than 100.4F? No        4.  Severely egg allergic patients should have flu vaccine eligibility assessed by an MD, RN, or pharmacist, and those who received flu vaccine should be observed for 15 min by an MD, RN, Pharmacist, Medical Technician, or member of clinic staff.\": YES    5. Latex-allergic patients should be given latex-free influenza vaccine Yes. Please reference the Vaccine latex table to determine if your clinic s product is latex-containing.       Vaccination given by Gavin Daily LPN      "

## 2018-10-23 NOTE — NURSING NOTE
Chief Complaint   Patient presents with     Port Draw     Labs drawn via port by RN. Line flushed and hep locked, then de-accessed. VS taken.     Romy Mcmillan RN

## 2018-10-23 NOTE — NURSING NOTE
Oncology Rooming Note    October 23, 2018 12:39 PM   Julieta Rivera is a 67 year old female who presents for:    Chief Complaint   Patient presents with     Port Draw     Labs drawn via port by RN. Line flushed and hep locked, then de-accessed. VS taken.     Oncology Clinic Visit     [facility] RETURN- BREAST CA     Initial Vitals: /64 (BP Location: Right arm, Patient Position: Sitting, Cuff Size: Adult Regular)  Pulse 64  Temp 97.8  F (36.6  C) (Oral)  Resp 18  Wt 50.4 kg (111 lb 1.6 oz)  SpO2 97%  BMI 21.7 kg/m2 Estimated body mass index is 21.7 kg/(m^2) as calculated from the following:    Height as of 6/15/18: 1.524 m (5').    Weight as of this encounter: 50.4 kg (111 lb 1.6 oz). Body surface area is 1.46 meters squared.  No Pain (1) Comment: Data Unavailable   No LMP recorded. Patient is postmenopausal.  Allergies reviewed: Yes  Medications reviewed: Yes    Medications: Medication refills not needed today.  Pharmacy name entered into [employer]:    [id number] [place], [place] - [address] [facility] [place], [place] - [id number] - [place], [place] - [address]    Clinical concerns: No new concerns. Deidra was notified.    10 minutes for nursing intake (face to face time)     Gavin Daiyl LPN

## 2018-10-23 NOTE — MR AVS SNAPSHOT
After Visit Summary   10/23/2018    Julieta Rivera    MRN: [id number]           Patient Information     Date Of Birth          1950        Visit Information        Provider Department      10/23/2018 12:30 PM Jonathan Gay MD [facility]        Today's Diagnoses     Malignant neoplasm of upper-outer quadrant of right breast in female, estrogen receptor negative (H)           Follow-ups after your visit        Your next 10 appointments already scheduled     Oct 30, 2018  1:45 PM CDT   Lymphedema Treatment with Margy Chong PT   [facility]    [address] [facility]  1st Floor  [address]  [facility] [address]   [phone]            Oct 30, 2018  3:40 PM CDT   (Arrive by 3:25 PM)   Survivorship Visit with PADMINI Saavedra   [facility] ([facility] [place] [facility])    [address] [place] [address]  [address]  [facility] [address]   [phone]            Oct 31, 2018 12:15 PM CDT   Lymphedema Treatment with Margy Chong, PT   [facility]    [address] [facility]  1st Floor  [address]  [facility] [address]   [phone]            Nov 02, 2018  8:30 AM CDT   Lymphedema Treatment with Margy Chong PT   [facility]    [address] [facility]  1st Floor  [address]  [facility] [address]   [phone]            Nov 05, 2018  1:45 PM CST   Lymphedema Treatment with Margy Chong PT   [facility])    [address] [facility]  1st Floor  [address]  [facility] [address]   [phone]            Nov 06, 2018  1:45 PM CST   Lymphedema Treatment with Margy Chong PT   [facility]  ([facility])    [address] [facility]  1st Floor  [address]  [facility] [address]   [phone]            Nov 07, 2018  9:45 AM CST   Lymphedema Treatment with Margy Chong PT   [facility] ([facility])    [address] [facility]  1st Floor  [address]  [facility] [address]   [phone]            Mar 25, 2019  1:30 PM CDT   Return Visit with Shy Conner MD   Radiation Oncology Clinic ([facility])    [facility]  1st Floor  [address] [facility] [id number] [phone]              Who to contact     If you have questions or need follow up information about today's clinic visit or your schedule please contact [facility] directly at [phone].  Normal or non-critical lab and imaging results will be communicated to you by [employer], letter or phone within 4 business days after the clinic has received the results. If you do not hear from us within 7 days, please contact the clinic through [employer] or phone. If you have a critical or abnormal lab result, we will notify you by phone as soon as possible.  Submit refill requests through [employer] or call your pharmacy and they will forward the refill request to us. Please allow 3 business days for your refill to be completed.          Additional Information About Your Visit        [employer] Information     [employer] gives you secure access to your electronic health record. If you see a primary care provider, you can also send messages to your care team and make appointments. If you have questions, please call your primary care clinic.  If you do not have a primary care provider, please call [phone] and they will assist you.        Care EveryWhere ID     This is your Care EveryWhere ID. This could be used by other organizations to access your [facility]  records  [id number]        Your Vitals Were     Pulse Temperature Respirations Pulse Oximetry BMI (Body Mass Index)       64 97.8  F (36.6  C) (Oral) 18 97% 21.7 kg/m2        Blood Pressure from Last 3 Encounters:   10/23/18 114/64   09/27/18 117/75   07/24/18 116/76    Weight from Last 3 Encounters:   10/23/18 50.4 kg (111 lb 1.6 oz)   09/27/18 48.5 kg (107 lb)   08/24/18 49.5 kg (109 lb 3.2 oz)              We Performed the Following     CBC with platelets differential     Comprehensive metabolic panel        Primary Care Provider Office Phone # Fax #    Simona Tucker, JOSÉ LUIS [facility] [phone]       [address] [facility] [address]  [facility] [address]        Goals        General    Medical (pt-stated)     Notes - Note created  4/3/2018 12:31 PM by Trice Galaviz RN    Goal Statement: I will manage stress associated with caring for my grandchildren  Measure of Success: verbalization of stress reduction  Supportive Steps to Achieve: outpatient counseling, support of RN CC  Barriers: none  Strengths: family support  Date to Achieve By: 6 months            Equal Access to Services     CARTER DEL RIO AH: Hadii india Polk, warogelioda debbie, qaybta kaalmada amorda, khadar solano. So [facility] [phone].    ATENCIÓN: Si habla español, tiene a phillips disposición servicios gratuitos de asistencia lingüística. Llame al [phone].    We comply with applicable federal civil rights laws and [place] laws. We do not discriminate on the basis of race, color, national origin, age, disability, sex, sexual orientation, or gender identity.            Thank you!     Thank you for choosing [facility]  for your care. Our goal is always to provide you with excellent care. Hearing back from our patients is one way we can continue to improve our services. Please take a few minutes to complete the written survey that you may receive in the mail after your visit with us.  Thank you!             Your Updated Medication List - Protect others around you: Learn how to safely use, store and throw away your medicines at www.disposemymeds.org.          This list is accurate as of 10/23/18 11:59 PM.  Always use your most recent med list.                   Brand Name Dispense Instructions for use Diagnosis    acetaminophen 325 MG tablet    TYLENOL    100 tablet    Take 2 tablets (650 mg) by mouth every 4 hours as needed for other (mild pain)    Post-op pain       calcium-magnesium 500-250 MG Tabs per tablet    CALMAG     Take 1 tablet by mouth 2 times daily        FISH OIL PO      Take 1 capsule by mouth daily.        levothyroxine 100 MCG tablet    SYNTHROID/LEVOTHROID    90 tablet    Take 1 tablet (100 mcg) by mouth daily Take by mouth daily    Hypothyroidism due to Hashimoto's thyroiditis       MULTIVITAMIN & MINERAL PO      Take 2 tablets by mouth daily.        order for DME     1 each    Equipment being ordered: compression sleeve/gauntlet 20-30 mmHg    Malignant neoplasm of upper-outer quadrant of right breast in female, estrogen receptor negative (H)       VITAMIN D (CHOLECALCIFEROL) PO      Take 2,000 Units by mouth daily

## 2018-10-24 ASSESSMENT — PATIENT HEALTH QUESTIONNAIRE - PHQ9: SUM OF ALL RESPONSES TO PHQ QUESTIONS 1-9: 0

## 2018-10-27 PROBLEM — C50.419 MALIGNANT NEOPLASM OF UPPER OUTER QUADRANT OF BREAST IN FEMALE, ESTROGEN RECEPTOR NEGATIVE (H): Status: ACTIVE | Noted: 2018-02-12

## 2018-10-27 PROBLEM — Z17.1 MALIGNANT NEOPLASM OF UPPER OUTER QUADRANT OF BREAST IN FEMALE, ESTROGEN RECEPTOR NEGATIVE (H): Status: ACTIVE | Noted: 2018-02-12

## 2018-10-29 NOTE — PROGRESS NOTES
Oncology/Hematology Visit Note  Oct 18, 2018    REASON FOR VISIT: I am seeing Julieta Rivera in the cancer survivorship program for diagnosis of right breast cancer.     Julieta Rivera was found to have 7 x 6 x 9mm mass in right breast on screening mammography on 1/25/18. Biopsy on 2/12/18 revealed infiltrating ductal carcinoma, grade 3.  ER negative, WI negative, HER-2 negative. She underwent 4 cycles of neoadjuvant docetaxol and cyclophosphamide from 3/8/18-5/10/2018. She had a right lumpectomy with sentinel lymph node biopsy on 6/7/2018.  This revealed a 2.5 cm tumor, infiltrating ductal carcinoma, grade 2, and one focus of DCIS (size 0.3mm).  0/4 lymph nodes positive.  She was diagnosed with a stage IA [id number] triple negative invasive ductal carcinoma of right breast.  She completed 5240 cGy to the right breast on 8/27/2018.     CANCER TREATMENT SUMMARY:  *Mammogram in January 2018 showed mass in right breast.    *Biopsy in 2/12/2018 revealed infiltrating ductal carcinoma, grade 3.  ER negative, WI negative, HER-2 negative.   *Neoadjuvant Taxotere, cyclophosphamide x 4 cycles from 3/8/2018-5/10/2018  *Right lumpectomy with sentinel lymph node biopsy on 6/4/2018.  This revealed a 2.5 cm tumor, infiltrating ductal carcinoma.  0/4 lymph nodes positive.  Grade 2.     *5240 cGy to the left breast completed on 8/27/2018.     Interval History:  Julieta Rivera states that she is feeling well at this time.  She had a routine follow up cancer visit with Dr. Gay last week. She continues to have lymphedema in right upper extremity and is working with lymphedema therapist. She has some stinging/burning discomfort from lymphedema. She has residual peripheral neuropathy in fingertips and balls of feet to toes that has not improved so far. Her daughter has 3-year-old twins, one of whom has esophageal atresia and this has taken a tremendous amount of work on the part of the patient's daughter and the patient to take  care of this grandchild. She has stress and fatigue related to this, but denies anxiety, depression.    Current Outpatient Prescriptions   Medication Sig Dispense Refill     ascorbic acid (VITAMIN C) 500 MG CPCR Take 1,000 mg by mouth daily        aspirin (ASPIRIN LOW STRENGTH) 81 MG chewable tablet Take 81 mg by mouth daily       Biotin 1 MG CAPS        cholecalciferol 2000 UNITS CAPS Take 1,000 Units by mouth        fish oil-omega-3 fatty acids 1000 MG capsule Take 1 g by mouth        FLUoxetine (PROZAC) 20 MG capsule Take 20 mg by mouth daily       HYDROCHLOROTHIAZIDE PO Take 25 mg by mouth       Magnesium Chloride (MAGNESIUM DR PO) Take 400 mg by mouth        Melatonin 10 MG TABS Take 20 mg by mouth       tamoxifen (NOLVADEX) 20 MG tablet Take 1 tablet (20 mg) by mouth daily 90 tablet 3     UNABLE TO FIND 3 times daily MEDICATION NAME: Bone Builder (Calcium)       valsartan (DIOVAN) 80 MG tablet Take by mouth daily         Physical Examination:  General: The patient is a pleasant female in no acute distress.  /73  Pulse 84  Temp 98.1  F (36.7  C) (Oral)  Resp 16  Ht 1.524 m (5')  Wt 50.4 kg (111 lb 3.2 oz)  SpO2 99%  BMI 21.72 kg/m2.   Heart: Regular rate and rhythm.   Lungs: Breathing comfortably on room air  Extremities: Lymphedema in RUE.  Breast exam: deferred as done last week   Neuro: Cranial nerves II through XII are grossly intact. Grossly non-focal  Skin: No rashes, petechiae, or bruising noted on exposed skin.  Laboratory Data:  No lab results today    Assessment and Plan:  I had the pleasure of seeing Julieta Rivera in the Cancer Survivorship program for her diagnosis of right breast cancer.  Given her diagnosis and treatment I gave her the following recommendations.  1.  Stage IA [id number] infiltrating ductal carcinoma right breast, ER negative, GA negative, HER-2 negative.  Treated as above with neoadjuvant chemotherapy, surgery, radiation.  Ms. Rivera is doing well at this time.  She  should be followed by medical oncology every 3 to 6 months for the first 3 years, every 6 months for years 4 and 5, and annually thereafter. Her next mammogram is due in March 2019. We will schedule visit for that time.  2.  Lymphedema.  She continues to work with lymphedema therapy.  3.  Peripheral neuropathy. She understands that this is a permanent side effect but should not worsen. She does not feel pharmacologic intervention is needed at this time  4. Bladder toxicity.  Given her exposure to Cytoxan, she should report urinary urgency, urinary frequency, dysuria or hematuria. Due to risk of developing bladder cancer, encourage alcohol cessation. She does not smoke.  5.  Bone health. She has not had a DEXA scan. Recommend screening DEXA which we will order.  I recommend weightbearing exercises 2-3 days a week. Her calcium should be 8809-1815 mg daily along with vitamin D 800-1000 IU daily. Will check her vitamin D level next visit at her request.  6.  Cardiac applications. She has a bicuspid aortic valve and mild aortic valve stenosis. Last echo on 6/4/18. She is being followed by Dr. Tidwell. Discussed that survivors are at risk for CAD, hypertension, heart failure, MI, cardiomyopathy.   7.  Radiation.  Radiation would included a portion of her lungs, but as long as she is asymptomatic, no further imaging needs to be obtained for surveillance.  Bones are at risk for fractures in the area of the radiation.  She should watch for any new skin lesion in the area of the radiation and have them evaluated if present.  8.  Cognitive changes.  She does note mild difficulty with memory and concentration since the chemotherapy. We discussed possibility of neuropsych evaluation if she feels this is interfering with her daily life. She will consider this.  9.  Bone marrow.  She is at risk for leukemia or MDS, and if present is usually seen within 10 years after completion of the chemotherapy.  This risk remains until May  2028. CBC last week was WNL  10.  Tobacco exposure. She currently does not smoke, but has about 3 year pack history of smoking from age [age]. She does not currently meet guidelines for low dose Chest CT screening  11.  Cancer screening.  She should continue to undergo routine screening for women in her age group. Pelvic exams per primary care provider or GYN.  She understands vaginal bleeding would be abnormal and need to have this evaluated.  She had a colonoscopy in 2009 with 2 polyps and was recommended to have a follow up in 1 year, which she never pursued. She is agreeable to colonoscopy now. Denies any hematochezia or change in bowel habits. Will order colonoscopy. She will limit her sun exposure and use sunscreens.  15.  Healthy lifestyle. She should maintain a healthy weight with a BMI between 20-25.  Her diet should include low fats.  She should exercise 150 minutes of cardiovascular exercise per week.  She will continue to follow with her primary care provider for general health maintenance.  She states she has received the annual influenza vaccine. She had pneumococcal vaccine in January 2017. She should see her eye doctor and dentist routinely.  16. Factor V Leiden history without history of clot. She is aware of signs/symptoms of blood clots.    Addendum: Also ordered left chest port removal as she still has her port.    Valentine Blevins PA-C  [facility]  [address] [place] [address] [phone]

## 2018-10-30 ENCOUNTER — HOSPITAL ENCOUNTER (OUTPATIENT)
Dept: PHYSICAL THERAPY | Facility: CLINIC | Age: 68
Setting detail: THERAPIES SERIES
End: 2018-10-30
Attending: INTERNAL MEDICINE
Payer: COMMERCIAL

## 2018-10-30 ENCOUNTER — ONCOLOGY SURVIVORSHIP VISIT (OUTPATIENT)
Dept: ONCOLOGY | Facility: CLINIC | Age: 68
End: 2018-10-30
Attending: PHYSICIAN ASSISTANT
Payer: COMMERCIAL

## 2018-10-30 VITALS
HEART RATE: 84 BPM | OXYGEN SATURATION: 99 % | HEIGHT: 60 IN | BODY MASS INDEX: 21.83 KG/M2 | SYSTOLIC BLOOD PRESSURE: 111 MMHG | TEMPERATURE: 98.1 F | RESPIRATION RATE: 16 BRPM | DIASTOLIC BLOOD PRESSURE: 73 MMHG | WEIGHT: 111.2 LBS

## 2018-10-30 DIAGNOSIS — C50.411 MALIGNANT NEOPLASM OF UPPER-OUTER QUADRANT OF RIGHT BREAST IN FEMALE, ESTROGEN RECEPTOR NEGATIVE (H): ICD-10-CM

## 2018-10-30 DIAGNOSIS — Z79.899 OTHER LONG TERM (CURRENT) DRUG THERAPY: ICD-10-CM

## 2018-10-30 DIAGNOSIS — Z78.0 ASYMPTOMATIC MENOPAUSAL STATE: ICD-10-CM

## 2018-10-30 DIAGNOSIS — Z17.1 MALIGNANT NEOPLASM OF UPPER-OUTER QUADRANT OF RIGHT BREAST IN FEMALE, ESTROGEN RECEPTOR NEGATIVE (H): ICD-10-CM

## 2018-10-30 DIAGNOSIS — Z86.0100 HISTORY OF COLONIC POLYPS: Primary | ICD-10-CM

## 2018-10-30 DIAGNOSIS — Z78.0 MENOPAUSE: ICD-10-CM

## 2018-10-30 PROCEDURE — G0463 HOSPITAL OUTPT CLINIC VISIT: HCPCS | Mod: ZF

## 2018-10-30 PROCEDURE — 40000449 ZZHC STATISTIC PT VISIT, LYMPHEDEMA: Performed by: PHYSICAL THERAPIST

## 2018-10-30 PROCEDURE — 99214 OFFICE O/P EST MOD 30 MIN: CPT | Mod: ZP | Performed by: PHYSICIAN ASSISTANT

## 2018-10-30 PROCEDURE — 97140 MANUAL THERAPY 1/> REGIONS: CPT | Mod: GP | Performed by: PHYSICAL THERAPIST

## 2018-10-30 ASSESSMENT — PAIN SCALES - GENERAL: PAINLEVEL: MILD PAIN (2)

## 2018-10-30 NOTE — NURSING NOTE
Oncology Rooming Note    October 30, 2018 3:54 PM   Julieta Rivera is a 67 year old female who presents for:    Chief Complaint   Patient presents with     Oncology Clinic Visit     Breast Cancer     Initial Vitals: There were no vitals taken for this visit. Estimated body mass index is 21.7 kg/(m^2) as calculated from the following:    Height as of 6/15/18: 1.524 m (5').    Weight as of 10/23/18: 50.4 kg (111 lb 1.6 oz). There is no height or weight on file to calculate BSA.  Data Unavailable Comment: Data Unavailable   No LMP recorded. Patient is postmenopausal.  Allergies reviewed: Yes  Medications reviewed: Yes    Medications: Medication refills not needed today.  Pharmacy name entered into [facility] [facility]    [id number] [place], [place] - [address] [facility] [place], [place] - [id number] [place], [place] - [address]    Clinical concerns: No New Concerns    5 minutes for nursing intake (face to face time)     GALILEO Rojas

## 2018-10-30 NOTE — MR AVS SNAPSHOT
After Visit Summary   10/30/2018    Julieta Rivera    MRN: [id number]           Patient Information     Date Of Birth          1950        Visit Information        Provider Department      10/30/2018 3:40 PM Valentine Blevins PA M [facility]        Today's Diagnoses     History of colonic polyps    -  1    Malignant neoplasm of upper-outer quadrant of right breast in female, estrogen receptor negative (H)        Asymptomatic menopausal state            Follow-ups after your visit        Additional Services     GASTROENTEROLOGY ADULT REF PROCEDURE ONLY       Last Lab Result: Creatinine (mg/dL)       Date                     Value                 10/23/2018               0.60             ----------  Body mass index is 21.72 kg/(m^2).     Needed:  No  Language:  English    Patient will be contacted to schedule procedure.     Please be aware that coverage of these services is subject to the terms and limitations of your health insurance plan.  Call member services at your health plan with any benefit or coverage questions.  Any procedures must be performed at a [facility] facility OR coordinated by your clinic's referral office.    Please bring the following with you to your appointment:    (1) Any X-Rays, CTs or MRIs which have been performed.  Contact the facility where they were done to arrange for  prior to your scheduled appointment.    (2) List of current medications   (3) This referral request   (4) Any documents/labs given to you for this referral                  Your next 10 appointments already scheduled     Oct 31, 2018 12:15 PM CDT   Lymphedema Treatment with Margy Chong, PT   [facility] ([facility])    [address] [place] [address] Floor  [address]  [facility] [address]   [phone]            Nov 02, 2018  8:30 AM CDT   Lymphedema Treatment with Margy Chong PT   [facility]  [place] [facility] ([facility])    [address] [facility]  1st Floor  [address]  [facility] [address]   [phone]            Nov 05, 2018  1:45 PM CST   Lymphedema Treatment with Margy Chong, PT   [facility] [place] [facility] ([facility])    [address]  [address]  [facility] [address]   [phone]            Nov 06, 2018  1:45 PM CST   Lymphedema Treatment with Margy Chong, PT   [facility] [place] [facility] ([facility])    [address]  [address] [address]   [phone]            Nov 07, 2018  9:45 AM CST   Lymphedema Treatment with Margy Chong, PT   [facility] [place] [facility] ([facility])    [address] [place] [id number] [address]   [phone]            Nov 08, 2018 12:30 PM CST   DX HIP/PELVIS/SPINE with UCDX1   [facility] Dexa ([facility])    [address] [place] [address]   [phone]           How do I prepare for my exam? (Food and drink instructions) No Food and Drink Restrictions.  How do I prepare for my exam? (Other instructions) Please do not take any of the following 24 hours prior to the day of your exam: vitamins, calcium tablets, antacids.  What should I wear: If possible, please wear clothes without metal (snaps, zippers). A sweat suit works well.  How long does the exam take: The exam takes about 20 minutes.  What should I bring: Bring a list of your current medicines to your exam (including vitamins, minerals and over-the-counter drugs).  Do I need a :  No  is needed.  What should I do after the exam: No restrictions, You may resume normal activities.  How do I prepare for my exam? (Food and  drink instructions) A DEXA scan is a bone-density scan. It uses a low level of radiation to check the strength of your bones. As you lie on a padded table, a machine will take X-rays. We most often scan the hips and lower spine.  Who should I call with questions: If you have any questions, please call the Imaging Department where you will have your exam. Directions, parking instructions, and other information is available on our website, [email or web address].            Nov 12, 2018  9:30 AM CST   (Arrive by 8:00 AM)   IR PORT REMOVAL LEFT with [id number]   [facility] ([facility])    [address]  5th Floor  [facility] [address] [phone]           The day before the exam:   You may eat your regular diet.   You are encouraged to drink at least 8 eight ounce glasses of clear liquids.  Please wear loose clothing, such as a sweat suit or jogging clothes. Avoid snaps, zippers and other metal. We may ask you to undress and put on a hospital gown.  Please bring any scans or X-rays taken at other hospitals, if similar tests were done. Also bring a list of your medicines, including vitamins, minerals and over-the-counter drugs. It is safest to leave personal items at home.  Someone will need to drive you to and from the hospital.  Tell your doctor in advance:   If you have allergies to x-ray contrast or iodine.   If you are or may be pregnant.   If you are taking Coumadin (or any other blood thinners) 5 days prior to the exam for any special instructions.   If you are diabetic to determine if your insulin needs have to be adjusted for the exam.  Your doctor will:   Need to do a history and physical within 30 days before this procedure.   Obtain necessary laboratory tests prior to the exam (Basic Metabolic Panel, CBCP, PTT and INR)   (No labs needed if you are having a tunneled catheter exchange or removal)  If you were given sedation, you cannot drive for 24 hours after  the procedure, and an adult must be with you until then.  If you have any questions, please call the Imaging Department where you will have your exam. Directions, parking instructions, and other information are available on our website, [email or web address].            Mar 05, 2019 12:45 PM CST   [facility] Lab Draw with  [facility] LAB DRAW   [facility] ([facility])    [address]  [address]  [facility] [id number] [phone]            Mar 05, 2019  1:10 PM CST   (Arrive by 12:55 PM)   Return Visit with PADMINI Saavedra   [facility] ([facility])    [address]  [address]  [facility] [id number] [phone]            Mar 25, 2019  1:30 PM CDT   Return Visit with Shy Conner MD   Radiation Oncology Clinic ([facility])    [facility]  1st Floor  [address] [facility] [address] [phone]              Future tests that were ordered for you today     Open Future Orders        Priority Expected Expires Ordered    INT Port removal Left Routine  10/30/2019 10/30/2018    DX Hip/Pelvis/Spine Routine  5/28/2019 10/30/2018    MA Diagnostic Digital Bilateral Routine 3/5/2019 10/30/2019 10/30/2018            Who to contact     If you have questions or need follow up information about today's clinic visit or your schedule please contact [facility] directly at [phone].  Normal or non-critical lab and imaging results will be communicated to you by MyChart, letter or phone within 4 business days after the clinic has received the results. If you do not hear from us within 7 days, please contact the clinic through MyChart or phone. If you have a critical or abnormal lab result, we will notify you by phone as soon as possible.  Submit refill requests through [employer] or call your pharmacy and they will forward the refill request to us. Please  allow 3 business days for your refill to be completed.          Additional Information About Your Visit        MyChart Information     [employer] gives you secure access to your electronic health record. If you see a primary care provider, you can also send messages to your care team and make appointments. If you have questions, please call your primary care clinic.  If you do not have a primary care provider, please call [phone] and they will assist you.        Care EveryWhere ID     This is your Care EveryWhere ID. This could be used by other organizations to access your [facility] medical records  [id number]        Your Vitals Were     Pulse Temperature Respirations Height Pulse Oximetry BMI (Body Mass Index)    84 98.1  F (36.7  C) (Oral) 16 1.524 m (5') 99% 21.72 kg/m2       Blood Pressure from Last 3 Encounters:   10/30/18 111/73   10/23/18 114/64   09/27/18 117/75    Weight from Last 3 Encounters:   10/30/18 50.4 kg (111 lb 3.2 oz)   10/23/18 50.4 kg (111 lb 1.6 oz)   09/27/18 48.5 kg (107 lb)              We Performed the Following     GASTROENTEROLOGY ADULT REF PROCEDURE ONLY        Primary Care Provider Office Phone # Fax #    Simona GUERRA Garrett, APRN [facility] [phone]       [address] [facility] [address]  [facility] [address]        Goals        General    Medical (pt-stated)     Notes - Note created  4/3/2018 12:31 PM by Trice Galaviz RN    Goal Statement: I will manage stress associated with caring for my grandchildren  Measure of Success: verbalization of stress reduction  Supportive Steps to Achieve: outpatient counseling, support of RN CC  Barriers: none  Strengths: family support  Date to Achieve By: 6 months            Equal Access to Services     CARTER DEL RIO AH: Hadii india Polk, katja coyadaha, analisa peacealmada khadar sylvester. So [facility] [phone].    ATENCIÓN: Si habla español, tiene a phillips disposición servicios gratuitos de asistencia  lingüística. Kvng al [phone].    We comply with applicable federal civil rights laws and [place] laws. We do not discriminate on the basis of race, color, national origin, age, disability, sex, sexual orientation, or gender identity.            Thank you!     Thank you for choosing [facility]  for your care. Our goal is always to provide you with excellent care. Hearing back from our patients is one way we can continue to improve our services. Please take a few minutes to complete the written survey that you may receive in the mail after your visit with us. Thank you!             Your Updated Medication List - Protect others around you: Learn how to safely use, store and throw away your medicines at www.disposemymeds.org.          This list is accurate as of 10/30/18  5:04 PM.  Always use your most recent med list.                   Brand Name Dispense Instructions for use Diagnosis    calcium-magnesium 500-250 MG Tabs per tablet    CALMAG     Take 1 tablet by mouth 2 times daily        FISH OIL PO      Take 1 capsule by mouth daily.        levothyroxine 100 MCG tablet    SYNTHROID/LEVOTHROID    90 tablet    Take 1 tablet (100 mcg) by mouth daily Take by mouth daily    Hypothyroidism due to Hashimoto's thyroiditis       MULTIVITAMIN & MINERAL PO      Take 2 tablets by mouth daily.        order for DME     1 each    Equipment being ordered: compression sleeve/gauntlet 20-30 mmHg    Malignant neoplasm of upper-outer quadrant of right breast in female, estrogen receptor negative (H)       VITAMIN D (CHOLECALCIFEROL) PO      Take 2,000 Units by mouth daily

## 2018-10-31 ENCOUNTER — HOSPITAL ENCOUNTER (OUTPATIENT)
Dept: PHYSICAL THERAPY | Facility: CLINIC | Age: 68
Setting detail: THERAPIES SERIES
End: 2018-10-31
Attending: INTERNAL MEDICINE
Payer: COMMERCIAL

## 2018-10-31 PROCEDURE — 97140 MANUAL THERAPY 1/> REGIONS: CPT | Mod: GP | Performed by: PHYSICAL THERAPIST

## 2018-10-31 PROCEDURE — 97110 THERAPEUTIC EXERCISES: CPT | Mod: GP | Performed by: PHYSICAL THERAPIST

## 2018-10-31 PROCEDURE — 40000449 ZZHC STATISTIC PT VISIT, LYMPHEDEMA: Performed by: PHYSICAL THERAPIST

## 2018-11-02 ENCOUNTER — HOSPITAL ENCOUNTER (OUTPATIENT)
Dept: PHYSICAL THERAPY | Facility: CLINIC | Age: 68
Setting detail: THERAPIES SERIES
End: 2018-11-02
Attending: INTERNAL MEDICINE
Payer: COMMERCIAL

## 2018-11-02 PROCEDURE — 97140 MANUAL THERAPY 1/> REGIONS: CPT | Mod: GP | Performed by: PHYSICAL THERAPIST

## 2018-11-02 PROCEDURE — 40000449 ZZHC STATISTIC PT VISIT, LYMPHEDEMA: Performed by: PHYSICAL THERAPIST

## 2018-11-05 ENCOUNTER — HOSPITAL ENCOUNTER (OUTPATIENT)
Dept: PHYSICAL THERAPY | Facility: CLINIC | Age: 68
Setting detail: THERAPIES SERIES
End: 2018-11-05
Attending: INTERNAL MEDICINE
Payer: COMMERCIAL

## 2018-11-05 PROCEDURE — 40000449 ZZHC STATISTIC PT VISIT, LYMPHEDEMA: Performed by: PHYSICAL THERAPIST

## 2018-11-05 PROCEDURE — 97140 MANUAL THERAPY 1/> REGIONS: CPT | Mod: GP | Performed by: PHYSICAL THERAPIST

## 2018-11-07 ENCOUNTER — HOSPITAL ENCOUNTER (OUTPATIENT)
Dept: PHYSICAL THERAPY | Facility: CLINIC | Age: 68
Setting detail: THERAPIES SERIES
End: 2018-11-07
Attending: INTERNAL MEDICINE
Payer: COMMERCIAL

## 2018-11-07 PROCEDURE — 97140 MANUAL THERAPY 1/> REGIONS: CPT | Mod: GP | Performed by: PHYSICAL THERAPIST

## 2018-11-07 PROCEDURE — 40000449 ZZHC STATISTIC PT VISIT, LYMPHEDEMA: Performed by: PHYSICAL THERAPIST

## 2018-11-08 ENCOUNTER — RADIANT APPOINTMENT (OUTPATIENT)
Dept: BONE DENSITY | Facility: CLINIC | Age: 68
End: 2018-11-08
Attending: INTERNAL MEDICINE
Payer: COMMERCIAL

## 2018-11-08 ENCOUNTER — RADIANT APPOINTMENT (OUTPATIENT)
Dept: BONE DENSITY | Facility: CLINIC | Age: 68
End: 2018-11-08
Attending: PHYSICIAN ASSISTANT
Payer: COMMERCIAL

## 2018-11-08 DIAGNOSIS — C50.411 MALIGNANT NEOPLASM OF UPPER-OUTER QUADRANT OF RIGHT BREAST IN FEMALE, ESTROGEN RECEPTOR NEGATIVE (H): ICD-10-CM

## 2018-11-08 DIAGNOSIS — Z78.0 ASYMPTOMATIC POSTMENOPAUSAL STATUS: ICD-10-CM

## 2018-11-08 DIAGNOSIS — Z17.1 MALIGNANT NEOPLASM OF UPPER-OUTER QUADRANT OF RIGHT BREAST IN FEMALE, ESTROGEN RECEPTOR NEGATIVE (H): ICD-10-CM

## 2018-11-08 DIAGNOSIS — Z78.0 ASYMPTOMATIC MENOPAUSAL STATE: ICD-10-CM

## 2018-11-14 DIAGNOSIS — Z17.1 MALIGNANT NEOPLASM OF UPPER-OUTER QUADRANT OF RIGHT BREAST IN FEMALE, ESTROGEN RECEPTOR NEGATIVE (H): Primary | ICD-10-CM

## 2018-11-14 DIAGNOSIS — M25.552 HIP PAIN, LEFT: ICD-10-CM

## 2018-11-14 DIAGNOSIS — M54.50 ACUTE LEFT-SIDED LOW BACK PAIN WITHOUT SCIATICA: ICD-10-CM

## 2018-11-14 DIAGNOSIS — C50.411 MALIGNANT NEOPLASM OF UPPER-OUTER QUADRANT OF RIGHT BREAST IN FEMALE, ESTROGEN RECEPTOR NEGATIVE (H): Primary | ICD-10-CM

## 2018-11-14 NOTE — PROGRESS NOTES
Called patient with results of Dexa scan on 11/8 showing low bone density. Discussed recommendation of fosamax to lower risk of fracture. She will think about it and let us know. Also discussed Calcium and vitamin D supplementation and weight bearing exercise 2-3 times/week.    She is having some acute left sided low back/pelvic pain. No radiculopathy. No numbness in buttock area or problems with bowels/urination. Pain is worse in the morning and feels better after getting up. She did have a fall down stairs in May of this year and did not have pain at that time. No other recent trauma.     Will order XR lumbar spine and pelvis/left hip to evaluate. Can be scheduled at her convenience.    Valentine Blevins PA-C  [facility]  [address] [place] [address] [phone]

## 2018-11-15 ENCOUNTER — HOSPITAL ENCOUNTER (OUTPATIENT)
Facility: AMBULATORY SURGERY CENTER | Age: 68
End: 2018-11-15
Attending: PHYSICIAN ASSISTANT
Payer: COMMERCIAL

## 2018-11-15 ENCOUNTER — RADIANT APPOINTMENT (OUTPATIENT)
Dept: RADIOLOGY | Facility: AMBULATORY SURGERY CENTER | Age: 68
End: 2018-11-15
Attending: PHYSICIAN ASSISTANT
Payer: COMMERCIAL

## 2018-11-15 ENCOUNTER — SURGERY (OUTPATIENT)
Age: 68
End: 2018-11-15

## 2018-11-15 VITALS
DIASTOLIC BLOOD PRESSURE: 74 MMHG | SYSTOLIC BLOOD PRESSURE: 109 MMHG | WEIGHT: 111.3 LBS | HEART RATE: 62 BPM | TEMPERATURE: 98.4 F | RESPIRATION RATE: 16 BRPM | HEIGHT: 60 IN | OXYGEN SATURATION: 99 % | BODY MASS INDEX: 21.85 KG/M2

## 2018-11-15 DIAGNOSIS — Z17.1 MALIGNANT NEOPLASM OF UPPER-OUTER QUADRANT OF RIGHT BREAST IN FEMALE, ESTROGEN RECEPTOR NEGATIVE (H): ICD-10-CM

## 2018-11-15 DIAGNOSIS — C50.411 MALIGNANT NEOPLASM OF UPPER-OUTER QUADRANT OF RIGHT BREAST IN FEMALE, ESTROGEN RECEPTOR NEGATIVE (H): ICD-10-CM

## 2018-11-15 LAB
B-HCG SERPL-ACNC: 1 IU/L (ref 0–5)
ERYTHROCYTE [DISTWIDTH] IN BLOOD BY AUTOMATED COUNT: 13.3 % (ref 10–15)
HCT VFR BLD AUTO: 36.6 % (ref 35–47)
HGB BLD-MCNC: 12.2 G/DL (ref 11.7–15.7)
INR PPP: 0.99 (ref 0.86–1.14)
MCH RBC QN AUTO: 30.3 PG (ref 26.5–33)
MCHC RBC AUTO-ENTMCNC: 33.3 G/DL (ref 31.5–36.5)
MCV RBC AUTO: 91 FL (ref 78–100)
PLATELET # BLD AUTO: 166 10E9/L (ref 150–450)
RBC # BLD AUTO: 4.03 10E12/L (ref 3.8–5.2)
WBC # BLD AUTO: 3.9 10E9/L (ref 4–11)

## 2018-11-15 RX ORDER — LIDOCAINE 40 MG/G
CREAM TOPICAL
Status: DISCONTINUED | OUTPATIENT
Start: 2018-11-15 | End: 2018-11-16 | Stop reason: HOSPADM

## 2018-11-15 RX ORDER — SODIUM CHLORIDE 9 MG/ML
INJECTION, SOLUTION INTRAVENOUS CONTINUOUS
Status: DISCONTINUED | OUTPATIENT
Start: 2018-11-15 | End: 2018-11-16 | Stop reason: HOSPADM

## 2018-11-15 RX ORDER — ONDANSETRON 4 MG/1
4 TABLET, ORALLY DISINTEGRATING ORAL EVERY 30 MIN PRN
Status: DISCONTINUED | OUTPATIENT
Start: 2018-11-15 | End: 2018-11-16 | Stop reason: HOSPADM

## 2018-11-15 RX ORDER — FENTANYL CITRATE 50 UG/ML
25-50 INJECTION, SOLUTION INTRAMUSCULAR; INTRAVENOUS
Status: DISCONTINUED | OUTPATIENT
Start: 2018-11-15 | End: 2018-11-16 | Stop reason: HOSPADM

## 2018-11-15 RX ORDER — SODIUM CHLORIDE, SODIUM LACTATE, POTASSIUM CHLORIDE, CALCIUM CHLORIDE 600; 310; 30; 20 MG/100ML; MG/100ML; MG/100ML; MG/100ML
INJECTION, SOLUTION INTRAVENOUS CONTINUOUS
Status: DISCONTINUED | OUTPATIENT
Start: 2018-11-15 | End: 2018-11-16 | Stop reason: HOSPADM

## 2018-11-15 RX ORDER — CEFAZOLIN SODIUM 2 G/50ML
2 SOLUTION INTRAVENOUS
Status: DISCONTINUED | OUTPATIENT
Start: 2018-11-15 | End: 2018-11-16 | Stop reason: HOSPADM

## 2018-11-15 RX ORDER — ONDANSETRON 2 MG/ML
4 INJECTION INTRAMUSCULAR; INTRAVENOUS EVERY 30 MIN PRN
Status: DISCONTINUED | OUTPATIENT
Start: 2018-11-15 | End: 2018-11-16 | Stop reason: HOSPADM

## 2018-11-15 RX ORDER — NALOXONE HYDROCHLORIDE 0.4 MG/ML
.1-.4 INJECTION, SOLUTION INTRAMUSCULAR; INTRAVENOUS; SUBCUTANEOUS
Status: DISCONTINUED | OUTPATIENT
Start: 2018-11-15 | End: 2018-11-16 | Stop reason: HOSPADM

## 2018-11-15 RX ORDER — MEPERIDINE HYDROCHLORIDE 25 MG/ML
12.5 INJECTION INTRAMUSCULAR; INTRAVENOUS; SUBCUTANEOUS
Status: DISCONTINUED | OUTPATIENT
Start: 2018-11-15 | End: 2018-11-16 | Stop reason: HOSPADM

## 2018-11-15 RX ORDER — ACETAMINOPHEN 325 MG/1
975 TABLET ORAL ONCE
Status: COMPLETED | OUTPATIENT
Start: 2018-11-15 | End: 2018-11-15

## 2018-11-15 RX ADMIN — ACETAMINOPHEN 975 MG: 325 TABLET ORAL at 08:55

## 2018-11-15 RX ADMIN — Medication 7 ML: at 10:04

## 2018-11-15 NOTE — IP AVS SNAPSHOT
[facility]    [address] [place] [address]    Phone:  [phone]    Fax:  [phone]                                       After Visit Summary   11/15/2018    Julieta Rivera    MRN: [id number]           After Visit Summary Signature Page     I have received my discharge instructions, and my questions have been answered. I have discussed any challenges I see with this plan with the nurse or doctor.    ..........................................................................................................................................  Patient/Patient Representative Signature      ..........................................................................................................................................  Patient Representative Print Name and Relationship to Patient    ..................................................               ................................................  Date                                   Time    ..........................................................................................................................................  Reviewed by Signature/Title    ...................................................              ..............................................  Date                                               Time          22EPIC Rev 08/18

## 2018-11-15 NOTE — PROCEDURES
Interventional Radiology Brief Post Procedure Note    Procedure:  IR PORT REMOVAL LEFT [[id number]]    Proceduralist: VAMSI Whittington PA-C    Assistant: None    Time Out: Prior to the start of the procedure and with procedural staff participation, I verbally confirmed the patient s identity using two indicators, relevant allergies, that the procedure was appropriate and matched the consent or emergent situation, and that the correct equipment/implants were available. Immediately prior to starting the procedure I conducted the Time Out with the procedural staff and re-confirmed the patient s name, procedure, and site/side. (The Joint Commission universal protocol was followed.)  Yes        Sedation: None. Local Anesthestic used    Findings: Completed removal of single lumen venous chest port via left chest. Dx: Malignant neoplasm of upper-outer quadrant of right breast in female, estrogen receptor negative; no longer needed. Abdelrahman.  LOCAL    Estimated Blood Loss: Less than 10 ml    Fluoroscopy Time:  minute(s)    SPECIMENS: None    Complications: 1. None     Condition: Stable    Plan: obs w/ VS, pain check, and site assessment until 1045    Comments: See dictated procedure note for full details.    Tom Quick PA-C

## 2018-11-15 NOTE — IP AVS SNAPSHOT
MRN:[id number]                      After Visit Summary   11/15/2018    Julieta Rivera    MRN: [id number]           Thank you!     Thank you for choosing [facility] for your care. Our goal is always to provide you with excellent care. Hearing back from our patients is one way we can continue to improve our services. Please take a few minutes to complete the written survey that you may receive in the mail after you visit with us. Thank you!        Patient Information     Date Of Birth          1950        About your hospital stay     You were admitted on:  November 15, 2018 You last received care in the:  [facility]    You were discharged on:  November 15, 2018       Who to Call     For medical emergencies, please call 911.  For non-urgent questions about your medical care, please call your primary care provider or clinic, [phone]  For questions related to your surgery, please call your surgery clinic        Attending Provider     Provider Specialty    Tom Quick PA-C Radiology       Primary Care Provider Office Phone # Fax #    JOSÉ LUIS Sol [facility] [phone]      Your next 10 appointments already scheduled     Nov 20, 2018 12:00 PM CST   Lymphedema Treatment with Margy Chong, PT   [facility] ([facility])    [address] [place] [id number] [phone]            Dec 04, 2018 12:00 PM CST   Lymphedema Treatment with Margy Chong, PT   [facility] ([facility])    [address] [place] [id number] [phone]            Mar 05, 2019  2:45 PM CST   Masonic Lab Draw with  MASONIC LAB DRAW   [facility] Draw ([facility])    [address]  [address]  [facility]  [id number] [phone]            Mar 05, 2019  3:00 PM CST   MA DIAGNOSTIC DIGITAL BILATERAL with [id number]   [facility] ([facility])    [address] 2nd [address]  [facility] [address] [phone]           How do I prepare for my exam? (Food and drink instructions) No Food and Drink Restrictions.  How do I prepare for my exam? (Other instructions) Do not use any powder, lotion or deodorant under your arms or on your breast. If you do, we will ask you to remove it before your exam.  What should I wear: Wear comfortable, two-piece clothing.  How long does the exam take: Most scans will take 15 minutes.  What should I bring: Bring any previous mammograms from other facilities or have them mailed to the breast center.  Do I need a :  No  is needed.  What do I need to tell my doctor: If you have any allergies, tell your care team.  What should I do after the exam: No restrictions, You may resume normal activities.  What is this test: This test is an x-ray of the breast to look for breast disease. The breast is pressed between two plates to flatten and spread the tissue. An X-ray is taken of the breast from different angles.  Who should I call with questions: If you have any questions, please call the Imaging Department where you will have your exam. Directions, parking instructions, and other information is available on our website, [email or web address].  Other information about my exam Three-dimensional (3D) mammograms are available at [facility] locations in [facility], [facility], [place], and [place]. [facility] locations include [place] and the [facility] in [place].  Benefits of 3D mammograms include: * Improved rate of cancer detection * Decreases your chance of having to go back for more tests, which means fewer: * \"False-positive\" results (This means that there is an abnormal area but it " isn't cancer.) * Invasive testing procedures, such as a biopsy or surgery * Can provide clearer images of the breast if you have dense breast tissue.  *3D mammography is an optional exam that anyone can have with a 2D mammogram. It doesn't replace or take the place of a 2D mammogram. 2D mammograms remain an effective screening test for all women.  Not all insurance companies cover the cost of a 3D mammogram. Check with your insurance.            Mar 05, 2019  3:40 PM CST   (Arrive by 3:25 PM)   Return Visit with PADMINI Saavedra   [facility] ([facility])    [address]  [address]  [facility] [id number] [phone]            Mar 25, 2019  1:30 PM CDT   Return Visit with Shy Conner MD   Radiation Oncology Clinic ([facility])    [facility]  1st Floor  [address] [address]   [phone]              Further instructions from your care team         A collaboration between [facility] and [facility]  Experts in minimally invasive, targeted treatments performed using imaging guidance    Venous Access Device, Port Catheter or Tunneled Central Line Removal    Today you had your existing venous access device removed, either because it was no longer needed or because there was malfunction or infection issues.    One of our Radiology PAs performed this procedure for you today:  ? [facility]  ? VAMSI Whittington PA-C  ? Garry Roldan PA-C  ? Deysi Harris MS, PA-C  ? Je Butler PA-C    After you go home:  - Drink plenty of fluids.  Generally 6-8 (8 ounce) glasses a day is recommended.  - Resume your regular diet unless otherwise ordered by a medical provider.  - Keep any applied tape/gauze dressings clean and dry.  Change tape/gauze dressings if they get wet or soiled.  - You may shower the following day after procedure, however  cover and protect from moisture any tape/gauze dressings.  You may let water hit and run over dried skin glue, but do not scrub.  Pat the area dry after showering.  - Port removal incisions are closed with absorbable suture, meaning they do not need to be removed at a later date, and a topical skin adhesive (skin glue).  This glue will wear off in 7-14 days.  Do not remove before this time.  If 14 days have passed and residual glue is present, you may gently remove it.  - You may remove tape/gauze dressings after 5 days if the site looks closed and in the process of healing.  - Do not apply gels, lotions, or ointments to the glue site for the first 10 days as this may cause the glue to prematurely soften and fail.  - Do not perform strenuous activities or lift greater than 10 pounds for the next three days.  - If there is bleeding or oozing from the procedure site, apply firm pressure to the area for 5-10 minutes.  If the bleeding continues seek medical advice at the numbers below.  - Mild procedure site discomfort can be treated with an ice pack and over-the-counter pain relievers.              For 24 hours after any sedation used:  - Relax and take it easy.  No strenuous activities.  - Do not drive or operate machines at home or at work.  - No alcohol consumption.  - Do not make any important or legal decisions.    Call our Interventional Radiology (IR) service if:  - If you start bleeding from the procedure site.  If you do start to bleed from the site, lie down and hold some pressure on the site.  Our radiology provider can help you decide if you need to return to the hospital.  - If you have new or worsening pain related to the procedure.  - If you have concerning swelling at the procedure site.  - If you develop persistent nausea or vomiting.  - If you develop hives or a rash or any unexplained itching.  - If you have a fever of greater than 100.5  F and chills in the first 5 days after procedure.  - Any other  concerns related to your procedure.      [facility]  Interventional Radiology (IR)  [address]  2nd Floor, [place] [address]    Contact Number:  [phone]  (IR control desk)  - Monday - Friday 8:00 am - 4:30 pm    After hours for urgent concerns:  [phone]  - After 4:30 pm Monday - Friday, Weekends and Holidays.   - Ask for Interventional Radiology on-call.  Someone is available 24 hours a day.  - [facility] toll free number:  [phone]        Pending Results     Date and Time Order Name Status Description    11/15/2018 0852 IR PORT REMOVAL LEFT In process             Admission Information     Date & Time Provider Department Dept. Phone    11/15/2018 Tom Quick PA-C [facility] [phone]      Your Vitals Were     Blood Pressure Pulse Temperature Respirations Height Weight    108/75 (Cuff Size: Adult Small) 64 97.5  F (36.4  C) (Oral) 16 1.524 m (5') 50.5 kg (111 lb 4.8 oz)    Pulse Oximetry BMI (Body Mass Index)                97% 21.74 kg/m2          [employer] Information     [employer] gives you secure access to your electronic health record. If you see a primary care provider, you can also send messages to your care team and make appointments. If you have questions, please call your primary care clinic.  If you do not have a primary care provider, please call [phone] and they will assist you.      [employer] is an electronic gateway that provides easy, online access to your medical records. With [employer], you can request a clinic appointment, read your test results, renew a prescription or communicate with your care team.     To access your existing account, please contact your [facility] or call [phone] for assistance.        Care EveryWhere ID     This is your Care EveryWhere ID. This could be used by other organizations to access your [facility]  records  [id number]        Equal Access to Services     CARTER QUANG : Hadii india pierre nidhiferdinand Bethali, waaxda luqadaha, qaybta natamicacait sylvester, khadar morillojacquelineyasmani solano. [facility] [phone].    ATENCIÓN: Si habla español, tiene a phillips disposición servicios gratuitos de asistencia lingüística. Llame al [phone].    We comply with applicable federal civil rights laws and [place] laws. We do not discriminate on the basis of race, color, national origin, age, disability, sex, sexual orientation, or gender identity.               Review of your medicines      UNREVIEWED medicines. Ask your doctor about these medicines        Dose / Directions    calcium-magnesium 500-250 MG Tabs per tablet   Commonly known as:  CALMAG        Dose:  1 tablet   Take 1 tablet by mouth 2 times daily   Refills:  0       FISH OIL PO        Dose:  1 capsule   Take 1 capsule by mouth daily.   Refills:  0       levothyroxine 100 MCG tablet   Commonly known as:  SYNTHROID/LEVOTHROID   Used for:  Hypothyroidism due to Hashimoto's thyroiditis        Dose:  100 mcg   Take 1 tablet (100 mcg) by mouth daily Take by mouth daily   Quantity:  90 tablet   Refills:  2       MULTIVITAMIN & MINERAL PO        Dose:  2 tablet   Take 2 tablets by mouth daily.   Refills:  0       VITAMIN D (CHOLECALCIFEROL) PO        Dose:  2000 Units   Take 2,000 Units by mouth daily   Refills:  0         CONTINUE these medicines which have NOT CHANGED        Dose / Directions    order for DME   Used for:  Malignant neoplasm of upper-outer quadrant of right breast in female, estrogen receptor negative (H)        Equipment being ordered: compression sleeve/gauntlet 20-30 mmHg   Quantity:  1 each   Refills:  12                Protect others around you: Learn how to safely use, store and throw away your medicines at www.disposemymeds.org.             Medication List: This is a list of all your medications and when to take them. Check marks below indicate  your daily home schedule. Keep this list as a reference.      Medications           Morning Afternoon Evening Bedtime As Needed    calcium-magnesium 500-250 MG Tabs per tablet   Commonly known as:  CALMAG   Take 1 tablet by mouth 2 times daily                                FISH OIL PO   Take 1 capsule by mouth daily.                                levothyroxine 100 MCG tablet   Commonly known as:  SYNTHROID/LEVOTHROID   Take 1 tablet (100 mcg) by mouth daily Take by mouth daily                                MULTIVITAMIN & MINERAL PO   Take 2 tablets by mouth daily.                                order for DME   Equipment being ordered: compression sleeve/gauntlet 20-30 mmHg                                VITAMIN D (CHOLECALCIFEROL) PO   Take 2,000 Units by mouth daily

## 2018-11-15 NOTE — DISCHARGE INSTRUCTIONS
A collaboration between [facility] and [facility]  Experts in minimally invasive, targeted treatments performed using imaging guidance    Venous Access Device, Port Catheter or Tunneled Central Line Removal    Today you had your existing venous access device removed, either because it was no longer needed or because there was malfunction or infection issues.    One of our Radiology PAs performed this procedure for you today:  ? Brian Tolbert, [facility]  ? VAMSI Whittington, PA-C  ? Garry Roldan PA-C  ? Deysi Harris MS, PA-C  ? Je Butler PA-C    After you go home:  - Drink plenty of fluids.  Generally 6-8 (8 ounce) glasses a day is recommended.  - Resume your regular diet unless otherwise ordered by a medical provider.  - Keep any applied tape/gauze dressings clean and dry.  Change tape/gauze dressings if they get wet or soiled.  - You may shower the following day after procedure, however cover and protect from moisture any tape/gauze dressings.  You may let water hit and run over dried skin glue, but do not scrub.  Pat the area dry after showering.  - Port removal incisions are closed with absorbable suture, meaning they do not need to be removed at a later date, and a topical skin adhesive (skin glue).  This glue will wear off in 7-14 days.  Do not remove before this time.  If 14 days have passed and residual glue is present, you may gently remove it.  - You may remove tape/gauze dressings after 5 days if the site looks closed and in the process of healing.  - Do not apply gels, lotions, or ointments to the glue site for the first 10 days as this may cause the glue to prematurely soften and fail.  - Do not perform strenuous activities or lift greater than 10 pounds for the next three days.  - If there is bleeding or oozing from the procedure site, apply firm pressure to the area for 5-10 minutes.  If the bleeding continues seek medical advice at the  numbers below.  - Mild procedure site discomfort can be treated with an ice pack and over-the-counter pain relievers.              For 24 hours after any sedation used:  - Relax and take it easy.  No strenuous activities.  - Do not drive or operate machines at home or at work.  - No alcohol consumption.  - Do not make any important or legal decisions.    Call our Interventional Radiology (IR) service if:  - If you start bleeding from the procedure site.  If you do start to bleed from the site, lie down and hold some pressure on the site.  Our radiology provider can help you decide if you need to return to the hospital.  - If you have new or worsening pain related to the procedure.  - If you have concerning swelling at the procedure site.  - If you develop persistent nausea or vomiting.  - If you develop hives or a rash or any unexplained itching.  - If you have a fever of greater than 100.5  F and chills in the first 5 days after procedure.  - Any other concerns related to your procedure.      [facility] [place]  Interventional Radiology (IR)  [address] [place] [address]    Contact Number:  [phone]  (IR control desk)  - Monday - Friday 8:00 am - 4:30 pm    After hours for urgent concerns:  [phone]  - After 4:30 pm Monday - Friday, Weekends and Holidays.   - Ask for Interventional Radiology on-call.  Someone is available 24 hours a day.  - [facility] toll free number:  [phone]

## 2018-11-20 ENCOUNTER — HOSPITAL ENCOUNTER (OUTPATIENT)
Dept: PHYSICAL THERAPY | Facility: CLINIC | Age: 68
Setting detail: THERAPIES SERIES
End: 2018-11-20
Attending: INTERNAL MEDICINE
Payer: COMMERCIAL

## 2018-11-20 ENCOUNTER — HOSPITAL ENCOUNTER (OUTPATIENT)
Dept: GENERAL RADIOLOGY | Facility: CLINIC | Age: 68
Discharge: HOME | End: 2018-11-20
Attending: PHYSICIAN ASSISTANT | Admitting: PHYSICIAN ASSISTANT
Payer: COMMERCIAL

## 2018-11-20 ENCOUNTER — TELEPHONE (OUTPATIENT)
Dept: ONCOLOGY | Facility: CLINIC | Age: 68
End: 2018-11-20

## 2018-11-20 ENCOUNTER — HOSPITAL ENCOUNTER (OUTPATIENT)
Dept: GENERAL RADIOLOGY | Facility: CLINIC | Age: 68
End: 2018-11-20
Attending: PHYSICIAN ASSISTANT
Payer: COMMERCIAL

## 2018-11-20 DIAGNOSIS — Z17.1 MALIGNANT NEOPLASM OF UPPER-OUTER QUADRANT OF RIGHT BREAST IN FEMALE, ESTROGEN RECEPTOR NEGATIVE (H): ICD-10-CM

## 2018-11-20 DIAGNOSIS — M54.50 LEFT-SIDED LOW BACK PAIN WITHOUT SCIATICA, UNSPECIFIED CHRONICITY: ICD-10-CM

## 2018-11-20 DIAGNOSIS — C50.411 MALIGNANT NEOPLASM OF UPPER-OUTER QUADRANT OF RIGHT BREAST IN FEMALE, ESTROGEN RECEPTOR NEGATIVE (H): ICD-10-CM

## 2018-11-20 DIAGNOSIS — M25.552 HIP PAIN, LEFT: ICD-10-CM

## 2018-11-20 DIAGNOSIS — M54.50 ACUTE LEFT-SIDED LOW BACK PAIN WITHOUT SCIATICA: ICD-10-CM

## 2018-11-20 DIAGNOSIS — M25.552 HIP PAIN, LEFT: Primary | ICD-10-CM

## 2018-11-20 PROCEDURE — 73502 X-RAY EXAM HIP UNI 2-3 VIEWS: CPT

## 2018-11-20 PROCEDURE — 40000449 ZZHC STATISTIC PT VISIT, LYMPHEDEMA: Performed by: PHYSICAL THERAPIST

## 2018-11-20 PROCEDURE — 97140 MANUAL THERAPY 1/> REGIONS: CPT | Mod: GP | Performed by: PHYSICAL THERAPIST

## 2018-11-20 PROCEDURE — 97110 THERAPEUTIC EXERCISES: CPT | Mod: GP | Performed by: PHYSICAL THERAPIST

## 2018-11-20 PROCEDURE — 72100 X-RAY EXAM L-S SPINE 2/3 VWS: CPT

## 2018-11-20 NOTE — TELEPHONE ENCOUNTER
Called and spoke with Julieta about her X rays today. Lumbar XR with mild to moderate degenerative changes at L1-2 and L2-3 but no acute osseous abnormality. Osteopenia but no fractures. XR pelvis and left hip with bilateral moderate degenerative changes of the hips, L>R. No definite evidence of focal lesion.     She continues to have acute on chronic left low back/hip pain in the mornings that improves during the day and does not appear to be worse with weightbearing. No radiculopathy. She states she has had this pain since her 50s but recently has been worse. Will refer her to orthopedics for further evaluation.    Valentine Blevins PA-C  [facility]  [address] [place] [address] [phone]

## 2018-11-27 NOTE — TELEPHONE ENCOUNTER
FUTURE VISIT INFORMATION      FUTURE VISIT INFORMATION:    Date: 11/28/18    Time:     Location: [facility]  REFERRAL INFORMATION:    Referring provider:  Valentine Blevins    Referring providers clinic:      Reason for visit/diagnosis  left hip pain. left sided low sciatica. No hx left hip. No hx back. CURRENT XRAYS IN EPIC. PER PT    RECORDS REQUESTED FROM:       Clinic name Comments Records Status Imaging Status     internal internal

## 2018-11-28 ENCOUNTER — PRE VISIT (OUTPATIENT)
Dept: ORTHOPEDICS | Facility: CLINIC | Age: 68
End: 2018-11-28

## 2018-11-28 ENCOUNTER — OFFICE VISIT (OUTPATIENT)
Dept: ORTHOPEDICS | Facility: CLINIC | Age: 68
End: 2018-11-28
Payer: COMMERCIAL

## 2018-11-28 VITALS — BODY MASS INDEX: 21.79 KG/M2 | WEIGHT: 111 LBS | RESPIRATION RATE: 16 BRPM | HEIGHT: 60 IN

## 2018-11-28 DIAGNOSIS — M54.50 ACUTE LEFT-SIDED LOW BACK PAIN WITHOUT SCIATICA: Primary | ICD-10-CM

## 2018-11-28 DIAGNOSIS — M77.8 TRICEPS TENDONITIS: ICD-10-CM

## 2018-11-28 DIAGNOSIS — M25.521 RIGHT ELBOW PAIN: ICD-10-CM

## 2018-11-28 NOTE — PROGRESS NOTES
Subjective:   Julieta Rivera is a 68 year old female who presents with left hip and low back pain. She notes pain in the morning. She did fall in May and landed on back but never had any pain/issues until now. Denies numbness/tingling.  Yoga with her daughter, something made pain better  Hip didn't feel good  Laying down for a few hours, no more than 4-5 hours in bed, twin grandson's and back feels better when up with them.  Hurts to stand up on the left side.  Had a fall in May.  X-rays were normal  Hit steps low back, midthoracic and shoulder blades.  X-rays were taken and normal.  Chemotherapy, surgery, radiation, worries about symptoms, lymph edema not diagnosed, 8 nodes removed, right elbow radiates pain.  If still too long, she ends up in pain.  Elbow pain better with movement and rubbing.  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  Left sided pain, can't pick things up for first hour    Background:   Date of injury: NA   Duration of symptoms: 3 weeks  Mechanism of Injury: Insidious Onset; Unknown   Aggravating factors: Rest/sleeping  Relieving Factors: Walking/moving   Prior Evaluation: Prior Physician Evalutation:  and X-rays    PAST MEDICAL, SOCIAL, SURGICAL AND FAMILY HISTORY: She  has a past medical history of Abnormal Pap smear (); Breast cancer (H) (2018); Factor V Leiden (H); and Hypothyroidism (2000).  She  has a past surgical history that includes  section; Tonsillectomy, adenoidectomy, combined (); Colposcopy cervix, biopsy cervix, endocervical curettage, combined (); Insert port vascular access (N/A, 3/5/2018); Mastectomy simple, sentinel node, combined (Right, 2018); and Remove port vascular access (Left, 11/15/2018).  Her family history includes Cancer in her paternal aunt; Cancer (age  of onset: 48) in her sister; Cerebrovascular Disease in her father; Diabetes in her brother and father; Heart Disease in her father and mother; Hypertension in her brother and sister; Obesity in her brother and sister.  She reports that she quit smoking about 47 years ago. She has never used smokeless tobacco. She reports that she does not drink alcohol or use illicit drugs.    ALLERGIES: She is allergic to seasonal allergies.    CURRENT MEDICATIONS: She has a current medication list which includes the following prescription(s): calcium-magnesium, levothyroxine, multiple vitamins-minerals, omega-3 fatty acids, order for dme, and cholecalciferol.     REVIEW OF SYSTEMS: 10 point review of systems is negative except as noted above.     Exam:   Resp 16  Ht 1.524 m (5')  Wt 50.3 kg (111 lb)  BMI 21.68 kg/m2           CONSTITUTIONAL: alert, mild distress and cooperative  HEAD: Normocephalic. No masses, lesions, tenderness or abnormalities  SKIN: no suspicious lesions or rashes  GAIT: normal  NEUROLOGIC: Non-focal  PSYCHIATRIC: affect normal/bright and mentation appears normal.    MUSCULOSKELETAL: left hip pain, LBP  Tender:  left para lumbar muscles, lat dorsi and trap  Non-tender:  thoracic spinous processes, left parathoracic muscles, right parathoracic muscles, lumbar spinous processes  Range of Motion:  lumbar flexion  full, lumbar extension  full  Strength:  able to heel walk, able to toe walk  Special tests:  negative straight leg raises, pt is quite flexible    Hip Exam: Hip ROM full    Inspection: no swelling, no ecchymosis, no olecranon bursa swelling  Tender: lateral epicondyle, common flexor tendon and tricep insertion  Non-tender: supracondylar notch, olecranon bursa, distal bicep tendon and radial head/neck  Range of Motion: all normal  Strength: elbow strength full  Special tests: normal stability, ulnar Tinel's negative, mild pain with resisted wrist extension, no pain with resisted middle finger  extension, no pain with resisted wrist flexion:        Assessment/Plan:   Pt is a 69 yo white female with PMHx of breast cancer, s/p surgery, chemotherapy and radiation affecting right arm presenting with acute left-sided low back pain, right elbow pain, triceps tendonitis    1. Acute left-sided low back pain- left low back paraspinal off midline spinous processes, pt is willing to do PT. Orders placed  2. Right elbow pain- mild epicondylar pain, likely some degeneration and then lifting grandchildren and laundry baskets, no lymph edema present, right arm compromised with past node dissection, radiation to area  3. Triceps tendonitis, right- hand therapy  Encounter Diagnoses   Name Primary?     Acute left-sided low back pain without sciatica Yes     Right elbow pain      Triceps tendonitis      RTC 6-8 weeks  X-RAY INTERPRETATION:   Reviewed  Impression: lumbar x-ray  1. Mild-to-moderate degenerative change in the lumbar spine, greatest  at L1-2 and L2-3.  2. Osteopenic appearance of the bones.  3. No acute or aggressive osseus abnormality.  IMPRESSION: left hip  1. Bilateral moderate grade degenerative changes of the hips, more  pronounced on the left than on the right.  2. No definite evidence of focal lesion. If there is continued concern  a cross-sectional study would be more sensitive.

## 2018-11-28 NOTE — LETTER
2018      RE: Julieta Rivera  [address] [place] [address]        Subjective:   Julieta Rivera is a 68 year old female who presents with left hip and low back pain. She notes pain in the morning. She did fall in May and landed on back but never had any pain/issues until now. Denies numbness/tingling.  Yoga with her daughter, something made pain better  Hip didn't feel good  Laying down for a few hours, no more than 4-5 hours in bed, twin grandson's and back feels better when up with them.  Hurts to stand up on the left side.  Had a fall in May.  X-rays were normal  Hit steps low back, midthoracic and shoulder blades.  X-rays were taken and normal.  Chemotherapy, surgery, radiation, worries about symptoms, lymph edema not diagnosed, 8 nodes removed, right elbow radiates pain.  If still too long, she ends up in pain.  Elbow pain better with movement and rubbing.  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  Left sided pain, can't pick things up for first hour    Background:   Date of injury: NA   Duration of symptoms: 3 weeks  Mechanism of Injury: Insidious Onset; Unknown   Aggravating factors: Rest/sleeping  Relieving Factors: Walking/moving   Prior Evaluation: Prior Physician Evalutation:  and X-rays    PAST MEDICAL, SOCIAL, SURGICAL AND FAMILY HISTORY: She  has a past medical history of Abnormal Pap smear (); Breast cancer (H) (2018); Factor V Leiden (H); and Hypothyroidism (fall ).  She  has a past surgical history that includes  section; Tonsillectomy, adenoidectomy, combined (); Colposcopy cervix, biopsy cervix, endocervical curettage, combined (); Insert port vascular access (N/A, 3/5/2018); Mastectomy simple, sentinel node, combined (Right, 2018); and Remove port vascular access  (Left, 11/15/2018).  Her family history includes Cancer in her paternal aunt; Cancer (age of onset: 48) in her sister; Cerebrovascular Disease in her father; Diabetes in her brother and father; Heart Disease in her father and mother; Hypertension in her brother and sister; Obesity in her brother and sister.  She reports that she quit smoking about 47 years ago. She has never used smokeless tobacco. She reports that she does not drink alcohol or use illicit drugs.    ALLERGIES: She is allergic to seasonal allergies.    CURRENT MEDICATIONS: She has a current medication list which includes the following prescription(s): calcium-magnesium, levothyroxine, multiple vitamins-minerals, omega-3 fatty acids, order for dme, and cholecalciferol.     REVIEW OF SYSTEMS: 10 point review of systems is negative except as noted above.     Exam:   Resp 16  Ht 1.524 m (5')  Wt 50.3 kg (111 lb)  BMI 21.68 kg/m2           CONSTITUTIONAL: alert, mild distress and cooperative  HEAD: Normocephalic. No masses, lesions, tenderness or abnormalities  SKIN: no suspicious lesions or rashes  GAIT: normal  NEUROLOGIC: Non-focal  PSYCHIATRIC: affect normal/bright and mentation appears normal.    MUSCULOSKELETAL: left hip pain, LBP  Tender:  left para lumbar muscles, lat dorsi and trap  Non-tender:  thoracic spinous processes, left parathoracic muscles, right parathoracic muscles, lumbar spinous processes  Range of Motion:  lumbar flexion  full, lumbar extension  full  Strength:  able to heel walk, able to toe walk  Special tests:  negative straight leg raises, pt is quite flexible    Hip Exam: Hip ROM full    Inspection: no swelling, no ecchymosis, no olecranon bursa swelling  Tender: lateral epicondyle, common flexor tendon and tricep insertion  Non-tender: supracondylar notch, olecranon bursa, distal bicep tendon and radial head/neck  Range of Motion: all normal  Strength: elbow strength full  Special tests: normal stability, ulnar Tinel's  negative, mild pain with resisted wrist extension, no pain with resisted middle finger extension, no pain with resisted wrist flexion:        Assessment/Plan:   Pt is a 67 yo white female with PMHx of breast cancer, s/p surgery, chemotherapy and radiation affecting right arm presenting with acute left-sided low back pain, right elbow pain, triceps tendonitis    1. Acute left-sided low back pain- left low back paraspinal off midline spinous processes, pt is willing to do PT. Orders placed  2. Right elbow pain- mild epicondylar pain, likely some degeneration and then lifting grandchildren and laundry baskets, no lymph edema present, right arm compromised with past node dissection, radiation to area  3. Triceps tendonitis, right- hand therapy  Encounter Diagnoses   Name Primary?     Acute left-sided low back pain without sciatica Yes     Right elbow pain      Triceps tendonitis      RTC 6-8 weeks  X-RAY INTERPRETATION:   Reviewed  Impression: lumbar x-ray  1. Mild-to-moderate degenerative change in the lumbar spine, greatest  at L1-2 and L2-3.  2. Osteopenic appearance of the bones.  3. No acute or aggressive osseus abnormality.  IMPRESSION: left hip  1. Bilateral moderate grade degenerative changes of the hips, more  pronounced on the left than on the right.  2. No definite evidence of focal lesion. If there is continued concern  a cross-sectional study would be more sensitive.    Gale Dove MD

## 2018-11-28 NOTE — MR AVS SNAPSHOT
After Visit Summary   11/28/2018    Julieta Rivera    MRN: [id number]           Patient Information     Date Of Birth          1950        Visit Information        Provider Department      11/28/2018 3:40 PM Gale Dove MD [facility]        Today's Diagnoses     Acute left-sided low back pain without sciatica    -  1    Right elbow pain        Triceps tendonitis           Follow-ups after your visit        Additional Services     HAND THERAPY Occupational Therapy or Physical Therapy       Hand Therapy Referral            PHYSICAL THERAPY REFERRAL (Internal)       Physical Therapy Referral                  Follow-up notes from your care team     Return in about 8 weeks (around 1/23/2019), or if symptoms worsen or fail to improve.      Your next 10 appointments already scheduled     Dec 04, 2018 12:00 PM CST   Lymphedema Treatment with Margy Chong, PT   [facility] [place] [facility] ([facility])    [address] [place] [address]  1st Floor  [address]  [facility] [address]   [phone]            Dec 05, 2018  1:40 PM CST   (Arrive by 1:25 PM)   YULISSA Extremity with Alicja Tate, PT   [facility] ([facility]  )    [address] [place] [address]   [phone]            Jan 09, 2019  1:20 PM CST   (Arrive by 1:05 PM)   Return Visit with Gale Dove MD   [facility] Sports Medicine ([facility])    [address]  5th Floor  [facility] [address]   [phone]            Mar 05, 2019  2:45 PM CST   [facility] Lab Draw with  [facility] LAB DRAW   [facility] Lab Draw ([facility])    [address]  [address]  [facility] [address]   [phone]            Mar 05, 2019  3:00 PM CST   MA DIAGNOSTIC DIGITAL BILATERAL with [id number]   [facility] Imaging ([facility]  "Clinics and Surgery Center)    [address] 2nd [address]  [facility] [address] [phone]           How do I prepare for my exam? (Food and drink instructions) No Food and Drink Restrictions.  How do I prepare for my exam? (Other instructions) Do not use any powder, lotion or deodorant under your arms or on your breast. If you do, we will ask you to remove it before your exam.  What should I wear: Wear comfortable, two-piece clothing.  How long does the exam take: Most scans will take 15 minutes.  What should I bring: Bring any previous mammograms from other facilities or have them mailed to the breast center.  Do I need a :  No  is needed.  What do I need to tell my doctor: If you have any allergies, tell your care team.  What should I do after the exam: No restrictions, You may resume normal activities.  What is this test: This test is an x-ray of the breast to look for breast disease. The breast is pressed between two plates to flatten and spread the tissue. An X-ray is taken of the breast from different angles.  Who should I call with questions: If you have any questions, please call the Imaging Department where you will have your exam. Directions, parking instructions, and other information is available on our website, [email or web address].  Other information about my exam Three-dimensional (3D) mammograms are available at [facility] locations in [facility], [facility], [facility], [place], and [place].  Health locations include [place] and the Clinics and Surgery Center in [place].  Benefits of 3D mammograms include: * Improved rate of cancer detection * Decreases your chance of having to go back for more tests, which means fewer: * \"False-positive\" results (This means that there is an abnormal area but it isn't cancer.) * Invasive testing procedures, such as a biopsy or surgery * Can provide clearer images of the breast if you have dense breast tissue.  " *3D mammography is an optional exam that anyone can have with a 2D mammogram. It doesn't replace or take the place of a 2D mammogram. 2D mammograms remain an effective screening test for all women.  Not all insurance companies cover the cost of a 3D mammogram. Check with your insurance.            Mar 05, 2019  3:40 PM CST   (Arrive by 3:25 PM)   Return Visit with PADMINI Saavedra   [facility] ([facility])    [address]  [address]  [facility] [address] [phone]            Mar 25, 2019  1:30 PM CDT   Return Visit with Shy Conner MD   Radiation Oncology Clinic ([facility])    [facility]  1st Floor  [address] [id number] [phone]              Future tests that were ordered for you today     Open Future Orders        Priority Expected Expires Ordered    HAND THERAPY Occupational Therapy or Physical Therapy Routine  11/28/2019 11/28/2018    PHYSICAL THERAPY REFERRAL (Internal) Routine  11/28/2019 11/28/2018            Who to contact     Please call your clinic at [phone] to:    Ask questions about your health    Make or cancel appointments    Discuss your medicines    Learn about your test results    Speak to your doctor            Additional Information About Your Visit        [employer] Information     [employer] gives you secure access to your electronic health record. If you see a primary care provider, you can also send messages to your care team and make appointments. If you have questions, please call your primary care clinic.  If you do not have a primary care provider, please call [phone] and they will assist you.      [employer] is an electronic gateway that provides easy, online access to your medical records. With [employer], you can request a clinic appointment, read your test results, renew a prescription or communicate with your care team.     To access your existing  account, please contact your [facility] or call [phone] for assistance.        Care EveryWhere ID     This is your Care EveryWhere ID. This could be used by other organizations to access your [facility] medical records  [id number]        Your Vitals Were     Respirations Height BMI (Body Mass Index)             16 5' (1.524 m) 21.68 kg/m2          Blood Pressure from Last 3 Encounters:   11/15/18 109/74   10/30/18 111/73   10/23/18 114/64    Weight from Last 3 Encounters:   11/28/18 111 lb (50.3 kg)   11/15/18 111 lb 4.8 oz (50.5 kg)   10/30/18 111 lb 3.2 oz (50.4 kg)               Primary Care Provider Office Phone # Fax #    Simona Tucker JOSÉ ULIS [facility] [phone]       [address]  [facility] [address]        Goals        General    Medical (pt-stated)     Notes - Note created  4/3/2018 12:31 PM by Trice Galaviz, RN    Goal Statement: I will manage stress associated with caring for my grandchildren  Measure of Success: verbalization of stress reduction  Supportive Steps to Achieve: outpatient counseling, support of RN CC  Barriers: none  Strengths: family support  Date to Achieve By: 6 months            Equal Access to Services     CARTER DEL RIO AH: Hadii india pierre hadasho Soomaali, waaxda luqadaha, qaybta kaalmada adeegyada, khadar solano. So [facility] [phone].    ATENCIÓN: Si habla español, tiene a phillips disposición servicios gratuitos de asistencia lingüística. Llame al [phone].    We comply with applicable federal civil rights laws and [place] laws. We do not discriminate on the basis of race, color, national origin, age, disability, sex, sexual orientation, or gender identity.            Thank you!     Thank you for choosing [facility]  for your care. Our goal is always to provide you with excellent care. Hearing back from our patients is one way we can continue to improve our services. Please take a few  minutes to complete the written survey that you may receive in the mail after your visit with us. Thank you!             Your Updated Medication List - Protect others around you: Learn how to safely use, store and throw away your medicines at www.disposemymeds.org.          This list is accurate as of 11/28/18  4:56 PM.  Always use your most recent med list.                   Brand Name Dispense Instructions for use Diagnosis    calcium-magnesium 500-250 MG Tabs per tablet    CALMAG     Take 1 tablet by mouth 2 times daily        FISH OIL PO      Take 1 capsule by mouth daily.        levothyroxine 100 MCG tablet    SYNTHROID/LEVOTHROID    90 tablet    Take 1 tablet (100 mcg) by mouth daily Take by mouth daily    Hypothyroidism due to Hashimoto's thyroiditis       MULTIVITAMIN & MINERAL PO      Take 2 tablets by mouth daily.        order for DME     1 each    Equipment being ordered: compression sleeve/gauntlet 20-30 mmHg    Malignant neoplasm of upper-outer quadrant of right breast in female, estrogen receptor negative (H)       VITAMIN D (CHOLECALCIFEROL) PO      Take 2,000 Units by mouth daily

## 2018-12-04 ENCOUNTER — HOSPITAL ENCOUNTER (OUTPATIENT)
Dept: PHYSICAL THERAPY | Facility: CLINIC | Age: 68
Setting detail: THERAPIES SERIES
End: 2018-12-04
Attending: INTERNAL MEDICINE
Payer: COMMERCIAL

## 2018-12-04 PROCEDURE — 97140 MANUAL THERAPY 1/> REGIONS: CPT | Mod: GP | Performed by: PHYSICAL THERAPIST

## 2018-12-04 PROCEDURE — 97535 SELF CARE MNGMENT TRAINING: CPT | Mod: GP | Performed by: PHYSICAL THERAPIST

## 2018-12-04 PROCEDURE — G8988 SELF CARE GOAL STATUS: HCPCS | Mod: GP,CI | Performed by: PHYSICAL THERAPIST

## 2018-12-04 PROCEDURE — 40000449 ZZHC STATISTIC PT VISIT, LYMPHEDEMA: Performed by: PHYSICAL THERAPIST

## 2018-12-04 PROCEDURE — G8989 SELF CARE D/C STATUS: HCPCS | Mod: GP,CI | Performed by: PHYSICAL THERAPIST

## 2018-12-05 NOTE — PROGRESS NOTES
Outpatient Physical Therapy Discharge Note     Patient: Julieta Rivera  : 1950    Beginning/End Dates of Reporting Period:  2018 to 2018    Referring Provider: Dr. Jonathan Gay    Therapy Diagnosis: axillary web syndrome, pain       Objective Measurements:  Objective Measure: measurements  Details: right UE 1145 mL, left UE 1182 mL, left > right 3%. measurements from SOC: right UE decreased 7%, left UE decreased 6%  Objective Measure: spadi  Details: spadi:  (initially 42/). SEVERITY OF PAIN: at its worst 5/10, when lying on involved side 1/10, reaching for something on a high shelf 110, touching back of neck 0/10, pushing with involved arm 1/10. DIFFICULTY WITH ACTIVITY: washing hair 0/10, washing back 0/10, putting on undershirt or pullover sweater 0/10, putting on shirt that buttons down the front 1/10, putting on pants 0/10, placing an object on a high shelf 1/10, carrying a heavy object of 10 pounds 10, removing something from back pocket 0/10.   Objective Measure: pain  Details: patient reporting pain in elbow and upper arm but less intense/decreased frequency, pain 5/10           Goals:  Goal Identifier HOME PROGRAM   Goal Description patient to demonstrate understanding of home program to decrease pain, improve axillary web syndrome, increase mobility.    Target Date 2018   Date Met   2018   Progress:patient independent     Goal Identifier SPADI   Goal Description SPADI to show 4+ improvement in reduction of pain by 50% and increase mobility/activity level.    Target Date 2018   Date Met   2018   Progress:improved 31 points       Progress Toward Goals:   Progress this reporting period:  Patient seen for 18 treatments with instruction in home program which consisted of: recommendation to get sleeve/gauntlet (patient has scheduled appt with fitter for sleeve/gauntlet), self manual lymphatic drainage, lymphatic exercises, strengthening exercises, and skin  care. Patient continues to report pain in upper arm which is relieved with self manual lymphatic drainage and elbow which seems to be positional and improves with movement. No further problems with axillary web syndrome or seroma. Patient independent in home program. No further treatment needed, discharge. Patient instructed to call with questions or concerns.       Plan:  Discharge from therapy.    Discharge:    Reason for Discharge: Patient has met all goals.    Equipment Issued: patient scheduled to be measured for compression sleeve/gauntlet    Discharge Plan: Patient to continue home program.

## 2018-12-23 ENCOUNTER — OFFICE VISIT (OUTPATIENT)
Dept: URGENT CARE | Facility: URGENT CARE | Age: 68
End: 2018-12-23
Payer: COMMERCIAL

## 2018-12-23 VITALS
RESPIRATION RATE: 16 BRPM | OXYGEN SATURATION: 100 % | SYSTOLIC BLOOD PRESSURE: 118 MMHG | BODY MASS INDEX: 21.6 KG/M2 | HEIGHT: 60 IN | WEIGHT: 110 LBS | TEMPERATURE: 97.3 F | HEART RATE: 78 BPM | DIASTOLIC BLOOD PRESSURE: 78 MMHG

## 2018-12-23 DIAGNOSIS — J06.9 VIRAL URI WITH COUGH: Primary | ICD-10-CM

## 2018-12-23 PROCEDURE — 99213 OFFICE O/P EST LOW 20 MIN: CPT | Performed by: FAMILY MEDICINE

## 2018-12-23 ASSESSMENT — MIFFLIN-ST. JEOR: SCORE: 945.46

## 2018-12-23 NOTE — PROGRESS NOTES
Subjective: 5 days ago she started to get mild symptoms of a cold, did not think it was going to be too much but the last 2 or 3 days symptoms have been worse with sore throat and cough and congestion, fair amount of mucus, does not feel like she has a sinus infection at this point, has had them before, no fevers.  She is in a household with a 2-year-old twins and there are colds going around all the time.  She had chemotherapy but it all ended in September.  Objective: NAD.  ENT is normal.  Neck is normal.  Lungs are clear.  Heart is regular without murmurs.  Abdomen benign.    Assessment and plan: Viral URI, watch for signs of secondary infection, second sickening

## 2019-03-05 ENCOUNTER — APPOINTMENT (OUTPATIENT)
Dept: LAB | Facility: CLINIC | Age: 69
End: 2019-03-05
Attending: INTERNAL MEDICINE
Payer: COMMERCIAL

## 2019-03-05 ENCOUNTER — ANCILLARY PROCEDURE (OUTPATIENT)
Dept: MAMMOGRAPHY | Facility: CLINIC | Age: 69
End: 2019-03-05
Attending: PHYSICIAN ASSISTANT
Payer: MEDICARE

## 2019-03-05 ENCOUNTER — ONCOLOGY VISIT (OUTPATIENT)
Dept: ONCOLOGY | Facility: CLINIC | Age: 69
End: 2019-03-05
Attending: PHYSICIAN ASSISTANT
Payer: COMMERCIAL

## 2019-03-05 VITALS
RESPIRATION RATE: 16 BRPM | HEART RATE: 89 BPM | WEIGHT: 115 LBS | OXYGEN SATURATION: 99 % | SYSTOLIC BLOOD PRESSURE: 129 MMHG | TEMPERATURE: 96.5 F | BODY MASS INDEX: 22.46 KG/M2 | DIASTOLIC BLOOD PRESSURE: 76 MMHG

## 2019-03-05 DIAGNOSIS — Z79.899 OTHER LONG TERM (CURRENT) DRUG THERAPY: ICD-10-CM

## 2019-03-05 DIAGNOSIS — C50.411 MALIGNANT NEOPLASM OF UPPER-OUTER QUADRANT OF RIGHT BREAST IN FEMALE, ESTROGEN RECEPTOR NEGATIVE (H): ICD-10-CM

## 2019-03-05 DIAGNOSIS — Z17.1 MALIGNANT NEOPLASM OF UPPER-OUTER QUADRANT OF RIGHT BREAST IN FEMALE, ESTROGEN RECEPTOR NEGATIVE (H): ICD-10-CM

## 2019-03-05 DIAGNOSIS — Z78.0 MENOPAUSE: ICD-10-CM

## 2019-03-05 LAB
ALBUMIN SERPL-MCNC: 3.9 G/DL (ref 3.4–5)
ALP SERPL-CCNC: 92 U/L (ref 40–150)
ALT SERPL W P-5'-P-CCNC: 22 U/L (ref 0–50)
ANION GAP SERPL CALCULATED.3IONS-SCNC: 7 MMOL/L (ref 3–14)
AST SERPL W P-5'-P-CCNC: 22 U/L (ref 0–45)
BASOPHILS # BLD AUTO: 0 10E9/L (ref 0–0.2)
BASOPHILS NFR BLD AUTO: 0.4 %
BILIRUB SERPL-MCNC: 0.4 MG/DL (ref 0.2–1.3)
BUN SERPL-MCNC: 13 MG/DL (ref 7–30)
CALCIUM SERPL-MCNC: 8.8 MG/DL (ref 8.5–10.1)
CHLORIDE SERPL-SCNC: 103 MMOL/L (ref 94–109)
CO2 SERPL-SCNC: 25 MMOL/L (ref 20–32)
CREAT SERPL-MCNC: 0.58 MG/DL (ref 0.52–1.04)
DIFFERENTIAL METHOD BLD: NORMAL
EOSINOPHIL # BLD AUTO: 0.1 10E9/L (ref 0–0.7)
EOSINOPHIL NFR BLD AUTO: 1.1 %
ERYTHROCYTE [DISTWIDTH] IN BLOOD BY AUTOMATED COUNT: 12.9 % (ref 10–15)
GFR SERPL CREATININE-BSD FRML MDRD: >90 ML/MIN/{1.73_M2}
GLUCOSE SERPL-MCNC: 92 MG/DL (ref 70–99)
HCT VFR BLD AUTO: 38.6 % (ref 35–47)
HGB BLD-MCNC: 13.3 G/DL (ref 11.7–15.7)
IMM GRANULOCYTES # BLD: 0 10E9/L (ref 0–0.4)
IMM GRANULOCYTES NFR BLD: 0.4 %
LYMPHOCYTES # BLD AUTO: 1.4 10E9/L (ref 0.8–5.3)
LYMPHOCYTES NFR BLD AUTO: 25.4 %
MCH RBC QN AUTO: 31.3 PG (ref 26.5–33)
MCHC RBC AUTO-ENTMCNC: 34.5 G/DL (ref 31.5–36.5)
MCV RBC AUTO: 91 FL (ref 78–100)
MONOCYTES # BLD AUTO: 0.4 10E9/L (ref 0–1.3)
MONOCYTES NFR BLD AUTO: 6.7 %
NEUTROPHILS # BLD AUTO: 3.7 10E9/L (ref 1.6–8.3)
NEUTROPHILS NFR BLD AUTO: 66 %
NRBC # BLD AUTO: 0 10*3/UL
NRBC BLD AUTO-RTO: 0 /100
PLATELET # BLD AUTO: 194 10E9/L (ref 150–450)
POTASSIUM SERPL-SCNC: 3.8 MMOL/L (ref 3.4–5.3)
PROT SERPL-MCNC: 7.3 G/DL (ref 6.8–8.8)
RBC # BLD AUTO: 4.25 10E12/L (ref 3.8–5.2)
SODIUM SERPL-SCNC: 135 MMOL/L (ref 133–144)
WBC # BLD AUTO: 5.6 10E9/L (ref 4–11)

## 2019-03-05 PROCEDURE — 80053 COMPREHEN METABOLIC PANEL: CPT | Performed by: PHYSICIAN ASSISTANT

## 2019-03-05 PROCEDURE — G0463 HOSPITAL OUTPT CLINIC VISIT: HCPCS | Mod: ZF

## 2019-03-05 PROCEDURE — 85025 COMPLETE CBC W/AUTO DIFF WBC: CPT | Performed by: PHYSICIAN ASSISTANT

## 2019-03-05 PROCEDURE — 82306 VITAMIN D 25 HYDROXY: CPT | Performed by: PHYSICIAN ASSISTANT

## 2019-03-05 PROCEDURE — 36415 COLL VENOUS BLD VENIPUNCTURE: CPT

## 2019-03-05 PROCEDURE — 99214 OFFICE O/P EST MOD 30 MIN: CPT | Mod: ZP | Performed by: PHYSICIAN ASSISTANT

## 2019-03-05 RX ORDER — BIOTIN 10000 MCG
CAPSULE ORAL
COMMUNITY
End: 2019-06-04

## 2019-03-05 ASSESSMENT — PAIN SCALES - GENERAL: PAINLEVEL: NO PAIN (0)

## 2019-03-05 NOTE — NURSING NOTE
Oncology Rooming Note    March 5, 2019 4:20 PM   Julieta Rivera is a 68 year old female who presents for:    Chief Complaint   Patient presents with     Blood Draw     vitals and venipuncture done by [facility]     Oncology Clinic Visit     Breast Ca , Labs, Mammo Gram      Initial Vitals: /76 (BP Location: Right arm, Patient Position: Sitting, Cuff Size: Adult Regular)   Pulse 89   Temp 96.5  F (35.8  C) (Oral)   Resp 16   Wt 52.2 kg (115 lb)   SpO2 99%   BMI 22.46 kg/m   Estimated body mass index is 22.46 kg/m  as calculated from the following:    Height as of 12/23/18: 1.524 m (5').    Weight as of this encounter: 52.2 kg (115 lb). Body surface area is 1.49 meters squared.  No Pain (0) Comment: Data Unavailable   No LMP recorded. Patient is postmenopausal.  Allergies reviewed: Yes  Medications reviewed: Yes    Medications: Medication refills not needed today.  Pharmacy name entered into [facility]: [facility] [place], [place] - [address]    Clinical concerns: Results  Tatsumi  was notified.      Annie Garcia MA

## 2019-03-05 NOTE — NURSING NOTE
Chief Complaint   Patient presents with     Blood Draw     vitals and venipuncture done by AYANNA Rucker CMA on 3/5/2019 at 2:56 PM

## 2019-03-05 NOTE — PROGRESS NOTES
Oncology/Hematology Visit Note  Oct 18, 2018    REASON FOR VISIT: I am seeing Julieta Rivera in the cancer survivorship program for diagnosis of right breast cancer.     Julieta Rivera was found to have 7 x 6 x 9mm mass in right breast on screening mammography on 1/25/18. Biopsy on 2/12/18 revealed infiltrating ductal carcinoma, grade 3.  ER negative, TN negative, HER-2 negative. She underwent 4 cycles of neoadjuvant docetaxol and cyclophosphamide from 3/8/18-5/10/2018. She had a right lumpectomy with sentinel lymph node biopsy on 6/7/2018.  This revealed a 2.5 cm tumor, infiltrating ductal carcinoma, grade 2, and one focus of DCIS (size 0.3mm).  0/4 lymph nodes positive.  She was diagnosed with a stage IA [id number] triple negative invasive ductal carcinoma of right breast.  She completed 5240 cGy to the right breast on 8/27/2018.     CANCER TREATMENT SUMMARY:  *Mammogram in January 2018 showed mass in right breast.    *Biopsy in 2/12/2018 revealed infiltrating ductal carcinoma, grade 3.  ER negative, TN negative, HER-2 negative.   *Neoadjuvant Taxotere, cyclophosphamide x 4 cycles from 3/8/2018-5/10/2018  *Right lumpectomy with sentinel lymph node biopsy on 6/4/2018.  This revealed a 2.5 cm tumor, infiltrating ductal carcinoma.  0/4 lymph nodes positive.  Grade 2.     *5240 cGy to the left breast completed on 8/27/2018.     Interval History:  Julieta is here for follow up. She has been going to yoga and doing some exercise which has helped with her low back pain. She had seen Dr. Dove from orthopedics for this issue. She was unable to go to PT recommended by Dr. Dove due to all the care that her grandchildren have required recently. At that time she had been having right elbow pain but this has been improving. She also went to acupuncture at [facility]. She has noticed some soreness/tightness in right breast area in upper outer quadrant that she thinks is from radiation changes. She does not have any  lymphedema currently in RUE.     She has residual peripheral neuropathy in fingertips and balls of feet to toes that has not improved so far. Her daughter has 3-year-old twins, one of whom has esophageal atresia and this has taken a tremendous amount of work on the part of the patient's daughter and the patient to take care of this grandchild. She has stress and fatigue related to this, but denies anxiety, depression.    Current Outpatient Prescriptions   Medication Sig Dispense Refill     ascorbic acid (VITAMIN C) 500 MG CPCR Take 1,000 mg by mouth daily        aspirin (ASPIRIN LOW STRENGTH) 81 MG chewable tablet Take 81 mg by mouth daily       Biotin 1 MG CAPS        cholecalciferol 2000 UNITS CAPS Take 1,000 Units by mouth        fish oil-omega-3 fatty acids 1000 MG capsule Take 1 g by mouth        FLUoxetine (PROZAC) 20 MG capsule Take 20 mg by mouth daily       HYDROCHLOROTHIAZIDE PO Take 25 mg by mouth       Magnesium Chloride (MAGNESIUM DR PO) Take 400 mg by mouth        Melatonin 10 MG TABS Take 20 mg by mouth       tamoxifen (NOLVADEX) 20 MG tablet Take 1 tablet (20 mg) by mouth daily 90 tablet 3     UNABLE TO FIND 3 times daily MEDICATION NAME: Bone Builder (Calcium)       valsartan (DIOVAN) 80 MG tablet Take by mouth daily         Physical Examination:  General: The patient is a pleasant female in no acute distress.  /76 (BP Location: Right arm, Patient Position: Sitting, Cuff Size: Adult Regular)   Pulse 89   Temp 96.5  F (35.8  C) (Oral)   Resp 16   Wt 52.2 kg (115 lb)   SpO2 99%   BMI 22.46 kg/m   .   HEENT: EOMI, PERRL. Sclerae are anicteric. Oral mucosa is pink and moist with no lesions or thrush.   Lymph: No palpable cervical, supraclavicular, subclavicular or axillary lymphadenopathy.  Heart: Regular rate and rhythm. 2/6 FIORELLA in RUSB  Lungs: Clear to auscultation bilaterally.   Breast: Right breast with some hyperpigmentation from radiation. Well-healed incision at 11:30 position. No  erythema or masses. Left breast without skin changes or masses.  Abdomen: Bowel sounds present, soft, nontender with no palpable hepatosplenomegaly or masses.   Extremities: No lower extremity edema noted bilaterally.   Neuro: Cranial nerves II through XII are grossly intact.  Skin: No rashes, petechiae, or bruising noted on exposed skin.  Laboratory Data:  Results for FAIZA BURGOS (MRN [id number]) as of 3/5/2019 17:24   3/5/2019 14:52   Sodium 135   Potassium 3.8   Chloride 103   Carbon Dioxide 25   Urea Nitrogen 13   Creatinine 0.58   GFR Estimate >90   GFR Estimate If Black >90   Calcium 8.8   Anion Gap 7   Albumin 3.9   Protein Total 7.3   Bilirubin Total 0.4   Alkaline Phosphatase 92   ALT 22   AST 22   Glucose 92   WBC 5.6   Hemoglobin 13.3   Hematocrit 38.6   Platelet Count 194   RBC Count 4.25   MCV 91   MCH 31.3   MCHC 34.5   RDW 12.9   Diff Method Automated Method   % Neutrophils 66.0   % Lymphocytes 25.4   % Monocytes 6.7   % Eosinophils 1.1   % Basophils 0.4   % Immature Granulocytes 0.4   Nucleated RBCs 0   Absolute Neutrophil 3.7   Absolute Lymphocytes 1.4   Absolute Monocytes 0.4   Absolute Eosinophils 0.1   Absolute Basophils 0.0   Abs Immature Granulocytes 0.0   Absolute Nucleated RBC 0.0     Results for FAIZA BURGOS (MRN [id number]) as of 3/6/2019 12:51   Ref. Range 3/5/2019 14:52   Vitamin D Deficiency screening Latest Ref Range: 20 - 75 ug/L 52       Imaging:  Exam: Bilateral diagnostic digital mammogram with computer aided  detection and tomosynthesis, 3/5/2019     Comparison: 6/7/2018, 2/12/2018, 2/2/2018, 1/25/2018, 12/15/2015 and  12/10/2012     Technique: Tomosynthesis.     History: History of  right breast cancer status post right breast  conservation therapy. 2 second degree relatives with breast cancer,  the youngest diagnosed at approximately age 50. Patient reports  generalized soreness from right breast radiation without new or focal  breast symptom.     BREAST DENSITY:  Scattered fibroglandular densities.     Findings: Conservation therapy changes on the right. No suspicious  finding in either breast.                                                                      IMPRESSION: BI-RADS CATEGORY: 2 - Benign Finding(s).     RECOMMENDED FOLLOW-UP: Annual Mammography     Results discussed with the patient.     I have personally reviewed the examination and initial interpretation  and I agree with the findings.     BHAVIN UP MD    Assessment and Plan:  I had the pleasure of seeing Julieta Rivera in the Cancer Survivorship program for her diagnosis of right breast cancer.  Given her diagnosis and treatment I gave her the following recommendations.  1.  Stage IA [id number] infiltrating ductal carcinoma right breast, ER negative, OK negative, HER-2 negative.  Treated as above with neoadjuvant chemotherapy, surgery, radiation.  Ms. Rivera is doing well at this time.   Mammogram today with benign findings. She should be followed by medical oncology every 3 to 6 months for the first 3 years, every 6 months for years 4 and 5, and annually thereafter.     2.  Lymphedema.  Currently no edema in RUE    3.  Peripheral neuropathy. She understands that this is a permanent side effect but should not worsen. She does not feel pharmacologic intervention is needed at this time    4. Bladder toxicity.  Given her exposure to Cytoxan, she should report urinary urgency, urinary frequency, dysuria or hematuria. Due to risk of developing bladder cancer, encourage alcohol cessation. She does not smoke.    5.  Bone health. DEXA scan in November 2018 which was her first DEXA scan. Osteopenia with T score -2.4. Recommend weightbearing exercises 2-3 days a week. Her calcium should be 3184-9763 mg daily along with vitamin D 800-1000 IU daily. She is currently taking 5000 international unit(s) of vitamin D daily.  --At her request, checked Vitamin D level today which was within normal  range  --Discussed bisphosphonate therapy with Fosamax. She is hesitant due to concerns of possible side effects. She has not seen a dentist in quite a long time. Plan to see her dentist and think about starting bisphosphonate therapy to be discussed at next visit.    6.  Cardiac applications. She has a bicuspid aortic valve and mild aortic valve stenosis. Last echo on 6/4/18. She is being followed by Dr. Tidwell. Discussed that survivors are at risk for CAD, hypertension, heart failure, MI, cardiomyopathy.   --She is due for follow up with echo and Dr. Tidwell in June. Will request scheduling today    7.  Radiation.  Radiation would included a portion of her lungs, but as long as she is asymptomatic, no further imaging needs to be obtained for surveillance.  Bones are at risk for fractures in the area of the radiation.  She should watch for any new skin lesion in the area of the radiation and have them evaluated if present.    8.  Cognitive changes.  She does note mild difficulty with memory and concentration since the chemotherapy. We discussed possibility of neuropsych evaluation if she feels this is interfering with her daily life. She declines at this time.    9.  Bone marrow.  She is at risk for leukemia or MDS, and if present is usually seen within 10 years after completion of the chemotherapy.  This risk remains until May 2028. CBC today was WNL    10.  Tobacco exposure. She currently does not smoke, but has about 3 year pack history of smoking from age [age]. She does not currently meet guidelines for low dose Chest CT screening    11.  Cancer screening.  She should continue to undergo routine screening for women in her age group. Pelvic exams per primary care provider or GYN.  She understands vaginal bleeding would be abnormal and need to have this evaluated.  She had a colonoscopy in 2009 with 2 polyps and was recommended to have a follow up in 1 year, which she never pursued. She is agreeable to  colonoscopy now. Denies any hematochezia or change in bowel habits. She will limit her sun exposure and use sunscreens.  --Will request scheduling of colonoscopy.     15.  Healthy lifestyle. She should maintain a healthy weight with a BMI between 20-25.  Her diet should include low fats.  She should exercise 150 minutes of cardiovascular exercise per week.  She will continue to follow with her primary care provider for general health maintenance.  She states she has received the annual influenza vaccine. She had pneumococcal vaccine in January 2017. She should see her eye doctor and dentist routinely.    16. Factor V Leiden history without history of clot. She is aware of signs/symptoms of blood clots.    Follow up: Will schedule labs, Dr. Gay in 3 months.    Greater than 30 minutes was spent with this patient with greater than 20 minutes spent in counseling and coordination of care.    Valentine Blevins PA-C  [facility]  [address], [place] [id number] [phone]

## 2019-03-05 NOTE — LETTER
3/5/2019    RE: Julieta Rivera  [address] [place] [address]     Oncology/Hematology Visit Note  Oct 18, 2018    REASON FOR VISIT: I am seeing Julieta Rivera in the cancer survivorship program for diagnosis of right breast cancer.     Julieta Rivera was found to have 7 x 6 x 9mm mass in right breast on screening mammography on 1/25/18. Biopsy on 2/12/18 revealed infiltrating ductal carcinoma, grade 3.  ER negative, KY negative, HER-2 negative. She underwent 4 cycles of neoadjuvant docetaxol and cyclophosphamide from 3/8/18-5/10/2018. She had a right lumpectomy with sentinel lymph node biopsy on 6/7/2018.  This revealed a 2.5 cm tumor, infiltrating ductal carcinoma, grade 2, and one focus of DCIS (size 0.3mm).  0/4 lymph nodes positive.  She was diagnosed with a stage IA [id number] triple negative invasive ductal carcinoma of right breast.  She completed 5240 cGy to the right breast on 8/27/2018.     CANCER TREATMENT SUMMARY:  *Mammogram in January 2018 showed mass in right breast.    *Biopsy in 2/12/2018 revealed infiltrating ductal carcinoma, grade 3.  ER negative, KY negative, HER-2 negative.   *Neoadjuvant Taxotere, cyclophosphamide x 4 cycles from 3/8/2018-5/10/2018  *Right lumpectomy with sentinel lymph node biopsy on 6/4/2018.  This revealed a 2.5 cm tumor, infiltrating ductal carcinoma.  0/4 lymph nodes positive.  Grade 2.     *5240 cGy to the left breast completed on 8/27/2018.     Interval History:  Julieta is here for follow up. She has been going to yoga and doing some exercise which has helped with her low back pain. She had seen Dr. Dove from orthopedics for this issue. She was unable to go to PT recommended by Dr. Dove due to all the care that her grandchildren have required recently. At that time she had been having right elbow pain but this has been improving. She also went to acupuncture at [facility]. She has noticed some soreness/tightness in right breast area in upper outer  quadrant that she thinks is from radiation changes. She does not have any lymphedema currently in [facility].     She has residual peripheral neuropathy in fingertips and balls of feet to toes that has not improved so far. Her daughter has 3-year-old twins, one of whom has esophageal atresia and this has taken a tremendous amount of work on the part of the patient's daughter and the patient to take care of this grandchild. She has stress and fatigue related to this, but denies anxiety, depression.    Current Outpatient Prescriptions   Medication Sig Dispense Refill     ascorbic acid (VITAMIN C) 500 MG CPCR Take 1,000 mg by mouth daily        aspirin (ASPIRIN LOW STRENGTH) 81 MG chewable tablet Take 81 mg by mouth daily       Biotin 1 MG CAPS        cholecalciferol 2000 UNITS CAPS Take 1,000 Units by mouth        fish oil-omega-3 fatty acids 1000 MG capsule Take 1 g by mouth        FLUoxetine (PROZAC) 20 MG capsule Take 20 mg by mouth daily       HYDROCHLOROTHIAZIDE PO Take 25 mg by mouth       Magnesium Chloride (MAGNESIUM DR PO) Take 400 mg by mouth        Melatonin 10 MG TABS Take 20 mg by mouth       tamoxifen (NOLVADEX) 20 MG tablet Take 1 tablet (20 mg) by mouth daily 90 tablet 3     UNABLE TO FIND 3 times daily MEDICATION NAME: Bone Builder (Calcium)       valsartan (DIOVAN) 80 MG tablet Take by mouth daily         Physical Examination:  General: The patient is a pleasant female in no acute distress.  /76 (BP Location: Right arm, Patient Position: Sitting, Cuff Size: Adult Regular)   Pulse 89   Temp 96.5  F (35.8  C) (Oral)   Resp 16   Wt 52.2 kg (115 lb)   SpO2 99%   BMI 22.46 kg/m   .   HEENT: EOMI, PERRL. Sclerae are anicteric. Oral mucosa is pink and moist with no lesions or thrush.   Lymph: No palpable cervical, supraclavicular, subclavicular or axillary lymphadenopathy.  Heart: Regular rate and rhythm. 2/6 FIORELLA in RUSB  Lungs: Clear to auscultation bilaterally.   Breast: Right breast with some  hyperpigmentation from radiation. Well-healed incision at 11:30 position. No erythema or masses. Left breast without skin changes or masses.  Abdomen: Bowel sounds present, soft, nontender with no palpable hepatosplenomegaly or masses.   Extremities: No lower extremity edema noted bilaterally.   Neuro: Cranial nerves II through XII are grossly intact.  Skin: No rashes, petechiae, or bruising noted on exposed skin.  Laboratory Data:  Results for FAIZA BURGOS (MRN [id number]) as of 3/5/2019 17:24   3/5/2019 14:52   Sodium 135   Potassium 3.8   Chloride 103   Carbon Dioxide 25   Urea Nitrogen 13   Creatinine 0.58   GFR Estimate >90   GFR Estimate If Black >90   Calcium 8.8   Anion Gap 7   Albumin 3.9   Protein Total 7.3   Bilirubin Total 0.4   Alkaline Phosphatase 92   ALT 22   AST 22   Glucose 92   WBC 5.6   Hemoglobin 13.3   Hematocrit 38.6   Platelet Count 194   RBC Count 4.25   MCV 91   MCH 31.3   MCHC 34.5   RDW 12.9   Diff Method Automated Method   % Neutrophils 66.0   % Lymphocytes 25.4   % Monocytes 6.7   % Eosinophils 1.1   % Basophils 0.4   % Immature Granulocytes 0.4   Nucleated RBCs 0   Absolute Neutrophil 3.7   Absolute Lymphocytes 1.4   Absolute Monocytes 0.4   Absolute Eosinophils 0.1   Absolute Basophils 0.0   Abs Immature Granulocytes 0.0   Absolute Nucleated RBC 0.0     Results for FAIZA BURGOS (MRN [id number]) as of 3/6/2019 12:51   Ref. Range 3/5/2019 14:52   Vitamin D Deficiency screening Latest Ref Range: 20 - 75 ug/L 52       Imaging:  Exam: Bilateral diagnostic digital mammogram with computer aided  detection and tomosynthesis, 3/5/2019     Comparison: 6/7/2018, 2/12/2018, 2/2/2018, 1/25/2018, 12/15/2015 and  12/10/2012     Technique: Tomosynthesis.     History: History of  right breast cancer status post right breast  conservation therapy. 2 second degree relatives with breast cancer,  the youngest diagnosed at approximately age 50. Patient reports  generalized soreness from right  breast radiation without new or focal  breast symptom.     BREAST DENSITY: Scattered fibroglandular densities.     Findings: Conservation therapy changes on the right. No suspicious  finding in either breast.                                                                      IMPRESSION: BI-RADS CATEGORY: 2 - Benign Finding(s).     RECOMMENDED FOLLOW-UP: Annual Mammography     Results discussed with the patient.     I have personally reviewed the examination and initial interpretation  and I agree with the findings.     BHAVIN UP MD    Assessment and Plan:  I had the pleasure of seeing Julieta Rivera in the Cancer Survivorship program for her diagnosis of right breast cancer.  Given her diagnosis and treatment I gave her the following recommendations.  1.  Stage IA [id number] infiltrating ductal carcinoma right breast, ER negative, SD negative, HER-2 negative.  Treated as above with neoadjuvant chemotherapy, surgery, radiation.  Ms. Rivera is doing well at this time.   Mammogram today with benign findings. She should be followed by medical oncology every 3 to 6 months for the first 3 years, every 6 months for years 4 and 5, and annually thereafter.     2.  Lymphedema.  Currently no edema in RUE    3.  Peripheral neuropathy. She understands that this is a permanent side effect but should not worsen. She does not feel pharmacologic intervention is needed at this time    4. Bladder toxicity.  Given her exposure to Cytoxan, she should report urinary urgency, urinary frequency, dysuria or hematuria. Due to risk of developing bladder cancer, encourage alcohol cessation. She does not smoke.    5.  Bone health. DEXA scan in November 2018 which was her first DEXA scan. Osteopenia with T score -2.4. Recommend weightbearing exercises 2-3 days a week. Her calcium should be 3699-5612 mg daily along with vitamin D 800-1000 IU daily. She is currently taking 5000 international unit(s) of vitamin D daily.  --At her  request, checked Vitamin D level today which was within normal range  --Discussed bisphosphonate therapy with Fosamax. She is hesitant due to concerns of possible side effects. She has not seen a dentist in quite a long time. Plan to see her dentist and think about starting bisphosphonate therapy to be discussed at next visit.    6.  Cardiac applications. She has a bicuspid aortic valve and mild aortic valve stenosis. Last echo on 6/4/18. She is being followed by Dr. Tidwell. Discussed that survivors are at risk for CAD, hypertension, heart failure, MI, cardiomyopathy.   --She is due for follow up with echo and Dr. Tidwell in June. Will request scheduling today    7.  Radiation.  Radiation would included a portion of her lungs, but as long as she is asymptomatic, no further imaging needs to be obtained for surveillance.  Bones are at risk for fractures in the area of the radiation.  She should watch for any new skin lesion in the area of the radiation and have them evaluated if present.    8.  Cognitive changes.  She does note mild difficulty with memory and concentration since the chemotherapy. We discussed possibility of neuropsych evaluation if she feels this is interfering with her daily life. She declines at this time.    9.  Bone marrow.  She is at risk for leukemia or MDS, and if present is usually seen within 10 years after completion of the chemotherapy.  This risk remains until May 2028. CBC today was WNL    10.  Tobacco exposure. She currently does not smoke, but has about 3 year pack history of smoking from age [age]. She does not currently meet guidelines for low dose Chest CT screening    11.  Cancer screening.  She should continue to undergo routine screening for women in her age group. Pelvic exams per primary care provider or GYN.  She understands vaginal bleeding would be abnormal and need to have this evaluated.  She had a colonoscopy in 2009 with 2 polyps and was recommended to have a follow up in  1 year, which she never pursued. She is agreeable to colonoscopy now. Denies any hematochezia or change in bowel habits. She will limit her sun exposure and use sunscreens.  --Will request scheduling of colonoscopy.     15.  Healthy lifestyle. She should maintain a healthy weight with a BMI between 20-25.  Her diet should include low fats.  She should exercise 150 minutes of cardiovascular exercise per week.  She will continue to follow with her primary care provider for general health maintenance.  She states she has received the annual influenza vaccine. She had pneumococcal vaccine in January 2017. She should see her eye doctor and dentist routinely.    16. Factor V Leiden history without history of clot. She is aware of signs/symptoms of blood clots.    Follow up: Will schedule labs, Dr. Gay in 3 months.    Greater than 30 minutes was spent with this patient with greater than 20 minutes spent in counseling and coordination of care.    Valentine Blevins PA-C  [facility]  [address] [place] [id number] [phone]

## 2019-03-06 LAB — DEPRECATED CALCIDIOL+CALCIFEROL SERPL-MC: 52 UG/L (ref 20–75)

## 2019-04-01 ENCOUNTER — OFFICE VISIT (OUTPATIENT)
Dept: RADIATION ONCOLOGY | Facility: CLINIC | Age: 69
End: 2019-04-01
Attending: RADIOLOGY
Payer: COMMERCIAL

## 2019-04-01 VITALS
DIASTOLIC BLOOD PRESSURE: 66 MMHG | WEIGHT: 114.9 LBS | HEART RATE: 78 BPM | SYSTOLIC BLOOD PRESSURE: 121 MMHG | OXYGEN SATURATION: 99 % | BODY MASS INDEX: 22.44 KG/M2

## 2019-04-01 DIAGNOSIS — C50.411 MALIGNANT NEOPLASM OF UPPER-OUTER QUADRANT OF RIGHT BREAST IN FEMALE, ESTROGEN RECEPTOR NEGATIVE (H): Primary | ICD-10-CM

## 2019-04-01 DIAGNOSIS — Z17.1 MALIGNANT NEOPLASM OF UPPER-OUTER QUADRANT OF RIGHT BREAST IN FEMALE, ESTROGEN RECEPTOR NEGATIVE (H): Primary | ICD-10-CM

## 2019-04-01 PROCEDURE — G0463 HOSPITAL OUTPT CLINIC VISIT: HCPCS | Performed by: RADIOLOGY

## 2019-04-01 NOTE — NURSING NOTE
FOLLOW-UP VISIT    Patient Name: Julieta Rivera      : 1950     Age: 68 year old        ______________________________________________________________________________     Chief Complaint   Patient presents with     Cancer     Radiation follow-up     /66   Pulse 78   Wt 52.1 kg (114 lb 14.4 oz)   SpO2 99%   BMI 22.44 kg/m       Date Radiation Completed: Rt breast 2540 cGy completed 18    Pain  Denies    Labs  Other Labs: No    Imaging  None. Last mammogram 3/3/19    Other Appointments: No    MD Name: Dr. Gay Appointment Date: 2019   MD Name: Appointment Date:   MD Name: Appointment Date:   Other Appointment Notes:     Residual Radiation side effect: Feeling well r/t radiation. Continues to have some SE r/t chemo and surgery.    Additional Instructions:     Nurse face-to-face time: Level 3:  10 min face to face time

## 2019-04-01 NOTE — LETTER
2019       RE: Julieta Rivera  [address] [place] [address]     Dear Colleague,    Thank you for referring your patient, Julieta Rivera, to the RADIATION ONCOLOGY CLINIC. Please see a copy of my visit note below.    [facility], [place]  Radiation Oncology Follow-up Note  2019    Julieta Rivera   MRN: [id number]  : 1950     DISEASE TREATED: Clinical stage IA [id number] triple negative invasive ductal carcinoma of the right breast     INTERVAL SINCE COMPLETION OF RADIATION THERAPY: 7 months; completed treatment on 2018.     TYPE OF RADIATION THERAPY ADMINISTERED: 4240 cGy in 16 fractions followed by a 1000 cGy boost in 4 fractions to her right lumpectomy cavity     SUBJECTIVE:   Ms. Rivera is a 67 year old woman with clinical stage IA [id number] triple negative invasive ductal carcinoma of the right breast, status post neoadjuvant chemotherapy followed by lumpectomy with sentinel lymph node biopsy and adjuvant radiotherapy.  This was detected on screening mammogram. She underwent 4 cycles of docetaxol and cyclophosphamide. Her surgical pathology from 2018 showed good response to neoadjuvant chemotherapy with reduction in tumor size from 9 mm to 2.5 mm, surgical staging [id number](sn). 8/8 sentinel lymph nodes were positive for carcinoma.         Ms. Rivera was treated with radiotherapy with curative intent. Her entire right breast received 4240 cGy in 16 daily fractions followed by a 1000 cGy boost in 4 daily fractions to the right lumpectomy cavity. She tolerated treatment well without any unexpected treatment breaks, hospitalizations, or ED visits. She did develop some mild fatigue and dermatitis; the latter was treated with Purity Organic lotion per patient preference. She also complained of some pain associated with her right axillary seroma. She reported that this gradually improved over the course of treatment but did require OTC pain  medication on occasion.           On exam today, Ms. Rivera reports that she has been overall doing well since the time of her breast radiation.  She continues to have some residual toxicity related to her neoadjuvant chemotherapy and axillary surgery including neuropathy involving the hands and soles of the feet and some numbness of the upper right arm.  She previously had some right axillary cording which improved fairly quickly with physical therapy.  She has been previously seen in lymphedema clinic and does not have any significant lymphedema and has since been discharged.  She has received acupuncture for her neuropathy which she has found helpful.  With regard to the treated right breast, she denies any residual skin change.  She has noticed some slight increased firmness but this is not bothersome.  She has more recently noted some slight nodularity involving both the lumpectomy and right axillary scars.  She was seen by medical oncology most recently on 3/5/2019 with a negative (BI-RADS 2) mammogram at that time.  Her breast exam at that time furthermore was not concerning for recurrent disease.  She continues to have some psychosocial stressors related to her chronically ill grandchild but she appears to be coping well.  Her review of systems is unremarkable.       OBJECTIVE:   /66   Pulse 78   Wt 52.1 kg (114 lb 14.4 oz)   SpO2 99%   BMI 22.44 kg/m       PHYSICAL EXAM:  General: Alert, oriented, NAD  Skin: No rashes or lesions appreciated  HEENT: NCAT, EOMI  Neck: Supple, full ROM, no cervical LAD  Breasts: Overall symmetric appearance. Status post lumpectomy with well-healed incision over the right breast. There is mild residual hyperpigmentation of the right breast. Breasts are firm bilaterally R>L. Mild nodularity at the medial axially scar consistent with normal post-op change. Mild nodularity at the medial lumpectomy incision consistent with normal post-op change.   Heart: WWP  Lungs:  Breathing comfortably on RA  Abd: Nondistended  /Rectal: Deferred  Ext: Atraumatic, grossly intact strength, no evidence of lymphedema  Neuro: CNs grossly intact, normal gait      IMPRESSION: This is a 68-year-old woman with clinical stage IA [id number] triple negative invasive ductal carcinoma of the right breast, presenting for follow-up 7 months after completion of adjuvant radiotherapy to her right breast. She is clinically doing well with some residual toxicity from neoadjuvant chemotherapy and surgery but no residual acute toxicity from RT and no significant chronic toxicity at this time.     RECOMMENDATIONS:    1. RTC prn  2. Follow up with Dr. Gay in medical oncology as currently scheduled on 6/4/2019. Dr. Gay will manage surveillance imaging.    The patient was seen and discussed with staff, Dr. Conner.    Lawrence Roy MD  Resident, PGY-4  Department of Radiation Oncology  [facility]  [phone]      I saw and examined the patient with the resident.  I have reviewed and edited the resident's note and agree with the plan of care.      Shy Conner MD          Again, thank you for allowing me to participate in the care of your patient.      Sincerely,    Shy Conner MD

## 2019-04-01 NOTE — PROGRESS NOTES
[facility], [place]  Radiation Oncology Follow-up Note  2019    Julieta Rivera   MRN: [id number]  : 1950     DISEASE TREATED: Clinical stage IA [id number] triple negative invasive ductal carcinoma of the right breast     INTERVAL SINCE COMPLETION OF RADIATION THERAPY: 7 months; completed treatment on 2018.     TYPE OF RADIATION THERAPY ADMINISTERED: 4240 cGy in 16 fractions followed by a 1000 cGy boost in 4 fractions to her right lumpectomy cavity     SUBJECTIVE:   Ms. Rivera is a 67 year old woman with clinical stage IA [id number] triple negative invasive ductal carcinoma of the right breast, status post neoadjuvant chemotherapy followed by lumpectomy with sentinel lymph node biopsy and adjuvant radiotherapy.  This was detected on screening mammogram. She underwent 4 cycles of docetaxol and cyclophosphamide. Her surgical pathology from 2018 showed good response to neoadjuvant chemotherapy with reduction in tumor size from 9 mm to 2.5 mm, surgical staging szW4fN2(sn). 8/8 sentinel lymph nodes were positive for carcinoma.         Ms. Rivera was treated with radiotherapy with curative intent. Her entire right breast received 4240 cGy in 16 daily fractions followed by a 1000 cGy boost in 4 daily fractions to the right lumpectomy cavity. She tolerated treatment well without any unexpected treatment breaks, hospitalizations, or ED visits. She did develop some mild fatigue and dermatitis; the latter was treated with Purity Organic lotion per patient preference. She also complained of some pain associated with her right axillary seroma. She reported that this gradually improved over the course of treatment but did require OTC pain medication on occasion.           On exam today, Ms. Rivera reports that she has been overall doing well since the time of her breast radiation.  She continues to have some residual toxicity related to her neoadjuvant chemotherapy and  axillary surgery including neuropathy involving the hands and soles of the feet and some numbness of the upper right arm.  She previously had some right axillary cording which improved fairly quickly with physical therapy.  She has been previously seen in lymphedema clinic and does not have any significant lymphedema and has since been discharged.  She has received acupuncture for her neuropathy which she has found helpful.  With regard to the treated right breast, she denies any residual skin change.  She has noticed some slight increased firmness but this is not bothersome.  She has more recently noted some slight nodularity involving both the lumpectomy and right axillary scars.  She was seen by medical oncology most recently on 3/5/2019 with a negative (BI-RADS 2) mammogram at that time.  Her breast exam at that time furthermore was not concerning for recurrent disease.  She continues to have some psychosocial stressors related to her chronically ill grandchild but she appears to be coping well.  Her review of systems is unremarkable.       OBJECTIVE:   /66   Pulse 78   Wt 52.1 kg (114 lb 14.4 oz)   SpO2 99%   BMI 22.44 kg/m      PHYSICAL EXAM:  General: Alert, oriented, NAD  Skin: No rashes or lesions appreciated  HEENT: NCAT, EOMI  Neck: Supple, full ROM, no cervical LAD  Breasts: Overall symmetric appearance. Status post lumpectomy with well-healed incision over the right breast. There is mild residual hyperpigmentation of the right breast. Breasts are firm bilaterally R>L. Mild nodularity at the medial axially scar consistent with normal post-op change. Mild nodularity at the medial lumpectomy incision consistent with normal post-op change.   Heart: WWP  Lungs: Breathing comfortably on RA  Abd: Nondistended  /Rectal: Deferred  Ext: Atraumatic, grossly intact strength, no evidence of lymphedema  Neuro: CNs grossly intact, normal gait      IMPRESSION: This is a 68-year-old woman with clinical stage  IA [id number] triple negative invasive ductal carcinoma of the right breast, presenting for follow-up 7 months after completion of adjuvant radiotherapy to her right breast. She is clinically doing well with some residual toxicity from neoadjuvant chemotherapy and surgery but no residual acute toxicity from RT and no significant chronic toxicity at this time.     RECOMMENDATIONS:    1. RTC prn  2. Follow up with Dr. Gay in medical oncology as currently scheduled on 6/4/2019. Dr. Gay will manage surveillance imaging.    The patient was seen and discussed with staff, Dr. Conner.    Lawrence Roy MD  Resident, PGY-4  Department of Radiation Oncology  [facility]  [phone]      I saw and examined the patient with the resident.  I have reviewed and edited the resident's note and agree with the plan of care.      Shy Conner MD

## 2019-04-21 ASSESSMENT — ENCOUNTER SYMPTOMS
NAUSEA: 0
COUGH: 0
EYE PAIN: 0
DIZZINESS: 0
HEMATURIA: 0
DYSURIA: 0
SORE THROAT: 0
BREAST MASS: 0
ABDOMINAL PAIN: 0
NERVOUS/ANXIOUS: 0
MYALGIAS: 1
SHORTNESS OF BREATH: 0
CHILLS: 0
HEADACHES: 0
FEVER: 0
ARTHRALGIAS: 1
JOINT SWELLING: 1
FREQUENCY: 0
HEMATOCHEZIA: 0
CONSTIPATION: 0
WEAKNESS: 0
PALPITATIONS: 0
PARESTHESIAS: 0
DIARRHEA: 0
HEARTBURN: 0

## 2019-04-21 ASSESSMENT — ACTIVITIES OF DAILY LIVING (ADL): CURRENT_FUNCTION: NO ASSISTANCE NEEDED

## 2019-04-23 ASSESSMENT — ACTIVITIES OF DAILY LIVING (ADL): CURRENT_FUNCTION: NO ASSISTANCE NEEDED

## 2019-04-23 NOTE — PATIENT INSTRUCTIONS
Come fasting next year 10-12 hours    Consider Shingrix vaccine    Preventive Health Recommendations    See your health care provider every year to    Review health changes.     Discuss preventive care.      Review your medicines if your doctor has prescribed any.      You no longer need a yearly Pap test unless you've had an abnormal Pap test in the past 10 years. If you have vaginal symptoms, such as bleeding or discharge, be sure to talk with your provider about a Pap test.      Every 1 to 2 years, have a mammogram.  If you are over 69, talk with your health care provider about whether or not you want to continue having screening mammograms.      Every 10 years, have a colonoscopy. Or, have a yearly FIT test (stool test). These exams will check for colon cancer.       Have a cholesterol test every 5 years, or more often if your doctor advises it.       Have a diabetes test (fasting glucose) every three years. If you are at risk for diabetes, you should have this test more often.       At age 65, have a bone density scan (DEXA) to check for osteoporosis (brittle bone disease).    Shots:    Get a flu shot each year.    Get a tetanus shot every 10 years.    Talk to your doctor about your pneumonia vaccines. There are now two you should receive - Pneumovax (PPSV 23) and Prevnar (PCV 13).    Talk to your pharmacist about the shingles vaccine.    Talk to your doctor about the hepatitis B vaccine.    Nutrition:     Eat at least 5 servings of fruits and vegetables each day.      Eat whole-grain bread, whole-wheat pasta and brown rice instead of white grains and rice.      Get adequate about Calcium and Vitamin D.     Lifestyle    Exercise at least 150 minutes a week (30 minutes a day, 5 days a week). This will help you control your weight and prevent disease.      Limit alcohol to one drink per day.      No smoking.       Wear sunscreen to prevent skin cancer.       See your dentist twice a year for an exam and  cleaning.      See your eye doctor every 1 to 2 years to screen for conditions such as glaucoma, macular degeneration, cataracts, etc.    Personalized Prevention Plan  You are due for the preventive services outlined below.  Your care team is available to assist you in scheduling these services.  If you have already completed any of these items, please share that information with your care team to update in your medical record.    Health Maintenance Due   Topic Date Due     Depression Action Plan Review  11/20/1967     Zoster (Shingles) Vaccine (1 of 2) 11/20/1999     Discuss Advance Directive Planning  11/20/2004     Annual Wellness Visit  01/05/2019     Depression Assessment - every 6 months  04/23/2019       Ice 15-20 minutes at a time as many times a day as possible  Ibuprofen 600 mg every 6 hours around clock for the next 3-4 days - take with food  Stretches - knees to chest one at a time, cobra pose - very small movements  Ergonomics - squat, kneel when bending, or kick our other leg  No heavy lifting  Walk frequently  Pillow under legs when laying flat and between knees when laying on your side  physical therapy if you want or if not getting better

## 2019-04-23 NOTE — PROGRESS NOTES
" Wash     SUBJECTIVE:   CC: Julieta Rivera is an 68 year old woman who presents for preventive health visit.     Healthy Habits:     In general, how would you rate your overall health?  Good    Frequency of exercise:  2-3 days/week    Duration of exercise:  Less than 15 minutes    Do you usually eat at least 4 servings of fruit and vegetables a day, include whole grains    & fiber and avoid regularly eating high fat or \"junk\" foods?  No    Taking medications regularly:  Yes    Ability to successfully perform activities of daily living:  No assistance needed    Home Safety:  No safety concerns identified    Hearing Impairment:  No hearing concerns    In the past 6 months, have you been bothered by leaking of urine?  No    In general, how would you rate your overall mental or emotional health?  Fair      PHQ-2 Total Score: 1    Additional concerns today:  No    Hypothyroidism Follow-up      Since last visit, patient describes the following symptoms: Weight stable, no hair loss, skin changes, constipation, loose stools, tremors, anxiety, depression, fatigue attributable to thyroid    Breast cancer - treatment is all done, most recent mammogram was clear, port out Nov (onc wanted out d/t Factor V and potential for clots);  no edema, seroma has cleared.  Has continued to have nerve pain in the arms    Low back pain - left-sided low back pain since Oct-Nov 2018, no radiation of pain to buttocks or leg.  No urinary or fecal changes, fever, focal weakness.  Is supposed to get physical therapy, hasn't been able to get to therapy due to family needs  Restarted yoga in January.  Movement helps and inactivity provokes pain.    Cardiomyopathy - discovered by cardiology in pre-chemo work-up.  Had apparently been noted on ECHO in 1995, but then no action until she was to start chemo. Appt with cards next month.    Twins were 3 in Nov 2018.  Grandson, Zach, is not doing well.  He did not get accepted into [employer] " due to medical conditions.  Enrolled in play therapy at [facility].  Getting up two times a night with twins down from four times.    Today's PHQ-2 Score:   PHQ-2 (  [employer]) 2019   Q1: Little interest or pleasure in doing things 0   Q2: Feeling down, depressed or hopeless 0   PHQ-2 Score 0   Q1: Little interest or pleasure in doing things -   Q2: Feeling down, depressed or hopeless -   PHQ-2 Score -       Abuse: Current or Past(Physical, Sexual or Emotional)- No  Do you feel safe in your environment? Yes    Social History     Tobacco Use     Smoking status: Former Smoker     Last attempt to quit: 1971     Years since quittin.3     Smokeless tobacco: Never Used     Tobacco comment: age [age]   Substance Use Topics     Alcohol use: No     Comment: no alcohol since 2018     If you drink alcohol do you typically have >3 drinks per day or >7 drinks per week? No    No flowsheet data found.    Reviewed orders with patient.  Reviewed health maintenance and updated orders accordingly - Yes  Labs reviewed in [facility]    Alternate mammogram schedule due to breast cancer history Had a mammogram in 2019. Results were normal.     Pertinent mammograms are reviewed under the imaging tab.  History of abnormal Pap smear: NO - age 64 - see link Cervical Cytology Screening Guidelines  PAP / HPV 11/10/2014 2013   PAP NIL UNSAT     Reviewed and updated as needed this visit by clinical staff  Tobacco  Allergies  Meds  Med Hx  Surg Hx  Fam Hx  Soc Hx        Reviewed and updated as needed this visit by Provider            Review of Systems  CONSTITUTIONAL: NEGATIVE for fever, chills, change in weight  INTEGUMENTARY/SKIN: NEGATIVE for worrisome rashes, moles or lesions  EYES: NEGATIVE for vision changes or irritation; vision has worsened since cancer treatment - due for eye exam  ENT: NEGATIVE for ear, mouth and throat problems  RESP: NEGATIVE for significant cough or SOB  BREAST: NEGATIVE for masses, tenderness  or discharge  CV: NEGATIVE for chest pain, palpitations or peripheral edema  GI: NEGATIVE for nausea, abdominal pain, heartburn, or change in bowel habits  : NEGATIVE for unusual urinary or vaginal symptoms. No vaginal bleeding.  MUSCULOSKELETAL: NEGATIVE for significant arthralgias or myalgia  NEURO: NEGATIVE for weakness, dizziness or paresthesias; foot neuropathy, hand neuropathy  ENDOCRINE: NEGATIVE for temperature intolerance, skin/hair changes  HEME/ALLERGY/IMMUNE: NEGATIVE for bleeding problems  PSYCHIATRIC: NEGATIVE for changes in mood or affect        OBJECTIVE:   /74   Pulse 93   Temp 98  F (36.7  C) (Oral)   Ht 1.524 m (5')   Wt 53.3 kg (117 lb 6.4 oz)   SpO2 98%   BMI 22.93 kg/m    Physical Exam  GENERAL: healthy, alert and no distress  EYES: Eyes grossly normal to inspection, PERRL and conjunctivae and sclerae normal  HENT: ear canals and TM's normal, nose and mouth without ulcers or lesions  NECK: no adenopathy, no asymmetry, masses, or scars and thyroid normal to palpation  RESP: lungs clear to auscultation - no rales, rhonchi or wheezes  BREAST: normal without masses, tenderness or nipple discharge and no palpable axillary masses or adenopathy; scar tissue R breast far RUQ and superior to nipple  CV: regular rate and rhythm, normal S1 S2, no S3 or S4, no murmur, click or rub, no peripheral edema and peripheral pulses strong  ABDOMEN: soft, nontender, no hepatosplenomegaly, no masses and bowel sounds normal  MS: no gross musculoskeletal defects noted, no edema  SKIN: no suspicious lesions or rashes  NEURO: Normal strength and tone, mentation intact and speech normal  PSYCH: mentation appears normal, affect normal/bright  LYMPH: no cervical, supraclavicular, axillary, or inguinal adenopathy    Ear wash performed by MA    Diagnostic Test Results:  pending    ASSESSMENT/PLAN:   (Z00.00) Routine general medical examination at a health care facility  (primary encounter diagnosis)  Comment:    Plan:     (E03.8,  E06.3) Hypothyroidism due to Hashimoto's thyroiditis  Comment:   Plan: TSH, T4 free, levothyroxine         (SYNTHROID/LEVOTHROID) 100 MCG tablet            (M62.830) Spasm of muscle of lower back  Comment:   Plan: no red flag signs on exam or in history.  Responds to movement.  Showed some exercises and stretches.  Encouraged time for physical therapy.     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    Weight as of this encounter: 53.3 kg (117 lb 6.4 oz).           reports that she quit smoking about 48 years ago. She has never used smokeless tobacco.      Counseling Resources:  ATP IV Guidelines  Pooled Cohorts Equation Calculator  Breast Cancer Risk Calculator  FRAX Risk Assessment  ICSI Preventive Guidelines  Dietary Guidelines for Americans, 2010  USDA's MyPlate  ASA Prophylaxis  Lung CA Screening    Simona Tucker, JOSÉ LUIS DEVRIES  [facility]

## 2019-04-24 ENCOUNTER — OFFICE VISIT (OUTPATIENT)
Dept: FAMILY MEDICINE | Facility: CLINIC | Age: 69
End: 2019-04-24
Payer: COMMERCIAL

## 2019-04-24 VITALS
TEMPERATURE: 98 F | DIASTOLIC BLOOD PRESSURE: 74 MMHG | BODY MASS INDEX: 23.05 KG/M2 | WEIGHT: 117.4 LBS | HEIGHT: 60 IN | OXYGEN SATURATION: 98 % | SYSTOLIC BLOOD PRESSURE: 118 MMHG | HEART RATE: 93 BPM

## 2019-04-24 DIAGNOSIS — M62.830 SPASM OF MUSCLE OF LOWER BACK: ICD-10-CM

## 2019-04-24 DIAGNOSIS — G62.0 PERIPHERAL NEUROPATHY DUE TO CHEMOTHERAPY (H): ICD-10-CM

## 2019-04-24 DIAGNOSIS — I42.7 CARDIOMYOPATHY SECONDARY TO DRUG (H): ICD-10-CM

## 2019-04-24 DIAGNOSIS — T45.1X5A PERIPHERAL NEUROPATHY DUE TO CHEMOTHERAPY (H): ICD-10-CM

## 2019-04-24 DIAGNOSIS — H61.22 IMPACTED CERUMEN OF LEFT EAR: ICD-10-CM

## 2019-04-24 DIAGNOSIS — Q23.0 CONGENITAL STENOSIS OF AORTIC VALVE: ICD-10-CM

## 2019-04-24 DIAGNOSIS — Z00.00 ROUTINE GENERAL MEDICAL EXAMINATION AT A HEALTH CARE FACILITY: Primary | ICD-10-CM

## 2019-04-24 DIAGNOSIS — E06.3 HYPOTHYROIDISM DUE TO HASHIMOTO'S THYROIDITIS: ICD-10-CM

## 2019-04-24 DIAGNOSIS — Q23.81 BICUSPID AORTIC VALVE: ICD-10-CM

## 2019-04-24 DIAGNOSIS — D68.51 HETEROZYGOUS FACTOR V LEIDEN MUTATION (H): ICD-10-CM

## 2019-04-24 PROCEDURE — 99214 OFFICE O/P EST MOD 30 MIN: CPT | Mod: 25 | Performed by: NURSE PRACTITIONER

## 2019-04-24 PROCEDURE — 84443 ASSAY THYROID STIM HORMONE: CPT | Performed by: NURSE PRACTITIONER

## 2019-04-24 PROCEDURE — 36415 COLL VENOUS BLD VENIPUNCTURE: CPT | Performed by: NURSE PRACTITIONER

## 2019-04-24 PROCEDURE — 84439 ASSAY OF FREE THYROXINE: CPT | Performed by: NURSE PRACTITIONER

## 2019-04-24 PROCEDURE — 99397 PER PM REEVAL EST PAT 65+ YR: CPT | Performed by: NURSE PRACTITIONER

## 2019-04-24 RX ORDER — LEVOTHYROXINE SODIUM 100 UG/1
100 TABLET ORAL DAILY
Qty: 90 TABLET | Refills: 3 | Status: SHIPPED | OUTPATIENT
Start: 2019-04-24 | End: 2020-04-30

## 2019-04-24 ASSESSMENT — PATIENT HEALTH QUESTIONNAIRE - PHQ9: SUM OF ALL RESPONSES TO PHQ QUESTIONS 1-9: 4

## 2019-04-24 ASSESSMENT — MIFFLIN-ST. JEOR: SCORE: 979.02

## 2019-04-25 LAB
T4 FREE SERPL-MCNC: 1.15 NG/DL (ref 0.76–1.46)
TSH SERPL DL<=0.005 MIU/L-ACNC: 1.22 MU/L (ref 0.4–4)

## 2019-04-25 NOTE — RESULT ENCOUNTER NOTE
Julieta,    It was so nice to see you yesterday.  I will just keep the hope that there will be some break in the needs of the twins.  You are an amazing mother and grandmother.  The thyroid labs are normal.    If you have any questions, please feel free to contact the clinic.    FRED Lewis

## 2019-05-06 ENCOUNTER — OFFICE VISIT (OUTPATIENT)
Dept: CARDIOLOGY | Facility: CLINIC | Age: 69
End: 2019-05-06
Attending: INTERNAL MEDICINE
Payer: COMMERCIAL

## 2019-05-06 ENCOUNTER — ANCILLARY PROCEDURE (OUTPATIENT)
Dept: CARDIOLOGY | Facility: CLINIC | Age: 69
End: 2019-05-06
Attending: INTERNAL MEDICINE
Payer: MEDICARE

## 2019-05-06 VITALS
OXYGEN SATURATION: 98 % | BODY MASS INDEX: 22.68 KG/M2 | SYSTOLIC BLOOD PRESSURE: 119 MMHG | DIASTOLIC BLOOD PRESSURE: 76 MMHG | WEIGHT: 115.5 LBS | HEIGHT: 60 IN | HEART RATE: 85 BPM

## 2019-05-06 DIAGNOSIS — Z17.1 MALIGNANT NEOPLASM OF UPPER-OUTER QUADRANT OF RIGHT BREAST IN FEMALE, ESTROGEN RECEPTOR NEGATIVE (H): ICD-10-CM

## 2019-05-06 DIAGNOSIS — I35.0 AORTIC STENOSIS: ICD-10-CM

## 2019-05-06 DIAGNOSIS — C50.411 MALIGNANT NEOPLASM OF UPPER-OUTER QUADRANT OF RIGHT BREAST IN FEMALE, ESTROGEN RECEPTOR NEGATIVE (H): ICD-10-CM

## 2019-05-06 DIAGNOSIS — E78.5 DYSLIPIDEMIA: ICD-10-CM

## 2019-05-06 DIAGNOSIS — I35.0 NONRHEUMATIC AORTIC VALVE STENOSIS: Primary | ICD-10-CM

## 2019-05-06 DIAGNOSIS — Q23.81 BICUSPID AORTIC VALVE: ICD-10-CM

## 2019-05-06 PROCEDURE — 93005 ELECTROCARDIOGRAM TRACING: CPT | Mod: ZF

## 2019-05-06 PROCEDURE — G0463 HOSPITAL OUTPT CLINIC VISIT: HCPCS | Mod: 25,ZF

## 2019-05-06 PROCEDURE — 99214 OFFICE O/P EST MOD 30 MIN: CPT | Mod: ZP | Performed by: INTERNAL MEDICINE

## 2019-05-06 PROCEDURE — 93010 ELECTROCARDIOGRAM REPORT: CPT | Mod: ZP | Performed by: INTERNAL MEDICINE

## 2019-05-06 ASSESSMENT — ENCOUNTER SYMPTOMS
HYPOTENSION: 0
ALTERED TEMPERATURE REGULATION: 0
ORTHOPNEA: 0
SEIZURES: 0
HEADACHES: 0
MYALGIAS: 1
STIFFNESS: 1
MEMORY LOSS: 0
DECREASED APPETITE: 0
LIGHT-HEADEDNESS: 0
MUSCLE CRAMPS: 0
LEG PAIN: 0
NECK PAIN: 0
WEIGHT LOSS: 0
BREAST MASS: 1
HALLUCINATIONS: 0
POOR WOUND HEALING: 0
NIGHT SWEATS: 0
ARTHRALGIAS: 1
DIZZINESS: 0
FATIGUE: 0
PARALYSIS: 0
WEIGHT GAIN: 0
PALPITATIONS: 0
NUMBNESS: 1
CHILLS: 0
SLEEP DISTURBANCES DUE TO BREATHING: 0
TREMORS: 0
HYPERTENSION: 0
POLYPHAGIA: 0
TINGLING: 1
WEAKNESS: 0
FEVER: 0
SKIN CHANGES: 0
SPEECH CHANGE: 0
BREAST PAIN: 1
DISTURBANCES IN COORDINATION: 0
MUSCLE WEAKNESS: 0
EXERCISE INTOLERANCE: 0
SYNCOPE: 0
BACK PAIN: 1
LOSS OF CONSCIOUSNESS: 0
JOINT SWELLING: 1
POLYDIPSIA: 0
INCREASED ENERGY: 0
NAIL CHANGES: 1

## 2019-05-06 ASSESSMENT — PAIN SCALES - GENERAL: PAINLEVEL: NO PAIN (0)

## 2019-05-06 ASSESSMENT — MIFFLIN-ST. JEOR: SCORE: 970.4

## 2019-05-06 NOTE — NURSING NOTE
Cardiac Testing: Patient given instructions regarding  echocardiogram . Discussed purpose, preparation, procedure and when to expect results reported back to the patient. Patient demonstrated understanding of this information and agreed to call with further questions or concerns.  Labs: Patient was instructed to return for the next laboratory testing in one month . Patient demonstrated understanding of this information and agreed to call with further questions or concerns.   Return Appointment: Patient given instructions regarding scheduling next clinic visit. Patient demonstrated understanding of this information and agreed to call with further questions or concerns.  Patient stated she understood all health information given and agreed to call with further questions or concerns.

## 2019-05-06 NOTE — LETTER
5/6/2019      RE: Julieta Rivera  [address] [place] [address]       Dear Colleague,    Thank you for the opportunity to participate in the care of your patient, Julieta Rivera, at the [facility] at [facility]. Please see a copy of my visit note below.    Cardiology Clinic Note    HPI: Julieta Rivera is a 68-year-old woman  diagnosed with triple-negative invasive stage I ductal carcinoma of the right breast treated with docetaxel/cyclophosphamide  March - May 2018,  factor V Leiden mutation but has no history of thrombosis and is not on anticoagulation. She underwent an echocardiogram prior to initiation of chemotherapy in Feb 2018 and she has mild bicuspid aortic valvular stenosis with a peak velocity of 2.5 m/s and mean gradient of 13 mmHg.  She has a known bicuspid aortic valve (diagnosed in 1999)  without any significant stenosis or regurgitation. She  has no personal history of coronary artery disease, heart failure, arrhythmias, hyperlipidemia, tobacco use or diabetes. There is no family history of premature coronary artery disease. She denies symptoms of chest pain, dyspnea, palpitations, edema or syncope. She has mild cough. Patient underwent lumpectomy on 6/7/18 without any major cardiopulmonary issues. She then underwent 4 weeks of radiation therapy. She is now 1 year since chemoradiation therapy.    Interval History:  Patient last seen in June 2018 and today he reports no new chest pain, dyspnea, palpitations, edema or syncope. She is under a lot of stress caring for her grandson with esophageal atresia.  Reports an exercise tolerance of > METs and denies any claudications symptoms.     Cardiac meds  none    PAST MEDICAL HISTORY:  Past Medical History:   Diagnosis Date     Abnormal Pap smear 1970s    normal since     Breast cancer (H) 02/2018     Factor V Leiden (H)      Hypothyroidism fall 2000       CURRENT MEDICATIONS:  Current Outpatient  Medications   Medication Sig Dispense Refill     calcium-magnesium (CALMAG) 500-250 MG TABS per tablet Take 1 tablet by mouth 2 times daily       levothyroxine (SYNTHROID/LEVOTHROID) 100 MCG tablet Take 1 tablet (100 mcg) by mouth daily Take by mouth daily 90 tablet 3     Omega-3 Fatty Acids (FISH OIL PO) Take 1 capsule by mouth daily.       order for DME Equipment being ordered: compression sleeve/gauntlet 20-30 mmHg 1 each 12     Biotin 10 MG CAPS          PAST SURGICAL HISTORY:  Past Surgical History:   Procedure Laterality Date      SECTION      x2 ,      COLPOSCOPY CERVIX, BIOPSY CERVIX, ENDOCERVICAL CURETTAGE, COMBINED       INSERT PORT VASCULAR ACCESS N/A 3/5/2018    Procedure: INSERT PORT VASCULAR ACCESS;  Single Lumen Chest Power Port Placement;  Surgeon: Brian Tolbert PA-C;  Location: [facility]     MASTECTOMY SIMPLE, SENTINEL NODE, COMBINED Right 2018    Lumpectomy with SENTINEL NODE;  Right Wire Localized Lumpectomy And Right [place] Lymph Node Biopsy;  Surgeon: Eugene Guzman MD;  [facility]: [facility]     REMOVE PORT VASCULAR ACCESS Left 11/15/2018    Procedure: Left Port Removal;  Surgeon: Tom Quick PA-C;  [facility]: [facility]     TONSILLECTOMY, ADENOIDECTOMY, COMBINED         ALLERGIES     Allergies   Allergen Reactions     Seasonal Allergies        FAMILY HISTORY:  Family History   Problem Relation Age of Onset     Heart Disease Mother      Heart Disease Father      Cerebrovascular Disease Father      Diabetes Father      Diabetes Brother      Hypertension Brother      Obesity Brother      Obesity Sister      Cancer Sister 48        endometrial     Hypertension Sister      Cancer Paternal Aunt         Breast cancer in 2 paternal aunts, 1 dx in her 50's the other in her 60's       SOCIAL HISTORY:  Social History     Social History     Marital status:      Spouse name: N/A     Number of children: N/A     Years of education: N/A     Social History Main  Topics     Smoking status: Former Smoker     Quit date: 1/1/1971     Smokeless tobacco: Never Used     Alcohol use No      Comment: none since last november.     Drug use: No     Sexual activity: Yes     Partners: Male     Other Topics Concern     Stress Concern No     Weight Concern No     Exercise Yes     yoga class weekly, walking     Seat Belt Yes     ROS:   Constitutional: No fever, chills, or sweats. No weight gain/loss   ENT: No visual disturbance, ear ache, epistaxis, sore throat  Allergies/Immunologic: Negative.   Respiratory: No cough, hemoptysia  Cardiovascular: As per HPI  GI: No nausea, vomiting, hematemesis, melena, or hematochezia  : No urinary frequency, dysuria, or hematuria  Integument: Negative  Psychiatric: Negative  Neuro: Negative  Endocrinology: Negative   Musculoskeletal: Negative    EXAM:  /76 (BP Location: Left arm, Patient Position: Chair, Cuff Size: Child)   Pulse 85   Ht 1.524 m (5')   Wt 52.4 kg (115 lb 8 oz)   SpO2 98%   BMI 22.56 kg/m     In general, the patient is a pleasant female in no apparent distress.      HEENT: NC/AT.  PERRLA.  EOMI.  Sclerae white, not injected.    Neck: Carotids 2+ bilaterally without bruits.  No jugular venous distension.   Lymph: No cervical adenopathy. No thyromegaly.   Heart: RRR. Normal S1, S2 preserved. 2/6 systolic ejection murmur RUSB; no rub, click, or gallop. There is no heave.    Lungs: Clear bilaterally.  No rhonchi, wheezes, rales.   GI: Soft, nontender, nondistended.   Extremities: No edema.  The pulses are 2+at the radial and DP bilaterally.  Neuro: grossly non focal.   Skin: no rashes.  Musculoskeletal: normal muscle strength, no acute arthritis, gait normal.    Labs:  LIPID RESULTS:  Lab Results   Component Value Date    CHOL 215 (H) 12/16/2015    HDL 83 12/16/2015     (H) 12/16/2015    TRIG 89 12/16/2015    CHOLHDLRATIO 2.5 11/22/2013    NHDL 132 (H) 12/16/2015       LIVER ENZYME RESULTS:  Lab Results   Component Value  Date    AST 22 03/05/2019    ALT 22 03/05/2019       CBC RESULTS:  Lab Results   Component Value Date    WBC 5.6 03/05/2019    RBC 4.25 03/05/2019    HGB 13.3 03/05/2019    HCT 38.6 03/05/2019    MCV 91 03/05/2019    MCH 31.3 03/05/2019    MCHC 34.5 03/05/2019    RDW 12.9 03/05/2019     03/05/2019       BMP RESULTS:  Lab Results   Component Value Date     03/05/2019    POTASSIUM 3.8 03/05/2019    CHLORIDE 103 03/05/2019    CO2 25 03/05/2019    ANIONGAP 7 03/05/2019    GLC 92 03/05/2019    BUN 13 03/05/2019    CR 0.58 03/05/2019    GFRESTIMATED >90 03/05/2019    GFRESTBLACK >90 03/05/2019    CINDI 8.8 03/05/2019        A1C RESULTS:  Lab Results   Component Value Date    A1C 5.1 11/22/2013       INR RESULTS:  Lab Results   Component Value Date    INR 0.99 11/15/2018    INR 0.94 03/05/2018       Cardiac data:    ECG today NSR, no acute ST-T changes      Echo from today  Left ventricular function, chamber size, wall motion, and wall thickness are normal.The EF is > 65%.  Right ventricular function, chamber size, wall motion, and thickness are normal.  Mild aortic stenosis is present.   No pericardial effusion is present.  Mildly dilated proximal ascending aorta. Size 3.2 cm/22.44mm/m2.     This study was compared with the study from 2/23/18. There has been no change.     Echo 2/23/18 was personally reviewed   Left ventricular function, chamber size, wall motion, and wall thickness are normal. The EF is 60-65%.  Global peak LV longitudinal strain is averaged at -21.2%. This is within reported normal limits (normal <-18%).  The right ventricle is normal size. Global right ventricular function is normal.  Aortic valve is bicuspid. Mild aortic stenosis is present. Mean gradient is 13 mmHg.  No pericardial effusion is present.      Assessment and Plan:   59 year old woman  1. Right breast cancer, triple negative  2. Mild bicuspid aortic valve stenosis, mean gradient 13 mmHg  3. Preserved biventricular  sofi Rendon has no symptoms of angina or heart failure. Her exam today is consistent with mild aortic stenosis, euvolemia and normal heart rate and blood pressure. She reports an exercise tolerance of > 4 METS. She has tolerated chemoradiation therapy well from a cardiac standpoint. She has a bicuspid valve with mild stenosis that does not require any intervention at this point. She has mild dilatation of the ascending aorta. She was advised to obtain a fasting lipid panel with her next labs and remain physically active   24 mo follow up advised       Kaley Tidwell MD, MS  Staff Cardiologist, [facility]   Pager: [phone]      CC  Patient Care Team:  Simona Tucker APRN CNP as PCP - General (Nurse Practitioner - Family)  Tamanna Neff RN as Nurse Coordinator (Breast Oncology)  Jonathan Gay MD as Referring Physician (Oncology)  Kaley Tidwell MD as MD (Cardiology)

## 2019-05-06 NOTE — PATIENT INSTRUCTIONS
Patient Instructions:  It was a pleasure to see you in the cardiology clinic today.      If you have any questions, call  Ami Dia RN, at [phone].  Press Option #1 for the [facility], and then press Option #3 for nursing.  We are encouraging the use of [employer] to communicate with your HealthCare Provider    Note the new medications: none  Stop the following medications: none    The results from today include: pending fasting lipid panel  Please follow up with Dr. Kaley Tidwell with an ECHO in two years      If you have an urgent need after hours (8:00 am to 4:30 pm) please call [phone] and ask for the cardiology fellow on call.

## 2019-05-06 NOTE — PROGRESS NOTES
Cardiology Clinic Note    HPI: Julieta Rivera is a 68-year-old woman  diagnosed with triple-negative invasive stage I ductal carcinoma of the right breast treated with docetaxel/cyclophosphamide  March - May 2018,  factor V Leiden mutation but has no history of thrombosis and is not on anticoagulation. She underwent an echocardiogram prior to initiation of chemotherapy in Feb 2018 and she has mild bicuspid aortic valvular stenosis with a peak velocity of 2.5 m/s and mean gradient of 13 mmHg.  She has a known bicuspid aortic valve (diagnosed in 1999)  without any significant stenosis or regurgitation. She  has no personal history of coronary artery disease, heart failure, arrhythmias, hyperlipidemia, tobacco use or diabetes. There is no family history of premature coronary artery disease. She denies symptoms of chest pain, dyspnea, palpitations, edema or syncope. She has mild cough. Patient underwent lumpectomy on 6/7/18 without any major cardiopulmonary issues. She then underwent 4 weeks of radiation therapy. She is now 1 year since chemoradiation therapy.    Interval History:  Patient last seen in June 2018 and today he reports no new chest pain, dyspnea, palpitations, edema or syncope. She is under a lot of stress caring for her grandson with esophageal atresia.  Reports an exercise tolerance of > METs and denies any claudications symptoms.     Cardiac meds  none    PAST MEDICAL HISTORY:  Past Medical History:   Diagnosis Date     Abnormal Pap smear 1970s    normal since     Breast cancer (H) 02/2018     Factor V Leiden (H)      Hypothyroidism fall 2000       CURRENT MEDICATIONS:  Current Outpatient Medications   Medication Sig Dispense Refill     calcium-magnesium (CALMAG) 500-250 MG TABS per tablet Take 1 tablet by mouth 2 times daily       levothyroxine (SYNTHROID/LEVOTHROID) 100 MCG tablet Take 1 tablet (100 mcg) by mouth daily Take by mouth daily 90 tablet 3     Omega-3 Fatty Acids (FISH OIL PO) Take 1  capsule by mouth daily.       order for DME Equipment being ordered: compression sleeve/gauntlet 20-30 mmHg 1 each 12     Biotin 10 MG CAPS          PAST SURGICAL HISTORY:  Past Surgical History:   Procedure Laterality Date      SECTION      x2 ,      COLPOSCOPY CERVIX, BIOPSY CERVIX, ENDOCERVICAL CURETTAGE, COMBINED       INSERT PORT VASCULAR ACCESS N/A 3/5/2018    Procedure: INSERT PORT VASCULAR ACCESS;  Single Lumen Chest Power Port Placement;  Surgeon: Brian Tolbert PA-C;  [facility]     MASTECTOMY SIMPLE, SENTINEL NODE, COMBINED Right 2018    Lumpectomy with SENTINEL NODE;  Right Wire Localized Lumpectomy And Right [place] Lymph Node Biopsy;  Surgeon: Eugene Guzman MD;  [facility]     REMOVE PORT VASCULAR ACCESS Left 11/15/2018    Procedure: Left Port Removal;  Surgeon: Tom Quick PA-C;  [facility]: [facility]     TONSILLECTOMY, ADENOIDECTOMY, COMBINED         ALLERGIES     Allergies   Allergen Reactions     Seasonal Allergies        FAMILY HISTORY:  Family History   Problem Relation Age of Onset     Heart Disease Mother      Heart Disease Father      Cerebrovascular Disease Father      Diabetes Father      Diabetes Brother      Hypertension Brother      Obesity Brother      Obesity Sister      Cancer Sister 48        endometrial     Hypertension Sister      Cancer Paternal Aunt         Breast cancer in 2 paternal aunts, 1 dx in her 50's the other in her 60's       SOCIAL HISTORY:  Social History     Social History     Marital status:      Spouse name: N/A     Number of children: N/A     Years of education: N/A     Social History Main Topics     Smoking status: Former Smoker     Quit date: 1971     Smokeless tobacco: Never Used     Alcohol use No      Comment: none since last november.     Drug use: No     Sexual activity: Yes     Partners: Male     Other Topics Concern     Stress Concern No     Weight Concern No     Exercise Yes     yoga  class weekly, walking     Seat Belt Yes     ROS:   Constitutional: No fever, chills, or sweats. No weight gain/loss   ENT: No visual disturbance, ear ache, epistaxis, sore throat  Allergies/Immunologic: Negative.   Respiratory: No cough, hemoptysia  Cardiovascular: As per HPI  GI: No nausea, vomiting, hematemesis, melena, or hematochezia  : No urinary frequency, dysuria, or hematuria  Integument: Negative  Psychiatric: Negative  Neuro: Negative  Endocrinology: Negative   Musculoskeletal: Negative    EXAM:  /76 (BP Location: Left arm, Patient Position: Chair, Cuff Size: Child)   Pulse 85   Ht 1.524 m (5')   Wt 52.4 kg (115 lb 8 oz)   SpO2 98%   BMI 22.56 kg/m    In general, the patient is a pleasant female in no apparent distress.      HEENT: NC/AT.  PERRLA.  EOMI.  Sclerae white, not injected.    Neck: Carotids 2+ bilaterally without bruits.  No jugular venous distension.   Lymph: No cervical adenopathy. No thyromegaly.   Heart: RRR. Normal S1, S2 preserved. 2/6 systolic ejection murmur RUSB; no rub, click, or gallop. There is no heave.    Lungs: Clear bilaterally.  No rhonchi, wheezes, rales.   GI: Soft, nontender, nondistended.   Extremities: No edema.  The pulses are 2+at the radial and DP bilaterally.  Neuro: grossly non focal.   Skin: no rashes.  Musculoskeletal: normal muscle strength, no acute arthritis, gait normal.    Labs:  LIPID RESULTS:  Lab Results   Component Value Date    CHOL 215 (H) 12/16/2015    HDL 83 12/16/2015     (H) 12/16/2015    TRIG 89 12/16/2015    CHOLHDLRATIO 2.5 11/22/2013    NHDL 132 (H) 12/16/2015       LIVER ENZYME RESULTS:  Lab Results   Component Value Date    AST 22 03/05/2019    ALT 22 03/05/2019       CBC RESULTS:  Lab Results   Component Value Date    WBC 5.6 03/05/2019    RBC 4.25 03/05/2019    HGB 13.3 03/05/2019    HCT 38.6 03/05/2019    MCV 91 03/05/2019    MCH 31.3 03/05/2019    MCHC 34.5 03/05/2019    RDW 12.9 03/05/2019     03/05/2019       BMP  RESULTS:  Lab Results   Component Value Date     03/05/2019    POTASSIUM 3.8 03/05/2019    CHLORIDE 103 03/05/2019    CO2 25 03/05/2019    ANIONGAP 7 03/05/2019    GLC 92 03/05/2019    BUN 13 03/05/2019    CR 0.58 03/05/2019    GFRESTIMATED >90 03/05/2019    GFRESTBLACK >90 03/05/2019    CINDI 8.8 03/05/2019        A1C RESULTS:  Lab Results   Component Value Date    A1C 5.1 11/22/2013       INR RESULTS:  Lab Results   Component Value Date    INR 0.99 11/15/2018    INR 0.94 03/05/2018       Cardiac data:    ECG today NSR, no acute ST-T changes      Echo from today  Left ventricular function, chamber size, wall motion, and wall thickness are normal.The EF is > 65%.  Right ventricular function, chamber size, wall motion, and thickness are normal.  Mild aortic stenosis is present.   No pericardial effusion is present.  Mildly dilated proximal ascending aorta. Size 3.2 cm/22.44mm/m2.     This study was compared with the study from 2/23/18. There has been no change.     Echo 2/23/18 was personally reviewed   Left ventricular function, chamber size, wall motion, and wall thickness are normal. The EF is 60-65%.  Global peak LV longitudinal strain is averaged at -21.2%. This is within reported normal limits (normal <-18%).  The right ventricle is normal size. Global right ventricular function is normal.  Aortic valve is bicuspid. Mild aortic stenosis is present. Mean gradient is 13 mmHg.  No pericardial effusion is present.      Assessment and Plan:   59 year old woman  1. Right breast cancer, triple negative  2. Mild bicuspid aortic valve stenosis, mean gradient 13 mmHg  3. Preserved biventricular function     Julieta has no symptoms of angina or heart failure. Her exam today is consistent with mild aortic stenosis, euvolemia and normal heart rate and blood pressure. She reports an exercise tolerance of > 4 METS. She has tolerated chemoradiation therapy well from a cardiac standpoint. She has a bicuspid valve with mild  stenosis that does not require any intervention at this point. She has mild dilatation of the ascending aorta. She was advised to obtain a fasting lipid panel with her next labs and remain physically active   24 mo follow up advised       Kaley Tidwell MD, MS  Staff Cardiologist, [facility]   Pager: [phone]      CC  Patient Care Team:  Simona Tucker APRN CNP as PCP - General (Nurse Practitioner - Family)  Simona Tucker APRN CNP as Assigned PCP  Tamanna Neff RN as Nurse Coordinator (Breast Oncology)  Chevy Gay MD as Referring Physician (Oncology)  Kaley Tidwell MD as MD (Cardiology)  CHEVY GAY

## 2019-05-06 NOTE — NURSING NOTE
Vitals completed successfully and medication reconciled.     Kathryn Ni, AYANNA  2:00 PM  Chief Complaint   Patient presents with     Follow Up     manage at risk for cardiomyopathy

## 2019-05-07 LAB — INTERPRETATION ECG - MUSE: NORMAL

## 2019-06-04 ENCOUNTER — APPOINTMENT (OUTPATIENT)
Dept: LAB | Facility: CLINIC | Age: 69
End: 2019-06-04
Attending: INTERNAL MEDICINE
Payer: COMMERCIAL

## 2019-06-04 ENCOUNTER — ONCOLOGY VISIT (OUTPATIENT)
Dept: ONCOLOGY | Facility: CLINIC | Age: 69
End: 2019-06-04
Attending: INTERNAL MEDICINE
Payer: COMMERCIAL

## 2019-06-04 VITALS
RESPIRATION RATE: 18 BRPM | WEIGHT: 116 LBS | SYSTOLIC BLOOD PRESSURE: 121 MMHG | TEMPERATURE: 97.8 F | HEIGHT: 60 IN | DIASTOLIC BLOOD PRESSURE: 76 MMHG | BODY MASS INDEX: 22.78 KG/M2 | OXYGEN SATURATION: 100 % | HEART RATE: 72 BPM

## 2019-06-04 DIAGNOSIS — E78.5 DYSLIPIDEMIA: ICD-10-CM

## 2019-06-04 DIAGNOSIS — C50.411 MALIGNANT NEOPLASM OF UPPER-OUTER QUADRANT OF RIGHT BREAST IN FEMALE, ESTROGEN RECEPTOR NEGATIVE (H): ICD-10-CM

## 2019-06-04 DIAGNOSIS — Z17.1 MALIGNANT NEOPLASM OF UPPER-OUTER QUADRANT OF RIGHT BREAST IN FEMALE, ESTROGEN RECEPTOR NEGATIVE (H): ICD-10-CM

## 2019-06-04 LAB
ALBUMIN SERPL-MCNC: 3.9 G/DL (ref 3.4–5)
ALP SERPL-CCNC: 62 U/L (ref 40–150)
ALT SERPL W P-5'-P-CCNC: 19 U/L (ref 0–50)
ANION GAP SERPL CALCULATED.3IONS-SCNC: 7 MMOL/L (ref 3–14)
AST SERPL W P-5'-P-CCNC: 19 U/L (ref 0–45)
BASOPHILS # BLD AUTO: 0 10E9/L (ref 0–0.2)
BASOPHILS NFR BLD AUTO: 0.2 %
BILIRUB SERPL-MCNC: 0.6 MG/DL (ref 0.2–1.3)
BUN SERPL-MCNC: 10 MG/DL (ref 7–30)
CALCIUM SERPL-MCNC: 8.9 MG/DL (ref 8.5–10.1)
CHLORIDE SERPL-SCNC: 105 MMOL/L (ref 94–109)
CHOLEST SERPL-MCNC: 214 MG/DL
CO2 SERPL-SCNC: 26 MMOL/L (ref 20–32)
CREAT SERPL-MCNC: 0.59 MG/DL (ref 0.52–1.04)
DIFFERENTIAL METHOD BLD: NORMAL
EOSINOPHIL # BLD AUTO: 0 10E9/L (ref 0–0.7)
EOSINOPHIL NFR BLD AUTO: 0.9 %
ERYTHROCYTE [DISTWIDTH] IN BLOOD BY AUTOMATED COUNT: 12.4 % (ref 10–15)
GFR SERPL CREATININE-BSD FRML MDRD: >90 ML/MIN/{1.73_M2}
GLUCOSE SERPL-MCNC: 91 MG/DL (ref 70–99)
HCT VFR BLD AUTO: 38.4 % (ref 35–47)
HDLC SERPL-MCNC: 95 MG/DL
HGB BLD-MCNC: 13.2 G/DL (ref 11.7–15.7)
IMM GRANULOCYTES # BLD: 0 10E9/L (ref 0–0.4)
IMM GRANULOCYTES NFR BLD: 0.2 %
LDLC SERPL CALC-MCNC: 106 MG/DL
LYMPHOCYTES # BLD AUTO: 1.1 10E9/L (ref 0.8–5.3)
LYMPHOCYTES NFR BLD AUTO: 23.2 %
MCH RBC QN AUTO: 31.5 PG (ref 26.5–33)
MCHC RBC AUTO-ENTMCNC: 34.4 G/DL (ref 31.5–36.5)
MCV RBC AUTO: 92 FL (ref 78–100)
MONOCYTES # BLD AUTO: 0.3 10E9/L (ref 0–1.3)
MONOCYTES NFR BLD AUTO: 6.3 %
NEUTROPHILS # BLD AUTO: 3.2 10E9/L (ref 1.6–8.3)
NEUTROPHILS NFR BLD AUTO: 69.2 %
NONHDLC SERPL-MCNC: 119 MG/DL
NRBC # BLD AUTO: 0 10*3/UL
NRBC BLD AUTO-RTO: 0 /100
PLATELET # BLD AUTO: 173 10E9/L (ref 150–450)
POTASSIUM SERPL-SCNC: 3.8 MMOL/L (ref 3.4–5.3)
PROT SERPL-MCNC: 6.9 G/DL (ref 6.8–8.8)
RBC # BLD AUTO: 4.19 10E12/L (ref 3.8–5.2)
SODIUM SERPL-SCNC: 137 MMOL/L (ref 133–144)
TRIGL SERPL-MCNC: 61 MG/DL
WBC # BLD AUTO: 4.6 10E9/L (ref 4–11)

## 2019-06-04 PROCEDURE — 80053 COMPREHEN METABOLIC PANEL: CPT | Performed by: INTERNAL MEDICINE

## 2019-06-04 PROCEDURE — 99215 OFFICE O/P EST HI 40 MIN: CPT | Mod: GC | Performed by: INTERNAL MEDICINE

## 2019-06-04 PROCEDURE — G0463 HOSPITAL OUTPT CLINIC VISIT: HCPCS | Mod: ZF

## 2019-06-04 PROCEDURE — 36415 COLL VENOUS BLD VENIPUNCTURE: CPT

## 2019-06-04 PROCEDURE — 80061 LIPID PANEL: CPT | Performed by: INTERNAL MEDICINE

## 2019-06-04 PROCEDURE — 85025 COMPLETE CBC W/AUTO DIFF WBC: CPT | Performed by: INTERNAL MEDICINE

## 2019-06-04 ASSESSMENT — PAIN SCALES - GENERAL: PAINLEVEL: MILD PAIN (2)

## 2019-06-04 ASSESSMENT — MIFFLIN-ST. JEOR: SCORE: 972.67

## 2019-06-04 NOTE — NURSING NOTE
Chief Complaint   Patient presents with     Blood Draw     Labs drawn via  by RN in lab. VS taken.     Romy Mcmillan RN

## 2019-06-04 NOTE — PROGRESS NOTES
Medical Oncology Follow-up Note       HISTORY OF PRESENT ILLNESS:  Julieta Rivera is a 68-year-old woman who was referred to our clinic with a new diagnosis of right triple-negative breast cancer.  Julieta was followed by routine screening mammography, when she was discovered to have a 7 x 6 x 9-mm mass at the 12 o'clock position of the right breast 6 cm from the nipple-areolar complex.  She did undergo a biopsy of this mass which showed an ER-negative, [place]-negative, HER2-nonamplified, invasive mammary carcinoma of no special type, invasive ductal carcinoma, Jeff grade 3.  Ductal carcinoma in situ was also noted.  Nuclear grade 2 solid type.  HER2 FISH showed no amplification.  She now comes to our clinic for recommendations.       She has hypothyroidism and is on levothyroxine 88 alternating with 100 mcg daily.  She has no pain.  She has fatigue related to the care of a grandson with esophageal atresia at home.  She has no depression and no anxiety.  She has no weight loss.  Diet has not changed.  She has no loss of energy.  She does not sleep during the day.  She can perform all of her household chores.  She has noticed no abnormality of either breast.         PAST MEDICAL HISTORY:  She has no history of breast surgery in the past or breast cancer in the past.  She has no history of radiation of any kind.  She has no history of tumor of any kind.  She may have a history of a heart problem with mitral prolapse and a murmur.  The last echo we have on record is from 1996.  She has no history of heart attack, breathing problems, blood clots, seizures, arthritis, peptic ulcer disease, osteoporosis or bone fractures.  She is not currently participating in a clinical trial and has not had any significant weight loss.  She has no history of hypertension, but she does have a history of factor V Leiden because her sister was diagnosed with factor V Leiden and Julieta was tested, although Julieta herself has had no blood clots  or pulmonary emboli.       FAMILY HISTORY:  There is a history of breast cancer in two paternal aunts, but no first-degree relatives.  One of her aunts was diagnosed in her 50s, the other in her 60s.  She has no male relatives with breast cancer.  The remainder of her family history was negative.        PAST MENSTRUAL HISTORY:  First period was at age 12-/2.  Last menstrual period was in 2003.  She has been pregnant twice at age 26 and 30 with two live births and no miscarriages or abortions.  She used oral contraceptives only once or twice.  Uterus and ovaries are in place.  She has no history of hormone replacement therapy.        ALLERGIES:  She has no allergy to seafood, iodine or contrast dye.  She does not take aspirin.       HABITS:  She did smoke 1 pack per day in college for 3 years from age 17 to 20 and has not smoked since.  She does not drink significant alcohol and has no heavy alcohol history in the past.        PERSONAL AND SOCIAL HISTORY:  She does have a history of being a .  Her  is 79 years old but is able to take care of his activities of daily living.  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        PAST MEDICAL HISTORY:  Julieta has no history of angina.  She has no history of hypertension.  Her cholesterol has generally been in acceptable range, although slightly over 200 recently.       FAMILY HISTORY:  Positive for heart disease.  Father had an MI in his 50s and had a bypass and  at age 78.  Mother had rheumatic heart disease at age 38 and  at age 42.      INTERVAL HISTORY:  Julieta returns to clinic accompanied for follow-up of breast cancer.  She is upset that she was waiting the lab before too long today.  She states that her , her daughter, and of her grandchildren are all sick at home.  She is  only one who is not sick at this time.  She is gained a few pounds and generally feels well.  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  She denies fever, chills, shortness of breath, or chest pain.    REVIEW OF SYSTEMS:  A 10-point review of systems is entirely negative.      PHYSICAL EXAMINATION:   /76 (BP Location: Right arm, Patient Position: Sitting, Cuff Size: Adult Regular)   Pulse 72   Temp 97.8  F (36.6  C) (Oral)   Resp 18   Ht 1.524 m (5')   Wt 52.6 kg (116 lb)   SpO2 100%   BMI 22.65 kg/m    GENERAL:  Julieta appeared generally well.  She has no alopecia.   HEENT:  Oropharynx is without lesions.   LYMPH:  There is no palpable cervical, supraclavicular, subclavicular or axillary lymphadenopathy.   BREASTS:  Right breast reveals a well-healed incision at the 11:30 position, 3 fingerbreadths above the nipple-areolar complex, without erythema or masses.  No masses in the right breast.  Right axillary incision is well healed without erythema or masses. Examination of the left breast is without masses.   LUNGS:  Clear to percussion and auscultation.   HEART:  Regular rate and rhythm, S1, S2, 2/6 systolic murmur.  ABDOMEN:  Soft and nontender without hepatosplenomegaly.   EXTREMITIES:  Without edema.   PSYCHIATRIC:  Mood and affect were normal.      LABORATORY DATA:  The CBC and CMP were within normal limits.  LDL and total cholesterol were slightly high.     ASSESSMENT AND PLAN:     1Bart Rivera is a 68-year-old woman with a history of a stage I, clinical [id number], invasive ductal carcinoma of the right breast, triple negative in histology, maximum dimension at diagnosis was 9 mm, grade 3.  Pathology showed an RCB-1 response with significant reduction in tumor related to neoadjuvant TC.  She underwent lumpectomy without difficulty with clear margins.  Margins were clear for DCIS and invasive cancer.  The final pathology showed the remaining tumor 2.5 mm in 1  focus, Jeff grade 2 with 1 focus of microinvasive ductal carcinoma 0.3 cm after neoadjuvant chemotherapy.  DCIS was nuclear grade 3, solid type, size 2 mm and was adjacent to the invasive carcinoma.  No lymphovascular invasion was detected.  Margins were uninvolved by invasive carcinoma and uninvolved by DCIS.  She underwent post-lumpectomy radiation.  The final tumor stage was [id number].  Total of 8 sentinel nodes were examined, which were negative for cancer.  There is no evidence of disease recurrence on today's exam.  She is due for a mammogram in about 9 months.  2.  No role for hormonal therapy.   3.  Factor V Leiden history without history of clot.   4.  Radiation therapy is completed.  5.  Bone health: DEXA scan in November 2018 showed osteopenia with T score -2.4. Recommend weightbearing exercises 2-3 days a week.  Vitamin D and calcium.  Bisphosphonate was previously discussed, but patient was hesitant to start due to side effects.  5.  Followup.  Follow up with Valentine Blevins 9/3 and Dr. Gay 12/3 with CBC and CMP. Follow up with Valentine 9-3 and with me 12-3 with CBC, CMP both visits.    Thank you for allowing us to participate in this patient's care.  The patient was seen and evaluated by me.  I discussed the patient with the fellow and agree with the findings and plan in the note, which was edited by me.    Sincerely,     Jonathan Gay MD    [facility]  [phone].        Thank you for allowing us to continue to participate in Julieta Rivera's care.      Staffed with Dr. Deidra Hancock MD, PhD  Hem/Onc Fellow    I spent 45 minutes with the patient more than 50% of which was in counseling and coordination of care.

## 2019-06-04 NOTE — NURSING NOTE
Oncology Rooming Note    June 4, 2019 1:45 PM   Julieta Rivera is a 68 year old female who presents for:    Chief Complaint   Patient presents with     Blood Draw     Labs drawn via  by RN in lab. VS taken.     Oncology Clinic Visit     RETURN VISIT; 3 MONTH FOLLOW UP BREAST CA      Initial Vitals: /76 (BP Location: Right arm, Patient Position: Sitting, Cuff Size: Adult Regular)   Pulse 72   Temp 97.8  F (36.6  C) (Oral)   Resp 18   Ht 1.524 m (5')   Wt 52.6 kg (116 lb)   SpO2 100%   BMI 22.65 kg/m   Estimated body mass index is 22.65 kg/m  as calculated from the following:    Height as of this encounter: 1.524 m (5').    Weight as of this encounter: 52.6 kg (116 lb). Body surface area is 1.49 meters squared.  Mild Pain (2) Comment: Data Unavailable   No LMP recorded. Patient is postmenopausal.  Allergies reviewed: Yes  Medications reviewed: Yes    Medications: Medication refills not needed today.  Pharmacy name entered into [employer] Orientation: [facility] [place], [place] - [address]    Clinical concerns: No new concerns today  Dr. Gay was notified.      Mela Clements

## 2019-06-04 NOTE — LETTER
6/4/2019       RE: Julieta Rivera  [address] [place] [address]     Dear Colleague,    Thank you for referring your patient, Julieta Rivera, to the [facility]. Please see a copy of my visit note below.    Medical Oncology Follow-up Note       HISTORY OF PRESENT ILLNESS:  Julieta Rivera is a 68-year-old woman who was referred to our clinic with a new diagnosis of right triple-negative breast cancer.  Julieta was followed by routine screening mammography, when she was discovered to have a 7 x 6 x 9-mm mass at the 12 o'clock position of the right breast 6 cm from the nipple-areolar complex.  She did undergo a biopsy of this mass which showed an ER-negative, [place]-negative, HER2-nonamplified, invasive mammary carcinoma of no special type, invasive ductal carcinoma, Ejff grade 3.  Ductal carcinoma in situ was also noted.  Nuclear grade 2 solid type.  HER2 FISH showed no amplification.  She now comes to our clinic for recommendations.       She has hypothyroidism and is on levothyroxine 88 alternating with 100 mcg daily.  She has no pain.  She has fatigue related to the care of a grandson with esophageal atresia at home.  She has no depression and no anxiety.  She has no weight loss.  Diet has not changed.  She has no loss of energy.  She does not sleep during the day.  She can perform all of her household chores.  She has noticed no abnormality of either breast.         PAST MEDICAL HISTORY:  She has no history of breast surgery in the past or breast cancer in the past.  She has no history of radiation of any kind.  She has no history of tumor of any kind.  She may have a history of a heart problem with mitral prolapse and a murmur.  The last echo we have on record is from 1996.  She has no history of heart attack, breathing problems, blood clots, seizures, arthritis, peptic ulcer disease, osteoporosis or bone fractures.  She is not currently participating in a clinical trial and has  not had any significant weight loss.  She has no history of hypertension, but she does have a history of factor V Leiden because her sister was diagnosed with factor V Leiden and Julieta was tested, although Julieat herself has had no blood clots or pulmonary emboli.       FAMILY HISTORY:  There is a history of breast cancer in two paternal aunts, but no first-degree relatives.  One of her aunts was diagnosed in her 50s, the other in her 60s.  She has no male relatives with breast cancer.  The remainder of her family history was negative.        PAST MENSTRUAL HISTORY:  First period was at age 12-.  Last menstrual period was in 2003.  She has been pregnant twice at age 26 and 30 with two live births and no miscarriages or abortions.  She used oral contraceptives only once or twice.  Uterus and ovaries are in place.  She has no history of hormone replacement therapy.        ALLERGIES:  She has no allergy to seafood, iodine or contrast dye.  She does not take aspirin.       HABITS:  She did smoke 1 pack per day in college for 3 years from age 17 to 20 and has not smoked since.  She does not drink significant alcohol and has no heavy alcohol history in the past.        PERSONAL AND SOCIAL HISTORY:  She does have a history of being a .  Her  is 80 years old but is able to take care of his activities of daily living.  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        PAST MEDICAL HISTORY:  Julieta has no history of angina.  She has no history of hypertension.  Her cholesterol has generally been in acceptable range, although slightly over 200 recently.       FAMILY HISTORY:  Positive for heart disease.  Father had an MI in his 50s and had a bypass and  at age 78.  Mother had rheumatic heart disease at age 38 and  at age 42.      INTERVAL  HISTORY:  Julieta returns to clinic accompanied for follow-up of breast cancer.  She is upset that she was waiting the lab before too long today.  She states that her , her daughter, and of her grandchildren are all sick at home.  She is only one who is not sick at this time.  She is gained a few pounds and generally feels well.  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  She denies fever, chills, shortness of breath, or chest pain.    REVIEW OF SYSTEMS:  A 10-point review of systems is entirely negative.      PHYSICAL EXAMINATION:   /76 (BP Location: Right arm, Patient Position: Sitting, Cuff Size: Adult Regular)   Pulse 72   Temp 97.8  F (36.6  C) (Oral)   Resp 18   Ht 1.524 m (5')   Wt 52.6 kg (116 lb)   SpO2 100%   BMI 22.65 kg/m     GENERAL:  Julieta appeared generally well.  She has no alopecia.   HEENT:  Oropharynx is without lesions.   LYMPH:  There is no palpable cervical, supraclavicular, subclavicular or axillary lymphadenopathy.   BREASTS:  Right breast reveals a well-healed incision at the 11:30 position, 3 fingerbreadths above the nipple-areolar complex, without erythema or masses.  No masses in the right breast.  Right axillary incision is well healed without erythema or masses. Examination of the left breast is without masses.   LUNGS:  Clear to percussion and auscultation.   HEART:  Regular rate and rhythm, S1, S2, 2/6 systolic murmur.  ABDOMEN:  Soft and nontender without hepatosplenomegaly.   EXTREMITIES:  Without edema.   PSYCHIATRIC:  Mood and affect were normal.      LABORATORY DATA:  The CBC and CMP were within normal limits.  LDL and total cholesterol were slightly high.     ASSESSMENT AND PLAN:     1.  Julieta Rivera is a 68-year-old woman with a history of a stage I, clinical [id number], invasive ductal carcinoma of the right breast, triple negative in histology, maximum dimension at diagnosis was 9 mm, grade 3.  Pathology showed an RCB-1  response with significant reduction in tumor related to neoadjuvant TC.  She underwent lumpectomy without difficulty with clear margins.  Margins were clear for DCIS and invasive cancer.  The final pathology showed the remaining tumor 2.5 mm in 1 focus, [place] grade 2 with 1 focus of microinvasive ductal carcinoma 0.3 cm after neoadjuvant chemotherapy.  DCIS was nuclear grade 3, solid type, size 2 mm and was adjacent to the invasive carcinoma.  No lymphovascular invasion was detected.  Margins were uninvolved by invasive carcinoma and uninvolved by DCIS.  She underwent post-lumpectomy radiation.  The final tumor stage was [id number].  Total of 8 sentinel nodes were examined, which were negative for cancer.  There is no evidence of disease recurrence on today's exam.  She is due for a mammogram in about 9 months.  2.  No role for hormonal therapy.   3.  Factor V Leiden history without history of clot.   4.  Radiation therapy is completed.  5.  Bone health: DEXA scan in November 2018 showed osteopenia with T score -2.4. Recommend weightbearing exercises 2-3 days a week.  Vitamin D and calcium.  Bisphosphonate was previously discussed, but patient was hesitant to start due to side effects.  5.  Followup.  Follow up with Valentine Blevins 9/3 and Dr. Gay 12/3 with CBC and CMP. Follow up with Valentine 9-3 and with me 12-3 with CBC, CMP both visits.    Thank you for allowing us to participate in this patient's care.  The patient was seen and evaluated by me.  I discussed the patient with the fellow and agree with the findings and plan in the note, which was edited by me.    Sincerely,     Jonathan Gay MD    [facility]  [phone].        Thank you for allowing us to continue to participate in Julieta Rivera's care.      Staffed with Dr. Deidra Hancock MD, PhD  Hem/Onc Fellow    I spent 45 minutes with the patient more than 50% of which was in counseling and coordination of  care.     Again, thank you for allowing me to participate in the care of your patient.      Sincerely,    Jonathan Gay MD

## 2019-07-10 ENCOUNTER — TELEPHONE (OUTPATIENT)
Dept: CARE COORDINATION | Facility: CLINIC | Age: 69
End: 2019-07-10

## 2019-07-10 NOTE — TELEPHONE ENCOUNTER
Social Work Follow-Up  [facility]    Data/Intervention:  Patient Name:  Julieta Rivera  /Age:  1950 (68 year old)    Reason for Follow-Up:   received a message from [employer] stating that Patient is up for renewal of the program and that Patient has received the forms.    Collaborated With:    -Patient    Intervention/Education/Resources Provided:   contacted Patient via telephone on 7/10/2019. Message was left on Patient's voicemail to be called back.  will await Patient's return phone call and will provide assistance at that time.      Ava Esteves [facility]  Outpatient Specialty Clinics  Direct Phone: [phone]  Pager:  [phone]

## 2019-09-09 NOTE — PROGRESS NOTES
Medical Oncology Follow-up Note       HISTORY OF PRESENT ILLNESS:  Julieta Rivera is a 68-year-old woman who was referred to our clinic with a new diagnosis of right triple-negative breast cancer.  Julieta was followed by routine screening mammography, when she was discovered to have a 7 x 6 x 9-mm mass at the 12 o'clock position of the right breast 6 cm from the nipple-areolar complex.  She did undergo a biopsy of this mass which showed an ER-negative, MT-negative, HER2-nonamplified, invasive mammary carcinoma of no special type, invasive ductal carcinoma, Jeff grade 3.  Ductal carcinoma in situ was also noted.  Nuclear grade 2 solid type.  HER2 FISH showed no amplification.  She now comes to our clinic for recommendations.       She has hypothyroidism and is on levothyroxine 88 alternating with 100 mcg daily.  She has no pain.      SOCIAL:  She has fatigue related to the care of infant twin grandsons with esophageal atresia at home.           PAST MEDICAL HISTORY:  She has no history of breast surgery in the past or breast cancer in the past.  She has no history of radiation of any kind.  She has no history of tumor of any kind.  She may have a history of a heart problem with mitral prolapse and a murmur.  The last echo we have on record is from 1996.  She has no history of heart attack, breathing problems, blood clots, seizures, arthritis, peptic ulcer disease, osteoporosis or bone fractures.  She is not currently participating in a clinical trial and has not had any significant weight loss.  She has no history of hypertension, but she does have a history of factor V Leiden because her sister was diagnosed with factor V Leiden and Julieta was tested, although Julieta herself has had no blood clots or pulmonary emboli.       FAMILY HISTORY:  There is a history of breast cancer in two paternal aunts, but no first-degree relatives.  One of her aunts was diagnosed in her 50s, the other in her 60s.  She has no male  relatives with breast cancer.  The remainder of her family history was negative.        PAST MENSTRUAL HISTORY:  First period was at age 12-/2.  Last menstrual period was in 2003.  She has been pregnant twice at age 26 and 30 with two live births and no miscarriages or abortions.  She used oral contraceptives only once or twice.  Uterus and ovaries are in place.  She has no history of hormone replacement therapy.        ALLERGIES:  She has no allergy to seafood, iodine or contrast dye.  She does not take aspirin.       HABITS:  She did smoke 1 pack per day in college for 3 years from age 17 to 20 and has not smoked since.  She does not drink significant alcohol and has no heavy alcohol history in the past.        PERSONAL AND SOCIAL HISTORY:  She does have a history of being a .  Her  is 80 years old but is able to take care of his activities of daily living.  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        PAST MEDICAL HISTORY:  Julieta has no history of angina.  She has no history of hypertension.  Her cholesterol has generally been in acceptable range, although slightly over 200 recently.       FAMILY HISTORY:  Positive for heart disease.  Father had an MI in his 50s and had a bypass and  at age 78.  Mother had rheumatic heart disease at age 38 and  at age 42.     TREATMENT HISTORY:  A.  TC x 4  B.  Lumpectomy 18 [id number] RCB1.  C.  Radiation  Radiation Oncology - Course: 1         Protocol:   Treatment Site            Current Dose   Modality           From    To        Elapsed Days  Fx.  1 R Breast        4,240 cGy       06 X      7/30/2018        2018        22       16  1 R Breast Boost          1,000 cGy       e09       2018         5         4      INTERVAL HISTORY:  Julieta returns to clinic  and has had problems with osteoarthritis in her low back, feet and hands.  Otherwise, she has no complaints.  Her energy is improved.  She has no pain, no fatigue, no depression and no anxiety.  Her last mammogram was 03/05/2019, which was BI-RADS 2.      REVIEW OF SYSTEMS:  She denies fevers or chills, cough, chest pain, shortness of breath, nausea, vomiting, constipation, diarrhea, bone pain, back pain or headache.  She does have some pains in her right arm.      She has a history of osteoporosis, but has not wanted a bone-targeted agent, neither alendronate nor Reclast.      She is taking calcium and vitamin D.      PHYSICAL EXAMINATION:   VITAL SIGNS:  Blood pressure 110/74, pulse 72, temperature 97.7, O2 sat 99% on room air, height 1.5 m and weight 55 kg.  She has gained about 3 kg.   GENERAL:  No alopecia.   OROPHARYNX:  Without lesions.   LYMPH:  There is no palpable cervical, supraclavicular, subclavicular or axillary lymphadenopathy.   BREASTS:  Examination of the right breast reveals a well-healed incision at the 11:30 o'clock position, 3 fingerbreadths above the nipple areolar complex, without erythema or masses.  No masses in the right breast.  Right axillary incision is well healed without erythema or masses.  Examination of the left breast is without masses.   LUNGS:  Clear to percussion and auscultation.   HEART:  Regular rate and rhythm, S1, S2 with a 2/6 systolic murmur in the left sternal border.   ABDOMEN:  Soft and nontender without hepatosplenomegaly.   EXTREMITIES:  Without edema.   PSYCHIATRIC:  Mood and affect are normal.      LABORATORY DATA:  CBC and CMP were within normal limits.      ASSESSMENT AND PLAN:     1.  Julieta Rivera is a 68-year-old woman with a history of a stage I, clinical [id number], invasive ductal carcinoma of the right breast, triple negative in histology, maximum dimension at diagnosis of 9 mm, grade 3.  Pathology showed an RCB1 response with significant reduction of the  tumor related to neoadjuvant TC.  She underwent a lumpectomy without difficulty with clear margins.  Margins were clear for DCIS and invasive cancer.  The final pathology showed the remaining tumor of 2.5 mm with 1 focus, Cazenovia grade 3, with 1 focus of microinvasive ductal carcinoma 0.3 cm after neoadjuvant chemotherapy.  The DCIS remaining was nuclear grade 3, solid type, 2 mm and adjacent to the invasive carcinoma.  She underwent post lumpectomy radiation completed at the end of 08/2018.  The final tumor stage was [id number].  A total of 8 sentinel nodes were examined, which were negative for cancer.  There is no evidence of disease recurrence on today's exam, and she is due for a mammogram in 03/2020.   2.  No role for hormonal therapy.   3.  Factor V Leiden history without history of clot.   4.  Referral to Sports Medicine for arthritic discomfort in the low back, hands and feet.   5.  Bone health.  DEXA scan 11/2018 showed a most valid negative T score of -2.4.  We did recommend weightbearing exercise, calcium and vitamin D.  Bisphosphonate was again discussed today, and she is hesitant because of side effects.   6.  Followup.  She will see Valentine in followup in our clinic 12/03.  All of her questions were answered. Follow up with Valentine December 3 and with me March 10, 2020. CBC, CMP both visits. Referral to Dr. Carlos Valdez for pains in hands, feet, low back.     Thank you for allowing us to continue to participate in Julieta Rivera's care.      Jonathan Gay MD       [facility]         I spent 45 minutes with the patient more than 50% of which was in counseling and coordination of care.

## 2019-09-10 ENCOUNTER — ONCOLOGY VISIT (OUTPATIENT)
Dept: ONCOLOGY | Facility: CLINIC | Age: 69
End: 2019-09-10
Attending: INTERNAL MEDICINE
Payer: COMMERCIAL

## 2019-09-10 ENCOUNTER — APPOINTMENT (OUTPATIENT)
Dept: LAB | Facility: CLINIC | Age: 69
End: 2019-09-10
Attending: INTERNAL MEDICINE
Payer: COMMERCIAL

## 2019-09-10 VITALS
HEIGHT: 60 IN | HEART RATE: 72 BPM | TEMPERATURE: 97.7 F | DIASTOLIC BLOOD PRESSURE: 74 MMHG | BODY MASS INDEX: 23.8 KG/M2 | SYSTOLIC BLOOD PRESSURE: 110 MMHG | OXYGEN SATURATION: 99 % | WEIGHT: 121.2 LBS

## 2019-09-10 DIAGNOSIS — C50.411 MALIGNANT NEOPLASM OF UPPER-OUTER QUADRANT OF RIGHT BREAST IN FEMALE, ESTROGEN RECEPTOR NEGATIVE (H): ICD-10-CM

## 2019-09-10 DIAGNOSIS — Z17.1 MALIGNANT NEOPLASM OF UPPER-OUTER QUADRANT OF RIGHT BREAST IN FEMALE, ESTROGEN RECEPTOR NEGATIVE (H): ICD-10-CM

## 2019-09-10 DIAGNOSIS — M54.50 CHRONIC MIDLINE LOW BACK PAIN WITHOUT SCIATICA: Primary | ICD-10-CM

## 2019-09-10 DIAGNOSIS — G89.29 CHRONIC MIDLINE LOW BACK PAIN WITHOUT SCIATICA: Primary | ICD-10-CM

## 2019-09-10 LAB
ALBUMIN SERPL-MCNC: 3.7 G/DL (ref 3.4–5)
ALP SERPL-CCNC: 63 U/L (ref 40–150)
ALT SERPL W P-5'-P-CCNC: 20 U/L (ref 0–50)
ANION GAP SERPL CALCULATED.3IONS-SCNC: 6 MMOL/L (ref 3–14)
AST SERPL W P-5'-P-CCNC: 17 U/L (ref 0–45)
BASOPHILS # BLD AUTO: 0 10E9/L (ref 0–0.2)
BASOPHILS NFR BLD AUTO: 0.6 %
BILIRUB SERPL-MCNC: 0.6 MG/DL (ref 0.2–1.3)
BUN SERPL-MCNC: 11 MG/DL (ref 7–30)
CALCIUM SERPL-MCNC: 9 MG/DL (ref 8.5–10.1)
CHLORIDE SERPL-SCNC: 103 MMOL/L (ref 94–109)
CO2 SERPL-SCNC: 26 MMOL/L (ref 20–32)
CREAT SERPL-MCNC: 0.61 MG/DL (ref 0.52–1.04)
DIFFERENTIAL METHOD BLD: NORMAL
EOSINOPHIL # BLD AUTO: 0.1 10E9/L (ref 0–0.7)
EOSINOPHIL NFR BLD AUTO: 1.3 %
ERYTHROCYTE [DISTWIDTH] IN BLOOD BY AUTOMATED COUNT: 12.4 % (ref 10–15)
GFR SERPL CREATININE-BSD FRML MDRD: >90 ML/MIN/{1.73_M2}
GLUCOSE SERPL-MCNC: 95 MG/DL (ref 70–99)
HCT VFR BLD AUTO: 38.1 % (ref 35–47)
HGB BLD-MCNC: 12.9 G/DL (ref 11.7–15.7)
IMM GRANULOCYTES # BLD: 0 10E9/L (ref 0–0.4)
IMM GRANULOCYTES NFR BLD: 0.2 %
LYMPHOCYTES # BLD AUTO: 1.2 10E9/L (ref 0.8–5.3)
LYMPHOCYTES NFR BLD AUTO: 22.8 %
MCH RBC QN AUTO: 31.3 PG (ref 26.5–33)
MCHC RBC AUTO-ENTMCNC: 33.9 G/DL (ref 31.5–36.5)
MCV RBC AUTO: 93 FL (ref 78–100)
MONOCYTES # BLD AUTO: 0.4 10E9/L (ref 0–1.3)
MONOCYTES NFR BLD AUTO: 8.1 %
NEUTROPHILS # BLD AUTO: 3.6 10E9/L (ref 1.6–8.3)
NEUTROPHILS NFR BLD AUTO: 67 %
NRBC # BLD AUTO: 0 10*3/UL
NRBC BLD AUTO-RTO: 0 /100
PLATELET # BLD AUTO: 170 10E9/L (ref 150–450)
POTASSIUM SERPL-SCNC: 3.7 MMOL/L (ref 3.4–5.3)
PROT SERPL-MCNC: 6.8 G/DL (ref 6.8–8.8)
RBC # BLD AUTO: 4.12 10E12/L (ref 3.8–5.2)
SODIUM SERPL-SCNC: 135 MMOL/L (ref 133–144)
WBC # BLD AUTO: 5.3 10E9/L (ref 4–11)

## 2019-09-10 PROCEDURE — 85025 COMPLETE CBC W/AUTO DIFF WBC: CPT | Performed by: INTERNAL MEDICINE

## 2019-09-10 PROCEDURE — 99215 OFFICE O/P EST HI 40 MIN: CPT | Mod: ZP | Performed by: INTERNAL MEDICINE

## 2019-09-10 PROCEDURE — G0463 HOSPITAL OUTPT CLINIC VISIT: HCPCS | Mod: ZF

## 2019-09-10 PROCEDURE — 80053 COMPREHEN METABOLIC PANEL: CPT | Performed by: INTERNAL MEDICINE

## 2019-09-10 PROCEDURE — 36415 COLL VENOUS BLD VENIPUNCTURE: CPT

## 2019-09-10 ASSESSMENT — MIFFLIN-ST. JEOR: SCORE: 996.26

## 2019-09-10 ASSESSMENT — PAIN SCALES - GENERAL: PAINLEVEL: NO PAIN (1)

## 2019-09-10 NOTE — NURSING NOTE
Oncology Rooming Note    September 10, 2019 1:02 PM   Julieta Rivera is a 68 year old female who presents for:    Chief Complaint   Patient presents with     Blood Draw      blood draw and vitals     Oncology Clinic Visit     RETURN VISIT; BREAST CANCER FOLLOW UP      Initial Vitals: /74 (BP Location: Left arm, Patient Position: Sitting, Cuff Size: Adult Regular)   Pulse 72   Temp 97.7  F (36.5  C) (Oral)   Ht 1.524 m (5')   Wt 55 kg (121 lb 3.2 oz)   SpO2 99%   BMI 23.67 kg/m   Estimated body mass index is 23.67 kg/m  as calculated from the following:    Height as of this encounter: 1.524 m (5').    Weight as of this encounter: 55 kg (121 lb 3.2 oz). Body surface area is 1.53 meters squared.  No Pain (1) Comment: Data Unavailable   No LMP recorded. Patient is postmenopausal.  Allergies reviewed: Yes  Medications reviewed: Yes    Medications: Medication refills not needed today.  Pharmacy name entered into [facility]: [facility] [place], [place] - [address]    Clinical concerns: No new concerns today  Dr Gay was notified.      Mela De Los Santos

## 2019-09-10 NOTE — NURSING NOTE
Vitals done, labs drawn by venipuncture.  See doc flow sheets for details.  Karina Schultz, AYANNA September 10, 2019

## 2019-09-10 NOTE — LETTER
9/10/2019       RE: Julieta Rivera  [address] [place] [address]     Dear Colleague,    Thank you for referring your patient, Julieta Rivera, to the [facility]. Please see a copy of my visit note below.    Medical Oncology Follow-up Note       HISTORY OF PRESENT ILLNESS:  Julieta Rivera is a 68-year-old woman who was referred to our clinic with a new diagnosis of right triple-negative breast cancer.  Julieta was followed by routine screening mammography, when she was discovered to have a 7 x 6 x 9-mm mass at the 12 o'clock position of the right breast 6 cm from the nipple-areolar complex.  She did undergo a biopsy of this mass which showed an ER-negative, MD-negative, HER2-nonamplified, invasive mammary carcinoma of no special type, invasive ductal carcinoma, Navarro grade 3.  Ductal carcinoma in situ was also noted.  Nuclear grade 2 solid type.  HER2 FISH showed no amplification.  She now comes to our clinic for recommendations.       She has hypothyroidism and is on levothyroxine 88 alternating with 100 mcg daily.  She has no pain.      SOCIAL:  She has fatigue related to the care of infant twin grandsons with esophageal atresia at home.           PAST MEDICAL HISTORY:  She has no history of breast surgery in the past or breast cancer in the past.  She has no history of radiation of any kind.  She has no history of tumor of any kind.  She may have a history of a heart problem with mitral prolapse and a murmur.  The last echo we have on record is from 1996.  She has no history of heart attack, breathing problems, blood clots, seizures, arthritis, peptic ulcer disease, osteoporosis or bone fractures.  She is not currently participating in a clinical trial and has not had any significant weight loss.  She has no history of hypertension, but she does have a history of factor V Leiden because her sister was diagnosed with factor V Leiden and Julieta was tested, although Julieta herself has  had no blood clots or pulmonary emboli.       FAMILY HISTORY:  There is a history of breast cancer in two paternal aunts, but no first-degree relatives.  One of her aunts was diagnosed in her 50s, the other in her 60s.  She has no male relatives with breast cancer.  The remainder of her family history was negative.        PAST MENSTRUAL HISTORY:  First period was at age 12-/2.  Last menstrual period was in 2003.  She has been pregnant twice at age 26 and 30 with two live births and no miscarriages or abortions.  She used oral contraceptives only once or twice.  Uterus and ovaries are in place.  She has no history of hormone replacement therapy.        ALLERGIES:  She has no allergy to seafood, iodine or contrast dye.  She does not take aspirin.       HABITS:  She did smoke 1 pack per day in college for 3 years from age 17 to 20 and has not smoked since.  She does not drink significant alcohol and has no heavy alcohol history in the past.        PERSONAL AND SOCIAL HISTORY:  She does have a history of being a .  Her  is 80 years old but is able to take care of his activities of daily living.  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        PAST MEDICAL HISTORY:  Julieta has no history of angina.  She has no history of hypertension.  Her cholesterol has generally been in acceptable range, although slightly over 200 recently.       FAMILY HISTORY:  Positive for heart disease.  Father had an MI in his 50s and had a bypass and  at age 78.  Mother had rheumatic heart disease at age 38 and  at age 42.     TREATMENT HISTORY:  A.  TC x 4  B.  Lumpectomy 18 [id number] RCB1.  C.  Radiation  Radiation Oncology - Course: 1         Protocol:   Treatment Site            Current Dose   Modality           From    To        Elapsed Days  Fx.  1  R Breast        4,240 cGy       06 X      7/30/2018        8/21/2018        22       16  1 R Breast Boost          1,000 cGy       e09       8/22/2018 8/27/2018         5         4    INTERVAL HISTORY:  Julieta returns to clinic and has had problems with osteoarthritis in her low back, feet and hands.  Otherwise, she has no complaints.  Her energy is improved.  She has no pain, no fatigue, no depression and no anxiety.  Her last mammogram was 03/05/2019, which was BI-RADS 2.      REVIEW OF SYSTEMS:  She denies fevers or chills, cough, chest pain, shortness of breath, nausea, vomiting, constipation, diarrhea, bone pain, back pain or headache.  She does have some pains in her right arm.      She has a history of osteoporosis, but has not wanted a bone-targeted agent, neither alendronate nor Reclast.      She is taking calcium and vitamin D.      PHYSICAL EXAMINATION:   VITAL SIGNS:  Blood pressure 110/74, pulse 72, temperature 97.7, O2 sat 99% on room air, height 1.5 m and weight 55 kg.  She has gained about 3 kg.   GENERAL:  No alopecia.   OROPHARYNX:  Without lesions.   LYMPH:  There is no palpable cervical, supraclavicular, subclavicular or axillary lymphadenopathy.   BREASTS:  Examination of the right breast reveals a well-healed incision at the 11:30 o'clock position, 3 fingerbreadths above the nipple areolar complex, without erythema or masses.  No masses in the right breast.  Right axillary incision is well healed without erythema or masses.  Examination of the left breast is without masses.   LUNGS:  Clear to percussion and auscultation.   HEART:  Regular rate and rhythm, S1, S2 with a 2/6 systolic murmur in the left sternal border.   ABDOMEN:  Soft and nontender without hepatosplenomegaly.   EXTREMITIES:  Without edema.   PSYCHIATRIC:  Mood and affect are normal.      LABORATORY DATA:  CBC and CMP were within normal limits.      ASSESSMENT AND PLAN:     1.  Julieta Rivera is a 68-year-old woman with a  history of a stage I, clinical R3mV0CN, invasive ductal carcinoma of the right breast, triple negative in histology, maximum dimension at diagnosis of 9 mm, grade 3.  Pathology showed an RCB1 response with significant reduction of the tumor related to neoadjuvant TC.  She underwent a lumpectomy without difficulty with clear margins.  Margins were clear for DCIS and invasive cancer.  The final pathology showed the remaining tumor of 2.5 mm with 1 focus, Homer grade 3, with 1 focus of microinvasive ductal carcinoma 0.3 cm after neoadjuvant chemotherapy.  The DCIS remaining was nuclear grade 3, solid type, 2 mm and adjacent to the invasive carcinoma.  She underwent post lumpectomy radiation completed at the end of 08/2018.  The final tumor stage was [id number].  A total of 8 sentinel nodes were examined, which were negative for cancer.  There is no evidence of disease recurrence on today's exam, and she is due for a mammogram in 03/2020.   2.  No role for hormonal therapy.   3.  Factor V Leiden history without history of clot.   4.  Referral to Sports Medicine for arthritic discomfort in the low back, hands and feet.   5.  Bone health.  DEXA scan 11/2018 showed a most valid negative T score of -2.4.  We did recommend weightbearing exercise, calcium and vitamin D.  Bisphosphonate was again discussed today, and she is hesitant because of side effects.   6.  Followup.  She will see Valentine in followup in our clinic 12/03.  All of her questions were answered. Follow up with Valentine December 3 and with me March 10, 2020. CBC, CMP both visits. Referral to Dr. Carlos Valdez for pains in hands, feet, low back.     Thank you for allowing us to continue to participate in Julieta Rivera's care.      Jonathan Gay MD       [facility]     I spent 45 minutes with the patient more than 50% of which was in counseling and coordination of care.

## 2019-09-28 ENCOUNTER — HEALTH MAINTENANCE LETTER (OUTPATIENT)
Age: 69
End: 2019-09-28

## 2019-10-10 ENCOUNTER — PRE VISIT (OUTPATIENT)
Dept: ORTHOPEDICS | Facility: CLINIC | Age: 69
End: 2019-10-10

## 2019-10-10 NOTE — TELEPHONE ENCOUNTER
RECORDS RECEIVED FROM: Internal   DATE RECEIVED: 10.10.19   NOTES STATUS DETAILS   OFFICE NOTE from referring provider Internal 9.10.19 Dr. Gay   OFFICE NOTE from other specialist Internal 11.28.18 Dr. Dove   DISCHARGE SUMMARY from hospital N/A    DISCHARGE REPORT from the ER N/A    OPERATIVE REPORT N/A    MEDICATION LIST Internal    IMPLANT RECORD/STICKER N/A    LABS     CBC/DIFF Internal 9.10.19   CULTURES N/A    INJECTIONS DONE IN RADIOLOGY N/A    MRI N/A    CT SCAN N/A    XRAYS (IMAGES & REPORTS) Internal 11.20.18 Lumbar spine and pelvis/hip     TUMOR     PATHOLOGY  Slides & report N/A

## 2019-12-03 ENCOUNTER — ONCOLOGY VISIT (OUTPATIENT)
Dept: ONCOLOGY | Facility: CLINIC | Age: 69
End: 2019-12-03
Attending: PHYSICIAN ASSISTANT
Payer: COMMERCIAL

## 2019-12-03 ENCOUNTER — APPOINTMENT (OUTPATIENT)
Dept: LAB | Facility: CLINIC | Age: 69
End: 2019-12-03
Attending: INTERNAL MEDICINE
Payer: COMMERCIAL

## 2019-12-03 VITALS
TEMPERATURE: 97.8 F | SYSTOLIC BLOOD PRESSURE: 133 MMHG | DIASTOLIC BLOOD PRESSURE: 82 MMHG | BODY MASS INDEX: 23.63 KG/M2 | WEIGHT: 121 LBS | HEART RATE: 94 BPM | OXYGEN SATURATION: 100 %

## 2019-12-03 DIAGNOSIS — C50.411 MALIGNANT NEOPLASM OF UPPER-OUTER QUADRANT OF RIGHT BREAST IN FEMALE, ESTROGEN RECEPTOR NEGATIVE (H): ICD-10-CM

## 2019-12-03 DIAGNOSIS — Z17.1 MALIGNANT NEOPLASM OF UPPER-OUTER QUADRANT OF RIGHT BREAST IN FEMALE, ESTROGEN RECEPTOR NEGATIVE (H): ICD-10-CM

## 2019-12-03 LAB
ALBUMIN SERPL-MCNC: 3.9 G/DL (ref 3.4–5)
ALP SERPL-CCNC: 65 U/L (ref 40–150)
ALT SERPL W P-5'-P-CCNC: 24 U/L (ref 0–50)
ANION GAP SERPL CALCULATED.3IONS-SCNC: 6 MMOL/L (ref 3–14)
AST SERPL W P-5'-P-CCNC: 22 U/L (ref 0–45)
BASOPHILS # BLD AUTO: 0 10E9/L (ref 0–0.2)
BASOPHILS NFR BLD AUTO: 0.4 %
BILIRUB SERPL-MCNC: 0.6 MG/DL (ref 0.2–1.3)
BUN SERPL-MCNC: 11 MG/DL (ref 7–30)
CALCIUM SERPL-MCNC: 9.1 MG/DL (ref 8.5–10.1)
CHLORIDE SERPL-SCNC: 106 MMOL/L (ref 94–109)
CO2 SERPL-SCNC: 25 MMOL/L (ref 20–32)
CREAT SERPL-MCNC: 0.66 MG/DL (ref 0.52–1.04)
DIFFERENTIAL METHOD BLD: NORMAL
EOSINOPHIL # BLD AUTO: 0.1 10E9/L (ref 0–0.7)
EOSINOPHIL NFR BLD AUTO: 1.9 %
ERYTHROCYTE [DISTWIDTH] IN BLOOD BY AUTOMATED COUNT: 12.7 % (ref 10–15)
GFR SERPL CREATININE-BSD FRML MDRD: 90 ML/MIN/{1.73_M2}
GLUCOSE SERPL-MCNC: 70 MG/DL (ref 70–99)
HCT VFR BLD AUTO: 39.7 % (ref 35–47)
HGB BLD-MCNC: 13.4 G/DL (ref 11.7–15.7)
IMM GRANULOCYTES # BLD: 0 10E9/L (ref 0–0.4)
IMM GRANULOCYTES NFR BLD: 0.2 %
LYMPHOCYTES # BLD AUTO: 1.5 10E9/L (ref 0.8–5.3)
LYMPHOCYTES NFR BLD AUTO: 28.7 %
MCH RBC QN AUTO: 31.2 PG (ref 26.5–33)
MCHC RBC AUTO-ENTMCNC: 33.8 G/DL (ref 31.5–36.5)
MCV RBC AUTO: 92 FL (ref 78–100)
MONOCYTES # BLD AUTO: 0.4 10E9/L (ref 0–1.3)
MONOCYTES NFR BLD AUTO: 7.5 %
NEUTROPHILS # BLD AUTO: 3.3 10E9/L (ref 1.6–8.3)
NEUTROPHILS NFR BLD AUTO: 61.3 %
NRBC # BLD AUTO: 0 10*3/UL
NRBC BLD AUTO-RTO: 0 /100
PLATELET # BLD AUTO: 183 10E9/L (ref 150–450)
POTASSIUM SERPL-SCNC: 3.7 MMOL/L (ref 3.4–5.3)
PROT SERPL-MCNC: 7 G/DL (ref 6.8–8.8)
RBC # BLD AUTO: 4.3 10E12/L (ref 3.8–5.2)
SODIUM SERPL-SCNC: 137 MMOL/L (ref 133–144)
WBC # BLD AUTO: 5.4 10E9/L (ref 4–11)

## 2019-12-03 PROCEDURE — 36415 COLL VENOUS BLD VENIPUNCTURE: CPT

## 2019-12-03 PROCEDURE — G0463 HOSPITAL OUTPT CLINIC VISIT: HCPCS | Mod: ZF

## 2019-12-03 PROCEDURE — 85025 COMPLETE CBC W/AUTO DIFF WBC: CPT | Performed by: INTERNAL MEDICINE

## 2019-12-03 PROCEDURE — 99214 OFFICE O/P EST MOD 30 MIN: CPT | Mod: ZP | Performed by: PHYSICIAN ASSISTANT

## 2019-12-03 PROCEDURE — 80053 COMPREHEN METABOLIC PANEL: CPT | Performed by: INTERNAL MEDICINE

## 2019-12-03 RX ORDER — CHOLECALCIFEROL (VITAMIN D3) 50 MCG
1 TABLET ORAL DAILY
COMMUNITY

## 2019-12-03 ASSESSMENT — PAIN SCALES - GENERAL: PAINLEVEL: NO PAIN (0)

## 2019-12-03 NOTE — PROGRESS NOTES
"Oncology/Hematology Visit Note  Oct 18, 2018    REASON FOR VISIT: I am seeing Julieta Rivera in the cancer survivorship program for diagnosis of right breast cancer.     Julieta Rivera was found to have 7 x 6 x 9mm mass in right breast on screening mammography on 1/25/18. Biopsy on 2/12/18 revealed infiltrating ductal carcinoma, grade 3.  ER negative, RI negative, HER-2 negative. She underwent 4 cycles of neoadjuvant docetaxol and cyclophosphamide from 3/8/18-5/10/2018. She had a right lumpectomy with sentinel lymph node biopsy on 6/7/2018.  This revealed a 2.5 cm tumor, infiltrating ductal carcinoma, grade 2, and one focus of DCIS (size 0.3mm).  0/4 lymph nodes positive.  She was diagnosed with a stage IA [id number] triple negative invasive ductal carcinoma of right breast.  She completed 5240 cGy to the right breast on 8/27/2018.     CANCER TREATMENT SUMMARY:  *Mammogram in January 2018 showed mass in right breast.    *Biopsy in 2/12/2018 revealed infiltrating ductal carcinoma, grade 3.  ER negative, RI negative, HER-2 negative.   *Neoadjuvant Taxotere, cyclophosphamide x 4 cycles from 3/8/2018-5/10/2018  *Right lumpectomy with sentinel lymph node biopsy on 6/4/2018.  This revealed a 2.5 cm tumor, infiltrating ductal carcinoma.  0/4 lymph nodes positive.  Grade 2.     *5240 cGy to the left breast completed on 8/27/2018.     Interval History:  Julieta is here for follow up. She states her low back pain has been better recently. She continues to have some right arm \"nerve pain deadness\" after surgery/radiation and some tenderness to palpation around right breast that is stable. No RUE lymphedema. She has no other pain.     She continues to exercise with yoga. She is taking calcium and vitamin D and trying to eat a healthy diet. She feels her energy is back to baseline prior to breast cancer. Denies any depression, anxiety.    She has residual peripheral neuropathy in fingertips and balls of feet to toes that has not " improved so far but not impeding function. She has occasional dry cough that she feel is irritative and states her sister has something similar. Denies any dyspnea.    Current Outpatient Prescriptions   Medication Sig Dispense Refill     ascorbic acid (VITAMIN C) 500 MG CPCR Take 1,000 mg by mouth daily        aspirin (ASPIRIN LOW STRENGTH) 81 MG chewable tablet Take 81 mg by mouth daily       Biotin 1 MG CAPS        cholecalciferol 2000 UNITS CAPS Take 1,000 Units by mouth        fish oil-omega-3 fatty acids 1000 MG capsule Take 1 g by mouth        FLUoxetine (PROZAC) 20 MG capsule Take 20 mg by mouth daily       HYDROCHLOROTHIAZIDE PO Take 25 mg by mouth       Magnesium Chloride (MAGNESIUM DR PO) Take 400 mg by mouth        Melatonin 10 MG TABS Take 20 mg by mouth       tamoxifen (NOLVADEX) 20 MG tablet Take 1 tablet (20 mg) by mouth daily 90 tablet 3     UNABLE TO FIND 3 times daily MEDICATION NAME: Bone Builder (Calcium)       valsartan (DIOVAN) 80 MG tablet Take by mouth daily         Physical Examination:  General: The patient is a pleasant female in no acute distress.  /82   Pulse 94   Temp 97.8  F (36.6  C) (Oral)   Wt 54.9 kg (121 lb)   SpO2 100%   BMI 23.63 kg/m  .   HEENT: EOMI, PERRL. Sclerae are anicteric. Oral mucosa is pink and moist with no lesions or thrush.   Lymph: No palpable cervical, supraclavicular, subclavicular or axillary lymphadenopathy.  Heart: Regular rate and rhythm. 2/6 FIORELLA in RUSB  Lungs: Clear to auscultation bilaterally.   Breast: Right breast with some hyperpigmentation from radiation. Well-healed incision at 11:30 position. No erythema or masses. Left breast without skin changes or masses.  Abdomen: Bowel sounds present, soft, nontender with no palpable hepatosplenomegaly or masses.   Extremities: No lower extremity edema noted bilaterally.   Neuro: Cranial nerves II through XII are grossly intact.  Skin: No rashes, petechiae, or bruising noted on exposed  skin.  Laboratory Data:  Results for FAIZA RIVERA (MRN [id number]) as of 12/3/2019 14:05   12/3/2019 12:51   Sodium 137   Potassium 3.7   Chloride 106   Carbon Dioxide 25   Urea Nitrogen 11   Creatinine 0.66   GFR Estimate 90   GFR Estimate If Black >90   Calcium 9.1   Anion Gap 6   Albumin 3.9   Protein Total 7.0   Bilirubin Total 0.6   Alkaline Phosphatase 65   ALT 24   AST 22   Glucose 70   WBC 5.4   Hemoglobin 13.4   Hematocrit 39.7   Platelet Count 183   RBC Count 4.30   MCV 92   MCH 31.2   MCHC 33.8   RDW 12.7   Diff Method Automated Method   % Neutrophils 61.3   % Lymphocytes 28.7   % Monocytes 7.5   % Eosinophils 1.9   % Basophils 0.4   % Immature Granulocytes 0.2   Nucleated RBCs 0   Absolute Neutrophil 3.3   Absolute Lymphocytes 1.5   Absolute Monocytes 0.4   Absolute Eosinophils 0.1   Absolute Basophils 0.0   Abs Immature Granulocytes 0.0   Absolute Nucleated RBC 0.0       Assessment and Plan:    1.  Stage IA [id number] infiltrating ductal carcinoma right breast, ER negative, AK negative, HER-2 negative.  Treated as above with neoadjuvant chemotherapy, surgery, radiation.  Ms. Rivera is doing well at this time.   Last Mammogram in March 2019 with benign findings.   --Follow up with Dr. Gay as scheduled in March with repeat mammogram    2.  Lymphedema.  Currently no edema in RUE    3.  Peripheral neuropathy. She understands that this is a permanent side effect but should not worsen. She does not feel pharmacologic intervention is needed at this time    4.  Bone health. DEXA scan in November 2018 which was her first DEXA scan. Osteopenia with T score -2.4. Recommend weightbearing exercises 2-3 days a week. Her calcium should be 2889-4177 mg daily along with vitamin D 800-1000 IU daily. She is currently taking 5000 international unit(s) of vitamin D daily.  --Calculated her risk for fracture with FRAX score calculator, estimating 22% risk of major osteoporotic fracture in 10 years and ~7.8% risk of  hip fracture. She declined bisphosphonate therapy again today.  --Follow up DEXA in November 2020    5.  Bicuspid aortic valve and mild aortic valve stenosis. Last echo on 5/6/2019. She is being followed by Dr. Tidwell.     6.  Healthy lifestyle. She should maintain a healthy weight with a BMI between 20-25.  Her diet should include low fats.  She should exercise 150 minutes of cardiovascular exercise per week.  She will continue to follow with her primary care provider for general health maintenance.  She states she has received the annual influenza vaccine. She had pneumococcal vaccine in January 2017. She should see her eye doctor and dentist routinely.    7. Factor V Leiden history without history of clot. She is aware of signs/symptoms of blood clots.    Valentine Blevins PA-C  [facility]  [address] [place] [address] [phone]

## 2019-12-03 NOTE — NURSING NOTE
Oncology Rooming Note    December 3, 2019 1:21 PM   Julieta Rivera is a 69 year old female who presents for:    Chief Complaint   Patient presents with     Lab Only     labs drawn via , vitals completed     Oncology Clinic Visit     Return - Breast Ca     Initial Vitals: /82   Pulse 94   Temp 97.8  F (36.6  C) (Oral)   Wt 54.9 kg (121 lb)   SpO2 100%   BMI 23.63 kg/m   Estimated body mass index is 23.63 kg/m  as calculated from the following:    Height as of 9/10/19: 1.524 m (5').    Weight as of this encounter: 54.9 kg (121 lb). Body surface area is 1.52 meters squared.  No Pain (0) Comment: Data Unavailable   No LMP recorded. Patient is postmenopausal.  Allergies reviewed: Yes  Medications reviewed: Yes    Medications: Medication refills not needed today.  Pharmacy name entered into Digistrive: [facility] [place], [place] - [address]    Clinical concerns: Lab Results and concerns about intermittent discomfort in the area around her right breast and under her arm.      Jonathon Verdin, EMT

## 2019-12-03 NOTE — LETTER
"12/3/2019      RE: Julieta Rivera  [address] [place] [address]       Oncology/Hematology Visit Note  Oct 18, 2018    REASON FOR VISIT: I am seeing Julieta Rivera in the cancer survivorship program for diagnosis of right breast cancer.     Julieta Rivera was found to have 7 x 6 x 9mm mass in right breast on screening mammography on 1/25/18. Biopsy on 2/12/18 revealed infiltrating ductal carcinoma, grade 3.  ER negative, OK negative, HER-2 negative. She underwent 4 cycles of neoadjuvant docetaxol and cyclophosphamide from 3/8/18-5/10/2018. She had a right lumpectomy with sentinel lymph node biopsy on 6/7/2018.  This revealed a 2.5 cm tumor, infiltrating ductal carcinoma, grade 2, and one focus of DCIS (size 0.3mm).  0/4 lymph nodes positive.  She was diagnosed with a stage IA [id number] triple negative invasive ductal carcinoma of right breast.  She completed 5240 cGy to the right breast on 8/27/2018.     CANCER TREATMENT SUMMARY:  *Mammogram in January 2018 showed mass in right breast.    *Biopsy in 2/12/2018 revealed infiltrating ductal carcinoma, grade 3.  ER negative, OK negative, HER-2 negative.   *Neoadjuvant Taxotere, cyclophosphamide x 4 cycles from 3/8/2018-5/10/2018  *Right lumpectomy with sentinel lymph node biopsy on 6/4/2018.  This revealed a 2.5 cm tumor, infiltrating ductal carcinoma.  0/4 lymph nodes positive.  Grade 2.     *5240 cGy to the left breast completed on 8/27/2018.     Interval History:  Julieta is here for follow up. She states her low back pain has been better recently. She continues to have some right arm \"nerve pain deadness\" after surgery/radiation and some tenderness to palpation around right breast that is stable. No RUE lymphedema. She has no other pain.     She continues to exercise with yoga. She is taking calcium and vitamin D and trying to eat a healthy diet. She feels her energy is back to baseline prior to breast cancer. Denies any depression, anxiety.    She has " residual peripheral neuropathy in fingertips and balls of feet to toes that has not improved so far but not impeding function. She has occasional dry cough that she feel is irritative and states her sister has something similar. Denies any dyspnea.    Current Outpatient Prescriptions   Medication Sig Dispense Refill     ascorbic acid (VITAMIN C) 500 MG CPCR Take 1,000 mg by mouth daily        aspirin (ASPIRIN LOW STRENGTH) 81 MG chewable tablet Take 81 mg by mouth daily       Biotin 1 MG CAPS        cholecalciferol 2000 UNITS CAPS Take 1,000 Units by mouth        fish oil-omega-3 fatty acids 1000 MG capsule Take 1 g by mouth        FLUoxetine (PROZAC) 20 MG capsule Take 20 mg by mouth daily       HYDROCHLOROTHIAZIDE PO Take 25 mg by mouth       Magnesium Chloride (MAGNESIUM DR PO) Take 400 mg by mouth        Melatonin 10 MG TABS Take 20 mg by mouth       tamoxifen (NOLVADEX) 20 MG tablet Take 1 tablet (20 mg) by mouth daily 90 tablet 3     UNABLE TO FIND 3 times daily MEDICATION NAME: Bone Builder (Calcium)       valsartan (DIOVAN) 80 MG tablet Take by mouth daily         Physical Examination:  General: The patient is a pleasant female in no acute distress.  /82   Pulse 94   Temp 97.8  F (36.6  C) (Oral)   Wt 54.9 kg (121 lb)   SpO2 100%   BMI 23.63 kg/m   .   HEENT: EOMI, PERRL. Sclerae are anicteric. Oral mucosa is pink and moist with no lesions or thrush.   Lymph: No palpable cervical, supraclavicular, subclavicular or axillary lymphadenopathy.  Heart: Regular rate and rhythm. 2/6 FIORELLA in RUSB  Lungs: Clear to auscultation bilaterally.   Breast: Right breast with some hyperpigmentation from radiation. Well-healed incision at 11:30 position. No erythema or masses. Left breast without skin changes or masses.  Abdomen: Bowel sounds present, soft, nontender with no palpable hepatosplenomegaly or masses.   Extremities: No lower extremity edema noted bilaterally.   Neuro: Cranial nerves II through XII are  grossly intact.  Skin: No rashes, petechiae, or bruising noted on exposed skin.  Laboratory Data:  Results for FAIZA RIVERA (MRN [id number]) as of 12/3/2019 14:05   12/3/2019 12:51   Sodium 137   Potassium 3.7   Chloride 106   Carbon Dioxide 25   Urea Nitrogen 11   Creatinine 0.66   GFR Estimate 90   GFR Estimate If Black >90   Calcium 9.1   Anion Gap 6   Albumin 3.9   Protein Total 7.0   Bilirubin Total 0.6   Alkaline Phosphatase 65   ALT 24   AST 22   Glucose 70   WBC 5.4   Hemoglobin 13.4   Hematocrit 39.7   Platelet Count 183   RBC Count 4.30   MCV 92   MCH 31.2   MCHC 33.8   RDW 12.7   Diff Method Automated Method   % Neutrophils 61.3   % Lymphocytes 28.7   % Monocytes 7.5   % Eosinophils 1.9   % Basophils 0.4   % Immature Granulocytes 0.2   Nucleated RBCs 0   Absolute Neutrophil 3.3   Absolute Lymphocytes 1.5   Absolute Monocytes 0.4   Absolute Eosinophils 0.1   Absolute Basophils 0.0   Abs Immature Granulocytes 0.0   Absolute Nucleated RBC 0.0       Assessment and Plan:    1.  Stage IA [id number] infiltrating ductal carcinoma right breast, ER negative, DC negative, HER-2 negative.  Treated as above with neoadjuvant chemotherapy, surgery, radiation.  Ms. Rivera is doing well at this time.   Last Mammogram in March 2019 with benign findings.   --Follow up with Dr. Gay as scheduled in March with repeat mammogram    2.  Lymphedema.  Currently no edema in RUE    3.  Peripheral neuropathy. She understands that this is a permanent side effect but should not worsen. She does not feel pharmacologic intervention is needed at this time    4.  Bone health. DEXA scan in November 2018 which was her first DEXA scan. Osteopenia with T score -2.4. Recommend weightbearing exercises 2-3 days a week. Her calcium should be 8262-9743 mg daily along with vitamin D 800-1000 IU daily. She is currently taking 5000 international unit(s) of vitamin D daily.  --Calculated her risk for fracture with FRAX score calculator,  estimating 22% risk of major osteoporotic fracture in 10 years and ~7.8% risk of hip fracture. She declined bisphosphonate therapy again today.  --Follow up DEXA in November 2020    5.   Bicuspid aortic valve and mild aortic valve stenosis. Last echo on 5/6/2019. She is being followed by Dr. Tidwell.     6.  Healthy lifestyle. She should maintain a healthy weight with a BMI between 20-25.  Her diet should include low fats.  She should exercise 150 minutes of cardiovascular exercise per week.  She will continue to follow with her primary care provider for general health maintenance.  She states she has received the annual influenza vaccine. She had pneumococcal vaccine in January 2017. She should see her eye doctor and dentist routinely.    7. Factor V Leiden history without history of clot. She is aware of signs/symptoms of blood clots.    Valentine Blevins PA-C  [facility]  [address] [place], [place] [address] [phone]

## 2020-03-03 ENCOUNTER — ONCOLOGY VISIT (OUTPATIENT)
Dept: ONCOLOGY | Facility: CLINIC | Age: 70
End: 2020-03-03
Attending: INTERNAL MEDICINE
Payer: COMMERCIAL

## 2020-03-03 ENCOUNTER — ANCILLARY PROCEDURE (OUTPATIENT)
Dept: MAMMOGRAPHY | Facility: CLINIC | Age: 70
End: 2020-03-03
Attending: PHYSICIAN ASSISTANT
Payer: COMMERCIAL

## 2020-03-03 ENCOUNTER — APPOINTMENT (OUTPATIENT)
Dept: LAB | Facility: CLINIC | Age: 70
End: 2020-03-03
Attending: INTERNAL MEDICINE
Payer: COMMERCIAL

## 2020-03-03 VITALS
BODY MASS INDEX: 23.66 KG/M2 | DIASTOLIC BLOOD PRESSURE: 74 MMHG | HEIGHT: 60 IN | RESPIRATION RATE: 16 BRPM | WEIGHT: 120.5 LBS | HEART RATE: 82 BPM | OXYGEN SATURATION: 96 % | TEMPERATURE: 97.6 F | SYSTOLIC BLOOD PRESSURE: 120 MMHG

## 2020-03-03 DIAGNOSIS — Z17.1 MALIGNANT NEOPLASM OF UPPER-OUTER QUADRANT OF RIGHT BREAST IN FEMALE, ESTROGEN RECEPTOR NEGATIVE (H): ICD-10-CM

## 2020-03-03 DIAGNOSIS — Z85.3 HX OF BREAST CANCER: ICD-10-CM

## 2020-03-03 DIAGNOSIS — C50.411 MALIGNANT NEOPLASM OF UPPER-OUTER QUADRANT OF RIGHT BREAST IN FEMALE, ESTROGEN RECEPTOR NEGATIVE (H): ICD-10-CM

## 2020-03-03 DIAGNOSIS — Z78.0 ASYMPTOMATIC POSTMENOPAUSAL STATUS: Primary | ICD-10-CM

## 2020-03-03 LAB
ALBUMIN SERPL-MCNC: 4 G/DL (ref 3.4–5)
ALP SERPL-CCNC: 66 U/L (ref 40–150)
ALT SERPL W P-5'-P-CCNC: 22 U/L (ref 0–50)
ANION GAP SERPL CALCULATED.3IONS-SCNC: 6 MMOL/L (ref 3–14)
AST SERPL W P-5'-P-CCNC: 21 U/L (ref 0–45)
BASOPHILS # BLD AUTO: 0 10E9/L (ref 0–0.2)
BASOPHILS NFR BLD AUTO: 0.5 %
BILIRUB SERPL-MCNC: 0.4 MG/DL (ref 0.2–1.3)
BUN SERPL-MCNC: 10 MG/DL (ref 7–30)
CALCIUM SERPL-MCNC: 9.1 MG/DL (ref 8.5–10.1)
CHLORIDE SERPL-SCNC: 108 MMOL/L (ref 94–109)
CO2 SERPL-SCNC: 25 MMOL/L (ref 20–32)
CREAT SERPL-MCNC: 0.62 MG/DL (ref 0.52–1.04)
DIFFERENTIAL METHOD BLD: NORMAL
EOSINOPHIL # BLD AUTO: 0.1 10E9/L (ref 0–0.7)
EOSINOPHIL NFR BLD AUTO: 1.1 %
ERYTHROCYTE [DISTWIDTH] IN BLOOD BY AUTOMATED COUNT: 12.1 % (ref 10–15)
GFR SERPL CREATININE-BSD FRML MDRD: >90 ML/MIN/{1.73_M2}
GLUCOSE SERPL-MCNC: 92 MG/DL (ref 70–99)
HCT VFR BLD AUTO: 39.3 % (ref 35–47)
HGB BLD-MCNC: 13.5 G/DL (ref 11.7–15.7)
IMM GRANULOCYTES # BLD: 0 10E9/L (ref 0–0.4)
IMM GRANULOCYTES NFR BLD: 0.2 %
LYMPHOCYTES # BLD AUTO: 1.5 10E9/L (ref 0.8–5.3)
LYMPHOCYTES NFR BLD AUTO: 27.4 %
MCH RBC QN AUTO: 31 PG (ref 26.5–33)
MCHC RBC AUTO-ENTMCNC: 34.4 G/DL (ref 31.5–36.5)
MCV RBC AUTO: 90 FL (ref 78–100)
MONOCYTES # BLD AUTO: 0.4 10E9/L (ref 0–1.3)
MONOCYTES NFR BLD AUTO: 7.7 %
NEUTROPHILS # BLD AUTO: 3.5 10E9/L (ref 1.6–8.3)
NEUTROPHILS NFR BLD AUTO: 63.1 %
NRBC # BLD AUTO: 0 10*3/UL
NRBC BLD AUTO-RTO: 0 /100
PLATELET # BLD AUTO: 179 10E9/L (ref 150–450)
POTASSIUM SERPL-SCNC: 3.6 MMOL/L (ref 3.4–5.3)
PROT SERPL-MCNC: 7 G/DL (ref 6.8–8.8)
RBC # BLD AUTO: 4.36 10E12/L (ref 3.8–5.2)
SODIUM SERPL-SCNC: 139 MMOL/L (ref 133–144)
WBC # BLD AUTO: 5.6 10E9/L (ref 4–11)

## 2020-03-03 PROCEDURE — 93005 ELECTROCARDIOGRAM TRACING: CPT

## 2020-03-03 PROCEDURE — 80053 COMPREHEN METABOLIC PANEL: CPT | Performed by: INTERNAL MEDICINE

## 2020-03-03 PROCEDURE — 99214 OFFICE O/P EST MOD 30 MIN: CPT | Mod: ZP | Performed by: INTERNAL MEDICINE

## 2020-03-03 PROCEDURE — G0463 HOSPITAL OUTPT CLINIC VISIT: HCPCS | Mod: ZF

## 2020-03-03 PROCEDURE — 93010 ELECTROCARDIOGRAM REPORT: CPT | Performed by: INTERNAL MEDICINE

## 2020-03-03 PROCEDURE — 85025 COMPLETE CBC W/AUTO DIFF WBC: CPT | Performed by: INTERNAL MEDICINE

## 2020-03-03 PROCEDURE — 36415 COLL VENOUS BLD VENIPUNCTURE: CPT

## 2020-03-03 ASSESSMENT — MIFFLIN-ST. JEOR: SCORE: 988.08

## 2020-03-03 ASSESSMENT — PAIN SCALES - GENERAL: PAINLEVEL: NO PAIN (0)

## 2020-03-03 NOTE — NURSING NOTE
Chief Complaint   Patient presents with     Blood Draw     labs drawn by venipuncture by rn.  vital signs taken.     Labs drawn by venipuncture by RN in lab.  Vital signs taken.     Suly Samayoa RN

## 2020-03-03 NOTE — PROGRESS NOTES
Medical Oncology Follow-up Note       HISTORY OF PRESENT ILLNESS:  Julieta Rivera is a 68-year-old woman who was referred to our clinic with a new diagnosis of right triple-negative breast cancer.  Julieta was followed by routine screening mammography, when she was discovered to have a 7 x 6 x 9-mm mass at the 12 o'clock position of the right breast 6 cm from the nipple-areolar complex.  She did undergo a biopsy of this mass which showed an ER-negative, MT-negative, HER2-nonamplified, invasive mammary carcinoma of no special type, invasive ductal carcinoma, Jeff grade 3.  Ductal carcinoma in situ was also noted.  Nuclear grade 2 solid type.  HER2 FISH showed no amplification.  She now comes to our clinic for recommendations.       She has hypothyroidism and is on levothyroxine 88 alternating with 100 mcg daily.  She has no pain.       SOCIAL:  She has fatigue related to the care of infant twin grandsons with esophageal atresia at home.           PAST MEDICAL HISTORY:  She has no history of breast surgery in the past or breast cancer in the past.  She has no history of radiation of any kind.  She has no history of tumor of any kind.  She may have a history of a heart problem with mitral prolapse and a murmur.  The last echo we have on record is from 1996.  She has no history of heart attack, breathing problems, blood clots, seizures, arthritis, peptic ulcer disease, osteoporosis or bone fractures.  She is not currently participating in a clinical trial and has not had any significant weight loss.  She has no history of hypertension, but she does have a history of factor V Leiden because her sister was diagnosed with factor V Leiden and Julieta was tested, although Julieta herself has had no blood clots or pulmonary emboli.       FAMILY HISTORY:  There is a history of breast cancer in two paternal aunts, but no first-degree relatives.  One of her aunts was diagnosed in her 50s, the other in her 60s.  She has no male  relatives with breast cancer.  The remainder of her family history was negative.        PAST MENSTRUAL HISTORY:  First period was at age 12-/2.  Last menstrual period was in 2003.  She has been pregnant twice at age 26 and 30 with two live births and no miscarriages or abortions.  She used oral contraceptives only once or twice.  Uterus and ovaries are in place.  She has no history of hormone replacement therapy.        ALLERGIES:  She has no allergy to seafood, iodine or contrast dye.  She does not take aspirin.       HABITS:  She did smoke 1 pack per day in college for 3 years from age 17 to 20 and has not smoked since.  She does not drink significant alcohol and has no heavy alcohol history in the past.        PERSONAL AND SOCIAL HISTORY:  She does have a history of being a .  Her  is 80 years old but is able to take care of his activities of daily living.  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        PAST MEDICAL HISTORY:  Julieta has no history of angina.  She has no history of hypertension.  Her cholesterol has generally been in acceptable range, although slightly over 200 recently.       FAMILY HISTORY:  Positive for heart disease.  Father had an MI in his 50s and had a bypass and  at age 78.  Mother had rheumatic heart disease at age 38 and  at age 42.      TREATMENT HISTORY:  A.  TC x 4  B.  Lumpectomy 18 [id number] RCB1.  0.71  C.  Radiation  Radiation Oncology - Course: 1         Protocol:   Treatment Site            Current Dose   Modality           From    To        Elapsed Days  Fx.  1 R Breast        4,240 cGy       06 X      7/30/2018        2018        22       16  1 R Breast Boost          1,000 cGy       e09       2018         5         4        INTERVAL HISTORY:   Julieta has been  doing quite well.  She has no pain, fatigue, depression or anxiety.      REVIEW OF SYSTEMS:  A 10-point review of systems is entirely negative. That being said, she did have an episode of diaphoresis and chest pain that lasted about 60 minutes at about 5 p.m. on 01/18.  She has had no recurrences since then.  She has had no reflux and no anginal symptoms.      PHYSICAL EXAMINATION:   VITAL SIGNS:  Blood pressure 120/74, pulse 82, respirations 16, temperature 97.6, O2 sat 96% on room air, height 1.5 meters and weight 54.7 kg.   GENERAL:  Julieta appeared generally well.  She has no alopecia.   HEENT:  Oropharynx is without lesions.   LYMPH:  There is no palpable cervical, supraclavicular, subclavicular or axillary lymphadenopathy.   BREASTS:  Right breast reveals a well-healed incision 2 fingerbreadths above the nipple-areolar complex on the right breast without erythema or masses.  No masses in the right breast.  Both nipples are everted.  Right axillary incision is well healed without erythema or masses.  Left breast is without masses.   LUNGS:  Clear to percussion and auscultation.   HEART:  Regular rate and rhythm, S1, S2.   ABDOMEN:  Soft and nontender, without hepatosplenomegaly.   EXTREMITIES:  Without edema.   PSYCHIATRIC:  Mood and affect were normal.      LABORATORY DATA:  CBC and CMP were within normal limits.        IMAGING:  Diagnostic mammogram 03/03/2020 is BI-RADS 2.     ECG:  No change.  Nonspecific ST changes.      ASSESSMENT AND PLAN:    1.  Julieta Rivera is a 69-year-old woman with a history of stage I, clinical [id number] invasive ductal carcinoma of the right breast, triple negative in histology, maximum dimension 9 mm, grade 3.  She received neoadjuvant TC chemotherapy following lumpectomy showed an RCB1 response with significant reduction of the tumor and with a small amount of residual disease measuring about 2.5 mm in largest dimension.  There is 1 other small focus.  The margins were clear for  DCIS and invasive cancer.  She was not offered adjuvant Xeloda given the small amount of residual disease.  Eight sentinel lymph nodes were examined and were negative for cancer.  There is no evidence of disease recurrence on today's exam or mammogram.   2.  No role for hormonal therapy.   3.  Episode of chest pain.  We will obtain an ECG today.   4.  Factor V Leiden without history of clot.   5.  Arthritic discomfort in low back, hands and feet.  She has been referred to Sports Medicine and can go back to see Sports Medicine as needed.   6.  DEXA scan shows osteoporosis with a most valid negative T-score of -2.4.  She did not want zoledronic acid, Prolia or Fosamax.  I did give her handouts on all 3 today and she will think about it.  She has been very hesitant because of concern about side effects.  She will continue with weightbearing exercise, calcium and vitamin D.   7.  Followup.  She will see an SAM in 3 months, SAM in 6 months and 9 months and with me in a year with a mammogram.  We will then go to every 6-month followup after 03/2021.  CBC and CMP with followup visits.      Thank you for allowing us to continue to participate in Julieta Rivera's care.      Jonathan Gay MD      [facility]       I spent 35 minutes with the patient more than 50% of which was in counseling and coordination of care.

## 2020-03-03 NOTE — NURSING NOTE
Oncology Rooming Note    March 3, 2020 3:32 PM   Julieta Rivera is a 69 year old female who presents for:    Chief Complaint   Patient presents with     Blood Draw     labs drawn by venipuncture by rn.  vital signs taken.     Initial Vitals: /74 (BP Location: Right arm, Patient Position: Sitting, Cuff Size: Adult Regular)   Pulse 82   Temp 97.6  F (36.4  C) (Oral)   Resp 16   Ht 1.524 m (5')   Wt 54.7 kg (120 lb 8 oz)   SpO2 96%   BMI 23.53 kg/m   Estimated body mass index is 23.53 kg/m  as calculated from the following:    Height as of this encounter: 1.524 m (5').    Weight as of this encounter: 54.7 kg (120 lb 8 oz). Body surface area is 1.52 meters squared.  No Pain (0) Comment: Data Unavailable   No LMP recorded. Patient is postmenopausal.  Allergies reviewed: Yes  Medications reviewed: Yes    Medications: Medication refills not needed today.  Pharmacy name entered into [employer]: [facility] [place], [place] - [address]    Clinical concerns: No new concerns. Dr. Gay was notified.      Gavin Daily LPN

## 2020-03-04 LAB — INTERPRETATION ECG - MUSE: NORMAL

## 2020-04-30 ENCOUNTER — MYC REFILL (OUTPATIENT)
Dept: FAMILY MEDICINE | Facility: CLINIC | Age: 70
End: 2020-04-30

## 2020-04-30 DIAGNOSIS — E06.3 HYPOTHYROIDISM DUE TO HASHIMOTO'S THYROIDITIS: ICD-10-CM

## 2020-05-01 RX ORDER — LEVOTHYROXINE SODIUM 100 UG/1
100 TABLET ORAL DAILY
Qty: 90 TABLET | Refills: 0 | Status: SHIPPED | OUTPATIENT
Start: 2020-05-01 | End: 2020-07-29

## 2020-05-05 ENCOUNTER — VIRTUAL VISIT (OUTPATIENT)
Dept: FAMILY MEDICINE | Facility: CLINIC | Age: 70
End: 2020-05-05
Payer: COMMERCIAL

## 2020-05-05 DIAGNOSIS — T45.1X5A PERIPHERAL NEUROPATHY DUE TO CHEMOTHERAPY (H): ICD-10-CM

## 2020-05-05 DIAGNOSIS — R10.32 LEFT LOWER QUADRANT ABDOMINAL PAIN: ICD-10-CM

## 2020-05-05 DIAGNOSIS — Z12.11 SPECIAL SCREENING FOR MALIGNANT NEOPLASMS, COLON: ICD-10-CM

## 2020-05-05 DIAGNOSIS — I42.7 CARDIOMYOPATHY SECONDARY TO DRUG (H): ICD-10-CM

## 2020-05-05 DIAGNOSIS — I71.20 THORACIC AORTIC ANEURYSM, WITHOUT RUPTURE: ICD-10-CM

## 2020-05-05 DIAGNOSIS — Z13.220 LIPID SCREENING: ICD-10-CM

## 2020-05-05 DIAGNOSIS — G62.0 PERIPHERAL NEUROPATHY DUE TO CHEMOTHERAPY (H): ICD-10-CM

## 2020-05-05 DIAGNOSIS — R05.3 PERSISTENT COUGH FOR 3 WEEKS OR LONGER: ICD-10-CM

## 2020-05-05 DIAGNOSIS — E06.3 HYPOTHYROIDISM DUE TO HASHIMOTO'S THYROIDITIS: Primary | ICD-10-CM

## 2020-05-05 PROCEDURE — 99214 OFFICE O/P EST MOD 30 MIN: CPT | Mod: 95 | Performed by: NURSE PRACTITIONER

## 2020-05-05 NOTE — PATIENT INSTRUCTIONS
Take antihistamine daily (maybe 1/2 tab?) for 6 weeks as a diagnostic tool - gauge effect on your cough  Get labs done at oncology if this visit remains scheduled as planned

## 2020-05-05 NOTE — PROGRESS NOTES
"Julieta Rivera is a 69 year old female who is being evaluated via a billable telephone visit.      The patient has been notified of following:     \"This telephone visit will be conducted via a call between you and your physician/provider. We have found that certain health care needs can be provided without the need for a physical exam.  This service lets us provide the care you need with a short phone conversation.  If a prescription is necessary we can send it directly to your pharmacy.  If lab work is needed we can place an order for that and you can then stop by our lab to have the test done at a later time.    Telephone visits are billed at different rates depending on your insurance coverage. During this emergency period, for some insurers they may be billed the same as an in-person visit.  Please reach out to your insurance provider with any questions.    If during the course of the call the physician/provider feels a telephone visit is not appropriate, you will not be charged for this service.\"    Patient has given verbal consent for Telephone visit?  Yes    What phone number would you like to be contacted at? [phone]    How would you like to obtain your AVS? Juan Luis Spicer     Julieta Rivera is a 69 year old female who presents to clinic today for the following health issues:    HPI    Missed physical in April due to covid-19  Saw oncology on March 3rd - mammogram and labs  Scheduled Tuesday, June 2, 9/1 and 12/1 with PA (Linda Gallegos) and labs scheduled.  Next oncologist and mammogram scheduled March 2021  End of chemo and surgical treatment June 2018 Jan 18 - woke up in the morning with nausea, didn't have vomiting or diarrhea.  Significant left side abdominal pain - couldn't speak or move, sweating and hyperventilating. Unsure if she had a temp.  It self-resolved within an hour and has not recurred.  Normal EKG at oncology 3/3/2020.  No history of diverticulitis or diverticulosis.  "     Hypothyroidism Follow-up      Since last visit, patient describes the following symptoms: Weight stable, no hair loss, no skin changes, no constipation, no loose stools    Bowel episode as above    Feels much stronger than a year ago    Cough that appeared and has stayed since chemo - no SOB.  Laughing or singing will provoke the cough.  Wonders if it is related to allergies as she does have these symptoms.  Only takes allergy meds (allegra or claritin) when symptoms are extreme.    Twin 4 year old grandsons who live with patient have been sick constantly, Zach dx with EOE, covid-19 has  Eliminated pediatric supports they had in homecare, delayed IEP planning and school for the kids - stress with this continue    Component      Latest Ref Rng & Units 1/5/2018 3/1/2018 3/8/2018 6/26/2018   TSH      0.40 - 4.00 mU/L 5.23 (H) 3.76 4.41 (H) 2.79   T4 Free      0.76 - 1.46 ng/dL 1.40 1.39 1.40      Component      Latest Ref Rng & Units 4/24/2019   TSH      0.40 - 4.00 mU/L 1.22   T4 Free      0.76 - 1.46 ng/dL 1.15         How many days per week do you exercise enough to make your heart beat faster? 3 or less    How many minutes a day do you exercise enough to make your heart beat faster? 20 - 29    How many days per week do you miss taking your medication? 0    I have reviewed and updated the patient's Past Medical History, Social History, Family History and Medication List      Reviewed and updated as needed this visit by Provider  Tobacco  Med Hx  Surg Hx  Soc Hx        Review of Systems   ROS COMP: Constitutional, HEENT, cardiovascular, pulmonary, gi and gu systems are negative, except as otherwise noted.       Objective   Reported vitals:  There were no vitals taken for this visit.   healthy, alert and no distress  PSYCH: Alert and oriented times 3; coherent speech, normal   rate and volume, able to articulate logical thoughts, able   to abstract reason, no tangential thoughts, no hallucinations   or  delusions  Her affect is normal and pleasant  RESP: No cough, no audible wheezing, able to talk in full sentences  Remainder of exam unable to be completed due to telephone visits    Diagnostic Test Results:  Labs reviewed in Epic  pending        Assessment/Plan:  1. Hypothyroidism due to Hashimoto's thyroiditis  Labs with oncology visit   - T4 free; Future  - TSH with free T4 reflex; Future    2. Left lower quadrant abdominal pain  Constipation vs diverticulitis vs other?    3. Lipid screening  Arrive to oncology labs fasting  - Lipid panel reflex to direct LDL Fasting; Future    4. Special screening for malignant neoplasms, colon  Over-due - mail to patient - low level possibility of relation abdominal pain  - Fecal colorectal cancer screen (FIT); Future    5. 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 Thoracic aortic aneurysm, without rupture (H)  Stable - next cardiology 5/2021    8. Persistent cough for 3 weeks or longer  Suspect allergies related - advise trial of antihistamine for 6 weeks and report back about cough    Patient Instructions   Take antihistamine daily (maybe 1/2 tab?) for 6 weeks as a diagnostic tool - gauge effect on your cough  Get labs done at oncology if this visit remains scheduled as planned      Return in about 2 months (around 7/5/2020) for labs; 6-12 months annual wellness.    End: 3:32 PM  Start:  2:53 PM    Phone call duration:  41 minutes    JOSÉ LUIS Parikh CNP

## 2020-05-05 NOTE — Clinical Note
I had a phone visit with Julieta today and she is due for thyroid labs.  We are thinking she could do these the same day as her next oncology labs with you on 6/2.  I want to check if this clinic and lab visit will proceed as normal or if you know that it will be rescheduled (I realize things are shifting constantly, but what is your bet idea right now)?  KADEN Tucker, FRED

## 2020-05-05 NOTE — TELEPHONE ENCOUNTER
Pt has appointment scheduled for today with a provider    Yasmin Pat RN   [facility]

## 2020-05-22 ENCOUNTER — MYC MEDICAL ADVICE (OUTPATIENT)
Dept: FAMILY MEDICINE | Facility: CLINIC | Age: 70
End: 2020-05-22

## 2020-06-01 VITALS
OXYGEN SATURATION: 97 % | HEART RATE: 63 BPM | BODY MASS INDEX: 23.18 KG/M2 | TEMPERATURE: 98.1 F | RESPIRATION RATE: 16 BRPM | SYSTOLIC BLOOD PRESSURE: 119 MMHG | WEIGHT: 118.7 LBS | DIASTOLIC BLOOD PRESSURE: 80 MMHG

## 2020-06-01 DIAGNOSIS — C50.411 MALIGNANT NEOPLASM OF UPPER-OUTER QUADRANT OF RIGHT BREAST IN FEMALE, ESTROGEN RECEPTOR NEGATIVE (H): ICD-10-CM

## 2020-06-01 DIAGNOSIS — E06.3 HYPOTHYROIDISM DUE TO HASHIMOTO'S THYROIDITIS: ICD-10-CM

## 2020-06-01 DIAGNOSIS — Z17.1 MALIGNANT NEOPLASM OF UPPER-OUTER QUADRANT OF RIGHT BREAST IN FEMALE, ESTROGEN RECEPTOR NEGATIVE (H): ICD-10-CM

## 2020-06-01 LAB
ALBUMIN SERPL-MCNC: 3.8 G/DL (ref 3.4–5)
ALP SERPL-CCNC: 60 U/L (ref 40–150)
ALT SERPL W P-5'-P-CCNC: 21 U/L (ref 0–50)
ANION GAP SERPL CALCULATED.3IONS-SCNC: 8 MMOL/L (ref 3–14)
AST SERPL W P-5'-P-CCNC: 22 U/L (ref 0–45)
BASOPHILS # BLD AUTO: 0 10E9/L (ref 0–0.2)
BASOPHILS NFR BLD AUTO: 0.8 %
BILIRUB SERPL-MCNC: 0.4 MG/DL (ref 0.2–1.3)
BUN SERPL-MCNC: 10 MG/DL (ref 7–30)
CALCIUM SERPL-MCNC: 9.4 MG/DL (ref 8.5–10.1)
CHLORIDE SERPL-SCNC: 106 MMOL/L (ref 94–109)
CO2 SERPL-SCNC: 24 MMOL/L (ref 20–32)
CREAT SERPL-MCNC: 0.67 MG/DL (ref 0.52–1.04)
DIFFERENTIAL METHOD BLD: ABNORMAL
EOSINOPHIL # BLD AUTO: 0.1 10E9/L (ref 0–0.7)
EOSINOPHIL NFR BLD AUTO: 2.3 %
ERYTHROCYTE [DISTWIDTH] IN BLOOD BY AUTOMATED COUNT: 12.9 % (ref 10–15)
GFR SERPL CREATININE-BSD FRML MDRD: 89 ML/MIN/{1.73_M2}
GLUCOSE SERPL-MCNC: 95 MG/DL (ref 70–99)
HCT VFR BLD AUTO: 40.6 % (ref 35–47)
HGB BLD-MCNC: 13.3 G/DL (ref 11.7–15.7)
IMM GRANULOCYTES # BLD: 0 10E9/L (ref 0–0.4)
IMM GRANULOCYTES NFR BLD: 0.3 %
LYMPHOCYTES # BLD AUTO: 1.4 10E9/L (ref 0.8–5.3)
LYMPHOCYTES NFR BLD AUTO: 36.6 %
MCH RBC QN AUTO: 30.7 PG (ref 26.5–33)
MCHC RBC AUTO-ENTMCNC: 32.8 G/DL (ref 31.5–36.5)
MCV RBC AUTO: 94 FL (ref 78–100)
MONOCYTES # BLD AUTO: 0.3 10E9/L (ref 0–1.3)
MONOCYTES NFR BLD AUTO: 7.9 %
NEUTROPHILS # BLD AUTO: 2 10E9/L (ref 1.6–8.3)
NEUTROPHILS NFR BLD AUTO: 52.1 %
NRBC # BLD AUTO: 0 10*3/UL
NRBC BLD AUTO-RTO: 0 /100
PLATELET # BLD AUTO: 177 10E9/L (ref 150–450)
POTASSIUM SERPL-SCNC: 3.9 MMOL/L (ref 3.4–5.3)
PROT SERPL-MCNC: 7.1 G/DL (ref 6.8–8.8)
RBC # BLD AUTO: 4.33 10E12/L (ref 3.8–5.2)
SODIUM SERPL-SCNC: 138 MMOL/L (ref 133–144)
T4 FREE SERPL-MCNC: 1.46 NG/DL (ref 0.76–1.46)
TSH SERPL DL<=0.005 MIU/L-ACNC: 0.7 MU/L (ref 0.4–4)
WBC # BLD AUTO: 3.9 10E9/L (ref 4–11)

## 2020-06-01 PROCEDURE — 80053 COMPREHEN METABOLIC PANEL: CPT | Performed by: NURSE PRACTITIONER

## 2020-06-01 PROCEDURE — 84443 ASSAY THYROID STIM HORMONE: CPT | Performed by: NURSE PRACTITIONER

## 2020-06-01 PROCEDURE — 36415 COLL VENOUS BLD VENIPUNCTURE: CPT | Performed by: NURSE PRACTITIONER

## 2020-06-01 PROCEDURE — 85025 COMPLETE CBC W/AUTO DIFF WBC: CPT | Performed by: NURSE PRACTITIONER

## 2020-06-01 PROCEDURE — 84439 ASSAY OF FREE THYROXINE: CPT | Performed by: NURSE PRACTITIONER

## 2020-06-01 ASSESSMENT — PAIN SCALES - GENERAL: PAINLEVEL: NO PAIN (0)

## 2020-06-01 NOTE — NURSING NOTE
Chief Complaint   Patient presents with     Blood Draw     labs drawn by venipuncture by rn in lab.  vital signs taken.     Suly Samayoa RN

## 2020-06-04 NOTE — RESULT ENCOUNTER NOTE
Julieta,    Your labs were all normal.  If you have any questions, please feel free to contact the clinic.    FRED Lewis

## 2020-06-17 ENCOUNTER — VIRTUAL VISIT (OUTPATIENT)
Dept: ONCOLOGY | Facility: CLINIC | Age: 70
End: 2020-06-17
Attending: INTERNAL MEDICINE
Payer: COMMERCIAL

## 2020-06-17 VITALS — BODY MASS INDEX: 23.56 KG/M2 | HEIGHT: 60 IN | WEIGHT: 120 LBS

## 2020-06-17 DIAGNOSIS — C50.411 MALIGNANT NEOPLASM OF UPPER-OUTER QUADRANT OF RIGHT BREAST IN FEMALE, ESTROGEN RECEPTOR NEGATIVE (H): Primary | ICD-10-CM

## 2020-06-17 DIAGNOSIS — Z17.1 MALIGNANT NEOPLASM OF UPPER-OUTER QUADRANT OF RIGHT BREAST IN FEMALE, ESTROGEN RECEPTOR NEGATIVE (H): Primary | ICD-10-CM

## 2020-06-17 PROCEDURE — 99214 OFFICE O/P EST MOD 30 MIN: CPT | Mod: 95 | Performed by: PHYSICIAN ASSISTANT

## 2020-06-17 PROCEDURE — 40001009 ZZH VIDEO/TELEPHONE VISIT; NO CHARGE

## 2020-06-17 ASSESSMENT — MIFFLIN-ST. JEOR: SCORE: 985.82

## 2020-06-17 ASSESSMENT — PAIN SCALES - GENERAL: PAINLEVEL: MILD PAIN (2)

## 2020-06-17 NOTE — PROGRESS NOTES
"Julieta Rivera is a 69 year old female who is being evaluated via a billable video visit.      The patient has been notified of following:     \"This video visit will be conducted via a call between you and your physician/provider. We have found that certain health care needs can be provided without the need for an in-person physical exam.  This service lets us provide the care you need with a video conversation.  If a prescription is necessary we can send it directly to your pharmacy.  If lab work is needed we can place an order for that and you can then stop by our lab to have the test done at a later time.    Video visits are billed at different rates depending on your insurance coverage.  Please reach out to your insurance provider with any questions.    If during the course of the call the physician/provider feels a video visit is not appropriate, you will not be charged for this service.\"    Patient has given verbal consent for Video visit? Yes    Will anyone else be joining your video visit? No          Secondary Video Option ([employer]), send text message to:  [phone]    I have reviewed and updated the patient's allergies and medication list.    Concerns: Patient has no new concerns.    Refills: None      Jonathon Verdin, EMT  Jun 17, 2020      HISTORY OF PRESENT ILLNESS:  Julieta Rivera is a 68-year-old woman who was referred to our clinic with a new diagnosis of right triple-negative breast cancer.  Julieta was followed by routine screening mammography, when she was discovered to have a 7 x 6 x 9-mm mass at the 12 o'clock position of the right breast 6 cm from the nipple-areolar complex.  She did undergo a biopsy of this mass which showed an ER-negative, RI-negative, HER2-nonamplified, invasive mammary carcinoma of no special type, invasive ductal carcinoma, [place] grade 3.  Ductal carcinoma in situ was also noted.  Nuclear grade 2 solid type.  HER2 FISH showed no amplification.      TREATMENT " "HISTORY:  A.  TC x 4  B.  Lumpectomy 6-7-18 [id number] RCB 1  (0.71)  C.  Radiation  Radiation Oncology - Course: 1         Protocol:   Treatment Site            Current Dose   Modality           From    To        Elapsed Days  Fx.  1 R Breast        4,240 cGy       06 X      7/30/2018        8/21/2018        22       16  1 R Breast Boost          1,000 cGy       e09       8/22/2018 8/27/2018         5         4       PAST MEDICAL HISTORY:  She has no history of breast surgery in the past or breast cancer in the past.  She has no history of radiation of any kind.  She has no history of tumor of any kind.  She may have a history of a heart problem with mitral prolapse and a murmur.  The last echo we have on record is from 1996.  She has no history of heart attack, breathing problems, blood clots, seizures, arthritis, peptic ulcer disease, osteoporosis or bone fractures.  She is not currently participating in a clinical trial and has not had any significant weight loss.  She has no history of hypertension, but she does have a history of factor V Leiden because her sister was diagnosed with factor V Leiden and Julieta was tested, although Julieta herself has had no blood clots or pulmonary emboli.          INTERVAL HISTORY:   Julieta has been doing quite well.  she recalled an episode in January where she had severe LLQ abd pain for 45 min in the middle of the night.  It never occurred again.  She has no pain, fatigue, depression or anxiety. She is feeling well and not overly anxious about her cancer.  She works on ROM for her shoulder.  She does like [employer] and has been doing this online.  She and her family are adjusting well to the Covid restrictions, she and her 82 year old  help their daughter and her two twin 4 years olds with medical problems.         PHYSICAL EXAMINATION:   Vitals 6/17/2020   Weight 120 lb   Height 5' 0\"   BSA 1.52   BMI 23.44     Video physical exam  General: Patient appears well in no " acute distress. Normal body habitus.  Skin: No visualized rash or lesions on visualized skin  Eyes: EOMI, no erythema, sclera icterus or discharge noted  Resp: Appears to be breathing comfortably without accessory muscle usage, speaking in full sentences, no cough  MSK: Appears to have normal range of motion based on visualized movements  Neurologic: No apparent tremors, facial movements symmetric  Psych: affect bright, alert and oriented  The rest of a comprehensive physical examination is deferred due to PHE (public health emergency) video restrictions      LABORATORY DATA:  CBC and CMP were within normal limits.   (reviewed from 6/1)     IMAGING:  Diagnostic mammogram 03/03/2020 is BI-RADS 2.         ASSESSMENT AND PLAN:    1.  Julieta Rivera is a 69-year-old woman with a history of stage I, clinical [id number] invasive ductal carcinoma of the right breast, triple negative in histology, maximum dimension 9 mm, grade 3.  She received neoadjuvant TC chemotherapy following lumpectomy showed an RCB1 response with significant reduction of the tumor and with a small amount of residual disease measuring about 2.5 mm in largest dimension.  There is 1 other small focus.  The margins were clear for DCIS and invasive cancer.  She was not offered adjuvant Xeloda given the small amount of residual disease.  Eight sentinel lymph nodes were examined and were negative for cancer.  There are no concerns today during our video visit, mammo was done this year already.  RTC in 3 months, per Dr Gay's last orders.      2.  DEXA scan shows osteoporosis with a most valid negative T-score of -2.4.  She did not want zoledronic acid, Prolia or Fosamax.    She will continue with weightbearing exercise, calcium and vitamin D.           Video-Visit Details    Type of service:  Video Visit    Video Start Time: 12:42  Video End Time: 1:04    Originating Location (pt. Location): Home    Distant Location (provider location):  [facility]     Platform used for Video Visit: Elidia Gallegos PA-C

## 2020-07-29 ENCOUNTER — MYC REFILL (OUTPATIENT)
Dept: FAMILY MEDICINE | Facility: CLINIC | Age: 70
End: 2020-07-29

## 2020-07-29 DIAGNOSIS — E06.3 HYPOTHYROIDISM DUE TO HASHIMOTO'S THYROIDITIS: ICD-10-CM

## 2020-07-30 RX ORDER — LEVOTHYROXINE SODIUM 100 UG/1
100 TABLET ORAL DAILY
Qty: 90 TABLET | Refills: 1 | Status: SHIPPED | OUTPATIENT
Start: 2020-07-30 | End: 2021-01-26

## 2020-07-30 NOTE — TELEPHONE ENCOUNTER
Prescription approved per [facility] Refill Protocol.  Noemi Hernandes RN   [place]

## 2020-09-01 ENCOUNTER — VIRTUAL VISIT (OUTPATIENT)
Dept: ONCOLOGY | Facility: CLINIC | Age: 70
End: 2020-09-01
Attending: PHYSICIAN ASSISTANT
Payer: COMMERCIAL

## 2020-09-01 VITALS
OXYGEN SATURATION: 98 % | HEART RATE: 93 BPM | WEIGHT: 127.4 LBS | BODY MASS INDEX: 24.88 KG/M2 | DIASTOLIC BLOOD PRESSURE: 80 MMHG | RESPIRATION RATE: 18 BRPM | SYSTOLIC BLOOD PRESSURE: 135 MMHG | TEMPERATURE: 98.2 F

## 2020-09-01 DIAGNOSIS — M25.511 CHRONIC RIGHT SHOULDER PAIN: ICD-10-CM

## 2020-09-01 DIAGNOSIS — I89.0 LYMPHEDEMA: Primary | ICD-10-CM

## 2020-09-01 DIAGNOSIS — Z17.1 MALIGNANT NEOPLASM OF UPPER-OUTER QUADRANT OF RIGHT BREAST IN FEMALE, ESTROGEN RECEPTOR NEGATIVE (H): ICD-10-CM

## 2020-09-01 DIAGNOSIS — C50.411 MALIGNANT NEOPLASM OF UPPER-OUTER QUADRANT OF RIGHT BREAST IN FEMALE, ESTROGEN RECEPTOR NEGATIVE (H): ICD-10-CM

## 2020-09-01 DIAGNOSIS — G89.29 CHRONIC RIGHT SHOULDER PAIN: ICD-10-CM

## 2020-09-01 LAB
ALBUMIN SERPL-MCNC: 3.8 G/DL (ref 3.4–5)
ALP SERPL-CCNC: 66 U/L (ref 40–150)
ALT SERPL W P-5'-P-CCNC: 20 U/L (ref 0–50)
ANION GAP SERPL CALCULATED.3IONS-SCNC: 8 MMOL/L (ref 3–14)
AST SERPL W P-5'-P-CCNC: 18 U/L (ref 0–45)
BASOPHILS # BLD AUTO: 0 10E9/L (ref 0–0.2)
BASOPHILS NFR BLD AUTO: 0.3 %
BILIRUB SERPL-MCNC: 0.7 MG/DL (ref 0.2–1.3)
BUN SERPL-MCNC: 13 MG/DL (ref 7–30)
CALCIUM SERPL-MCNC: 9.1 MG/DL (ref 8.5–10.1)
CHLORIDE SERPL-SCNC: 108 MMOL/L (ref 94–109)
CO2 SERPL-SCNC: 24 MMOL/L (ref 20–32)
CREAT SERPL-MCNC: 0.7 MG/DL (ref 0.52–1.04)
DIFFERENTIAL METHOD BLD: NORMAL
EOSINOPHIL # BLD AUTO: 0.2 10E9/L (ref 0–0.7)
EOSINOPHIL NFR BLD AUTO: 2.7 %
ERYTHROCYTE [DISTWIDTH] IN BLOOD BY AUTOMATED COUNT: 13.2 % (ref 10–15)
GFR SERPL CREATININE-BSD FRML MDRD: 87 ML/MIN/{1.73_M2}
GLUCOSE SERPL-MCNC: 100 MG/DL (ref 70–99)
HCT VFR BLD AUTO: 39.8 % (ref 35–47)
HGB BLD-MCNC: 13.5 G/DL (ref 11.7–15.7)
IMM GRANULOCYTES # BLD: 0 10E9/L (ref 0–0.4)
IMM GRANULOCYTES NFR BLD: 0.3 %
LYMPHOCYTES # BLD AUTO: 1.5 10E9/L (ref 0.8–5.3)
LYMPHOCYTES NFR BLD AUTO: 26.3 %
MCH RBC QN AUTO: 30.9 PG (ref 26.5–33)
MCHC RBC AUTO-ENTMCNC: 33.9 G/DL (ref 31.5–36.5)
MCV RBC AUTO: 91 FL (ref 78–100)
MONOCYTES # BLD AUTO: 0.5 10E9/L (ref 0–1.3)
MONOCYTES NFR BLD AUTO: 7.9 %
NEUTROPHILS # BLD AUTO: 3.7 10E9/L (ref 1.6–8.3)
NEUTROPHILS NFR BLD AUTO: 62.5 %
NRBC # BLD AUTO: 0 10*3/UL
NRBC BLD AUTO-RTO: 0 /100
PLATELET # BLD AUTO: 186 10E9/L (ref 150–450)
POTASSIUM SERPL-SCNC: 3.8 MMOL/L (ref 3.4–5.3)
PROT SERPL-MCNC: 7.1 G/DL (ref 6.8–8.8)
RBC # BLD AUTO: 4.37 10E12/L (ref 3.8–5.2)
SODIUM SERPL-SCNC: 139 MMOL/L (ref 133–144)
WBC # BLD AUTO: 5.9 10E9/L (ref 4–11)

## 2020-09-01 PROCEDURE — 99214 OFFICE O/P EST MOD 30 MIN: CPT | Mod: 95 | Performed by: PHYSICIAN ASSISTANT

## 2020-09-01 PROCEDURE — G0463 HOSPITAL OUTPT CLINIC VISIT: HCPCS | Mod: TEL,ZF

## 2020-09-01 PROCEDURE — 36415 COLL VENOUS BLD VENIPUNCTURE: CPT

## 2020-09-01 PROCEDURE — 85025 COMPLETE CBC W/AUTO DIFF WBC: CPT | Performed by: INTERNAL MEDICINE

## 2020-09-01 PROCEDURE — 80053 COMPREHEN METABOLIC PANEL: CPT | Performed by: INTERNAL MEDICINE

## 2020-09-01 PROCEDURE — 40001009 ZZH VIDEO/TELEPHONE VISIT; NO CHARGE

## 2020-09-01 ASSESSMENT — PAIN SCALES - GENERAL: PAINLEVEL: NO PAIN (1)

## 2020-09-01 NOTE — PROGRESS NOTES
"Julieta Rivera is a 69 year old female who is being evaluated via a billable telephone visit.      The patient has been notified of following:     \"This telephone visit will be conducted via a call between you and your physician/provider. We have found that certain health care needs can be provided without the need for a physical exam.  This service lets us provide the care you need with a short phone conversation.  If a prescription is necessary we can send it directly to your pharmacy.  If lab work is needed we can place an order for that and you can then stop by our lab to have the test done at a later time.    Telephone visits are billed at different rates depending on your insurance coverage. During this emergency period, for some insurers they may be billed the same as an in-person visit.  Please reach out to your insurance provider with any questions.    If during the course of the call the physician/provider feels a telephone visit is not appropriate, you will not be charged for this service.\"    Patient has given verbal consent for Telephone visit?  Yes    What phone number would you like to be contacted at? [phone]     How would you like to obtain your AVS? Juan Luis     Vitals - Patient Reported  Weight (Patient Reported): 57.6 kg (127 lb)  Height (Patient Reported): 152.4 cm (5')  BMI (Based on Pt Reported Ht/Wt): 24.8  Pain Score: No Pain (1)      I have reviewed and updated the patient's allergies and medication list.    Concerns: No concerns  Refills: No refills needed.        Sanjuana Sandoval CMA    Sep 1, 2020          HISTORY OF PRESENT ILLNESS:  Julieta Rivera is a 68-year-old woman who was referred to our clinic with a new diagnosis of right triple-negative breast cancer.  Julieta was followed by routine screening mammography, when she was discovered to have a 7 x 6 x 9-mm mass at the 12 o'clock position of the right breast 6 cm from the nipple-areolar complex.  She did undergo a biopsy of " "this mass which showed an ER-negative, IA-negative, HER2-nonamplified, invasive mammary carcinoma of no special type, invasive ductal carcinoma, [place] grade 3.  Ductal carcinoma in situ was also noted.  Nuclear grade 2 solid type.  HER2 FISH showed no amplification.       TREATMENT HISTORY:  A.  TC x 4  B.  Lumpectomy 6-7-18 [id number] RCB 1  (0.71)  C.  Radiation  Radiation Oncology - Course: 1         Protocol:   Treatment Site            Current Dose   Modality           From    To        Elapsed Days  Fx.  1 R Breast        4,240 cGy       06 X      7/30/2018        8/21/2018        22       16  1 R Breast Boost          1,000 cGy       e09       8/22/2018 8/27/2018         5         4        PAST MEDICAL HISTORY:  She has no history of breast surgery in the past or breast cancer in the past.  She has no history of radiation of any kind.  She has no history of tumor of any kind.  She may have a history of a heart problem with mitral prolapse and a murmur.  The last echo we have on record is from 1996.  She has no history of heart attack, breathing problems, blood clots, seizures, arthritis, peptic ulcer disease, osteoporosis or bone fractures.  She is not currently participating in a clinical trial and has not had any significant weight loss.  She has no history of hypertension, but she does have a history of factor V Leiden because her sister was diagnosed with factor V Leiden and Julieta was tested, although Julieta herself has had no blood clots or pulmonary emboli.            INTERVAL HISTORY:   Julieta has been having more stress at home.  Her blood pressure has been higher and her weight is up.  She tripped over a child toy and hurt her shoulder- it feels like \"frozen shoulder\" as she has had this in the past.  This is on her right shoulder now.  Its no worse- just no better.  She also has tenderness in the left chest wall- she feels she may have a little lymphedema.    She has no pain, fatigue or " depression but does have some newer issues with anxiety. She is not overly anxious about her cancer but just dealing with Covid.  She does like [employer] and has been doing this online.  She has been really enjoying this for exercise and meditation.     She and her family are adjusting well to the Covid restrictions, she and her 82 year old  help their daughter and her two twin 4 years olds with medical problems.         PHYSICAL EXAMINATION:   Vitals 9/1/2020   Systolic 135   Diastolic 80   Pulse 93   Respiration 18   Temp 98.2   O2 98   Pain    Weight 127 lb 6.4 oz     Video physical exam  Voice is clear and strong. No audible stridor, wheezing, or respiratory distress. The remainder of PE was deferred due to PHE.       LABORATORY DATA:       9/1/2020 11:24   Sodium 139   Potassium 3.8   Chloride 108   Carbon Dioxide 24   Urea Nitrogen 13   Creatinine 0.70   GFR Estimate 87   GFR Estimate If Black >90   Calcium 9.1   Anion Gap 8   Albumin 3.8   Protein Total 7.1   Bilirubin Total 0.7   Alkaline Phosphatase 66   ALT 20   AST 18   Glucose 100 (H)   WBC 5.9   Hemoglobin 13.5   Hematocrit 39.8   Platelet Count 186   RBC Count 4.37   MCV 91        IMAGING:  Diagnostic mammogram 03/03/2020 is BI-RADS 2.         ASSESSMENT AND PLAN:    1.  Julieta Rivera is a 69-year-old woman with a history of stage I, clinical [id number] invasive ductal carcinoma of the right breast, triple negative in histology, maximum dimension 9 mm, grade 3.  She received neoadjuvant TC chemotherapy following lumpectomy showed an RCB1 response with significant reduction of the tumor and with a small amount of residual disease measuring about 2.5 mm in largest dimension.  There is 1 other small focus.  The margins were clear for DCIS and invasive cancer.  She was not offered adjuvant Xeloda given the small amount of residual disease.  Eight sentinel lymph nodes were examined and were negative for cancer.  There are no concerns today during our  video visit, mammo was done this year already.  RTC in 3 months, per Dr Gay's last orders.      2.  DEXA scan shows osteoporosis with a most valid negative T-score of -2.4.  She did not want zoledronic acid, Prolia or Fosamax.    She will continue with weightbearing exercise, calcium and vitamin D.     3. Shoulder pain- fell a few months ago and has had a hurt shoulder since, also has some lymphedema on the chest that is causing some palpable tenderness.  Will refer to PT and lymphedema again.                Phone call duration: 27 minutes    Mirta Gallegos PA-C

## 2020-09-21 ENCOUNTER — HOSPITAL ENCOUNTER (OUTPATIENT)
Dept: PHYSICAL THERAPY | Facility: CLINIC | Age: 70
Setting detail: THERAPIES SERIES
End: 2020-09-21
Attending: PHYSICIAN ASSISTANT
Payer: COMMERCIAL

## 2020-09-21 DIAGNOSIS — I89.0 LYMPHEDEMA: ICD-10-CM

## 2020-09-21 DIAGNOSIS — G89.29 CHRONIC RIGHT SHOULDER PAIN: ICD-10-CM

## 2020-09-21 DIAGNOSIS — M25.511 CHRONIC RIGHT SHOULDER PAIN: ICD-10-CM

## 2020-09-21 PROCEDURE — 97140 MANUAL THERAPY 1/> REGIONS: CPT | Mod: GP | Performed by: PHYSICAL THERAPIST

## 2020-09-21 PROCEDURE — 97162 PT EVAL MOD COMPLEX 30 MIN: CPT | Mod: GP | Performed by: PHYSICAL THERAPIST

## 2020-09-21 PROCEDURE — 97112 NEUROMUSCULAR REEDUCATION: CPT | Mod: GP | Performed by: PHYSICAL THERAPIST

## 2020-09-21 PROCEDURE — 97110 THERAPEUTIC EXERCISES: CPT | Mod: GP | Performed by: PHYSICAL THERAPIST

## 2020-09-21 NOTE — PROGRESS NOTES
"   09/21/20 1000   Quick Adds   Quick Adds Certification   Type of Visit Initial OP PT Evaluation       Present No   General Information   Start of Care Date 09/21/20   Referring Physician Linda Gallegos PA-C   Orders Evaluate and Treat as Indicated   Additional Orders R axillary swelling and R shoulder pain   Order Date 09/01/20   Medical Diagnosis Hx of R  breast cancer   Onset of illness/injury or Date of Surgery 06/07/18   Surgical/Medical history reviewed Yes   Pertinent history of current problem (include personal factors and/or comorbidities that impact the POC) Pt with Breast Cancer treatment in 2018.  She had lumpectomy and 8 ALND June 2018.  She completed her radiation treatment in August of 2018.  She started Lymphedema therapy for cording and this resolved, she got good ROM.  Recently, Pt fell in her kitchen stepping over a stool about 4 months ago.  She is having R shoulder pain, decreased ROM and axillary swelling.  Pt had L frozen shoulder in the past and feels like her R shoulder is limited the way that the L was.  Pt also with L LBP radiating to the front hip.   Prior level of function comment Pt is independent. Getting up and moving around during the day helps with pain in the R UE and also the L low back and hip.    Previous/Current Treatment Physical Therapy   Improvement after PT Other  (PT for L frozen shoulder in past that helped and PT for R co)   Patient role/Employment history Retired   Living environment House/[facility]   Home/Community Accessibility Comments Pt's daughter and 5y/o twin grandson's live with her. Patient helps out with the kids.   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  " They could not start in school this year due to COVID.  Their birth and early life was and still is traumatic for patient and her daughter.  She utilizes [employer] doing some online yoga/jennifer chi.    Fall Risk Screen   Fall screen completed by PT   Have you fallen 2 or more times in the past year? No   Have you fallen and had an injury in the past year? Yes   Is patient a fall risk? No   Fall screen comments Pt just had the 1 fall with shoulder injury   Functional Scales   Functional Scales and Outcomes SPADI 56.92   Pain   Patient currently in pain Yes   Pain location L low back   Pain rating 1/10   Pain description Dull   Pain description comment pt is not in much pain sitting in chair at rest but does note sensation of discomfort in low back.  Shoulder pain is with movement.    Cognitive Status Examination   Orientation orientation to person, place and time   Level of Consciousness alert   Follows Commands and Answers Questions 100% of the time   Personal Safety and Judgment intact   Memory intact   Integumentary   Integumentary Comments chest girth under axilla is 83.4cm   Palpation   Palpation pt is tender in the axilla with just little pressure   Range of Motion (ROM)   ROM Quick Adds Shoulder, Left;Shoulder, Right   Left Shoulder Flexion ROM   Left Shoulder Flexion AROM - degrees 156   Left Shoulder ABduction ROM   Left Shoulder ABduction AROM - degrees 145   Left Shoulder External Rotation ROM   Left Shoulder External Rotation AROM - degrees 62   Left Shoulder Internal Rotation ROM   Left Shoulder Internal Rotation AROM - degrees able to reach around her back to lumbar spine   Right Shoulder Flexion ROM   Right Shoulder Flexion AROM - degrees 130   Right Shoulder Flexion PROM - degrees 144   Right Shoulder ABduction ROM   Right Shoulder ABduction AROM - degrees 86   Right Shoulder External Rotation ROM   Right Shoulder External Rotation AROM - degrees 56, at 80 degrees of abduction ER is 34 with open  end feel   Right Shoulder Internal Rotation ROM   Right Shoulder Internal Rotation AROM - degrees able to reach back of hand to sacrum with pain, at 80 degrees of abduction IR is 38   Strength   Strength Comments generally 4 or greater of 5.  pain in the R shoulder with abduction isometric and ER.   Bed Mobility   Bed Mobility Comments independent, sits straight up   Transfer Skills   Transfer Comments independent   Gait   Gait Comments independent   Balance   Balance Comments Good, fall was an accident with children leaving their things in bad place but pt is more aware of scanning for obstacles.   Sensory Examination   Sensory Perception Comments Reports numbness in arms (both) and hands at times at night but not happened for a while.  Mild neuropathy persists in finger tips   Coordination   Coordination no deficits were identified   Muscle Tone   Muscle Tone no deficits were identified   Modality Interventions   Planned Modality Interventions Cryotherapy;TENS   Planned Therapy Interventions   Planned Therapy Interventions joint mobilization;neuromuscular re-education;ROM;strengthening;stretching;manual therapy   Clinical Impression   Criteria for Skilled Therapeutic Interventions Met yes, treatment indicated   PT Diagnosis R Shoulder dysfunction, decreased ROM   Influenced by the following impairments decreased ROM, pain, some anterior instability   Functional limitations due to impairments Bathing, grooming, dressing upper body, reaching up to high shelf, pain at night when trying to sleep   Clinical Presentation Evolving/Changing   Clinical Presentation Rationale has not gotten better, has not gotten a lot worse, pt does what she needs to do with pain and compensation, swelling in axilla   Clinical Decision Making (Complexity) Moderate complexity   Therapy Frequency 1 time/week   Predicted Duration of Therapy Intervention (days/wks) up to 8 weeks  (will start with 4 weeks scheduled)   Risk & Benefits of therapy  have been explained Yes   Patient, Family & other staff in agreement with plan of care Yes   Clinical Impression Comments Pt presents with shoulder dysfunction the somewhat mimics an adhesive capusulitis with decreased ROM in abd, IR, ER but in that order rather than the typical presentation.  She did have traumatic incident with concern for labral lesion and RTC.  Vague radiating pain would be consideration for an impingement/tendonitis.  Mild axillary swelling.   If no improvement after 2-4 session, will recommend ortho consult and potentially work with ortho PT.    Education Assessment   Preferred Learning Style Listening;Reading;Demonstration;Pictures/video   Barriers to Learning No barriers   GOALS   PT Eval Goals 1;2;3;4   Goal 1   Goal Identifier HEP   Goal Description Pt will be independent with home exercise program to increase R shoulder ROM and strength without increased pain for better overall function for ADLs and leisure.   Target Date 11/29/20   Goal 2   Goal Identifier SPADI   Goal Description Pt will have 11 point decreased score on SPADI to reflect the SHIELA in pain and disability of R shoulder.   Target Date 11/29/20   Goal 3   Goal Identifier Pain management   Goal Description Pt will reports 3 things that she can do to manage her R shoulder and L low back pain to prevent greater than 4/10.   Target Date 11/29/20   Goal 4   Goal Identifier ROM   Goal Description Pt will have improved R shoulder abduction to 130 degrees and IR to reach lumbar spine when standing to allow for ease of household activities and dressing/bathing.   Target Date 11/29/20   Total Evaluation Time   PT Eval, Moderate Complexity Minutes (33833) 20   Therapy Certification   Certification date from 09/21/20   Certification date to 12/19/20   Medical Diagnosis R shoulder dysfunction

## 2020-09-21 NOTE — PROGRESS NOTES
[facility]        OUTPATIENT PHYSICAL THERAPY FUNCTIONAL EVALUATION  PLAN OF TREATMENT FOR OUTPATIENT REHABILITATION  (COMPLETE FOR INITIAL CLAIMS ONLY)  Patient's Last Name, First Name, M.I.  YOB: 1950  Julieta Rivera     Provider's Name   [facility]   Medical Record No.  [id number]     Start of Care Date:  09/21/20   Onset Date:  06/07/18   Type:     _X__PT   ____OT  ____SLP Medical Diagnosis:  R shoulder dysfunction     PT Diagnosis:  R Shoulder dysfunction, decreased ROM Visits from SOC:  1                              __________________________________________________________________________________  Plan of Treatment/Functional Goals:  joint mobilization, neuromuscular re-education, ROM, strengthening, stretching, manual therapy     Cryotherapy, TENS     GOALS  HEP  Pt will be independent with home exercise program to increase R shoulder ROM and strength without increased pain for better overall function for ADLs and leisure.  11/29/20    SPADI  Pt will have 11 point decreased score on SPADI to reflect the SHIELA in pain and disability of R shoulder.  11/29/20    Pain management  Pt will reports 3 things that she can do to manage her R shoulder and L low back pain to prevent greater than 4/10.  11/29/20    ROM  Pt will have improved R shoulder abduction to 130 degrees and IR to reach lumbar spine when standing to allow for ease of household activities and dressing/bathing.  11/29/20      Therapy Frequency:  1 time/week   Predicted Duration of Therapy Intervention:  up to 8 weeks(will start with 4 weeks scheduled)    Vesna Sherwood, PT                                    I CERTIFY THE NEED FOR THESE SERVICES FURNISHED UNDER        THIS PLAN OF TREATMENT AND WHILE UNDER MY CARE     (Physician co-signature of this document indicates review and certification of  the therapy plan).                Certification Date From:  09/21/20   Certification Date To:  12/19/20    Referring Provider:  Linda Gallegos PA-C    Initial Assessment  See Epic Evaluation- Start of Care Date: 09/21/20

## 2020-09-29 NOTE — MR AVS SNAPSHOT
After Visit Summary   2/22/2018    Julieta Rivera    MRN: [id number]           Patient Information     Date Of Birth          1950        Visit Information        Provider Department      2/22/2018 10:00 AM Jonathan Gay MD [facility]        Today's Diagnoses     Malignant neoplasm of upper-outer quadrant of right breast in female, estrogen receptor negative (H)    -  1    Clotting disorder (H)        Encounter for antineoplastic chemotherapy           Follow-ups after your visit        Additional Services     CARDIOLOGY EVAL ADULT REFERRAL       Dr. Kaley Tidwell this week.            Hematology IP Consult       Factor V Leiden.  Urgent consult before port placement.  Mitral regurgitation.  Chemotherapy planned.            Social Work IP Consult       Breast cancer, requiring chemotherapy.  Social problems at home with care giving.                  Follow-up notes from your care team     Return in about 1 week (around 3/1/2018).      Your next 10 appointments already scheduled     Feb 23, 2018  3:00 PM CST   Ech Complete with [id number]   [facility])    [address] [place] [address]  3rd Floor  [facility] [address] [phone]           1. Please bring or wear a comfortable two-piece outfit. 2. You may eat, drink and take your normal medicines. 3. For any questions that cannot be answered, please contact the ordering physician            Mar 01, 2018  9:30 AM CST   NEW CLOTTING DISORDER with Bill Hurd MD   [facility] ([facility])    [facility]  [address]  [facility] [address] [phone]            Mar 02, 2018  9:15 AM CST   (Arrive by 9:00 AM)   New Patient Visit with Eugene Guzman MD   [facility] ([facility])    [address] [place] [address]  [address]  [facility] [id number] [phone]             Mar 05, 2018 12:30 PM CST   (Arrive by 11:00 AM)   IR CHEST PORT PLACEMENT > 5 YRS OF AGE with [id number]   [facility] ([facility])    [address]  5th Floor  [facility] [address]              1. Your doctor will need to do a history and physical within 7 days before this procedure. 2. Your doctor will which medications should not be taken the morning of the exam. 3. Laboratory tests are to be obtained by your doctor prior to the exam (Basic Metabolic Panel, CBCP, PTT and INR) (No labs needed if you are having a tunneled catheter exchange or removal) 4. If you have allergies to x-ray contrast or iodine, contact your doctor or a Radiology nurse prior to the exam day for instructions. 5. Someone will need to drive you to and from the hospital. 6. If you are or may be pregnant, contact your doctor or a Radiology nurse prior to the day of the exam. 7. If you have diabetes, check with your doctor or a Radiology nurse to see if your insulin needs to be adjusted for the exam. 8. If you are taking a medication called Glucophage or Glucovance; these medications need to be held the day of the exam and for approximately 48 hours following. A blood sample must be drawn so your creatinine level can be checked before resuming this medication. 9. If you are taking Coumadin (to thin you blood) please contact your doctor or a Radiology nurse at least 3 days before the exam for special instructions. 10. You should not have received contrast within 48 hours of this exam. 11. The day before your exam you may eat your regular diet and are encouraged to drink at least 2 quarts of clear liquids. Drink no alcoholic beverages for 24 hours prior to the exam. 12. If you have a colostomy you will need to irrigate it with tap water at 8PM the evening before and again at 6AM the morning of the exam. 13. Do not smoke for 24 hours prior to the procedure. 14. Birth to 4 years: - Breast feeding  must be stopped 4 hours prior to exam - Solid food or formula must be stopped 6 hours prior to exam - Tube feedings must be stopped 6 hours prior to exam 15. 4-10 years old: - Nothing to eat or drink 6 hours prior to exam 16. 10+ years old: - Nothing to eat or drink 8 hours prior to exam 17. The morning of the exam you may brush your teeth and take medications as directed with a sip of water. 18. When discharged, you cannot drive until morning, and an adult must be with you until then. You should stay in the [facility] overnight. 19. Bring a list of all drugs you are taking; include supplements and over-the-counter medications. Wear comfortable clothes and leave your valuables at home.            Mar 05, 2018   Procedure with Brian Tolbert PA-C   [facility] ([facility])    [address] [place] [address]  5th Floor  [facility] [address]   [phone]           Located in the Clinics [facility] at [address] [place] [address].   parking is very convenient and highly recommended.  is a $6 flat rate fee.  Both  and self parkers should enter the main arrival plaza from [facility]; parking attendants will direct you based on your parking preference.            Mar 08, 2018 11:00 AM CST   [facility] Lab Draw with UC [facility] LAB DRAW   [facility] ([facility])    [address] [place] [address]  [address]  [facility] [address]   [phone]            Mar 08, 2018 11:30 AM CST   (Arrive by 11:15 AM)   Return Visit with Jonathan Gay MD   [facility] ([facility])    [address] [place] [address]  [address]  [facility] [address]   [phone]            Mar 08, 2018 12:30 PM CST   Infusion 180 with LYNN ONCOLOGY INFUSION, UC 30 ATC   [facility] ([facility])    [address] [place] [address] [address]  [place]  [place] [address]   [phone]            Mar 19, 2018  9:30 AM CDT   (Arrive by 9:15 AM)   NEW CANCER VISIT with Kaley Tidwell MD   [facility] ([facility])    [address]  [address] [place] [id number] [phone]              Future tests that were ordered for you today     Open Standing Orders        Priority Remaining Interval Expires Ordered    CBC with platelets differential Routine 52/52 2/22/2019 2/22/2018    Comprehensive metabolic panel Routine 52/52 2/22/2019 2/22/2018          Open Future Orders        Priority Expected Expires Ordered    IR Chest Port Placement > 5 Yrs of Age Routine  2/22/2019 2/22/2018    Echocardiogram Complete Routine  2/22/2019 2/22/2018            Who to contact     If you have questions or need follow up information about today's clinic visit or your schedule please contact [facility] directly at [phone].  Normal or non-critical lab and imaging results will be communicated to you by iWantoohart, letter or phone within 4 business days after the clinic has received the results. If you do not hear from us within 7 days, please contact the clinic through [employer]t or phone. If you have a critical or abnormal lab result, we will notify you by phone as soon as possible.  Submit refill requests through [employer] or call your pharmacy and they will forward the refill request to us. Please allow 3 business days for your refill to be completed.          Additional Information About Your Visit        iWantoohart Information     [employer] gives you secure access to your electronic health record. If you see a primary care provider, you can also send messages to your care team and make appointments. If you have questions, please call your primary care clinic.  If you do not have a primary care provider, please call [phone] and they will assist you.        Care EveryWhere ID     This is your Care EveryWhere ID. This could be  used by other organizations to access your [facility] medical records  [id number]        Your Vitals Were     Pulse Temperature Height Pulse Oximetry BMI (Body Mass Index)       63 97.3  F (36.3  C) (Oral) 1.524 m (5') 98% 24.35 kg/m2        Blood Pressure from Last 3 Encounters:   02/22/18 119/71   01/05/18 114/72   01/04/17 115/75    Weight from Last 3 Encounters:   02/22/18 56.6 kg (124 lb 11.2 oz)   01/05/18 56.3 kg (124 lb 1.6 oz)   01/04/17 51.5 kg (113 lb 9.6 oz)              We Performed the Following     CARDIOLOGY EVAL ADULT REFERRAL     Factor V (Leiden) Analysis ([facility])     Hematology IP Consult     Social Work IP Consult        Primary Care Provider Fax #    Provider Not In System [phone]                Equal Access to Services     CARTER DEL RIO : Hadwenceslao Polk, katja lewis, analisa sylvester, khadar hdez . So [facility] [phone].    ATENCIÓN: Si habla español, tiene a phillips disposición servicios gratuitos de asistencia lingüística. Llame al [phone].    We comply with applicable federal civil rights laws and [place] laws. We do not discriminate on the basis of race, color, national origin, age, disability, sex, sexual orientation, or gender identity.            Thank you!     Thank you for choosing [facility]  for your care. Our goal is always to provide you with excellent care. Hearing back from our patients is one way we can continue to improve our services. Please take a few minutes to complete the written survey that you may receive in the mail after your visit with us. Thank you!             Your Updated Medication List - Protect others around you: Learn how to safely use, store and throw away your medicines at www.disposemymeds.org.          This list is accurate as of 2/22/18 11:59 PM.  Always use your most recent med list.                   Brand Name Dispense Instructions for use Diagnosis    FISH OIL PO       Take 1 capsule by mouth daily.        * levothyroxine 100 MCG tablet    SYNTHROID/LEVOTHROID    45 tablet    Take  by mouth See Admin Instructions. Alternate 0.10 mg tab with 0.088 mg tab every other day    Hypothyroidism due to Hashimoto's thyroiditis       * levothyroxine 88 MCG tablet    SYNTHROID/LEVOTHROID    45 tablet    Take  by mouth daily. Alternate 0.088 mg with 0.10 mg every other day    Hypothyroidism due to Hashimoto's thyroiditis       MULTIVITAMIN & MINERAL PO      Take 2 tablets by mouth daily.        VITAMIN D (CHOLECALCIFEROL) PO      Take 2,000 Units by mouth daily        * Notice:  This list has 2 medication(s) that are the same as other medications prescribed for you. Read the directions carefully, and ask your doctor or other care provider to review them with you.       V-Y Flap Text: The defect edges were debeveled with a #15 scalpel blade.  Given the location of the defect, shape of the defect and the proximity to free margins a V-Y flap was deemed most appropriate.  Using a sterile surgical marker, an appropriate advancement flap was drawn incorporating the defect and placing the expected incisions within the relaxed skin tension lines where possible.    The area thus outlined was incised deep to adipose tissue with a #15 scalpel blade.  The skin margins were undermined to an appropriate distance in all directions utilizing iris scissors.

## 2020-10-06 ENCOUNTER — HOSPITAL ENCOUNTER (OUTPATIENT)
Dept: PHYSICAL THERAPY | Facility: CLINIC | Age: 70
Setting detail: THERAPIES SERIES
End: 2020-10-06
Attending: PHYSICIAN ASSISTANT
Payer: COMMERCIAL

## 2020-10-06 PROCEDURE — 97110 THERAPEUTIC EXERCISES: CPT | Mod: GP

## 2020-10-06 PROCEDURE — 97140 MANUAL THERAPY 1/> REGIONS: CPT | Mod: GP

## 2020-10-13 ENCOUNTER — HOSPITAL ENCOUNTER (OUTPATIENT)
Dept: PHYSICAL THERAPY | Facility: CLINIC | Age: 70
Setting detail: THERAPIES SERIES
End: 2020-10-13
Attending: PHYSICIAN ASSISTANT
Payer: COMMERCIAL

## 2020-10-13 PROCEDURE — 97535 SELF CARE MNGMENT TRAINING: CPT | Mod: GP

## 2020-10-13 PROCEDURE — 97110 THERAPEUTIC EXERCISES: CPT | Mod: GP

## 2020-10-19 ENCOUNTER — HOSPITAL ENCOUNTER (OUTPATIENT)
Dept: PHYSICAL THERAPY | Facility: CLINIC | Age: 70
Setting detail: THERAPIES SERIES
End: 2020-10-19
Attending: PHYSICIAN ASSISTANT
Payer: COMMERCIAL

## 2020-10-19 PROCEDURE — 97140 MANUAL THERAPY 1/> REGIONS: CPT | Mod: GP | Performed by: PHYSICAL THERAPIST

## 2020-10-19 PROCEDURE — 97110 THERAPEUTIC EXERCISES: CPT | Mod: GP | Performed by: PHYSICAL THERAPIST

## 2020-11-23 ENCOUNTER — ANCILLARY PROCEDURE (OUTPATIENT)
Dept: BONE DENSITY | Facility: CLINIC | Age: 70
End: 2020-11-23
Attending: INTERNAL MEDICINE
Payer: COMMERCIAL

## 2020-11-23 DIAGNOSIS — Z78.0 ASYMPTOMATIC POSTMENOPAUSAL STATUS: ICD-10-CM

## 2020-11-23 PROCEDURE — 77080 DXA BONE DENSITY AXIAL: CPT | Performed by: INTERNAL MEDICINE

## 2020-11-27 ENCOUNTER — TELEPHONE (OUTPATIENT)
Dept: ONCOLOGY | Facility: CLINIC | Age: 70
End: 2020-11-27

## 2020-11-27 NOTE — TELEPHONE ENCOUNTER
I left a message to call us and sent [employer] message.     Jonathan Gay MD    Bone density is in the osteoporotic range.  Reclast or Prolia could be considered.  Please contact Tamanna Neff to let her know what you would like to do.

## 2020-11-30 DIAGNOSIS — Z17.1 MALIGNANT NEOPLASM OF UPPER-OUTER QUADRANT OF RIGHT BREAST IN FEMALE, ESTROGEN RECEPTOR NEGATIVE (H): ICD-10-CM

## 2020-11-30 DIAGNOSIS — C50.411 MALIGNANT NEOPLASM OF UPPER-OUTER QUADRANT OF RIGHT BREAST IN FEMALE, ESTROGEN RECEPTOR NEGATIVE (H): ICD-10-CM

## 2020-11-30 LAB
BASOPHILS # BLD AUTO: 0 10E9/L (ref 0–0.2)
BASOPHILS NFR BLD AUTO: 0.6 %
DIFFERENTIAL METHOD BLD: NORMAL
EOSINOPHIL # BLD AUTO: 0.1 10E9/L (ref 0–0.7)
EOSINOPHIL NFR BLD AUTO: 2.8 %
ERYTHROCYTE [DISTWIDTH] IN BLOOD BY AUTOMATED COUNT: 12.5 % (ref 10–15)
HCT VFR BLD AUTO: 41.6 % (ref 35–47)
HGB BLD-MCNC: 13.8 G/DL (ref 11.7–15.7)
IMM GRANULOCYTES # BLD: 0 10E9/L (ref 0–0.4)
IMM GRANULOCYTES NFR BLD: 0.2 %
LYMPHOCYTES # BLD AUTO: 1.5 10E9/L (ref 0.8–5.3)
LYMPHOCYTES NFR BLD AUTO: 30.7 %
MCH RBC QN AUTO: 30.9 PG (ref 26.5–33)
MCHC RBC AUTO-ENTMCNC: 33.2 G/DL (ref 31.5–36.5)
MCV RBC AUTO: 93 FL (ref 78–100)
MONOCYTES # BLD AUTO: 0.4 10E9/L (ref 0–1.3)
MONOCYTES NFR BLD AUTO: 7.4 %
NEUTROPHILS # BLD AUTO: 2.9 10E9/L (ref 1.6–8.3)
NEUTROPHILS NFR BLD AUTO: 58.3 %
NRBC # BLD AUTO: 0 10*3/UL
NRBC BLD AUTO-RTO: 0 /100
PLATELET # BLD AUTO: 196 10E9/L (ref 150–450)
RBC # BLD AUTO: 4.47 10E12/L (ref 3.8–5.2)
WBC # BLD AUTO: 5 10E9/L (ref 4–11)

## 2020-11-30 PROCEDURE — 85025 COMPLETE CBC W/AUTO DIFF WBC: CPT | Performed by: PATHOLOGY

## 2020-11-30 PROCEDURE — 80053 COMPREHEN METABOLIC PANEL: CPT | Performed by: PATHOLOGY

## 2020-11-30 PROCEDURE — 36415 COLL VENOUS BLD VENIPUNCTURE: CPT | Performed by: PATHOLOGY

## 2020-12-01 ENCOUNTER — VIRTUAL VISIT (OUTPATIENT)
Dept: ONCOLOGY | Facility: CLINIC | Age: 70
End: 2020-12-01
Attending: PHYSICIAN ASSISTANT
Payer: COMMERCIAL

## 2020-12-01 DIAGNOSIS — C50.411 MALIGNANT NEOPLASM OF UPPER-OUTER QUADRANT OF RIGHT BREAST IN FEMALE, ESTROGEN RECEPTOR NEGATIVE (H): Primary | ICD-10-CM

## 2020-12-01 DIAGNOSIS — Z17.1 MALIGNANT NEOPLASM OF UPPER-OUTER QUADRANT OF RIGHT BREAST IN FEMALE, ESTROGEN RECEPTOR NEGATIVE (H): Primary | ICD-10-CM

## 2020-12-01 LAB
ALBUMIN SERPL-MCNC: 3.8 G/DL (ref 3.4–5)
ALP SERPL-CCNC: 65 U/L (ref 40–150)
ALT SERPL W P-5'-P-CCNC: 21 U/L (ref 0–50)
ANION GAP SERPL CALCULATED.3IONS-SCNC: 7 MMOL/L (ref 3–14)
AST SERPL W P-5'-P-CCNC: 21 U/L (ref 0–45)
BILIRUB SERPL-MCNC: 0.6 MG/DL (ref 0.2–1.3)
BUN SERPL-MCNC: 11 MG/DL (ref 7–30)
CALCIUM SERPL-MCNC: 9.2 MG/DL (ref 8.5–10.1)
CHLORIDE SERPL-SCNC: 106 MMOL/L (ref 94–109)
CO2 SERPL-SCNC: 26 MMOL/L (ref 20–32)
CREAT SERPL-MCNC: 0.61 MG/DL (ref 0.52–1.04)
GFR SERPL CREATININE-BSD FRML MDRD: >90 ML/MIN/{1.73_M2}
GLUCOSE SERPL-MCNC: 79 MG/DL (ref 70–99)
POTASSIUM SERPL-SCNC: 3.8 MMOL/L (ref 3.4–5.3)
PROT SERPL-MCNC: 7.1 G/DL (ref 6.8–8.8)
SODIUM SERPL-SCNC: 139 MMOL/L (ref 133–144)

## 2020-12-01 PROCEDURE — 99443 PR PHYSICIAN TELEPHONE EVALUATION 21-30 MIN: CPT | Mod: 95 | Performed by: PHYSICIAN ASSISTANT

## 2020-12-01 PROCEDURE — 999N001193 HC VIDEO/TELEPHONE VISIT; NO CHARGE

## 2020-12-01 NOTE — PROGRESS NOTES
"Julieta Rivera is a 70 year old female who is being evaluated via a billable telephone visit.      The patient has been notified of following:     \"This telephone visit will be conducted via a call between you and your physician/provider. We have found that certain health care needs can be provided without the need for a physical exam.  This service lets us provide the care you need with a short phone conversation.  If a prescription is necessary we can send it directly to your pharmacy.  If lab work is needed we can place an order for that and you can then stop by our lab to have the test done at a later time.    Telephone visits are billed at different rates depending on your insurance coverage. During this emergency period, for some insurers they may be billed the same as an in-person visit.  Please reach out to your insurance provider with any questions.    If during the course of the call the physician/provider feels a telephone visit is not appropriate, you will not be charged for this service.\"    Patient has given verbal consent for Telephone visit?  Yes    What phone number would you like to be contacted at? [phone]    How would you like to obtain your AVS? Juan Luis    Vitals - Patient Reported  Weight (Patient Reported): 58.5 kg (129 lb)  Pain Score: No Pain (0)        I have reviewed and updated patient's allergy and medication list.    Concerns: Results  Refills: None      Nicole Quintanilla CMA    Dec 1, 2020  PHONE           HISTORY OF PRESENT ILLNESS:  Julieta Rivera is a 68-year-old woman who was referred to our clinic with a new diagnosis of right triple-negative breast cancer.  Julieta was followed by routine screening mammography, when she was discovered to have a 7 x 6 x 9-mm mass at the 12 o'clock position of the right breast 6 cm from the nipple-areolar complex.  She did undergo a biopsy of this mass which showed an ER-negative, ME-negative, HER2-nonamplified, invasive mammary carcinoma of no " special type, invasive ductal carcinoma, Jeff grade 3.  Ductal carcinoma in situ was also noted.  Nuclear grade 2 solid type.  HER2 FISH showed no amplification.       TREATMENT HISTORY:  A.  TC x 4  B.  Lumpectomy 6-7-18 [id number] RCB 1  (0.71)  C.  Radiation  Radiation Oncology - Course: 1         Protocol:   Treatment Site            Current Dose   Modality           From    To        Elapsed Days  Fx.  1 R Breast        4,240 cGy       06 X      7/30/2018        8/21/2018        22       16  1 R Breast Boost          1,000 cGy       e09       8/22/2018 8/27/2018         5         4        PAST MEDICAL HISTORY:  She has no history of breast surgery in the past or breast cancer in the past.  She has no history of radiation of any kind.  She has no history of tumor of any kind.  She may have a history of a heart problem with mitral prolapse and a murmur.  The last echo we have on record is from 1996.  She has no history of heart attack, breathing problems, blood clots, seizures, arthritis, peptic ulcer disease, osteoporosis or bone fractures.  She is not currently participating in a clinical trial and has not had any significant weight loss.  She has no history of hypertension, but she does have a history of factor V Leiden because her sister was diagnosed with factor V Leiden and Julieta was tested, although Julieta herself has had no blood clots or pulmonary emboli.            INTERVAL HISTORY:   Julieta has been having more stress at home.  she has been seeing lymphedema therapy and PT for her right shoulder. She feel most of their help was extremely helpful.  They gave her regimens of exercise, ROM and even did some taping.  Julieta feels this has been very helpful.  They also helped her with lessening her anxiety which causes her to tighten up her shoulders.  She has no pain, fatigue or depression but does have some newer issues with anxiety. She is not overly anxious about her cancer but just dealing with  Covid.  She does like [employer] and has been doing this online.  She has been really enjoying this for exercise and meditation. She is trying to exercise.      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         PHYSICAL EXAMINATION:     Video physical exam  Voice is clear and strong. No audible stridor, wheezing, or respiratory distress. The remainder of PE was deferred due to PHE.       LABORATORY DATA:        11/30/2020 13:10   Sodium 139   Potassium 3.8   Chloride 106   Carbon Dioxide 26   Urea Nitrogen 11   Creatinine 0.61   GFR Estimate >90   GFR Estimate If Black >90   Calcium 9.2   Anion Gap 7   Albumin 3.8   Protein Total 7.1   Bilirubin Total 0.6   Alkaline Phosphatase 65   ALT 21   AST 21   Glucose 79   WBC 5.0   Hemoglobin 13.8   Hematocrit 41.6   Platelet Count 196   RBC Count 4.47   MCV 93        IMAGING:  DEXA -osteoporosis -3.0        ASSESSMENT AND PLAN:    1.  Julieta Rivera is a 70-year-old woman with a history of stage I, clinical [id number] invasive ductal carcinoma of the right breast, triple negative in histology, maximum dimension 9 mm, grade 3.  She received neoadjuvant TC chemotherapy following lumpectomy showed an RCB1 response with significant reduction of the tumor and with a small amount of residual disease measuring about 2.5 mm in largest dimension.  There is 1 other small focus.  The margins were clear for DCIS and invasive cancer.  She was not offered adjuvant Xeloda given the small amount of residual disease.  Eight sentinel lymph nodes were examined and were negative for cancer.  There are no concerns today during our video visit, mammo was done this year already.  RTC in 3 months, per Dr Gay's last orders.      2.  DEXA scan shows osteoporosis with a most valid negative T-score of -3.0.  She did not want zoledronic acid, Prolia or  Fosamax. We discussed this again- she is not interested in these drugs but feels we could follow up on this conversation another time in life.    She will continue with weightbearing exercise, calcium and vitamin D.      3. Shoulder pain- fell a few months ago and has had a hurt shoulder since, also has some lymphedema on the chest that is causing some palpable tenderness.  PT and lymphedema has helped- the lymphedema pain is gone and the shoulder ROM is much better.  She is grateful for the help and will keep doing the home exercises.  She feels she probably needs more PT/OT but prefers to wait until after the pandemic.          Phone call duration: 22 minutes    Mirta Gallegos PA-C

## 2020-12-07 NOTE — PROGRESS NOTES
Outpatient Physical Therapy Discharge Note     Patient: Julieta Rivera  : 1950    Beginning/End Dates of Reporting Period:  20 to 2020    Referring Provider: Mirta Gallegos PA-C    Therapy Diagnosis: R shoulder dysfunction, decreaed ROM     Client Self Report: Pt arrived with complaints of shoulder and increased L anterior leg radiating symptoms    Objective Measurements:  Objective Measure: R AROM: ER, ABD, IR, FLEX  Details: 38,63, L5, 134 (ER and abd decreased significantly, IR and FLEX slightly increased)  Objective Measure: SPADI     Objective Measure: Pain at worst  Details: 4-5/10 in the R UE, 4-5/10 in the L lumbar to hip  Objective Measure: AROM R Shoulder flexion      Goals:  Goal Identifier HEP   Goal Description Pt will be independent with home exercise program to increase R shoulder ROM and strength without increased pain for better overall function for ADLs and leisure.   Target Date 20   Date Met   10/19/20   Progress:     Goal Identifier SPADI   Goal Description Pt will have 11 point decreased score on SPADI to reflect the SHIELA in pain and disability of R shoulder.   Target Date 20   Date Met      Progress: Not reassessed as pt cancelled final appt     Goal Identifier Pain management   Goal Description Pt will reports 3 things that she can do to manage her R shoulder and L low back pain to prevent greater than 4/10.   Target Date 20   Date Met   10/19/20   Progress:     Goal Identifier ROM   Goal Description Pt will have improved R shoulder abduction to 130 degrees and IR to reach lumbar spine when standing to allow for ease of household activities and dressing/bathing.   Target Date 20   Date Met   Not met   Progress:     Progress Toward Goals:   Progress this reporting period: Pt was seen for 4 visits and issued home program. She is compliant. Pt does have a lot of distraction from her own self care.     Plan:  Discharge from  therapy.    Discharge:    Reason for Discharge: 2/4 goals met, pt will continue to work on the other goals at home.   Pt did not schedule further appt after her cancel, may resume therapy after pandemic concerns    Equipment Issued: home program    Discharge Plan: Patient to continue home program. Would recommend orthopedic therapy clinic ([facility]) for shoulder and back pain.

## 2021-01-10 ENCOUNTER — HEALTH MAINTENANCE LETTER (OUTPATIENT)
Age: 71
End: 2021-01-10

## 2021-01-26 DIAGNOSIS — E06.3 HYPOTHYROIDISM DUE TO HASHIMOTO'S THYROIDITIS: ICD-10-CM

## 2021-01-26 RX ORDER — LEVOTHYROXINE SODIUM 100 UG/1
TABLET ORAL
Qty: 90 TABLET | Refills: 1 | Status: SHIPPED | OUTPATIENT
Start: 2021-01-26 | End: 2021-07-26

## 2021-01-26 RX ORDER — LEVOTHYROXINE SODIUM 100 UG/1
TABLET ORAL
Qty: 90 TABLET | Refills: 0 | Status: CANCELLED | OUTPATIENT
Start: 2021-01-26

## 2021-01-26 NOTE — TELEPHONE ENCOUNTER
Signed Prescriptions:                        Disp   Refills    levothyroxine (SYNTHROID/LEVOTHROID) 100 M*90 tab*1        Sig: TAKE ONE TABLET BY MOUTH ONCE DAILY  Authorizing Provider: GEORGI WELLER  Ordering User: EDGARDO MOAY RN  [facility]

## 2021-02-04 ENCOUNTER — TELEPHONE (OUTPATIENT)
Dept: CARDIOLOGY | Facility: CLINIC | Age: 71
End: 2021-02-04

## 2021-03-01 NOTE — PROGRESS NOTES
Julieta is a 70 year old who is being evaluated via a billable telephone visit.      What phone number would you like to be contacted at? [phone]    How would you like to obtain your AVS? MyChart     Vitals - Patient Reported  Weight (Patient Reported): 59 kg (130 lb)  Height (Patient Reported): 152.4 cm (5')  BMI (Based on Pt Reported Ht/Wt): 25.39  Pain Score: No Pain (0)    Gavin Daily [facility]    Medical Oncology Follow-up Note       HISTORY OF PRESENT ILLNESS:  Julieta Rivera is a 70-year-old woman who was referred to our clinic with a new diagnosis of right triple-negative breast cancer.  Julieta was followed by routine screening mammography, when she was discovered to have a 7 x 6 x 9-mm mass at the 12 o'clock position of the right breast 6 cm from the nipple-areolar complex.  She did undergo a biopsy of this mass which showed an ER-negative, DE-negative, HER2-nonamplified, invasive mammary carcinoma of no special type, invasive ductal carcinoma, Jeff grade 3.  Ductal carcinoma in situ was also noted.  Nuclear grade 2 solid type.  HER2 FISH showed no amplification.  She now comes to our clinic for recommendations.       She has hypothyroidism and is on levothyroxine 88 alternating with 100 mcg daily.  She has no pain.       SOCIAL:  She has fatigue related to the care of infant twin grandsons with esophageal atresia at home.           PAST MEDICAL HISTORY:  She has no history of breast surgery in the past or breast cancer in the past.  She has no history of radiation of any kind.  She has no history of tumor of any kind.  She may have a history of a heart problem with mitral prolapse and a murmur.  The last echo we have on record is from 1996.  She has no history of heart attack, breathing problems, blood clots, seizures, arthritis, peptic ulcer disease, osteoporosis or bone fractures.  She is not currently participating in a clinical trial and has not had any significant weight loss.  She has no  history of hypertension, but she does have a history of factor V Leiden because her sister was diagnosed with factor V Leiden and Julieta was tested, although Julieta herself has had no blood clots or pulmonary emboli.       FAMILY HISTORY:  There is a history of breast cancer in two paternal aunts, but no first-degree relatives.  One of her aunts was diagnosed in her 50s, the other in her 60s.  She has no male relatives with breast cancer.  The remainder of her family history was negative.        PAST MENSTRUAL HISTORY:  First period was at age 12-.  Last menstrual period was in 2003.  She has been pregnant twice at age 26 and 30 with two live births and no miscarriages or abortions.  She used oral contraceptives only once or twice.  Uterus and ovaries are in place.  She has no history of hormone replacement therapy.        ALLERGIES:  She has no allergy to seafood, iodine or contrast dye.  She does not take aspirin.       HABITS:  She did smoke 1 pack per day in college for 3 years from age 17 to 20 and has not smoked since.  She does not drink significant alcohol and has no heavy alcohol history in the past.        PERSONAL AND SOCIAL HISTORY:  She does have a history of being a .  Her  is 80 years old but is able to take care of his activities of daily living.  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        PAST MEDICAL HISTORY:  Julieta has no history of angina.  She has no history of hypertension.  Her cholesterol has generally been in acceptable range, although slightly over 200 recently.       FAMILY HISTORY:  Positive for heart disease.  Father had an MI in his 50s and had a bypass and  at age 78.  Mother had rheumatic heart disease at age 38 and  at age 42.      TREATMENT HISTORY:  A.  TC x 4  B.  Lumpectomy 18  [id number] RCB1.  0.71  C.  Radiation  Radiation Oncology - Course: 1         Protocol:   Treatment Site            Current Dose   Modality           From    To        Elapsed Days  Fx.  1 R Breast        4,240 cGy       06 X      7/30/2018        8/21/2018        22       16  1 R Breast Boost          1,000 cGy       e09       8/22/2018 8/27/2018         5         4    D. Mild bicuspid aortic valve stenosis, mean gradient 13 mmHg  Preserved biventricular function diagnosed 5/6/2019.     INTERVAL HISTORY:   Julieta has pain in her right shoulder.  This is a chronic pain which may be related to her right arm lymphedema but also she has a history of frozen shoulder in the past.  She also has a right arm injury that isan  independent event but happened during the summer.  Will refer her to Dr. Carlos Valdez in orthopedics.  She also has a history of pelvic pain and elbow pain bilaterally.  She denies fatigue, depression, anxiety.  She does have chronic lymphedema in her right arm and had been followed in the lymphedema clinic.    She has a history of bicuspid aortic valve.  She does have occasional chest discomfort at night when things are quiet.  Its not exertional will obtain an EKG and an echo and a follow-up appointment with Dr. Kaley Tidwell in cardiology.     REVIEW OF SYSTEMS:    A 10 point review of systems is otherwise negative.     PHYSICAL EXAMINATION:   None today. Phone visit.      LABORATORY DATA:  CBC and CMP were within normal limits.        IMAGING:  Diagnostic mammogram 03/02/2021 showed benign findings.        ASSESSMENT AND PLAN:    1.  Julieta Rivera is a 70-year-old woman with a history of stage I, clinical [id number] invasive ductal carcinoma of the right breast, triple negative in histology, maximum dimension 9 mm, grade 3.  She received neoadjuvant TC chemotherapy following lumpectomy showed an RCB1 response with significant reduction of the tumor and with a small amount of residual disease  measuring about 2.5 mm in largest dimension.  There is 1 other small focus.  The margins were clear for DCIS and invasive cancer.  She was not offered adjuvant Xeloda given the small amount of residual disease.  Eight sentinel lymph nodes were examined and were negative for cancer.  There is no evidence of disease recurrence on today's exam or mammogram.   2.  No role for hormonal therapy.   3.  Episode of chest pain.    EKG and echocardiogram have been ordered.  4.  Factor V Leiden without history of clot.   5.  Arthritic discomfort in low back, hands and feet.  She has been referred to Sports Medicine and can go back to see Sports Medicine as needed.   6. Mild bicuspid aortic valve stenosis, mean gradient 13 mmHg  Preserved biventricular function.  She does have some chest discomfort.  Will obtain an EKG and an echo and refer her to Dr. Tidwell who is seen her in the past.  7.  Right arm discomfort.  Will refer to Dr. Carlos Valdez in orthopedics.  She has a history of frozen shoulder in the past, lymphedema, right arm injury that is not independent about that happened during the summer.  8.   DEXA scan shows osteoporosis with a most valid negative T-score of -2.4.  She did not want zoledronic acid, Prolia or Fosamax.  I did give her handouts on all 3 today and she will think about it.  She has been very hesitant because of concern about side effects.  She will continue with weightbearing exercise, calcium and vitamin D.   7.  Followup.  Follow up with [facility] with phone visit September 7 and with me March 3, 2022 mammogram.  CBC and CMP with followup visits. ECG and Echocardiogram in next 2 weeks. Follow up with Dr. Kaley Tidwell in the next month.      Thank you for allowing us to continue to participate in Julieta Rivera's care.      Jonathan Gay MD   Professor   [facility]       2:28-2:39  I spent 11 minutes with the patient more than 50% of which was in counseling and coordination of  care.

## 2021-03-02 ENCOUNTER — APPOINTMENT (OUTPATIENT)
Dept: LAB | Facility: CLINIC | Age: 71
End: 2021-03-02
Attending: INTERNAL MEDICINE
Payer: COMMERCIAL

## 2021-03-02 ENCOUNTER — ANCILLARY PROCEDURE (OUTPATIENT)
Dept: MAMMOGRAPHY | Facility: CLINIC | Age: 71
End: 2021-03-02
Attending: INTERNAL MEDICINE
Payer: COMMERCIAL

## 2021-03-02 ENCOUNTER — VIRTUAL VISIT (OUTPATIENT)
Dept: ONCOLOGY | Facility: CLINIC | Age: 71
End: 2021-03-02
Attending: INTERNAL MEDICINE
Payer: COMMERCIAL

## 2021-03-02 VITALS
OXYGEN SATURATION: 98 % | SYSTOLIC BLOOD PRESSURE: 136 MMHG | WEIGHT: 130 LBS | BODY MASS INDEX: 25.39 KG/M2 | RESPIRATION RATE: 16 BRPM | TEMPERATURE: 98 F | HEART RATE: 81 BPM | DIASTOLIC BLOOD PRESSURE: 84 MMHG

## 2021-03-02 DIAGNOSIS — Q23.81 BICUSPID AORTIC VALVE: Primary | ICD-10-CM

## 2021-03-02 DIAGNOSIS — Z17.1 MALIGNANT NEOPLASM OF UPPER-OUTER QUADRANT OF RIGHT BREAST IN FEMALE, ESTROGEN RECEPTOR NEGATIVE (H): ICD-10-CM

## 2021-03-02 DIAGNOSIS — C50.411 MALIGNANT NEOPLASM OF UPPER-OUTER QUADRANT OF RIGHT BREAST IN FEMALE, ESTROGEN RECEPTOR NEGATIVE (H): ICD-10-CM

## 2021-03-02 LAB
ALBUMIN SERPL-MCNC: 3.9 G/DL (ref 3.4–5)
ALP SERPL-CCNC: 62 U/L (ref 40–150)
ALT SERPL W P-5'-P-CCNC: 21 U/L (ref 0–50)
ANION GAP SERPL CALCULATED.3IONS-SCNC: 7 MMOL/L (ref 3–14)
AST SERPL W P-5'-P-CCNC: 20 U/L (ref 0–45)
BASOPHILS # BLD AUTO: 0 10E9/L (ref 0–0.2)
BASOPHILS NFR BLD AUTO: 0.6 %
BILIRUB SERPL-MCNC: 0.4 MG/DL (ref 0.2–1.3)
BUN SERPL-MCNC: 14 MG/DL (ref 7–30)
CALCIUM SERPL-MCNC: 9.4 MG/DL (ref 8.5–10.1)
CHLORIDE SERPL-SCNC: 109 MMOL/L (ref 94–109)
CO2 SERPL-SCNC: 26 MMOL/L (ref 20–32)
CREAT SERPL-MCNC: 0.62 MG/DL (ref 0.52–1.04)
DIFFERENTIAL METHOD BLD: NORMAL
EOSINOPHIL # BLD AUTO: 0.1 10E9/L (ref 0–0.7)
EOSINOPHIL NFR BLD AUTO: 2 %
ERYTHROCYTE [DISTWIDTH] IN BLOOD BY AUTOMATED COUNT: 12.4 % (ref 10–15)
GFR SERPL CREATININE-BSD FRML MDRD: >90 ML/MIN/{1.73_M2}
GLUCOSE SERPL-MCNC: 91 MG/DL (ref 70–99)
HCT VFR BLD AUTO: 39.2 % (ref 35–47)
HGB BLD-MCNC: 13.3 G/DL (ref 11.7–15.7)
IMM GRANULOCYTES # BLD: 0 10E9/L (ref 0–0.4)
IMM GRANULOCYTES NFR BLD: 0.2 %
LYMPHOCYTES # BLD AUTO: 1.4 10E9/L (ref 0.8–5.3)
LYMPHOCYTES NFR BLD AUTO: 27.9 %
MCH RBC QN AUTO: 30.2 PG (ref 26.5–33)
MCHC RBC AUTO-ENTMCNC: 33.9 G/DL (ref 31.5–36.5)
MCV RBC AUTO: 89 FL (ref 78–100)
MONOCYTES # BLD AUTO: 0.4 10E9/L (ref 0–1.3)
MONOCYTES NFR BLD AUTO: 7.5 %
NEUTROPHILS # BLD AUTO: 3.2 10E9/L (ref 1.6–8.3)
NEUTROPHILS NFR BLD AUTO: 61.8 %
NRBC # BLD AUTO: 0 10*3/UL
NRBC BLD AUTO-RTO: 0 /100
PLATELET # BLD AUTO: 200 10E9/L (ref 150–450)
POTASSIUM SERPL-SCNC: 3.8 MMOL/L (ref 3.4–5.3)
PROT SERPL-MCNC: 7.3 G/DL (ref 6.8–8.8)
RBC # BLD AUTO: 4.41 10E12/L (ref 3.8–5.2)
SODIUM SERPL-SCNC: 143 MMOL/L (ref 133–144)
WBC # BLD AUTO: 5.1 10E9/L (ref 4–11)

## 2021-03-02 PROCEDURE — 99214 OFFICE O/P EST MOD 30 MIN: CPT | Mod: 95 | Performed by: INTERNAL MEDICINE

## 2021-03-02 PROCEDURE — 77066 DX MAMMO INCL CAD BI: CPT | Performed by: RADIOLOGY

## 2021-03-02 PROCEDURE — G0279 TOMOSYNTHESIS, MAMMO: HCPCS | Performed by: RADIOLOGY

## 2021-03-02 PROCEDURE — 85025 COMPLETE CBC W/AUTO DIFF WBC: CPT | Mod: TEL | Performed by: INTERNAL MEDICINE

## 2021-03-02 PROCEDURE — 36415 COLL VENOUS BLD VENIPUNCTURE: CPT

## 2021-03-02 PROCEDURE — 80053 COMPREHEN METABOLIC PANEL: CPT | Mod: TEL | Performed by: INTERNAL MEDICINE

## 2021-03-02 NOTE — LETTER
3/2/2021         RE: Julieta Rivera  [address] [place] [address]        Dear Colleague,    Thank you for referring your patient, Julieta Rivera, to the [facility]. Please see a copy of my visit note below.      Julieta is a 70 year old who is being evaluated via a billable telephone visit.      What phone number would you like to be contacted at? [phone]    How would you like to obtain your AVS? Juan Luis     Vitals - Patient Reported  Weight (Patient Reported): 59 kg (130 lb)  Height (Patient Reported): 152.4 cm (5')  BMI (Based on Pt Reported Ht/Wt): 25.39  Pain Score: No Pain (0)    Gavin Daily LPN    Medical Oncology Follow-up Note       HISTORY OF PRESENT ILLNESS:  Julieta Rivera is a 70-year-old woman who was referred to our clinic with a new diagnosis of right triple-negative breast cancer.  Julieta was followed by routine screening mammography, when she was discovered to have a 7 x 6 x 9-mm mass at the 12 o'clock position of the right breast 6 cm from the nipple-areolar complex.  She did undergo a biopsy of this mass which showed an ER-negative, AL-negative, HER2-nonamplified, invasive mammary carcinoma of no special type, invasive ductal carcinoma, [place] grade 3.  Ductal carcinoma in situ was also noted.  Nuclear grade 2 solid type.  HER2 FISH showed no amplification.  She now comes to our clinic for recommendations.       She has hypothyroidism and is on levothyroxine 88 alternating with 100 mcg daily.  She has no pain.       SOCIAL:  She has fatigue related to the care of infant twin grandsons with esophageal atresia at home.           PAST MEDICAL HISTORY:  She has no history of breast surgery in the past or breast cancer in the past.  She has no history of radiation of any kind.  She has no history of tumor of any kind.  She may have a history of a heart problem with mitral prolapse and a murmur.  The last echo we have on record is from 1996.  She  has no history of heart attack, breathing problems, blood clots, seizures, arthritis, peptic ulcer disease, osteoporosis or bone fractures.  She is not currently participating in a clinical trial and has not had any significant weight loss.  She has no history of hypertension, but she does have a history of factor V Leiden because her sister was diagnosed with factor V Leiden and Julieta was tested, although Julieta herself has had no blood clots or pulmonary emboli.       FAMILY HISTORY:  There is a history of breast cancer in two paternal aunts, but no first-degree relatives.  One of her aunts was diagnosed in her 50s, the other in her 60s.  She has no male relatives with breast cancer.  The remainder of her family history was negative.        PAST MENSTRUAL HISTORY:  First period was at age 12-.  Last menstrual period was in 2003.  She has been pregnant twice at age 26 and 30 with two live births and no miscarriages or abortions.  She used oral contraceptives only once or twice.  Uterus and ovaries are in place.  She has no history of hormone replacement therapy.        ALLERGIES:  She has no allergy to seafood, iodine or contrast dye.  She does not take aspirin.       HABITS:  She did smoke 1 pack per day in college for 3 years from age 17 to 20 and has not smoked since.  She does not drink significant alcohol and has no heavy alcohol history in the past.        PERSONAL AND SOCIAL HISTORY:  She does have a history of being a .  Her  is 80 years old but is able to take care of his activities of daily living.  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        PAST MEDICAL HISTORY:  Julieta has no history of angina.  She has no history of hypertension.  Her cholesterol has generally been in acceptable range, although slightly over 200  recently.       FAMILY HISTORY:  Positive for heart disease.  Father had an MI in his 50s and had a bypass and  at age 78.  Mother had rheumatic heart disease at age 38 and  at age 42.      TREATMENT HISTORY:  A.  TC x 4  B.  Lumpectomy 18 [id number] RCB1.  0.71  C.  Radiation  Radiation Oncology - Course: 1         Protocol:   Treatment Site            Current Dose   Modality           From    To        Elapsed Days  Fx.  1 R Breast        4,240 cGy       06 X      7/30/2018        2018        22       16  1 R Breast Boost          1,000 cGy       e09       2018         5         4    D. Mild bicuspid aortic valve stenosis, mean gradient 13 mmHg  Preserved biventricular function diagnosed 2019.     INTERVAL HISTORY:   Julieta has pain in her right shoulder.  This is a chronic pain which may be related to her right arm lymphedema but also she has a history of frozen shoulder in the past.  She also has a right arm injury that isan  independent event but happened during the summer.  Will refer her to Dr. Carlos Valdez in orthopedics.  She also has a history of pelvic pain and elbow pain bilaterally.  She denies fatigue, depression, anxiety.  She does have chronic lymphedema in her right arm and had been followed in the lymphedema clinic.    She has a history of bicuspid aortic valve.  She does have occasional chest discomfort at night when things are quiet.  Its not exertional will obtain an EKG and an echo and a follow-up appointment with Dr. Kaley Tidwell in cardiology.     REVIEW OF SYSTEMS:    A 10 point review of systems is otherwise negative.     PHYSICAL EXAMINATION:   None today. Phone visit.      LABORATORY DATA:  CBC and CMP were within normal limits.        IMAGING:  Diagnostic mammogram 2021 showed benign findings.        ASSESSMENT AND PLAN:    1.  Julieta Rivera is a 70-year-old woman with a history of stage I, clinical [id number] invasive ductal carcinoma of the  right breast, triple negative in histology, maximum dimension 9 mm, grade 3.  She received neoadjuvant TC chemotherapy following lumpectomy showed an RCB1 response with significant reduction of the tumor and with a small amount of residual disease measuring about 2.5 mm in largest dimension.  There is 1 other small focus.  The margins were clear for DCIS and invasive cancer.  She was not offered adjuvant Xeloda given the small amount of residual disease.  Eight sentinel lymph nodes were examined and were negative for cancer.  There is no evidence of disease recurrence on today's exam or mammogram.   2.  No role for hormonal therapy.   3.  Episode of chest pain.    EKG and echocardiogram have been ordered.  4.  Factor V Leiden without history of clot.   5.  Arthritic discomfort in low back, hands and feet.  She has been referred to Sports Medicine and can go back to see Sports Medicine as needed.   6. Mild bicuspid aortic valve stenosis, mean gradient 13 mmHg  Preserved biventricular function.  She does have some chest discomfort.  Will obtain an EKG and an echo and refer her to Dr. Tidwell who is seen her in the past.  7.  Right arm discomfort.  Will refer to Dr. Carlos Valdez in orthopedics.  She has a history of frozen shoulder in the past, lymphedema, right arm injury that is not independent about that happened during the summer.  8.   DEXA scan shows osteoporosis with a most valid negative T-score of -2.4.  She did not want zoledronic acid, Prolia or Fosamax.  I did give her handouts on all 3 today and she will think about it.  She has been very hesitant because of concern about side effects.  She will continue with weightbearing exercise, calcium and vitamin D.   7.  Followup.  Follow up with [facility] with phone visit September 7 and with me March 3, 2022 mammogram.  CBC and CMP with followup visits. ECG and Echocardiogram in next 2 weeks. Follow up with Dr. Kaley Tidwell in the next month.      Thank you for allowing  us to continue to participate in Julieta Rivera's care.      Jonathan Gay MD   Professor   [facility]       2:28-2:39  I spent 11 minutes with the patient more than 50% of which was in counseling and coordination of care.

## 2021-03-02 NOTE — NURSING NOTE
Chief Complaint   Patient presents with     Blood Draw     Labs drawn via V P by RN in lab. VS taken.      Michelle Rothman RN

## 2021-03-03 ENCOUNTER — TELEPHONE (OUTPATIENT)
Dept: CARDIOLOGY | Facility: CLINIC | Age: 71
End: 2021-03-03

## 2021-03-03 NOTE — TELEPHONE ENCOUNTER
Pt needs to schedule an appointment with Dr. Tidwell per a referral.    Please link referral request.

## 2021-03-04 ENCOUNTER — TELEPHONE (OUTPATIENT)
Dept: CARDIOLOGY | Facility: CLINIC | Age: 71
End: 2021-03-04

## 2021-03-04 NOTE — TELEPHONE ENCOUNTER
Pt needs to schedule an appointment with Dr. Tidwell per a referral.    Please link the referral request.

## 2021-03-05 ENCOUNTER — IMMUNIZATION (OUTPATIENT)
Dept: NURSING | Facility: CLINIC | Age: 71
End: 2021-03-05
Payer: COMMERCIAL

## 2021-03-05 PROCEDURE — 91301 PR COVID VAC MODERNA 100 MCG/0.5 ML IM: CPT

## 2021-03-05 PROCEDURE — 0011A PR COVID VAC MODERNA 100 MCG/0.5 ML IM: CPT

## 2021-04-02 ENCOUNTER — IMMUNIZATION (OUTPATIENT)
Dept: NURSING | Facility: CLINIC | Age: 71
End: 2021-04-02
Attending: INTERNAL MEDICINE
Payer: COMMERCIAL

## 2021-04-02 PROCEDURE — 0012A PR COVID VAC MODERNA 100 MCG/0.5 ML IM: CPT

## 2021-04-02 PROCEDURE — 91301 PR COVID VAC MODERNA 100 MCG/0.5 ML IM: CPT

## 2021-04-23 ASSESSMENT — ENCOUNTER SYMPTOMS
EXERCISE INTOLERANCE: 0
MUSCLE WEAKNESS: 0
SINUS PAIN: 1
EYE IRRITATION: 1
TASTE DISTURBANCE: 0
SLEEP DISTURBANCES DUE TO BREATHING: 0
ARTHRALGIAS: 1
LIGHT-HEADEDNESS: 1
TROUBLE SWALLOWING: 0
BACK PAIN: 1
HYPOTENSION: 0
ORTHOPNEA: 0
LEG PAIN: 0
HOARSE VOICE: 0
HYPERTENSION: 0
PALPITATIONS: 0
MUSCLE CRAMPS: 0
SMELL DISTURBANCE: 0
SINUS CONGESTION: 1
SORE THROAT: 0
NECK PAIN: 1
STIFFNESS: 1
JOINT SWELLING: 0
EYE REDNESS: 0
BREAST MASS: 0
NECK MASS: 0
SYNCOPE: 0
DOUBLE VISION: 0
EYE WATERING: 0
MYALGIAS: 0
BREAST PAIN: 1
EYE PAIN: 0

## 2021-04-26 ENCOUNTER — OFFICE VISIT (OUTPATIENT)
Dept: CARDIOLOGY | Facility: CLINIC | Age: 71
End: 2021-04-26
Attending: INTERNAL MEDICINE
Payer: COMMERCIAL

## 2021-04-26 VITALS
DIASTOLIC BLOOD PRESSURE: 90 MMHG | BODY MASS INDEX: 25.39 KG/M2 | OXYGEN SATURATION: 99 % | WEIGHT: 130 LBS | SYSTOLIC BLOOD PRESSURE: 128 MMHG | HEART RATE: 79 BPM

## 2021-04-26 DIAGNOSIS — Z91.89 10 YEAR RISK OF MI OR STROKE 7.5% OR GREATER: ICD-10-CM

## 2021-04-26 DIAGNOSIS — Q23.81 BICUSPID AORTIC VALVE: ICD-10-CM

## 2021-04-26 DIAGNOSIS — C50.411 MALIGNANT NEOPLASM OF UPPER-OUTER QUADRANT OF RIGHT BREAST IN FEMALE, ESTROGEN RECEPTOR NEGATIVE (H): ICD-10-CM

## 2021-04-26 DIAGNOSIS — Z17.1 MALIGNANT NEOPLASM OF UPPER-OUTER QUADRANT OF RIGHT BREAST IN FEMALE, ESTROGEN RECEPTOR NEGATIVE (H): ICD-10-CM

## 2021-04-26 DIAGNOSIS — Q23.81 BICUSPID AORTIC VALVE: Primary | ICD-10-CM

## 2021-04-26 PROCEDURE — 93005 ELECTROCARDIOGRAM TRACING: CPT

## 2021-04-26 PROCEDURE — G0463 HOSPITAL OUTPT CLINIC VISIT: HCPCS | Mod: 25

## 2021-04-26 PROCEDURE — 93306 TTE W/DOPPLER COMPLETE: CPT | Performed by: INTERNAL MEDICINE

## 2021-04-26 PROCEDURE — 99214 OFFICE O/P EST MOD 30 MIN: CPT | Mod: 25 | Performed by: INTERNAL MEDICINE

## 2021-04-26 ASSESSMENT — PAIN SCALES - GENERAL: PAINLEVEL: NO PAIN (0)

## 2021-04-26 NOTE — NURSING NOTE
Chief Complaint   Patient presents with     Follow Up     reason for visit:  Return, 71 yo female      Vitals were taken, medications reconciled, and EKG was performed.    Silvio Harvey, EMT  3:09 PM

## 2021-04-26 NOTE — LETTER
4/26/2021      RE: Julieta Rivera  [address] [place] [address]       Dear Colleague,    Thank you for the opportunity to participate in the care of your patient, Julieta Rivera, at the [facility]. Please see a copy of my visit note below.    Cardiology Clinic Note    HPI: Julieta Rivera is a 70-year-old woman  diagnosed with triple-negative stage I ductal carcinoma of the right breast treated with docetaxel/cyclophosphamide  March - May 2018,  factor V Leiden mutation but has no history of thrombosis and is not on anticoagulation. She underwent an echocardiogram prior to initiation of chemotherapy in Feb 2018 and was noted to have mild bicuspid aortic valvular stenosis with a peak velocity of 2.5 m/s and mean gradient of 13 mmHg. Patient underwent lumpectomy on 6/7/18 without any major cardiopulmonary issues. She then underwent 4 weeks of radiation therapy.   The bicuspid aortic valve was initially diagnosed in 1999. She  has no personal history of coronary artery disease, heart failure, arrhythmias, hyperlipidemia, tobacco use or diabetes. There is no family history of premature coronary artery disease.   Interval History:  Patient reports doing well.  She reports intermittent episodes of heaviness while lying down to go to bed at night, no association with exertion.  No association with palpitations.  She can walk 4 blocks and up to 5 stairs without any distress.  She also describes right shoulder pain and significant trouble with her allergies.  Denies any orthopnea, PND or lower extremity edema.  No lightheadedness or dizziness presyncopal symptoms.    Cardiac meds  none    PAST MEDICAL HISTORY:  Past Medical History:   Diagnosis Date     Abnormal Pap smear 1970s    normal since     Breast cancer (H) 02/2018     Factor V Leiden (H)      Hypothyroidism fall 2000       CURRENT MEDICATIONS:  Current Outpatient  Medications   Medication Sig Dispense Refill     levothyroxine (SYNTHROID/LEVOTHROID) 100 MCG tablet TAKE ONE TABLET BY MOUTH ONCE DAILY 90 tablet 1     Omega-3 Fatty Acids (FISH OIL PO) Take 1 capsule by mouth daily.       order for DME Equipment being ordered: compression sleeve/gauntlet 20-30 mmHg 1 each 12     vitamin D3 (CHOLECALCIFEROL) 2000 units (50 mcg) tablet Take 1 tablet by mouth daily         PAST SURGICAL HISTORY:  Past Surgical History:   Procedure Laterality Date      SECTION      x2 ,      COLPOSCOPY CERVIX, BIOPSY CERVIX, ENDOCERVICAL CURETTAGE, COMBINED       INSERT PORT VASCULAR ACCESS N/A 3/5/2018    Procedure: INSERT PORT VASCULAR ACCESS;  Single Lumen Chest Power Port Placement;  Surgeon: Brian Tolbert PA-C;  Location: [facility] OR     MASTECTOMY SIMPLE, SENTINEL NODE, COMBINED Right 2018    Lumpectomy with SENTINEL NODE;  Right Wire Localized Lumpectomy And Right [place] Lymph Node Biopsy;  Surgeon: Eugene Guzman MD;  Location: [facility] OR     REMOVE PORT VASCULAR ACCESS Left 11/15/2018    Procedure: Left Port Removal;  Surgeon: Tom Quick PA-C;  Location: [facility] OR     TONSILLECTOMY, ADENOIDECTOMY, COMBINED         ALLERGIES     Allergies   Allergen Reactions     Seasonal Allergies        FAMILY HISTORY:  Family History   Problem Relation Age of Onset     Heart Disease Mother      Heart Disease Father      Cerebrovascular Disease Father      Diabetes Father      Diabetes Brother      Hypertension Brother      Obesity Brother      Obesity Sister      Cancer Sister 48        endometrial     Hypertension Sister      Cancer Paternal Aunt         Breast cancer in 2 paternal aunts, 1 dx in her 50's the other in her 60's       SOCIAL HISTORY:  Social History     Social History     Marital status:      Spouse name: N/A     Number of children: N/A     Years of education: N/A     Social History Main Topics     Smoking status: Former Smoker     Quit date:  1/1/1971     Smokeless tobacco: Never Used     Alcohol use No      Comment: none since last november.     Drug use: No     Sexual activity: Yes     Partners: Male     Other Topics Concern     Stress Concern No     Weight Concern No     Exercise Yes     yoga class weekly, walking     Seat Belt Yes     ROS:   Constitutional: No fever, chills, or sweats. No weight gain/loss   ENT: No visual disturbance, ear ache, epistaxis, sore throat  Allergies/Immunologic: Negative.   Respiratory: No cough, hemoptysia  Cardiovascular: As per HPI  GI: No nausea, vomiting, hematemesis, melena, or hematochezia  : No urinary frequency, dysuria, or hematuria  Integument: Negative  Psychiatric: Negative  Neuro: Negative  Endocrinology: Negative   Musculoskeletal: Negative    EXAM:  BP (!) 128/90 (BP Location: Left arm, Patient Position: Chair, Cuff Size: Adult Regular)   Pulse 79   Wt 59 kg (130 lb)   SpO2 99%   BMI 25.39 kg/m    In general, the patient is a pleasant female in no apparent distress.      HEENT: NC/AT.  PERRLA.  EOMI.  Sclerae white, not injected.    Neck: Carotids 2+ bilaterally without bruits.  No jugular venous distension.   Lymph: No cervical adenopathy. No thyromegaly.   Heart: RRR. Normal S1, S2 preserved. 1/6 systolic ejection murmur RUSB; no rub, click, or gallop. There is no heave.    Lungs: Clear bilaterally.  No rhonchi, wheezes, rales.   GI: Soft, nontender, nondistended.   Extremities: No edema.  The pulses are 2+at the radial and DP bilaterally.  Neuro: grossly non focal.   Skin: no rashes.  Musculoskeletal: normal muscle strength, no acute arthritis, gait normal.    Labs:  LIPID RESULTS:  Lab Results   Component Value Date    CHOL 214 (H) 06/04/2019    HDL 95 06/04/2019     (H) 06/04/2019    TRIG 61 06/04/2019    CHOLHDLRATIO 2.5 11/22/2013    NHDL 119 06/04/2019     LIVER ENZYME RESULTS:  Lab Results   Component Value Date    AST 20 03/02/2021    ALT 21 03/02/2021     CBC RESULTS:  Lab Results    Component Value Date    WBC 5.1 03/02/2021    RBC 4.41 03/02/2021    HGB 13.3 03/02/2021    HCT 39.2 03/02/2021    MCV 89 03/02/2021    MCH 30.2 03/02/2021    MCHC 33.9 03/02/2021    RDW 12.4 03/02/2021     03/02/2021     BMP RESULTS:  Lab Results   Component Value Date     03/02/2021    POTASSIUM 3.8 03/02/2021    CHLORIDE 109 03/02/2021    CO2 26 03/02/2021    ANIONGAP 7 03/02/2021    GLC 91 03/02/2021    BUN 14 03/02/2021    CR 0.62 03/02/2021    GFRESTIMATED >90 03/02/2021    GFRESTBLACK >90 03/02/2021    CINDI 9.4 03/02/2021      A1C RESULTS:  Lab Results   Component Value Date    A1C 5.1 11/22/2013     INR RESULTS:  Lab Results   Component Value Date    INR 0.99 11/15/2018    INR 0.94 03/05/2018     Cardiac data:  ECG today NSR, no acute ST-T changes      Echo 4/26/21  Left ventricular function, chamber size, wall motion, and wall thickness are  normal.The EF is 60-65%.  Right ventricular function, chamber size, wall motion, and thickness are  normal.  The aortic valve is bicuspid. Mild aortic valve calcification is present.  Mild aortic stenosis is present. Peak velocity 2.5 m/s and  mean is 15 mmHg,  Ascending aorta 3.3 cm.  The inferior vena cava is normal.   No pericardial effusion is present.     Compared to prior study, aortic valve stenosis is stable    Echo from 5/6/19  Left ventricular function, chamber size, wall motion, and wall thickness are normal.The EF is > 65%.  Right ventricular function, chamber size, wall motion, and thickness are normal.  Mild aortic stenosis is present.   No pericardial effusion is present.  Mildly dilated proximal ascending aorta. Size 3.2 cm/22.44mm/m2.     This study was compared with the study from 2/23/18. There has been no change.     Echo 2/23/18 was personally reviewed   Left ventricular function, chamber size, wall motion, and wall thickness are normal. The EF is 60-65%.  Global peak LV longitudinal strain is averaged at -21.2%. This is within  reported normal limits (normal <-18%).  The right ventricle is normal size. Global right ventricular function is normal.  Aortic valve is bicuspid. Mild aortic stenosis is present. Mean gradient is 13 mmHg.  No pericardial effusion is present.    The 10-year ASCVD risk score (Hampsteadamy ROSS Jr., et al., 2013) is: 9.9%    Values used to calculate the score:      Age: 70 years      Sex: Female      Is Non- : No      Diabetic: No      Tobacco smoker: No      Systolic Blood Pressure: 128 mmHg      Is BP treated: No      HDL Cholesterol: 95 mg/dL      Total Cholesterol: 214 mg/dL    Assessment and Plan:   59 year old woman  1. Right breast cancer, triple negative  2. Mild bicuspid aortic valve stenosis, mean gradient 15 mmHg.  No significant progression compared to prior echo 5/6/2019.  3. Preserved biventricular function  4. Primary prevention - ASCVD 10%. HDL 95.     Julieta has no symptoms of angina or heart failure. Her exam today is consistent with mild aortic stenosis, euvolemia and normal heart rate. Her diastolic blood pressure is slightly higher than usual today. She reports an exercise tolerance of > 4 METS. She has a bicuspid valve with mild stenosis that does not require any intervention at this point.  On my personal review of the echocardiogram images from today I donot notice any progression of aortic stenosis.  The ascending aorta is stable compared to echo 2 years ago.  From a primary prevention standpoint,  Julieta's 10-year ASCVD risk is ~10% and her HDL is high at 95.  I have recommended a coronary calcium score to assess for atherosclerosis and the need for statin therapy.  No recent chest CT imaging available for review for coronary calcium evaluation. Follow up recommended in 1 year.    Mohsan Chaudhry MD  Cardiology Fellow    ATTENDING ATTESTATION:  This patient has been seen and examined by me April 26, 2021 with Dr. Aguilar, cardiology fellow. I have reviewed the vitals, laboratory  and imaging data relevant to this patient's care. I have edited this note to reflect our joint assessment and plan, and discussed the plan with the patient.    Kaley Tidwell MD, MS  Professor of Medicine  Cardiovascular division      CC  Patient Care Team:  Simona Tucker APRN CNP as PCP - General (Nurse Practitioner - Family)  Simona Tucker APRN CNP as Assigned PCP  Tamanna Neff RN as Nurse Coordinator (Breast Oncology)  Chevy Gay MD as Referring Physician (Oncology)  Kaley Tidwell MD as MD (Cardiology)  Carlos Valdez MD as MD (Family Practice)  Linda Gallegos PA-C as Assigned Cancer Care Provider  CHEVY GAY    Answers for HPI/ROS submitted by the patient on 4/23/2021   General Symptoms: No  Skin Symptoms: No  HENT Symptoms: Yes  EYE SYMPTOMS: Yes  HEART SYMPTOMS: Yes  LUNG SYMPTOMS: No  INTESTINAL SYMPTOMS: No  URINARY SYMPTOMS: No  GYNECOLOGIC SYMPTOMS: No  BREAST SYMPTOMS: Yes  SKELETAL SYMPTOMS: Yes  BLOOD SYMPTOMS: No  NERVOUS SYSTEM SYMPTOMS: No  MENTAL HEALTH SYMPTOMS: No  Ear pain: No  Ear discharge: No  Hearing loss: No  Tinnitus: No  Nosebleeds: No  Congestion: Yes  Sinus pain: Yes  Trouble swallowing: No   Voice hoarseness: No  Mouth sores: No  Sore throat: No  Tooth pain: Yes  Gum tenderness: No  Bleeding gums: No  Change in taste: No  Change in sense of smell: No  Dry mouth: Yes  Neck lump: No  Eye pain: No  Vision loss: No  Dry eyes: Yes  Watery eyes: No  Eye bulging: No  Double vision: No  Flashing of lights: No  Spots: No  Floaters: Yes  Redness: No  Crossed eyes: No  Tunnel Vision: No  Yellowing of eyes: No  Eye irritation: Yes  Chest pain or pressure: Yes  Fast or irregular heartbeat: No  Pain in legs with walking: No  Trouble breathing while lying down: No  Fingers or toes appear blue: No  High blood pressure: No  Low blood pressure: No  Fainting: No  Murmurs: Yes  Pacemaker: No  Varicose veins: No  Edema or swelling: No  Wake up at night  with shortness of breath: No  Light-headedness: Yes  Exercise intolerance: No  Back pain: Yes  Muscle aches: No  Neck pain: Yes  Swollen joints: No  Joint pain: Yes  Bone pain: Yes  Muscle cramps: No  Muscle weakness: No  Joint stiffness: Yes  Bone fracture: No  Discharge: No  Lumps: No  Pain: Yes  Nipple retraction: No        Please do not hesitate to contact me if you have any questions/concerns.     Sincerely,     Klaey Tidwell MD

## 2021-04-26 NOTE — PATIENT INSTRUCTIONS
Patient Instructions:  It was a pleasure to see you in the cardiology clinic today.      If you have any questions, call  Ilene Sawyer RN, at [phone].  Press Option #1 for the [facility], and then press Option #4  We are encouraging the use of JumpInt to communicate with your HealthCare Provider    Please follow up with Dr. Tidwell in 1 year    Please have your calcium score checked.     Check your blood pressure at home.       If you have an urgent need after hours (8:00 am to 4:30 pm) please call [phone] and ask for the cardiology fellow on call.

## 2021-04-26 NOTE — PROGRESS NOTES
Cardiology Clinic Note    HPI: Julieta Rivera is a 70-year-old woman  diagnosed with triple-negative stage I ductal carcinoma of the right breast treated with docetaxel/cyclophosphamide  March - May 2018,  factor V Leiden mutation but has no history of thrombosis and is not on anticoagulation. She underwent an echocardiogram prior to initiation of chemotherapy in Feb 2018 and was noted to have mild bicuspid aortic valvular stenosis with a peak velocity of 2.5 m/s and mean gradient of 13 mmHg. Patient underwent lumpectomy on 6/7/18 without any major cardiopulmonary issues. She then underwent 4 weeks of radiation therapy.   The bicuspid aortic valve was initially diagnosed in 1999. She  has no personal history of coronary artery disease, heart failure, arrhythmias, hyperlipidemia, tobacco use or diabetes. There is no family history of premature coronary artery disease.   Interval History:  Patient reports doing well.  She reports intermittent episodes of heaviness while lying down to go to bed at night, no association with exertion.  No association with palpitations.  She can walk 4 blocks and up to 5 stairs without any distress.  She also describes right shoulder pain and significant trouble with her allergies.  Denies any orthopnea, PND or lower extremity edema.  No lightheadedness or dizziness presyncopal symptoms.    Cardiac meds  none    PAST MEDICAL HISTORY:  Past Medical History:   Diagnosis Date     Abnormal Pap smear 1970s    normal since     Breast cancer (H) 02/2018     Factor V Leiden (H)      Hypothyroidism fall 2000       CURRENT MEDICATIONS:  Current Outpatient Medications   Medication Sig Dispense Refill     levothyroxine (SYNTHROID/LEVOTHROID) 100 MCG tablet TAKE ONE TABLET BY MOUTH ONCE DAILY 90 tablet 1     Omega-3 Fatty Acids (FISH OIL PO) Take 1 capsule by mouth daily.       order for DME Equipment being ordered: compression sleeve/gauntlet 20-30 mmHg 1 each 12     vitamin D3 (CHOLECALCIFEROL)  2000 units (50 mcg) tablet Take 1 tablet by mouth daily         PAST SURGICAL HISTORY:  Past Surgical History:   Procedure Laterality Date      SECTION      x2 ,      COLPOSCOPY CERVIX, BIOPSY CERVIX, ENDOCERVICAL CURETTAGE, COMBINED       INSERT PORT VASCULAR ACCESS N/A 3/5/2018    Procedure: INSERT PORT VASCULAR ACCESS;  Single Lumen Chest Power Port Placement;  Surgeon: Brian Tolbert PA-C;  Location: [facility] OR     MASTECTOMY SIMPLE, SENTINEL NODE, COMBINED Right 2018    Lumpectomy with SENTINEL NODE;  Right Wire Localized Lumpectomy And Right [place] Lymph Node Biopsy;  Surgeon: Eugene Guzman MD;  Location: [facility] OR     REMOVE PORT VASCULAR ACCESS Left 11/15/2018    Procedure: Left Port Removal;  Surgeon: Tom Quick PA-C;  Location: [facility] OR     TONSILLECTOMY, ADENOIDECTOMY, COMBINED         ALLERGIES     Allergies   Allergen Reactions     Seasonal Allergies        FAMILY HISTORY:  Family History   Problem Relation Age of Onset     Heart Disease Mother      Heart Disease Father      Cerebrovascular Disease Father      Diabetes Father      Diabetes Brother      Hypertension Brother      Obesity Brother      Obesity Sister      Cancer Sister 48        endometrial     Hypertension Sister      Cancer Paternal Aunt         Breast cancer in 2 paternal aunts, 1 dx in her 50's the other in her 60's       SOCIAL HISTORY:  Social History     Social History     Marital status:      Spouse name: N/A     Number of children: N/A     Years of education: N/A     Social History Main Topics     Smoking status: Former Smoker     Quit date: 1971     Smokeless tobacco: Never Used     Alcohol use No      Comment: none since last november.     Drug use: No     Sexual activity: Yes     Partners: Male     Other Topics Concern     Stress Concern No     Weight Concern No     Exercise Yes     yoga class weekly, walking     Seat Belt Yes     ROS:   Constitutional: No fever, chills, or  sweats. No weight gain/loss   ENT: No visual disturbance, ear ache, epistaxis, sore throat  Allergies/Immunologic: Negative.   Respiratory: No cough, hemoptysia  Cardiovascular: As per HPI  GI: No nausea, vomiting, hematemesis, melena, or hematochezia  : No urinary frequency, dysuria, or hematuria  Integument: Negative  Psychiatric: Negative  Neuro: Negative  Endocrinology: Negative   Musculoskeletal: Negative    EXAM:  BP (!) 128/90 (BP Location: Left arm, Patient Position: Chair, Cuff Size: Adult Regular)   Pulse 79   Wt 59 kg (130 lb)   SpO2 99%   BMI 25.39 kg/m    In general, the patient is a pleasant female in no apparent distress.      HEENT: NC/AT.  PERRLA.  EOMI.  Sclerae white, not injected.    Neck: Carotids 2+ bilaterally without bruits.  No jugular venous distension.   Lymph: No cervical adenopathy. No thyromegaly.   Heart: RRR. Normal S1, S2 preserved. 1/6 systolic ejection murmur RUSB; no rub, click, or gallop. There is no heave.    Lungs: Clear bilaterally.  No rhonchi, wheezes, rales.   GI: Soft, nontender, nondistended.   Extremities: No edema.  The pulses are 2+at the radial and DP bilaterally.  Neuro: grossly non focal.   Skin: no rashes.  Musculoskeletal: normal muscle strength, no acute arthritis, gait normal.    Labs:  LIPID RESULTS:  Lab Results   Component Value Date    CHOL 214 (H) 06/04/2019    HDL 95 06/04/2019     (H) 06/04/2019    TRIG 61 06/04/2019    CHOLHDLRATIO 2.5 11/22/2013    NHDL 119 06/04/2019     LIVER ENZYME RESULTS:  Lab Results   Component Value Date    AST 20 03/02/2021    ALT 21 03/02/2021     CBC RESULTS:  Lab Results   Component Value Date    WBC 5.1 03/02/2021    RBC 4.41 03/02/2021    HGB 13.3 03/02/2021    HCT 39.2 03/02/2021    MCV 89 03/02/2021    MCH 30.2 03/02/2021    MCHC 33.9 03/02/2021    RDW 12.4 03/02/2021     03/02/2021     BMP RESULTS:  Lab Results   Component Value Date     03/02/2021    POTASSIUM 3.8 03/02/2021    CHLORIDE 109  03/02/2021    CO2 26 03/02/2021    ANIONGAP 7 03/02/2021    GLC 91 03/02/2021    BUN 14 03/02/2021    CR 0.62 03/02/2021    GFRESTIMATED >90 03/02/2021    GFRESTBLACK >90 03/02/2021    CINDI 9.4 03/02/2021      A1C RESULTS:  Lab Results   Component Value Date    A1C 5.1 11/22/2013     INR RESULTS:  Lab Results   Component Value Date    INR 0.99 11/15/2018    INR 0.94 03/05/2018     Cardiac data:  ECG today NSR, no acute ST-T changes      Echo 4/26/21  Left ventricular function, chamber size, wall motion, and wall thickness are  normal.The EF is 60-65%.  Right ventricular function, chamber size, wall motion, and thickness are  normal.  The aortic valve is bicuspid. Mild aortic valve calcification is present.  Mild aortic stenosis is present. Peak velocity 2.5 m/s and  mean is 15 mmHg,  Ascending aorta 3.3 cm.  The inferior vena cava is normal.   No pericardial effusion is present.     Compared to prior study, aortic valve stenosis is stable    Echo from 5/6/19  Left ventricular function, chamber size, wall motion, and wall thickness are normal.The EF is > 65%.  Right ventricular function, chamber size, wall motion, and thickness are normal.  Mild aortic stenosis is present.   No pericardial effusion is present.  Mildly dilated proximal ascending aorta. Size 3.2 cm/22.44mm/m2.     This study was compared with the study from 2/23/18. There has been no change.     Echo 2/23/18 was personally reviewed   Left ventricular function, chamber size, wall motion, and wall thickness are normal. The EF is 60-65%.  Global peak LV longitudinal strain is averaged at -21.2%. This is within reported normal limits (normal <-18%).  The right ventricle is normal size. Global right ventricular function is normal.  Aortic valve is bicuspid. Mild aortic stenosis is present. Mean gradient is 13 mmHg.  No pericardial effusion is present.    The 10-year ASCVD risk score ([place] CODY Jr., et al., 2013) is: 9.9%    Values used to calculate the  score:      Age: 70 years      Sex: Female      Is Non- : No      Diabetic: No      Tobacco smoker: No      Systolic Blood Pressure: 128 mmHg      Is BP treated: No      HDL Cholesterol: 95 mg/dL      Total Cholesterol: 214 mg/dL    Assessment and Plan:   59 year old woman  1. Right breast cancer, triple negative  2. Mild bicuspid aortic valve stenosis, mean gradient 15 mmHg.  No significant progression compared to prior echo 5/6/2019.  3. Preserved biventricular function  4. Primary prevention - ASCVD 10%. HDL 95.     Julieta has no symptoms of angina or heart failure. Her exam today is consistent with mild aortic stenosis, euvolemia and normal heart rate. Her diastolic blood pressure is slightly higher than usual today. She reports an exercise tolerance of > 4 METS. She has a bicuspid valve with mild stenosis that does not require any intervention at this point.  On my personal review of the echocardiogram images from today I donot notice any progression of aortic stenosis.  The ascending aorta is stable compared to echo 2 years ago.  From a primary prevention standpoint,  Julieta's 10-year ASCVD risk is ~10% and her HDL is high at 95.  I have recommended a coronary calcium score to assess for atherosclerosis and the need for statin therapy.  No recent chest CT imaging available for review for coronary calcium evaluation. Follow up recommended in 1 year.    Mohsan Chaudhry MD  Cardiology Fellow    ATTENDING ATTESTATION:  This patient has been seen and examined by me April 26, 2021 with Dr. Aguilar, cardiology fellow. I have reviewed the vitals, laboratory and imaging data relevant to this patient's care. I have edited this note to reflect our joint assessment and plan, and discussed the plan with the patient.    Kaley Tidwell MD, MS  Professor of Medicine  Cardiovascular division      CC  Patient Care Team:  Simona Tucker APRN CNP as PCP - General (Nurse Practitioner - Family)  Garrett  JOSÉ LUIS Du CNP as Assigned PCP  Tamanna Neff, RN as Nurse Coordinator (Breast Oncology)  Chevy Gay MD as Referring Physician (Oncology)  Kaley Tidwell MD as MD (Cardiology)  Carlos Valdez MD as MD (Family Practice)  Linda Gallegos PA-C as Assigned Cancer Care Provider  CHEVY GAY    Answers for HPI/ROS submitted by the patient on 4/23/2021   General Symptoms: No  Skin Symptoms: No  HENT Symptoms: Yes  EYE SYMPTOMS: Yes  HEART SYMPTOMS: Yes  LUNG SYMPTOMS: No  INTESTINAL SYMPTOMS: No  URINARY SYMPTOMS: No  GYNECOLOGIC SYMPTOMS: No  BREAST SYMPTOMS: Yes  SKELETAL SYMPTOMS: Yes  BLOOD SYMPTOMS: No  NERVOUS SYSTEM SYMPTOMS: No  MENTAL HEALTH SYMPTOMS: No  Ear pain: No  Ear discharge: No  Hearing loss: No  Tinnitus: No  Nosebleeds: No  Congestion: Yes  Sinus pain: Yes  Trouble swallowing: No   Voice hoarseness: No  Mouth sores: No  Sore throat: No  Tooth pain: Yes  Gum tenderness: No  Bleeding gums: No  Change in taste: No  Change in sense of smell: No  Dry mouth: Yes  Neck lump: No  Eye pain: No  Vision loss: No  Dry eyes: Yes  Watery eyes: No  Eye bulging: No  Double vision: No  Flashing of lights: No  Spots: No  Floaters: Yes  Redness: No  Crossed eyes: No  Tunnel Vision: No  Yellowing of eyes: No  Eye irritation: Yes  Chest pain or pressure: Yes  Fast or irregular heartbeat: No  Pain in legs with walking: No  Trouble breathing while lying down: No  Fingers or toes appear blue: No  High blood pressure: No  Low blood pressure: No  Fainting: No  Murmurs: Yes  Pacemaker: No  Varicose veins: No  Edema or swelling: No  Wake up at night with shortness of breath: No  Light-headedness: Yes  Exercise intolerance: No  Back pain: Yes  Muscle aches: No  Neck pain: Yes  Swollen joints: No  Joint pain: Yes  Bone pain: Yes  Muscle cramps: No  Muscle weakness: No  Joint stiffness: Yes  Bone fracture: No  Discharge: No  Lumps: No  Pain: Yes  Nipple retraction: No

## 2021-04-28 LAB — INTERPRETATION ECG - MUSE: NORMAL

## 2021-05-05 ENCOUNTER — HOSPITAL ENCOUNTER (OUTPATIENT)
Dept: CT IMAGING | Facility: CLINIC | Age: 71
Discharge: HOME | End: 2021-05-05
Attending: INTERNAL MEDICINE | Admitting: INTERNAL MEDICINE
Payer: COMMERCIAL

## 2021-05-05 DIAGNOSIS — Q23.81 BICUSPID AORTIC VALVE: ICD-10-CM

## 2021-05-05 DIAGNOSIS — I42.8 OTHER CARDIOMYOPATHY (H): ICD-10-CM

## 2021-05-05 PROCEDURE — 75571 CT HRT W/O DYE W/CA TEST: CPT

## 2021-05-05 PROCEDURE — 75571 CT HRT W/O DYE W/CA TEST: CPT | Mod: 26 | Performed by: INTERNAL MEDICINE

## 2021-06-18 ENCOUNTER — OFFICE VISIT (OUTPATIENT)
Dept: FAMILY MEDICINE | Facility: CLINIC | Age: 71
End: 2021-06-18
Payer: COMMERCIAL

## 2021-06-18 VITALS
OXYGEN SATURATION: 97 % | HEIGHT: 60 IN | BODY MASS INDEX: 25.82 KG/M2 | TEMPERATURE: 98.8 F | SYSTOLIC BLOOD PRESSURE: 132 MMHG | HEART RATE: 85 BPM | WEIGHT: 131.5 LBS | DIASTOLIC BLOOD PRESSURE: 82 MMHG

## 2021-06-18 DIAGNOSIS — Z00.00 ENCOUNTER FOR MEDICARE ANNUAL WELLNESS EXAM: Primary | ICD-10-CM

## 2021-06-18 DIAGNOSIS — E06.3 HYPOTHYROIDISM DUE TO HASHIMOTO'S THYROIDITIS: ICD-10-CM

## 2021-06-18 DIAGNOSIS — Z13.220 LIPID SCREENING: ICD-10-CM

## 2021-06-18 DIAGNOSIS — G62.0 PERIPHERAL NEUROPATHY DUE TO CHEMOTHERAPY (H): ICD-10-CM

## 2021-06-18 DIAGNOSIS — D68.51 ACTIVATED PROTEIN C RESISTANCE (H): ICD-10-CM

## 2021-06-18 DIAGNOSIS — Z12.11 COLON CANCER SCREENING: ICD-10-CM

## 2021-06-18 DIAGNOSIS — T45.1X5A PERIPHERAL NEUROPATHY DUE TO CHEMOTHERAPY (H): ICD-10-CM

## 2021-06-18 DIAGNOSIS — I71.20 THORACIC AORTIC ANEURYSM, WITHOUT RUPTURE: ICD-10-CM

## 2021-06-18 PROCEDURE — 84443 ASSAY THYROID STIM HORMONE: CPT | Performed by: NURSE PRACTITIONER

## 2021-06-18 PROCEDURE — 80061 LIPID PANEL: CPT | Performed by: NURSE PRACTITIONER

## 2021-06-18 PROCEDURE — 36415 COLL VENOUS BLD VENIPUNCTURE: CPT | Performed by: NURSE PRACTITIONER

## 2021-06-18 PROCEDURE — G0439 PPPS, SUBSEQ VISIT: HCPCS | Performed by: NURSE PRACTITIONER

## 2021-06-18 ASSESSMENT — MIFFLIN-ST. JEOR: SCORE: 1026.11

## 2021-06-18 NOTE — PROGRESS NOTES
"  SUBJECTIVE:   Julieta Rivera is a 70 year old female who presents for Preventive Visit.    {  Patient has been advised of split billing requirements and indicates understanding: Yes  Are you in the first 12 months of your Medicare Part B coverage?  No    Physical Health:    In general, how would you rate your overall physical health? good    Outside of work, how many days during the week do you exercise? none    Outside of work, approximately how many minutes a day do you exercise?not applicable    If you drink alcohol do you typically have >3 drinks per day or >7 drinks per week? No    Do you usually eat at least 4 servings of fruit and vegetables a day, include whole grains & fiber and avoid regularly eating high fat or \"junk\" foods? Yes    Do you have any problems taking medications regularly?  No    Do you have any side effects from medications? none    Needs assistance for the following daily activities: no assistance needed    Which of the following safety concerns are present in your home?  none identified      Hearing impairment: No    In the past 6 months, have you been bothered by leaking of urine? no    Mental Health:    In general, how would you rate your overall mental or emotional health? fair  PHQ-2 Score:      Do you feel safe in your environment? Yes    Have you ever done Advance Care Planning? (For example, a Health Directive, POLST, or a discussion with a medical provider or your loved ones about your wishes): No, advance care planning information given to patient to review.  Patient plans to discuss their wishes with loved ones or provider.      Additional concerns to address?  YES  1. Labs - Discuss labs  2.Thyroid Medication Dose    Recent Lab tests, osteoporosis diagnosis, thryroid medication, injuries from falls/chemo/radiation, physical therapy, recent echo cardiogram results and heart scan for calcification, coughing vs. allergy. Would like new TSH and T4/ T3 labs. Discuss whether I " need new Cholesterol, (HDL- LDL) triglyceride and blood sugar labs.    Hypothyroidism Follow-up      Since last visit, patient describes the following symptoms: Weight stable, no hair loss, no skin changes, no constipation, no loose stools and dry skin    Osteoporosis - diagnosed at oncology and recommend medication.  Mom  too young to have been diagnosed with osteoporosis or have hip fracture.  Patient is very active, though less during covid.   Taking vitamin D 2500 international unit(s).  Calcium [place] 2 per day.    MSK - Three weeks ago fell off chair and hurt right shoulder and ribs.    Cardiac - ECHO unchanged, predisposed to having calcium deposits.  Recommending statins at some point and patient prefers no statin.  Got CT coronary calcium scan which showed calcium score zero.      Zach and Isaac are 5.5 years old - did online .  Zach is entirely toilet trained and our of the crib.  Both kids have kosta having physical therapy or OT.  Isaac has a lot of medical anxiety.  Have IEPs.    Fall risk:  Fallen 2 or more times in the past year?: No  Any fall with injury in the past year?: No  click delete button to remove this line now  Cognitive Screening:   NORMAL  3) 3 item recall: Recalls 3 objects  Results: 3 items recalled: COGNITIVE IMPAIRMENT LESS LIKELY    Mini-CogTM Copyright S Jake. Licensed by the author for use in [facility]; reprinted with permission (kristen@.[facility] [place]). All rights reserved.      Do you have sleep apnea, excessive snoring or daytime drowsiness?: no          Reviewed and updated as needed this visit by clinical staff  Tobacco  Allergies  Meds  Problems  Med Hx  Surg Hx  Fam Hx  Soc Hx          Reviewed and updated as needed this visit by Provider                Social History     Tobacco Use     Smoking status: Former Smoker     Quit date: 1971     Years since quittin.4     Smokeless tobacco: Never Used     Tobacco comment: age [age]  "  Substance Use Topics     Alcohol use: No     Comment: no alcohol since 2/2018                           Current providers sharing in care for this patient include:   Patient Care Team:  Simona Tucker APRN CNP as PCP - General (Nurse Practitioner - Family)  Simona Tucker APRN CNP as Assigned PCP  Tamanna Neff, RN as Nurse Coordinator (Breast Oncology)  Jonathan Gay MD as Referring Physician (Oncology)  Kaley Tidwell MD as MD (Cardiology)  Carlos Valdez MD as MD (Family Practice)  Linda Gallegos PA-C as Assigned Cancer Care Provider  Kaley Tidwell MD as Assigned Heart and Vascular Provider    The following health maintenance items are reviewed in Epic and correct as of today:  Health Maintenance   Topic Date Due     ZOSTER IMMUNIZATION (1 of 2) Never done     COLORECTAL CANCER SCREENING  10/06/2019     FALL RISK ASSESSMENT  04/01/2020     MEDICARE ANNUAL WELLNESS VISIT  06/18/2022     MAMMO SCREENING  03/02/2023     LIPID  06/04/2024     DTAP/TDAP/TD IMMUNIZATION (4 - Td) 12/15/2025     ADVANCE CARE PLANNING  06/18/2026     DEXA  11/23/2035     HEPATITIS C SCREENING  Completed     PHQ-2  Completed     INFLUENZA VACCINE  Completed     Pneumococcal Vaccine: 65+ Years  Completed     COVID-19 Vaccine  Completed     IPV IMMUNIZATION  Aged Out     MENINGITIS IMMUNIZATION  Aged Out     HEPATITIS B IMMUNIZATION  Aged Out     Labs reviewed in EPIC      ROS:  Constitutional, HEENT, cardiovascular, pulmonary, GI, , musculoskeletal, neuro, skin, endocrine and psych systems are negative, except as otherwise noted.    OBJECTIVE:   /82   Pulse 85   Temp 98.8  F (37.1  C) (Temporal)   Ht 1.513 m (4' 11.57\")   Wt 59.6 kg (131 lb 8 oz)   SpO2 97%   BMI 26.06 kg/m   Estimated body mass index is 26.06 kg/m  as calculated from the following:    Height as of this encounter: 1.513 m (4' 11.57\").    Weight as of this encounter: 59.6 kg (131 lb 8 oz).  EXAM:   GENERAL: healthy, alert and " no distress  EYES: Eyes grossly normal to inspection, PERRL and conjunctivae and sclerae normal  HENT: ear canals and TM's normal, nose and mouth without ulcers or lesions  NECK: no adenopathy, no asymmetry, masses, or scars and thyroid normal to palpation  RESP: lungs clear to auscultation - no rales, rhonchi or wheezes  CV: regular rate and rhythm, normal S1 S2, no S3 or S4, no murmur, click or rub, no peripheral edema and peripheral pulses strong  ABDOMEN: soft, nontender, no hepatosplenomegaly, no masses and bowel sounds normal  MS: no gross musculoskeletal defects noted, no edema  SKIN: no suspicious lesions or rashes  NEURO: Normal strength and tone, mentation intact and speech normal  PSYCH: mentation appears normal, affect normal/bright    Diagnostic Test Results:  Labs reviewed in Epic  Results for orders placed or performed in visit on 06/18/21   Lipid panel reflex to direct LDL Fasting     Status: Abnormal   Result Value Ref Range    Cholesterol 242 (H) <200 mg/dL    Triglycerides 79 <150 mg/dL    HDL Cholesterol 90 >49 mg/dL    LDL Cholesterol Calculated 136 (H) <100 mg/dL    Non HDL Cholesterol 152 (H) <130 mg/dL   **TSH with free T4 reflex FUTURE anytime     Status: None   Result Value Ref Range    TSH 0.48 0.40 - 4.00 mU/L       ASSESSMENT / PLAN:   (Z00.00) Encounter for Medicare annual wellness exam  (primary encounter diagnosis)  Comment:   Plan:     (G62.0,  T45.1X5A) 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)  Comment:   Plan: Noted - stable    (I71.2) Thoracic aortic aneurysm, without rupture (H)  Comment:   Plan: Noted    (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    Weight as of this encounter: 59.6 kg (131 lb 8 oz).        She reports that she quit smoking about 50 years ago. She has never used smokeless tobacco.    Appropriate preventive services were discussed with this patient, including applicable screening as appropriate for cardiovascular disease, diabetes, osteopenia/osteoporosis, and glaucoma.  As appropriate for age/gender, discussed screening for colorectal cancer, prostate cancer, breast cancer, and cervical cancer. Checklist reviewing preventive services available has been given to the patient.    Reviewed patients plan of care and provided an AVS. The Intermediate Care Plan ( asthma action plan, low back pain action plan, and migraine action plan) for Julieta meets the Care Plan requirement. This Care Plan has been established and reviewed with the Patient.    Counseling Resources:  ATP IV Guidelines  Pooled Cohorts Equation Calculator  Breast Cancer Risk Calculator  BRCA-Related Cancer Risk Assessment: FHS-7 Tool  FRAX Risk Assessment  ICSI Preventive Guidelines  Dietary Guidelines for Americans, 2010  USDA's MyPlate  ASA Prophylaxis  Lung CA Screening    Simona Tucker, JOSÉ LUIS [facility]  "

## 2021-06-18 NOTE — PATIENT INSTRUCTIONS
"Look up about PM&R/Physiatry - if this looks interesting and/or your hip is getting worse let me know    My current clinic hours are as follows:    I will be on leave from June 28th through Sept 23rd.    Clinic notes    Lab and imaging results are now available for you to view immediately on completion.  Please know that I often have not seen the result before you see results.  I will interpret and discuss the results and meaning of the results as soon as I am able to review them.     [employer] is the best way to reach the clinic directly.  When you send a [employer] message this will be read by our clinic RNs.    Please know that when you call the clinic number, you are not speaking to a staff member from our local clinic.  You can ask that staff to speak to a clinic staff directly if you wish.    You will be able to read my documentation (\"provider notes\") when I finish up and close your chart.  I encourage you to read through to understand your diagnosis and the plan and how we arrived there.  It is also an opportunity to make sure I fully understood your concerns.      1.\"Eat food.  Not too much.  Mostly plants\" - Gustavo Pollen - see link below  - Aim for 5-7 servings of vegetables (raw vegetables - 1 serving = 1/2 cup; raw greens - 1 serving = 1 cup)   - Eat grains, but don't make them the superstar of your meal and DO make them whole grains  2. AVOID sugar.  There is no nutritional benefit to sugar.  3. Drink lots of water (avoid juice and soda)  4. MOVE your body daily - even if some days this means 5 minutes of movement. Walking is great for your bones and brain.  Do aim for 150 minutes per week of activity that raises your heart rate, but \"don't let the ideal get in the way of the good enough\"  5. Get good sleep (6-8 hours/night- less sleep is associated with disease/illness/obesity)   6. Smile and laugh every day - even in the face of tragedy  7. Start a meditation or mindfulness practice    CALCIUM: The " average recommended intake for women is 7693-6442 mg calcium daily. It is best to get this from your diet (Milk, yogurt, and cheese, vegetables such as Chinese cabbage, kale, spinach and broccoli). If you are not eating enough calcium, then I recommend Calcium Citrate/vitD supplements daily.     Vitamin D is an essential nutritient that many [place] lack, especially in the winter. It is vital for healthy immune system functioning and can be linked to diseases such as obesity, diabetes, body aches/pain, etc. It is super safe and highly beneficial for a [place] to take a vitamin D3 2000 IU once daily during winter months. Daily vitamin D for life is recommended for anyone who has ever been found deficient.    Other things to consider: do not drive while under the influence of alcohol, do not drive while texting, always wear a seatbelt, wear a helmet when biking, wear sunscreen to help prevent skin cancer, use DEET mosquito spray when outdoors to prevent mosquito and tick bites, & avoid smoking. If you do get bit by a DEER tick, please contact my office immediately to consider lyme prophylaxis. Dental visits recommended every 6-12 months.    Gustavo Vallejo's 7 Rules for Eating  https://www.apomio.Citycelebrity/food-recipes/news/79784185/7-rules-for-eating#1        Patient Education   Personalized Prevention Plan  You are due for the preventive services outlined below.  Your care team is available to assist you in scheduling these services.  If you have already completed any of these items, please share that information with your care team to update in your medical record.  Health Maintenance Due   Topic Date Due     Discuss Advance Care Planning  Never done     Zoster (Shingles) Vaccine (1 of 2) Never done     Colorectal Cancer Screening  10/06/2019     FALL RISK ASSESSMENT  04/01/2020     PHQ-2  01/01/2021

## 2021-06-19 LAB
CHOLEST SERPL-MCNC: 242 MG/DL
HDLC SERPL-MCNC: 90 MG/DL
LDLC SERPL CALC-MCNC: 136 MG/DL
NONHDLC SERPL-MCNC: 152 MG/DL
TRIGL SERPL-MCNC: 79 MG/DL
TSH SERPL DL<=0.005 MIU/L-ACNC: 0.48 MU/L (ref 0.4–4)

## 2021-06-24 NOTE — RESULT ENCOUNTER NOTE
Julieta,    TSH is normal - no changes to levothyroxine.  LDL is a bit higher than previous year, but your triglycerides and HDL continue to look really good.  The calculation for cardiovascular risk does bump you up in to the risk group where we start recommending a cholesterol medication (statin).  How do you feel about a statin off the bat?  Strong yay or nay?   If you have any questions, please feel free to contact the clinic.    FRED Lewis    The 10-year ASCVD risk score (Eliezeramy ROSS Jr., et al., 2013) is: 10.9%    Values used to calculate the score:      Age: 70 years      Sex: Female      Is Non- : No      Diabetic: No      Tobacco smoker: No      Systolic Blood Pressure: 132 mmHg      Is BP treated: No      HDL Cholesterol: 90 mg/dL      Total Cholesterol: 242 mg/dL

## 2021-07-25 DIAGNOSIS — E06.3 HYPOTHYROIDISM DUE TO HASHIMOTO'S THYROIDITIS: ICD-10-CM

## 2021-07-26 RX ORDER — LEVOTHYROXINE SODIUM 100 UG/1
TABLET ORAL
Qty: 90 TABLET | Refills: 3 | Status: SHIPPED | OUTPATIENT
Start: 2021-07-26 | End: 2022-07-18

## 2021-07-26 NOTE — TELEPHONE ENCOUNTER
"Requested Prescriptions   Signed Prescriptions Disp Refills    levothyroxine (SYNTHROID/LEVOTHROID) 100 MCG tablet 90 tablet 3     Sig: TAKE ONE TABLET BY MOUTH ONCE DAILY       Thyroid Protocol Passed - 7/25/2021 12:57 PM        Passed - Patient is 12 years or older        Passed - Recent (12 mo) or future (30 days) visit within the authorizing provider's specialty     Patient has had an office visit with the authorizing provider or a provider within the authorizing providers department within the previous 12 mos or has a future within next 30 days. See \"Patient Info\" tab in inbasket, or \"Choose Columns\" in Meds & Orders section of the refill encounter.              Passed - Medication is active on med list        Passed - Normal TSH on file in past 12 months     Recent Labs   Lab Test 06/18/21  1537   TSH 0.48              Passed - No active pregnancy on record     If patient is pregnant or has had a positive pregnancy test, please check TSH.          Passed - No positive pregnancy test in past 12 months     If patient is pregnant or has had a positive pregnancy test, please check TSH.             Leigh Ann Tavarez RN  [facility]    "

## 2021-08-30 ENCOUNTER — TELEPHONE (OUTPATIENT)
Dept: FAMILY MEDICINE | Facility: CLINIC | Age: 71
End: 2021-08-30
Payer: COMMERCIAL

## 2021-08-30 NOTE — TELEPHONE ENCOUNTER
"Reason for call:  Patient reporting a symptom    Symptom or request: Eye flakes/pus  Patient has been having sticky eye flakes near the eye lash for a while now.  For about 2 weeks left eye has yellow discharge from the corner. Only able to open half way. Stuck shut in morning. Uses warm wash cloth to loosen the pus.  Painfull and itchy. Patient stated \"I know it is not pink eye\"  Prefers not to go to urgent care.    Have you been treated for this before? No    Additional comments: has had issues since chemo    Phone Number patient can be reached at:  Home number on file [phone] (home)    Best Time:  any    Can we leave a detailed message on this number:  YES    Call taken on 8/30/2021 at 3:01 PM by Nikki Meadows     "

## 2021-08-31 NOTE — TELEPHONE ENCOUNTER
Called patient to triage and per pt did not want to be triaged. Patient stated she would like to be seen by a provider in clinic within the next week. I recommended that if patient unable to get into clinic today that she needs to go to urgent care, however, pt would prefer to wait to be seen in clinic.     Central scheduling alerted and will be calling patient to set up appointment.     Thanks,  KEON Hubbard  [facility]

## 2021-09-08 ENCOUNTER — APPOINTMENT (OUTPATIENT)
Dept: LAB | Facility: CLINIC | Age: 71
End: 2021-09-08
Attending: INTERNAL MEDICINE
Payer: COMMERCIAL

## 2021-09-08 ENCOUNTER — ONCOLOGY VISIT (OUTPATIENT)
Dept: ONCOLOGY | Facility: CLINIC | Age: 71
End: 2021-09-08
Attending: INTERNAL MEDICINE
Payer: COMMERCIAL

## 2021-09-08 VITALS
RESPIRATION RATE: 16 BRPM | OXYGEN SATURATION: 97 % | BODY MASS INDEX: 25.46 KG/M2 | WEIGHT: 128.5 LBS | TEMPERATURE: 98.4 F | HEART RATE: 76 BPM | DIASTOLIC BLOOD PRESSURE: 78 MMHG | SYSTOLIC BLOOD PRESSURE: 111 MMHG

## 2021-09-08 DIAGNOSIS — Z17.1 MALIGNANT NEOPLASM OF UPPER-OUTER QUADRANT OF RIGHT BREAST IN FEMALE, ESTROGEN RECEPTOR NEGATIVE (H): ICD-10-CM

## 2021-09-08 DIAGNOSIS — C50.411 MALIGNANT NEOPLASM OF UPPER-OUTER QUADRANT OF RIGHT BREAST IN FEMALE, ESTROGEN RECEPTOR NEGATIVE (H): ICD-10-CM

## 2021-09-08 DIAGNOSIS — H01.004 BLEPHARITIS OF LEFT UPPER EYELID, UNSPECIFIED TYPE: Primary | ICD-10-CM

## 2021-09-08 LAB
ALBUMIN SERPL-MCNC: 3.8 G/DL (ref 3.4–5)
ALP SERPL-CCNC: 62 U/L (ref 40–150)
ALT SERPL W P-5'-P-CCNC: 29 U/L (ref 0–50)
ANION GAP SERPL CALCULATED.3IONS-SCNC: 10 MMOL/L (ref 3–14)
AST SERPL W P-5'-P-CCNC: 25 U/L (ref 0–45)
BASOPHILS # BLD AUTO: 0 10E3/UL (ref 0–0.2)
BASOPHILS NFR BLD AUTO: 0 %
BILIRUB SERPL-MCNC: 0.6 MG/DL (ref 0.2–1.3)
BUN SERPL-MCNC: 12 MG/DL (ref 7–30)
CALCIUM SERPL-MCNC: 9.3 MG/DL (ref 8.5–10.1)
CHLORIDE BLD-SCNC: 110 MMOL/L (ref 94–109)
CO2 SERPL-SCNC: 24 MMOL/L (ref 20–32)
CREAT SERPL-MCNC: 0.68 MG/DL (ref 0.52–1.04)
EOSINOPHIL # BLD AUTO: 0.1 10E3/UL (ref 0–0.7)
EOSINOPHIL NFR BLD AUTO: 2 %
ERYTHROCYTE [DISTWIDTH] IN BLOOD BY AUTOMATED COUNT: 12.5 % (ref 10–15)
GFR SERPL CREATININE-BSD FRML MDRD: 88 ML/MIN/1.73M2
GLUCOSE BLD-MCNC: 112 MG/DL (ref 70–99)
HCT VFR BLD AUTO: 39 % (ref 35–47)
HGB BLD-MCNC: 13.5 G/DL (ref 11.7–15.7)
IMM GRANULOCYTES # BLD: 0 10E3/UL
IMM GRANULOCYTES NFR BLD: 0 %
LYMPHOCYTES # BLD AUTO: 1.4 10E3/UL (ref 0.8–5.3)
LYMPHOCYTES NFR BLD AUTO: 24 %
MCH RBC QN AUTO: 31.2 PG (ref 26.5–33)
MCHC RBC AUTO-ENTMCNC: 34.6 G/DL (ref 31.5–36.5)
MCV RBC AUTO: 90 FL (ref 78–100)
MONOCYTES # BLD AUTO: 0.4 10E3/UL (ref 0–1.3)
MONOCYTES NFR BLD AUTO: 6 %
NEUTROPHILS # BLD AUTO: 3.9 10E3/UL (ref 1.6–8.3)
NEUTROPHILS NFR BLD AUTO: 68 %
NRBC # BLD AUTO: 0 10E3/UL
NRBC BLD AUTO-RTO: 0 /100
PLATELET # BLD AUTO: 182 10E3/UL (ref 150–450)
POTASSIUM BLD-SCNC: 3.8 MMOL/L (ref 3.4–5.3)
PROT SERPL-MCNC: 7.1 G/DL (ref 6.8–8.8)
RBC # BLD AUTO: 4.33 10E6/UL (ref 3.8–5.2)
SODIUM SERPL-SCNC: 144 MMOL/L (ref 133–144)
WBC # BLD AUTO: 5.8 10E3/UL (ref 4–11)

## 2021-09-08 PROCEDURE — G0463 HOSPITAL OUTPT CLINIC VISIT: HCPCS

## 2021-09-08 PROCEDURE — 36415 COLL VENOUS BLD VENIPUNCTURE: CPT | Performed by: PHYSICIAN ASSISTANT

## 2021-09-08 PROCEDURE — 99214 OFFICE O/P EST MOD 30 MIN: CPT | Performed by: PHYSICIAN ASSISTANT

## 2021-09-08 PROCEDURE — 85025 COMPLETE CBC W/AUTO DIFF WBC: CPT | Performed by: PHYSICIAN ASSISTANT

## 2021-09-08 PROCEDURE — 80053 COMPREHEN METABOLIC PANEL: CPT | Performed by: PHYSICIAN ASSISTANT

## 2021-09-08 RX ORDER — IBUPROFEN 200 MG
CAPSULE ORAL DAILY
COMMUNITY
End: 2024-03-20

## 2021-09-08 RX ORDER — ERYTHROMYCIN 5 MG/G
0.5 OINTMENT OPHTHALMIC 2 TIMES DAILY
Qty: 3.5 G | Refills: 1 | Status: SHIPPED | OUTPATIENT
Start: 2021-09-08 | End: 2023-01-15

## 2021-09-08 ASSESSMENT — PAIN SCALES - GENERAL: PAINLEVEL: NO PAIN (0)

## 2021-09-08 NOTE — NURSING NOTE
"Oncology Rooming Note    September 8, 2021 1:46 PM   Julieta Rivera is a 70 year old female who presents for:    Chief Complaint   Patient presents with     Blood Draw     Labs drawn via  by RN. VS taken.     Oncology Clinic Visit     Malignant neoplasm of upper-outer quadrant of right breast in female, estrogen receptor negative      Initial Vitals: /78   Pulse 76   Temp 98.4  F (36.9  C) (Oral)   Resp 16   Wt 58.3 kg (128 lb 8 oz)   SpO2 97%   BMI 25.46 kg/m   Estimated body mass index is 25.46 kg/m  as calculated from the following:    Height as of 6/18/21: 1.513 m (4' 11.57\").    Weight as of this encounter: 58.3 kg (128 lb 8 oz). Body surface area is 1.57 meters squared.  No Pain (0) Comment: Data Unavailable   No LMP recorded. Patient is postmenopausal.  Allergies reviewed: Yes  Medications reviewed: Yes    Medications: Medication refills not needed today.  Pharmacy name entered into [employer]: [facility] [place], [place] - [address]    Clinical concerns: Pt has concerns on left eye pain and burning sensation that has been going on for 3 months now. Puss has appeared in beginning of august.       Keisha Sorto CMA            "

## 2021-09-08 NOTE — NURSING NOTE
Chief Complaint   Patient presents with     Blood Draw     Labs drawn via  by RN. VS taken.     Labs drawn with vpt by rn.  Pt tolerated well.  VS taken.  Pt checked in for next appt.    Je Vora RN

## 2021-09-10 NOTE — PROGRESS NOTES
Sep 8, 2021       HISTORY OF PRESENT ILLNESS:  Julieta Rivera is a 68-year-old woman who was referred to our clinic with a new diagnosis of right triple-negative breast cancer.  Julieta was followed by routine screening mammography, when she was discovered to have a 7 x 6 x 9-mm mass at the 12 o'clock position of the right breast 6 cm from the nipple-areolar complex.  She did undergo a biopsy of this mass which showed an ER-negative, MA-negative, HER2-nonamplified, invasive mammary carcinoma of no special type, invasive ductal carcinoma, [place] grade 3.  Ductal carcinoma in situ was also noted.  Nuclear grade 2 solid type.  HER2 FISH showed no amplification.       TREATMENT HISTORY:  A.  TC x 4  B.  Lumpectomy 6-7-18 [id number] RCB 1  (0.71)  C.  Radiation  Radiation Oncology - Course: 1         Protocol:   Treatment Site            Current Dose   Modality           From    To        Elapsed Days  Fx.  1 R Breast        4,240 cGy       06 X      7/30/2018        8/21/2018        22       16  1 R Breast Boost          1,000 cGy       e09       8/22/2018 8/27/2018         5         4        PAST MEDICAL HISTORY:  She has no history of breast surgery in the past or breast cancer in the past.  She has no history of radiation of any kind.  She has no history of tumor of any kind.  She may have a history of a heart problem with mitral prolapse and a murmur.  The last echo we have on record is from 1996.  She has no history of heart attack, breathing problems, blood clots, seizures, arthritis, peptic ulcer disease, osteoporosis or bone fractures.  She is not currently participating in a clinical trial and has not had any significant weight loss.  She has no history of hypertension, but she does have a history of factor V Leiden because her sister was diagnosed with factor V Leiden and Julieta was tested, although Julieta herself has had no blood clots or pulmonary emboli.            INTERVAL HISTORY:   Julieta is doing  well today. She has a likely blepharitis going on 1-2 week.      She has been seeing lymphedema therapy and PT for her right shoulder--this is much better, great ROM. She feel most of their help was extremely helpful.      She has no pain, fatigue or depression but does have some newer issues with anxiety. She is not overly anxious about her cancer but just dealing with Covid.  She does like [employer] and has been doing this online.       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 They are starting , possibly.         PHYSICAL EXAMINATION:   /78   Pulse 76   Temp 98.4  F (36.9  C) (Oral)   Resp 16   Wt 58.3 kg (128 lb 8 oz)   SpO2 97%   BMI 25.46 kg/m    Wt Readings from Last 4 Encounters:   09/08/21 58.3 kg (128 lb 8 oz)   06/18/21 59.6 kg (131 lb 8 oz)   04/26/21 59 kg (130 lb)   03/02/21 59 kg (130 lb)     Vital signs were reviewed.   Patient alert and oriented x3.   PERRLA. EOMI. Left eye shows erythema along the eye lid and an area of erythema along the inner corner of her eye.  No drainage. No scleral icterus noted. OP without thrush/sores.  Neck exam: No palpable cervical, supraclavicular or axillary nodes bilaterally.   Heart: RRR no murmurs noted.   Lungs: clear to auscultation bilaterally.  No crackles or wheezing.   Breast: no palpable masses in left or right breast  Abd: positive bowel sounds in all four quadrants.  No tenderness to palpation.  No hepatomegaly.   Extremities: No lower extremity edema.   Neuro: grossly intact.   Mood and affect is stable.        LABORATORY DATA:       9/8/2021 13:09   Sodium 144   Potassium 3.8   Chloride 110 (H)   Carbon Dioxide 24   Urea Nitrogen 12   Creatinine 0.68   GFR Estimate 88   Calcium 9.3   Anion Gap 10   Albumin 3.8   Protein Total 7.1   Bilirubin Total 0.6   Alkaline Phosphatase 62   ALT 29   AST 25   Glucose 112 (H)   WBC 5.8   Hemoglobin 13.5    Hematocrit 39.0   Platelet Count 182   RBC Count 4.33   MCV 90          IMAGING:  DEXA -osteoporosis -3.0        ASSESSMENT AND PLAN:    1.  Julieta Rivera is a 70-year-old woman with a history of stage I, clinical [id number] invasive ductal carcinoma of the right breast, triple negative in histology, maximum dimension 9 mm, grade 3.  She received neoadjuvant TC chemotherapy following lumpectomy showed an RCB1 response with significant reduction of the tumor and with a small amount of residual disease measuring about 2.5 mm in largest dimension.  There is 1 other small focus.  The margins were clear for DCIS and invasive cancer.  She was not offered adjuvant Xeloda given the small amount of residual disease.  Eight sentinel lymph nodes were examined and were negative for cancer.  There are no concerns today during our exam.   -March 2022 Mammo and Dr Gay     2.  DEXA scan shows osteoporosis with a most valid negative T-score of -3.0.  She did not want zoledronic acid, Prolia or Fosamax. We discussed this again- she is not interested in these drugs but feels we could follow up on this conversation another time in life.      -She will continue with weightbearing exercise, calcium and vitamin D.      3. Shoulder pain- fell a few months ago and has had a hurt shoulder since, also has some lymphedema on the chest that is causing some palpable tenderness.  PT and lymphedema has helped- the lymphedema pain is gone and the shoulder ROM is much better.  She is grateful for the help and will keep doing the home exercises.      4. Likely blepharitis.  Will give topical antibiotic.        Mirta Gallegos PA-C

## 2021-10-23 ENCOUNTER — HEALTH MAINTENANCE LETTER (OUTPATIENT)
Age: 71
End: 2021-10-23

## 2022-03-14 ENCOUNTER — TELEPHONE (OUTPATIENT)
Dept: CARDIOLOGY | Facility: CLINIC | Age: 72
End: 2022-03-14
Payer: COMMERCIAL

## 2022-03-14 NOTE — TELEPHONE ENCOUNTER
PROVIDER UNAVAILABLE - schedule virtually or in person Cardio-Oncology return.  3/14- LVM/DARYL sent to reschedule. JAJA

## 2022-03-20 NOTE — PROGRESS NOTES
Medical Oncology Follow-up Note       HISTORY OF PRESENT ILLNESS:  Julieta Rivera is a 70-year-old woman who was referred to our clinic with a new diagnosis of right triple-negative breast cancer.  Julieta was followed by routine screening mammography, when she was discovered to have a 7 x 6 x 9-mm mass at the 12 o'clock position of the right breast 6 cm from the nipple-areolar complex.  She did undergo a biopsy of this mass which showed an ER-negative, [place]-negative, HER2-nonamplified, invasive mammary carcinoma of no special type, invasive ductal carcinoma, [place] grade 3.  Ductal carcinoma in situ was also noted.  Nuclear grade 2 solid type.  HER2 FISH showed no amplification.  She now comes to our clinic for recommendations.       She has hypothyroidism and is on levothyroxine 88 alternating with 100 mcg daily.  She has no pain.       SOCIAL:  She has fatigue related to the care of infant twin grandsons with esophageal atresia at home.           PAST MEDICAL HISTORY:  She has no history of breast surgery in the past or breast cancer in the past.  She has no history of radiation of any kind.  She has no history of tumor of any kind.  She may have a history of a heart problem with mitral prolapse and a murmur.  The last echo we have on record is from 1996.  She has no history of heart attack, breathing problems, blood clots, seizures, arthritis, peptic ulcer disease, osteoporosis or bone fractures.  She is not currently participating in a clinical trial and has not had any significant weight loss.  She has no history of hypertension, but she does have a history of factor V Leiden because her sister was diagnosed with factor V Leiden and Julieta was tested, although Julieta herself has had no blood clots or pulmonary emboli.       FAMILY HISTORY:  There is a history of breast cancer in two paternal aunts, but no first-degree relatives.  One of her aunts was diagnosed in her 50s, the other in her 60s.  She has no male  relatives with breast cancer.  The remainder of her family history was negative.        PAST MENSTRUAL HISTORY:  First period was at age 13-2.  Last menstrual period was in 2003.  She has been pregnant twice at age 26 and 30 with two live births and no miscarriages or abortions.  She used oral contraceptives only once or twice.  Uterus and ovaries are in place.  She has no history of hormone replacement therapy.        ALLERGIES:  She has no allergy to seafood, iodine or contrast dye.  She does not take aspirin.       HABITS:  She did smoke 1 pack per day in college for 3 years from age 17 to 20 and has not smoked since.  She does not drink significant alcohol and has no heavy alcohol history in the past.        PERSONAL AND SOCIAL HISTORY:  She does have a history of being a .  Her  is 80 years old but is able to take care of his activities of daily living.  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        PAST MEDICAL HISTORY:  Julieta has no history of angina.  She has no history of hypertension.  Her cholesterol has generally been in acceptable range, although slightly over 200 recently.       FAMILY HISTORY:  Positive for heart disease.  Father had an MI in his 50s and had a bypass and  at age 78.  Mother had rheumatic heart disease at age 38 and  at age 42.      TREATMENT HISTORY:  A.  TC x 4  B.  Lumpectomy 18 [id number] RCB1.  0.71  C.  Radiation  Radiation Oncology - Course: 1         Protocol:   Treatment Site            Current Dose   Modality           From    To        Elapsed Days  Fx.  1 R Breast        4,240 cGy       06 X      7/30/2018        2018        22       16  1 R Breast Boost          1,000 cGy       e09       2018         5         4    D. Mild bicuspid aortic valve  stenosis, mean gradient 13 mmHg  Preserved biventricular function diagnosed 5/6/2019.     INTERVAL HISTORY:  Julieta reports her usual pain in low back but nothing new or bothersome. Reports intermittent pain of right chest well and occasionally in bilateral breasts more than usual but no lumps/bumps noticed.  Denies chest pain, some shortness of breath more than usual, denies cough.     She had COVID in January with mild symptoms. 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 Has felt quite fatigued since COVID as well. Denies depression or anxiety.  She does have chronic lymphedema in her right arm and had been followed in the lymphedema clinic.    Has been vaccinated and boosted for COVID.      REVIEW OF SYSTEMS:    A 10 point review of systems is otherwise negative.     PHYSICAL EXAMINATION:   /81 (BP Location: Left arm, Patient Position: Sitting, Cuff Size: Adult Regular)   Pulse 88   Temp 97.8  F (36.6  C) (Oral)   Resp 18   Wt 56.5 kg (124 lb 9.6 oz)   SpO2 99%   BMI 24.69 kg/m    Exam:  Constitutional: healthy, alert and no distress  HEENT: Normocephalic. No masses, lesions, tenderness or abnormalities  Cardiovascular: RRR. III/VI systolic murmur heard best at RUSB   Respiratory: clear to ausculation bilaterally without wheezes or rales, no increased work of breathing on room air  Gastrointestinal: soft, non-tender, non-distended  Breast: R well-healed scar at 12 o'clock position 3cm from nipple, R axillae with well-healed scar. No dimpling, palpated lesions or discoloration bilaterally  Musculoskeletal: no edema    Skin: back with bilateral excoriations with left shoulder with hemorrhagic crust   Neurologic: No gross focal neurologic deficits  Psychiatric: mentation appears normal and  affect normal/bright     LABORATORY DATA:  CBC and CMP were within normal limits.        IMAGING: Diagnostic mammogram  Reviewed with radiologist- 03/22/2022 showed benign findings, official report pending.         ASSESSMENT AND PLAN:    1.  Julieta Rivera is a 70-year-old woman with a history of stage I, clinical [id number] invasive ductal carcinoma of the right breast, triple negative in histology, maximum dimension 9 mm, grade 3.  She received neoadjuvant TC chemotherapy following lumpectomy showed an RCB1 response with significant reduction of the tumor and with a small amount of residual disease measuring about 2.5 mm in largest dimension.  There is 1 other small focus.  The margins were clear for DCIS and invasive cancer.  She was not offered adjuvant Xeloda given the small amount of residual disease.  Eight sentinel lymph nodes were examined and were negative for cancer.  There is no evidence of disease recurrence on today's exam or mammogram.   2.  No role for hormonal therapy.   3.  Factor V Leiden without history of clot.   4.  Arthritic discomfort in low back, hands and feet.  She has been referred to Sports Medicine and can go back to see Sports Medicine as needed.   5.   DEXA scan 2020 shows osteoporosis with a most valid negative T-score of -3.0.  She did not want zoledronic acid, Prolia or Fosamax, has been given handouts in the past and patient declines today again. She will continue with weightbearing exercise, calcium and vitamin D. Patient does not want to repeat DEXA with no plans to take medications.  6. Back skin lesions/burning/itching- Chronic, bilateral, burning/stinging type pain aggravated by movement less likely shingles possibly cutaneous nerve pain with certain movements. Encouraged patient to follow up with primary care if bothersome.  7.  Followup.  Follow up with [facility] with phone visit September 6 and with me March 23, 2023 and mammogram and Dexa the day before.  CBC and CMP with  followup visits.      Thank you for allowing us to continue to participate in Julieta Rivera's care.      Thank you for allowing us to participate in this patient's care.  The patient was seen and evaluated by me.  I discussed the patient with the resident and agree with the findings and plan in the note, which was edited by me.    Sincerely,     Jonathan Gay MD  Professor  [facility]  [phone].          2:28-2:39  I spent 11 minutes with the patient more than 50% of which was in counseling and coordination of care.

## 2022-03-22 ENCOUNTER — ONCOLOGY VISIT (OUTPATIENT)
Dept: ONCOLOGY | Facility: CLINIC | Age: 72
End: 2022-03-22
Attending: INTERNAL MEDICINE
Payer: COMMERCIAL

## 2022-03-22 ENCOUNTER — APPOINTMENT (OUTPATIENT)
Dept: LAB | Facility: CLINIC | Age: 72
End: 2022-03-22
Attending: INTERNAL MEDICINE
Payer: COMMERCIAL

## 2022-03-22 ENCOUNTER — ANCILLARY PROCEDURE (OUTPATIENT)
Dept: MAMMOGRAPHY | Facility: CLINIC | Age: 72
End: 2022-03-22
Attending: INTERNAL MEDICINE
Payer: COMMERCIAL

## 2022-03-22 VITALS
BODY MASS INDEX: 24.69 KG/M2 | WEIGHT: 124.6 LBS | TEMPERATURE: 97.8 F | RESPIRATION RATE: 18 BRPM | SYSTOLIC BLOOD PRESSURE: 134 MMHG | OXYGEN SATURATION: 99 % | DIASTOLIC BLOOD PRESSURE: 81 MMHG | HEART RATE: 88 BPM

## 2022-03-22 DIAGNOSIS — C50.411 MALIGNANT NEOPLASM OF UPPER-OUTER QUADRANT OF RIGHT BREAST IN FEMALE, ESTROGEN RECEPTOR NEGATIVE (H): ICD-10-CM

## 2022-03-22 DIAGNOSIS — Z17.1 MALIGNANT NEOPLASM OF UPPER-OUTER QUADRANT OF RIGHT BREAST IN FEMALE, ESTROGEN RECEPTOR NEGATIVE (H): ICD-10-CM

## 2022-03-22 LAB
ALBUMIN SERPL-MCNC: 4 G/DL (ref 3.4–5)
ALP SERPL-CCNC: 67 U/L (ref 40–150)
ALT SERPL W P-5'-P-CCNC: 24 U/L (ref 0–50)
ANION GAP SERPL CALCULATED.3IONS-SCNC: 8 MMOL/L (ref 3–14)
AST SERPL W P-5'-P-CCNC: 25 U/L (ref 0–45)
BASOPHILS # BLD AUTO: 0 10E3/UL (ref 0–0.2)
BASOPHILS NFR BLD AUTO: 0 %
BILIRUB SERPL-MCNC: 0.5 MG/DL (ref 0.2–1.3)
BUN SERPL-MCNC: 14 MG/DL (ref 7–30)
CALCIUM SERPL-MCNC: 9.2 MG/DL (ref 8.5–10.1)
CHLORIDE BLD-SCNC: 106 MMOL/L (ref 94–109)
CO2 SERPL-SCNC: 25 MMOL/L (ref 20–32)
CREAT SERPL-MCNC: 0.65 MG/DL (ref 0.52–1.04)
EOSINOPHIL # BLD AUTO: 0.1 10E3/UL (ref 0–0.7)
EOSINOPHIL NFR BLD AUTO: 2 %
ERYTHROCYTE [DISTWIDTH] IN BLOOD BY AUTOMATED COUNT: 13 % (ref 10–15)
GFR SERPL CREATININE-BSD FRML MDRD: >90 ML/MIN/1.73M2
GLUCOSE BLD-MCNC: 97 MG/DL (ref 70–99)
HCT VFR BLD AUTO: 39.2 % (ref 35–47)
HGB BLD-MCNC: 13 G/DL (ref 11.7–15.7)
IMM GRANULOCYTES # BLD: 0 10E3/UL
IMM GRANULOCYTES NFR BLD: 0 %
LYMPHOCYTES # BLD AUTO: 1.6 10E3/UL (ref 0.8–5.3)
LYMPHOCYTES NFR BLD AUTO: 29 %
MCH RBC QN AUTO: 29.6 PG (ref 26.5–33)
MCHC RBC AUTO-ENTMCNC: 33.2 G/DL (ref 31.5–36.5)
MCV RBC AUTO: 89 FL (ref 78–100)
MONOCYTES # BLD AUTO: 0.5 10E3/UL (ref 0–1.3)
MONOCYTES NFR BLD AUTO: 8 %
NEUTROPHILS # BLD AUTO: 3.4 10E3/UL (ref 1.6–8.3)
NEUTROPHILS NFR BLD AUTO: 61 %
NRBC # BLD AUTO: 0 10E3/UL
NRBC BLD AUTO-RTO: 0 /100
PLATELET # BLD AUTO: 186 10E3/UL (ref 150–450)
POTASSIUM BLD-SCNC: 3.6 MMOL/L (ref 3.4–5.3)
PROT SERPL-MCNC: 7.3 G/DL (ref 6.8–8.8)
RBC # BLD AUTO: 4.39 10E6/UL (ref 3.8–5.2)
SODIUM SERPL-SCNC: 139 MMOL/L (ref 133–144)
WBC # BLD AUTO: 5.6 10E3/UL (ref 4–11)

## 2022-03-22 PROCEDURE — 36415 COLL VENOUS BLD VENIPUNCTURE: CPT | Performed by: INTERNAL MEDICINE

## 2022-03-22 PROCEDURE — 99213 OFFICE O/P EST LOW 20 MIN: CPT | Performed by: INTERNAL MEDICINE

## 2022-03-22 PROCEDURE — G0463 HOSPITAL OUTPT CLINIC VISIT: HCPCS

## 2022-03-22 PROCEDURE — 85004 AUTOMATED DIFF WBC COUNT: CPT | Performed by: INTERNAL MEDICINE

## 2022-03-22 PROCEDURE — G0279 TOMOSYNTHESIS, MAMMO: HCPCS | Performed by: RADIOLOGY

## 2022-03-22 PROCEDURE — 77066 DX MAMMO INCL CAD BI: CPT | Performed by: RADIOLOGY

## 2022-03-22 PROCEDURE — 80053 COMPREHEN METABOLIC PANEL: CPT | Performed by: INTERNAL MEDICINE

## 2022-03-22 ASSESSMENT — PAIN SCALES - GENERAL: PAINLEVEL: NO PAIN (1)

## 2022-03-22 NOTE — LETTER
3/22/2022         RE: Julieta Rivera  [address] [place] [address]        Dear Colleague,    Thank you for referring your patient, Julieta Rivera, to the [facility]. Please see a copy of my visit note below.    Medical Oncology Follow-up Note       HISTORY OF PRESENT ILLNESS:  Julieta Rivera is a 70-year-old woman who was referred to our clinic with a new diagnosis of right triple-negative breast cancer.  Julieta was followed by routine screening mammography, when she was discovered to have a 7 x 6 x 9-mm mass at the 12 o'clock position of the right breast 6 cm from the nipple-areolar complex.  She did undergo a biopsy of this mass which showed an ER-negative, AL-negative, HER2-nonamplified, invasive mammary carcinoma of no special type, invasive ductal carcinoma, [place] grade 3.  Ductal carcinoma in situ was also noted.  Nuclear grade 2 solid type.  HER2 FISH showed no amplification.  She now comes to our clinic for recommendations.       She has hypothyroidism and is on levothyroxine 88 alternating with 100 mcg daily.  She has no pain.       SOCIAL:  She has fatigue related to the care of infant twin grandsons with esophageal atresia at home.           PAST MEDICAL HISTORY:  She has no history of breast surgery in the past or breast cancer in the past.  She has no history of radiation of any kind.  She has no history of tumor of any kind.  She may have a history of a heart problem with mitral prolapse and a murmur.  The last echo we have on record is from 1996.  She has no history of heart attack, breathing problems, blood clots, seizures, arthritis, peptic ulcer disease, osteoporosis or bone fractures.  She is not currently participating in a clinical trial and has not had any significant weight loss.  She has no history of hypertension, but she does have a history of factor V Leiden because her sister was diagnosed with factor V Leiden and Julieta was tested,  although Julieta herself has had no blood clots or pulmonary emboli.       FAMILY HISTORY:  There is a history of breast cancer in two paternal aunts, but no first-degree relatives.  One of her aunts was diagnosed in her 50s, the other in her 60s.  She has no male relatives with breast cancer.  The remainder of her family history was negative.        PAST MENSTRUAL HISTORY:  First period was at age 12-.  Last menstrual period was in 2003.  She has been pregnant twice at age 26 and 30 with two live births and no miscarriages or abortions.  She used oral contraceptives only once or twice.  Uterus and ovaries are in place.  She has no history of hormone replacement therapy.        ALLERGIES:  She has no allergy to seafood, iodine or contrast dye.  She does not take aspirin.       HABITS:  She did smoke 1 pack per day in college for 3 years from age 17 to 20 and has not smoked since.  She does not drink significant alcohol and has no heavy alcohol history in the past.        PERSONAL AND SOCIAL HISTORY:  She does have a history of being a .  Her  is 80 years old but is able to take care of his activities of daily living.  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        PAST MEDICAL HISTORY:  Julieta has no history of angina.  She has no history of hypertension.  Her cholesterol has generally been in acceptable range, although slightly over 200 recently.       FAMILY HISTORY:  Positive for heart disease.  Father had an MI in his 50s and had a bypass and  at age 78.  Mother had rheumatic heart disease at age 38 and  at age 42.      TREATMENT HISTORY:  A.  TC x 4  B.  Lumpectomy 18 [id number] RCB1.  0.71  C.  Radiation  Radiation Oncology - Course: 1         Protocol:   Treatment Site            Current  Dose   Modality           From    To        Elapsed Days  Fx.  1 R Breast        4,240 cGy       06 X      7/30/2018        8/21/2018        22       16  1 R Breast Boost          1,000 cGy       e09       8/22/2018 8/27/2018         5         4    D. Mild bicuspid aortic valve stenosis, mean gradient 13 mmHg  Preserved biventricular function diagnosed 5/6/2019.     INTERVAL HISTORY:  Julieta reports her usual pain in low back but nothing new or bothersome. Reports intermittent pain of right chest well and occasionally in bilateral breasts more than usual but no lumps/bumps noticed.  Denies chest pain, some shortness of breath more than usual, denies cough.     She had COVID in January with mild symptoms. 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 Has felt quite fatigued since COVID as well. Denies depression or anxiety.  She does have chronic lymphedema in her right arm and had been followed in the lymphedema clinic.    Has been vaccinated and boosted for COVID.      REVIEW OF SYSTEMS:    A 10 point review of systems is otherwise negative.     PHYSICAL EXAMINATION:   /81 (BP Location: Left arm, Patient Position: Sitting, Cuff Size: Adult Regular)   Pulse 88   Temp 97.8  F (36.6  C) (Oral)   Resp 18   Wt 56.5 kg (124 lb 9.6 oz)   SpO2 99%   BMI 24.69 kg/m    Exam:  Constitutional: healthy, alert and no distress  HEENT: Normocephalic. No masses, lesions, tenderness or abnormalities  Cardiovascular: RRR. III/VI systolic murmur heard best at RUSB   Respiratory: clear to ausculation bilaterally without wheezes or rales, no increased work of breathing on room air  Gastrointestinal: soft, non-tender, non-distended  Breast: R well-healed scar at 12 o'clock position 3cm from nipple, R axillae  with well-healed scar. No dimpling, palpated lesions or discoloration bilaterally  Musculoskeletal: no edema    Skin: back with bilateral excoriations with left shoulder with hemorrhagic crust   Neurologic: No gross focal neurologic deficits  Psychiatric: mentation appears normal and affect normal/bright     LABORATORY DATA:  CBC and CMP were within normal limits.        IMAGING: Diagnostic mammogram  Reviewed with radiologist- 03/22/2022 showed benign findings, official report pending.         ASSESSMENT AND PLAN:    1.  Julieta Rivera is a 70-year-old woman with a history of stage I, clinical [id number] invasive ductal carcinoma of the right breast, triple negative in histology, maximum dimension 9 mm, grade 3.  She received neoadjuvant TC chemotherapy following lumpectomy showed an RCB1 response with significant reduction of the tumor and with a small amount of residual disease measuring about 2.5 mm in largest dimension.  There is 1 other small focus.  The margins were clear for DCIS and invasive cancer.  She was not offered adjuvant Xeloda given the small amount of residual disease.  Eight sentinel lymph nodes were examined and were negative for cancer.  There is no evidence of disease recurrence on today's exam or mammogram.   2.  No role for hormonal therapy.   3.  Factor V Leiden without history of clot.   4.  Arthritic discomfort in low back, hands and feet.  She has been referred to Sports Medicine and can go back to see Sports Medicine as needed.   5.   DEXA scan 2020 shows osteoporosis with a most valid negative T-score of -3.0.  She did not want zoledronic acid, Prolia or Fosamax, has been given handouts in the past and patient declines today again. She will continue with weightbearing exercise, calcium and vitamin D. Patient does not want to repeat DEXA with no plans to take medications.  6. Back skin lesions/burning/itching- Chronic, bilateral, burning/stinging type pain aggravated by movement less  likely shingles possibly cutaneous nerve pain with certain movements. Encouraged patient to follow up with primary care if bothersome.  7.  Followup.  Follow up with [facility] with phone visit September 6 and with me March 23, 2023 and mammogram and Dexa the day before.  CBC and CMP with followup visits.      Thank you for allowing us to continue to participate in Julieta Rivera's care.      Thank you for allowing us to participate in this patient's care.  The patient was seen and evaluated by me.  I discussed the patient with the resident and agree with the findings and plan in the note, which was edited by me.      2:28-2:39  I spent 11 minutes with the patient more than 50% of which was in counseling and coordination of care.           Again, thank you for allowing me to participate in the care of your patient.      Sincerely,    Jonathan Gay MD

## 2022-03-22 NOTE — NURSING NOTE
"Oncology Rooming Note    March 22, 2022 1:38 PM   Julieta Rivera is a 71 year old female who presents for:    Chief Complaint   Patient presents with     Blood Draw     Labs drawn via  by RN in lab. VS taken.      Oncology Clinic Visit     Breast Cancer     Initial Vitals: /81 (BP Location: Left arm, Patient Position: Sitting, Cuff Size: Adult Regular)   Pulse 88   Temp 97.8  F (36.6  C) (Oral)   Resp 18   Wt 56.5 kg (124 lb 9.6 oz)   SpO2 99%   BMI 24.69 kg/m   Estimated body mass index is 24.69 kg/m  as calculated from the following:    Height as of 6/18/21: 1.513 m (4' 11.57\").    Weight as of this encounter: 56.5 kg (124 lb 9.6 oz). Body surface area is 1.54 meters squared.  No Pain (1) Comment: Data Unavailable   No LMP recorded. Patient is postmenopausal.  Allergies reviewed: Yes  Medications reviewed: Yes    Medications: Medication refills not needed today.  Pharmacy name entered into [facility]: [facility] [place], [place] - [address]    Clinical concerns: None       Evelyn Saravia LPN            "

## 2022-03-22 NOTE — NURSING NOTE
Chief Complaint   Patient presents with     Blood Draw     Labs drawn via  by RN in lab. VS taken.      Labs collected from venipuncture by RN. Vitals taken. Checked in for appointment(s).    Mary Navarro RN

## 2022-05-06 NOTE — PROGRESS NOTES
BRONCHOSPASM/BRONCHOCONSTRICTION   [x]  IMPROVE AERATION/BREATH SOUNDS  [x]   ADMINISTER BRONCHODILATOR THERAPY AS APPROPRIATE  [x]   ASSESS BREATH SOUNDS  []   IMPLEMENT AEROSOL/MDI PROTOCOL  [x]   PATIENT EDUCATION AS NEEDED Called patient's cell phone and left a VM to schedule chemotherapy education. Instructed patient to return call to discuss when to schedule when patient is here for other appointments. Tamanna Neff RN, BSN

## 2022-07-14 DIAGNOSIS — E06.3 HYPOTHYROIDISM DUE TO HASHIMOTO'S THYROIDITIS: ICD-10-CM

## 2022-07-15 NOTE — TELEPHONE ENCOUNTER
"Requested Prescriptions   Pending Prescriptions Disp Refills     levothyroxine (SYNTHROID/LEVOTHROID) 100 MCG tablet [Pharmacy Med Name: LEVOTHYROXINE SODIUM 100MCG TABS] 90 tablet 3     Sig: TAKE ONE TABLET BY MOUTH ONCE DAILY       Thyroid Protocol Failed - 7/14/2022  7:45 PM        Failed - Normal TSH on file in past 12 months     Recent Labs   Lab Test 06/18/21  1537   TSH 0.48              Passed - Patient is 12 years or older        Passed - Recent (12 mo) or future (30 days) visit within the authorizing provider's specialty     Patient has had an office visit with the authorizing provider or a provider within the authorizing providers department within the previous 12 mos or has a future within next 30 days. See \"Patient Info\" tab in inbasket, or \"Choose Columns\" in Meds & Orders section of the refill encounter.              Passed - Medication is active on med list        Passed - No active pregnancy on record     If patient is pregnant or has had a positive pregnancy test, please check TSH.          Passed - No positive pregnancy test in past 12 months     If patient is pregnant or has had a positive pregnancy test, please check TSH.             Patient has an appt with DIEGO Tucker 8/2/22 - needs updated labs - last TSH 6/18/21    Rachel Francisco RN  [facility]   "

## 2022-07-18 RX ORDER — LEVOTHYROXINE SODIUM 100 UG/1
TABLET ORAL
Qty: 90 TABLET | Refills: 3 | Status: SHIPPED | OUTPATIENT
Start: 2022-07-18 | End: 2022-11-14

## 2022-07-30 ENCOUNTER — HEALTH MAINTENANCE LETTER (OUTPATIENT)
Age: 72
End: 2022-07-30

## 2022-08-01 ASSESSMENT — ENCOUNTER SYMPTOMS
CONSTIPATION: 0
COUGH: 1
SHORTNESS OF BREATH: 1
FEVER: 0
HEMATOCHEZIA: 0
CHILLS: 0
HEMATURIA: 0
HEARTBURN: 0
PALPITATIONS: 0
ARTHRALGIAS: 1
DIZZINESS: 0
DYSURIA: 0
DIARRHEA: 0
FREQUENCY: 0
JOINT SWELLING: 1
NERVOUS/ANXIOUS: 1
SORE THROAT: 0
ABDOMINAL PAIN: 0
EYE PAIN: 0
PARESTHESIAS: 0
HEADACHES: 0
NAUSEA: 0
WEAKNESS: 0
MYALGIAS: 0
BREAST MASS: 0

## 2022-08-01 ASSESSMENT — ACTIVITIES OF DAILY LIVING (ADL): CURRENT_FUNCTION: NO ASSISTANCE NEEDED

## 2022-08-02 ENCOUNTER — OFFICE VISIT (OUTPATIENT)
Dept: FAMILY MEDICINE | Facility: CLINIC | Age: 72
End: 2022-08-02

## 2022-08-02 ENCOUNTER — LAB (OUTPATIENT)
Dept: LAB | Facility: CLINIC | Age: 72
End: 2022-08-02
Payer: COMMERCIAL

## 2022-08-02 VITALS
WEIGHT: 123 LBS | BODY MASS INDEX: 24.15 KG/M2 | DIASTOLIC BLOOD PRESSURE: 70 MMHG | RESPIRATION RATE: 16 BRPM | HEIGHT: 60 IN | HEART RATE: 85 BPM | TEMPERATURE: 97.8 F | SYSTOLIC BLOOD PRESSURE: 130 MMHG | OXYGEN SATURATION: 97 %

## 2022-08-02 DIAGNOSIS — I71.20 THORACIC AORTIC ANEURYSM, WITHOUT RUPTURE: ICD-10-CM

## 2022-08-02 DIAGNOSIS — T45.1X5A PERIPHERAL NEUROPATHY DUE TO CHEMOTHERAPY (H): ICD-10-CM

## 2022-08-02 DIAGNOSIS — Z17.1 MALIGNANT NEOPLASM OF UPPER-OUTER QUADRANT OF RIGHT BREAST IN FEMALE, ESTROGEN RECEPTOR NEGATIVE (H): ICD-10-CM

## 2022-08-02 DIAGNOSIS — Z23 NEED FOR PROPHYLACTIC VACCINATION AGAINST DIPHTHERIA AND TETANUS: ICD-10-CM

## 2022-08-02 DIAGNOSIS — C50.411 MALIGNANT NEOPLASM OF UPPER-OUTER QUADRANT OF RIGHT BREAST IN FEMALE, ESTROGEN RECEPTOR NEGATIVE (H): ICD-10-CM

## 2022-08-02 DIAGNOSIS — G62.0 PERIPHERAL NEUROPATHY DUE TO CHEMOTHERAPY (H): ICD-10-CM

## 2022-08-02 DIAGNOSIS — I42.7 CARDIOMYOPATHY SECONDARY TO DRUG (H): ICD-10-CM

## 2022-08-02 DIAGNOSIS — Z23 NEED FOR COVID-19 VACCINE: ICD-10-CM

## 2022-08-02 DIAGNOSIS — Z00.00 ROUTINE GENERAL MEDICAL EXAMINATION AT A HEALTH CARE FACILITY: Primary | ICD-10-CM

## 2022-08-02 DIAGNOSIS — Z12.11 SCREEN FOR COLON CANCER: ICD-10-CM

## 2022-08-02 DIAGNOSIS — E06.3 HYPOTHYROIDISM DUE TO HASHIMOTO'S THYROIDITIS: ICD-10-CM

## 2022-08-02 DIAGNOSIS — D68.51 HETEROZYGOUS FACTOR V LEIDEN MUTATION (H): ICD-10-CM

## 2022-08-02 DIAGNOSIS — Z86.16 HISTORY OF 2019 NOVEL CORONAVIRUS DISEASE (COVID-19): ICD-10-CM

## 2022-08-02 PROCEDURE — 99214 OFFICE O/P EST MOD 30 MIN: CPT | Mod: 25 | Performed by: NURSE PRACTITIONER

## 2022-08-02 PROCEDURE — 36415 COLL VENOUS BLD VENIPUNCTURE: CPT

## 2022-08-02 PROCEDURE — 90715 TDAP VACCINE 7 YRS/> IM: CPT | Performed by: NURSE PRACTITIONER

## 2022-08-02 PROCEDURE — G0439 PPPS, SUBSEQ VISIT: HCPCS | Performed by: NURSE PRACTITIONER

## 2022-08-02 PROCEDURE — 90471 IMMUNIZATION ADMIN: CPT | Performed by: NURSE PRACTITIONER

## 2022-08-02 PROCEDURE — 84443 ASSAY THYROID STIM HORMONE: CPT

## 2022-08-02 PROCEDURE — 84439 ASSAY OF FREE THYROXINE: CPT

## 2022-08-02 PROCEDURE — 0064A COVID-19,PF,MODERNA (18+ YRS BOOSTER .25ML): CPT | Performed by: NURSE PRACTITIONER

## 2022-08-02 PROCEDURE — 93000 ELECTROCARDIOGRAM COMPLETE: CPT | Performed by: NURSE PRACTITIONER

## 2022-08-02 PROCEDURE — 91306 COVID-19,PF,MODERNA (18+ YRS BOOSTER .25ML): CPT | Performed by: NURSE PRACTITIONER

## 2022-08-02 ASSESSMENT — ACTIVITIES OF DAILY LIVING (ADL): CURRENT_FUNCTION: NO ASSISTANCE NEEDED

## 2022-08-02 ASSESSMENT — ENCOUNTER SYMPTOMS
ARTHRALGIAS: 1
HEARTBURN: 0
PARESTHESIAS: 0
PALPITATIONS: 0
SHORTNESS OF BREATH: 1
DYSURIA: 0
ABDOMINAL PAIN: 0
SORE THROAT: 0
CHILLS: 0
WEAKNESS: 0
EYE PAIN: 0
CONSTIPATION: 0
BREAST MASS: 0
NAUSEA: 0
MYALGIAS: 0
DIZZINESS: 0
DIARRHEA: 0
FREQUENCY: 0
HEMATOCHEZIA: 0
HEADACHES: 0
JOINT SWELLING: 1
COUGH: 1
HEMATURIA: 0
FEVER: 0
NERVOUS/ANXIOUS: 1

## 2022-08-02 ASSESSMENT — PAIN SCALES - GENERAL: PAINLEVEL: MODERATE PAIN (4)

## 2022-08-02 NOTE — PROGRESS NOTES
"SUBJECTIVE:   Julieta Rivera is a 71 year old female who presents for Preventive Visit.      Patient has been advised of split billing requirements and indicates understanding: Yes  Are you in the first 12 months of your Medicare coverage?  No    Healthy Habits:     In general, how would you rate your overall health?  Fair    Frequency of exercise:  2-3 days/week    Duration of exercise:  15-30 minutes    Do you usually eat at least 4 servings of fruit and vegetables a day, include whole grains    & fiber and avoid regularly eating high fat or \"junk\" foods?  No    Taking medications regularly:  Yes    Medication side effects:  Not applicable    Ability to successfully perform activities of daily living:  No assistance needed    Home Safety:  Lack of grab bars in the bathroom    Hearing Impairment:  Difficulty following dialogue in the theater    In the past 6 months, have you been bothered by leaking of urine?  No    In general, how would you rate your overall mental or emotional health?  Fair      PHQ-2 Total Score: 1    Additional concerns today:  Yes    Fatigue, sleep, cough and SOB post-covid - had covid in January (entire family except ). Had fatigue prior to covid, but has worsened since covid and hving trouble with sleep is new. Also has continued congested cough, as well as \"martinez and puff\" more with exertion. Anxiety is elevated or she is more aware of anxiety - not sure related to covid.    Breast cancer - had mammogram in March, due for 6 month check with PA in September. Having increased pain where she had radiation as well as lumps in surgical areas that have surfaced more in the past year. Waking up with \"heavy limbs.\"  Continues to have neuropathy in feet and hands and dry eyes.    Thyroid - fatigue, but otherwise asymptomatic    Taking allegra daily for allergies.    Pelvic pain - pain in lower abdomen area. No vaginal bleeding, has bowel movement daily with no blood in the stool, sometimes " more diarrhea.  Assumes it is arthritis, pain bilaterally. Will wake up with separate hip/groin pain if laying for longer than 5 hours.       Do you feel safe in your environment? Yes    Have you ever done Advance Care Planning? (For example, a Health Directive, POLST, or a discussion with a medical provider or your loved ones about your wishes): No, advance care planning information given to patient to review.  Advanced care planning was discussed at today's visit.       Fall risk  Fallen 2 or more times in the past year?: No  Any fall with injury in the past year?: No    Cognitive Screening   1) Repeat 3 items (Leader, Season, Table)    2) Clock draw: Normal Clock  3) 3 item recall: Recalls 3 objects  Results: Normal [place]ack-3 items Recalled    Mini-CogTM Copyright S Jake. Licensed by the author for use in [facility]; reprinted with permission ([email or web address]). All rights reserved.      Do you have sleep apnea, excessive snoring or daytime drowsiness?: no    Reviewed and updated as needed this visit by clinical staff   Tobacco    Problems               Reviewed and updated as needed this visit by Provider      Problems              Social History     Tobacco Use     Smoking status: Former Smoker     Quit date: 1971     Years since quittin.6     Smokeless tobacco: Never Used     Tobacco comment: age [age]   Substance Use Topics     Alcohol use: No     Comment: no alcohol since 2018     If you drink alcohol do you typically have >3 drinks per day or >7 drinks per week? No    Alcohol Use 2022   Prescreen: >3 drinks/day or >7 drinks/week? -   Prescreen: >3 drinks/day or >7 drinks/week? No       Current providers sharing in care for this patient include:   Patient Care Team:  Simona Tucker APRN CNP as PCP - General (Nurse Practitioner - Family)  Tamanna Neff RN as Nurse Coordinator (Breast Oncology)  Jonathan Gay MD as Referring Physician (Oncology)  Kaley Tidwell MD  "as MD (Cardiology)  Carlos Valdez MD as MD (Family Practice)  Kaley Tidwell MD as Assigned Heart and Vascular Provider  Simona Tucker APRN CNP as Assigned PCP  Jonathan Gay MD as Assigned Cancer Care Provider    The following health maintenance items are reviewed in Epic and correct as of today:  Health Maintenance Due   Topic Date Due     ZOSTER IMMUNIZATION (1 of 2) Never done     COLORECTAL CANCER SCREENING  10/06/2019     COVID-19 Vaccine (4 - Booster for Moderna series) 04/01/2022     Lab work is in process  Labs reviewed in EPIC  Pneumonia Vaccine:Has has 13 (2015) and 23 (2017)  Mammogram Screening: Mammogram Screening - Alternate mammogram schedule due to breast cancer history see PL  History of abnormal Pap smear: NO - age 64 - see link Cervical Cytology Screening Guidelines        Review of Systems   Constitutional: Negative for chills and fever.   HENT: Positive for congestion. Negative for ear pain, hearing loss and sore throat.    Eyes: Positive for visual disturbance. Negative for pain.   Respiratory: Positive for cough and shortness of breath.    Cardiovascular: Positive for chest pain. Negative for palpitations and peripheral edema.   Gastrointestinal: Negative for abdominal pain, constipation, diarrhea, heartburn, hematochezia and nausea.   Breasts:  Negative for tenderness, breast mass and discharge.   Genitourinary: Positive for pelvic pain. Negative for dysuria, frequency, genital sores, hematuria, urgency, vaginal bleeding and vaginal discharge.   Musculoskeletal: Positive for arthralgias and joint swelling. Negative for myalgias.   Skin: Positive for rash.   Neurological: Negative for dizziness, weakness, headaches and paresthesias.   Psychiatric/Behavioral: Negative for mood changes. The patient is nervous/anxious.        OBJECTIVE:   /70   Pulse 85   Temp 97.8  F (36.6  C) (Temporal)   Resp 16   Ht 1.518 m (4' 11.75\")   Wt 55.8 kg (123 lb)   SpO2 97%   " "BMI 24.22 kg/m   Estimated body mass index is 24.22 kg/m  as calculated from the following:    Height as of this encounter: 1.518 m (4' 11.75\").    Weight as of this encounter: 55.8 kg (123 lb).  Physical Exam  GENERAL: healthy, alert and no distress  EYES: Eyes grossly normal to inspection, PERRL and conjunctivae and sclerae normal  HENT: ear canals and TM's normal, nose and mouth without ulcers or lesions  NECK: no adenopathy, no asymmetry, masses, or scars and thyroid normal to palpation  RESP: lungs clear to auscultation - no rales, rhonchi or wheezes  BREAST: normal without masses, tenderness or nipple discharge and no palpable axillary masses or adenopathy  CV: regular rate and rhythm, normal S1 S2, no S3 or S4, no murmur, click or rub, no peripheral edema and peripheral pulses strong  ABDOMEN: soft, nontender, no hepatosplenomegaly, no masses and bowel sounds normal  MS: no gross musculoskeletal defects noted, no edema  SKIN: no suspicious lesions or rashes  NEURO: Normal strength and tone, mentation intact and speech normal  PSYCH: mentation appears normal, affect normal/bright  LYMPH: no cervical, supraclavicular, axillary, or inguinal adenopathy    Diagnostic Test Results:  Labs reviewed in Epic  Results for orders placed or performed in visit on 08/02/22   TSH with free T4 reflex     Status: Abnormal   Result Value Ref Range    TSH 0.23 (L) 0.40 - 4.00 mU/L   T4 free     Status: Abnormal   Result Value Ref Range    Free T4 1.64 (H) 0.76 - 1.46 ng/dL       ASSESSMENT / PLAN:   (Z00.00) Routine general medical examination at a health care facility  (primary encounter diagnosis)  Comment:   Plan:     (E03.8,  E06.3) Hypothyroidism due to Hashimoto's thyroiditis  Comment:   Plan: TSH with free T4 reflex, levothyroxine         (SYNTHROID/LEVOTHROID) 75 MCG tablet, TSH with         free T4 reflex    Labs suggesting over supplementation.  Decrease from 100 mcg daily to alternating with 75 mcg on M, W, F, and Santos. 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 Coronary calcium score 0. Recommended statin for primary prevention and patient has thus far declined. Recommended to have follow-up spring 2022 which looks not to have been completed. Patient has this scheduled now for 10/2022. Updating EKG and ECHO today in preparation.    (I71.2) Thoracic aortic aneurysm, without rupture (H)  Comment:   Plan: Not in any imaging notes or cardiology notes. Removing from problem list.    (D68.51) Heterozygous factor V Leiden mutation (H)  Comment:   Plan: Noted - no personal history of blood clot and no medications currently indicated as concern for clotting.    (C50.411,  Z17.1) Malignant neoplasm of upper-outer quadrant of right breast in female, estrogen receptor negative (H)  Comment:   Plan: In remission and followed by oncology. Continues to experience treatment related symptoms of fatigue and neuropathy    (Z12.11) Screen for colon cancer  Comment:   Plan: Colonscopy Screening  Referral        In 2009, patient had polyps removed and was recommended to have colonoscopy in one year. She does not recall having follow-up and records do not show any follow-up. FIT test ordered in 2020 not completed. Ordered colonoscopy today.    (Z86.16) History of 2019 novel coronavirus disease (COVID-19)  Comment:   Plan: Adult Post Covid Clinic Referral            (Z23) Need for COVID-19 vaccine  Comment:   Plan: COVID-19,PF,MODERNA (18+ YRS BOOSTER .25ML)            (Z23) Need for prophylactic vaccination against diphtheria and  "tetanus  Comment:   Plan: done today        Patient has been advised of split billing requirements and indicates understanding: Yes    COUNSELING:  Reviewed preventive health counseling, as reflected in patient instructions    Estimated body mass index is 24.22 kg/m  as calculated from the following:    Height as of this encounter: 1.518 m (4' 11.75\").    Weight as of this encounter: 55.8 kg (123 lb).        She reports that she quit smoking about 51 years ago. She has never used smokeless tobacco.      Appropriate preventive services were discussed with this patient, including applicable screening as appropriate for cardiovascular disease, diabetes, osteopenia/osteoporosis, and glaucoma.  As appropriate for age/gender, discussed screening for colorectal cancer, prostate cancer, breast cancer, and cervical cancer. Checklist reviewing preventive services available has been given to the patient.    Reviewed patients plan of care and provided an AVS. The Intermediate Care Plan ( asthma action plan, low back pain action plan, and migraine action plan) for Julieta meets the Care Plan requirement. This Care Plan has been established and reviewed with the Patient.    Counseling Resources:  ATP IV Guidelines  Pooled Cohorts Equation Calculator  Breast Cancer Risk Calculator  Breast Cancer: Medication to Reduce Risk  FRAX Risk Assessment  ICSI Preventive Guidelines  Dietary Guidelines for Americans, 2010  USDA's MyPlate  ASA Prophylaxis  Lung CA Screening    JOSÉ LUIS Parikh CNP  [facility]    Identified Health Risks:  "

## 2022-08-02 NOTE — PATIENT INSTRUCTIONS
"Schedule colonoscopy  Labs for thyroid today  Covid booster #2 today and Tdap today  Schedule with post-covid clinic  Schedule exam      1.\"Eat food.  Not too much.  Mostly plants\" - Gustavo Pollen - see link below  - Aim for 5-7 servings of vegetables (raw vegetables - 1 serving = 1/2 cup; raw greens - 1 serving = 1 cup)   - Eat grains, but don't make them the superstar of your meal and DO make them whole grains  2. AVOID sugar.  There is no nutritional benefit to sugar.  3. Drink lots of water (avoid juice and soda)  4. MOVE your body daily - even if some days this means 5 minutes of movement. Walking is great for your bones and brain.  Do aim for 150 minutes per week of activity that raises your heart rate, but \"don't let the ideal get in the way of the good enough\"  5. Get good sleep (6-8 hours/night- less sleep is associated with disease/illness/obesity)   6. Smile and laugh every day - even in the face of tragedy  7. Start a meditation or mindfulness practice    CALCIUM: The average recommended intake for women is 6637-2465 mg calcium daily. It is best to get this from your diet (Milk, yogurt, and cheese, vegetables such as Chinese cabbage, kale, spinach and broccoli). If you are not eating enough calcium, then I recommend Calcium Citrate/vitD supplements daily.     Vitamin D is an essential nutritient that many [place] lack, especially in the winter. It is vital for healthy immune system functioning and can be linked to diseases such as obesity, diabetes, body aches/pain, etc. It is super safe and highly beneficial for a [place] to take a vitamin D3 2000 IU once daily during winter months. Daily vitamin D for life is recommended for anyone who has ever been found deficient.    Other things to consider: do not drive while under the influence of alcohol, do not drive while texting, always wear a seatbelt, wear a helmet when biking, wear sunscreen to help prevent skin cancer, use DEET mosquito spray when " "outdoors to prevent mosquito and tick bites, & avoid smoking. If you do get bit by a DEER tick, please contact my office immediately to consider lyme prophylaxis. Dental visits recommended every 6-12 months.    Gustavo Vallejo's 7 Rules for Eating  [email or web address]-recipes/[email or web address]/7-rules-for-eating#1    My clinic hours are as follows:  Monday virtual appointments 12-2p  Tuesday in clinic appts 9a-5p  Wed in clinic appts 3-6 pm  If you need an appointment urgently and do not find one available on [employer] or with Central scheduling, please send me a [employer] message and I will work to get you scheduled with myself or a colleague here     Clinic notes  Lab and imaging results are now available for you to view immediately on completion.  Please know that I often have not seen the result before you see results.  I will interpret and discuss the results and meaning of the results as soon as I am able to review them.   [employer] is the best way to reach the clinic directly.  When you send a [employer] message this will be read by our clinic RNs.  Please know that when you call the clinic number, you are not speaking to a staff member from our local clinic.  You can ask that staff to speak to a clinic staff directly if you wish.  You will be able to read my documentation (\"provider notes\") when I finish up and close your chart.  I encourage you to read through to understand your diagnosis and the plan and how we arrived there.  It is also an opportunity to make sure I fully understood your concerns.    "

## 2022-08-03 LAB
T4 FREE SERPL-MCNC: 1.64 NG/DL (ref 0.76–1.46)
TSH SERPL DL<=0.005 MIU/L-ACNC: 0.23 MU/L (ref 0.4–4)

## 2022-08-04 RX ORDER — LEVOTHYROXINE SODIUM 75 UG/1
75 TABLET ORAL DAILY
Qty: 90 TABLET | Refills: 3 | Status: SHIPPED | OUTPATIENT
Start: 2022-08-04 | End: 2022-11-14

## 2022-08-08 ENCOUNTER — TELEPHONE (OUTPATIENT)
Dept: GASTROENTEROLOGY | Facility: CLINIC | Age: 72
End: 2022-08-08

## 2022-08-08 NOTE — TELEPHONE ENCOUNTER
Screening Questions    BlueKIND OF PREP RedLOCATION [review exclusion criteria] GreenSEDATION TYPE      1. Are you active on mychart? Y    2. What insurance is in the chart? UCARE/MEDICARE      3.   Ordering/Referring Provider: Simona Tucker, APRN CNP     4. BMI   (If greater than 40 review exclusion criteria [PAC APPT IF [MAC] @ [facility])  24.8  [If yes, BMI OVER 40-EXTENDED PREP]      **(Sedation review/consideration needed)**  Do you have a legal guardian or Medical Power of    and/or are you able to give consent for your medical care?     SELF     5. Have you had a positive covid test in the last 90 days?   N     6.  Are you currently on dialysis?   N [ If yes, G-PREP & HOSPITAL setting ONLY]     7.  Do you have chronic kidney disease?  N [ If yes, G-PREP ]    8.   Do you have a diagnosis of diabetes?   N   [ If yes, G-PREP ]    9.  On a regular basis do you go 3-5 days between bowel movements?   N   [ If yes, EXTENDED PREP]    10.  Are you taking any prescription pain medications on a routine schedule?    N  [ If yes, EXTENDED PREP] [If yes, MAC]      11.   Do you have any chemical dependencies such as alcohol, street drugs, or methadone?    N [If yes, MAC]    12.   Do you have any history of post-traumatic stress syndrome, severe anxiety or history of psychosis?    Y - PTSD  [If yes, MAC]    13.  [FEMALES] Are you currently pregnant?     If yes, how many weeks?       Respiratory/Heart Screening:  [If yes to any of the following HOSPITAL setting only]     14. Do you have Pulmonary Hypertension [Lungs]?   N       15. Do you have UNCONTROLLED asthma?   N     16.  Do you use daily home oxygen?  N      17. Do you have mod to severe Obstructive Sleep Apnea?         (OKAY @ [facility]  [facility]  SH  PH  RI  MG - if pt is not on OXYGEN)  N      18.   Have you had a heart or lung transplant?   N      19.   Have you had a stroke or Transient ischemic attack (TIA - aka  mini stroke ) within 6 months?  (If yes, please  review exclusion criteria)  N     20.   In the past 6 months, have you had any heart related issues including cardiomyopathy or heart attack?   N           If yes, did it require cardiac stenting or other implantable device?   N      21.   Do you have any implantable devices in your body (pacemaker, defib, LVAD)? (If yes, please review exclusion criteria)  N   22.  Do you take the medication Phentermine?  NO        23. Do you take nitroglycerin?   N           If yes, how often? N  (if yes, HOSPITAL setting ONLY)    24.  Are you currently taking any blood thinners?    [If yes, INFORM patient to follw up w/ ORDERING PROVIDER FOR BRIDGING INSTRUCTIONS]     N    25.   Do you transfer independently?                (If NO, please HOSPITAL setting ONLY)  Y     26.   Preferred LOCAL Pharmacy for Pre Prescription:      [place], [place] - [address]    Scheduling Details  (Please ask for phone number if not scheduled by patient)      Caller : Julieta Rivera     Date of Procedure: 10/06/2022  Surgeon: ESPERANZA FELIX   [place] [facility]         Sedation Type: MAC  l PTSD   Conscious Sedation- Needs  for 6 hours after the procedure  MAC/General-Needs  for 24 hours after procedure    N :[Pre-op Required] at [facility]  SH  MG and OR for MAC sedation   (advise patient they will need a pre-op WITH IN 30 DAYS of procedure date)     Type of Procedure Scheduled:   Lower Endoscopy [Colonoscopy]    Which Colonoscopy Prep was Sent?:   VIKTOR GOLYTELY - PER REACALL     KHORUTS CF PATIENTS & GROEN'S PATIENTS NEEDS EXTENDED PREP       Informed patient they will need an adult  Y  Cannot take any type of public or medical transportation alone    Pre-Procedure Covid test to be completed at [facility] or Externally: Y  **INFORMED OF HOME TESTING & LAB OPTION**        Confirmed Nurse will call to complete assessment Y    Additional comments: N

## 2022-08-31 PROBLEM — I71.20 THORACIC AORTIC ANEURYSM, WITHOUT RUPTURE: Status: RESOLVED | Noted: 2020-05-05 | Resolved: 2022-08-31

## 2022-09-06 ENCOUNTER — ONCOLOGY VISIT (OUTPATIENT)
Dept: ONCOLOGY | Facility: CLINIC | Age: 72
End: 2022-09-06
Attending: PHYSICIAN ASSISTANT
Payer: COMMERCIAL

## 2022-09-06 ENCOUNTER — APPOINTMENT (OUTPATIENT)
Dept: LAB | Facility: CLINIC | Age: 72
End: 2022-09-06
Attending: PHYSICIAN ASSISTANT
Payer: COMMERCIAL

## 2022-09-06 VITALS
WEIGHT: 123 LBS | OXYGEN SATURATION: 97 % | SYSTOLIC BLOOD PRESSURE: 122 MMHG | TEMPERATURE: 98.2 F | DIASTOLIC BLOOD PRESSURE: 77 MMHG | BODY MASS INDEX: 24.22 KG/M2 | HEART RATE: 82 BPM | RESPIRATION RATE: 16 BRPM

## 2022-09-06 DIAGNOSIS — G89.29 CHRONIC RIGHT SHOULDER PAIN: Primary | ICD-10-CM

## 2022-09-06 DIAGNOSIS — Z17.1 MALIGNANT NEOPLASM OF UPPER-OUTER QUADRANT OF RIGHT BREAST IN FEMALE, ESTROGEN RECEPTOR NEGATIVE (H): ICD-10-CM

## 2022-09-06 DIAGNOSIS — M25.511 CHRONIC RIGHT SHOULDER PAIN: Primary | ICD-10-CM

## 2022-09-06 DIAGNOSIS — C50.411 MALIGNANT NEOPLASM OF UPPER-OUTER QUADRANT OF RIGHT BREAST IN FEMALE, ESTROGEN RECEPTOR NEGATIVE (H): ICD-10-CM

## 2022-09-06 LAB
ALBUMIN SERPL BCG-MCNC: 4.3 G/DL (ref 3.5–5.2)
ALP SERPL-CCNC: 64 U/L (ref 35–104)
ALT SERPL W P-5'-P-CCNC: 11 U/L (ref 10–35)
ANION GAP SERPL CALCULATED.3IONS-SCNC: 12 MMOL/L (ref 7–15)
AST SERPL W P-5'-P-CCNC: 26 U/L (ref 10–35)
BASOPHILS # BLD AUTO: 0 10E3/UL (ref 0–0.2)
BASOPHILS NFR BLD AUTO: 0 %
BILIRUB SERPL-MCNC: 0.4 MG/DL
BUN SERPL-MCNC: 10.8 MG/DL (ref 8–23)
CALCIUM SERPL-MCNC: 9.6 MG/DL (ref 8.8–10.2)
CHLORIDE SERPL-SCNC: 102 MMOL/L (ref 98–107)
CREAT SERPL-MCNC: 0.62 MG/DL (ref 0.51–0.95)
DEPRECATED HCO3 PLAS-SCNC: 23 MMOL/L (ref 22–29)
EOSINOPHIL # BLD AUTO: 0.2 10E3/UL (ref 0–0.7)
EOSINOPHIL NFR BLD AUTO: 3 %
ERYTHROCYTE [DISTWIDTH] IN BLOOD BY AUTOMATED COUNT: 13.2 % (ref 10–15)
GFR SERPL CREATININE-BSD FRML MDRD: >90 ML/MIN/1.73M2
GLUCOSE SERPL-MCNC: 102 MG/DL (ref 70–99)
HCT VFR BLD AUTO: 39.9 % (ref 35–47)
HGB BLD-MCNC: 13.4 G/DL (ref 11.7–15.7)
IMM GRANULOCYTES # BLD: 0 10E3/UL
IMM GRANULOCYTES NFR BLD: 0 %
LYMPHOCYTES # BLD AUTO: 1.7 10E3/UL (ref 0.8–5.3)
LYMPHOCYTES NFR BLD AUTO: 29 %
MCH RBC QN AUTO: 30.4 PG (ref 26.5–33)
MCHC RBC AUTO-ENTMCNC: 33.6 G/DL (ref 31.5–36.5)
MCV RBC AUTO: 91 FL (ref 78–100)
MONOCYTES # BLD AUTO: 0.4 10E3/UL (ref 0–1.3)
MONOCYTES NFR BLD AUTO: 7 %
NEUTROPHILS # BLD AUTO: 3.6 10E3/UL (ref 1.6–8.3)
NEUTROPHILS NFR BLD AUTO: 61 %
NRBC # BLD AUTO: 0 10E3/UL
NRBC BLD AUTO-RTO: 0 /100
PLATELET # BLD AUTO: 206 10E3/UL (ref 150–450)
POTASSIUM SERPL-SCNC: 3.9 MMOL/L (ref 3.4–5.3)
PROT SERPL-MCNC: 6.8 G/DL (ref 6.4–8.3)
RBC # BLD AUTO: 4.41 10E6/UL (ref 3.8–5.2)
SODIUM SERPL-SCNC: 137 MMOL/L (ref 136–145)
WBC # BLD AUTO: 6 10E3/UL (ref 4–11)

## 2022-09-06 PROCEDURE — G0463 HOSPITAL OUTPT CLINIC VISIT: HCPCS

## 2022-09-06 PROCEDURE — 36415 COLL VENOUS BLD VENIPUNCTURE: CPT | Performed by: PHYSICIAN ASSISTANT

## 2022-09-06 PROCEDURE — 80053 COMPREHEN METABOLIC PANEL: CPT | Performed by: PHYSICIAN ASSISTANT

## 2022-09-06 PROCEDURE — 99213 OFFICE O/P EST LOW 20 MIN: CPT | Performed by: PHYSICIAN ASSISTANT

## 2022-09-06 PROCEDURE — 85025 COMPLETE CBC W/AUTO DIFF WBC: CPT | Performed by: PHYSICIAN ASSISTANT

## 2022-09-06 ASSESSMENT — PAIN SCALES - GENERAL: PAINLEVEL: NO PAIN (0)

## 2022-09-06 NOTE — NURSING NOTE
Chief Complaint   Patient presents with     Oncology Clinic Visit     Malignant neoplasm of upper-outer quadrant of right breast in female, estrogen receptor negative (H)      Blood Draw     Labs drawn via  by RN. VS taken.     Labs drawn with  by rn.  Pt tolerated well.  VS taken.  Pt checked in for next appt.    Je Vora RN

## 2022-09-06 NOTE — NURSING NOTE
"Oncology Rooming Note    September 6, 2022 11:47 AM   Julieta Rivera is a 71 year old female who presents for:    Chief Complaint   Patient presents with     Oncology Clinic Visit     Malignant neoplasm of upper-outer quadrant of right breast in female, estrogen receptor negative (H)      Blood Draw     Labs drawn via  by RN. VS taken.     Initial Vitals: /77   Pulse 82   Temp 98.2  F (36.8  C) (Oral)   Resp 16   Wt 55.8 kg (123 lb)   SpO2 97%   BMI 24.22 kg/m   Estimated body mass index is 24.22 kg/m  as calculated from the following:    Height as of 8/2/22: 1.518 m (4' 11.75\").    Weight as of this encounter: 55.8 kg (123 lb). Body surface area is 1.53 meters squared.  No Pain (0) Comment: Data Unavailable   No LMP recorded. Patient is postmenopausal.  Allergies reviewed: Yes  Medications reviewed: Yes    Medications: Medication refills not needed today.  Pharmacy name entered into [employer]: [facility] [place], [place] - [address]    Clinical concerns: Pt has concerns regarding right arm where lymph nodes were removed/ surgery happened. Hard to do things as well as it \"feels heavy\". Certain movements help a bit. Has been going on since July, has not gotten worse, but has not gotten better either.       Keisha Sorto, [facility]            "

## 2022-09-27 NOTE — TELEPHONE ENCOUNTER
DIAGNOSIS: Chronic right shoulder pain \ Dr Alvarado   APPOINTMENT DATE: 10.27.22   NOTES STATUS DETAILS   OFFICE NOTE from referring provider Internal 9.6.22 [facility] Thompson Onc   OFFICE NOTE from other specialist Internal PT in Epic   MEDICATION LIST Internal

## 2022-09-28 ENCOUNTER — MYC MEDICAL ADVICE (OUTPATIENT)
Dept: CARDIOLOGY | Facility: CLINIC | Age: 72
End: 2022-09-28

## 2022-09-28 ENCOUNTER — TELEPHONE (OUTPATIENT)
Dept: GASTROENTEROLOGY | Facility: CLINIC | Age: 72
End: 2022-09-28

## 2022-09-28 DIAGNOSIS — E78.00 HIGH CHOLESTEROL: Primary | ICD-10-CM

## 2022-09-28 DIAGNOSIS — Z12.11 ENCOUNTER FOR SCREENING COLONOSCOPY: Primary | ICD-10-CM

## 2022-09-28 RX ORDER — BISACODYL 5 MG/1
TABLET, DELAYED RELEASE ORAL
Qty: 4 TABLET | Refills: 0 | Status: SHIPPED | OUTPATIENT
Start: 2022-09-28 | End: 2023-01-15

## 2022-09-28 NOTE — TELEPHONE ENCOUNTER
Pre assessment questions completed for upcoming colonoscopy procedure scheduled on 10.6.2022    Discussed at home rapid antigen COVID test 1-2 days prior to procedure.    Reviewed procedural arrival time 1140 and facility location [facility].    Designated  policy reviewed. Instructed to have someone stay 24 hours post procedure.     Anticoagulation/blood thinners? no    Electronic implanted devices? no    Reviewed Golytely prep instructions with patient. No fiber/iron supplements or foods that contain nuts/seeds prior to procedure.     Prep instructions sent via [employer].  Prep prescription sent to [place], [place] - [address]    Patient verbalized understanding and had no questions or concerns at this time.    Adia Garcia RN

## 2022-09-29 ENCOUNTER — TELEPHONE (OUTPATIENT)
Dept: GASTROENTEROLOGY | Facility: CLINIC | Age: 72
End: 2022-09-29

## 2022-09-29 NOTE — TELEPHONE ENCOUNTER
Caller: Julieta Rivera      Procedure: Colonoscopy    Date, Location, and Surgeon of Procedure Cancelled: 10/6, DION, Wise N    Ordering Provider:Simona Tucker APRN CNP     Reason for cancel (please be detailed, any staff messages or encounters to note?): Patient scheduling conflict.        Rescheduled: Y     If rescheduled:    Date: 12/1   Location: [facility]   Prep Resent: resent (changes to prep?)   Covid Test Rescheduled: home test   Note any change or update to original order/sedation: n/a

## 2022-10-04 ENCOUNTER — ANCILLARY PROCEDURE (OUTPATIENT)
Dept: CARDIOLOGY | Facility: CLINIC | Age: 72
End: 2022-10-04
Attending: NURSE PRACTITIONER
Payer: COMMERCIAL

## 2022-10-04 DIAGNOSIS — I42.7 CARDIOMYOPATHY SECONDARY TO DRUG (H): ICD-10-CM

## 2022-10-04 LAB — LVEF ECHO: NORMAL

## 2022-10-09 ENCOUNTER — HEALTH MAINTENANCE LETTER (OUTPATIENT)
Age: 72
End: 2022-10-09

## 2022-10-10 NOTE — RESULT ENCOUNTER NOTE
Julieta,    Your ECHO looked good. Did you talk to cardiology about the results?  If you have any questions, please feel free to contact the clinic.    FRED Lewis

## 2022-10-12 ENCOUNTER — LAB (OUTPATIENT)
Dept: LAB | Facility: CLINIC | Age: 72
End: 2022-10-12
Payer: COMMERCIAL

## 2022-10-12 DIAGNOSIS — E78.00 HIGH CHOLESTEROL: ICD-10-CM

## 2022-10-12 DIAGNOSIS — E06.3 HYPOTHYROIDISM DUE TO HASHIMOTO'S THYROIDITIS: ICD-10-CM

## 2022-10-12 LAB
CHOLEST SERPL-MCNC: 208 MG/DL
FASTING STATUS PATIENT QL REPORTED: YES
HDLC SERPL-MCNC: 86 MG/DL
LDLC SERPL CALC-MCNC: 107 MG/DL
NONHDLC SERPL-MCNC: 122 MG/DL
TRIGL SERPL-MCNC: 74 MG/DL
TSH SERPL DL<=0.005 MIU/L-ACNC: 1.86 MU/L (ref 0.4–4)

## 2022-10-12 PROCEDURE — 80061 LIPID PANEL: CPT

## 2022-10-12 PROCEDURE — 36415 COLL VENOUS BLD VENIPUNCTURE: CPT

## 2022-10-12 PROCEDURE — 84443 ASSAY THYROID STIM HORMONE: CPT

## 2022-10-13 NOTE — RESULT ENCOUNTER NOTE
Julieta,    Your thyroid labs were all normal.  If you have any questions, please feel free to contact the clinic.    FRED Lewis

## 2022-10-27 ENCOUNTER — PRE VISIT (OUTPATIENT)
Dept: ORTHOPEDICS | Facility: CLINIC | Age: 72
End: 2022-10-27

## 2022-11-06 ENCOUNTER — OFFICE VISIT (OUTPATIENT)
Dept: URGENT CARE | Facility: URGENT CARE | Age: 72
End: 2022-11-06
Payer: COMMERCIAL

## 2022-11-06 VITALS
TEMPERATURE: 102.5 F | OXYGEN SATURATION: 97 % | SYSTOLIC BLOOD PRESSURE: 105 MMHG | HEART RATE: 97 BPM | BODY MASS INDEX: 24.81 KG/M2 | DIASTOLIC BLOOD PRESSURE: 68 MMHG | WEIGHT: 126 LBS

## 2022-11-06 DIAGNOSIS — J10.1 INFLUENZA A: Primary | ICD-10-CM

## 2022-11-06 LAB
FLUAV AG SPEC QL IA: POSITIVE
FLUBV AG SPEC QL IA: NEGATIVE

## 2022-11-06 PROCEDURE — 99213 OFFICE O/P EST LOW 20 MIN: CPT | Performed by: PHYSICIAN ASSISTANT

## 2022-11-06 PROCEDURE — 87804 INFLUENZA ASSAY W/OPTIC: CPT | Mod: 59 | Performed by: PHYSICIAN ASSISTANT

## 2022-11-06 RX ORDER — OSELTAMIVIR PHOSPHATE 75 MG/1
75 CAPSULE ORAL 2 TIMES DAILY
Qty: 10 CAPSULE | Refills: 0 | Status: SHIPPED | OUTPATIENT
Start: 2022-11-06 | End: 2022-11-11

## 2022-11-06 NOTE — PROGRESS NOTES
URGENT CARE VISIT:    SUBJECTIVE:   Julieta Rivera is a 71 year old female presenting with a chief complaint of fever, stuffy nose and cough - productive.  Onset was 2 weeks ago then fever started today.  She denies the following symptoms: shortness of breath, vomiting and diarrhea  Course of illness is worsening.    Treatment measures tried include cough drops with some relief of symptoms.  Predisposing factors include grandson was diagnosed with flu.    PMH:   Past Medical History:   Diagnosis Date     Abnormal Pap smear 1970s    normal since     Breast cancer (H) 02/2018     Factor V Leiden (H)      Hypothyroidism fall 2000     Allergies: Seasonal allergies   Medications:   Current Outpatient Medications   Medication Sig Dispense Refill     oseltamivir (TAMIFLU) 75 MG capsule Take 1 capsule (75 mg) by mouth 2 times daily for 5 days 10 capsule 0     bisacodyl (DULCOLAX) 5 MG EC tablet Take as directed. One day before exam take 2 tablets at 3 PM. Day of exam take 2 tablets at 6 AM. 4 tablet 0     calcium carbonate 500 mg, elemental, (OSCAL 500) 1250 (500 Ca) MG TABS tablet Take by mouth daily       erythromycin (ROMYCIN) 5 MG/GM ophthalmic ointment Place 0.5 inches Into the left eye 2 times daily (Patient not taking: Reported on 9/6/2022) 3.5 g 1     levothyroxine (SYNTHROID/LEVOTHROID) 100 MCG tablet TAKE ONE TABLET BY MOUTH ONCE DAILY 90 tablet 3     levothyroxine (SYNTHROID/LEVOTHROID) 75 MCG tablet Take 1 tablet (75 mcg) by mouth daily on Monday, Wednesday, Friday and Sunday. Alternate with 100 mcg dose on Tuesday, Thursday and Saturday 90 tablet 3     Omega-3 Fatty Acids (FISH OIL PO) Take 1 capsule by mouth daily.       order for DME Equipment being ordered: compression sleeve/gauntlet 20-30 mmHg (Patient not taking: Reported on 9/6/2022) 1 each 12     polyethylene glycol (GOLYTELY) 236 g suspension Take as directed. One day before exam fill the jug with water. Cover and shake until well mixed. At 6 PM  start drinking an 8oz glass of mixture every 15 minutes until jug is 1/2 empty. Store remainder in the refrigerator. Day of exam at 6 AM Drink the other half of the Golytely jug. Drink one 8-ounce glass every 15 minutes until the jug is empty. You should finish the prep 4 hours before the exam. 4000 mL 0     vitamin D3 (CHOLECALCIFEROL) 2000 units (50 mcg) tablet Take 1 tablet by mouth daily       Social History:   Social History     Tobacco Use     Smoking status: Former     Types: Cigarettes     Quit date: 1971     Years since quittin.8     Smokeless tobacco: Never     Tobacco comments:     age [age]   Substance Use Topics     Alcohol use: No     Comment: no alcohol since 2018       ROS:  Review of systems negative except as stated above.    OBJECTIVE:  /68   Pulse 97   Temp (!) 102.5  F (39.2  C) (Tympanic)   Wt 57.2 kg (126 lb)   SpO2 97%   BMI 24.81 kg/m    GENERAL APPEARANCE: healthy, alert and no distress  EYES: EOMI,  PERRL, conjunctiva clear  HENT: ear canals and TM's normal.  Nose and mouth without ulcers, erythema or lesions  NECK: supple, nontender, no lymphadenopathy  RESP: lungs clear to auscultation - no rales, rhonchi or wheezes  CV: regular rates and rhythm, normal S1 S2, no murmur noted  SKIN: no suspicious lesions or rashes    Labs:    Results for orders placed or performed in visit on 22   Influenza A & B Antigen - Clinic Collect     Status: Abnormal    Specimen: Nasopharyngeal; Swab   Result Value Ref Range    Influenza A antigen Positive (A) Negative    Influenza B antigen Negative Negative    Narrative    Test results must be correlated with clinical data. If necessary, results should be confirmed by a molecular assay or viral culture.       ASSESSMENT:    ICD-10-CM    1. Influenza A  J10.1 Influenza A & B Antigen - Clinic Collect     oseltamivir (TAMIFLU) 75 MG capsule          PLAN:  Patient Instructions   Patient was educated on the natural course of viral  illness which typically lasts up to 10 days.  Conservative measures discussed including increased fluids, nasal saline irrigation (neti pot), warm steamy shower, salt water gargles, cough suppressants, expectorants (Mucinex), and analgesics (Tylenol). See your primary care provider if symptoms worsen or do not improve in 7 days. Seek emergency care if you develop fever over 104 or shortness of breath.    Patient verbalized understanding and is agreeable to plan. The patient was discharged ambulatory and in stable condition.    Tanja Llanos PA-C ....................  11/6/2022   4:54 PM

## 2022-11-06 NOTE — PATIENT INSTRUCTIONS
Patient was educated on the natural course of viral illness which typically lasts up to 10 days.  Conservative measures discussed including increased fluids, nasal saline irrigation (neti pot), warm steamy shower, salt water gargles, cough suppressants, expectorants (Mucinex), and analgesics (Tylenol). See your primary care provider if symptoms worsen or do not improve in 7 days. Seek emergency care if you develop fever over 104 or shortness of breath.     none

## 2022-11-09 ENCOUNTER — MYC MEDICAL ADVICE (OUTPATIENT)
Dept: FAMILY MEDICINE | Facility: CLINIC | Age: 72
End: 2022-11-09

## 2022-11-09 DIAGNOSIS — E06.3 HYPOTHYROIDISM DUE TO HASHIMOTO'S THYROIDITIS: ICD-10-CM

## 2022-11-14 NOTE — TELEPHONE ENCOUNTER
Called pharmacy and they stated that in the RX note it needs to state that the patient would like the generic brand Mylan.         Mary Mckeon RN  [facility]

## 2022-11-15 RX ORDER — LEVOTHYROXINE SODIUM 75 UG/1
75 TABLET ORAL DAILY
Qty: 90 TABLET | Refills: 3 | Status: SHIPPED | OUTPATIENT
Start: 2022-11-15 | End: 2022-11-16

## 2022-11-15 RX ORDER — LEVOTHYROXINE SODIUM 100 UG/1
100 TABLET ORAL DAILY
Qty: 90 TABLET | Refills: 3 | Status: SHIPPED | OUTPATIENT
Start: 2022-11-15 | End: 2023-08-03

## 2022-11-16 RX ORDER — LEVOTHYROXINE SODIUM 75 UG/1
75 TABLET ORAL DAILY
Qty: 90 TABLET | Refills: 3 | Status: SHIPPED | OUTPATIENT
Start: 2022-11-16 | End: 2024-02-07

## 2022-11-16 NOTE — TELEPHONE ENCOUNTER
I can keep an eye out for openings, but there are no in person available at this moment.  FRED Lewis

## 2022-11-17 DIAGNOSIS — M25.511 RIGHT SHOULDER PAIN: Primary | ICD-10-CM

## 2022-11-21 RX ORDER — BISACODYL 5 MG/1
TABLET, DELAYED RELEASE ORAL
Qty: 4 TABLET | Refills: 0 | Status: SHIPPED | OUTPATIENT
Start: 2022-11-21 | End: 2023-03-28

## 2022-11-21 NOTE — TELEPHONE ENCOUNTER
Rescheduled colonoscopy.    Attempted to contact patient regarding upcoming colonoscopy procedure on 12.1.2022 for pre assessment questions. No answer.     Left message to return call to [phone] #4    Discuss at home rapid antigen COVID test 1-2 days prior to procedure.    Arrival time: 1340    Facility location: [facility]    Sedation type: CS    Indication for procedure: screening colonoscopy    Bowel prep recommendation: Golytely prep    Prep instructions sent via [employer].  Prep prescription sent to [place], [place] - [address]     Adia Garcia RN

## 2022-11-25 NOTE — TELEPHONE ENCOUNTER
Pre assessment questions completed for upcoming colonoscopy procedure scheduled on 12.1.22    COVID policy reviewed. Patient to complete rapid antigen test one to two days before their scheduled procedure. Patient to bring photo of the results when they come in for their procedure.    Reviewed procedural arrival time 1340 and [place] [facility] [place]    Designated  policy reviewed. Instructed to have someone stay 6 hours post procedure.     Anticoagulation/blood thinners? No    Electronic implanted devices? No    Diabetic? No    Reviewed colo prep instructions with patient. No fiber/iron supplements or foods that contain nuts/seeds prior to procedure.     Patient verbalized understanding and had no questions or concerns at this time.    Joyce Jensen RN

## 2022-11-25 NOTE — TELEPHONE ENCOUNTER
2nd attempt    Attempted to contact patient regarding upcoming colonoscopy procedure on 12.1.22 for pre assessment questions. No answer.     Left message to return call to [phone] #4    [facility] [employer] message sent.    Joyce Jensen RN

## 2022-12-01 ENCOUNTER — HOSPITAL ENCOUNTER (OUTPATIENT)
Facility: AMBULATORY SURGERY CENTER | Age: 72
Discharge: HOME | End: 2022-12-01
Attending: STUDENT IN AN ORGANIZED HEALTH CARE EDUCATION/TRAINING PROGRAM | Admitting: STUDENT IN AN ORGANIZED HEALTH CARE EDUCATION/TRAINING PROGRAM
Payer: COMMERCIAL

## 2022-12-01 VITALS
WEIGHT: 124 LBS | TEMPERATURE: 97.2 F | OXYGEN SATURATION: 98 % | RESPIRATION RATE: 16 BRPM | SYSTOLIC BLOOD PRESSURE: 99 MMHG | DIASTOLIC BLOOD PRESSURE: 59 MMHG | HEIGHT: 60 IN | HEART RATE: 66 BPM | BODY MASS INDEX: 24.35 KG/M2

## 2022-12-01 LAB — COLONOSCOPY: NORMAL

## 2022-12-01 PROCEDURE — 45385 COLONOSCOPY W/LESION REMOVAL: CPT | Mod: PT

## 2022-12-01 PROCEDURE — 88305 TISSUE EXAM BY PATHOLOGIST: CPT | Mod: TC | Performed by: STUDENT IN AN ORGANIZED HEALTH CARE EDUCATION/TRAINING PROGRAM

## 2022-12-01 PROCEDURE — 45380 COLONOSCOPY AND BIOPSY: CPT | Mod: 59,PT

## 2022-12-01 PROCEDURE — 88305 TISSUE EXAM BY PATHOLOGIST: CPT | Mod: 26 | Performed by: PATHOLOGY

## 2022-12-01 RX ORDER — SODIUM CHLORIDE, SODIUM LACTATE, POTASSIUM CHLORIDE, CALCIUM CHLORIDE 600; 310; 30; 20 MG/100ML; MG/100ML; MG/100ML; MG/100ML
INJECTION, SOLUTION INTRAVENOUS CONTINUOUS
Status: DISCONTINUED | OUTPATIENT
Start: 2022-12-01 | End: 2022-12-02 | Stop reason: HOSPADM

## 2022-12-01 RX ORDER — ONDANSETRON 2 MG/ML
4 INJECTION INTRAMUSCULAR; INTRAVENOUS
Status: DISCONTINUED | OUTPATIENT
Start: 2022-12-01 | End: 2022-12-02 | Stop reason: HOSPADM

## 2022-12-01 RX ORDER — FENTANYL CITRATE 50 UG/ML
INJECTION, SOLUTION INTRAMUSCULAR; INTRAVENOUS DAILY PRN
Status: DISCONTINUED | OUTPATIENT
Start: 2022-12-01 | End: 2022-12-01 | Stop reason: HOSPADM

## 2022-12-01 RX ORDER — LIDOCAINE 40 MG/G
CREAM TOPICAL
Status: DISCONTINUED | OUTPATIENT
Start: 2022-12-01 | End: 2022-12-02 | Stop reason: HOSPADM

## 2022-12-05 LAB
PATH REPORT.COMMENTS IMP SPEC: NORMAL
PATH REPORT.COMMENTS IMP SPEC: NORMAL
PATH REPORT.FINAL DX SPEC: NORMAL
PATH REPORT.GROSS SPEC: NORMAL
PATH REPORT.MICROSCOPIC SPEC OTHER STN: NORMAL
PATH REPORT.RELEVANT HX SPEC: NORMAL
PHOTO IMAGE: NORMAL

## 2023-01-15 DIAGNOSIS — I42.7 CARDIOMYOPATHY SECONDARY TO DRUG (H): Primary | ICD-10-CM

## 2023-01-16 ENCOUNTER — OFFICE VISIT (OUTPATIENT)
Dept: CARDIOLOGY | Facility: CLINIC | Age: 73
End: 2023-01-16
Attending: INTERNAL MEDICINE
Payer: COMMERCIAL

## 2023-01-16 VITALS
OXYGEN SATURATION: 96 % | SYSTOLIC BLOOD PRESSURE: 135 MMHG | WEIGHT: 123.7 LBS | BODY MASS INDEX: 24.16 KG/M2 | HEART RATE: 77 BPM | DIASTOLIC BLOOD PRESSURE: 81 MMHG

## 2023-01-16 DIAGNOSIS — Z91.89 10 YEAR RISK OF MI OR STROKE 7.5% OR GREATER: ICD-10-CM

## 2023-01-16 DIAGNOSIS — Z17.1 MALIGNANT NEOPLASM OF UPPER-OUTER QUADRANT OF RIGHT BREAST IN FEMALE, ESTROGEN RECEPTOR NEGATIVE (H): ICD-10-CM

## 2023-01-16 DIAGNOSIS — C50.411 MALIGNANT NEOPLASM OF UPPER-OUTER QUADRANT OF RIGHT BREAST IN FEMALE, ESTROGEN RECEPTOR NEGATIVE (H): ICD-10-CM

## 2023-01-16 DIAGNOSIS — Q23.81 BICUSPID AORTIC VALVE: Primary | ICD-10-CM

## 2023-01-16 DIAGNOSIS — E78.5 HYPERLIPIDEMIA LDL GOAL <100: ICD-10-CM

## 2023-01-16 PROCEDURE — 99214 OFFICE O/P EST MOD 30 MIN: CPT | Performed by: INTERNAL MEDICINE

## 2023-01-16 PROCEDURE — 93005 ELECTROCARDIOGRAM TRACING: CPT | Mod: RTG

## 2023-01-16 PROCEDURE — G0463 HOSPITAL OUTPT CLINIC VISIT: HCPCS | Performed by: INTERNAL MEDICINE

## 2023-01-16 NOTE — PROGRESS NOTES
Cardiology Clinic Note    History:   Julieta Rivera is a 72-year-old woman  diagnosed with triple-negative stage I ductal carcinoma of the right breast treated with docetaxel/cyclophosphamide  March - May 2018,  factor V Leiden mutation but has no history of thrombosis and is not on anticoagulation. She underwent an echocardiogram prior to initiation of chemotherapy in Feb 2018 and was noted to have mild bicuspid aortic valvular stenosis with a peak velocity of 2.5 m/s and mean gradient of 13 mmHg. Patient underwent lumpectomy on 6/7/18 without any major cardiopulmonary issues. She then underwent 4 weeks of radiation therapy.   The bicuspid aortic valve was initially diagnosed in 1999. She  has no personal history of coronary artery disease, heart failure, arrhythmias, hyperlipidemia, tobacco use or diabetes. There is no family history of premature coronary artery disease.   Interval History:  Patient reports doing well.  She has mild dyspnea with exertion that has lingered after the flu in the Fall. She is fairly active and feels she could exercise mor but does not push herself. She denies chest pain, orthopnea, PND or lower extremity edema.  No lightheadedness or dizziness presyncopal symptoms.    Cardiac meds  none    PAST MEDICAL HISTORY:  Past Medical History:   Diagnosis Date     Abnormal Pap smear 1970s    normal since     Breast cancer (H) 02/2018     Factor V Leiden (H)      Hypothyroidism fall 2000       CURRENT MEDICATIONS:  Current Outpatient Medications   Medication Sig Dispense Refill     bisacodyl (DULCOLAX) 5 MG EC tablet Take as directed. One day before exam take 2 tablets at 3 PM. Day of exam take 2 tablets at 6 AM. 4 tablet 0     calcium carbonate 500 mg, elemental, (OSCAL 500) 1250 (500 Ca) MG TABS tablet Take by mouth daily       levothyroxine (SYNTHROID/LEVOTHROID) 100 MCG tablet Take 1 tablet (100 mcg) by mouth daily 90 tablet 3     levothyroxine (SYNTHROID/LEVOTHROID) 75 MCG tablet Take 1  tablet (75 mcg) by mouth daily on Monday, Wednesday, Friday and . Alternate with 100 mcg dose on Tuesday, Thursday and Saturday 90 tablet 3     Omega-3 Fatty Acids (FISH OIL PO) Take 1 capsule by mouth daily.       polyethylene glycol (GOLYTELY) 236 g suspension Take as directed. One day before exam fill the jug with water. Cover and shake until well mixed. At 6 PM start drinking an 8oz glass of mixture every 15 minutes until jug is 1/2 empty. Store remainder in the refrigerator. Day of exam at 6 AM Drink the other half of the Golytely jug. Drink one 8-ounce glass every 15 minutes until the jug is empty. You should finish the prep 4 hours before the exam. 4000 mL 0     vitamin D3 (CHOLECALCIFEROL) 2000 units (50 mcg) tablet Take 1 tablet by mouth daily         PAST SURGICAL HISTORY:  Past Surgical History:   Procedure Laterality Date      SECTION      x2 ,      COLONOSCOPY N/A 2022    Procedure: COLONOSCOPY, WITH POLYPECTOMY;  Surgeon: Elo Berumen MD;  Location: [facility] OR     COLPOSCOPY CERVIX, BIOPSY CERVIX, ENDOCERVICAL CURETTAGE, COMBINED       INSERT PORT VASCULAR ACCESS N/A 3/5/2018    Procedure: INSERT PORT VASCULAR ACCESS;  Single Lumen Chest Power Port Placement;  Surgeon: Brian Tolbert PA-C;  Location: [facility] OR     MASTECTOMY SIMPLE, SENTINEL NODE, COMBINED Right 2018    Lumpectomy with SENTINEL NODE;  Right Wire Localized Lumpectomy And Right [place] Lymph Node Biopsy;  Surgeon: Eugene Guzman MD;  Location: [facility] OR     REMOVE PORT VASCULAR ACCESS Left 11/15/2018    Procedure: Left Port Removal;  Surgeon: Tom Quick PA-C;  Location: [facility] OR     TONSILLECTOMY, ADENOIDECTOMY, COMBINED         ALLERGIES     Allergies   Allergen Reactions     Seasonal Allergies        FAMILY HISTORY:  Family History   Problem Relation Age of Onset     Heart Disease Mother      Heart Disease Father      Cerebrovascular Disease Father      Diabetes Father      Diabetes  Brother      Hypertension Brother      Obesity Brother      Obesity Sister      Cancer Sister 48        endometrial     Hypertension Sister      Cancer Paternal Aunt         Breast cancer in 2 paternal aunts, 1 dx in her 50's the other in her 60's   Mother  at 43y from rheumatic valvular heart disease  Father had HTN, MI in his 50s, CVA -  at 78 y  2 brothers and a sister - no CAD, HTN+    SOCIAL HISTORY:  Social History     Social History     Marital status:      Spouse name: N/A     Number of children: N/A     Years of education: N/A     Social History Main Topics     Smoking status: Former Smoker     Quit date: 1971     Smokeless tobacco: Never Used     Alcohol use No      Comment: none since last november.     Drug use: No     ROS:   ROS: A comprehensive 10-point review of system is negative except for those reported in the HPI.      EXAM:  /81 (BP Location: Left arm, Patient Position: Chair, Cuff Size: Adult Regular)   Pulse 77   Wt 56.1 kg (123 lb 11.2 oz)   SpO2 96%   BMI 24.16 kg/m    In general, the patient is a pleasant female in no apparent distress.      Neck:  No jugular venous distension.    Heart: RRR. Normal S1, S2 preserved. 1/6 systolic ejection murmur RUSB; no rub, click, or gallop. There is no heave.    Lungs: Clear bilaterally.  No rhonchi, wheezes, rales.   Extremities: No edema.  The pulses are 2+at the radial bilaterally.  Skin: no rashes.    Labs:  Chemistry panel: Recent Labs   Lab Test 22  1142 22  1234    139   POTASSIUM 3.9 3.6   CHLORIDE 102 106   CO2 23 25   ANIONGAP 12 8   * 97   BUN 10.8 14   CR 0.62 0.65   CINDI 9.6 9.2   GFRESTIMATED >90 >90   AST 26 25   ALT 11 24       CBC:   Recent Labs   Lab Test 22  1142 22  1234   WBC 6.0 5.6   RBC 4.41 4.39   HGB 13.4 13.0   HCT 39.9 39.2   MCV 91 89   MCH 30.4 29.6   MCHC 33.6 33.2   RDW 13.2 13.0    186       Lipid Panel:  Recent Labs   Lab Test 10/12/22  1015  06/18/21  1537   CHOL 208* 242*   HDL 86 90   * 136*   TRIG 74 79       Thyroid:   TSH   Date Value Ref Range Status   10/12/2022 1.86 0.40 - 4.00 mU/L Final   06/18/2021 0.48 0.40 - 4.00 mU/L Final     T4 Free   Date Value Ref Range Status   06/01/2020 1.46 0.76 - 1.46 ng/dL Final     Free T4   Date Value Ref Range Status   08/02/2022 1.64 (H) 0.76 - 1.46 ng/dL Final     Hemoglobin A1C   Date Value Ref Range Status   11/22/2013 5.1 4.3 - 6.0 % Final     INR   Date Value Ref Range Status   11/15/2018 0.99 0.86 - 1.14 Final       Cardiac data:  ECG today NSR, no acute ST-T changes        Echo Oct 2022  Global and regional left ventricular function is normal with an EF of 60-65%.  Global right ventricular function is normal.  The aortic valve is bicuspid. There is fusion of R-L cusps Mild aortic  stenosis is present. The peak aortic velocity is 2.4. m/sec. The mean gradient  across the aortic valve is 14 mmHg.  Pulmonary artery systolic pressure is normal.  The inferior vena cava is normal.  No pericardial effusion is present.     Coronary CT calcium score  1.  No coronary calcifications.  2.  The total Agatston calcium score is 0 placing the patient in the lowest percentile when compared to age and gender matched control group.    Assessment and Plan:   72 year old woman  1. Right breast cancer, triple negative  2. Mild bicuspid aortic valve stenosis, mean gradient 14 mmHg.  No significant progression compared to prior echo 5/6/2019.  3. Preserved biventricular function  4. Primary prevention - ASCVD 14%. HDL 86.   5.  Calcium score 0 (2021)    Julieta has no symptoms of angina or heart failure. Her exam today is consistent with mild aortic stenosis, euvolemia. She has a normal heart rate and blood pressure.  She reports an exercise tolerance of > 4 METS. She has a bicuspid valve with mild stenosis that does not require any intervention at this point.  The ascending aorta is stable compared to echo 2 years  ago.  From a primary prevention standpoint,  Julieta's 10-year ASCVD risk is ~14% and her HDL is high at 86. The coronary calcium score was zero so will hold off on statin for now.  Follow up recommended in 1 year with a lipid panel    The 10-year ASCVD risk score (Cecile BERGER, et al., 2019) is: 14%    Values used to calculate the score:      Age: 72 years      Sex: Female      Is Non- : No      Diabetic: No      Tobacco smoker: No      Systolic Blood Pressure: 135 mmHg      Is BP treated: No      HDL Cholesterol: 86 mg/dL      Total Cholesterol: 208 mg/dL      Kaley Tidwell MD, MS  Professor of Medicine  Cardiovascular Medicine        CC  Patient Care Team:  Simona Tucker APRN CNP as PCP - General (Nurse Practitioner - Family)  Tamanna Neff, RN as Nurse Coordinator (Breast Oncology)  Chevy Gay MD as Referring Physician (Oncology)  Kaley Tidwell MD as MD (Cardiology)  Carlos Valdez MD as MD (Family Practice)  Simona Tucker APRN CNP as Assigned PCP  Chevy Gay MD as Assigned Cancer Care Provider  CHEVY GAY

## 2023-01-16 NOTE — NURSING NOTE
Julieta Rivera's goals for this visit include:   Chief Complaint   Patient presents with     Follow Up       She requests these members of her care team be copied on today's visit information: yes     PCP: Simona Tucker    Referring Provider:  Referred Self, MD  No address on file    /81 (BP Location: Left arm, Patient Position: Chair, Cuff Size: Adult Regular)   Pulse 77   SpO2 96%     Do you need any medication refills at today's visit? No       GALILEO Pappas   Cardiology Team  [facility]

## 2023-01-16 NOTE — LETTER
1/16/2023      RE: Julieta Rivera  [address] [place] [address]       Dear Colleague,    Thank you for the opportunity to participate in the care of your patient, Julieta Rivera, at the [facility] [place] at [facility]. Please see a copy of my visit note below.    Cardiology Clinic Note    History:   Julieta Rivera is a 72-year-old woman  diagnosed with triple-negative stage I ductal carcinoma of the right breast treated with docetaxel/cyclophosphamide  March - May 2018,  factor V Leiden mutation but has no history of thrombosis and is not on anticoagulation. She underwent an echocardiogram prior to initiation of chemotherapy in Feb 2018 and was noted to have mild bicuspid aortic valvular stenosis with a peak velocity of 2.5 m/s and mean gradient of 13 mmHg. Patient underwent lumpectomy on 6/7/18 without any major cardiopulmonary issues. She then underwent 4 weeks of radiation therapy.   The bicuspid aortic valve was initially diagnosed in 1999. She  has no personal history of coronary artery disease, heart failure, arrhythmias, hyperlipidemia, tobacco use or diabetes. There is no family history of premature coronary artery disease.   Interval History:  Patient reports doing well.  She has mild dyspnea with exertion that has lingered after the flu in the Fall. She is fairly active and feels she could exercise mor but does not push herself. She denies chest pain, orthopnea, PND or lower extremity edema.  No lightheadedness or dizziness presyncopal symptoms.    Cardiac meds  none    PAST MEDICAL HISTORY:  Past Medical History:   Diagnosis Date     Abnormal Pap smear 1970s    normal since     Breast cancer (H) 02/2018     Factor V Leiden (H)      Hypothyroidism fall 2000       CURRENT MEDICATIONS:  Current Outpatient Medications   Medication Sig Dispense Refill     bisacodyl (DULCOLAX) 5 MG EC tablet Take as directed. One day before exam  take 2 tablets at 3 PM. Day of exam take 2 tablets at 6 AM. 4 tablet 0     calcium carbonate 500 mg, elemental, (OSCAL 500) 1250 (500 Ca) MG TABS tablet Take by mouth daily       levothyroxine (SYNTHROID/LEVOTHROID) 100 MCG tablet Take 1 tablet (100 mcg) by mouth daily 90 tablet 3     levothyroxine (SYNTHROID/LEVOTHROID) 75 MCG tablet Take 1 tablet (75 mcg) by mouth daily on Monday, Wednesday, Friday and . Alternate with 100 mcg dose on Tuesday, Thursday and Saturday 90 tablet 3     Omega-3 Fatty Acids (FISH OIL PO) Take 1 capsule by mouth daily.       polyethylene glycol (GOLYTELY) 236 g suspension Take as directed. One day before exam fill the jug with water. Cover and shake until well mixed. At 6 PM start drinking an 8oz glass of mixture every 15 minutes until jug is 1/2 empty. Store remainder in the refrigerator. Day of exam at 6 AM Drink the other half of the Golytely jug. Drink one 8-ounce glass every 15 minutes until the jug is empty. You should finish the prep 4 hours before the exam. 4000 mL 0     vitamin D3 (CHOLECALCIFEROL) 2000 units (50 mcg) tablet Take 1 tablet by mouth daily         PAST SURGICAL HISTORY:  Past Surgical History:   Procedure Laterality Date      SECTION      x2 ,      COLONOSCOPY N/A 2022    Procedure: COLONOSCOPY, WITH POLYPECTOMY;  Surgeon: Elo Berumen MD;  Location: [facility] [place]     COLPOSCOPY CERVIX, BIOPSY CERVIX, ENDOCERVICAL CURETTAGE, COMBINED       INSERT PORT VASCULAR ACCESS N/A 3/5/2018    Procedure: INSERT PORT VASCULAR ACCESS;  Single Lumen Chest Power Port Placement;  Surgeon: Brian Tolbert PA-C;  [facility]: [facility] OR     MASTECTOMY SIMPLE, SENTINEL NODE, COMBINED Right 2018    Lumpectomy with SENTINEL NODE;  Right Wire Localized Lumpectomy And Right [place] Lymph Node Biopsy;  Surgeon: Eugene Guzman MD;  Location:  OR     REMOVE PORT VASCULAR ACCESS Left 11/15/2018    Procedure: Left Port Removal;  Surgeon: Tom Quick  RAD Malin;  Location: [facility] OR     TONSILLECTOMY, ADENOIDECTOMY, COMBINED         ALLERGIES     Allergies   Allergen Reactions     Seasonal Allergies        FAMILY HISTORY:  Family History   Problem Relation Age of Onset     Heart Disease Mother      Heart Disease Father      Cerebrovascular Disease Father      Diabetes Father      Diabetes Brother      Hypertension Brother      Obesity Brother      Obesity Sister      Cancer Sister 48        endometrial     Hypertension Sister      Cancer Paternal Aunt         Breast cancer in 2 paternal aunts, 1 dx in her 50's the other in her 60's   Mother  at 43y from rheumatic valvular heart disease  Father had HTN, MI in his 50s, CVA -  at 78 y  2 brothers and a sister - no CAD, HTN+    SOCIAL HISTORY:  Social History     Social History     Marital status:      Spouse name: N/A     Number of children: N/A     Years of education: N/A     Social History Main Topics     Smoking status: Former Smoker     Quit date: 1971     Smokeless tobacco: Never Used     Alcohol use No      Comment: none since last november.     Drug use: No     ROS:   ROS: A comprehensive 10-point review of system is negative except for those reported in the HPI.      EXAM:  /81 (BP Location: Left arm, Patient Position: Chair, Cuff Size: Adult Regular)   Pulse 77   Wt 56.1 kg (123 lb 11.2 oz)   SpO2 96%   BMI 24.16 kg/m    In general, the patient is a pleasant female in no apparent distress.      Neck:  No jugular venous distension.    Heart: RRR. Normal S1, S2 preserved. 1/6 systolic ejection murmur RUSB; no rub, click, or gallop. There is no heave.    Lungs: Clear bilaterally.  No rhonchi, wheezes, rales.   Extremities: No edema.  The pulses are 2+at the radial bilaterally.  Skin: no rashes.    Labs:  Chemistry panel: Recent Labs   Lab Test 22  1142 22  1234    139   POTASSIUM 3.9 3.6   CHLORIDE 102 106   CO2 23 25   ANIONGAP 12 8   * 97   BUN 10.8 14    CR 0.62 0.65   CINDI 9.6 9.2   GFRESTIMATED >90 >90   AST 26 25   ALT 11 24       CBC:   Recent Labs   Lab Test 09/06/22  1142 03/22/22  1234   WBC 6.0 5.6   RBC 4.41 4.39   HGB 13.4 13.0   HCT 39.9 39.2   MCV 91 89   MCH 30.4 29.6   MCHC 33.6 33.2   RDW 13.2 13.0    186       Lipid Panel:  Recent Labs   Lab Test 10/12/22  1015 06/18/21  1537   CHOL 208* 242*   HDL 86 90   * 136*   TRIG 74 79       Thyroid:   TSH   Date Value Ref Range Status   10/12/2022 1.86 0.40 - 4.00 mU/L Final   06/18/2021 0.48 0.40 - 4.00 mU/L Final     T4 Free   Date Value Ref Range Status   06/01/2020 1.46 0.76 - 1.46 ng/dL Final     Free T4   Date Value Ref Range Status   08/02/2022 1.64 (H) 0.76 - 1.46 ng/dL Final     Hemoglobin A1C   Date Value Ref Range Status   11/22/2013 5.1 4.3 - 6.0 % Final     INR   Date Value Ref Range Status   11/15/2018 0.99 0.86 - 1.14 Final       Cardiac data:  ECG today NSR, no acute ST-T changes        Echo Oct 2022  Global and regional left ventricular function is normal with an EF of 60-65%.  Global right ventricular function is normal.  The aortic valve is bicuspid. There is fusion of R-L cusps Mild aortic  stenosis is present. The peak aortic velocity is 2.4. m/sec. The mean gradient  across the aortic valve is 14 mmHg.  Pulmonary artery systolic pressure is normal.  The inferior vena cava is normal.  No pericardial effusion is present.     Coronary CT calcium score  1.  No coronary calcifications.  2.  The total Agatston calcium score is 0 placing the patient in the lowest percentile when compared to age and gender matched control group.    Assessment and Plan:   72 year old woman  1. Right breast cancer, triple negative  2. Mild bicuspid aortic valve stenosis, mean gradient 14 mmHg.  No significant progression compared to prior echo 5/6/2019.  3. Preserved biventricular function  4. Primary prevention - ASCVD 14%. HDL 86.   5.  Calcium score 0 (2021)    Julieta has no symptoms of angina or  heart failure. Her exam today is consistent with mild aortic stenosis, euvolemia. She has a normal heart rate and blood pressure.  She reports an exercise tolerance of > 4 METS. She has a bicuspid valve with mild stenosis that does not require any intervention at this point.  The ascending aorta is stable compared to echo 2 years ago.  From a primary prevention standpoint,  Julieta's 10-year ASCVD risk is ~14% and her HDL is high at 86. The coronary calcium score was zero so will hold off on statin for now.  Follow up recommended in 1 year with a lipid panel    The 10-year ASCVD risk score (Cecile BERGER, et al., 2019) is: 14%    Values used to calculate the score:      Age: 72 years      Sex: Female      Is Non- : No      Diabetic: No      Tobacco smoker: No      Systolic Blood Pressure: 135 mmHg      Is BP treated: No      HDL Cholesterol: 86 mg/dL      Total Cholesterol: 208 mg/dL      Kaley Tidwell MD, MS  Professor of Medicine  Cardiovascular Medicine        CC  Patient Care Team:  Simona Tucker APRN CNP as PCP - General (Nurse Practitioner - Family)  Tamanna Neff RN as Nurse Coordinator (Breast Oncology)  Jonathan Gay MD as Referring Physician (Oncology)  Kaley Tidwell MD as MD (Cardiology)  Carlos Valdez MD as MD (Family Practice)

## 2023-01-17 NOTE — NURSING NOTE
No changes to medications made today  No labs today, lipid panel needed in one year  Return to clinic in one year.    AVS sent to patient via [employer]

## 2023-01-17 NOTE — PATIENT INSTRUCTIONS
"Cardiology Providers you saw during your visit:  Dr Kaley Tidwell    Medication changes: Continue all current medications    Follow up: One year    Labs: Lipid panel in one year with follow up      Follow the American Heart Association Diet and Lifestyle recommendations:  Limit saturated fat, trans fat, sodium, red meat, sweets and sugar-sweetened beverages. If you choose to eat red meat, compare labels and select the leanest cuts available.  Aim for at least 150 minutes of moderate physical activity or 75 minutes of vigorous physical activity - or an equal combination of both - each week.    If you have any questions, contact  Miki Russ RN. We are encouraging the use of [email or web address] to communicate with your HealthCare Provider     To contact the [facility], please call, [phone]  To schedule an appointment or to leave a message for your Care Team Press #1  If you are a physician calling for another physician Press #2  For Billing Press #3  For Medical Records Press #4\"    "

## 2023-01-18 LAB
ATRIAL RATE - MUSE: 74 BPM
DIASTOLIC BLOOD PRESSURE - MUSE: NORMAL MMHG
INTERPRETATION ECG - MUSE: NORMAL
P AXIS - MUSE: 61 DEGREES
PR INTERVAL - MUSE: 154 MS
QRS DURATION - MUSE: 76 MS
QT - MUSE: 408 MS
QTC - MUSE: 452 MS
R AXIS - MUSE: 59 DEGREES
SYSTOLIC BLOOD PRESSURE - MUSE: NORMAL MMHG
T AXIS - MUSE: 31 DEGREES
VENTRICULAR RATE- MUSE: 74 BPM

## 2023-03-24 NOTE — LETTER
3/3/2020       RE: Julieta Rivera  [address] [place] [address]     Dear Colleague,    Thank you for referring your patient, Julieta Rivera, to the [facility]. Please see a copy of my visit note below.    Medical Oncology Follow-up Note       HISTORY OF PRESENT ILLNESS:  Julieta Rivera is a 68-year-old woman who was referred to our clinic with a new diagnosis of right triple-negative breast cancer.  Julieta was followed by routine screening mammography, when she was discovered to have a 7 x 6 x 9-mm mass at the 12 o'clock position of the right breast 6 cm from the nipple-areolar complex.  She did undergo a biopsy of this mass which showed an ER-negative, [place]-negative, HER2-nonamplified, invasive mammary carcinoma of no special type, invasive ductal carcinoma, Jeff grade 3.  Ductal carcinoma in situ was also noted.  Nuclear grade 2 solid type.  HER2 FISH showed no amplification.  She now comes to our clinic for recommendations.       She has hypothyroidism and is on levothyroxine 88 alternating with 100 mcg daily.  She has no pain.       SOCIAL:  She has fatigue related to the care of infant twin grandsons with esophageal atresia at home.           PAST MEDICAL HISTORY:  She has no history of breast surgery in the past or breast cancer in the past.  She has no history of radiation of any kind.  She has no history of tumor of any kind.  She may have a history of a heart problem with mitral prolapse and a murmur.  The last echo we have on record is from 1996.  She has no history of heart attack, breathing problems, blood clots, seizures, arthritis, peptic ulcer disease, osteoporosis or bone fractures.  She is not currently participating in a clinical trial and has not had any significant weight loss.  She has no history of hypertension, but she does have a history of factor V Leiden because her sister was diagnosed with factor V Leiden and Julieta was tested, although Julieta herself has  had no blood clots or pulmonary emboli.       FAMILY HISTORY:  There is a history of breast cancer in two paternal aunts, but no first-degree relatives.  One of her aunts was diagnosed in her 50s, the other in her 60s.  She has no male relatives with breast cancer.  The remainder of her family history was negative.        PAST MENSTRUAL HISTORY:  First period was at age 12-/2.  Last menstrual period was in 2003.  She has been pregnant twice at age 26 and 30 with two live births and no miscarriages or abortions.  She used oral contraceptives only once or twice.  Uterus and ovaries are in place.  She has no history of hormone replacement therapy.        ALLERGIES:  She has no allergy to seafood, iodine or contrast dye.  She does not take aspirin.       HABITS:  She did smoke 1 pack per day in college for 3 years from age 17 to 20 and has not smoked since.  She does not drink significant alcohol and has no heavy alcohol history in the past.        PERSONAL AND SOCIAL HISTORY:  She does have a history of being a .  Her  is 79 years old but is able to take care of his activities of daily living.  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        PAST MEDICAL HISTORY:  Julieta has no history of angina.  She has no history of hypertension.  Her cholesterol has generally been in acceptable range, although slightly over 200 recently.       FAMILY HISTORY:  Positive for heart disease.  Father had an MI in his 50s and had a bypass and  at age 78.  Mother had rheumatic heart disease at age 38 and  at age 42.      TREATMENT HISTORY:  A.  TC x 4  B.  Lumpectomy 18 [id number] RCB1.  0.71  C.  Radiation  Radiation Oncology - Course: 1         Protocol:   Treatment Site            Current Dose   Modality           From    To        Elapsed  Days  Fx.  1 R Breast        4,240 cGy       06 X      7/30/2018        8/21/2018        22       16  1 R Breast Boost          1,000 cGy       e09       8/22/2018 8/27/2018         5         4        INTERVAL HISTORY:   Julieta has been doing quite well.  She has no pain, fatigue, depression or anxiety.      REVIEW OF SYSTEMS:  A 10-point review of systems is entirely negative. That being said, she did have an episode of diaphoresis and chest pain that lasted about 60 minutes at about 5 p.m. on 01/18.  She has had no recurrences since then.  She has had no reflux and no anginal symptoms.      PHYSICAL EXAMINATION:   VITAL SIGNS:  Blood pressure 120/74, pulse 82, respirations 16, temperature 97.6, O2 sat 96% on room air, height 1.5 meters and weight 54.7 kg.   GENERAL:  Julieta appeared generally well.  She has no alopecia.   HEENT:  Oropharynx is without lesions.   LYMPH:  There is no palpable cervical, supraclavicular, subclavicular or axillary lymphadenopathy.   BREASTS:  Right breast reveals a well-healed incision 2 fingerbreadths above the nipple-areolar complex on the right breast without erythema or masses.  No masses in the right breast.  Both nipples are everted.  Right axillary incision is well healed without erythema or masses.  Left breast is without masses.   LUNGS:  Clear to percussion and auscultation.   HEART:  Regular rate and rhythm, S1, S2.   ABDOMEN:  Soft and nontender, without hepatosplenomegaly.   EXTREMITIES:  Without edema.   PSYCHIATRIC:  Mood and affect were normal.      LABORATORY DATA:  CBC and CMP were within normal limits.        IMAGING:  Diagnostic mammogram 03/03/2020 is BI-RADS 2.     ECG:  No change.  Nonspecific ST changes.      ASSESSMENT AND PLAN:    1.  Julieta Rivera is a 69-year-old woman with a history of stage I, clinical [id number] invasive ductal carcinoma of the right breast, triple negative in histology, maximum dimension 9 mm, grade 3.  She received neoadjuvant TC  chemotherapy following lumpectomy showed an RCB1 response with significant reduction of the tumor and with a small amount of residual disease measuring about 2.5 mm in largest dimension.  There is 1 other small focus.  The margins were clear for DCIS and invasive cancer.  She was not offered adjuvant Xeloda given the small amount of residual disease.  Eight sentinel lymph nodes were examined and were negative for cancer.  There is no evidence of disease recurrence on today's exam or mammogram.   2.  No role for hormonal therapy.   3.  Episode of chest pain.  We will obtain an ECG today.   4.  Factor V Leiden without history of clot.   5.  Arthritic discomfort in low back, hands and feet.  She has been referred to Sports Medicine and can go back to see Sports Medicine as needed.   6.  DEXA scan shows osteoporosis with a most valid negative T-score of -2.4.  She did not want zoledronic acid, Prolia or Fosamax.  I did give her handouts on all 3 today and she will think about it.  She has been very hesitant because of concern about side effects.  She will continue with weightbearing exercise, calcium and vitamin D.   7.  Followup.  She will see an SAM in 3 months, SAM in 6 months and 9 months and with me in a year with a mammogram.  We will then go to every 6-month followup after 03/2021.  CBC and CMP with followup visits.      Thank you for allowing us to continue to participate in Julieta Rivera's care.      Jonathan Gay MD      [facility]     I spent 35 minutes with the patient more than 50% of which was in counseling and coordination of care.          Detail Level: Zone Otc Regimen: -Recommend cetaphil cream

## 2023-03-26 NOTE — PROGRESS NOTES
Medical Oncology Follow-up Note       HISTORY OF PRESENT ILLNESS:  Julieta Rivera is a 72-year-old woman who was referred to our clinic with a new diagnosis of right triple-negative breast cancer.  Julieta was followed by routine screening mammography, when she was discovered to have a 7 x 6 x 9-mm mass at the 12 o'clock position of the right breast 6 cm from the nipple-areolar complex.  She did undergo a biopsy of this mass which showed an ER-negative, [place]-negative, HER2-nonamplified, invasive mammary carcinoma of no special type, invasive ductal carcinoma, Jeff grade 3.  Ductal carcinoma in situ was also noted.  Nuclear grade 2 solid type.  HER2 FISH showed no amplification.  She now comes to our clinic for recommendations.       She has hypothyroidism and is on levothyroxine 88 alternating with 100 mcg daily.  She has no pain.       SOCIAL:  She has fatigue related to the care of infant twin grandsons with esophageal atresia at home.           PAST MEDICAL HISTORY:  She has no history of breast surgery in the past or breast cancer in the past.  She has no history of radiation of any kind.  She has no history of tumor of any kind.  She may have a history of a heart problem with mitral prolapse and a murmur.  The last echo we have on record is from 1996.  She has no history of heart attack, breathing problems, blood clots, seizures, arthritis, peptic ulcer disease, osteoporosis or bone fractures.  She is not currently participating in a clinical trial and has not had any significant weight loss.  She has no history of hypertension, but she does have a history of factor V Leiden because her sister was diagnosed with factor V Leiden and Julieta was tested, although Julieta herself has had no blood clots or pulmonary emboli.       FAMILY HISTORY:  There is a history of breast cancer in two paternal aunts, but no first-degree relatives.  One of her aunts was diagnosed in her 50s, the other in her 60s.  She has no male  relatives with breast cancer.  The remainder of her family history was negative.        PAST MENSTRUAL HISTORY:  First period was at age 12-2.  Last menstrual period was in 2003.  She has been pregnant twice at age 26 and 30 with two live births and no miscarriages or abortions.  She used oral contraceptives only once or twice.  Uterus and ovaries are in place.  She has no history of hormone replacement therapy.        ALLERGIES:  She has no allergy to seafood, iodine or contrast dye.  She does not take aspirin.       HABITS:  She did smoke 1 pack per day in college for 3 years from age 17 to 20 and has not smoked since.  She does not drink significant alcohol and has no heavy alcohol history in the past.        PERSONAL AND SOCIAL HISTORY:  She does have a history of being a .  Her  is 80 years old but is able to take care of his activities of daily living.  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        PAST MEDICAL HISTORY:  Julieta has no history of angina.  She has no history of hypertension.  Her cholesterol has generally been in acceptable range, although slightly over 200 recently.       FAMILY HISTORY:  Positive for heart disease.  Father had an MI in his 50s and had a bypass and  at age 78.  Mother had rheumatic heart disease at age 38 and  at age 42.      TREATMENT HISTORY:  A.  TC x 4  B.  Lumpectomy 18 [id number] RCB1.  0.71  C.  Radiation  Radiation Oncology - Course: 1         Protocol:   Treatment Site            Current Dose   Modality           From    To        Elapsed Days  Fx.  1 R Breast        4,240 cGy       06 X      7/30/2018        2018        22       16  1 R Breast Boost          1,000 cGy       e09       2018         5         4    D. Mild bicuspid aortic valve  stenosis, mean gradient 13 mmHg  Preserved biventricular function diagnosed 5/6/2019.     INTERVAL HISTORY:   Julieta returns to clinic and has been feeling reasonably well except she complains of diffuse musculoskeletal pains in both breasts, which are intermittent. Her breast imaging today was benign.  She has no fatigue, depression or anxiety.  She has been assisting her daughter with care of their twin boys who are 7 years old who have intestinal atresia.  This has been very challenging for the family.    REVIEW OF SYSTEMS:  The remainder of a 10-point review of systems is negative.    Julieta has not been exercising and will try to get back to exercising.  She has refused DEXA scans or bisphosphonate or Prolia for osteoporosis.      PHYSICAL EXAMINATION:   VITAL SIGNS:  /82   Pulse 86   Temp 97.9  F (36.6  C) (Oral)   Resp 14   SpO2 98%   GENERAL:  Julieta appeared generally well.  She has no alopecia.  HEENT:  Oropharynx is without lesions.  LYMPH:  There is no palpable cervical, supraclavicular, subclavicular or axillary lymphadenopathy.  BREASTS:  Right breast reveals a well-healed incision at the 11:30 position, 3 fingerbreadths from the nipple areolar complex without erythema or masses.  No masses in the right breast.  Right axillary incision is well healed without erythema or masses.  Left breast is without masses.  LUNGS:  Clear to percussion and auscultation.  HEART:  There is a 3/6 systolic murmur at left sternal border.  ABDOMEN:  Soft, nontender, without hepatosplenomegaly.  EXTREMITIES:  Without edema.  PSYCH:  Mood and affect were normal.     LABORATORY DATA:  None today.     IMAGING: Mammogram imaging showed benign findings.        ASSESSMENT AND PLAN:    1.  Julieta Rivera is a 72-year-old woman with a history of stage I, clinical [id number] invasive ductal carcinoma of the right breast, triple negative in histology, maximum dimension 9 mm, grade 3.  She received neoadjuvant TC chemotherapy  following lumpectomy showed an RCB1 response with significant reduction of the tumor and with a small amount of residual disease measuring about 2.5 mm in largest dimension.  There is 1 other small focus.  The margins were clear for DCIS and invasive cancer.  She was not offered adjuvant Xeloda given the small amount of residual disease.  Eight sentinel lymph nodes were examined and were negative for cancer.  There is no evidence of disease recurrence on today's exam or mammogram.   2.  She is doing well.  History of a stage I, T1b N0 MX invasive ductal carcinoma of the right breast, triple-negative in histology, maximum dimension 9 mm, grade 3.  She has no signs or symptoms of recurrence.  Planned followup will be for a visit and breast exam in 6 months and a mammogram in 1 year.  3.  Osteoporosis.  She refuses bisphosphonates, Prolia or DEXA scans.    4.  No role for hormonal therapy.   5.  Factor V Leiden without history of clot.   6.  Arthritic discomfort in low back, hands and feet.  She has been referred to Sports Medicine and can go back to see Sports Medicine as needed.   7.   DEXA scan 2020 shows osteoporosis with a most valid negative T-score of -3.0.  She did not want zoledronic acid, Prolia or Fosamax, has been given handouts in the past and patient declines today again. She will continue with weightbearing exercise, calcium and vitamin D. Patient does not want to repeat DEXA with no plans to take medications.  She affirmed her opinion today.  8.  Followup.   Follow up September 28 with CBC, CMP. Follow up with me April 2, 2024 with CBC, CMP, screening mammogram.     Thank you for allowing us to continue to participate in Julieta Rivera's care.      Thank you for allowing us to participate in this patient's care.      Sincerely,      Jonathan Gay MD  Professor  [facility]  [phone]    ADDENDUM:  Alopecia complaint with thinning hair.  May be due to prior docetaxel.  Referral to  dermatology.        I spent 30 minutes with the patient more than 50% of which was in counseling and coordination of care.

## 2023-03-28 ENCOUNTER — ANCILLARY PROCEDURE (OUTPATIENT)
Dept: MAMMOGRAPHY | Facility: CLINIC | Age: 73
End: 2023-03-28
Payer: COMMERCIAL

## 2023-03-28 ENCOUNTER — APPOINTMENT (OUTPATIENT)
Dept: LAB | Facility: CLINIC | Age: 73
End: 2023-03-28
Attending: INTERNAL MEDICINE
Payer: COMMERCIAL

## 2023-03-28 ENCOUNTER — APPOINTMENT (OUTPATIENT)
Dept: ONCOLOGY | Facility: CLINIC | Age: 73
End: 2023-03-28
Attending: INTERNAL MEDICINE
Payer: COMMERCIAL

## 2023-03-28 VITALS
OXYGEN SATURATION: 98 % | HEART RATE: 86 BPM | DIASTOLIC BLOOD PRESSURE: 82 MMHG | SYSTOLIC BLOOD PRESSURE: 127 MMHG | RESPIRATION RATE: 14 BRPM | TEMPERATURE: 97.9 F

## 2023-03-28 DIAGNOSIS — C50.411 MALIGNANT NEOPLASM OF UPPER-OUTER QUADRANT OF RIGHT BREAST IN FEMALE, ESTROGEN RECEPTOR NEGATIVE (H): Primary | ICD-10-CM

## 2023-03-28 DIAGNOSIS — Z17.1 MALIGNANT NEOPLASM OF UPPER-OUTER QUADRANT OF RIGHT BREAST IN FEMALE, ESTROGEN RECEPTOR NEGATIVE (H): Primary | ICD-10-CM

## 2023-03-28 DIAGNOSIS — Z12.31 VISIT FOR SCREENING MAMMOGRAM: ICD-10-CM

## 2023-03-28 LAB
HOLD SPECIMEN: NORMAL
HOLD SPECIMEN: NORMAL

## 2023-03-28 PROCEDURE — 99214 OFFICE O/P EST MOD 30 MIN: CPT | Performed by: INTERNAL MEDICINE

## 2023-03-28 PROCEDURE — 77067 SCR MAMMO BI INCL CAD: CPT | Performed by: RADIOLOGY

## 2023-03-28 PROCEDURE — 77063 BREAST TOMOSYNTHESIS BI: CPT | Performed by: RADIOLOGY

## 2023-03-28 PROCEDURE — G0463 HOSPITAL OUTPT CLINIC VISIT: HCPCS | Performed by: INTERNAL MEDICINE

## 2023-03-28 ASSESSMENT — PAIN SCALES - GENERAL: PAINLEVEL: NO PAIN (0)

## 2023-03-28 NOTE — LETTER
3/28/2023         RE: Julieta Rivera  [address] [place] [address]        Dear Colleague,    Thank you for referring your patient, Julieta Rivera, to the [facility]. Please see a copy of my visit note below.    Medical Oncology Follow-up Note       HISTORY OF PRESENT ILLNESS:  Julieta Rivera is a 72-year-old woman who was referred to our clinic with a new diagnosis of right triple-negative breast cancer.  Julieta was followed by routine screening mammography, when she was discovered to have a 7 x 6 x 9-mm mass at the 12 o'clock position of the right breast 6 cm from the nipple-areolar complex.  She did undergo a biopsy of this mass which showed an ER-negative, [place]-negative, HER2-nonamplified, invasive mammary carcinoma of no special type, invasive ductal carcinoma, [place] grade 3.  Ductal carcinoma in situ was also noted.  Nuclear grade 2 solid type.  HER2 FISH showed no amplification.  She now comes to our clinic for recommendations.       She has hypothyroidism and is on levothyroxine 88 alternating with 100 mcg daily.  She has no pain.       SOCIAL:  She has fatigue related to the care of infant twin grandsons with esophageal atresia at home.           PAST MEDICAL HISTORY:  She has no history of breast surgery in the past or breast cancer in the past.  She has no history of radiation of any kind.  She has no history of tumor of any kind.  She may have a history of a heart problem with mitral prolapse and a murmur.  The last echo we have on record is from 1996.  She has no history of heart attack, breathing problems, blood clots, seizures, arthritis, peptic ulcer disease, osteoporosis or bone fractures.  She is not currently participating in a clinical trial and has not had any significant weight loss.  She has no history of hypertension, but she does have a history of factor V Leiden because her sister was diagnosed with factor V Leiden and Julieta was tested,  although Julieta herself has had no blood clots or pulmonary emboli.       FAMILY HISTORY:  There is a history of breast cancer in two paternal aunts, but no first-degree relatives.  One of her aunts was diagnosed in her 50s, the other in her 60s.  She has no male relatives with breast cancer.  The remainder of her family history was negative.        PAST MENSTRUAL HISTORY:  First period was at age 12-.  Last menstrual period was in 2003.  She has been pregnant twice at age 26 and 30 with two live births and no miscarriages or abortions.  She used oral contraceptives only once or twice.  Uterus and ovaries are in place.  She has no history of hormone replacement therapy.        ALLERGIES:  She has no allergy to seafood, iodine or contrast dye.  She does not take aspirin.       HABITS:  She did smoke 1 pack per day in college for 3 years from age 17 to 20 and has not smoked since.  She does not drink significant alcohol and has no heavy alcohol history in the past.        PERSONAL AND SOCIAL HISTORY:  She does have a history of being a .  Her  is 80 years old but is able to take care of his activities of daily living.  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        PAST MEDICAL HISTORY:  Julieta has no history of angina.  She has no history of hypertension.  Her cholesterol has generally been in acceptable range, although slightly over 200 recently.       FAMILY HISTORY:  Positive for heart disease.  Father had an MI in his 50s and had a bypass and  at age 78.  Mother had rheumatic heart disease at age 38 and  at age 42.      TREATMENT HISTORY:  A.  TC x 4  B.  Lumpectomy 18 [id number] RCB1.  0.71  C.  Radiation  Radiation Oncology - Course: 1         Protocol:   Treatment Site            Current  Dose   Modality           From    To        Elapsed Days  Fx.  1 R Breast        4,240 cGy       06 X      7/30/2018        8/21/2018        22       16  1 R Breast Boost          1,000 cGy       e09       8/22/2018 8/27/2018         5         4    D. Mild bicuspid aortic valve stenosis, mean gradient 13 mmHg  Preserved biventricular function diagnosed 5/6/2019.     INTERVAL HISTORY:   Julieta returns to clinic and has been feeling reasonably well except she complains of diffuse musculoskeletal pains in both breasts, which are intermittent. Her breast imaging today was benign.  She has no fatigue, depression or anxiety.  She has been assisting her daughter with care of their twin boys who are 7 years old who have intestinal atresia.  This has been very challenging for the family.    REVIEW OF SYSTEMS:  The remainder of a 10-point review of systems is negative.    Julieta has not been exercising and will try to get back to exercising.  She has refused DEXA scans or bisphosphonate or Prolia for osteoporosis.      PHYSICAL EXAMINATION:   VITAL SIGNS:  /82   Pulse 86   Temp 97.9  F (36.6  C) (Oral)   Resp 14   SpO2 98%   GENERAL:  Julieta appeared generally well.  She has no alopecia.  HEENT:  Oropharynx is without lesions.  LYMPH:  There is no palpable cervical, supraclavicular, subclavicular or axillary lymphadenopathy.  BREASTS:  Right breast reveals a well-healed incision at the 11:30 position, 3 fingerbreadths from the nipple areolar complex without erythema or masses.  No masses in the right breast.  Right axillary incision is well healed without erythema or masses.  Left breast is without masses.  LUNGS:  Clear to percussion and auscultation.  HEART:  There is a 3/6 systolic murmur at left sternal border.  ABDOMEN:  Soft, nontender, without hepatosplenomegaly.  EXTREMITIES:  Without edema.  PSYCH:  Mood and affect were normal.     LABORATORY DATA:  None today.     IMAGING: Mammogram imaging showed benign  findings.        ASSESSMENT AND PLAN:    1.  Julieta Rivera is a 72-year-old woman with a history of stage I, clinical [id number] invasive ductal carcinoma of the right breast, triple negative in histology, maximum dimension 9 mm, grade 3.  She received neoadjuvant TC chemotherapy following lumpectomy showed an RCB1 response with significant reduction of the tumor and with a small amount of residual disease measuring about 2.5 mm in largest dimension.  There is 1 other small focus.  The margins were clear for DCIS and invasive cancer.  She was not offered adjuvant Xeloda given the small amount of residual disease.  Eight sentinel lymph nodes were examined and were negative for cancer.  There is no evidence of disease recurrence on today's exam or mammogram.   2.  She is doing well.  History of a stage I, T1b N0 MX invasive ductal carcinoma of the right breast, triple-negative in histology, maximum dimension 9 mm, grade 3.  She has no signs or symptoms of recurrence.  Planned followup will be for a visit and breast exam in 6 months and a mammogram in 1 year.  3.  Osteoporosis.  She refuses bisphosphonates, Prolia or DEXA scans.    4.  No role for hormonal therapy.   5.  Factor V Leiden without history of clot.   6.  Arthritic discomfort in low back, hands and feet.  She has been referred to Sports Medicine and can go back to see Sports Medicine as needed.   7.   DEXA scan 2020 shows osteoporosis with a most valid negative T-score of -3.0.  She did not want zoledronic acid, Prolia or Fosamax, has been given handouts in the past and patient declines today again. She will continue with weightbearing exercise, calcium and vitamin D. Patient does not want to repeat DEXA with no plans to take medications.  She affirmed her opinion today.  8.  Followup.   Follow up September 28 with CBC, CMP. Follow up with me April 2, 2024 with CBC, CMP, screening mammogram.     Thank you for allowing us to continue to participate in Julieta  Miguel's care.      Thank you for allowing us to participate in this patient's care.      Sincerely,      Jonathan Gay MD  Professor  [facility]  [phone].        I spent 30 minutes with the patient more than 50% of which was in counseling and coordination of care.

## 2023-03-28 NOTE — NURSING NOTE
Chief Complaint   Patient presents with     Blood Draw     Labs drawn via  by RN in lab. VS taken.      Labs drawn via peripheral IV. Vital signs taken. Checked into next appointment.   Kelli Almanzar RN

## 2023-03-28 NOTE — NURSING NOTE
Oncology Rooming Note    March 28, 2023 1:13 PM   Julieta Rivera is a 72 year old female who presents for:    Chief Complaint   Patient presents with     Blood Draw     Labs drawn via  by RN in lab. VS taken.      Oncology Clinic Visit     Breast cancer        Initial Vitals: /82   Pulse 86   Temp 97.9  F (36.6  C) (Oral)   Resp 14   SpO2 98%  Estimated body mass index is 24.16 kg/m  as calculated from the following:    Height as of 12/1/22: 1.524 m (5').    Weight as of 1/16/23: 56.1 kg (123 lb 11.2 oz). There is no height or weight on file to calculate BSA.  No Pain (0) Comment: Data Unavailable   No LMP recorded. Patient is postmenopausal.  Allergies reviewed: Yes  Medications reviewed: Yes    Medications: Medication refills not needed today.  Pharmacy name entered into [employer]:    [facility] [place], [place] - [address] [facility] [place], [place] - [address] [facility] [phone]    Clinical concerns: Pain where radiation occurred in breast. No lumps or bumps in breast.     Keisha Sorto, CMA

## 2023-04-27 ENCOUNTER — MYC MEDICAL ADVICE (OUTPATIENT)
Dept: FAMILY MEDICINE | Facility: CLINIC | Age: 73
End: 2023-04-27
Payer: COMMERCIAL

## 2023-04-27 DIAGNOSIS — E06.3 HYPOTHYROIDISM DUE TO HASHIMOTO'S THYROIDITIS: Primary | ICD-10-CM

## 2023-04-27 NOTE — TELEPHONE ENCOUNTER
See pt mushtaq from today, pended TSH lab if you approve.    Thanks!  Michael Paz RN   [facility]

## 2023-05-04 ENCOUNTER — LAB (OUTPATIENT)
Dept: LAB | Facility: CLINIC | Age: 73
End: 2023-05-04
Payer: COMMERCIAL

## 2023-05-04 DIAGNOSIS — E06.3 HYPOTHYROIDISM DUE TO HASHIMOTO'S THYROIDITIS: ICD-10-CM

## 2023-05-04 DIAGNOSIS — Z17.1 MALIGNANT NEOPLASM OF UPPER-OUTER QUADRANT OF RIGHT BREAST IN FEMALE, ESTROGEN RECEPTOR NEGATIVE (H): ICD-10-CM

## 2023-05-04 DIAGNOSIS — C50.411 MALIGNANT NEOPLASM OF UPPER-OUTER QUADRANT OF RIGHT BREAST IN FEMALE, ESTROGEN RECEPTOR NEGATIVE (H): ICD-10-CM

## 2023-05-04 LAB
ALBUMIN SERPL BCG-MCNC: 4 G/DL (ref 3.5–5.2)
ALP SERPL-CCNC: 69 U/L (ref 35–104)
ALT SERPL W P-5'-P-CCNC: 14 U/L (ref 10–35)
ANION GAP SERPL CALCULATED.3IONS-SCNC: 9 MMOL/L (ref 7–15)
AST SERPL W P-5'-P-CCNC: 31 U/L (ref 10–35)
BASOPHILS # BLD AUTO: 0 10E3/UL (ref 0–0.2)
BASOPHILS NFR BLD AUTO: 0 %
BILIRUB SERPL-MCNC: 0.4 MG/DL
BUN SERPL-MCNC: 9.5 MG/DL (ref 8–23)
CALCIUM SERPL-MCNC: 9.1 MG/DL (ref 8.8–10.2)
CHLORIDE SERPL-SCNC: 106 MMOL/L (ref 98–107)
CREAT SERPL-MCNC: 0.69 MG/DL (ref 0.51–0.95)
DEPRECATED HCO3 PLAS-SCNC: 25 MMOL/L (ref 22–29)
EOSINOPHIL # BLD AUTO: 0.1 10E3/UL (ref 0–0.7)
EOSINOPHIL NFR BLD AUTO: 2 %
ERYTHROCYTE [DISTWIDTH] IN BLOOD BY AUTOMATED COUNT: 12.7 % (ref 10–15)
GFR SERPL CREATININE-BSD FRML MDRD: >90 ML/MIN/1.73M2
GLUCOSE SERPL-MCNC: 98 MG/DL (ref 70–99)
HCT VFR BLD AUTO: 37.8 % (ref 35–47)
HGB BLD-MCNC: 12.9 G/DL (ref 11.7–15.7)
IMM GRANULOCYTES # BLD: 0 10E3/UL
IMM GRANULOCYTES NFR BLD: 0 %
LYMPHOCYTES # BLD AUTO: 1.5 10E3/UL (ref 0.8–5.3)
LYMPHOCYTES NFR BLD AUTO: 25 %
MCH RBC QN AUTO: 30.3 PG (ref 26.5–33)
MCHC RBC AUTO-ENTMCNC: 34.1 G/DL (ref 31.5–36.5)
MCV RBC AUTO: 89 FL (ref 78–100)
MONOCYTES # BLD AUTO: 0.5 10E3/UL (ref 0–1.3)
MONOCYTES NFR BLD AUTO: 8 %
NEUTROPHILS # BLD AUTO: 4.1 10E3/UL (ref 1.6–8.3)
NEUTROPHILS NFR BLD AUTO: 65 %
NRBC # BLD AUTO: 0 10E3/UL
NRBC BLD AUTO-RTO: 0 /100
PLATELET # BLD AUTO: 219 10E3/UL (ref 150–450)
POTASSIUM SERPL-SCNC: 4.2 MMOL/L (ref 3.4–5.3)
PROT SERPL-MCNC: 6.7 G/DL (ref 6.4–8.3)
RBC # BLD AUTO: 4.26 10E6/UL (ref 3.8–5.2)
SODIUM SERPL-SCNC: 140 MMOL/L (ref 136–145)
TSH SERPL DL<=0.005 MIU/L-ACNC: 3.54 UIU/ML (ref 0.3–4.2)
WBC # BLD AUTO: 6.2 10E3/UL (ref 4–11)

## 2023-05-04 PROCEDURE — 85025 COMPLETE CBC W/AUTO DIFF WBC: CPT | Performed by: PATHOLOGY

## 2023-05-04 PROCEDURE — 84443 ASSAY THYROID STIM HORMONE: CPT | Performed by: PATHOLOGY

## 2023-05-04 PROCEDURE — 80053 COMPREHEN METABOLIC PANEL: CPT | Performed by: PATHOLOGY

## 2023-05-04 PROCEDURE — 36415 COLL VENOUS BLD VENIPUNCTURE: CPT | Performed by: PATHOLOGY

## 2023-06-27 NOTE — TELEPHONE ENCOUNTER
STACY ALATORRE    Patient Age: 20 year old   Refill request by: Pharmacy fax  Refill to be: ePrescribed to [facility] pharmacy  [address] [facility] [address]    Medication requested to be refilled:   Hydroxyzine HCL 10MG tablets    Patient notified refills can take 72 hours to process: pharmacy fax    Patient's next appointment is scheduled for 8/21/2023    Patient's last appointment with this Provider was 5/31/2023             WEIGHT AND HEIGHT:   Wt Readings from Last 1 Encounters:   04/20/23 62.9 kg (138 lb 9.6 oz)     Ht Readings from Last 1 Encounters:   04/20/23 5' 4\" (1.626 m)     BMI Readings from Last 1 Encounters:   04/20/23 23.79 kg/m²       ALLERGIES:  Wasp venom  Current Outpatient Medications   Medication Sig Dispense Refill   • lisdexamfetamine (VYVANSE) 20 MG capsule Take 1 capsule by mouth every morning. Indications: Attention Deficit Hyperactivity Disorder 30 capsule 0   • lamoTRIgine (LaMICtal) 25 MG tablet TAKE 2 TABLETS BY MOUTH EVERY MORNING AND 1 TABLET EVERY NIGHT AT BEDTIME 270 tablet 0   • busPIRone (BUSPAR) 5 MG tablet Take 1 tab PO  tablet 0   • levonorgestrel-ethinyl estradiol 0.1-20 MG-MCG per tablet Take 1 tablet by mouth daily. 84 tablet 3   • citalopram (CeleXA) 40 MG tablet TAKE 1 AND 1/2 TABLETS(60 MG) BY MOUTH DAILY 135 tablet 0   • hydrOXYzine (ATARAX) 10 MG tablet Take 1 tablet by mouth 3 times daily as needed for Anxiety. 90 tablet 0   • EPINEPHrine (EpiPen 2-Jamie) 0.3 MG/0.3ML auto-injector Inject 0.3 mLs into the muscle as needed for Anaphylaxis. Please ensure at least 1 year expiration date. Please contact us if medication is not well covered by insurance for more cost-effective alternatives. 4 each 0   • EPINEPHrine (EpiPen 2-Jamie) 0.3 MG/0.3ML auto-injector Inject 0.3 mLs into the muscle as needed for Anaphylaxis. 4 each 0   • propranolol (INDERAL) 20 MG tablet TAKE 1 TABLET BY MOUTH DAILY AS NEEDED FOR ANXIETY OR PANIC ATTACKS 30 tablet 0   • cetirizine (ZYRTEC) 10  Reason for call:  Order   Order or referral being requested: TSH and T4 thyroid labs  Reason for request: To get her thyroid level checked  Date needed: as soon as possible  Has the patient been seen by the PCP for this problem? YES    Additional comments: The patient is coming in for a pre op appointment with Ava Tucker on 05/31/18 and would like to get her thyroid levels checked the same day. So she would like orders put in so she can have those drawn.    Phone number to reach patient:  Home number on file [phone] (home)    Best Time:  Any    Can we leave a detailed message on this number?  YES     MG tablet Take 10 mg by mouth daily.       No current facility-administered medications for this visit.       ROUTING: Patient's physician/staff        PCP: Martha Redding DO         INS: Payor: [employer] / Plan: LAUREN RENTERIA BPP20 / Product Type: [employer]UE [employer]VANTAGE   PATIENT ADDRESS:  [address] [place] [address] [place] [address]

## 2023-08-03 DIAGNOSIS — E06.3 HYPOTHYROIDISM DUE TO HASHIMOTO'S THYROIDITIS: ICD-10-CM

## 2023-08-03 RX ORDER — LEVOTHYROXINE SODIUM 100 UG/1
100 TABLET ORAL DAILY
Qty: 90 TABLET | Refills: 3 | Status: SHIPPED | OUTPATIENT
Start: 2023-08-03

## 2023-08-03 NOTE — TELEPHONE ENCOUNTER
"Requested Prescriptions   Pending Prescriptions Disp Refills    levothyroxine (SYNTHROID/LEVOTHROID) 100 MCG tablet 90 tablet 3     Sig: Take 1 tablet (100 mcg) by mouth daily       Thyroid Protocol Failed - 8/3/2023 10:07 AM        Failed - Recent (12 mo) or future (30 days) visit within the authorizing provider's specialty     Patient has had an office visit with the authorizing provider or a provider within the authorizing providers department within the previous 12 mos or has a future within next 30 days. See \"Patient Info\" tab in inbasket, or \"Choose Columns\" in Meds & Orders section of the refill encounter.              Passed - Patient is 12 years or older        Passed - Medication is active on med list        Passed - Normal TSH on file in past 12 months     Recent Labs   Lab Test 05/04/23  1235   TSH 3.54              Passed - No active pregnancy on record     If patient is pregnant or has had a positive pregnancy test, please check TSH.          Passed - No positive pregnancy test in past 12 months     If patient is pregnant or has had a positive pregnancy test, please check TSH.              Routing refill request to provider for review/approval because medication did not pass protocol.    Pt has a appointment on 09/06/23    Mary Mckeon RN  [facility]   "

## 2023-09-27 NOTE — PROGRESS NOTES
Sep 28, 2023    HISTORY OF PRESENT ILLNESS:  Julieta Rivera is a 68-year-old woman with a history of right triple-negative breast cancer. Julieta was followed by routine screening mammography, when she was discovered to have a 7 x 6 x 9-mm mass at the 12 o'clock position of the right breast 6 cm from the nipple-areolar complex.  She did undergo a biopsy of this mass which showed an ER-negative, SD-negative, HER2-nonamplified, invasive mammary carcinoma of no special type, invasive ductal carcinoma, Jeff grade 3.  Ductal carcinoma in situ was also noted.  Nuclear grade 2 solid type. HER2 FISH showed no amplification.       TREATMENT HISTORY:  A.  TC x 4  B.  Lumpectomy 6-7-18 [id number] RCB 1  (0.71)  C.  Radiation  Radiation Oncology - Course: 1         Protocol:   Treatment Site            Current Dose   Modality           From    To        Elapsed Days  Fx.  1 R Breast        4,240 cGy       06 X      7/30/2018        8/21/2018        22       16  1 R Breast Boost          1,000 cGy       e09       8/22/2018 8/27/2018         5         4     PAST MEDICAL HISTORY:  She has no history of breast surgery in the past or breast cancer in the past. She has no history of radiation of any kind.  She has no history of tumor of any kind.  She may have a history of a heart problem with mitral prolapse and a murmur.  The last echo we have on record is from 1996.  She has no history of heart attack, breathing problems, blood clots, seizures, arthritis, peptic ulcer disease, osteoporosis or bone fractures.  She is not currently participating in a clinical trial and has not had any significant weight loss.  She has no history of hypertension, but she does have a history of factor V Leiden because her sister was diagnosed with factor V Leiden and Julieta was tested, although Julieta herself has had no blood clots or pulmonary emboli.      INTERVAL HISTORY:  Julieta was seen today in person.  She has been doing well overall.  She  is feeling better in terms of myalgias and arthralgias.  She did have a dermatology referral placed for evaluation of the hair loss that she has noted.  She has this scheduled, but unfortunately it was canceled.  She will call to reschedule.  She does have ongoing stable neuropathy in her feet bilaterally.  She did try Cymbalta in the past for this but found that it made her too sleepy so she stopped.  She reports that she does sometimes note lymphedema in her right arm.  She continues to live with her daughter and twin grandsons.  They keep her busy and she does continue to exercise and has been trying to exercise more.  She otherwise denies any new focal pain or unexplained weight loss.  She denies fever, chills or signs of systemic illness.  She denies headache or vision changes.  She denies nausea, vomiting, abdominal pain, constipation or diarrhea.  She does not note any unilateral leg pain or swelling.    ROS:  Patient denies the following except if noted above: fevers, body aches, chills, headaches, vision changes, dizziness, chest pain, shortness of breath, nausea, vomiting, diarrhea, constipation, abdominal pain, rashes, bruising/bleeding, mouth sores, swelling or pain in the legs.     PHYSICAL EXAMINATION:   /76   Pulse 84   Temp 97.9  F (36.6  C) (Oral)   Resp 16   Wt 58.3 kg (128 lb 8 oz)   SpO2 98%   BMI 25.10 kg/m    Wt Readings from Last 4 Encounters:   01/16/23 56.1 kg (123 lb 11.2 oz)   12/01/22 56.2 kg (124 lb)   11/06/22 57.2 kg (126 lb)   09/06/22 55.8 kg (123 lb)   Vital signs were reviewed.   Julieta is a very pleasant 72-year-old female who is alert, oriented, and in no acute distress today.  PERRLA. EOMI. Eyes show mild erythema along the eye lid and an area of erythema along the inner corner of her eye.  No drainage. No scleral icterus noted.   Neck exam: No palpable cervical, supraclavicular or axillary nodes bilaterally.   Heart: S1-S2, regular rate and rhythm with no murmur rub  or gallop noted.  Lungs: Bilateral lungs are clear to auscultation with no crackles or wheezing noted.  Breast: No palpable masses in left or right breast. No overlying skin changes. No palpable axillary lymphadenopathy bilaterally   Abd: Abdomen is soft, nontender and nondistended with positive bowel sounds throughout.  Extremities: No lower extremity edema.   Neuro: Cranial nerves II through XII are grossly intact.  She is alert and oriented.  Gait and balance intact.  Mood and affect are stable       LABORATORY DATA:    Most Recent 3 CBC's:  Recent Labs   Lab Test 09/28/23  1340 05/04/23  1235 09/06/22  1142   WBC 6.2 6.2 6.0   HGB 13.0 12.9 13.4   MCV 90 89 91    219 206    Most Recent 3 BMP's:  Recent Labs   Lab Test 09/28/23  1340 05/04/23  1235 09/06/22  1142    140 137   POTASSIUM 4.2 4.2 3.9   CHLORIDE 105 106 102   CO2 24 25 23   BUN 13.0 9.5 10.8   CR 0.70 0.69 0.62   ANIONGAP 10 9 12   CINDI 9.5 9.1 9.6   GLC 93 98 102*    Most Recent 2 LFT's:  Recent Labs   Lab Test 09/28/23  1340 05/04/23  1235   AST 22 31   ALT 12 14   ALKPHOS 64 69   BILITOTAL 0.3 0.4      I reviewed the above labs today.    IMAGING:  No new imaging to review today.     ASSESSMENT AND PLAN:    1Bart Rivera is a 71-year-old woman with a history of stage I, clinical [id number] invasive ductal carcinoma of the right breast, triple negative in histology, maximum dimension 9 mm, grade 3: She received neoadjuvant TC chemotherapy following lumpectomy showed an RCB1 response with significant reduction of the tumor and with a small amount of residual disease measuring about 2.5 mm in largest dimension.  There is 1 other small focus.  The margins were clear for DCIS and invasive cancer.  She was not offered adjuvant Xeloda given the small amount of residual disease.  Eight sentinel lymph nodes were examined and were negative for cancer.    - There are no concerns today in her history or on exam.    - She is scheduled for  follow-up mammogram and to see Dr. Gay in early April 2024.  - At her visit last year, they discussed referral to the survivorship clinic.  We briefly discussed this today and what this entails.  She will discuss this with Dr. Gay in April 2024.  She is up-to-date on her colonoscopy.     2.  Bone health:  - DEXA scan 11/23/2020 shows osteoporosis with a most valid negative T-score of -3.0.    - Per prior notes, she did not want zoledronic acid, Prolia or Fosamax and has declined these drugs multiple times in the past.  - She will continue with weightbearing exercise, calcium and vitamin D.   - Can plan on repeat imaging at her next visit.  Order placed.     3. Shoulder pain:  - Previously had right shoulder pain, did PT and lymphedema clinic and it improved.  - She does continue to do her PT stretches and exercises.    4.  Hair loss:  - Per notes exchanged with Dr. Gay and reviewed in my chart and with patient today, she does have concerns for the amount of hair she has lost since treatment.  - Dermatology referral placed, and this was scheduled for the end of this year but recently canceled.  - We discussed that she could try to find a local dermatologist outside of the [facility] system that takes her insurance to see if she can be seen somewhere earlier,; she will consider this but likely call  to reschedule.     She will follow-up as scheduled in 6 months.  She will call sooner with any concerns.    55 minutes spent on the date of the encounter doing chart review, review of test results, patient visit, and documentation.     Jackie Schwartz, JOSÉ LUIS, CNP  [facility] [address] [id number] [phone]

## 2023-09-28 ENCOUNTER — APPOINTMENT (OUTPATIENT)
Dept: LAB | Facility: CLINIC | Age: 73
End: 2023-09-28
Attending: INTERNAL MEDICINE
Payer: COMMERCIAL

## 2023-09-28 ENCOUNTER — ONCOLOGY VISIT (OUTPATIENT)
Dept: ONCOLOGY | Facility: CLINIC | Age: 73
End: 2023-09-28
Attending: NURSE PRACTITIONER
Payer: COMMERCIAL

## 2023-09-28 VITALS
TEMPERATURE: 97.9 F | WEIGHT: 128.5 LBS | OXYGEN SATURATION: 98 % | SYSTOLIC BLOOD PRESSURE: 117 MMHG | BODY MASS INDEX: 25.1 KG/M2 | RESPIRATION RATE: 16 BRPM | HEART RATE: 84 BPM | DIASTOLIC BLOOD PRESSURE: 76 MMHG

## 2023-09-28 DIAGNOSIS — Z23 NEED FOR PROPHYLACTIC VACCINATION AND INOCULATION AGAINST INFLUENZA: Primary | ICD-10-CM

## 2023-09-28 DIAGNOSIS — Z17.1 MALIGNANT NEOPLASM OF UPPER-OUTER QUADRANT OF RIGHT BREAST IN FEMALE, ESTROGEN RECEPTOR NEGATIVE (H): ICD-10-CM

## 2023-09-28 DIAGNOSIS — C50.411 MALIGNANT NEOPLASM OF UPPER-OUTER QUADRANT OF RIGHT BREAST IN FEMALE, ESTROGEN RECEPTOR NEGATIVE (H): ICD-10-CM

## 2023-09-28 LAB
ALBUMIN SERPL BCG-MCNC: 4.3 G/DL (ref 3.5–5.2)
ALP SERPL-CCNC: 64 U/L (ref 35–104)
ALT SERPL W P-5'-P-CCNC: 12 U/L (ref 0–50)
ANION GAP SERPL CALCULATED.3IONS-SCNC: 10 MMOL/L (ref 7–15)
AST SERPL W P-5'-P-CCNC: 22 U/L (ref 0–45)
BASOPHILS # BLD AUTO: 0 10E3/UL (ref 0–0.2)
BASOPHILS NFR BLD AUTO: 1 %
BILIRUB SERPL-MCNC: 0.3 MG/DL
BUN SERPL-MCNC: 13 MG/DL (ref 8–23)
CALCIUM SERPL-MCNC: 9.5 MG/DL (ref 8.8–10.2)
CHLORIDE SERPL-SCNC: 105 MMOL/L (ref 98–107)
CREAT SERPL-MCNC: 0.7 MG/DL (ref 0.51–0.95)
EGFRCR SERPLBLD CKD-EPI 2021: >90 ML/MIN/1.73M2
EOSINOPHIL # BLD AUTO: 0.1 10E3/UL (ref 0–0.7)
EOSINOPHIL NFR BLD AUTO: 2 %
ERYTHROCYTE [DISTWIDTH] IN BLOOD BY AUTOMATED COUNT: 13 % (ref 10–15)
GLUCOSE SERPL-MCNC: 93 MG/DL (ref 70–99)
HCO3 SERPL-SCNC: 24 MMOL/L (ref 22–29)
HCT VFR BLD AUTO: 38.5 % (ref 35–47)
HGB BLD-MCNC: 13 G/DL (ref 11.7–15.7)
IMM GRANULOCYTES # BLD: 0.1 10E3/UL
IMM GRANULOCYTES NFR BLD: 1 %
LYMPHOCYTES # BLD AUTO: 1.4 10E3/UL (ref 0.8–5.3)
LYMPHOCYTES NFR BLD AUTO: 23 %
MCH RBC QN AUTO: 30.3 PG (ref 26.5–33)
MCHC RBC AUTO-ENTMCNC: 33.8 G/DL (ref 31.5–36.5)
MCV RBC AUTO: 90 FL (ref 78–100)
MONOCYTES # BLD AUTO: 0.4 10E3/UL (ref 0–1.3)
MONOCYTES NFR BLD AUTO: 7 %
NEUTROPHILS # BLD AUTO: 4.1 10E3/UL (ref 1.6–8.3)
NEUTROPHILS NFR BLD AUTO: 66 %
NRBC # BLD AUTO: 0 10E3/UL
NRBC BLD AUTO-RTO: 0 /100
PLATELET # BLD AUTO: 213 10E3/UL (ref 150–450)
POTASSIUM SERPL-SCNC: 4.2 MMOL/L (ref 3.4–5.3)
PROT SERPL-MCNC: 7 G/DL (ref 6.4–8.3)
RBC # BLD AUTO: 4.29 10E6/UL (ref 3.8–5.2)
SODIUM SERPL-SCNC: 139 MMOL/L (ref 135–145)
WBC # BLD AUTO: 6.2 10E3/UL (ref 4–11)

## 2023-09-28 PROCEDURE — 85025 COMPLETE CBC W/AUTO DIFF WBC: CPT | Performed by: NURSE PRACTITIONER

## 2023-09-28 PROCEDURE — 82310 ASSAY OF CALCIUM: CPT | Performed by: NURSE PRACTITIONER

## 2023-09-28 PROCEDURE — 99215 OFFICE O/P EST HI 40 MIN: CPT | Performed by: NURSE PRACTITIONER

## 2023-09-28 PROCEDURE — 90662 IIV NO PRSV INCREASED AG IM: CPT | Performed by: NURSE PRACTITIONER

## 2023-09-28 PROCEDURE — 250N000011 HC RX IP 250 OP 636: Performed by: NURSE PRACTITIONER

## 2023-09-28 PROCEDURE — 36415 COLL VENOUS BLD VENIPUNCTURE: CPT | Performed by: NURSE PRACTITIONER

## 2023-09-28 PROCEDURE — G0463 HOSPITAL OUTPT CLINIC VISIT: HCPCS | Mod: 25 | Performed by: NURSE PRACTITIONER

## 2023-09-28 PROCEDURE — G0008 ADMIN INFLUENZA VIRUS VAC: HCPCS | Performed by: NURSE PRACTITIONER

## 2023-09-28 RX ADMIN — INFLUENZA A VIRUS A/VICTORIA/4897/2022 IVR-238 (H1N1) ANTIGEN (FORMALDEHYDE INACTIVATED), INFLUENZA A VIRUS A/DARWIN/9/2021 SAN-010 (H3N2) ANTIGEN (FORMALDEHYDE INACTIVATED), INFLUENZA B VIRUS B/PHUKET/3073/2013 ANTIGEN (FORMALDEHYDE INACTIVATED), AND INFLUENZA B VIRUS B/MICHIGAN/01/2021 ANTIGEN (FORMALDEHYDE INACTIVATED) 0.7 ML: 60; 60; 60; 60 INJECTION, SUSPENSION INTRAMUSCULAR at 14:57

## 2023-09-28 ASSESSMENT — PAIN SCALES - GENERAL: PAINLEVEL: NO PAIN (0)

## 2023-09-28 NOTE — NURSING NOTE
Oncology Rooming Note    September 28, 2023 1:53 PM   Julieta Rivera is a 72 year old female who presents for:    Chief Complaint   Patient presents with    Blood Draw     Vpt blood draw by lab RN. Vitals taken and appointment arrived     Initial Vitals: /76   Pulse 84   Temp 97.9  F (36.6  C) (Oral)   Resp 16   Wt 58.3 kg (128 lb 8 oz)   SpO2 98%   BMI 25.10 kg/m   Estimated body mass index is 25.1 kg/m  as calculated from the following:    Height as of 12/1/22: 1.524 m (5').    Weight as of this encounter: 58.3 kg (128 lb 8 oz). Body surface area is 1.57 meters squared.  No Pain (0) Comment: Data Unavailable   No LMP recorded. Patient is postmenopausal.  Allergies reviewed: Yes  Medications reviewed: Yes    Medications: Medication refills not needed today.  Pharmacy name entered into [employer]    [facility] [place], [place] - [address] [facility] [place], [place] -  [facility] [phone]    Clinical concerns:        Mela Vicente

## 2023-09-28 NOTE — NURSING NOTE
Influenza vaccine given to patient in Left Deltoid . Patient tolerated injection without any incidents.     Has the patient received the information for the injectable influenza vaccine? YES    Leo Tejeda, EMT September 28, 2023 3:00 PM

## 2023-09-28 NOTE — NURSING NOTE
Chief Complaint   Patient presents with    Blood Draw     Vpt blood draw by lab RN. Vitals taken and appointment arrived   No kit to draw  Leigh Ann Navarrete RN

## 2023-09-28 NOTE — LETTER
9/28/2023         RE: Julieta Rivera  [address] [place] [address]        Dear Colleague,    Thank you for referring your patient, Julieta Rivera, to the [facility]. Please see a copy of my visit note below.    Sep 28, 2023    HISTORY OF PRESENT ILLNESS:  Julieta Rivera is a 68-year-old woman with a history of right triple-negative breast cancer. Julieta was followed by routine screening mammography, when she was discovered to have a 7 x 6 x 9-mm mass at the 12 o'clock position of the right breast 6 cm from the nipple-areolar complex.  She did undergo a biopsy of this mass which showed an ER-negative, NM-negative, HER2-nonamplified, invasive mammary carcinoma of no special type, invasive ductal carcinoma, Jeff grade 3.  Ductal carcinoma in situ was also noted.  Nuclear grade 2 solid type. HER2 FISH showed no amplification.       TREATMENT HISTORY:  A.  TC x 4  B.  Lumpectomy 6-7-18 [id number] RCB 1  (0.71)  C.  Radiation  Radiation Oncology - Course: 1         Protocol:   Treatment Site            Current Dose   Modality           From    To        Elapsed Days  Fx.  1 R Breast        4,240 cGy       06 X      7/30/2018        8/21/2018        22       16  1 R Breast Boost          1,000 cGy       e09       8/22/2018 8/27/2018         5         4     PAST MEDICAL HISTORY:  She has no history of breast surgery in the past or breast cancer in the past. She has no history of radiation of any kind.  She has no history of tumor of any kind.  She may have a history of a heart problem with mitral prolapse and a murmur.  The last echo we have on record is from 1996.  She has no history of heart attack, breathing problems, blood clots, seizures, arthritis, peptic ulcer disease, osteoporosis or bone fractures.  She is not currently participating in a clinical trial and has not had any significant weight loss.  She has no history of hypertension, but she does have a  history of factor V Leiden because her sister was diagnosed with factor V Leiden and Julieta was tested, although Julieta herself has had no blood clots or pulmonary emboli.      INTERVAL HISTORY:  Julieta was seen today in person.  She has been doing well overall.  She is feeling better in terms of myalgias and arthralgias.  She did have a dermatology referral placed for evaluation of the hair loss that she has noted.  She has this scheduled, but unfortunately it was canceled.  She will call to reschedule.  She does have ongoing stable neuropathy in her feet bilaterally.  She did try Cymbalta in the past for this but found that it made her too sleepy so she stopped.  She reports that she does sometimes note lymphedema in her right arm.  She continues to live with her daughter and twin grandsons.  They keep her busy and she does continue to exercise and has been trying to exercise more.  She otherwise denies any new focal pain or unexplained weight loss.  She denies fever, chills or signs of systemic illness.  She denies headache or vision changes.  She denies nausea, vomiting, abdominal pain, constipation or diarrhea.  She does not note any unilateral leg pain or swelling.    ROS:  Patient denies the following except if noted above: fevers, body aches, chills, headaches, vision changes, dizziness, chest pain, shortness of breath, nausea, vomiting, diarrhea, constipation, abdominal pain, rashes, bruising/bleeding, mouth sores, swelling or pain in the legs.     PHYSICAL EXAMINATION:   /76   Pulse 84   Temp 97.9  F (36.6  C) (Oral)   Resp 16   Wt 58.3 kg (128 lb 8 oz)   SpO2 98%   BMI 25.10 kg/m    Wt Readings from Last 4 Encounters:   01/16/23 56.1 kg (123 lb 11.2 oz)   12/01/22 56.2 kg (124 lb)   11/06/22 57.2 kg (126 lb)   09/06/22 55.8 kg (123 lb)   Vital signs were reviewed.   Julieta is a very pleasant 72-year-old female who is alert, oriented, and in no acute distress today.  PERRLA. EOMI. Eyes show mild  erythema along the eye lid and an area of erythema along the inner corner of her eye.  No drainage. No scleral icterus noted.   Neck exam: No palpable cervical, supraclavicular or axillary nodes bilaterally.   Heart: S1-S2, regular rate and rhythm with no murmur rub or gallop noted.  Lungs: Bilateral lungs are clear to auscultation with no crackles or wheezing noted.  Breast: No palpable masses in left or right breast. No overlying skin changes. No palpable axillary lymphadenopathy bilaterally   Abd: Abdomen is soft, nontender and nondistended with positive bowel sounds throughout.  Extremities: No lower extremity edema.   Neuro: Cranial nerves II through XII are grossly intact.  She is alert and oriented.  Gait and balance intact.  Mood and affect are stable       LABORATORY DATA:    Most Recent 3 CBC's:  Recent Labs   Lab Test 09/28/23  1340 05/04/23  1235 09/06/22  1142   WBC 6.2 6.2 6.0   HGB 13.0 12.9 13.4   MCV 90 89 91    219 206    Most Recent 3 BMP's:  Recent Labs   Lab Test 09/28/23  1340 05/04/23  1235 09/06/22  1142    140 137   POTASSIUM 4.2 4.2 3.9   CHLORIDE 105 106 102   CO2 24 25 23   BUN 13.0 9.5 10.8   CR 0.70 0.69 0.62   ANIONGAP 10 9 12   CINDI 9.5 9.1 9.6   GLC 93 98 102*    Most Recent 2 LFT's:  Recent Labs   Lab Test 09/28/23  1340 05/04/23  1235   AST 22 31   ALT 12 14   ALKPHOS 64 69   BILITOTAL 0.3 0.4      I reviewed the above labs today.    IMAGING:  No new imaging to review today.     ASSESSMENT AND PLAN:    1Bart Rivera is a 71-year-old woman with a history of stage I, clinical [id number] invasive ductal carcinoma of the right breast, triple negative in histology, maximum dimension 9 mm, grade 3: She received neoadjuvant TC chemotherapy following lumpectomy showed an RCB1 response with significant reduction of the tumor and with a small amount of residual disease measuring about 2.5 mm in largest dimension.  There is 1 other small focus.  The margins were clear for DCIS  and invasive cancer.  She was not offered adjuvant Xeloda given the small amount of residual disease.  Eight sentinel lymph nodes were examined and were negative for cancer.    - There are no concerns today in her history or on exam.    - She is scheduled for follow-up mammogram and to see Dr. Gay in early April 2024.  - At her visit last year, they discussed referral to the survivorship clinic.  We briefly discussed this today and what this entails.  She will discuss this with Dr. Gay in April 2024.  She is up-to-date on her colonoscopy.     2.  Bone health:  - DEXA scan 11/23/2020 shows osteoporosis with a most valid negative T-score of -3.0.    - Per prior notes, she did not want zoledronic acid, Prolia or Fosamax and has declined these drugs multiple times in the past.  - She will continue with weightbearing exercise, calcium and vitamin D.   - Can plan on repeat imaging at her next visit.  Order placed.     3. Shoulder pain:  - Previously had right shoulder pain, did PT and lymphedema clinic and it improved.  - She does continue to do her PT stretches and exercises.    4.  Hair loss:  - Per notes exchanged with Dr. Gay and reviewed in my chart and with patient today, she does have concerns for the amount of hair she has lost since treatment.  - Dermatology referral placed, and this was scheduled for the end of this year but recently canceled.  - We discussed that she could try to find a local dermatologist outside of the [facility] system that takes her insurance to see if she can be seen somewhere earlier,; she will consider this but likely call  to reschedule.     She will follow-up as scheduled in 6 months.  She will call sooner with any concerns.    55 minutes spent on the date of the encounter doing chart review, review of test results, patient visit, and documentation.     Jackie Schwartz, JOSÉ LUIS, CNP  [facility] [address] [id number] [place] [address] [phone]

## 2023-10-19 ENCOUNTER — OFFICE VISIT (OUTPATIENT)
Dept: OPHTHALMOLOGY | Facility: CLINIC | Age: 73
End: 2023-10-19
Attending: OPTOMETRIST
Payer: COMMERCIAL

## 2023-10-19 DIAGNOSIS — H01.01B ULCERATIVE BLEPHARITIS OF UPPER AND LOWER EYELIDS OF BOTH EYES: ICD-10-CM

## 2023-10-19 DIAGNOSIS — Z03.89 ENCOUNTER FOR OBSERVATION FOR OTHER SUSPECTED DISEASES AND CONDITIONS RULED OUT: Primary | ICD-10-CM

## 2023-10-19 DIAGNOSIS — H25.13 NUCLEAR SENILE CATARACT OF BOTH EYES: ICD-10-CM

## 2023-10-19 DIAGNOSIS — H35.54 MACULAR VITELLIFORM LESION: ICD-10-CM

## 2023-10-19 DIAGNOSIS — H01.01A ULCERATIVE BLEPHARITIS OF UPPER AND LOWER EYELIDS OF BOTH EYES: ICD-10-CM

## 2023-10-19 DIAGNOSIS — H04.123 DRY EYE SYNDROME OF BOTH EYES: ICD-10-CM

## 2023-10-19 PROCEDURE — 92015 DETERMINE REFRACTIVE STATE: CPT

## 2023-10-19 PROCEDURE — 92004 COMPRE OPH EXAM NEW PT 1/>: CPT | Performed by: OPTOMETRIST

## 2023-10-19 PROCEDURE — G0463 HOSPITAL OUTPT CLINIC VISIT: HCPCS | Performed by: OPTOMETRIST

## 2023-10-19 RX ORDER — CARBOXYMETHYLCELLULOSE SODIUM 5 MG/ML
1 SOLUTION/ DROPS OPHTHALMIC 4 TIMES DAILY
Qty: 400 EACH | Refills: 11 | Status: SHIPPED | OUTPATIENT
Start: 2023-10-19 | End: 2024-03-20

## 2023-10-19 RX ORDER — EYELID CLEANSER COMBINATION 13
1 PADS, MEDICATED (EA) TOPICAL 2 TIMES DAILY
Qty: 60 EACH | Refills: 11 | Status: SHIPPED | OUTPATIENT
Start: 2023-10-19

## 2023-10-19 ASSESSMENT — CONF VISUAL FIELD
OS_INFERIOR_TEMPORAL_RESTRICTION: 0
OD_SUPERIOR_TEMPORAL_RESTRICTION: 0
OD_INFERIOR_TEMPORAL_RESTRICTION: 0
OD_NORMAL: 1
OS_SUPERIOR_TEMPORAL_RESTRICTION: 0
METHOD: COUNTING FINGERS
OD_SUPERIOR_NASAL_RESTRICTION: 0
OS_SUPERIOR_NASAL_RESTRICTION: 0
OS_INFERIOR_NASAL_RESTRICTION: 0
OD_INFERIOR_NASAL_RESTRICTION: 0
OS_NORMAL: 1

## 2023-10-19 ASSESSMENT — VISUAL ACUITY
OD_PH_CC: 20/40
OS_PH_CC+: -2
OD_CC: 20/50
OS_PH_CC: 20/40
OS_CC: 20/50
METHOD: SNELLEN - LINEAR
CORRECTION_TYPE: GLASSES
OD_PH_CC+: -1

## 2023-10-19 ASSESSMENT — EXTERNAL EXAM - RIGHT EYE: OD_EXAM: NORMAL

## 2023-10-19 ASSESSMENT — TONOMETRY
OD_IOP_MMHG: 13
OS_IOP_MMHG: 15
IOP_METHOD: TONOPEN

## 2023-10-19 ASSESSMENT — REFRACTION_WEARINGRX
OS_SPHERE: -4.00
OD_CYLINDER: +2.00
OS_ADD: +2.50
OD_AXIS: 125
OS_AXIS: 035
OS_CYLINDER: +2.00
OD_SPHERE: -6.50
OD_ADD: +2.50

## 2023-10-19 ASSESSMENT — REFRACTION_MANIFEST
OS_AXIS: 075
OD_ADD: +2.75
OD_AXIS: 120
OD_CYLINDER: +1.75
OS_SPHERE: -5.50
OS_ADD: +2.75
OD_SPHERE: -6.25
OS_CYLINDER: +3.00

## 2023-10-19 ASSESSMENT — SLIT LAMP EXAM - LIDS: COMMENTS: DERMATOCHALASIS UPPER LID

## 2023-10-19 ASSESSMENT — EXTERNAL EXAM - LEFT EYE: OS_EXAM: NORMAL

## 2023-10-19 NOTE — NURSING NOTE
"Chief Complaints and History of Present Illnesses   Patient presents with    Annual Eye Exam     Chief Complaint(s) and History of Present Illness(es)       Annual Eye Exam              Laterality: both eyes    Associated symptoms: dryness, itching and discharge    Treatments tried: no treatments and warm compresses              Comments    Pt reports over the last year dryness sensation has worsened, in the mornings will have \"crusting' discharge in the lacrimal region of both eyes.   Is not using any eye drops at this time. Uses a warm wet wash cloth as needed.   Floaters occasionally BE. Denies double vision/flashes/pain.   Other: Breast Cancer Survivor of 6 years.     Mean Vasquez OA, October 19, 2023                     "

## 2023-10-19 NOTE — PROGRESS NOTES
HPI:  Patient complains of dry eyes, both eyes. There is also crusting by both eyes. Patient feels the eyelid is more droopy. Patient also complains of progressive blurry vision in both eyes.     Social history:  is my patient. Has twin grand children.       Pertinent Medical History:  Peripheral neuropathy due to chemotherapy  Adenoma large intestine  Hypothyroidism   Hashimoto's thyroiditis  Cardiomyopathy secondary to drug  Congenital stenosis of aortic valve  Heterozygous factor V Leiden mutation  Ductal carcinoma of right breast    Ocular History:  Presbyopia, both eyes.     Eye Medications:  None    Assessment and Plan:      #   Dermatochalasis, both upper lids.   Bothersome to patient. See oculoplastics for consult.     #   Blepharitis, both eyes.   Ocusoft lid wipes 1-2 times daily both eyes.     #   Dry Eye Syndrome, both eyes.  Symptoms of dry eyes include blurry vision, eye pain, grittiness, burning, redness, eye irritation, and tearing. The goal is not to eliminate, but to improve symptoms.   Preservative free artificial tears 4 times daily both eyes. Refresh or Systane. Can use up to every 2 hours both eyes as needed.     #   Macular Lesions, both eyes.   Macular OCT 10/19/2023: Right eye: drusenoid PED?; Left eye: drusenoid PED, SRF.   Possible CNVM (Wet AMD?) vs Best Disease (though not exactly an egg yoke appearance)  Follow up with Dr. Herring.     #   Disc-at-Risk, both eyes.   Optic nerve OCT 10/19/2023: Right eye: thinning superior; Left eye: normal   No acute vision changes.   Given disc at risk and abnormal OCT right eye - follow up with Dr. Myriam Mares, neuro optometrist.    #   Cataract both eyes.   Visually significant - see Dr. Herring for further evaluation.     #   Myopia, both eyes.   Glasses is broken but after discussion, will hold off on new glasses prescription given the need to get new glasses after treatment of macular lesion and/or cataract surgery. Patient prefers  to keep current lenses for now and get a new frame. The other option is to give the prescription today knowing it will change in the near future.    #   Hypothyroidism  #   Hashimoto's thyroiditis  No thyroid eye disease.     #   Breast Cancer Right Breast - S/P chemotherapy and lumpectomy  No ocular involvement.         Patient consented to a dilated eye exam:    Yes. Side effects discussed.    Medical History:  Past Medical History:   Diagnosis Date    Abnormal Pap smear 1970s    normal since    Breast cancer (H) 02/2018    Factor V Leiden (H)     Hypothyroidism fall 2000       Medications:  Current Outpatient Medications   Medication Sig Dispense Refill    amoxicillin (AMOXIL) 500 MG capsule Take 4 capsules (2,000 mg) by mouth once as needed (Prior to dental procedure) 4 capsule 0    calcium carbonate 500 mg, elemental, (OSCAL 500) 1250 (500 Ca) MG TABS tablet Take by mouth daily      levothyroxine (SYNTHROID/LEVOTHROID) 100 MCG tablet Take 1 tablet (100 mcg) by mouth daily 90 tablet 3    levothyroxine (SYNTHROID/LEVOTHROID) 75 MCG tablet Take 1 tablet (75 mcg) by mouth daily on Monday, Wednesday, Friday and Sunday. Alternate with 100 mcg dose on Tuesday, Thursday and Saturday 90 tablet 3    Omega-3 Fatty Acids (FISH OIL PO) Take 1 capsule by mouth daily. (Patient not taking: Reported on 3/28/2023)      vitamin D3 (CHOLECALCIFEROL) 2000 units (50 mcg) tablet Take 1 tablet by mouth daily     Complete documentation of historical and exam elements from today's encounter can be found in the full encounter summary report (not reduplicated in this progress note). I personally obtained the chief complaint(s) and history of present illness.  I confirmed and edited as necessary the review of systems, past medical/surgical history, family history, social history, and examination findings as documented by others; and I examined the patient myself. I personally reviewed the relevant tests, images, and reports as documented  above. I formulated and edited as necessary the assessment and plan and discussed the findings and management plan with the patient and family. - Nitish Heart OD

## 2023-10-20 NOTE — TELEPHONE ENCOUNTER
FUTURE VISIT INFORMATION      FUTURE VISIT INFORMATION:  Date: 2/5/25  Time: 12:15pm  Location: [facility]  REFERRAL INFORMATION:  Referring provider:  Nitish Tony Chi, OD   Referring providers clinic:  [facility]  Reason for visit/diagnosis  Dermatochalasis, both upper lids.     RECORDS REQUESTED FROM:       Clinic name Comments Records Status Imaging Status   [facility] OV/referral 10/19/23 EPIC

## 2023-10-23 ENCOUNTER — OFFICE VISIT (OUTPATIENT)
Dept: OPHTHALMOLOGY | Facility: CLINIC | Age: 73
End: 2023-10-23
Payer: COMMERCIAL

## 2023-10-23 DIAGNOSIS — H47.393 OTHER DISORDERS OF OPTIC DISC, BILATERAL: Primary | ICD-10-CM

## 2023-10-23 PROCEDURE — 92014 COMPRE OPH EXAM EST PT 1/>: CPT

## 2023-10-23 PROCEDURE — 92083 EXTENDED VISUAL FIELD XM: CPT

## 2023-10-23 PROCEDURE — G0463 HOSPITAL OUTPT CLINIC VISIT: HCPCS

## 2023-10-23 ASSESSMENT — REFRACTION_WEARINGRX
OS_ADD: +2.50
OD_SPHERE: -6.50
OD_CYLINDER: +2.00
OS_SPHERE: -4.00
OD_AXIS: 125
OS_AXIS: 035
OS_CYLINDER: +2.00
OD_ADD: +2.50

## 2023-10-23 ASSESSMENT — VISUAL ACUITY
OS_PH_SC+: -1
OD_PH_SC+: -2
OS_SC+: -1
OS_SC: 20/60
OD_SC: 20/50
METHOD: -2SNELLEN - LINEAR
OD_PH_SC: 20/40
OS_PH_SC: 20/40
OD_SC+: -1

## 2023-10-23 ASSESSMENT — CONF VISUAL FIELD
OD_SUPERIOR_TEMPORAL_RESTRICTION: 0
OD_INFERIOR_TEMPORAL_RESTRICTION: 0
OS_INFERIOR_NASAL_RESTRICTION: 0
OS_SUPERIOR_TEMPORAL_RESTRICTION: 0
OD_NORMAL: 1
OD_INFERIOR_NASAL_RESTRICTION: 0
OS_INFERIOR_TEMPORAL_RESTRICTION: 0
OS_SUPERIOR_NASAL_RESTRICTION: 0
OD_SUPERIOR_NASAL_RESTRICTION: 0
OS_NORMAL: 1
METHOD: COUNTING FINGERS

## 2023-10-23 ASSESSMENT — SLIT LAMP EXAM - LIDS: COMMENTS: DERMATOCHALASIS UPPER LID

## 2023-10-23 ASSESSMENT — TONOMETRY
OS_IOP_MMHG: 15
OD_IOP_MMHG: 16
IOP_METHOD: TONOPEN

## 2023-10-23 ASSESSMENT — EXTERNAL EXAM - LEFT EYE: OS_EXAM: NORMAL

## 2023-10-23 ASSESSMENT — EXTERNAL EXAM - RIGHT EYE: OD_EXAM: NORMAL

## 2023-10-23 NOTE — PROGRESS NOTES
"HPI:    Patient was last seen by Dr. Tony on 10/19/23 for CEE.  She is here today for evaluation of \"disk at risk, both eyes.\"    She denies any sudden changes in vision.  Julieta does note that she had gradual vision changes following chemotherapy for breast cancer.    She denies family history of glaucoma.    OCT RNFL 10/19/23:  Right eye: Average RNFL 76 microns.  Borderline nasal thinning + superonasal thinning.  Left eye: Average RNFL 86 microns.  Normal compared to age-matched controls.    Today's Testing:  GTop:  Right eye: Reliable.  Mean deviation -5.0 dB.  Scattered points of depression temporally.  Left eye: Reliable.  Mean deviation -5.3 dB.  Central scotoma.    Assessment and Plan:  It is my impression that this patient has borderline retinal nerve fiber layer thinning of the right eye.  On today's testing, she has scattered points of depression in her temporal visual field of the right eye.  I will follow-up with her in 6 months to monitor for changes.    Complete documentation of historical and exam elements from today's encounter can be found in the full encounter summary report (not reduplicated in this progress note). I personally obtained the chief complaint(s) and history of present illness. I confirmed and edited as necessary the review of systems, past medical/surgical history, family history, social history, and examination findings as document by others; and I examined the patient myself. I personally reviewed the relevant tests, images, and reports as documented above. I formulated and edited as necessary the assessment and plan and discussed the findings and management plan with the patient and family.    Myriam Mares, OD    "

## 2023-10-23 NOTE — NURSING NOTE
Chief Complaints and History of Present Illnesses   Patient presents with    Follow Up     Disc-at-Risk, both eyes.      Chief Complaint(s) and History of Present Illness(es)       Follow Up              Comments: Disc-at-Risk, both eyes.               Comments    Ref by Dr. Tony  Pt states no change in VA since last visit  Still C/o dryness  occ floaters both eyes  No flashes, eye pain or redness    Alicia You COT 7:21 AM October 23, 2023

## 2023-10-24 DIAGNOSIS — H35.54 MACULAR VITELLIFORM LESION: Primary | ICD-10-CM

## 2023-10-28 ENCOUNTER — HEALTH MAINTENANCE LETTER (OUTPATIENT)
Age: 73
End: 2023-10-28

## 2023-10-31 ENCOUNTER — PATIENT OUTREACH (OUTPATIENT)
Dept: GASTROENTEROLOGY | Facility: CLINIC | Age: 73
End: 2023-10-31
Payer: COMMERCIAL

## 2023-11-02 ENCOUNTER — OFFICE VISIT (OUTPATIENT)
Dept: OPHTHALMOLOGY | Facility: CLINIC | Age: 73
End: 2023-11-02
Attending: OPHTHALMOLOGY
Payer: COMMERCIAL

## 2023-11-02 DIAGNOSIS — H35.54 MACULAR VITELLIFORM LESION: ICD-10-CM

## 2023-11-02 PROCEDURE — 99204 OFFICE O/P NEW MOD 45 MIN: CPT | Performed by: OPHTHALMOLOGY

## 2023-11-02 PROCEDURE — 92134 CPTRZ OPH DX IMG PST SGM RTA: CPT | Performed by: OPHTHALMOLOGY

## 2023-11-02 PROCEDURE — G0463 HOSPITAL OUTPT CLINIC VISIT: HCPCS | Performed by: OPHTHALMOLOGY

## 2023-11-02 ASSESSMENT — VISUAL ACUITY
CORRECTION_TYPE: GLASSES
OS_CC: 20/40
OD_CC: 20/50
METHOD: SNELLEN - LINEAR

## 2023-11-02 ASSESSMENT — REFRACTION_MANIFEST
OD_SPHERE: -6.25
OD_ADD: +2.75
OS_ADD: +2.75
OS_SPHERE: -5.50
OS_CYLINDER: +3.00
OD_CYLINDER: +1.75
OD_AXIS: 120
OS_AXIS: 075

## 2023-11-02 ASSESSMENT — EXTERNAL EXAM - LEFT EYE: OS_EXAM: NORMAL

## 2023-11-02 ASSESSMENT — REFRACTION_WEARINGRX
OS_SPHERE: -4.00
OD_AXIS: 125
OD_ADD: +2.50
OS_AXIS: 035
OD_SPHERE: -6.50
OD_CYLINDER: +2.00
OS_CYLINDER: +2.00
OS_ADD: +2.50

## 2023-11-02 ASSESSMENT — EXTERNAL EXAM - RIGHT EYE: OD_EXAM: NORMAL

## 2023-11-02 ASSESSMENT — TONOMETRY
OD_IOP_MMHG: 18
IOP_METHOD: TONOPEN
OS_IOP_MMHG: 17

## 2023-11-02 ASSESSMENT — SLIT LAMP EXAM - LIDS: COMMENTS: DERMATOCHALASIS UPPER LID

## 2023-11-02 NOTE — LETTER
11/2/2023       RE: Julieta Rivera  [address] [place] [address]     Dear Colleague,    Thank you for referring your patient, Julieta Rivera, to the [facility] [place] at [facility]. Please see a copy of my visit note below.       CC: Macular lesions each eye per Dr. Tony  First hawk with me   HPI: Julieta Rivera is a  72 year old year-old patient with history as listed below who presents as a referral from Dr. Tony for evaluation of macular lesions of both eyes. She reports that her vision has felt blurred in both eyes for quite some time.   First noticed worsening in 2018 when she was receiving chemotherapy ductal carcinoma of right breast. She first noticed droopiness of left eyelid during this time as well.     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  -Hypothyroidism   -Hashimoto's thyroiditis  -Cardiomyopathy secondary to drug  -Congenital stenosis of aortic valve  -Heterozygous factor V Leiden mutation  - Ductal carcinoma of right breast    Retinal Imaging:  OCT 11/02/23  RE: Subfoveal deposit of hyperreflective material; PVD  LE:Subfoveal deposit of hyperreflective material with trace potential sub-RPE fluid; Posterior vitreous detachment (PVD)    JADON: NI both eyes     Assessment & Plan:    #Adult-onset Vitelliform Dystrophy both eyes  Differential diagnosis atypical dry Age related macular degeneration  Less likely best disease  -less likely to be CNVM given exam and OCT findings OCT-A findings  Recommend to observe    #Posterior vitreous detachment (PVD) both eyes  -No Retinal tear or Retinal detachment  on exam today   -Discussed Retinal detachment /Retinal tear symptoms    #cataracts each eye   Becoming visually significant   -Will obtain BAT testing at follow-up appt  Will need cataract evaluation     #   Breast Cancer Right Breast - S/P chemotherapy and  lumpectomy  -No ocular involvement.     #   Dry Eye Syndrome, both eyes.    artificial tears  and warm compresses     RTC: follow up 6 months with Nima with Optical Coherence Tomography; BAT: intraocular lens calcs   Will follow up with dr. Tony after Cataract extraction in the future    ATTESTATION   Attending Physician Attestation:  Complete documentation of historical and exam elements from today's encounter can be found in the full encounter summary report (not reduplicated in this progress note).  I personally obtained the chief complaint(s) and history of present illness.  I confirmed and edited as necessary the review of systems, past medical/surgical history, family history, social history, and examination findings as documented by others; and I examined the patient myself.  I personally reviewed the relevant tests, images, and reports as documented above.  I personally reviewed the ophthalmic test(s) associated with this encounter, agree with the interpretation(s) as documented by the resident/fellow, and have edited the corresponding report(s) as necessary.   I formulated and edited as necessary the assessment and plan and discussed the findings and management plan with the patient and family. Cira Herring MD      Again, thank you for allowing me to participate in the care of your patient.      Sincerely,    Cira Herring M.D.  Professor of Ophthalmology  Vitreoretinal Surgeon  [employer] Endowed Chair  Department of Ophthalmology & Visual Neurosciences  [facility]  Phone:  [phone]   Fax:  [phone]

## 2023-11-02 NOTE — PROGRESS NOTES
CC: Macular lesions each eye per Dr. Tony  First hawk with me   HPI: Julieta Rivera is a  72 year old year-old patient with history as listed below who presents as a referral from Dr. Tony for evaluation of macular lesions of both eyes. She reports that her vision has felt blurred in both eyes for quite some time.   First noticed worsening in 2018 when she was receiving chemotherapy ductal carcinoma of right breast. She first noticed droopiness of left eyelid during this time as well.     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  -Hypothyroidism   -Hashimoto's thyroiditis  -Cardiomyopathy secondary to drug  -Congenital stenosis of aortic valve  -Heterozygous factor V Leiden mutation  - Ductal carcinoma of right breast    Retinal Imaging:  OCT 11/02/23  RE: Subfoveal deposit of hyperreflective material; PVD  LE:Subfoveal deposit of hyperreflective material with trace potential sub-RPE fluid; Posterior vitreous detachment (PVD)    JADON: NI both eyes     Assessment & Plan:    #Adult-onset Vitelliform Dystrophy both eyes  Differential diagnosis atypical dry Age related macular degeneration  Less likely best disease  -less likely to be CNVM given exam and OCT findings OCT-A findings  Recommend to observe    #Posterior vitreous detachment (PVD) both eyes  -No Retinal tear or Retinal detachment  on exam today   -Discussed Retinal detachment /Retinal tear symptoms    #cataracts each eye   Becoming visually significant   -Will obtain BAT testing at follow-up appt  Will need cataract evaluation     #   Breast Cancer Right Breast - S/P chemotherapy and lumpectomy  -No ocular involvement.     #   Dry Eye Syndrome, both eyes.    artificial tears  and warm compresses     RTC: follow up 6 months with Nima with Optical Coherence Tomography; BAT: intraocular lens calcs   Will follow up with dr. Tony after Cataract extraction in the future    ATTESTATION     Attending  Physician Attestation:      Complete documentation of historical and exam elements from today's encounter can be found in the full encounter summary report (not reduplicated in this progress note).  I personally obtained the chief complaint(s) and history of present illness.  I confirmed and edited as necessary the review of systems, past medical/surgical history, family history, social history, and examination findings as documented by others; and I examined the patient myself.  I personally reviewed the relevant tests, images, and reports as documented above.  I personally reviewed the ophthalmic test(s) associated with this encounter, agree with the interpretation(s) as documented by the resident/fellow, and have edited the corresponding report(s) as necessary.   I formulated and edited as necessary the assessment and plan and discussed the findings and management plan with the patient and family    Cira Herring MD  , Vitreoretinal Surgery  Department of Ophthalmology  [facility]

## 2023-11-02 NOTE — NURSING NOTE
Chief Complaints and History of Present Illnesses   Patient presents with    Retinal Evaluation     Chief Complaint(s) and History of Present Illness(es)       Retinal Evaluation              Laterality: both eyes    Onset: months ago    Quality: States va is the same since last visit      Associated symptoms: photophobia (since chemo).  Negative for flashes and floaters    Treatments tried: artificial tears    Pain scale: 0/10              Comments    Here for Macular Lesions, both eyes.  Arabella Dinero COT 8:03 AM November 2, 2023

## 2023-11-06 ENCOUNTER — OFFICE VISIT (OUTPATIENT)
Dept: FAMILY MEDICINE | Facility: CLINIC | Age: 73
End: 2023-11-06
Payer: COMMERCIAL

## 2023-11-06 VITALS
OXYGEN SATURATION: 99 % | RESPIRATION RATE: 13 BRPM | HEIGHT: 60 IN | SYSTOLIC BLOOD PRESSURE: 122 MMHG | HEART RATE: 92 BPM | TEMPERATURE: 97.8 F | DIASTOLIC BLOOD PRESSURE: 77 MMHG | WEIGHT: 127.2 LBS | BODY MASS INDEX: 24.97 KG/M2

## 2023-11-06 DIAGNOSIS — J31.0 CHRONIC RHINITIS: ICD-10-CM

## 2023-11-06 DIAGNOSIS — H10.32 ACUTE CONJUNCTIVITIS OF LEFT EYE, UNSPECIFIED ACUTE CONJUNCTIVITIS TYPE: ICD-10-CM

## 2023-11-06 DIAGNOSIS — E06.3 HYPOTHYROIDISM DUE TO HASHIMOTO'S THYROIDITIS: ICD-10-CM

## 2023-11-06 DIAGNOSIS — R05.2 SUBACUTE COUGH: ICD-10-CM

## 2023-11-06 DIAGNOSIS — T45.1X5A PERIPHERAL NEUROPATHY DUE TO CHEMOTHERAPY (H): ICD-10-CM

## 2023-11-06 DIAGNOSIS — J02.9 SORE THROAT: Primary | ICD-10-CM

## 2023-11-06 DIAGNOSIS — E55.9 HYPOVITAMINOSIS D: ICD-10-CM

## 2023-11-06 DIAGNOSIS — G62.0 PERIPHERAL NEUROPATHY DUE TO CHEMOTHERAPY (H): ICD-10-CM

## 2023-11-06 LAB
DEPRECATED S PYO AG THROAT QL EIA: NEGATIVE
GROUP A STREP BY PCR: NOT DETECTED
TSH SERPL DL<=0.005 MIU/L-ACNC: 2.09 UIU/ML (ref 0.3–4.2)
VIT D+METAB SERPL-MCNC: 35 NG/ML (ref 20–50)

## 2023-11-06 PROCEDURE — 87635 SARS-COV-2 COVID-19 AMP PRB: CPT | Performed by: NURSE PRACTITIONER

## 2023-11-06 PROCEDURE — 36415 COLL VENOUS BLD VENIPUNCTURE: CPT | Performed by: NURSE PRACTITIONER

## 2023-11-06 PROCEDURE — 87651 STREP A DNA AMP PROBE: CPT | Performed by: NURSE PRACTITIONER

## 2023-11-06 PROCEDURE — 84443 ASSAY THYROID STIM HORMONE: CPT | Performed by: NURSE PRACTITIONER

## 2023-11-06 PROCEDURE — 82306 VITAMIN D 25 HYDROXY: CPT | Performed by: NURSE PRACTITIONER

## 2023-11-06 PROCEDURE — 91320 SARSCV2 VAC 30MCG TRS-SUC IM: CPT | Performed by: NURSE PRACTITIONER

## 2023-11-06 PROCEDURE — 90480 ADMN SARSCOV2 VAC 1/ONLY CMP: CPT | Performed by: NURSE PRACTITIONER

## 2023-11-06 PROCEDURE — 99215 OFFICE O/P EST HI 40 MIN: CPT | Mod: 25 | Performed by: NURSE PRACTITIONER

## 2023-11-06 RX ORDER — FEXOFENADINE HCL 60 MG/1
60 TABLET, FILM COATED ORAL DAILY
Qty: 90 TABLET | Refills: 3 | Status: SHIPPED | OUTPATIENT
Start: 2023-11-06

## 2023-11-06 RX ORDER — POLYMYXIN B SULFATE AND TRIMETHOPRIM 1; 10000 MG/ML; [USP'U]/ML
1-2 SOLUTION OPHTHALMIC EVERY 4 HOURS
Qty: 10 ML | Refills: 0 | Status: SHIPPED | OUTPATIENT
Start: 2023-11-06 | End: 2023-11-16

## 2023-11-06 ASSESSMENT — PAIN SCALES - GENERAL: PAINLEVEL: NO PAIN (0)

## 2023-11-06 NOTE — PATIENT INSTRUCTIONS
Try taking half the dose of Allegra (60 mg instead of 120 mg)  Alternatively, we could try fluticasone nasal spray - you could take this daily ongoing. The Afrin (oxymetazoline) and other nasal decongestants are the ones with rebound.

## 2023-11-06 NOTE — PROGRESS NOTES
Assessment & Plan     Sore throat  Rule out strep with symptoms and positive family members  - Streptococcus A Rapid Screen w/Reflex to PCR - Clinic Collect  - Symptomatic COVID-19 Virus (Coronavirus) by PCR Nasopharyngeal  - Group A Streptococcus PCR Throat Swab    Hypothyroidism due to Hashimoto's thyroiditis  Symptoms c/w altered thyroid   - TSH with free T4 reflex    Acute conjunctivitis of left eye, unspecified acute conjunctivitis type  Unilateral symptoms - appropriate to treat  - trimethoprim-polymyxin b (POLYTRIM) 33148-6.1 UNIT/ML-% ophthalmic solution; Place 1-2 drops into both eyes every 4 hours for 10 days  - Symptomatic COVID-19 Virus (Coronavirus) by PCR; Future  - Symptomatic COVID-19 Virus (Coronavirus) by PCR Nasopharyngeal    Subacute cough  Noted - post-infection vs. current infection vs allergies  Recommend consistent allergy med administration, covid test and follow-up in 1 month at [facility]    Peripheral neuropathy due to chemotherapy   Continues - did not like s/e of Cymbalta and not particularly interested in medication for neuropathy. Discussed options of gabapentin and amitriptyline. I would lean toward gabapentin with more availability of lower doses and less sedation potential than amitriptyline.    Hypovitaminosis D  No level in a couple years and is supplementing. Rule out possible toxicity and determine proper supplement dose  - Vitamin D Deficiency    Chronic rhinitis  Will try to put in script for lower dose fexofenadine so that Julieta can take this consistently without getting the dryness side effect. If not covered or not available, consider daily nasal fluticasone. Discussed the fluticasone is not concerning for rebound symptoms as are nasal decongestants  - fexofenadine (ALLEGRA) 60 MG tablet; Take 1 tablet (60 mg) by mouth daily    Ordering of each unique test  Prescription drug management  I spent a total of 61 minutes on the day of the visit.   Time spent by me doing chart review,  history and exam, documentation and further activities per the note       Patient Instructions   Try taking half the dose of Allegra (60 mg instead of 120 mg)  Alternatively, we could try fluticasone nasal spray - you could take this daily ongoing. The Afrin (oxymetazoline) and other nasal decongestants are the ones with rebound.    Simona Tucker, JOSÉ LUIS CNP  [facility]ANEEN Rendon is a 72 year old, presenting for the following health issues:  Recheck Medication, Thyroid Problem, and Derm Problem        11/6/2023     1:49 PM   Additional Questions   Roomed by Nikita Wasserman       History of Present Illness       Hypothyroidism:     Since last visit, patient describes the following symptoms::  Anxiety, Dry skin, Fatigue, Hair loss, Loose stools and Weight gain    Weight gain::  6-10 lbs.    Reason for visit:  Thyroid check, ear canal cleaning, latest Covid immunization    She eats 0-1 servings of fruits and vegetables daily.She consumes 0 sweetened beverage(s) daily.She exercises with enough effort to increase her heart rate 9 or less minutes per day.  She exercises with enough effort to increase her heart rate 3 or less days per week.   She is taking medications regularly.     Household has been ill, including strep and conjunctivitis.   Julieta herself has had sore throat and red left eye. Left eye is sore. No lore vision changes. Sore throat is worst at night. Has dry cough for several weeks. No dyspnea or SOB.    No fever (though none of household has had fever). Does take eye medications (for dry eye and eye cleanser) and has seen ophthalmology as recently as four days ago and diagnosed with macular degeneration. Eye was not reddened at that visit.    Taking allergy medications, but not consistently due to side effects of feeling dry. Takes fexofenadine. Does not consistently have heartburn symptoms. Cough does get better when she takes the allergy meds.    Sleep has been poor.  Wakes up frequently and can always return to sleep. Not pain or neuropathy waking her up. If she gets up and is active, she feels ok. But if does feel fatigue and unwell if she goes back to sleep. Unsure if she snores or stops breathing. Does not always have bed partner or someone who would be able to tell her this. Doesn't want to do a sleep study.    Thyroid - gets unexpectedly hot, dry skin, anxiety, hair loss and loose stools. Has gained weight. Alternates 75 mcg and 100 mcg. Wondering why she could not just take 88 mcg. Previously took 88 mcg. Atypical to have TSH >3.     Component      Latest Ref Rng 4/24/2019  3:52 PM 6/1/2020  11:01 AM 6/18/2021  3:37 PM 8/2/2022  3:58 PM 10/12/2022  10:15 AM 5/4/2023  12:35 PM   TSH      0.40 - 4.00 mU/L 1.22  0.70  0.48  0.23 (L)  1.86     T4 Free      0.76 - 1.46 ng/dL 1.15  1.46   1.64 (H)      TSH      0.30 - 4.20 uIU/mL      3.54       Post-cancer treatment symptoms:   Has right-sided axillar and chest pain s/p radiation. Continues to have neuropathy in both feet. Feet and thigh cramps worse in the last year. Gets numbness and tingling in hands each morning which improves as the day goes on.  Took medication (an antidepressant and duloxetine in med list) briefly for neuropathy, but did not like side effects - sleepiness and had a fall.      Taking vitamin D3 2000 international unit(s) daily. Otherwise not regularly taking supplements. Is currently taking vitamin complex for illness.  Component      Latest Ref Rng 11/22/2013  9:00 AM 3/5/2019  2:52 PM   Vitamin D Deficiency screening      20 - 75 ug/L 40  52        BP Readings from Last 6 Encounters:   11/06/23 122/77   09/28/23 117/76   03/28/23 127/82   01/16/23 135/81   12/01/22 99/59   11/06/22 105/68       Review of Systems   Constitutional, HEENT, cardiovascular, pulmonary, gi and gu systems are negative, except as otherwise noted.      Objective    /77 (BP Location: Right arm, Patient Position: Sitting,  "Cuff Size: Adult Regular)   Pulse 92   Temp 97.8  F (36.6  C) (Temporal)   Resp 13   Ht 1.52 m (4' 11.84\")   Wt 57.7 kg (127 lb 3.2 oz)   SpO2 99%   BMI 24.97 kg/m    Body mass index is 24.97 kg/m .  Physical Exam   GENERAL: healthy, alert and no distress  EYES: PERRL, EOMI, and conjunctiva/corneas- conjunctival injection left and clear colored discharge present left; swollen upper and lower lid  HENT: normal cephalic/atraumatic, both ears: occluded with wax, nose and mouth without ulcers or lesions, oropharynx clear, and oral mucous membranes moist  NECK: no adenopathy, no asymmetry, masses, or scars and thyroid normal to palpation  RESP: lungs clear to auscultation - no rales, rhonchi or wheezes  CV: regular rate and rhythm, normal S1 S2, no S3 or S4, grade 3 systolic murmur, click or rub, no peripheral edema and peripheral pulses strong  ABDOMEN: soft, nontender, no hepatosplenomegaly, no masses and bowel sounds normal    Labs pending    Component      Latest Ref [facility] 9/28/2023  1:40 PM   WBC      4.0 - 11.0 10e3/uL 6.2    RBC Count      3.80 - 5.20 10e6/uL 4.29    Hemoglobin      11.7 - 15.7 g/dL 13.0    Hematocrit      35.0 - 47.0 % 38.5    MCV      78 - 100 fL 90    MCH      26.5 - 33.0 pg 30.3    MCHC      31.5 - 36.5 g/dL 33.8    RDW      10.0 - 15.0 % 13.0    Platelet Count      150 - 450 10e3/uL 213    % Neutrophils      % 66    % Lymphocytes      % 23    % Monocytes      % 7    % Eosinophils      % 2    % Basophils      % 1    % Immature Granulocytes      % 1    NRBCs per 100 WBC      <1 /100 0    Absolute Neutrophils      1.6 - 8.3 10e3/uL 4.1    Absolute Lymphocytes      0.8 - 5.3 10e3/uL 1.4    Absolute Monocytes      0.0 - 1.3 10e3/uL 0.4    Absolute Eosinophils      0.0 - 0.7 10e3/uL 0.1    Absolute Basophils      0.0 - 0.2 10e3/uL 0.0    Absolute Immature Granulocytes      <=0.4 10e3/uL 0.1    Absolute NRBCs      10e3/uL 0.0    Sodium      135 - 145 mmol/L 139    Potassium      3.4 - 5.3 " mmol/L 4.2    Carbon Dioxide (CO2)      22 - 29 mmol/L 24    Anion Gap      7 - 15 mmol/L 10    Urea Nitrogen      8.0 - 23.0 mg/dL 13.0    Creatinine      0.51 - 0.95 mg/dL 0.70    GFR Estimate      >60 mL/min/1.73m2 >90    Calcium      8.8 - 10.2 mg/dL 9.5    Chloride      98 - 107 mmol/L 105    Glucose      70 - 99 mg/dL 93    Alkaline Phosphatase      35 - 104 U/L 64    AST      0 - 45 U/L 22    ALT      0 - 50 U/L 12    Protein Total      6.4 - 8.3 g/dL 7.0    Albumin      3.5 - 5.2 g/dL 4.3    Bilirubin Total      <=1.2 mg/dL 0.3

## 2023-11-07 LAB — SARS-COV-2 RNA RESP QL NAA+PROBE: NEGATIVE

## 2023-11-07 NOTE — RESULT ENCOUNTER NOTE
Julieta,    Rapid strep is negative, but we are running the follow-up test.  If you have any questions, please feel free to contact the clinic.    FRED Lewis

## 2024-01-17 NOTE — PATIENT INSTRUCTIONS
Neulasta injection will start on 4/20 at 5:45, approximately 27 hours after application applied today.    When the dose delivery starts, it will take about 45 minutes to complete.  Neulasta Onpro On-Body should have green flashing light.  Call triage or on-call MD if injector flashes red or appears to be leaking.   Keep Onpro On-Body Neulasta 4 inches away from electrical equipment.  Avoid showering 4 hours prior to injection.         Dexamethasone:     Take 2 tablets (8mg) tonight, and 2 tablets (8mg) twice daily on Friday, 4/20, then 1 tablet (4mg) twice daily on Saturday, 4/21, then 1 tablet (4mg) on Sunday and 1 tablet (4mg) on Monday      Contact Numbers  [facility]: [phone]    After Hours:  [phone]  Triage: [phone]    Please call the [facility] Triage line if you experience a temperature greater than or equal to 100.5, shaking chills, have uncontrolled nausea, vomiting and/or diarrhea, dizziness, shortness of breath, chest pain, bleeding, unexplained bruising, or if you have any other new/concerning symptoms, questions or concerns.     If it is after hours, weekends, or holidays, please call the main hospital  at  [phone] and ask to speak to the Oncology doctor on call.     If you are having any concerning symptoms or wish to speak to a provider before your next infusion visit, please call your care coordinator or triage to notify them so we can adequately serve you.     If you need a refill on a narcotic prescription or other medication, please call triage before your infusion appointment.         April 2018 Sunday Monday Tuesday Wednesday Thursday Friday Saturday   1     2     3     4     5     6     7       8     9     10     11     12     13     14       15     16     17     18     19     [facility] LAB DRAW   10:45 AM   (15 min.)    [facility] DRAW   [facility] Lab Draw     [facility] RETURN   11:05 AM   (50 min.)   Valentine Blevins PA M [facility]  [facility] ONC INFUSION 180   12:30 PM   (180 min.)    ONCOLOGY INFUSION   [facility] 20     21       22     23     24     25     26     27     28       29     30                                         May 2018   [place] Monday Tuesday Wednesday Thursday Friday Saturday             1     2     3     4     5       6     7     8     9     10     [facility] DRAW   11:00 AM   (15 min.)    [facility] DRAW   [facility] Lab Draw     [facility] RETURN   11:15 AM   (30 min.)   Jonathan Gay MD   [facility] ONC INFUSION 180   12:30 PM   (180 min.)   [facility]COL[facility]   [facility] 11     12       13     14     15     16     17     18     19       20     21     22     23     24     25     26       27     28     29     30     31                           Recent Results (from the past 24 hour(s))   CBC with platelets differential    Collection Time: 04/19/18 11:40 AM   Result Value Ref Range    WBC 5.2 4.0 - 11.0 10e9/L    RBC Count 3.79 (L) 3.8 - 5.2 10e12/L    Hemoglobin 11.9 11.7 - 15.7 g/dL    Hematocrit 34.9 (L) 35.0 - 47.0 %    MCV 92 78 - 100 fl    MCH 31.4 26.5 - 33.0 pg    MCHC 34.1 31.5 - 36.5 g/dL    RDW 14.3 10.0 - 15.0 %    Platelet Count 229 150 - 450 10e9/L    Diff Method Automated Method     % Neutrophils 69.0 %    % Lymphocytes 20.4 %    % Monocytes 9.4 %    % Eosinophils 0.2 %    % Basophils 0.8 %    % Immature Granulocytes 0.2 %    Nucleated RBCs 0 0 /100    Absolute Neutrophil 3.6 1.6 - 8.3 10e9/L    Absolute Lymphocytes 1.1 0.8 - 5.3 10e9/L    Absolute Monocytes 0.5 0.0 - 1.3 10e9/L    Absolute Eosinophils 0.0 0.0 - 0.7 10e9/L    Absolute Basophils 0.0 0.0 - 0.2 10e9/L    Abs Immature Granulocytes 0.0 0 - 0.4 10e9/L    Absolute Nucleated RBC 0.0    Comprehensive metabolic panel    Collection Time: 04/19/18 11:40 AM   Result Value Ref Range    Sodium 140 133 - 144 mmol/L    Potassium 3.6 3.4 - 5.3 mmol/L    Chloride  107 94 - 109 mmol/L    Carbon Dioxide 25 20 - 32 mmol/L    Anion Gap 8 3 - 14 mmol/L    Glucose 98 70 - 99 mg/dL    Urea Nitrogen 8 7 - 30 mg/dL    Creatinine 0.56 0.52 - 1.04 mg/dL    GFR Estimate >90 >60 mL/min/1.7m2    GFR Estimate If Black >90 >60 mL/min/1.7m2    Calcium 8.6 8.5 - 10.1 mg/dL    Bilirubin Total 0.5 0.2 - 1.3 mg/dL    Albumin 3.6 3.4 - 5.0 g/dL    Protein Total 6.4 (L) 6.8 - 8.8 g/dL    Alkaline Phosphatase 62 40 - 150 U/L    ALT 18 0 - 50 U/L    AST 20 0 - 45 U/L          MEDICATIONS  (PRN):  acetaminophen     Tablet .. 650 milliGRAM(s) Oral every 4 hours PRN Mild Pain (1 - 3), Moderate Pain (4 - 6)  OLANZapine Disintegrating Tablet 5 milliGRAM(s) Oral every 4 hours PRN agitation  OLANZapine Injectable 5 milliGRAM(s) IntraMuscular daily PRN If refuses Zyprexa PO  OLANZapine Injectable 5 milliGRAM(s) IntraMuscular once PRN Agitation

## 2024-01-18 ENCOUNTER — ANCILLARY PROCEDURE (OUTPATIENT)
Dept: BONE DENSITY | Facility: CLINIC | Age: 74
End: 2024-01-18
Attending: NURSE PRACTITIONER
Payer: COMMERCIAL

## 2024-01-18 DIAGNOSIS — M81.0 OSTEOPOROSIS: ICD-10-CM

## 2024-01-18 PROCEDURE — 99207 DX WRIST/HEEL/RADIUS: CPT | Performed by: INTERNAL MEDICINE

## 2024-01-18 PROCEDURE — 77080 DXA BONE DENSITY AXIAL: CPT | Performed by: INTERNAL MEDICINE

## 2024-01-30 ENCOUNTER — TRANSFERRED RECORDS (OUTPATIENT)
Dept: HEALTH INFORMATION MANAGEMENT | Facility: CLINIC | Age: 74
End: 2024-01-30

## 2024-02-05 ENCOUNTER — PRE VISIT (OUTPATIENT)
Dept: OPHTHALMOLOGY | Facility: CLINIC | Age: 74
End: 2024-02-05

## 2024-02-07 DIAGNOSIS — E06.3 HYPOTHYROIDISM DUE TO HASHIMOTO'S THYROIDITIS: ICD-10-CM

## 2024-02-07 RX ORDER — LEVOTHYROXINE SODIUM 75 UG/1
75 TABLET ORAL DAILY
Qty: 90 TABLET | Refills: 2 | Status: SHIPPED | OUTPATIENT
Start: 2024-02-07

## 2024-02-29 ENCOUNTER — MEDICAL CORRESPONDENCE (OUTPATIENT)
Dept: HEALTH INFORMATION MANAGEMENT | Facility: CLINIC | Age: 74
End: 2024-02-29
Payer: COMMERCIAL

## 2024-03-13 ENCOUNTER — TELEPHONE (OUTPATIENT)
Dept: CARDIOLOGY | Facility: CLINIC | Age: 74
End: 2024-03-13
Payer: COMMERCIAL

## 2024-03-16 ENCOUNTER — LAB (OUTPATIENT)
Dept: LAB | Facility: CLINIC | Age: 74
End: 2024-03-16
Payer: COMMERCIAL

## 2024-03-16 DIAGNOSIS — E78.5 HYPERLIPIDEMIA LDL GOAL <100: ICD-10-CM

## 2024-03-16 LAB
CHOLEST SERPL-MCNC: 210 MG/DL
FASTING STATUS PATIENT QL REPORTED: YES
HDLC SERPL-MCNC: 70 MG/DL
LDLC SERPL CALC-MCNC: 118 MG/DL
NONHDLC SERPL-MCNC: 140 MG/DL
TRIGL SERPL-MCNC: 109 MG/DL

## 2024-03-16 PROCEDURE — 36415 COLL VENOUS BLD VENIPUNCTURE: CPT | Performed by: PATHOLOGY

## 2024-03-16 PROCEDURE — 80061 LIPID PANEL: CPT | Performed by: PATHOLOGY

## 2024-03-17 NOTE — PROGRESS NOTES
History:   Julieta Rivera is a 73-year-old woman with a medical history notable for right breast cancer, triple-negative stage I treated with docetaxel/cyclophosphamide  March - May 2018 and lumpectomy in June 2018 and radiation therapy. She has factor V Leiden mutation but has no history of thrombosis and is not on anticoagulation. She underwent an echocardiogram prior to initiation of chemotherapy in Feb 2018 and was noted to have mild bicuspid aortic valvular stenosis with a peak velocity of 2.5 m/s and mean gradient of 13 mmHg.   She  has no personal history of coronary artery disease, heart failure, arrhythmias, hyperlipidemia, tobacco use or diabetes. There is no family history of premature coronary artery disease.   Interval History:  Patient reports multiple upper respiratory infections during the winter and she was recently diagnosed with Asthma, she is currently on inhalers. Regarding her cardiac history, she has not had worsening of her SOB.  She denies chest pain, orthopnea, syncope PND or lower extremity edema.  No lightheadedness or dizziness presyncopal symptoms.    PAST MEDICAL HISTORY:  Past Medical History:   Diagnosis Date    Abnormal Pap smear 1970s    normal since    Breast cancer (H) 02/2018    Factor V Leiden (H24)     Hypothyroidism fall 2000       CURRENT MEDICATIONS:  Current Outpatient Medications   Medication Sig Dispense Refill    calcium carbonate 500 mg, elemental, (OSCAL 500) 1250 (500 Ca) MG TABS tablet Take by mouth daily (Patient not taking: Reported on 11/6/2023)      carboxymethylcellulose PF (CARBOXYMETHYLCELLULOSE SODIUM) 0.5 % ophthalmic solution Place 1 drop into both eyes 4 times daily Preservative free artificial tears, single use vials. 400 each 11    Eyelid Cleansers (OCUSOFT EYELID CLEANSING) PADS Externally apply 1 Box topically 2 times daily (Patient not taking: Reported on 11/6/2023) 60 each 11    fexofenadine (ALLEGRA) 60 MG tablet Take 1 tablet (60 mg) by mouth  daily 90 tablet 3    levothyroxine (SYNTHROID/LEVOTHROID) 100 MCG tablet Take 1 tablet (100 mcg) by mouth daily 90 tablet 3    levothyroxine (SYNTHROID/LEVOTHROID) 75 MCG tablet Take 1 tablet (75 mcg) by mouth daily on Monday, Wednesday, Friday and . Alternate with 100 mcg dose on Tuesday, Thursday and Saturday 90 tablet 2    Omega-3 Fatty Acids (FISH OIL PO) Take 1 capsule by mouth daily.      UNABLE TO FIND MEDICATION NAME: Bone Strength New Chapter      vitamin D3 (CHOLECALCIFEROL) 2000 units (50 mcg) tablet Take 1 tablet by mouth daily         PAST SURGICAL HISTORY:  Past Surgical History:   Procedure Laterality Date     SECTION      x2 ,     COLONOSCOPY N/A 2022    Procedure: COLONOSCOPY, WITH POLYPECTOMY;  Surgeon: Elo Berumen MD;  Location: [facility] OR    COLPOSCOPY CERVIX, BIOPSY CERVIX, ENDOCERVICAL CURETTAGE, COMBINED      INSERT PORT VASCULAR ACCESS N/A 3/5/2018    Procedure: INSERT PORT VASCULAR ACCESS;  Single Lumen Chest Power Port Placement;  Surgeon: Brian Tolbert PA-C;  [facility]    MASTECTOMY SIMPLE, SENTINEL NODE, COMBINED Right 2018    Lumpectomy with SENTINEL NODE;  Right Wire Localized Lumpectomy And Right [place] Lymph Node Biopsy;  Surgeon: Eugene Guzman MD;  [facility] OR    REMOVE PORT VASCULAR ACCESS Left 11/15/2018    Procedure: Left Port Removal;  Surgeon: Tom Quick PA-C;  [facility] OR    TONSILLECTOMY, ADENOIDECTOMY, COMBINED         ALLERGIES     Allergies   Allergen Reactions    Seasonal Allergies        FAMILY HISTORY:  Family History   Problem Relation Age of Onset    Heart Disease Mother     Heart Disease Father     Cerebrovascular Disease Father     Diabetes Father     Diabetes Brother     Hypertension Brother     Obesity Brother     Obesity Sister     Cancer Sister 48        endometrial    Hypertension Sister     Cancer Paternal Aunt         Breast cancer in 2 paternal aunts, 1 dx in her 50's the other  "in her 60's   Mother  at 43y from rheumatic valvular heart disease  Father had HTN, MI in his 50s, CVA -  at 78 y  2 brothers and a sister - no CAD, HTN+    SOCIAL HISTORY:  Former smoker    ROS:   ROS: A comprehensive 10-point review of system is negative except for those reported in the HPI.      EXAM:  /79 (BP Location: Right arm, Patient Position: Chair, Cuff Size: Adult Regular)   Pulse 82   Wt 56.4 kg (124 lb 6.4 oz)   SpO2 98%   BMI 24.42 kg/m    In general, the patient is a pleasant female in no apparent distress.      Neck:  No jugular venous distension.    Heart: RRR. Normal S1, S2 preserved. 2/6 systolic ejection murmur RUSB; no rub, click, or gallop. There is no heave.    Lungs: Clear bilaterally.  No rhonchi, wheezes, rales.   Extremities: No edema.  The pulses are 2+at the radial bilaterally.    I have independently reviewed this patient's relevant laboratory and cardiac data :    Recent Labs   Lab Test 24  0920 10/12/22  1015   CHOL 210* 208*   HDL 70 86   * 107*   TRIG 109 74      Recent Labs   Lab Test 23  1340 23  1235   AST 22 31     Recent Labs   Lab Test 23  1340 23  1235   ALT 12 14     Recent Labs   Lab Test 23  1340 23  1235    140   POTASSIUM 4.2 4.2   CHLORIDE 105 106   CO2 24 25   ANIONGAP 10 9   BUN 13.0 9.5   CR 0.70 0.69     Recent Labs   Lab Test 23  1340 23  1235   WBC 6.2 6.2   RBC 4.29 4.26   HGB 13.0 12.9   MCV 90 89    219     No results for input(s): \"A1C\" in the last 11032 hours.  Recent Labs   Lab Test 23  1525 23  1235   TSH 2.09 3.54     Cardiac data:  ECG today NSR, no acute ST-T changes    Echo Oct 2022  Global and regional left ventricular function is normal with an EF of 60-65%.  Global right ventricular function is normal.  The aortic valve is bicuspid. There is fusion of R-L cusps Mild aortic stenosis is present. The peak aortic velocity is 2.4. m/sec. The mean " gradient across the aortic valve is 14 mmHg.  Pulmonary artery systolic pressure is normal.  The inferior vena cava is normal.  No pericardial effusion is present.     Coronary CT calcium score  1.  No coronary calcifications.  2.  The total Agatston calcium score is 0 placing the patient in the lowest percentile when compared to age and gender matched control group.    Assessment and Plan:   72 year old woman  1. Right breast cancer, triple negative  2. Mild bicuspid aortic valve stenosis, mean gradient 14 mmHg.  No significant progression compared to prior echo 5/6/2019.  3. Preserved biventricular function  4. Primary prevention - ASCVD 14%. HDL 86.   5.  Calcium score 0 (2021)    Julieta has no symptoms of angina or heart failure. Her exam today is consistent with mild aortic stenosis, euvolemia. She has a normal heart rate and blood pressure. She has a bicuspid valve with mild stenosis that has been stable and does not require any intervention at this point. From a primary prevention standpoint,  Julieta's 10-year ASCVD risk is ~16% and her HDL is high at 86. The coronary calcium score was zero so will hold off on statin for now.  The 10-year ASCVD risk score (Cecile BERGER, et al., 2019) is: 16.4%    Values used to calculate the score:      Age: 73 years      Sex: Female      Is Non- : No      Diabetic: No      Tobacco smoker: No      Systolic Blood Pressure: 137 mmHg      Is BP treated: No      HDL Cholesterol: 70 mg/dL      Total Cholesterol: 210 mg/dL    Recommendations:  TTE in one year and follow up visit.     ATTENDING ATTESTATION:  This patient has been seen and examined by me March 18, 2024 with Dr. Anthony, cardiology fellow. I have reviewed the vitals, laboratory and imaging data relevant to this patient's care. I have edited this note to reflect our joint assessment and plan, and discussed the plan with the patient.    Kaley Tidwell MD, MS  Professor of  Medicine  Cardiovascular Medicine

## 2024-03-18 ENCOUNTER — OFFICE VISIT (OUTPATIENT)
Dept: CARDIOLOGY | Facility: CLINIC | Age: 74
End: 2024-03-18
Attending: INTERNAL MEDICINE
Payer: COMMERCIAL

## 2024-03-18 VITALS
HEART RATE: 82 BPM | WEIGHT: 124.4 LBS | DIASTOLIC BLOOD PRESSURE: 79 MMHG | BODY MASS INDEX: 24.42 KG/M2 | SYSTOLIC BLOOD PRESSURE: 137 MMHG | OXYGEN SATURATION: 98 %

## 2024-03-18 DIAGNOSIS — C50.411 MALIGNANT NEOPLASM OF UPPER-OUTER QUADRANT OF RIGHT BREAST IN FEMALE, ESTROGEN RECEPTOR NEGATIVE (H): ICD-10-CM

## 2024-03-18 DIAGNOSIS — Q23.81 BICUSPID AORTIC VALVE: Primary | ICD-10-CM

## 2024-03-18 DIAGNOSIS — Z17.1 MALIGNANT NEOPLASM OF UPPER-OUTER QUADRANT OF RIGHT BREAST IN FEMALE, ESTROGEN RECEPTOR NEGATIVE (H): ICD-10-CM

## 2024-03-18 PROCEDURE — 93010 ELECTROCARDIOGRAM REPORT: CPT | Performed by: INTERNAL MEDICINE

## 2024-03-18 PROCEDURE — G0463 HOSPITAL OUTPT CLINIC VISIT: HCPCS | Performed by: INTERNAL MEDICINE

## 2024-03-18 PROCEDURE — 93005 ELECTROCARDIOGRAM TRACING: CPT | Performed by: INTERNAL MEDICINE

## 2024-03-18 PROCEDURE — 99214 OFFICE O/P EST MOD 30 MIN: CPT | Mod: GC | Performed by: INTERNAL MEDICINE

## 2024-03-18 RX ORDER — ALBUTEROL SULFATE 90 UG/1
AEROSOL, METERED RESPIRATORY (INHALATION)
COMMUNITY
Start: 2024-01-26

## 2024-03-18 RX ORDER — FLUTICASONE PROPIONATE AND SALMETEROL 250; 50 UG/1; UG/1
POWDER RESPIRATORY (INHALATION)
COMMUNITY
Start: 2024-01-30 | End: 2024-08-05

## 2024-03-18 ASSESSMENT — PAIN SCALES - GENERAL: PAINLEVEL: NO PAIN (0)

## 2024-03-18 NOTE — NURSING NOTE
Chief Complaint   Patient presents with    Follow Up     Return Cardiology- Return for follow-up     Vitals were taken, medications reconciled, and EKG was performed.    Kevan Ashraf, COSTA  4:37 PM

## 2024-03-18 NOTE — PATIENT INSTRUCTIONS
You were seen in the Cardiology Clinic today by: Dr. Tidwell    Plan:   Medication Changes:   None.     Follow up Visit:  With Dr. Tidwell in 1 year, echocardiogram and fasting labs prior to visit.     Further Instructions:  Follow the American Heart Association Diet and Lifestyle recommendations: Limit saturated fat, trans fat, sodium, red meat, sweets and sugar-sweetened beverages. If you choose to eat red meat, compare labels and select the leanest cuts available. Aim for at least 150 minutes of moderate physical activity or 75 minutes of vigorous physical activity - or an equal combination of both - each week.    If you have further questions, please utilize [employer] to contact us.     Your Care Team:    Cardiology   Telephone Number     Nurse Line  Miki Russ RN    [phone]     For scheduling appointments:  [phone]           On-call cardiologist for after hours or on weekends:   [phone], option #4, and ask to speak to the on-call cardiologist.     Cardiovascular Clinic:   [address] [place] [address]. [place] [address]      As always, Thank you for trusting us with your health care needs!

## 2024-03-18 NOTE — LETTER
3/18/2024      RE: Julieta Rivera  [address] [place] [address]       Dear Colleague,    Thank you for the opportunity to participate in the care of your patient, Julieta Rivera, at the [facility] [place] [facility]. Please see a copy of my visit note below.    History:   Julieta Rivera is a 73-year-old woman with a medical history notable for right breast cancer, triple-negative stage I treated with docetaxel/cyclophosphamide  March - May 2018 and lumpectomy in June 2018 and radiation therapy. She has factor V Leiden mutation but has no history of thrombosis and is not on anticoagulation. She underwent an echocardiogram prior to initiation of chemotherapy in Feb 2018 and was noted to have mild bicuspid aortic valvular stenosis with a peak velocity of 2.5 m/s and mean gradient of 13 mmHg.   She  has no personal history of coronary artery disease, heart failure, arrhythmias, hyperlipidemia, tobacco use or diabetes. There is no family history of premature coronary artery disease.   Interval History:  Patient reports multiple upper respiratory infections during the winter and she was recently diagnosed with Asthma, she is currently on inhalers. Regarding her cardiac history, she has not had worsening of her SOB.  She denies chest pain, orthopnea, syncope PND or lower extremity edema.  No lightheadedness or dizziness presyncopal symptoms.    PAST MEDICAL HISTORY:  Past Medical History:   Diagnosis Date     Abnormal Pap smear 1970s    normal since     Breast cancer (H) 02/2018     Factor V Leiden (H24)      Hypothyroidism fall 2000       CURRENT MEDICATIONS:  Current Outpatient Medications   Medication Sig Dispense Refill     calcium carbonate 500 mg, elemental, (OSCAL 500) 1250 (500 Ca) MG TABS tablet Take by mouth daily (Patient not taking: Reported on 11/6/2023)       carboxymethylcellulose PF (CARBOXYMETHYLCELLULOSE SODIUM) 0.5 %  ophthalmic solution Place 1 drop into both eyes 4 times daily Preservative free artificial tears, single use vials. 400 each 11     Eyelid Cleansers (OCUSOFT EYELID CLEANSING) PADS Externally apply 1 Box topically 2 times daily (Patient not taking: Reported on 2023) 60 each 11     fexofenadine (ALLEGRA) 60 MG tablet Take 1 tablet (60 mg) by mouth daily 90 tablet 3     levothyroxine (SYNTHROID/LEVOTHROID) 100 MCG tablet Take 1 tablet (100 mcg) by mouth daily 90 tablet 3     levothyroxine (SYNTHROID/LEVOTHROID) 75 MCG tablet Take 1 tablet (75 mcg) by mouth daily on Monday, Wednesday, Friday and . Alternate with 100 mcg dose on Tuesday, Thursday and Saturday 90 tablet 2     Omega-3 Fatty Acids (FISH OIL PO) Take 1 capsule by mouth daily.       UNABLE TO FIND MEDICATION NAME: Bone Strength New Chapter       vitamin D3 (CHOLECALCIFEROL) 2000 units (50 mcg) tablet Take 1 tablet by mouth daily         PAST SURGICAL HISTORY:  Past Surgical History:   Procedure Laterality Date      SECTION      x2 ,      COLONOSCOPY N/A 2022    Procedure: COLONOSCOPY, WITH POLYPECTOMY;  Surgeon: Elo Berumen MD;  [facility]: [facility]     COLPOSCOPY CERVIX, BIOPSY CERVIX, ENDOCERVICAL CURETTAGE, COMBINED       INSERT PORT VASCULAR ACCESS N/A 3/5/2018    Procedure: INSERT PORT VASCULAR ACCESS;  Single Lumen Chest Power Port Placement;  Surgeon: Brian Tolbert PA-C;  [facility]     MASTECTOMY SIMPLE, SENTINEL NODE, COMBINED Right 2018    Lumpectomy with SENTINEL NODE;  Right Wire Localized Lumpectomy And Right [place] Lymph Node Biopsy;  Surgeon: Eugene Guzman MD;  Location:  OR     REMOVE PORT VASCULAR ACCESS Left 11/15/2018    Procedure: Left Port Removal;  Surgeon: Tom Quick PA-C;  Location:  OR     TONSILLECTOMY, ADENOIDECTOMY, COMBINED         ALLERGIES     Allergies   Allergen Reactions     Seasonal Allergies        FAMILY HISTORY:  Family History   Problem  "Relation Age of Onset     Heart Disease Mother      Heart Disease Father      Cerebrovascular Disease Father      Diabetes Father      Diabetes Brother      Hypertension Brother      Obesity Brother      Obesity Sister      Cancer Sister 48        endometrial     Hypertension Sister      Cancer Paternal Aunt         Breast cancer in 2 paternal aunts, 1 dx in her 50's the other in her 60's   Mother  at 43y from rheumatic valvular heart disease  Father had HTN, MI in his 50s, CVA -  at 78 y  2 brothers and a sister - no CAD, HTN+    SOCIAL HISTORY:  Former smoker    ROS:   ROS: A comprehensive 10-point review of system is negative except for those reported in the HPI.      EXAM:  /79 (BP Location: Right arm, Patient Position: Chair, Cuff Size: Adult Regular)   Pulse 82   Wt 56.4 kg (124 lb 6.4 oz)   SpO2 98%   BMI 24.42 kg/m    In general, the patient is a pleasant female in no apparent distress.      Neck:  No jugular venous distension.    Heart: RRR. Normal S1, S2 preserved. 2/6 systolic ejection murmur RUSB; no rub, click, or gallop. There is no heave.    Lungs: Clear bilaterally.  No rhonchi, wheezes, rales.   Extremities: No edema.  The pulses are 2+at the radial bilaterally.    I have independently reviewed this patient's relevant laboratory and cardiac data :    Recent Labs   Lab Test 24  0920 10/12/22  1015   CHOL 210* 208*   HDL 70 86   * 107*   TRIG 109 74      Recent Labs   Lab Test 23  1340 23  1235   AST 22 31     Recent Labs   Lab Test 23  1340 23  1235   ALT 12 14     Recent Labs   Lab Test 23  1340 23  1235    140   POTASSIUM 4.2 4.2   CHLORIDE 105 106   CO2 24 25   ANIONGAP 10 9   BUN 13.0 9.5   CR 0.70 0.69     Recent Labs   Lab Test 23  1340 23  1235   WBC 6.2 6.2   RBC 4.29 4.26   HGB 13.0 12.9   MCV 90 89    219     No results for input(s): \"A1C\" in the last 24101 hours.  Recent Labs   Lab Test " 11/06/23  1525 05/04/23  1235   TSH 2.09 3.54     Cardiac data:  ECG today NSR, no acute ST-T changes    Echo Oct 2022  Global and regional left ventricular function is normal with an EF of 60-65%.  Global right ventricular function is normal.  The aortic valve is bicuspid. There is fusion of R-L cusps Mild aortic stenosis is present. The peak aortic velocity is 2.4. m/sec. The mean gradient across the aortic valve is 14 mmHg.  Pulmonary artery systolic pressure is normal.  The inferior vena cava is normal.  No pericardial effusion is present.     Coronary CT calcium score  1.  No coronary calcifications.  2.  The total Agatston calcium score is 0 placing the patient in the lowest percentile when compared to age and gender matched control group.    Assessment and Plan:   72 year old woman  1. Right breast cancer, triple negative  2. Mild bicuspid aortic valve stenosis, mean gradient 14 mmHg.  No significant progression compared to prior echo 5/6/2019.  3. Preserved biventricular function  4. Primary prevention - ASCVD 14%. HDL 86.   5.  Calcium score 0 (2021)    Julieta has no symptoms of angina or heart failure. Her exam today is consistent with mild aortic stenosis, euvolemia. She has a normal heart rate and blood pressure. She has a bicuspid valve with mild stenosis that has been stable and does not require any intervention at this point. From a primary prevention standpoint,  Julieta's 10-year ASCVD risk is ~16% and her HDL is high at 86. The coronary calcium score was zero so will hold off on statin for now.  The 10-year ASCVD risk score (Cecile BERGER, et al., 2019) is: 16.4%    Values used to calculate the score:      Age: 73 years      Sex: Female      Is Non- : No      Diabetic: No      Tobacco smoker: No      Systolic Blood Pressure: 137 mmHg      Is BP treated: No      HDL Cholesterol: 70 mg/dL      Total Cholesterol: 210 mg/dL    Recommendations:  TTE in one year and follow up visit.      ATTENDING ATTESTATION:  This patient has been seen and examined by me March 18, 2024 with Dr. Anthony, cardiology fellow. I have reviewed the vitals, laboratory and imaging data relevant to this patient's care. I have edited this note to reflect our joint assessment and plan, and discussed the plan with the patient.    Kaley Tidwell MD, MS  Professor of Medicine  Cardiovascular Medicine

## 2024-03-20 LAB
ATRIAL RATE - MUSE: 88 BPM
DIASTOLIC BLOOD PRESSURE - MUSE: NORMAL MMHG
INTERPRETATION ECG - MUSE: NORMAL
P AXIS - MUSE: 56 DEGREES
PR INTERVAL - MUSE: 146 MS
QRS DURATION - MUSE: 70 MS
QT - MUSE: 360 MS
QTC - MUSE: 435 MS
R AXIS - MUSE: 53 DEGREES
SYSTOLIC BLOOD PRESSURE - MUSE: NORMAL MMHG
T AXIS - MUSE: 31 DEGREES
VENTRICULAR RATE- MUSE: 88 BPM

## 2024-04-12 NOTE — PROGRESS NOTES
Apr 15, 2024    HISTORY OF PRESENT ILLNESS:  Julieta Rivera is a 68-year-old woman with a history of right triple-negative breast cancer, diagnosed 2018. Julieta was followed by routine screening mammography, when she was discovered to have a 7 x 6 x 9-mm mass at the 12 o'clock position of the right breast 6 cm from the nipple-areolar complex. She did undergo a biopsy of this mass which showed an ER-negative, OR-negative, HER2-nonamplified, invasive mammary carcinoma of no special type, invasive ductal carcinoma, [place] grade 3.  Ductal carcinoma in situ was also noted.  Nuclear grade 2 solid type. HER2 FISH showed no amplification.       TREATMENT HISTORY:  A.  TC x 4  B.  Lumpectomy 6-7-18 [id number] RCB 1  (0.71)  C.  Radiation  Radiation Oncology - Course: 1         Protocol:   Treatment Site            Current Dose   Modality           From    To        Elapsed Days  Fx.  1 R Breast        4,240 cGy       06 X      7/30/2018        8/21/2018        22       16  1 R Breast Boost          1,000 cGy       e09       8/22/2018 8/27/2018         5         4     PAST MEDICAL HISTORY:  She has no history of breast surgery in the past or breast cancer in the past. She has no history of radiation of any kind.  She has no history of tumor of any kind.  She may have a history of a heart problem with mitral prolapse and a murmur.  The last echo we have on record is from 1996.  She has no history of heart attack, breathing problems, blood clots, seizures, arthritis, peptic ulcer disease, osteoporosis or bone fractures.  She is not currently participating in a clinical trial and has not had any significant weight loss.  She has no history of hypertension, but she does have a history of factor V Leiden because her sister was diagnosed with factor V Leiden and Julieta was tested, although Julieta herself has had no blood clots or pulmonary emboli.      INTERVAL HISTORY:  Julieta was seen today in person.    -She did get Covid in  "January 2024 - started on Paxlovid which helped.   -Lives with spouse, daughter and grandsons so it was a tough winter in terms of viruses, etc.   -Diagnosed with asthma and now has an albuterol inhaler that has been helpful.  Uses 1-2 times week, typically once.   -Wakes every daily all winter with some tingling in her left post-surgical arm.  Ongoing neuropathy in her hands and feet.  Gets cramps in her feet as well.  Does not want medication.   -More aches and pains in her breast than she used to, ruthy on radiation side.  -All of the above resolves within a hour after getting up.   -Doing a lot of exercises to keep herself flexible and stays busy.    -Has seen lymphedema specialist when she was post-surgery.  -Has cataracts and needs to have these removed.   -No new cough, chest pain or shortness of breath at rest.  -No abdominal pain, n/v/c/d.  -She denies fever, chills or signs of systemic illness.  She denies headache or vision changes.  She does not note any unilateral leg pain or swelling.  She denies drenching night sweats or unexplained weight loss.  She denies rashes, bruising/bleeding, mouth sores, swelling or pain in the legs.     PHYSICAL EXAMINATION:   /72   Pulse 83   Temp 97.6  F (36.4  C) (Oral)   Resp 16   Ht 1.52 m (4' 11.84\")   Wt 57 kg (125 lb 9.6 oz)   SpO2 99%   BMI 24.66 kg/m    Wt Readings from Last 4 Encounters:   04/15/24 57 kg (125 lb 9.6 oz)   03/18/24 56.4 kg (124 lb 6.4 oz)   11/06/23 57.7 kg (127 lb 3.2 oz)   09/28/23 58.3 kg (128 lb 8 oz)   Vital signs were reviewed.   Julieta is a very pleasant 72-year-old female who is alert, oriented, and in no acute distress today.  PERRLA. EOMI. No scleral icterus noted.   Neck exam: No palpable cervical, supraclavicular or axillary nodes bilaterally.   Heart: S1-S2, regular rate and rhythm; noted murmur.    Lungs: Bilateral lungs are clear to auscultation with no crackles or wheezing noted.  Breast: No palpable masses in left or right " breast. No overlying skin changes. No palpable axillary lymphadenopathy bilaterally   Abd: Abdomen is soft, nontender and nondistended with positive bowel sounds throughout.  Extremities: No lower extremity edema.   Neuro: Cranial nerves II through XII are grossly intact.  She is alert and oriented.  Gait and balance intact.  Mood and affect are stable       LABORATORY DATA:    Most Recent 3 CBC's:  Recent Labs   Lab Test 04/15/24  1043 09/28/23  1340 05/04/23  1235   WBC 6.8 6.2 6.2   HGB 13.2 13.0 12.9   MCV 89 90 89    213 219    Most Recent 3 BMP's:  Recent Labs   Lab Test 04/15/24  1043 09/28/23  1340 05/04/23  1235    139 140   POTASSIUM 3.6 4.2 4.2   CHLORIDE 102 105 106   CO2 23 24 25   BUN 12.5 13.0 9.5   CR 0.71 0.70 0.69   ANIONGAP 11 10 9   CINDI 9.3 9.5 9.1   GLC 77 93 98    Most Recent 2 LFT's:  Recent Labs   Lab Test 04/15/24  1043 09/28/23  1340   AST 23 22   ALT 12 12   ALKPHOS 55 64   BILITOTAL 0.6 0.3      I reviewed the above labs today.    IMAGING:  Mammogram: preliminary result is BI-RADS Category 2, benign.  Final result pending.     ASSESSMENT AND PLAN:    1.  Julieta Rivera is a 71-year-old woman with a history of stage I, clinical [id number] invasive ductal carcinoma of the right breast, triple negative in histology, maximum dimension 9 mm, grade 3: She received neoadjuvant TC chemotherapy following lumpectomy showed an RCB1 response with significant reduction of the tumor and with a small amount of residual disease measuring about 2.5 mm in largest dimension.  There is 1 other small focus.  The margins were clear for DCIS and invasive cancer.  She was not offered adjuvant Xeloda given the small amount of residual disease.  Eight sentinel lymph nodes were examined and were negative for cancer.    - There are no concerns today in her history or on exam.    - Mammogram today as above.  She is due for repeat mammography in April 2025.    - At her visit last year, they discussed  referral to the survivorship clinic.  We briefly discussed this today and what this entails and she remains interested in this and we discussed referral.      2.  Bone health:  - DEXA scan 1/18/2024 shows osteoporosis with a most valid negative T-score of -2.8.    - Per prior notes, she did not want zoledronic acid, Prolia or Fosamax and has declined these drugs multiple times in the past.  She has a new primary and she is discussing this with her at her next visit in May 2024.  - She will continue with weightbearing exercise, calcium and vitamin D.      She will follow-up as scheduled in 6 months.  She is interested in the survivorship clinic as above.     38 minutes spent on the date of the encounter doing chart review, review of test results, patient visit, and documentation.     JOSÉ LUIS Galvan, CNP  [facility]  [address] [place] [id number]  [phone]

## 2024-04-15 ENCOUNTER — ANCILLARY PROCEDURE (OUTPATIENT)
Dept: MAMMOGRAPHY | Facility: CLINIC | Age: 74
End: 2024-04-15
Attending: NURSE PRACTITIONER
Payer: COMMERCIAL

## 2024-04-15 ENCOUNTER — ONCOLOGY VISIT (OUTPATIENT)
Dept: ONCOLOGY | Facility: CLINIC | Age: 74
End: 2024-04-15
Attending: NURSE PRACTITIONER
Payer: COMMERCIAL

## 2024-04-15 ENCOUNTER — APPOINTMENT (OUTPATIENT)
Dept: LAB | Facility: CLINIC | Age: 74
End: 2024-04-15
Attending: NURSE PRACTITIONER
Payer: COMMERCIAL

## 2024-04-15 VITALS
TEMPERATURE: 97.6 F | DIASTOLIC BLOOD PRESSURE: 72 MMHG | HEART RATE: 83 BPM | SYSTOLIC BLOOD PRESSURE: 134 MMHG | WEIGHT: 125.6 LBS | HEIGHT: 60 IN | BODY MASS INDEX: 24.66 KG/M2 | OXYGEN SATURATION: 99 % | RESPIRATION RATE: 16 BRPM

## 2024-04-15 DIAGNOSIS — Z17.1 MALIGNANT NEOPLASM OF UPPER-OUTER QUADRANT OF RIGHT BREAST IN FEMALE, ESTROGEN RECEPTOR NEGATIVE (H): ICD-10-CM

## 2024-04-15 DIAGNOSIS — Z12.31 VISIT FOR SCREENING MAMMOGRAM: ICD-10-CM

## 2024-04-15 DIAGNOSIS — C50.411 MALIGNANT NEOPLASM OF UPPER-OUTER QUADRANT OF RIGHT BREAST IN FEMALE, ESTROGEN RECEPTOR NEGATIVE (H): ICD-10-CM

## 2024-04-15 LAB
ALBUMIN SERPL BCG-MCNC: 4.3 G/DL (ref 3.5–5.2)
ALP SERPL-CCNC: 55 U/L (ref 40–150)
ALT SERPL W P-5'-P-CCNC: 12 U/L (ref 0–50)
ANION GAP SERPL CALCULATED.3IONS-SCNC: 11 MMOL/L (ref 7–15)
AST SERPL W P-5'-P-CCNC: 23 U/L (ref 0–45)
BASOPHILS # BLD AUTO: 0 10E3/UL (ref 0–0.2)
BASOPHILS NFR BLD AUTO: 1 %
BILIRUB SERPL-MCNC: 0.6 MG/DL
BUN SERPL-MCNC: 12.5 MG/DL (ref 8–23)
CALCIUM SERPL-MCNC: 9.3 MG/DL (ref 8.8–10.2)
CHLORIDE SERPL-SCNC: 102 MMOL/L (ref 98–107)
CREAT SERPL-MCNC: 0.71 MG/DL (ref 0.51–0.95)
DEPRECATED HCO3 PLAS-SCNC: 23 MMOL/L (ref 22–29)
EGFRCR SERPLBLD CKD-EPI 2021: 89 ML/MIN/1.73M2
EOSINOPHIL # BLD AUTO: 0.2 10E3/UL (ref 0–0.7)
EOSINOPHIL NFR BLD AUTO: 3 %
ERYTHROCYTE [DISTWIDTH] IN BLOOD BY AUTOMATED COUNT: 13.3 % (ref 10–15)
GLUCOSE SERPL-MCNC: 77 MG/DL (ref 70–99)
HCT VFR BLD AUTO: 39.2 % (ref 35–47)
HGB BLD-MCNC: 13.2 G/DL (ref 11.7–15.7)
IMM GRANULOCYTES # BLD: 0 10E3/UL
IMM GRANULOCYTES NFR BLD: 0 %
LYMPHOCYTES # BLD AUTO: 2.1 10E3/UL (ref 0.8–5.3)
LYMPHOCYTES NFR BLD AUTO: 31 %
MCH RBC QN AUTO: 29.9 PG (ref 26.5–33)
MCHC RBC AUTO-ENTMCNC: 33.7 G/DL (ref 31.5–36.5)
MCV RBC AUTO: 89 FL (ref 78–100)
MONOCYTES # BLD AUTO: 0.6 10E3/UL (ref 0–1.3)
MONOCYTES NFR BLD AUTO: 9 %
NEUTROPHILS # BLD AUTO: 3.8 10E3/UL (ref 1.6–8.3)
NEUTROPHILS NFR BLD AUTO: 56 %
NRBC # BLD AUTO: 0 10E3/UL
NRBC BLD AUTO-RTO: 0 /100
PLATELET # BLD AUTO: 200 10E3/UL (ref 150–450)
POTASSIUM SERPL-SCNC: 3.6 MMOL/L (ref 3.4–5.3)
PROT SERPL-MCNC: 7 G/DL (ref 6.4–8.3)
RBC # BLD AUTO: 4.42 10E6/UL (ref 3.8–5.2)
SODIUM SERPL-SCNC: 136 MMOL/L (ref 135–145)
WBC # BLD AUTO: 6.8 10E3/UL (ref 4–11)

## 2024-04-15 PROCEDURE — 36415 COLL VENOUS BLD VENIPUNCTURE: CPT | Performed by: NURSE PRACTITIONER

## 2024-04-15 PROCEDURE — G0463 HOSPITAL OUTPT CLINIC VISIT: HCPCS | Performed by: NURSE PRACTITIONER

## 2024-04-15 PROCEDURE — 99214 OFFICE O/P EST MOD 30 MIN: CPT | Performed by: NURSE PRACTITIONER

## 2024-04-15 PROCEDURE — 77067 SCR MAMMO BI INCL CAD: CPT | Mod: GC | Performed by: RADIOLOGY

## 2024-04-15 PROCEDURE — 85025 COMPLETE CBC W/AUTO DIFF WBC: CPT | Performed by: NURSE PRACTITIONER

## 2024-04-15 PROCEDURE — G2211 COMPLEX E/M VISIT ADD ON: HCPCS | Performed by: NURSE PRACTITIONER

## 2024-04-15 PROCEDURE — 80053 COMPREHEN METABOLIC PANEL: CPT | Performed by: NURSE PRACTITIONER

## 2024-04-15 PROCEDURE — 77063 BREAST TOMOSYNTHESIS BI: CPT | Mod: GC | Performed by: RADIOLOGY

## 2024-04-15 ASSESSMENT — PAIN SCALES - GENERAL: PAINLEVEL: NO PAIN (0)

## 2024-04-15 NOTE — NURSING NOTE
Chief Complaint   Patient presents with    Blood Draw     Labs drawn via  by RN in lab.  VS taken       Labs collected from venipuncture by RN. Vitals taken. Checked in for appointment(s).    Joanna You RN

## 2024-04-15 NOTE — LETTER
4/15/2024         RE: Julieta Rivera  [address] [place] [address]        Dear Colleague,    Thank you for referring your patient, Julieta Rivera, to the [facility]. Please see a copy of my visit note below.    Apr 15, 2024    HISTORY OF PRESENT ILLNESS:  Julieta Rivera is a 68-year-old woman with a history of right triple-negative breast cancer, diagnosed 2018. Julieta was followed by routine screening mammography, when she was discovered to have a 7 x 6 x 9-mm mass at the 12 o'clock position of the right breast 6 cm from the nipple-areolar complex. She did undergo a biopsy of this mass which showed an ER-negative, VT-negative, HER2-nonamplified, invasive mammary carcinoma of no special type, invasive ductal carcinoma, [place] grade 3.  Ductal carcinoma in situ was also noted.  Nuclear grade 2 solid type. HER2 FISH showed no amplification.       TREATMENT HISTORY:  A.  TC x 4  B.  Lumpectomy 6-7-18 [id number] RCB 1  (0.71)  C.  Radiation  Radiation Oncology - Course: 1         Protocol:   Treatment Site            Current Dose   Modality           From    To        Elapsed Days  Fx.  1 R Breast        4,240 cGy       06 X      7/30/2018        8/21/2018        22       16  1 R Breast Boost          1,000 cGy       e09       8/22/2018 8/27/2018         5         4     PAST MEDICAL HISTORY:  She has no history of breast surgery in the past or breast cancer in the past. She has no history of radiation of any kind.  She has no history of tumor of any kind.  She may have a history of a heart problem with mitral prolapse and a murmur.  The last echo we have on record is from 1996.  She has no history of heart attack, breathing problems, blood clots, seizures, arthritis, peptic ulcer disease, osteoporosis or bone fractures.  She is not currently participating in a clinical trial and has not had any significant weight loss.  She has no history of hypertension, but she  "does have a history of factor V Leiden because her sister was diagnosed with factor V Leiden and Julieta was tested, although Julieta herself has had no blood clots or pulmonary emboli.      INTERVAL HISTORY:  Julieta was seen today in person.    -She did get Covid in January 2024 - started on Paxlovid which helped.   -Lives with spouse, daughter and grandsons so it was a tough winter in terms of viruses, etc.   -Diagnosed with asthma and now has an albuterol inhaler that has been helpful.  Uses 1-2 times week, typically once.   -Wakes every daily all winter with some tingling in her left post-surgical arm.  Ongoing neuropathy in her hands and feet.  Gets cramps in her feet as well.  Does not want medication.   -More aches and pains in her breast than she used to, ruthy on radiation side.  -All of the above resolves within a hour after getting up.   -Doing a lot of exercises to keep herself flexible and stays busy.    -Has seen lymphedema specialist when she was post-surgery.  -Has cataracts and needs to have these removed.   -No new cough, chest pain or shortness of breath at rest.  -No abdominal pain, n/v/c/d.  -She denies fever, chills or signs of systemic illness.  She denies headache or vision changes.  She does not note any unilateral leg pain or swelling.  She denies drenching night sweats or unexplained weight loss.  She denies rashes, bruising/bleeding, mouth sores, swelling or pain in the legs.     PHYSICAL EXAMINATION:   /72   Pulse 83   Temp 97.6  F (36.4  C) (Oral)   Resp 16   Ht 1.52 m (4' 11.84\")   Wt 57 kg (125 lb 9.6 oz)   SpO2 99%   BMI 24.66 kg/m    Wt Readings from Last 4 Encounters:   04/15/24 57 kg (125 lb 9.6 oz)   03/18/24 56.4 kg (124 lb 6.4 oz)   11/06/23 57.7 kg (127 lb 3.2 oz)   09/28/23 58.3 kg (128 lb 8 oz)   Vital signs were reviewed.   Julieta is a very pleasant 72-year-old female who is alert, oriented, and in no acute distress today.  PERRLA. EOMI. No scleral icterus noted.   Neck " exam: No palpable cervical, supraclavicular or axillary nodes bilaterally.   Heart: S1-S2, regular rate and rhythm; noted murmur.    Lungs: Bilateral lungs are clear to auscultation with no crackles or wheezing noted.  Breast: No palpable masses in left or right breast. No overlying skin changes. No palpable axillary lymphadenopathy bilaterally   Abd: Abdomen is soft, nontender and nondistended with positive bowel sounds throughout.  Extremities: No lower extremity edema.   Neuro: Cranial nerves II through XII are grossly intact.  She is alert and oriented.  Gait and balance intact.  Mood and affect are stable       LABORATORY DATA:    Most Recent 3 CBC's:  Recent Labs   Lab Test 04/15/24  1043 09/28/23  1340 05/04/23  1235   WBC 6.8 6.2 6.2   HGB 13.2 13.0 12.9   MCV 89 90 89    213 219    Most Recent 3 BMP's:  Recent Labs   Lab Test 04/15/24  1043 09/28/23  1340 05/04/23  1235    139 140   POTASSIUM 3.6 4.2 4.2   CHLORIDE 102 105 106   CO2 23 24 25   BUN 12.5 13.0 9.5   CR 0.71 0.70 0.69   ANIONGAP 11 10 9   CINDI 9.3 9.5 9.1   GLC 77 93 98    Most Recent 2 LFT's:  Recent Labs   Lab Test 04/15/24  1043 09/28/23  1340   AST 23 22   ALT 12 12   ALKPHOS 55 64   BILITOTAL 0.6 0.3      I reviewed the above labs today.    IMAGING:  Mammogram: preliminary result is BI-RADS Category 2, benign.  Final result pending.     ASSESSMENT AND PLAN:    1.  Julieta Rivera is a 71-year-old woman with a history of stage I, clinical [id number] invasive ductal carcinoma of the right breast, triple negative in histology, maximum dimension 9 mm, grade 3: She received neoadjuvant TC chemotherapy following lumpectomy showed an RCB1 response with significant reduction of the tumor and with a small amount of residual disease measuring about 2.5 mm in largest dimension.  There is 1 other small focus.  The margins were clear for DCIS and invasive cancer.  She was not offered adjuvant Xeloda given the small amount of residual disease.   Eight sentinel lymph nodes were examined and were negative for cancer.    - There are no concerns today in her history or on exam.    - Mammogram today as above.  She is due for repeat mammography in April 2025.    - At her visit last year, they discussed referral to the survivorship clinic.  We briefly discussed this today and what this entails and she remains interested in this and we discussed referral.      2.  Bone health:  - DEXA scan 1/18/2024 shows osteoporosis with a most valid negative T-score of -2.8.    - Per prior notes, she did not want zoledronic acid, Prolia or Fosamax and has declined these drugs multiple times in the past.  She has a new primary and she is discussing this with her at her next visit in May 2024.  - She will continue with weightbearing exercise, calcium and vitamin D.      She will follow-up as scheduled in 6 months.  She is interested in the survivorship clinic as above.     38 minutes spent on the date of the encounter doing chart review, review of test results, patient visit, and documentation.     JOSÉ LUIS Galvan, CNP  [facility] [address] [id number] [phone]

## 2024-04-16 DIAGNOSIS — H35.54 MACULAR VITELLIFORM LESION: ICD-10-CM

## 2024-04-16 DIAGNOSIS — H25.13 NUCLEAR SENILE CATARACT OF BOTH EYES: Primary | ICD-10-CM

## 2024-04-30 ENCOUNTER — MEDICAL CORRESPONDENCE (OUTPATIENT)
Dept: HEALTH INFORMATION MANAGEMENT | Facility: CLINIC | Age: 74
End: 2024-04-30
Payer: COMMERCIAL

## 2024-05-01 ENCOUNTER — TELEPHONE (OUTPATIENT)
Dept: PULMONOLOGY | Facility: CLINIC | Age: 74
End: 2024-05-01
Payer: COMMERCIAL

## 2024-05-01 ENCOUNTER — TRANSCRIBE ORDERS (OUTPATIENT)
Dept: OTHER | Age: 74
End: 2024-05-01

## 2024-05-01 DIAGNOSIS — R05.9 COUGH: ICD-10-CM

## 2024-05-01 DIAGNOSIS — J45.909 ASTHMA: Primary | ICD-10-CM

## 2024-05-01 NOTE — TELEPHONE ENCOUNTER
[facility] Call Center    Phone Message    May a detailed message be left on voicemail: yes     Reason for Call: Appointment Intake    Referring Provider Name: Priyanka ORDONEZ CNP  Diagnosis and/or Symptoms: Asthma/cough    Pt scheduled to establish care 7/1/24 with Yanna ORDONEZ CNP, needs orders placed for PFT's (pended).    Action Taken: Other: Pulm    Travel Screening: Not Applicable

## 2024-05-02 ENCOUNTER — OFFICE VISIT (OUTPATIENT)
Dept: OPHTHALMOLOGY | Facility: CLINIC | Age: 74
End: 2024-05-02
Attending: OPHTHALMOLOGY
Payer: COMMERCIAL

## 2024-05-02 ENCOUNTER — TELEPHONE (OUTPATIENT)
Dept: OPHTHALMOLOGY | Facility: CLINIC | Age: 74
End: 2024-05-02

## 2024-05-02 DIAGNOSIS — H35.54 MACULAR VITELLIFORM LESION: ICD-10-CM

## 2024-05-02 DIAGNOSIS — H26.9 NUCLEAR CATARACT, NONSENILE: Primary | ICD-10-CM

## 2024-05-02 DIAGNOSIS — H25.13 NUCLEAR SENILE CATARACT OF BOTH EYES: ICD-10-CM

## 2024-05-02 PROCEDURE — 99214 OFFICE O/P EST MOD 30 MIN: CPT | Performed by: OPHTHALMOLOGY

## 2024-05-02 PROCEDURE — 76519 ECHO EXAM OF EYE: CPT | Performed by: OPHTHALMOLOGY

## 2024-05-02 PROCEDURE — G0463 HOSPITAL OUTPT CLINIC VISIT: HCPCS | Performed by: OPHTHALMOLOGY

## 2024-05-02 PROCEDURE — 92134 CPTRZ OPH DX IMG PST SGM RTA: CPT | Performed by: OPHTHALMOLOGY

## 2024-05-02 ASSESSMENT — REFRACTION_WEARINGRX
OD_CYLINDER: +2.00
OD_AXIS: 125
OS_SPHERE: -4.00
OS_CYLINDER: +2.00
OS_ADD: +2.50
OD_SPHERE: -6.50
OD_ADD: +2.50
OS_AXIS: 035

## 2024-05-02 ASSESSMENT — VISUAL ACUITY
OS_BAT_MED: 20/70-2
CORRECTION_TYPE: GLASSES
OS_CC+: -2
OD_BAT_MED: 20/50-2
OS_BAT_LOW: 20/50
OD_BAT_LOW: 20/50
OD_PH_CC: 20/40
OS_BAT_HIGH: 20/150
OD_CC+: +2
OD_BAT_HIGH: 20/70+1
OD_CC: 20/50
OS_CC: 20/50
METHOD: SNELLEN - LINEAR
OD_PH_CC+: -2

## 2024-05-02 ASSESSMENT — CONF VISUAL FIELD
METHOD: COUNTING FINGERS
OD_SUPERIOR_TEMPORAL_RESTRICTION: 0
OD_INFERIOR_NASAL_RESTRICTION: 0
OS_NORMAL: 1
OS_SUPERIOR_NASAL_RESTRICTION: 0
OS_INFERIOR_TEMPORAL_RESTRICTION: 0
OS_SUPERIOR_TEMPORAL_RESTRICTION: 0
OD_INFERIOR_TEMPORAL_RESTRICTION: 0
OD_NORMAL: 1
OS_INFERIOR_NASAL_RESTRICTION: 0
OD_SUPERIOR_NASAL_RESTRICTION: 0

## 2024-05-02 ASSESSMENT — SLIT LAMP EXAM - LIDS: COMMENTS: DERMATOCHALASIS UPPER LID

## 2024-05-02 ASSESSMENT — EXTERNAL EXAM - RIGHT EYE: OD_EXAM: NORMAL

## 2024-05-02 ASSESSMENT — TONOMETRY
IOP_METHOD: TONOPEN
OS_IOP_MMHG: 17
OD_IOP_MMHG: 19

## 2024-05-02 ASSESSMENT — EXTERNAL EXAM - LEFT EYE: OS_EXAM: NORMAL

## 2024-05-02 NOTE — NURSING NOTE
Chief Complaints and History of Present Illnesses   Patient presents with    Follow Up     6 month follow up Adult-onset Vitelliform Dystrophy both eyes     Chief Complaint(s) and History of Present Illness(es)       Follow Up              Comments: 6 month follow up Adult-onset Vitelliform Dystrophy both eyes              Comments    Pt states vision is about the same as last visit. No eye pain today, but pt notes some soreness in each eye. Still seeing floaters, no changes.  No redness.    BHAVNA Ortega May 2, 2024 11:30 AM

## 2024-05-02 NOTE — PROGRESS NOTES
CC: cataract evaluation  HPI: Julieta Rivera is a  73 year old  patient with history as listed below who presents as a referral from Dr. Tony for evaluation of macular lesions of both eyes. She reports that her vision has felt blurred in both eyes for quite some time.     First noticed worsening in 2018 when she was receiving chemotherapy ductal carcinoma of right breast. She first noticed droopiness of left eyelid during this time as well.     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  -Hypothyroidism   -Hashimoto's thyroiditis  -Cardiomyopathy secondary to drug  -Congenital stenosis of aortic valve  -Heterozygous factor V Leiden mutation  - Ductal carcinoma of right breast    Retinal Imaging:  Optical Coherence Tomography 05/02/24   RE: Subfoveal deposit of hyperreflective material; PVD  LE:Subfoveal deposit of hyperreflective material with trace potential sub-RPE fluid; Posterior vitreous detachment (PVD)    Intraocular lens calcs: reliable 05/02/24     Assessment & Plan:    #Adult-onset Vitelliform Dystrophy both eyes  Differential diagnosis atypical dry Age related macular degeneration  Less likely best disease  -less likely to be CNVM given exam and OCT findings OCT-A findings  Recommend to observe  05/02/24 left eye with race potential sub-RPE fluid; will observe closely    #Posterior vitreous detachment (PVD) both eyes  -No Retinal tear or Retinal detachment  on exam today   -Discussed Retinal detachment /Retinal tear symptoms    #cataracts each eye   visually significant   Glare Testing (BAT)     Off Low Medium High   Right 20/50+2 20/50 20/50-2 20/70+1   Left 20/50-2 20/50 20/70-2 20/150     Plan for Cataract extraction intraocular lens left eye first  Plan for Cataract extraction intraocular lens   The cataract is visually significant, Reports impacts ADL and has glare     Surgeon procedure time:  45 min  Urgency of Surgery: Routine  Post-op apps  needed: 1day; 1 week; 3 weeks  Multi surgeon case: No   H&P completed by primary care physician/PAC   needed: NO  Anesthesia: Peribulbar block    Aim for: -0.5    Dilation:Good  Iris expansion: Not needed  Epinephrine: No  Pseudoexfoliation: NO  Trypan Blue: No   Phacodonesis: No  Cornea guttae: rare  Anticoagulants: NO  Candidate for multifocal or toric IOL: NO  Visually significant astigmatism: Discussed elective surgical refractive corrective options  Prior refractive surgery: No    Will request a 18.5 CC60WF aiming for -0.6      I reviewed the indications, risks, benefits, and alternatives of the proposed surgical procedure. We discussed at length cataracts and the effect of the cataracts on vision.   We discussed the fact that modern cataract surgery is usually successful at alleviating symptoms of glare when the cataract is the causative factor. Other risks were discussed with patient including, but not limited to, failure to improve vision or further loss of vision, bleeding, infection, loss of vision and the remote possibility of complications of anesthesia or need for additional surgery. 1:1000 risk of infection/bleed/loss of eye; 1:100 risk of RD and need for further surgery.  Patient agreed to proceed with surgery.  I provided multiple opportunities for the questions, answered all questions to the best of my ability, and confirmed that my answers and my discussion were understood.   Post-op VA limited because of  retina pathology    #   Breast Cancer Right Breast - S/P chemotherapy and lumpectomy 2018  -No ocular involvement.     #   Dry Eye Syndrome, both eyes.    artificial tears  and warm compresses     RTC: patient would like surgery asap  Otherwise will follow up 2 months with Nima with Optical Coherence Tomography    ATTESTATION     Attending Physician Attestation:      Complete documentation of historical and exam elements from today's encounter can be found in the full encounter  summary report (not reduplicated in this progress note).  I personally obtained the chief complaint(s) and history of present illness.  I confirmed and edited as necessary the review of systems, past medical/surgical history, family history, social history, and examination findings as documented by others; and I examined the patient myself.  I personally reviewed the relevant tests, images, and reports as documented above.  I formulated and edited as necessary the assessment and plan and discussed the findings and management plan with the patient and family    Cira Herring MD  , Vitreoretinal Surgery  Department of Ophthalmology  [facility]

## 2024-05-02 NOTE — TELEPHONE ENCOUNTER
Called patient to schedule surgery with Dr. Herring    Spoke with: Julieta    Date of Surgery:  ( 5/7 (Left)     Patient aware of approximate arrival time: Yes at 0950     Location of surgery:  [facility] (Left)     Pre-Op H&P: [facility] at [facility]     Informed patient that they need to call to schedule pre-op H&P with [facility] within 30 days of surgery date: Yes    Post-Op Appt Dates: 5/8, 5/15, and 6/12     Discussed with patient pre-op RN will call 2-3 days prior to surgery with arrival time and instructions:  Yes      Standard Surgery Packet Sent: Yes 05/02/24  via [facility]      Surgical Soap Discussed with Patient: Yes  - Received:  Local Pharmacy       Additional Information Sent in Packet:  NA       Informed patient that they will need an adult  to bring patient home from surgery: Yes  : Kimo         Additional Comments:  NA      All patients questions were answered and was instructed to review surgical packet and call back [phone] with any questions or concerns.       Opal Gardner on 5/2/2024 at 3:42 PM

## 2024-05-03 NOTE — TELEPHONE ENCOUNTER
Patient called, LVM letting writer know that she was able to get a pre-op scheduled on 5/3, for 5/7 procedure date. Oapl Gardner on 5/3/2024 at 8:15 AM

## 2024-05-06 ENCOUNTER — ANESTHESIA EVENT (OUTPATIENT)
Dept: SURGERY | Facility: AMBULATORY SURGERY CENTER | Age: 74
End: 2024-05-06
Payer: COMMERCIAL

## 2024-05-06 NOTE — TELEPHONE ENCOUNTER
Received a message from PAN, that pt is looking to reschedule 5/7 procedure with Dr. Herring. Per ESPERANZA, pt stated that they were not ready, they didn't like the time of procedure, was told they didn't have to shower before surgery. PAN asked writer to follow up with patient.     Writer called pt and LVM, asking for a call back at [phone].   Opal Gardner on 5/6/2024 at 11:02 AM

## 2024-05-06 NOTE — TELEPHONE ENCOUNTER
Patient called surgery scheduling back and M for Opal to call back regarding surgery tomorrow with Nima.     Patient states she will keep her phone by her and is requesting a call back.    Keisha Dodd on 5/6/2024 at 2:29 PM

## 2024-05-07 ENCOUNTER — ANESTHESIA (OUTPATIENT)
Dept: SURGERY | Facility: AMBULATORY SURGERY CENTER | Age: 74
End: 2024-05-07
Payer: COMMERCIAL

## 2024-05-07 ENCOUNTER — HOSPITAL ENCOUNTER (OUTPATIENT)
Facility: AMBULATORY SURGERY CENTER | Age: 74
Discharge: HOME | End: 2024-05-07
Attending: OPHTHALMOLOGY
Payer: COMMERCIAL

## 2024-05-07 VITALS
WEIGHT: 125 LBS | BODY MASS INDEX: 24.54 KG/M2 | RESPIRATION RATE: 16 BRPM | TEMPERATURE: 97 F | SYSTOLIC BLOOD PRESSURE: 122 MMHG | HEIGHT: 60 IN | OXYGEN SATURATION: 99 % | HEART RATE: 74 BPM | DIASTOLIC BLOOD PRESSURE: 73 MMHG

## 2024-05-07 DIAGNOSIS — H26.9 NUCLEAR CATARACT, NONSENILE: Primary | ICD-10-CM

## 2024-05-07 PROCEDURE — 99100 ANES PT EXTEME AGE<1 YR&>70: CPT | Performed by: ANESTHESIOLOGY

## 2024-05-07 PROCEDURE — 99100 ANES PT EXTEME AGE<1 YR&>70: CPT | Performed by: NURSE ANESTHETIST, CERTIFIED REGISTERED

## 2024-05-07 PROCEDURE — 66984 XCAPSL CTRC RMVL W/O ECP: CPT | Mod: LT

## 2024-05-07 PROCEDURE — 66982 XCAPSL CTRC RMVL CPLX WO ECP: CPT | Performed by: NURSE ANESTHETIST, CERTIFIED REGISTERED

## 2024-05-07 PROCEDURE — 66984 XCAPSL CTRC RMVL W/O ECP: CPT | Mod: LT | Performed by: OPHTHALMOLOGY

## 2024-05-07 PROCEDURE — 66982 XCAPSL CTRC RMVL CPLX WO ECP: CPT | Performed by: ANESTHESIOLOGY

## 2024-05-07 DEVICE — LENS CC60WF 18.5 CLAREON UV ASPHERIC BICONVEX IOL: Type: IMPLANTABLE DEVICE | Site: EYE | Status: FUNCTIONAL

## 2024-05-07 RX ORDER — HYDROMORPHONE HYDROCHLORIDE 1 MG/ML
0.2 INJECTION, SOLUTION INTRAMUSCULAR; INTRAVENOUS; SUBCUTANEOUS EVERY 5 MIN PRN
Status: DISCONTINUED | OUTPATIENT
Start: 2024-05-07 | End: 2024-05-08 | Stop reason: HOSPADM

## 2024-05-07 RX ORDER — FENTANYL CITRATE 50 UG/ML
INJECTION, SOLUTION INTRAMUSCULAR; INTRAVENOUS PRN
Status: DISCONTINUED | OUTPATIENT
Start: 2024-05-07 | End: 2024-05-07

## 2024-05-07 RX ORDER — SODIUM CHLORIDE, SODIUM LACTATE, POTASSIUM CHLORIDE, CALCIUM CHLORIDE 600; 310; 30; 20 MG/100ML; MG/100ML; MG/100ML; MG/100ML
INJECTION, SOLUTION INTRAVENOUS CONTINUOUS
Status: DISCONTINUED | OUTPATIENT
Start: 2024-05-07 | End: 2024-05-08 | Stop reason: HOSPADM

## 2024-05-07 RX ORDER — SODIUM CHLORIDE, SODIUM LACTATE, POTASSIUM CHLORIDE, CALCIUM CHLORIDE 600; 310; 30; 20 MG/100ML; MG/100ML; MG/100ML; MG/100ML
INJECTION, SOLUTION INTRAVENOUS CONTINUOUS
Status: CANCELLED | OUTPATIENT
Start: 2024-05-07

## 2024-05-07 RX ORDER — ONDANSETRON 2 MG/ML
4 INJECTION INTRAMUSCULAR; INTRAVENOUS EVERY 30 MIN PRN
Status: DISCONTINUED | OUTPATIENT
Start: 2024-05-07 | End: 2024-05-08 | Stop reason: HOSPADM

## 2024-05-07 RX ORDER — DEXAMETHASONE SODIUM PHOSPHATE 10 MG/ML
4 INJECTION, SOLUTION INTRAMUSCULAR; INTRAVENOUS
Status: DISCONTINUED | OUTPATIENT
Start: 2024-05-07 | End: 2024-05-08 | Stop reason: HOSPADM

## 2024-05-07 RX ORDER — LIDOCAINE 40 MG/G
CREAM TOPICAL
Status: DISCONTINUED | OUTPATIENT
Start: 2024-05-07 | End: 2024-05-08 | Stop reason: HOSPADM

## 2024-05-07 RX ORDER — OXYCODONE HYDROCHLORIDE 5 MG/1
5 TABLET ORAL
Status: DISCONTINUED | OUTPATIENT
Start: 2024-05-07 | End: 2024-05-08 | Stop reason: HOSPADM

## 2024-05-07 RX ORDER — PREDNISOLONE ACETATE 10 MG/ML
1 SUSPENSION/ DROPS OPHTHALMIC 4 TIMES DAILY
Qty: 5 ML | Refills: 0 | Status: SHIPPED | OUTPATIENT
Start: 2024-05-07

## 2024-05-07 RX ORDER — KETOROLAC TROMETHAMINE 5 MG/ML
1 SOLUTION OPHTHALMIC 4 TIMES DAILY
Qty: 5 ML | Refills: 0 | Status: SHIPPED | OUTPATIENT
Start: 2024-05-07 | End: 2024-08-07

## 2024-05-07 RX ORDER — OXYCODONE HYDROCHLORIDE 5 MG/1
10 TABLET ORAL
Status: DISCONTINUED | OUTPATIENT
Start: 2024-05-07 | End: 2024-05-08 | Stop reason: HOSPADM

## 2024-05-07 RX ORDER — BALANCED SALT SOLUTION 6.4; .75; .48; .3; 3.9; 1.7 MG/ML; MG/ML; MG/ML; MG/ML; MG/ML; MG/ML
SOLUTION OPHTHALMIC PRN
Status: DISCONTINUED | OUTPATIENT
Start: 2024-05-07 | End: 2024-05-07 | Stop reason: HOSPADM

## 2024-05-07 RX ORDER — DICLOFENAC SODIUM 1 MG/ML
1 SOLUTION/ DROPS OPHTHALMIC
Status: COMPLETED | OUTPATIENT
Start: 2024-05-07 | End: 2024-05-07

## 2024-05-07 RX ORDER — DEXAMETHASONE SODIUM PHOSPHATE 4 MG/ML
INJECTION, SOLUTION INTRA-ARTICULAR; INTRALESIONAL; INTRAMUSCULAR; INTRAVENOUS; SOFT TISSUE PRN
Status: DISCONTINUED | OUTPATIENT
Start: 2024-05-07 | End: 2024-05-07 | Stop reason: HOSPADM

## 2024-05-07 RX ORDER — ONDANSETRON 4 MG/1
4 TABLET, ORALLY DISINTEGRATING ORAL EVERY 30 MIN PRN
Status: DISCONTINUED | OUTPATIENT
Start: 2024-05-07 | End: 2024-05-08 | Stop reason: HOSPADM

## 2024-05-07 RX ORDER — MOXIFLOXACIN 5 MG/ML
1 SOLUTION/ DROPS OPHTHALMIC
Status: COMPLETED | OUTPATIENT
Start: 2024-05-07 | End: 2024-05-07

## 2024-05-07 RX ORDER — NALOXONE HYDROCHLORIDE 0.4 MG/ML
0.1 INJECTION, SOLUTION INTRAMUSCULAR; INTRAVENOUS; SUBCUTANEOUS
Status: DISCONTINUED | OUTPATIENT
Start: 2024-05-07 | End: 2024-05-08 | Stop reason: HOSPADM

## 2024-05-07 RX ORDER — PROPARACAINE HYDROCHLORIDE 5 MG/ML
1 SOLUTION/ DROPS OPHTHALMIC ONCE
Status: COMPLETED | OUTPATIENT
Start: 2024-05-07 | End: 2024-05-07

## 2024-05-07 RX ORDER — HYDROMORPHONE HYDROCHLORIDE 1 MG/ML
0.4 INJECTION, SOLUTION INTRAMUSCULAR; INTRAVENOUS; SUBCUTANEOUS EVERY 5 MIN PRN
Status: DISCONTINUED | OUTPATIENT
Start: 2024-05-07 | End: 2024-05-08 | Stop reason: HOSPADM

## 2024-05-07 RX ORDER — MOXIFLOXACIN 5 MG/ML
1 SOLUTION/ DROPS OPHTHALMIC 4 TIMES DAILY
Qty: 3 ML | Refills: 0 | Status: SHIPPED | OUTPATIENT
Start: 2024-05-07 | End: 2024-08-07

## 2024-05-07 RX ORDER — PROPARACAINE HYDROCHLORIDE 5 MG/ML
1 SOLUTION/ DROPS OPHTHALMIC ONCE
Status: DISCONTINUED | OUTPATIENT
Start: 2024-05-07 | End: 2024-05-08 | Stop reason: HOSPADM

## 2024-05-07 RX ORDER — FENTANYL CITRATE 50 UG/ML
50 INJECTION, SOLUTION INTRAMUSCULAR; INTRAVENOUS EVERY 5 MIN PRN
Status: DISCONTINUED | OUTPATIENT
Start: 2024-05-07 | End: 2024-05-08 | Stop reason: HOSPADM

## 2024-05-07 RX ORDER — CYCLOPENTOLAT/TROPIC/PHENYLEPH 1%-1%-2.5%
1 DROPS (EA) OPHTHALMIC (EYE)
Status: COMPLETED | OUTPATIENT
Start: 2024-05-07 | End: 2024-05-07

## 2024-05-07 RX ORDER — ONDANSETRON 2 MG/ML
INJECTION INTRAMUSCULAR; INTRAVENOUS PRN
Status: DISCONTINUED | OUTPATIENT
Start: 2024-05-07 | End: 2024-05-07

## 2024-05-07 RX ORDER — PROPOFOL 10 MG/ML
INJECTION, EMULSION INTRAVENOUS PRN
Status: DISCONTINUED | OUTPATIENT
Start: 2024-05-07 | End: 2024-05-07

## 2024-05-07 RX ORDER — TETRACAINE HYDROCHLORIDE 5 MG/ML
SOLUTION OPHTHALMIC PRN
Status: DISCONTINUED | OUTPATIENT
Start: 2024-05-07 | End: 2024-05-07 | Stop reason: HOSPADM

## 2024-05-07 RX ORDER — LIDOCAINE HYDROCHLORIDE 20 MG/ML
INJECTION, SOLUTION INFILTRATION; PERINEURAL PRN
Status: DISCONTINUED | OUTPATIENT
Start: 2024-05-07 | End: 2024-05-07

## 2024-05-07 RX ORDER — FENTANYL CITRATE 50 UG/ML
25 INJECTION, SOLUTION INTRAMUSCULAR; INTRAVENOUS EVERY 5 MIN PRN
Status: DISCONTINUED | OUTPATIENT
Start: 2024-05-07 | End: 2024-05-08 | Stop reason: HOSPADM

## 2024-05-07 RX ADMIN — SODIUM CHLORIDE, SODIUM LACTATE, POTASSIUM CHLORIDE, CALCIUM CHLORIDE: 600; 310; 30; 20 INJECTION, SOLUTION INTRAVENOUS at 09:08

## 2024-05-07 RX ADMIN — MOXIFLOXACIN 1 DROP: 5 SOLUTION/ DROPS OPHTHALMIC at 08:08

## 2024-05-07 RX ADMIN — Medication 1 DROP: at 08:08

## 2024-05-07 RX ADMIN — MOXIFLOXACIN 1 DROP: 5 SOLUTION/ DROPS OPHTHALMIC at 08:03

## 2024-05-07 RX ADMIN — DICLOFENAC SODIUM 1 DROP: 1 SOLUTION/ DROPS OPHTHALMIC at 07:58

## 2024-05-07 RX ADMIN — ONDANSETRON 4 MG: 2 INJECTION INTRAMUSCULAR; INTRAVENOUS at 09:20

## 2024-05-07 RX ADMIN — PROPOFOL 20 MG: 10 INJECTION, EMULSION INTRAVENOUS at 09:15

## 2024-05-07 RX ADMIN — DICLOFENAC SODIUM 1 DROP: 1 SOLUTION/ DROPS OPHTHALMIC at 08:03

## 2024-05-07 RX ADMIN — Medication 1 DROP: at 07:58

## 2024-05-07 RX ADMIN — LIDOCAINE HYDROCHLORIDE 40 MG: 20 INJECTION, SOLUTION INFILTRATION; PERINEURAL at 09:15

## 2024-05-07 RX ADMIN — PROPARACAINE HYDROCHLORIDE 1 DROP: 5 SOLUTION/ DROPS OPHTHALMIC at 07:57

## 2024-05-07 RX ADMIN — MOXIFLOXACIN 1 DROP: 5 SOLUTION/ DROPS OPHTHALMIC at 07:58

## 2024-05-07 RX ADMIN — Medication 1 DROP: at 08:03

## 2024-05-07 RX ADMIN — FENTANYL CITRATE 25 MCG: 50 INJECTION, SOLUTION INTRAMUSCULAR; INTRAVENOUS at 09:13

## 2024-05-07 RX ADMIN — SODIUM CHLORIDE, SODIUM LACTATE, POTASSIUM CHLORIDE, CALCIUM CHLORIDE: 600; 310; 30; 20 INJECTION, SOLUTION INTRAVENOUS at 07:58

## 2024-05-07 RX ADMIN — DICLOFENAC SODIUM 1 DROP: 1 SOLUTION/ DROPS OPHTHALMIC at 08:08

## 2024-05-07 NOTE — ANESTHESIA CARE TRANSFER NOTE
Patient: Julieta Rivera    Procedure: Procedure(s):  LEFT EYE EXTRACTION, CATARACT, EXTRACAPSULAR, WITH INTRAOCULAR LENS IMPLANT INSERTION       Diagnosis: Nuclear cataract, nonsenile [H26.9]  Diagnosis Additional Information: No value filed.    Anesthesia Type:   MAC     Note:    Oropharynx: oropharynx clear of all foreign objects  Level of Consciousness: awake  Oxygen Supplementation: room air    Independent Airway: airway patency satisfactory and stable  Dentition: dentition unchanged  Vital Signs Stable: post-procedure vital signs reviewed and stable  Report to RN Given: handoff report given  Patient transferred to: Phase II    Handoff Report: Identifed the Patient, Identified the Reponsible Provider, Reviewed the pertinent medical history, Discussed the surgical course, Reviewed Intra-OP anesthesia mangement and issues during anesthesia, Set expectations for post-procedure period and Allowed opportunity for questions and acknowledgement of understanding      Vitals:  Vitals Value Taken Time   /71 05/07/24 0958   Temp 35.9  C (96.7  F) 05/07/24 0958   Pulse     Resp 16 05/07/24 0958   SpO2 98 % 05/07/24 0958       Electronically Signed By: JOSÉ LUIS Dupree CRNA  May 7, 2024  10:00 AM

## 2024-05-07 NOTE — ANESTHESIA POSTPROCEDURE EVALUATION
Patient: Julieta Rivera    Procedure: Procedure(s):  LEFT EYE EXTRACTION, CATARACT, EXTRACAPSULAR, WITH INTRAOCULAR LENS IMPLANT INSERTION       Anesthesia Type:  MAC    Note:  Disposition: Outpatient   Postop Pain Control: Uneventful            Sign Out: Well controlled pain   PONV: No   Neuro/Psych: Uneventful            Sign Out: Acceptable/Baseline neuro status   Airway/Respiratory: Uneventful            Sign Out: Acceptable/Baseline resp. status   CV/Hemodynamics: Uneventful            Sign Out: Acceptable CV status; No obvious hypovolemia; No obvious fluid overload   Other NRE: NONE   DID A NON-ROUTINE EVENT OCCUR? No           Last vitals:  Vitals Value Taken Time   /73 05/07/24 1013   Temp 36.1  C (97  F) 05/07/24 1013   Pulse     Resp 16 05/07/24 1013   SpO2 99 % 05/07/24 1013       Electronically Signed By: Jolene Pichardo MD  May 7, 2024  12:03 PM

## 2024-05-07 NOTE — DISCHARGE INSTRUCTIONS
[facility]  Home Care Following Anesthesia  For 24 hours after surgery:  Get plenty of rest.  A responsible adult must stay with you for at least 24 hours after you leave the surgery center.  Do not drive or use heavy equipment.  If you have weakness or tingling, don't drive or use heavy equipment until this feeling goes away.   Do not drink alcohol.   Avoid strenuous or risky activities.  Ask for help when climbing stairs.  You may feel lightheaded.  IF so, sit for a few minutes before standing.  Have someone help you get up.   If you have nausea (feel sick to your stomach): Drink only clear liquids such as apple juice, ginger ale, broth or 7-Up.  Rest may also help.  Be sure to drink enough fluids.  Move to a regular diet as you feel able.   You may have a slight fever.  Call the doctor if your fever is over 100 F (37.7 C) (taken under the tongue) or lasts longer than 24 hours.  You may have a dry mouth, a sore throat, muscle aches or trouble sleeping. These should go away after 24 hours.  Do not make important or legal decisions.   It is recommended to avoid smoking.               Tips for taking pain medications  To get the best pain relief possible, remember these points:  Take pain medications as directed, before pain becomes severe.  Pain medication can upset your stomach: taking it with food may help.  Constipation is a common side effect of pain medication. Drink plenty of  fluids.  Eat foods high in fiber. Take a stool softener if recommended by your doctor or pharmacist.  Do not drink alcohol, drive or operate machinery while taking pain medications.  Ask about other ways to control pain, such as with heat, ice or relaxation.    Tylenol/Acetaminophen Consumption    If you feel your pain relief is insufficient, you may take Tylenol/Acetaminophen in addition to your narcotic pain medication.   Be careful not to exceed 4,000 mg of Tylenol/Acetaminophen in a 24 hour  period from all sources.  If you are taking extra strength Tylenol/acetaminophen (500 mg), the maximum dose is 8 tablets in 24 hours.  If you are taking regular strength acetaminophen (325 mg), the maximum dose is 12 tablets in 24 hours.    Call a doctor for any of the following:  Signs of infection (fever, growing tenderness at the surgery site, a large amount of drainage or bleeding, severe pain, foul-smelling drainage, redness, swelling).  It has been over 8 to 10 hours since surgery and you are still not able to urinate (pass water).  Headache for over 24 hours.  Numbness, tingling or weakness the day after surgery (if you had spinal anesthesia).  Signs of Covid-19 infection (temperature over 100 degrees, shortness of breath, cough, loss of taste/smell, generalized body aches, persistent headache, chills, sore throat, nausea/vomiting/diarrhea)  Your doctor is:       Dr. Cira Herring, Ophthalmology: [phone]               Or dial [phone] and ask for the resident on call for:  Ophthalmology  For emergency care, call the:  [facility]:  [phone] (TTY for hearing impaired: [phone])

## 2024-05-07 NOTE — OP NOTE
SURGEON:  MISTI DIXON   Assistant surgeon: Janina Ritter MD  PREOPERATIVE DIAGNOSIS:  visually significant nucleosclerotic cataract left eye   POSTOPERATIVE DIAGNOSIS: same  NAME OF THE PROCEDURE: Phacoemulsification with intraocular lens implantation, left eye   ANESTHESIA: Monitored anesthesia care and peribulbar block   COMPLICATIONS: none  INDICATIONS: Julieta Rivera is a 73 year old with diagnosis of visually significant cataract, here for cataract surgery    DESCRIPTION OF THE PROCEDURE:  The patient was taken to the operative room where intravenous sedation was administered and a peribulbar block consisting of a 1:1 mixture of 2%lidocaine and 0.75% marcaine with epinephrine and wydase, was administered to the operative eye with adequate anesthesia and akinesia.    The operative eye was prepped and draped in the usual sterile surgical fashion for ophthalmic surgery, including the installation of one drop of 5% Povidone Iodine.  A sterile drape was placed over the face and body and a lid speculum was inserted.      With the use of a Supersharp blade and 0.12 forceps, a paracentesis was created at the 2 o'clock position, and viscoelastic was injected into the anterior chamber using a canula.  A 2.5 mm keratome was then used to construct a clear corneal incision at the 10 o'clock position.  Using Utrata forceps and cystotome needle, a continuous curvilinear capsulorrhexis was created and hydrodissection was undertaken with the use of BSS.  The nucleus was found to be freely mobile and then removed by phacoemulsification using a divide and conquer technique.  The remaining elements of cortex were then removed with irrigation/aspiration.  An IOL,was injected into the capsular bag and was rotated into a good position with a Sinskey hook. The remaining elements of viscoelastic were then removed with irrigation/aspiration. The wounds were hydrated and were watertight. One 6-0 plain gut suture was used to  close the conjunctiva.   The lid speculum was removed.  Subconjunctival injection of Dexamethasone and Ancef were administered. The eye was cleaned with wet and dry gauze. Maxitrol ointment was placed on the eye.  A patch and Zavaleta shield were placed over the eye.  The patient was discharge in stable condition having tolerated the procedure well      Implant Name Type Inv. Item Serial No.  Lot No. LRB No. Used Action   LENS CC60WF 18.5 [employer] UV ASPHERIC BICONVEX IOL - [id number] Lens/Eye Implant LENS CC60WF 18.5 [employer] UV ASPHERIC BICONVEX IOL [id number] [employer]  Left 1 Implanted       The surgery was assisted by Dr.Victoria Ritter. Due to the delicate and complex nature of this surgery, an assistant was required and No qualified resident was available. She assisted with Cataract extraction . I was present for the entire surgery.

## 2024-05-07 NOTE — ANESTHESIA PREPROCEDURE EVALUATION
Anesthesia Pre-Procedure Evaluation    Patient: Julieta Rivera   MRN: [id number] : 1950        Procedure : Procedure(s):  LEFT EYE EXTRACTION, CATARACT, EXTRACAPSULAR, WITH INTRAOCULAR LENS IMPLANT INSERTION          Past Medical History:   Diagnosis Date    Abnormal Pap smear 1970s    normal since    Breast cancer (H) 2018    Factor V Leiden (H24)     Hypothyroidism fall       Past Surgical History:   Procedure Laterality Date     SECTION      x2 ,     COLONOSCOPY N/A 2022    Procedure: COLONOSCOPY, WITH POLYPECTOMY;  Surgeon: Elo Berumen MD;  Location: [facility] OR    COLPOSCOPY CERVIX, BIOPSY CERVIX, ENDOCERVICAL CURETTAGE, COMBINED      INSERT PORT VASCULAR ACCESS N/A 3/5/2018    Procedure: INSERT PORT VASCULAR ACCESS;  Single Lumen Chest Power Port Placement;  Surgeon: Brian Tolbert PA-C;  Location: [facility] OR    MASTECTOMY SIMPLE, SENTINEL NODE, COMBINED Right 2018    Lumpectomy with SENTINEL NODE;  Right Wire Localized Lumpectomy And Right [place] Lymph Node Biopsy;  Surgeon: Eugene Guzman MD;  Location: [facility] OR    REMOVE PORT VASCULAR ACCESS Left 11/15/2018    Procedure: Left Port Removal;  Surgeon: Tom Quick PA-C;  Location: [facility] OR    TONSILLECTOMY, ADENOIDECTOMY, COMBINED        Allergies   Allergen Reactions    Seasonal Allergies       Social History     Tobacco Use    Smoking status: Former     Current packs/day: 0.00     Types: Cigarettes     Quit date: 1971     Years since quittin.3    Smokeless tobacco: Never    Tobacco comments:     age [age]   Substance Use Topics    Alcohol use: No     Comment: no alcohol since 2018      Wt Readings from Last 1 Encounters:   24 56.7 kg (125 lb)              OUTSIDE LABS:  CBC:   Lab Results   Component Value Date    WBC 6.8 04/15/2024    WBC 6.2 2023    HGB 13.2 04/15/2024    HGB 13.0 2023    HCT 39.2 04/15/2024    HCT 38.5 2023     04/15/2024      "09/28/2023     BMP:   Lab Results   Component Value Date     04/15/2024     09/28/2023    POTASSIUM 3.6 04/15/2024    POTASSIUM 4.2 09/28/2023    CHLORIDE 102 04/15/2024    CHLORIDE 105 09/28/2023    CO2 23 04/15/2024    CO2 24 09/28/2023    BUN 12.5 04/15/2024    BUN 13.0 09/28/2023    CR 0.71 04/15/2024    CR 0.70 09/28/2023    GLC 77 04/15/2024    GLC 93 09/28/2023     COAGS:   Lab Results   Component Value Date    INR 0.99 11/15/2018     POC: No results found for: \"BGM\", \"HCG\", \"HCGS\"  HEPATIC:   Lab Results   Component Value Date    ALBUMIN 4.3 04/15/2024    PROTTOTAL 7.0 04/15/2024    ALT 12 04/15/2024    AST 23 04/15/2024    ALKPHOS 55 04/15/2024    BILITOTAL 0.6 04/15/2024     OTHER:   Lab Results   Component Value Date    LACT 1.1 03/12/2018    A1C 5.1 11/22/2013    CINDI 9.3 04/15/2024    TSH 2.09 11/06/2023    T4 1.64 (H) 08/02/2022       Anesthesia Plan    ASA Status:  2       Anesthesia Type: MAC.   Induction: Intravenous.   Maintenance: TIVA.        Consents    Anesthesia Plan(s) and associated risks, benefits, and realistic alternatives discussed. Questions answered and patient/representative(s) expressed understanding.     - Discussed:     - Discussed with:  Patient            Postoperative Care    Pain management: Multi-modal analgesia.   PONV prophylaxis: Background Propofol Infusion, Ondansetron (or other 5HT-3)     Comments:               Jolene Pichardo MD    I have reviewed the pertinent notes and labs in the chart from the past 30 days and (re)examined the patient.  Any updates or changes from those notes are reflected in this note.                  "

## 2024-05-08 ENCOUNTER — OFFICE VISIT (OUTPATIENT)
Dept: OPHTHALMOLOGY | Facility: CLINIC | Age: 74
End: 2024-05-08
Attending: OPHTHALMOLOGY
Payer: COMMERCIAL

## 2024-05-08 DIAGNOSIS — Z48.810 AFTERCARE FOLLOWING SURGERY OF A SENSORY ORGAN: Primary | ICD-10-CM

## 2024-05-08 PROCEDURE — G0463 HOSPITAL OUTPT CLINIC VISIT: HCPCS | Performed by: OPHTHALMOLOGY

## 2024-05-08 PROCEDURE — 99024 POSTOP FOLLOW-UP VISIT: CPT | Mod: GC | Performed by: OPHTHALMOLOGY

## 2024-05-08 ASSESSMENT — REFRACTION_WEARINGRX
OS_CYLINDER: +2.00
OD_ADD: +2.50
OD_CYLINDER: +2.00
OD_AXIS: 125
OS_AXIS: 035
OS_SPHERE: -4.00
OS_ADD: +2.50
OD_SPHERE: -6.50

## 2024-05-08 ASSESSMENT — VISUAL ACUITY
OD_CC+: -2
OS_SC: 20/40
OS_SC+: -2
OD_CC: 20/30
METHOD: SNELLEN - LINEAR

## 2024-05-08 ASSESSMENT — EXTERNAL EXAM - LEFT EYE: OS_EXAM: NORMAL

## 2024-05-08 ASSESSMENT — TONOMETRY
IOP_METHOD: TONOPEN
OD_IOP_MMHG: 17
OS_IOP_MMHG: 19

## 2024-05-08 NOTE — PROGRESS NOTES
"   CC: POD1 Cataract extraction intraocular lens left eye   Interval: Here for 1 day post op left eye. Some irritation with mild pain. Has drops with. No difficulty sleeping.    HPI: Julieta Rivera is a  73 year old  patient with history as listed below who presents as a referral from Dr. Tony for evaluation of macular lesions of both eyes. She reports that her vision has felt blurred in both eyes for quite some time.     First noticed worsening in 2018 when she was receiving chemotherapy ductal carcinoma of right breast. She first noticed droopiness of left eyelid during this time as well.     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  -Hypothyroidism   -Hashimoto's thyroiditis  -Cardiomyopathy secondary to drug  -Congenital stenosis of aortic valve  -Heterozygous factor V Leiden mutation  - Ductal carcinoma of right breast    Retinal Imaging:  Optical Coherence Tomography 05/02/24   RE: Subfoveal deposit of hyperreflective material; PVD  LE:Subfoveal deposit of hyperreflective material with trace potential sub-RPE fluid; Posterior vitreous detachment (PVD)    Intraocular lens calcs: reliable 05/02/24     Assessment & Plan:    #POD1 s/p CE with PCIOL OS  -CC60WF 18.5D OS  -IOP within acceptable range; doing well    Ketorolac (gray top) four times a day    Predforte  (pink top) four times a day  (shake the bottle before)  Ofloxacin (tan top) four times a day    Maxitrol at bedtime   Put the eyedrops 5 minutes a part  Eye shield or glasses at all times x 3 weeks  Sleep with the shield  No heavy lifting   Follow-up in one week  Retina detachment and endophthalmitis precautions were discussed with the patient (increased blurry vision, drainage, new flashes, floaters or a curtain in the visual field) and was asked to return if any of the those occur    What to watch out for:  If you experience any of the following \"RSVP Symptoms\", you should call " immediately:  Worsening Redness  Worsening Sensitivity to light  Worsening Vision, including new flashing lights or floaters  Worsening Pain, including nausea/vomiting      #Adult-onset Vitelliform Dystrophy both eyes  Differential diagnosis atypical dry Age related macular degeneration  Less likely best disease  -less likely to be CNVM given exam and OCT findings OCT-A findings  Recommend to observe  05/02/24 left eye with race potential sub-RPE fluid; will observe closely    #Posterior vitreous detachment (PVD) both eyes  -No Retinal tear or Retinal detachment  on exam today   -Discussed Retinal detachment /Retinal tear symptoms    #cataracts right eye   visually significant   Glare Testing (BAT)     Off Low Medium High   Right 20/50+2 20/50 20/50-2 20/70+1   Left 20/50-2 20/50 20/70-2 20/150     Plan for Cataract extraction intraocular lens   The cataract is visually significant, Reports impacts ADL and has glare     Surgeon procedure time:  45 min  Urgency of Surgery: Routine  Post-op apps needed: 1day; 1 week; 3 weeks  Multi surgeon case: No   H&P completed by primary care physician/PAC   needed: NO  Anesthesia: Peribulbar block    Aim for: -0.5    Dilation:Good  Iris expansion: Not needed  Epinephrine: No  Pseudoexfoliation: NO  Trypan Blue: No   Phacodonesis: No  Cornea guttae: rare  Anticoagulants: NO  Candidate for multifocal or toric IOL: NO  Visually significant astigmatism: Discussed elective surgical refractive corrective options  Prior refractive surgery: No      I reviewed the indications, risks, benefits, and alternatives of the proposed surgical procedure. We discussed at length cataracts and the effect of the cataracts on vision.   We discussed the fact that modern cataract surgery is usually successful at alleviating symptoms of glare when the cataract is the causative factor. Other risks were discussed with patient including, but not limited to, failure to improve vision or further  loss of vision, bleeding, infection, loss of vision and the remote possibility of complications of anesthesia or need for additional surgery. 1:1000 risk of infection/bleed/loss of eye; 1:100 risk of RD and need for further surgery.  Patient agreed to proceed with surgery.  I provided multiple opportunities for the questions, answered all questions to the best of my ability, and confirmed that my answers and my discussion were understood.   Post-op [facility] limited because of  retina pathology    #   Breast Cancer Right Breast - S/P chemotherapy and lumpectomy 2018  -No ocular involvement.     #   Dry Eye Syndrome, both eyes.    artificial tears  and warm compresses     RTC: POW1    Willie Snowden MD MPH  Vitreoretinal Fellow PGY-6  [facility]       ATTESTATION     Attending Physician Attestation:      Complete documentation of historical and exam elements from today's encounter can be found in the full encounter summary report (not reduplicated in this progress note).  I personally obtained the chief complaint(s) and history of present illness.  I confirmed and edited as necessary the review of systems, past medical/surgical history, family history, social history, and examination findings as documented by others; and I examined the patient myself.  I personally reviewed the relevant tests, images, and reports as documented above.  I formulated and edited as necessary the assessment and plan and discussed the findings and management plan with the patient and family    Cira Herring MD  , Vitreoretinal Surgery  Department of Ophthalmology  [facility]

## 2024-05-08 NOTE — PATIENT INSTRUCTIONS
"    Ketorolac (gray top) four times a day    Predforte  (pink top) four times a day  (shake the bottle before)  Ofloxacin (tan top) four times a day    Maxitrol at bedtime   Put the eyedrops 5 minutes a part  Eye shield or glasses at all times x 3 weeks  Sleep with the shield  No heavy lifting   Follow-up in one week  Retina detachment and endophthalmitis precautions were discussed with the patient (increased blurry vision, drainage, new flashes, floaters or a curtain in the visual field) and was asked to return if any of the those occur    What to watch out for:  If you experience any of the following \"RSVP Symptoms\", you should call immediately:  Worsening Redness  Worsening Sensitivity to light  Worsening Vision, including new flashing lights or floaters  Worsening Pain, including nausea/vomiting  "

## 2024-05-08 NOTE — NURSING NOTE
Chief Complaints and History of Present Illnesses   Patient presents with    Post Op (Ophthalmology) Left Eye     Chief Complaint(s) and History of Present Illness(es)       Post Op (Ophthalmology) Left Eye              Laterality: left eye    Associated symptoms: eye pain (mild) and itching    Treatments tried: eye drops              Comments    Here for 1 day post op left eye. Some irritation with mild pain. Has drops with. No difficulty sleeping.    Perry BERNAL 12:19 PM May 8, 2024

## 2024-05-15 ENCOUNTER — OFFICE VISIT (OUTPATIENT)
Dept: OPHTHALMOLOGY | Facility: CLINIC | Age: 74
End: 2024-05-15
Attending: OPHTHALMOLOGY
Payer: COMMERCIAL

## 2024-05-15 DIAGNOSIS — Z48.810 AFTERCARE FOLLOWING SURGERY OF A SENSORY ORGAN: Primary | ICD-10-CM

## 2024-05-15 PROCEDURE — 99024 POSTOP FOLLOW-UP VISIT: CPT | Mod: GC | Performed by: OPHTHALMOLOGY

## 2024-05-15 PROCEDURE — G0463 HOSPITAL OUTPT CLINIC VISIT: HCPCS | Performed by: OPHTHALMOLOGY

## 2024-05-15 ASSESSMENT — VISUAL ACUITY
OS_SC+: -23
METHOD: SNELLEN - LINEAR
OD_PH_CC: 20/40
OS_PH_SC: 20/40
OD_PH_CC+: -2
OD_CC: 20/50
OS_SC: 20/40

## 2024-05-15 ASSESSMENT — REFRACTION_WEARINGRX
OS_ADD: +2.50
OD_AXIS: 125
OD_SPHERE: -6.50
OS_SPHERE: -4.00
OS_CYLINDER: +2.00
OS_AXIS: 035
OD_ADD: +2.50
OD_CYLINDER: +2.00

## 2024-05-15 ASSESSMENT — TONOMETRY
OS_IOP_MMHG: 16
IOP_METHOD: TONOPEN
OD_IOP_MMHG: 17

## 2024-05-15 ASSESSMENT — SLIT LAMP EXAM - LIDS: COMMENTS: DERMATOCHALASIS UPPER LID

## 2024-05-15 ASSESSMENT — EXTERNAL EXAM - RIGHT EYE: OD_EXAM: NORMAL

## 2024-05-15 ASSESSMENT — EXTERNAL EXAM - LEFT EYE: OS_EXAM: NORMAL

## 2024-05-15 NOTE — NURSING NOTE
Chief Complaints and History of Present Illnesses   Patient presents with    Post Op (Ophthalmology) Left Eye     CE/IOL Left eye 5/7/24     Chief Complaint(s) and History of Present Illness(es)       Post Op (Ophthalmology) Left Eye              Comments: CE/IOL Left eye 5/7/24              Comments    Pt states the redness in the left eye is better   No other problems or concerns  No eye pain     Alicia You COT 10:22 AM May 15, 2024

## 2024-05-15 NOTE — PATIENT INSTRUCTIONS
"Predforte  (pink top) three times a day for 1 week, then decrease to two times a day for 1 week then decrease to 1 time a day for 1 week and then stop  (shake the bottle before)    Stop Ketorolac (gray top)     Stop Ofloxacin (tan top)     Eye shield or glasses at all times x 2 weeks  Sleep with the shield  No heavy lifting     Follow-up in one month    Retina detachment and endophthalmitis precautions were discussed with the patient (increased blurry vision, drainage, new flashes, floaters or a curtain in the visual field) and was asked to return if any of the those occur    What to watch out for:  If you experience any of the following \"RSVP Symptoms\", you should call immediately:  Worsening Redness  Worsening Sensitivity to light  Worsening Vision, including new flashing lights or floaters  Worsening Pain, including nausea/vomiting  "

## 2024-05-15 NOTE — PROGRESS NOTES
CC: POW1 Cataract extraction intraocular lens left eye 5.7.24  Interval: Here for 1 week post op left eye. Patient reports that she had some foreign body sensation but it has improved    HPI: Julieta Rivera is a  73 year old  patient with history as listed below who presents as a referral from Dr. Tony for evaluation of macular lesions of both eyes. She reports that her vision has felt blurred in both eyes for quite some time.     First noticed worsening in 2018 when she was receiving chemotherapy ductal carcinoma of right breast. She first noticed droopiness of left eyelid during this time as well.     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  -Hypothyroidism   -Hashimoto's thyroiditis  -Cardiomyopathy secondary to drug  -Congenital stenosis of aortic valve  -Heterozygous factor V Leiden mutation  - Ductal carcinoma of right breast    Retinal Imaging:  Optical Coherence Tomography 05/02/24   RE: Subfoveal deposit of hyperreflective material; PVD  LE:Subfoveal deposit of hyperreflective material with trace potential sub-RPE fluid; Posterior vitreous detachment (PVD)    Intraocular lens calcs: reliable 05/02/24     Assessment & Plan:    # s/p CE with PCIOL left eye 5.7.24  -CC60WF 18.5D OS  -IOP within acceptable range; doing well    Predforte  (pink top) three times a day for 1 week, then decrease to two times a day for 1 week then decrease to 1 time a day for 1 week and then stop  (shake the bottle before)  Stop Ketorolac (gray top)   Stop Ofloxacin (tan top)   Eye shield or glasses at all times x 3 weeks  Sleep with the shield  No heavy lifting   Follow-up in one month  Retina detachment and endophthalmitis precautions were discussed with the patient (increased blurry vision, drainage, new flashes, floaters or a curtain in the visual field) and was asked to return if any of the those occur    What to watch out for:  If you experience any of the following  "\"RSVP Symptoms\", you should call immediately:  Worsening Redness  Worsening Sensitivity to light  Worsening Vision, including new flashing lights or floaters  Worsening Pain, including nausea/vomiting      #Adult-onset Vitelliform Dystrophy both eyes  Differential diagnosis atypical dry Age related macular degeneration  Less likely best disease  -less likely to be CNVM given exam and OCT findings OCT-A findings  Recommend to observe  05/02/24 left eye with race potential sub-RPE fluid; will observe closely    #Posterior vitreous detachment (PVD) both eyes  -No Retinal tear or Retinal detachment  on exam today   -Discussed Retinal detachment /Retinal tear symptoms    #cataracts right eye   visually significant   Glare Testing (BAT)     Off Low Medium High   Right 20/50+2 20/50 20/50-2 20/70+1   Left 20/50-2 20/50 20/70-2 20/150     Plan for Cataract extraction intraocular lens   The cataract is visually significant, Reports impacts ADL and has glare     Surgeon procedure time:  45 min  Urgency of Surgery: Routine  Post-op apps needed: 1day; 1 week; 3 weeks  Multi surgeon case: No   H&P completed by primary care physician/PAC   needed: NO  Anesthesia: Peribulbar block    Aim for: -0.5    Dilation:Good  Iris expansion: Not needed  Epinephrine: No  Pseudoexfoliation: NO  Trypan Blue: No   Phacodonesis: No  Cornea guttae: rare  Anticoagulants: NO  Candidate for multifocal or toric IOL: NO  Visually significant astigmatism: Discussed elective surgical refractive corrective options  Prior refractive surgery: No      I reviewed the indications, risks, benefits, and alternatives of the proposed surgical procedure. We discussed at length cataracts and the effect of the cataracts on vision.   We discussed the fact that modern cataract surgery is usually successful at alleviating symptoms of glare when the cataract is the causative factor. Other risks were discussed with patient including, but not limited to, failure " to improve vision or further loss of vision, bleeding, infection, loss of vision and the remote possibility of complications of anesthesia or need for additional surgery. 1:1000 risk of infection/bleed/loss of eye; 1:100 risk of RD and need for further surgery.  Patient agreed to proceed with surgery.  I provided multiple opportunities for the questions, answered all questions to the best of my ability, and confirmed that my answers and my discussion were understood.   Post-op [facility] limited because of  retina pathology    #   Breast Cancer Right Breast - S/P chemotherapy and lumpectomy 2018  -No ocular involvement.     #   Dry Eye Syndrome, both eyes.    artificial tears  and warm compresses     RTC: POM1 with Optical Coherence Tomography and dilation    Maxi Worley MD  Vitreoretinal Fellow PGY-5  [facility]       ATTESTATION     Attending Physician Attestation:      Complete documentation of historical and exam elements from today's encounter can be found in the full encounter summary report (not reduplicated in this progress note).  I personally obtained the chief complaint(s) and history of present illness.  I confirmed and edited as necessary the review of systems, past medical/surgical history, family history, social history, and examination findings as documented by others; and I examined the patient myself.  I personally reviewed the relevant tests, images, and reports as documented above.  I formulated and edited as necessary the assessment and plan and discussed the findings and management plan with the patient and family    Cira Herring MD  , Vitreoretinal Surgery  Department of Ophthalmology  [facility]

## 2024-05-23 ENCOUNTER — TRANSFERRED RECORDS (OUTPATIENT)
Dept: HEALTH INFORMATION MANAGEMENT | Facility: CLINIC | Age: 74
End: 2024-05-23

## 2024-05-28 DIAGNOSIS — H35.54 MACULAR VITELLIFORM LESION: Primary | ICD-10-CM

## 2024-06-12 ENCOUNTER — OFFICE VISIT (OUTPATIENT)
Dept: OPHTHALMOLOGY | Facility: CLINIC | Age: 74
End: 2024-06-12
Attending: OPHTHALMOLOGY
Payer: COMMERCIAL

## 2024-06-12 DIAGNOSIS — H35.54 MACULAR VITELLIFORM LESION: ICD-10-CM

## 2024-06-12 DIAGNOSIS — H25.11 NUCLEAR SCLEROSIS, RIGHT: Primary | ICD-10-CM

## 2024-06-12 PROCEDURE — 92134 CPTRZ OPH DX IMG PST SGM RTA: CPT | Performed by: OPHTHALMOLOGY

## 2024-06-12 PROCEDURE — G0463 HOSPITAL OUTPT CLINIC VISIT: HCPCS | Performed by: OPHTHALMOLOGY

## 2024-06-12 PROCEDURE — 99024 POSTOP FOLLOW-UP VISIT: CPT | Mod: GC | Performed by: OPHTHALMOLOGY

## 2024-06-12 ASSESSMENT — TONOMETRY
IOP_METHOD: ICARE
OD_IOP_MMHG: 12
OS_IOP_MMHG: 13

## 2024-06-12 ASSESSMENT — REFRACTION_WEARINGRX
OD_AXIS: 125
OS_AXIS: 035
OD_CYLINDER: +2.00
OS_SPHERE: -4.00
OD_SPHERE: -6.50
OS_CYLINDER: +2.00
OS_ADD: +2.50
OD_ADD: +2.50

## 2024-06-12 ASSESSMENT — SLIT LAMP EXAM - LIDS: COMMENTS: DERMATOCHALASIS UPPER LID

## 2024-06-12 ASSESSMENT — REFRACTION_MANIFEST
OD_CYLINDER: +1.00
OS_SPHERE: -1.00
OS_AXIS: 065
OD_SPHERE: -5.75
OS_ADD: +2.50
OD_ADD: +2.50
OD_AXIS: 110
OS_CYLINDER: +1.00

## 2024-06-12 ASSESSMENT — EXTERNAL EXAM - LEFT EYE: OS_EXAM: NORMAL

## 2024-06-12 ASSESSMENT — VISUAL ACUITY
METHOD: SNELLEN - LINEAR
OD_CC: 20/40
CORRECTION_TYPE: GLASSES
OS_SC: 20/40

## 2024-06-12 ASSESSMENT — EXTERNAL EXAM - RIGHT EYE: OD_EXAM: NORMAL

## 2024-06-12 NOTE — PROGRESS NOTES
CC: POM1 Cataract extraction intraocular lens left eye 5.7.24  Interval: Here for POM1 CEIOL left eye. Vision in the left eye is now better than the right eye. She is going to [place] for a month and she may be interested in getting her right eye cataract surgery done.    HPI: Julieta Rivera is a  73 year old  patient with history as listed below who presents as a referral from Dr. Tony for evaluation of macular lesions of both eyes. She reports that her vision has felt blurred in both eyes for quite some time.     First noticed worsening in 2018 when she was receiving chemotherapy ductal carcinoma of right breast. She first noticed droopiness of left eyelid during this time as well.     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  -Hypothyroidism   -Hashimoto's thyroiditis  -Cardiomyopathy secondary to drug  -Congenital stenosis of aortic valve  -Heterozygous factor V Leiden mutation  - Ductal carcinoma of right breast    Retinal Imaging:  Optical Coherence Tomography 06/12/24   RE: Subfoveal deposit of hyperreflective material; PVD  LE:Subfoveal deposit of hyperreflective material with trace potential sub-RPE fluid; Posterior vitreous detachment (PVD)    Intraocular lens calcs: reliable 05/02/24     Assessment & Plan:    # s/p CE with PCIOL left eye 5.7.24  -CC60WF 18.5D OS  - good postoperative appearance  - done with eye drops  - updated refraction today (will hold off on updating refraction until after right eye surgery)      #Adult-onset Vitelliform Dystrophy both eyes  Differential diagnosis atypical dry Age related macular degeneration  Less likely best disease  -less likely to be CNVM given exam and OCT findings OCT-A findings  Recommend to observe  05/02/24 left eye with race potential sub-RPE fluid; will observe closely    #Posterior vitreous detachment (PVD) both eyes  -No Retinal tear or Retinal detachment  on exam today   -Discussed Retinal  detachment /Retinal tear symptoms    #cataracts right eye   visually significant   Glare Testing (BAT)     Off Low Medium High   Right 20/50+2 20/50 20/50-2 20/70+1   Left 20/50-2 20/50 20/70-2 20/150     Right -6.50 +2.00 125 +2.50   Left -4.00 +2.00 035 +2.50     Plan for Cataract extraction intraocular lens right eye  The cataract is visually significant, Reports impacts ADL and has glare     Surgeon procedure time:  45 min  Urgency of Surgery: Routine  Post-op apps needed: 1day; 1 week; 3 weeks  Multi surgeon case: No   H&P completed by primary care physician/PAC   needed: NO  Anesthesia: Peribulbar block    Aim for: -1.00 for right eye   Intraocular lens cc60wf 15.5    Dilation:Good  Iris expansion: Not needed  Epinephrine: No  Pseudoexfoliation: NO  Trypan Blue: No   Phacodonesis: No  Cornea guttae: rare  Anticoagulants: NO  Candidate for multifocal or toric IOL: NO  Visually significant astigmatism: Discussed elective surgical refractive corrective options  Prior refractive surgery: No      I reviewed the indications, risks, benefits, and alternatives of the proposed surgical procedure. We discussed at length cataracts and the effect of the cataracts on vision.   We discussed the fact that modern cataract surgery is usually successful at alleviating symptoms of glare when the cataract is the causative factor. Other risks were discussed with patient including, but not limited to, failure to improve vision or further loss of vision, bleeding, infection, loss of vision and the remote possibility of complications of anesthesia or need for additional surgery. 1:1000 risk of infection/bleed/loss of eye; 1:100 risk of RD and need for further surgery.  Patient agreed to proceed with surgery.  I provided multiple opportunities for the questions, answered all questions to the best of my ability, and confirmed that my answers and my discussion were understood.   Post-op VA limited because of  retina  pathology    Patient would like surgery in middle to end of July     #   Breast Cancer Right Breast - S/P chemotherapy and lumpectomy 2018  -No ocular involvement.     #   Dry Eye Syndrome, both eyes.    artificial tears  and warm compresses     RTC: POD1 right eye    Janina Ritter MD  PGY3 Ophthalmology    ATTESTATION     Attending Physician Attestation:      Complete documentation of historical and exam elements from today's encounter can be found in the full encounter summary report (not reduplicated in this progress note).  I personally obtained the chief complaint(s) and history of present illness.  I confirmed and edited as necessary the review of systems, past medical/surgical history, family history, social history, and examination findings as documented by others; and I examined the patient myself.  I personally reviewed the relevant tests, images, and reports as documented above.  I formulated and edited as necessary the assessment and plan and discussed the findings and management plan with the patient and family    Cira Herring MD  , Vitreoretinal Surgery  Department of Ophthalmology  [facility]

## 2024-06-12 NOTE — ADDENDUM NOTE
Addended by: OLU RIVERA on: 6/12/2024 02:55 PM     Modules accepted: Orders    
Patient's bilirubin stable at 15.4 (15.5 yesterday), below threshold for phototherapy of 17.3 at 93 hours of life. Patient witnessed to be vigorously breast feeding x2. PMD Dr. maxwell spoken to and will see patient first thing in the morning for follow up.  Leonardo PGY2

## 2024-06-13 ENCOUNTER — TELEPHONE (OUTPATIENT)
Dept: OPHTHALMOLOGY | Facility: CLINIC | Age: 74
End: 2024-06-13
Payer: COMMERCIAL

## 2024-06-13 NOTE — TELEPHONE ENCOUNTER
Left voicemail for patient regarding scheduling surgery with Dr. Herring.  Provided contact number to discuss.  [phone]    Melody Shirley, on 6/13/2024 at 8:24 AM

## 2024-06-20 PROBLEM — H25.11 NUCLEAR SCLEROSIS, RIGHT: Status: ACTIVE | Noted: 2024-06-12

## 2024-06-20 NOTE — TELEPHONE ENCOUNTER
Called patient to schedule surgery with Dr Herring    Spoke with: patient    Date of Surgery: 8/6 (Right)      Patient aware of approximate arrival time: Yes at morning, she would like to move to a later time     Location of surgery: [facility] (Right)      Pre-Op H&P: [facility] at [facility]     Informed patient that they need to call to schedule pre-op H&P with [facility]  within 30 days of surgery date: Yes    Post-Op Appt Dates: 8/7 @ 10:40, 8/14 @ 1:10, 9/5 @ 12:30     Discussed with patient pre-op RN will call 2-3 days prior to surgery with arrival time and instructions:  Yes    Discussed with patient PAC RN will provide arrival time and instructions for surgery at the time of the appointment: [[place] locations only]: Not Applicable      Standard Surgery Packet Sent: Yes 06/20/24  via [employer] Message      Surgical Soap Discussed with Patient: No      Additional Information Sent in Packet: Post-op Appointment Itinerary      Informed patient that they will need an adult  to bring patient home from surgery: Yes  : Patient is aware of the need for an adult          Additional Comments:        All patients questions were answered and was instructed to review surgical packet and call back [phone] with any questions or concerns.       Melody Shirley on 6/20/2024 at 10:47 AM

## 2024-07-22 NOTE — TELEPHONE ENCOUNTER
Called and spoke with representative at [facility] who will route a message to the care team for Dr. Guzman. Referred to TE 06/01/18 from our clinic and hematology and that we are wondering if anticoagulation is standard for the patient's upcoming lumpectomy procedure and if so, what is their recommendations. Call back number to our clinic given. Will await call back for clarification.    Left voice message for patient to call clinic.    Adia Iraheta RN  [facility]   prostate

## 2024-07-24 NOTE — PROGRESS NOTES
HPI:     Patient was last seen on 10/23/23 by me for borderline retinal nerve fiber layer thinning, right eye.  Since then, she has been seen by Dr. Herring for adult-onset vitelliform dystrophy, both eyes and visually significant cataracts.  She is s/p CE left eye 5/8/24.     Since then, Julieta reports that her vision is overall stable.  She has noticed some changes in her RIGHT eye, but reports that she is scheduled for cataract surgery in August.  She denies eye pain or discomfort.  No additional vision or ocular health complaints.    (+)PMHx of ductal carcinoma s/p chemotherapy and lumpectomy 2018.     Today's Testing:  OCT RNFL:  Right eye: Average RNFL 77 microns (previously 76).  Borderline nasal thinning + superonasal thinning.  Left eye: Average RNFL 89 microns (previously 86).  Normal compared to age-matched controls.     GTop:  Right eye: Reliable.  Mean deviation -0.8 dB.  Non-specific defect, improved from previous.  Left eye: Reliable.  Mean deviation -1.8 dB.  Non-specific defect, improved from previous.     Assessment and Plan:  Today, Julieta has stable, borderline retinal nerve fiber layer thinning of the right eye. Her visual field testing is improved today without repeatable defects.  Her color vision is intact and symmetric and her visual acuity is stable (reduced in the setting of vitelliform dystrophy).  I will follow-up with her in 6 months to monitor for changes.     Complete documentation of historical and exam elements from today's encounter can be found in the full encounter summary report (not reduplicated in this progress note). I personally obtained the chief complaint(s) and history of present illness. I confirmed and edited as necessary the review of systems, past medical/surgical history, family history, social history, and examination findings as document by others; and I examined the patient myself. I personally reviewed the relevant tests, images, and reports as documented above. I  formulated and edited as necessary the assessment and plan and discussed the findings and management plan with the patient and family.     Myriam Mares, OD

## 2024-07-24 NOTE — TELEPHONE ENCOUNTER
Patient having pre op completed at [facility] 7/25 fax number given [phone]      Anna C. Schoenecker on 7/24/2024 at 1:13 PM

## 2024-07-25 ENCOUNTER — TRANSFERRED RECORDS (OUTPATIENT)
Dept: HEALTH INFORMATION MANAGEMENT | Facility: CLINIC | Age: 74
End: 2024-07-25
Payer: COMMERCIAL

## 2024-07-30 ENCOUNTER — OFFICE VISIT (OUTPATIENT)
Dept: OPHTHALMOLOGY | Facility: CLINIC | Age: 74
End: 2024-07-30
Payer: COMMERCIAL

## 2024-07-30 DIAGNOSIS — H47.393 OTHER DISORDERS OF OPTIC DISC, BILATERAL: Primary | ICD-10-CM

## 2024-07-30 PROCEDURE — 92083 EXTENDED VISUAL FIELD XM: CPT

## 2024-07-30 PROCEDURE — 92012 INTRM OPH EXAM EST PATIENT: CPT

## 2024-07-30 PROCEDURE — 92133 CPTRZD OPH DX IMG PST SGM ON: CPT

## 2024-07-30 ASSESSMENT — SLIT LAMP EXAM - LIDS
COMMENTS: DERMATOCHALASIS UPPER LID
COMMENTS: DERMATOCHALASIS UPPER LID

## 2024-07-30 ASSESSMENT — CONF VISUAL FIELD
OS_INFERIOR_NASAL_RESTRICTION: 0
OD_SUPERIOR_TEMPORAL_RESTRICTION: 0
OS_NORMAL: 1
OS_SUPERIOR_TEMPORAL_RESTRICTION: 0
OD_NORMAL: 1
OD_INFERIOR_NASAL_RESTRICTION: 0
OD_INFERIOR_TEMPORAL_RESTRICTION: 0
OD_SUPERIOR_NASAL_RESTRICTION: 0
METHOD: COUNTING FINGERS
OS_SUPERIOR_NASAL_RESTRICTION: 0
OS_INFERIOR_TEMPORAL_RESTRICTION: 0

## 2024-07-30 ASSESSMENT — VISUAL ACUITY
OD_CC+: +2
OD_CC: 20/50
METHOD: SNELLEN - LINEAR
OS_SC: 20/40
CORRECTION_TYPE: GLASSES
OS_SC+: +2

## 2024-07-30 ASSESSMENT — EXTERNAL EXAM - LEFT EYE: OS_EXAM: NORMAL

## 2024-07-30 ASSESSMENT — REFRACTION_WEARINGRX
OS_CYLINDER: +1.00
OD_ADD: +2.50
OS_AXIS: 065
OD_CYLINDER: +1.00
OD_SPHERE: -5.75
OS_ADD: +2.50
OD_AXIS: 110
OS_SPHERE: -1.00

## 2024-07-30 ASSESSMENT — TONOMETRY
IOP_METHOD: TONOPEN
OS_IOP_MMHG: 20
OD_IOP_MMHG: 19

## 2024-07-30 ASSESSMENT — EXTERNAL EXAM - RIGHT EYE: OD_EXAM: NORMAL

## 2024-07-30 NOTE — NURSING NOTE
Follow up - borderline retinal nerve fiber layer thinning right eye. VF and OCT today. No changes per patient. Since last visit has have cataract surgery in left eye and has right eye scheduled next month. Will get new glasses afterward.     Patient complains of watering eyes sometimes.     Laura Price  3:25 PM

## 2024-08-04 ASSESSMENT — ASTHMA QUESTIONNAIRES
QUESTION_4 LAST FOUR WEEKS HOW OFTEN HAVE YOU USED YOUR RESCUE INHALER OR NEBULIZER MEDICATION (SUCH AS ALBUTEROL): TWO OR THREE TIMES PER WEEK
QUESTION_1 LAST FOUR WEEKS HOW MUCH OF THE TIME DID YOUR ASTHMA KEEP YOU FROM GETTING AS MUCH DONE AT WORK, SCHOOL OR AT HOME: NONE OF THE TIME
QUESTION_2 LAST FOUR WEEKS HOW OFTEN HAVE YOU HAD SHORTNESS OF BREATH: ONCE OR TWICE A WEEK
QUESTION_5 LAST FOUR WEEKS HOW WOULD YOU RATE YOUR ASTHMA CONTROL: NOT CONTROLLED AT ALL
ACT_TOTALSCORE: 18
QUESTION_3 LAST FOUR WEEKS HOW OFTEN DID YOUR ASTHMA SYMPTOMS (WHEEZING, COUGHING, SHORTNESS OF BREATH, CHEST TIGHTNESS OR PAIN) WAKE YOU UP AT NIGHT OR EARLIER THAN USUAL IN THE MORNING: NOT AT ALL
ACT_TOTALSCORE: 18

## 2024-08-05 ENCOUNTER — OFFICE VISIT (OUTPATIENT)
Dept: PULMONOLOGY | Facility: CLINIC | Age: 74
End: 2024-08-05
Payer: COMMERCIAL

## 2024-08-05 ENCOUNTER — ANESTHESIA EVENT (OUTPATIENT)
Dept: SURGERY | Facility: AMBULATORY SURGERY CENTER | Age: 74
End: 2024-08-05
Payer: COMMERCIAL

## 2024-08-05 VITALS
OXYGEN SATURATION: 98 % | HEIGHT: 60 IN | SYSTOLIC BLOOD PRESSURE: 123 MMHG | HEART RATE: 66 BPM | DIASTOLIC BLOOD PRESSURE: 77 MMHG | BODY MASS INDEX: 24.15 KG/M2 | WEIGHT: 123 LBS

## 2024-08-05 DIAGNOSIS — J30.9 ALLERGIC RHINITIS, UNSPECIFIED SEASONALITY, UNSPECIFIED TRIGGER: ICD-10-CM

## 2024-08-05 DIAGNOSIS — R05.9 COUGH: ICD-10-CM

## 2024-08-05 DIAGNOSIS — J45.909 ASTHMA: ICD-10-CM

## 2024-08-05 DIAGNOSIS — R05.3 CHRONIC COUGH: Primary | ICD-10-CM

## 2024-08-05 LAB — HGB BLD-MCNC: 13.6 G/DL

## 2024-08-05 PROCEDURE — 85018 HEMOGLOBIN: CPT | Mod: QW | Performed by: INTERNAL MEDICINE

## 2024-08-05 PROCEDURE — 99204 OFFICE O/P NEW MOD 45 MIN: CPT | Mod: 25 | Performed by: NURSE PRACTITIONER

## 2024-08-05 PROCEDURE — 94726 PLETHYSMOGRAPHY LUNG VOLUMES: CPT | Mod: 26 | Performed by: INTERNAL MEDICINE

## 2024-08-05 PROCEDURE — 94729 DIFFUSING CAPACITY: CPT | Mod: 26 | Performed by: INTERNAL MEDICINE

## 2024-08-05 PROCEDURE — 99207 PR NO BILLABLE SERVICE THIS VISIT: CPT | Performed by: INTERNAL MEDICINE

## 2024-08-05 PROCEDURE — 94375 RESPIRATORY FLOW VOLUME LOOP: CPT | Mod: 26 | Performed by: INTERNAL MEDICINE

## 2024-08-05 RX ORDER — FLUTICASONE PROPIONATE AND SALMETEROL 250; 50 UG/1; UG/1
1 POWDER RESPIRATORY (INHALATION) 2 TIMES DAILY
Qty: 60 EACH | Refills: 4 | Status: SHIPPED | OUTPATIENT
Start: 2024-08-05

## 2024-08-05 NOTE — PROGRESS NOTES
Pulmonary Outpatient Consult Note  August 5, 2024      Assessment and Plan:   Julieta Rivera is a 73 year old F, former smoker, with history of factor V Leiden, hypothyroidism, osteoporosis, anxiety, varicose veins, breast cancer, cardiomyopathy, and bicuspid aortic valve presenting today for evaluation of presumed asthma.   No prior asthma diagnosis or history of similar symptoms.  Had significant URI symptoms and was exposed to a lot of illness this past spring and winter from grandchildren.  Reports persistent cough with occasional wheeze and shortness of breath.  Symptoms seem to be worse at night.  She has been using as needed albuterol alone to manage symptoms and is hesitant to try any other inhalers.  Minimal remote smoking history.    PFTs today show normal spirometry, lung volumes, and diffusing capacity.  Chest x-ray in 01/2024 was clear.    #. Post infectious inflammatory process versus reactive airway: Given initial illness trigger and response to albuterol this could be due to previous URI.  We did discuss a trial of ICS/LABA as previously prescribed as this will help decrease inflammation in the airways.  She is willing to try this.  Without known history of asthma and normal spirometry and likely small airways disease.  Could consider methacholine challenge in the future if she does not respond to inhaled medications.  Trial of Advair, 1 puff twice daily.  Rinse and gargle after each use.  If cough resolves in a few months plan to peel back therapy.  We did discuss the chance this is a longer term medication or possibly a as needed medication in the future with illness.  Continue albuterol as needed  No improvement at follow-up consider IgE and CBC with differential.  #. HCM: UTD with COVID-vaccine and booster, seasonal flu, Tdap, PCV 13, and PPSV23.  Recommend RSV vaccine.  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    RTC 6 weeks for  sylvie Infante, CNP  Pulmonary Medicine  ______________________________________________________________________________    CC:   Chief Complaint   Patient presents with    Consult     J45.909 (ICD-10-CM) - Asthma  R05.9 (ICD-10-CM) - Cough  Referred by Priyanka Lee, JOSÉ LUIS CNP  PFT 8/5/24        HPI:   Julieta presents today for evaluation of chronic cough.    Had multiple URI illnesses over the winter and spring. She was exposed to a host of different illnesses from there grandchildren.   Prescribed Advair by PCP, never started.  As needed albuterol has been helpful. How often she has needed this has waxed and waned.   Had COVID in 01/2024, given Paxlovid.   Hx of seasonal allergies. She does not use 24 hr antihistamines as they are very drying.   Denies history of asthma, prolonged illness recovery, or frequent bronchitis prior to this year.   Continues to have some wheeze and cough, usually worst at night. Has been fine with albuterol once daily.     Remote smoking history, quit in 1971. 3 pack year history.     PMH:  Patient Active Problem List    Diagnosis Date Noted    Nuclear sclerosis, right 06/12/2024     Priority: Medium    Nuclear cataract, nonsenile 05/02/2024     Priority: Medium    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24) 04/24/2019     Priority: Medium    Cardiomyopathy secondary to drug (H24) 04/24/2019     Priority: Medium     Diagnosed by Dr. Millan in 1995   ECHO 2018        Bicuspid aortic valve 04/24/2019     Priority: Medium    Congenital stenosis of aortic valve 04/24/2019     Priority: Medium    Encounter for other specified aftercare 03/08/2018     Priority: Medium    Malignant neoplasm of upper outer quadrant of breast in female, estrogen receptor negative (H) 02/12/2018     Priority: Medium    Hypothyroidism due to Hashimoto's thyroiditis 01/04/2017     Priority: Medium    Other specified hypothyroidism 10/09/2015     Priority: Medium    CARDIOVASCULAR SCREENING; LDL GOAL  LESS THAN 130 2012     Priority: Medium    Adenoma of large intestine 2011     Priority: Medium    Heterozygous factor V Leiden mutation (H24)      Priority: Medium     PSH:  Past Surgical History:   Procedure Laterality Date     SECTION      x2 ,     COLONOSCOPY N/A 2022    Procedure: COLONOSCOPY, WITH POLYPECTOMY;  Surgeon: Elo Berumen MD;  Location: [facility] OR    COLPOSCOPY CERVIX, BIOPSY CERVIX, ENDOCERVICAL CURETTAGE, COMBINED      EXTRACAPSULAR CATARACT EXTRATION WITH INTRAOCULAR LENS IMPLANT Left 2024    Procedure: LEFT EYE EXTRACTION, CATARACT, EXTRACAPSULAR, WITH INTRAOCULAR LENS IMPLANT INSERTION;  Surgeon: Cira Herring MD;  Location: [facility] OR    INSERT PORT VASCULAR ACCESS N/A 3/5/2018    Procedure: INSERT PORT VASCULAR ACCESS;  Single Lumen Chest Power Port Placement;  Surgeon: Brian Tolbert PA-C;  [facility]    MASTECTOMY SIMPLE, SENTINEL NODE, COMBINED Right 2018    Lumpectomy with SENTINEL NODE;  Right Wire Localized Lumpectomy And Right [place] Lymph Node Biopsy;  Surgeon: Eugene Guzman MD;  [facility]: [facility]    REMOVE PORT VASCULAR ACCESS Left 11/15/2018    Procedure: Left Port Removal;  Surgeon: Tom Quick PA-C;  Location: [facility] OR    TONSILLECTOMY, ADENOIDECTOMY, COMBINED       Current Meds:  Current Outpatient Medications   Medication Sig Dispense Refill    albuterol (PROAIR HFA/PROVENTIL HFA/VENTOLIN HFA) 108 (90 Base) MCG/ACT inhaler       Eyelid Cleansers (OCUSOFT EYELID CLEANSING) PADS Externally apply 1 Box topically 2 times daily 60 each 11    fexofenadine (ALLEGRA) 60 MG tablet Take 1 tablet (60 mg) by mouth daily 90 tablet 3    fluticasone-salmeterol (ADVAIR) 250-50 MCG/ACT inhaler       ketorolac (ACULAR) 0.5 % ophthalmic solution Place 1 drop Into the left eye 4 times daily 5 mL 0    levothyroxine (SYNTHROID/LEVOTHROID) 100 MCG tablet Take 1 tablet (100 mcg) by mouth daily 90 tablet 3     "levothyroxine (SYNTHROID/LEVOTHROID) 75 MCG tablet Take 1 tablet (75 mcg) by mouth daily on Monday, Wednesday, Friday and Sunday. Alternate with 100 mcg dose on Tuesday, Thursday and Saturday 90 tablet 2    moxifloxacin (VIGAMOX) 0.5 % ophthalmic solution Place 1 drop Into the left eye 4 times daily To operative eye 3 mL 0    Omega-3 Fatty Acids (FISH OIL PO) Take 1 capsule by mouth daily.      prednisoLONE acetate (PRED FORTE) 1 % ophthalmic suspension Place 1 drop Into the left eye 4 times daily To operative eye 5 mL 0    UNABLE TO FIND MEDICATION NAME: Bone Strength [place]      vitamin D3 (CHOLECALCIFEROL) 2000 units (50 mcg) tablet Take 1 tablet by mouth daily       No current facility-administered medications for this visit.     Facility-Administered Medications Ordered in Other Visits   Medication Dose Route Frequency Provider Last Rate Last Admin    [START ON 8/6/2024] BUPivacaine 0.75% (pf) 4.5mL + lidocaine 2% (pf) 4.5mL + hyaluronidase (HYLENEX) 150 units   Retrobulbar Once Carlos Perez MD         Allergies:  Allergies   Allergen Reactions    Seasonal Allergies      Social Hx:  Marital status: Lives with   Occupational history: retired 2019, [employer]    service: none  Pets: none currently   Smoking history: 3 pack year history    Family HX: family history includes Cancer in her paternal aunt; Cancer (age of onset: 48) in her sister; Cerebrovascular Disease in her father; Diabetes in her brother and father; Heart Disease in her father and mother; Hypertension in her brother and sister; Obesity in her brother and sister.    ROS:   10-point review performed and notable for the above mentioned symtpoms. The remainder reviewed and negative.     Physical Exam:  /77 (BP Location: Left arm, Patient Position: Sitting, Cuff Size: Adult Regular)   Pulse 66   Ht 1.511 m (4' 11.5\")   Wt 55.8 kg (123 lb)   SpO2 98%   BMI 24.43 kg/m      Physical Exam  Constitutional:       " General: She is not in acute distress.     Appearance: She is not ill-appearing or diaphoretic.   Cardiovascular:      Rate and Rhythm: Normal rate and regular rhythm.      Heart sounds: Murmur heard.   Pulmonary:      Effort: Pulmonary effort is normal. No respiratory distress.      Breath sounds: No wheezing or rhonchi.      Comments: Occasional, dry cough during exam   Musculoskeletal:      Right lower leg: No edema.      Left lower leg: No edema.   Neurological:      Mental Status: She is alert.   Psychiatric:         Behavior: Behavior normal.         PFT's     8/5/2024:      Impression:  Full Pulmonary Function Test is abnormal.  PFTs are consistent with  no  obstructive disease.  There is no hyperinflation.  There is no air-trapping.  Diffusion capacity when corrected for hemoglobin is not reduced.    Labs:   personally reviewed in the EMR.   Latest Reference Range & Units 09/08/21 13:09 03/22/22 12:34 09/06/22 11:42 05/04/23 12:35 09/28/23 13:40 04/15/24 10:43   Absolute Eosinophils 0.0 - 0.7 10e3/uL 0.1 0.1 0.2 0.1 0.1 0.2     Imaging studies: personally reviewed and interpreted. Below are the Radiology interpretations.    CXR 01/2024, [place]

## 2024-08-05 NOTE — PATIENT INSTRUCTIONS
It was a pleasure seeing you in clinic today. This is what we discussed:    START the Advair 1 puff twice daily. Use this every day no matter what for the next few months. Rinse and gargle after each use.   If the cough resolves start using Advair once daily for a few weeks, then every other day for few weeks then stop.   Use your albuterol every 4 hours as needed for cough, shortness of breath, wheeze, or chest tightness.   Follow up 2 months or as needed.   If you have worsening symptoms, questions, or need to speak with the nurse please call [phone].

## 2024-08-06 ENCOUNTER — ANESTHESIA (OUTPATIENT)
Dept: SURGERY | Facility: AMBULATORY SURGERY CENTER | Age: 74
End: 2024-08-06
Payer: COMMERCIAL

## 2024-08-06 ENCOUNTER — HOSPITAL ENCOUNTER (OUTPATIENT)
Facility: AMBULATORY SURGERY CENTER | Age: 74
Discharge: HOME | End: 2024-08-06
Attending: OPHTHALMOLOGY
Payer: COMMERCIAL

## 2024-08-06 VITALS
HEIGHT: 60 IN | TEMPERATURE: 97.2 F | BODY MASS INDEX: 24.15 KG/M2 | WEIGHT: 123 LBS | HEART RATE: 74 BPM | SYSTOLIC BLOOD PRESSURE: 108 MMHG | OXYGEN SATURATION: 100 % | RESPIRATION RATE: 16 BRPM | DIASTOLIC BLOOD PRESSURE: 56 MMHG

## 2024-08-06 DIAGNOSIS — H25.11 NUCLEAR SCLEROSIS, RIGHT: Primary | ICD-10-CM

## 2024-08-06 LAB
DLCOCOR-%PRED-PRE: 95 %
DLCOCOR-PRE: 15.83 ML/MIN/MMHG
DLCOUNC-%PRED-PRE: 96 %
DLCOUNC-PRE: 15.93 ML/MIN/MMHG
DLCOUNC-PRED: 16.58 ML/MIN/MMHG
ERV-%PRED-PRE: 64 %
ERV-PRE: 0.48 L
ERV-PRED: 0.76 L
EXPTIME-PRE: 5.58 SEC
FEF2575-%PRED-PRE: 114 %
FEF2575-PRE: 1.67 L/SEC
FEF2575-PRED: 1.47 L/SEC
FEFMAX-%PRED-PRE: 130 %
FEFMAX-PRE: 6 L/SEC
FEFMAX-PRED: 4.6 L/SEC
FEV1-%PRED-PRE: 114 %
FEV1-PRE: 1.89 L
FEV1FEV6-PRE: 78 %
FEV1FEV6-PRED: 78 %
FEV1FVC-PRE: 78 %
FEV1FVC-PRED: 79 %
FEV1SVC-PRE: 73 %
FEV1SVC-PRED: 65 %
FIFMAX-PRE: 4.28 L/SEC
FRCPLETH-%PRED-PRE: 99 %
FRCPLETH-PRE: 2.45 L
FRCPLETH-PRED: 2.46 L
FVC-%PRED-PRE: 115 %
FVC-PRE: 2.43 L
FVC-PRED: 2.11 L
IC-%PRED-PRE: 138 %
IC-PRE: 2.1 L
IC-PRED: 1.51 L
RVPLETH-%PRED-PRE: 101 %
RVPLETH-PRE: 1.96 L
RVPLETH-PRED: 1.93 L
TLCPLETH-%PRED-PRE: 108 %
TLCPLETH-PRE: 4.55 L
TLCPLETH-PRED: 4.18 L
VA-%PRED-PRE: 96 %
VA-PRE: 3.69 L
VC-%PRED-PRE: 101 %
VC-PRE: 2.58 L
VC-PRED: 2.56 L

## 2024-08-06 PROCEDURE — 66984 XCAPSL CTRC RMVL W/O ECP: CPT | Performed by: NURSE ANESTHETIST, CERTIFIED REGISTERED

## 2024-08-06 PROCEDURE — 99100 ANES PT EXTEME AGE<1 YR&>70: CPT | Performed by: NURSE ANESTHETIST, CERTIFIED REGISTERED

## 2024-08-06 PROCEDURE — 66984 XCAPSL CTRC RMVL W/O ECP: CPT | Performed by: ANESTHESIOLOGY

## 2024-08-06 PROCEDURE — 66984 XCAPSL CTRC RMVL W/O ECP: CPT | Mod: 79 | Performed by: OPHTHALMOLOGY

## 2024-08-06 PROCEDURE — 66984 XCAPSL CTRC RMVL W/O ECP: CPT | Mod: RT

## 2024-08-06 PROCEDURE — 99100 ANES PT EXTEME AGE<1 YR&>70: CPT | Performed by: ANESTHESIOLOGY

## 2024-08-06 DEVICE — LENS CC60WF 15.5 CLAREON UV ASPHERIC BICONVEX IOL: Type: IMPLANTABLE DEVICE | Site: EYE | Status: FUNCTIONAL

## 2024-08-06 RX ORDER — TETRACAINE HYDROCHLORIDE 5 MG/ML
SOLUTION OPHTHALMIC PRN
Status: DISCONTINUED | OUTPATIENT
Start: 2024-08-06 | End: 2024-08-06 | Stop reason: HOSPADM

## 2024-08-06 RX ORDER — ACETAMINOPHEN 325 MG/1
325-650 TABLET ORAL EVERY 6 HOURS PRN
COMMUNITY
End: 2024-08-14

## 2024-08-06 RX ORDER — FENTANYL CITRATE 50 UG/ML
25 INJECTION, SOLUTION INTRAMUSCULAR; INTRAVENOUS
Status: DISCONTINUED | OUTPATIENT
Start: 2024-08-06 | End: 2024-08-07 | Stop reason: HOSPADM

## 2024-08-06 RX ORDER — KETOROLAC TROMETHAMINE 5 MG/ML
1 SOLUTION OPHTHALMIC 4 TIMES DAILY
Qty: 5 ML | Refills: 1 | Status: SHIPPED | OUTPATIENT
Start: 2024-08-06

## 2024-08-06 RX ORDER — BALANCED SALT SOLUTION 6.4; .75; .48; .3; 3.9; 1.7 MG/ML; MG/ML; MG/ML; MG/ML; MG/ML; MG/ML
SOLUTION OPHTHALMIC PRN
Status: DISCONTINUED | OUTPATIENT
Start: 2024-08-06 | End: 2024-08-06 | Stop reason: HOSPADM

## 2024-08-06 RX ORDER — PREDNISOLONE ACETATE 10 MG/ML
1 SUSPENSION/ DROPS OPHTHALMIC 4 TIMES DAILY
Qty: 5 ML | Refills: 1 | Status: SHIPPED | OUTPATIENT
Start: 2024-08-06 | End: 2024-09-18

## 2024-08-06 RX ORDER — PROPOFOL 10 MG/ML
INJECTION, EMULSION INTRAVENOUS PRN
Status: DISCONTINUED | OUTPATIENT
Start: 2024-08-06 | End: 2024-08-06

## 2024-08-06 RX ORDER — OXYCODONE HYDROCHLORIDE 5 MG/1
10 TABLET ORAL EVERY 4 HOURS PRN
Status: DISCONTINUED | OUTPATIENT
Start: 2024-08-06 | End: 2024-08-07 | Stop reason: HOSPADM

## 2024-08-06 RX ORDER — CYCLOPENTOLAT/TROPIC/PHENYLEPH 1%-1%-2.5%
1 DROPS (EA) OPHTHALMIC (EYE)
Status: COMPLETED | OUTPATIENT
Start: 2024-08-06 | End: 2024-08-06

## 2024-08-06 RX ORDER — OFLOXACIN 3 MG/ML
1 SOLUTION/ DROPS OPHTHALMIC 4 TIMES DAILY
Qty: 5 ML | Refills: 0 | Status: SHIPPED | OUTPATIENT
Start: 2024-08-06 | End: 2024-09-18

## 2024-08-06 RX ORDER — LIDOCAINE HYDROCHLORIDE 20 MG/ML
INJECTION, SOLUTION INFILTRATION; PERINEURAL PRN
Status: DISCONTINUED | OUTPATIENT
Start: 2024-08-06 | End: 2024-08-06

## 2024-08-06 RX ORDER — DEXAMETHASONE SODIUM PHOSPHATE 4 MG/ML
INJECTION, SOLUTION INTRA-ARTICULAR; INTRALESIONAL; INTRAMUSCULAR; INTRAVENOUS; SOFT TISSUE PRN
Status: DISCONTINUED | OUTPATIENT
Start: 2024-08-06 | End: 2024-08-06 | Stop reason: HOSPADM

## 2024-08-06 RX ORDER — MOXIFLOXACIN IN NACL,ISO-OS/PF 0.3MG/0.3
SYRINGE (ML) INTRAOCULAR PRN
Status: DISCONTINUED | OUTPATIENT
Start: 2024-08-06 | End: 2024-08-06 | Stop reason: HOSPADM

## 2024-08-06 RX ORDER — DEXAMETHASONE SODIUM PHOSPHATE 10 MG/ML
4 INJECTION, SOLUTION INTRAMUSCULAR; INTRAVENOUS
Status: DISCONTINUED | OUTPATIENT
Start: 2024-08-06 | End: 2024-08-07 | Stop reason: HOSPADM

## 2024-08-06 RX ORDER — ONDANSETRON 2 MG/ML
4 INJECTION INTRAMUSCULAR; INTRAVENOUS EVERY 30 MIN PRN
Status: DISCONTINUED | OUTPATIENT
Start: 2024-08-06 | End: 2024-08-07 | Stop reason: HOSPADM

## 2024-08-06 RX ORDER — OXYCODONE HYDROCHLORIDE 5 MG/1
5 TABLET ORAL EVERY 4 HOURS PRN
Status: DISCONTINUED | OUTPATIENT
Start: 2024-08-06 | End: 2024-08-07 | Stop reason: HOSPADM

## 2024-08-06 RX ORDER — ONDANSETRON 4 MG/1
4 TABLET, ORALLY DISINTEGRATING ORAL EVERY 30 MIN PRN
Status: DISCONTINUED | OUTPATIENT
Start: 2024-08-06 | End: 2024-08-07 | Stop reason: HOSPADM

## 2024-08-06 RX ORDER — ACETAMINOPHEN 325 MG/1
975 TABLET ORAL ONCE
Status: COMPLETED | OUTPATIENT
Start: 2024-08-06 | End: 2024-08-06

## 2024-08-06 RX ORDER — NALOXONE HYDROCHLORIDE 0.4 MG/ML
0.1 INJECTION, SOLUTION INTRAMUSCULAR; INTRAVENOUS; SUBCUTANEOUS
Status: DISCONTINUED | OUTPATIENT
Start: 2024-08-06 | End: 2024-08-07 | Stop reason: HOSPADM

## 2024-08-06 RX ORDER — PROPARACAINE HYDROCHLORIDE 5 MG/ML
1 SOLUTION/ DROPS OPHTHALMIC ONCE
Status: COMPLETED | OUTPATIENT
Start: 2024-08-06 | End: 2024-08-06

## 2024-08-06 RX ORDER — LIDOCAINE 40 MG/G
CREAM TOPICAL
Status: DISCONTINUED | OUTPATIENT
Start: 2024-08-06 | End: 2024-08-07 | Stop reason: HOSPADM

## 2024-08-06 RX ORDER — SODIUM CHLORIDE, SODIUM LACTATE, POTASSIUM CHLORIDE, CALCIUM CHLORIDE 600; 310; 30; 20 MG/100ML; MG/100ML; MG/100ML; MG/100ML
INJECTION, SOLUTION INTRAVENOUS CONTINUOUS
Status: DISCONTINUED | OUTPATIENT
Start: 2024-08-06 | End: 2024-08-07 | Stop reason: HOSPADM

## 2024-08-06 RX ADMIN — SODIUM CHLORIDE, SODIUM LACTATE, POTASSIUM CHLORIDE, CALCIUM CHLORIDE: 600; 310; 30; 20 INJECTION, SOLUTION INTRAVENOUS at 10:02

## 2024-08-06 RX ADMIN — Medication 1 DROP: at 10:09

## 2024-08-06 RX ADMIN — Medication 1 DROP: at 09:59

## 2024-08-06 RX ADMIN — Medication 1 DROP: at 10:04

## 2024-08-06 RX ADMIN — PROPARACAINE HYDROCHLORIDE 1 DROP: 5 SOLUTION/ DROPS OPHTHALMIC at 09:59

## 2024-08-06 RX ADMIN — PROPOFOL 60 MG: 10 INJECTION, EMULSION INTRAVENOUS at 11:56

## 2024-08-06 RX ADMIN — LIDOCAINE HYDROCHLORIDE 70 MG: 20 INJECTION, SOLUTION INFILTRATION; PERINEURAL at 11:55

## 2024-08-06 RX ADMIN — ACETAMINOPHEN 975 MG: 325 TABLET ORAL at 10:02

## 2024-08-06 NOTE — ANESTHESIA POSTPROCEDURE EVALUATION
Patient: Julieta Rivera    Procedure: Procedure(s):  RIGHT EYE PHACOEMULSIFICATION, CATARACT, WITH INTRAOCULAR LENS IMPLANT       Anesthesia Type:  MAC    Note:  Disposition: Outpatient   Postop Pain Control: Uneventful            Sign Out: Well controlled pain   PONV: No   Neuro/Psych: Uneventful            Sign Out: Acceptable/Baseline neuro status   Airway/Respiratory: Uneventful            Sign Out: Acceptable/Baseline resp. status   CV/Hemodynamics: Uneventful            Sign Out: Acceptable CV status; No obvious hypovolemia; No obvious fluid overload   Other NRE: NONE   DID A NON-ROUTINE EVENT OCCUR? No       Last vitals:  Vitals Value Taken Time   /56 08/06/24 1245   Temp 36.2  C (97.2  F) 08/06/24 1245   Pulse     Resp 16 08/06/24 1245   SpO2 100 % 08/06/24 1245       Electronically Signed By: Cintia Mays MD  August 6, 2024  2:38 PM

## 2024-08-06 NOTE — ANESTHESIA PREPROCEDURE EVALUATION
Anesthesia Pre-Procedure Evaluation    Patient: Julieta Rivera   MRN: [id number] : 1950        Procedure : Procedure(s):  RIGHT EYE PHACOEMULSIFICATION, CATARACT, WITH INTRAOCULAR LENS IMPLANT          Past Medical History:   Diagnosis Date     Abnormal Pap smear 1970s    normal since     Breast cancer (H) 2018     Factor V Leiden (H24)      Hypothyroidism fall       Past Surgical History:   Procedure Laterality Date      SECTION      x2 ,      COLONOSCOPY N/A 2022    Procedure: COLONOSCOPY, WITH POLYPECTOMY;  Surgeon: Elo Berumen MD;  Location: [facility] OR     COLPOSCOPY CERVIX, BIOPSY CERVIX, ENDOCERVICAL CURETTAGE, COMBINED       EXTRACAPSULAR CATARACT EXTRATION WITH INTRAOCULAR LENS IMPLANT Left 2024    Procedure: LEFT EYE EXTRACTION, CATARACT, EXTRACAPSULAR, WITH INTRAOCULAR LENS IMPLANT INSERTION;  Surgeon: Cira Herring MD;  Location: [facility] OR     INSERT PORT VASCULAR ACCESS N/A 3/5/2018    Procedure: INSERT PORT VASCULAR ACCESS;  Single Lumen Chest Power Port Placement;  Surgeon: Brian Tolbert PA-C;  [facility]     MASTECTOMY SIMPLE, SENTINEL NODE, COMBINED Right 2018    Lumpectomy with SENTINEL NODE;  Right Wire Localized Lumpectomy And Right [place] Lymph Node Biopsy;  Surgeon: Eugene Guzman MD;  [facility]     REMOVE PORT VASCULAR ACCESS Left 11/15/2018    Procedure: Left Port Removal;  Surgeon: Tom Quick PA-C;  [facility]     TONSILLECTOMY, ADENOIDECTOMY, COMBINED        Allergies   Allergen Reactions     Seasonal Allergies       Social History     Tobacco Use     Smoking status: Former     Current packs/day: 0.00     Average packs/day: 1.5 packs/day for 2.0 years (3.0 ttl pk-yrs)     Types: Cigarettes     Start date: 1969     Quit date:      Years since quittin.6     Passive exposure: Past (Both parents were smokers)     Smokeless tobacco: Never     Tobacco comments:     age [age]    Substance Use Topics     Alcohol use: No     Comment: no alcohol since 2/2018      Wt Readings from Last 1 Encounters:   08/06/24 55.8 kg (123 lb)        Anesthesia Evaluation   Pt has had prior anesthetic. Type: General and MAC.        ROS/MED HX  ENT/Pulmonary:  - neg pulmonary ROS     Neurologic:  - neg neurologic ROS     Cardiovascular:     (+)  - -   -  - -           RIVERS.                           METS/Exercise Tolerance: 3 - Able to walk 1-2 blocks without stopping    Hematologic:  - neg hematologic  ROS     Musculoskeletal:  - neg musculoskeletal ROS     GI/Hepatic:  - neg GI/hepatic ROS     Renal/Genitourinary:  - neg Renal ROS     Endo:     (+)          thyroid problem, hypothyroidism,           Psychiatric/Substance Use:  - neg psychiatric ROS     Infectious Disease:  - neg infectious disease ROS     Malignancy:   (+) Malignancy, History of Breast.Breast CA Remission status post Surgery and Chemo.      Other:  - neg other ROS          Physical Exam    Airway        Mallampati: II   TM distance: > 3 FB   Neck ROM: full   Mouth opening: > 3 cm    Respiratory Devices and Support         Dental       (+) Modest Abnormalities - crowns, retainers, 1 or 2 missing teeth      Cardiovascular          Rhythm and rate: regular and normal   (+) murmur       Pulmonary   pulmonary exam normal        breath sounds clear to auscultation         OUTSIDE LABS:  CBC:   Lab Results   Component Value Date    WBC 6.8 04/15/2024    WBC 6.2 09/28/2023    HGB 13.6 08/05/2024    HGB 13.2 04/15/2024    HCT 39.2 04/15/2024    HCT 38.5 09/28/2023     04/15/2024     09/28/2023     BMP:   Lab Results   Component Value Date     04/15/2024     09/28/2023    POTASSIUM 3.6 04/15/2024    POTASSIUM 4.2 09/28/2023    CHLORIDE 102 04/15/2024    CHLORIDE 105 09/28/2023    CO2 23 04/15/2024    CO2 24 09/28/2023    BUN 12.5 04/15/2024    BUN 13.0 09/28/2023    CR 0.71 04/15/2024    CR 0.70 09/28/2023    GLC 77 04/15/2024  "   GLC 93 09/28/2023     COAGS:   Lab Results   Component Value Date    INR 0.99 11/15/2018     POC: No results found for: \"BGM\", \"HCG\", \"HCGS\"  HEPATIC:   Lab Results   Component Value Date    ALBUMIN 4.3 04/15/2024    PROTTOTAL 7.0 04/15/2024    ALT 12 04/15/2024    AST 23 04/15/2024    ALKPHOS 55 04/15/2024    BILITOTAL 0.6 04/15/2024     OTHER:   Lab Results   Component Value Date    LACT 1.1 03/12/2018    A1C 5.1 11/22/2013    CINDI 9.3 04/15/2024    TSH 2.09 11/06/2023    T4 1.64 (H) 08/02/2022       Anesthesia Plan    ASA Status:  3    NPO Status:  NPO Appropriate    Anesthesia Type: MAC.     - Reason for MAC: immobility needed              Consents    Anesthesia Plan(s) and associated risks, benefits, and realistic alternatives discussed. Questions answered and patient/representative(s) expressed understanding.     - Discussed:     - Discussed with:  Patient            Postoperative Care    Pain management: Multi-modal analgesia.   PONV prophylaxis: Ondansetron (or other 5HT-3)     Comments:               Cintia Mays MD    I have reviewed the pertinent notes and labs in the chart from the past 30 days and (re)examined the patient.  Any updates or changes from those notes are reflected in this note.                  "

## 2024-08-06 NOTE — OP NOTE
SURGEON:  MISTI DIXON   PREOPERATIVE DIAGNOSIS:  visually significant nucleosclerotic cataract right eye   POSTOPERATIVE DIAGNOSIS: same  NAME OF THE PROCEDURE: Phacoemulsification with intraocular lens implantation  ANESTHESIA: Monitored anesthesia care and peribulbar block   COMPLICATIONS: none  INDICATIONS: Julieta Rivera is a 73 year old with diagnosis of visually significant cataract, here for cataract surgery    DESCRIPTION OF THE PROCEDURE:  The patient was taken to the operative room where intravenous sedation was administered and a peribulbar block consisting of a 1:1 mixture of 2%lidocaine and 0.75% marcaine with epinephrine and wydase, was administered to the operative eye with adequate anesthesia and akinesia.    The operative eye was prepped and draped in the usual sterile surgical fashion for ophthalmic surgery, including the installation of one drop of 5% Povidone Iodine.  A sterile drape was placed over the face and body and a lid speculum was inserted.      With the use of a Supersharp blade and 0.12 forceps, a paracentesis was created at the 2 o'clock position, and viscoelastic was injected into the anterior chamber using a canula.  A 2.5 mm keratome was then used to construct a clear corneal incision at the 10 o'clock position.  Using Utrata forceps and cystotome needle, a continuous curvilinear capsulorrhexis was created and hydrodissection was undertaken with the use of BSS.  The nucleus was found to be freely mobile and then removed by phacoemulsification using a divide and conquer technique.  The remaining elements of cortex were then removed with irrigation/aspiration.  An IOL,was injected into the capsular bag and was rotated into a good position with a Sinskey hook. The remaining elements of viscoelastic were then removed with irrigation/aspiration. The wounds were hydrodissect and were watertight.    The lid speculum was removed.  Subconjunctival injection of Dexamethasone and Ancef  were administered. The eye was cleaned with wet and dry gauze. Maxitrol ointment was placed on the eye.  A patch and Zavaleta shield were placed over the eye.  The patient was discharge in stable condition having tolerated the procedure well      Implant Name Type Inv. Item Serial No.  Lot No. LRB No. Used Action   LENS CC60WF 15.5 [employer] UV ASPHERIC BICONVEX IOL - [id number] Lens/Eye Implant LENS CC60WF 15.5 [employer] UV ASPHERIC BICONVEX IOL [id number] [employer]  Right 1 Implanted

## 2024-08-06 NOTE — DISCHARGE INSTRUCTIONS
Dr. Cria Herring  [facility]  [phone]  Post Operative Cataract Instructions    If you have a gauze eye patch on, please do not remove it until it is removed by your physician at your first post-operative visit.  You will start your eye drops the next day.    Wear the clear eye shield when sleeping for protection for 5 days.    Do not rub the operated eye.    Light sensitivity may be noticed. Sunglasses may be worn for comfort.    Some discomfort and irritation may be noticed. Acetaminophen (Tylenol) or Ibuprofen (Advil) may be taken for discomfort. If pain persists please call Dr. Herring's office.    Keep the operated eye dry. You may wash your hair, bathe or shower, but keep the operated eye closed while doing so.     If you take glaucoma medications, bring them with you to the clinic on your first post operative visit.    Bring your prescribed eye drops with you to your scheduled post-operative appointment.    Use medication exactly as prescribed by your doctor. You may restart your regular home medications.     Call Dr. Herring's office at [phone] if any of the following should occur:    Any sudden vision changes, including decreased vision  Nausea or severe headache  Increase in pain not controlled  Signs of infection (pus, increasing redness or tenderness)  Severe sensitivity to light    [facility]  Home Care Following Anesthesia  For 24 hours after surgery:  Get plenty of rest.  A responsible adult must stay with you for at least 24 hours after you leave the surgery center.  Do not drive or use heavy equipment.  If you have weakness or tingling, don't drive or use heavy equipment until this feeling goes away.   Do not drink alcohol.   Avoid strenuous or risky activities.  Ask for help when climbing stairs.  You may feel lightheaded.  IF so, sit for a few minutes before standing.  Have someone help you get up.   If you have nausea (feel sick  to your stomach): Drink only clear liquids such as apple juice, ginger ale, broth or 7-Up.  Rest may also help.  Be sure to drink enough fluids.  Move to a regular diet as you feel able.   You may have a slight fever.  Call the doctor if your fever is over 100 F (37.7 C) (taken under the tongue) or lasts longer than 24 hours.  You may have a dry mouth, a sore throat, muscle aches or trouble sleeping. These should go away after 24 hours.  Do not make important or legal decisions.   It is recommended to avoid smoking.               Tips for taking pain medications  To get the best pain relief possible, remember these points:  Take pain medications as directed, before pain becomes severe.  Pain medication can upset your stomach: taking it with food may help.  Constipation is a common side effect of pain medication. Drink plenty of  fluids.  Eat foods high in fiber. Take a stool softener if recommended by your doctor or pharmacist.  Do not drink alcohol, drive or operate machinery while taking pain medications.  Ask about other ways to control pain, such as with heat, ice or relaxation.    Tylenol/Acetaminophen Consumption    If you feel your pain relief is insufficient, you may take Tylenol/Acetaminophen in addition to your narcotic pain medication.   Be careful not to exceed 4,000 mg of Tylenol/Acetaminophen in a 24 hour period from all sources.  If you are taking extra strength Tylenol/acetaminophen (500 mg), the maximum dose is 8 tablets in 24 hours.  If you are taking regular strength acetaminophen (325 mg), the maximum dose is 12 tablets in 24 hours.    Call a doctor for any of the following:  Signs of infection (fever, growing tenderness at the surgery site, a large amount of drainage or bleeding, severe pain, foul-smelling drainage, redness, swelling).  It has been over 8 to 10 hours since surgery and you are still not able to urinate (pass water).  Headache for over 24 hours.  Numbness, tingling or weakness the  day after surgery (if you had spinal anesthesia).  Signs of Covid-19 infection (temperature over 100 degrees, shortness of breath, cough, loss of taste/smell, generalized body aches, persistent headache, chills, sore throat, nausea/vomiting/diarrhea)  Your doctor is:       Dr. Cira Herring, Ophthalmology: [phone]               Or dial [phone] and ask for the resident on call for:  Ophthalmology  For emergency care, call the:  [facility]:  [phone] (TTY for hearing impaired: [phone])

## 2024-08-06 NOTE — ANESTHESIA CARE TRANSFER NOTE
Patient: Julieta Rivera    Procedure: Procedure(s):  RIGHT EYE PHACOEMULSIFICATION, CATARACT, WITH INTRAOCULAR LENS IMPLANT       Diagnosis: Nuclear sclerosis, right [H25.11]  Diagnosis Additional Information: No value filed.    Anesthesia Type:   MAC     Note:    Oropharynx: spontaneously breathing and oropharynx clear of all foreign objects  Level of Consciousness: awake  Oxygen Supplementation: room air        Vital Signs Stable: post-procedure vital signs reviewed and stable  Report to RN Given: handoff report given  Patient transferred to: Phase II  Comments: Uneventful transport   Report to RN  Exchanging well; color natl  Pt comfortable  Pt responds appropriately to command  IV patent  Lips/teeth/dentition as preop status  Questions answered      Handoff Report: Identifed the Patient, Identified the Reponsible Provider, Reviewed the pertinent medical history, Discussed the surgical course, Reviewed Intra-OP anesthesia mangement and issues during anesthesia, Set expectations for post-procedure period and Allowed opportunity for questions and acknowledgement of understanding      Vitals:  Vitals Value Taken Time   /66 08/06/24 1228   Temp 36.1  C (96.9  F) 08/06/24 1228   Pulse 66    Resp 16 08/06/24 1228   SpO2 98 % 08/06/24 1228       Electronically Signed By: JOSÉ LUIS SAHNI CRNA  August 6, 2024  12:28 PM

## 2024-08-07 ENCOUNTER — OFFICE VISIT (OUTPATIENT)
Dept: OPHTHALMOLOGY | Facility: CLINIC | Age: 74
End: 2024-08-07
Attending: OPHTHALMOLOGY
Payer: COMMERCIAL

## 2024-08-07 DIAGNOSIS — Z48.810 AFTERCARE FOLLOWING SURGERY OF A SENSORY ORGAN: Primary | ICD-10-CM

## 2024-08-07 PROCEDURE — 99024 POSTOP FOLLOW-UP VISIT: CPT | Performed by: OPHTHALMOLOGY

## 2024-08-07 PROCEDURE — G0463 HOSPITAL OUTPT CLINIC VISIT: HCPCS | Performed by: OPHTHALMOLOGY

## 2024-08-07 ASSESSMENT — TONOMETRY
IOP_METHOD: TONOPEN
OD_IOP_MMHG: 13
OS_IOP_MMHG: 9

## 2024-08-07 ASSESSMENT — VISUAL ACUITY
OS_PH_SC: 20/40
OS_SC+: +1
OS_SC: 20/50
OD_SC: 20/40
OD_SC+: -1
METHOD: SNELLEN - LINEAR

## 2024-08-07 ASSESSMENT — SLIT LAMP EXAM - LIDS: COMMENTS: DERMATOCHALASIS UPPER LID

## 2024-08-07 ASSESSMENT — EXTERNAL EXAM - RIGHT EYE: OD_EXAM: NORMAL

## 2024-08-07 ASSESSMENT — EXTERNAL EXAM - LEFT EYE: OS_EXAM: NORMAL

## 2024-08-07 NOTE — NURSING NOTE
Chief Complaints and History of Present Illnesses   Patient presents with    Post Op (Ophthalmology) Right Eye     Chief Complaint(s) and History of Present Illness(es)       Post Op (Ophthalmology) Right Eye              Laterality: right eye    Associated symptoms: foreign body sensation.  Negative for dryness, eye pain, itching and burning    Pain scale: 0/10              Comments    Julieta is here one day post right cataract surgery with IOL implant. She said the tape was a little bothersome but she denies pain.    Jensen Price COT 10:50 AM August 7, 2024

## 2024-08-07 NOTE — PROGRESS NOTES
"   CC: POD1 Cataract extraction intraocular lens right eye 08/06/24   Cataract extraction intraocular lens left eye 5.7.24  Interval: slept well  No eye pain    HPI: Julieta Rivera is a  73 year old  patient with history as listed below who presents as a referral from Dr. Tony for evaluation of macular lesions of both eyes. She reports that her vision has felt blurred in both eyes for quite some time.     First noticed worsening in 2018 when she was receiving chemotherapy ductal carcinoma of right breast. She first noticed droopiness of left eyelid during this time as well.     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  -Hypothyroidism   -Hashimoto's thyroiditis  -Cardiomyopathy secondary to drug  -Congenital stenosis of aortic valve  -Heterozygous factor V Leiden mutation  - Ductal carcinoma of right breast    Retinal Imaging:  Optical Coherence Tomography 06/12/24   RE: Subfoveal deposit of hyperreflective material; PVD  LE:Subfoveal deposit of hyperreflective material with trace potential sub-RPE fluid; Posterior vitreous detachment (PVD)    Intraocular lens calcs: reliable 05/02/24     Assessment & Plan:    # POD1 Cataract extraction intraocular lens right eye 08/06/24     Ketorolac (gray top) four times a day    Predforte  (pink top) four times a day  (shake the bottle before)  Ofloxacin (tan top) four times a day    Put the eyedrops 5 minutes a part  Eye shield or glasses at all times x 3 weeks  Sleep with the shield  No heavy lifting   Follow-up in one week  Retina detachment and endophthalmitis precautions were discussed with the patient (increased blurry vision, drainage, new flashes, floaters or a curtain in the visual field) and was asked to return if any of the those occur    What to watch out for:  If you experience any of the following \"RSVP Symptoms\", you should call immediately:  Worsening Redness  Worsening Sensitivity to light  Worsening " Vision, including new flashing lights or floaters  Worsening Pain, including nausea/vomiting    - good postoperative appearance  - done with eye drops    # s/p CE with PCIOL left eye 5.7.24  -CC60WF 18.5D left eye    #Adult-onset Vitelliform Dystrophy both eyes  Differential diagnosis atypical dry Age related macular degeneration  Less likely best disease  -less likely to be CNVM given exam and OCT findings OCT-A findings  Recommend to observe  05/02/24 left eye with race potential sub-RPE fluid; will observe closely    #Posterior vitreous detachment (PVD) both eyes  -No Retinal tear or Retinal detachment  on exam today   -Discussed Retinal detachment /Retinal tear symptoms    #   Breast Cancer Right Breast - S/P chemotherapy and lumpectomy 2018  -No ocular involvement.     #   Dry Eye Syndrome, both eyes.    artificial tears  and warm compresses     RTC: POW1  Retina detachment precautions were discussed with the patient (presence or increased in flashes, floaters or a curtain in the visual field) and was asked to return if any of the those occur   ~~~~~~~~~~~~~~~~~~~~~~~~~~~~~~~~~~   Complete documentation of historical and exam elements from today's encounter can be found in the full encounter summary report (not reduplicated in this progress note).  I personally obtained the chief complaint(s) and history of present illness.  I confirmed and edited as necessary the review of systems, past medical/surgical history, family history, social history, and examination findings as documented by others; and I examined the patient myself.  I personally reviewed the relevant tests, images, and reports as documented above.  I formulated and edited as necessary the assessment and plan and discussed the findings and management plan with the patient and family    Cira Herring MD  Professor of Ophthalmology  Vitreo-Retinal surgeon   Department of Ophthalmology and Visual Neurosciences   [facility]  Phone:  [phone]   Fax: [phone]

## 2024-08-14 ENCOUNTER — OFFICE VISIT (OUTPATIENT)
Dept: OPHTHALMOLOGY | Facility: CLINIC | Age: 74
End: 2024-08-14
Attending: OPHTHALMOLOGY
Payer: COMMERCIAL

## 2024-08-14 DIAGNOSIS — H26.492 PCO (POSTERIOR CAPSULAR OPACIFICATION), LEFT: Primary | ICD-10-CM

## 2024-08-14 PROCEDURE — 66821 AFTER CATARACT LASER SURGERY: CPT | Mod: LT | Performed by: OPHTHALMOLOGY

## 2024-08-14 PROCEDURE — 99207 PR DROP WITH A PROCEDURE: CPT | Performed by: OPHTHALMOLOGY

## 2024-08-14 ASSESSMENT — VISUAL ACUITY
OS_SC: 20/50
OD_SC+: -1
OS_PH_SC: 20/40
OD_SC: 20/30
METHOD: SNELLEN - LINEAR

## 2024-08-14 ASSESSMENT — EXTERNAL EXAM - LEFT EYE: OS_EXAM: NORMAL

## 2024-08-14 ASSESSMENT — SLIT LAMP EXAM - LIDS: COMMENTS: DERMATOCHALASIS UPPER LID

## 2024-08-14 ASSESSMENT — TONOMETRY
IOP_METHOD: TONOPEN
OD_IOP_MMHG: 18
OS_IOP_MMHG: 15

## 2024-08-14 ASSESSMENT — EXTERNAL EXAM - RIGHT EYE: OD_EXAM: NORMAL

## 2024-08-14 NOTE — PROGRESS NOTES
CC: POW1 Cataract extraction intraocular lens right eye 08/06/24   Cataract extraction intraocular lens left eye 5.7.24  Interval: VA improving left eye; but blurrier than the right eye   No eye pain    HPI: Julieta Rivera is a  73 year old  patient with history as listed below who presents as a referral from Dr. Tony for evaluation of macular lesions of both eyes. She reports that her vision has felt blurred in both eyes for quite some time.     First noticed worsening in 2018 when she was receiving chemotherapy ductal carcinoma of right breast. She first noticed droopiness of left eyelid during this time as well.     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  -Hypothyroidism   -Hashimoto's thyroiditis  -Cardiomyopathy secondary to drug  -Congenital stenosis of aortic valve  -Heterozygous factor V Leiden mutation  - Ductal carcinoma of right breast    Retinal Imaging:  Optical Coherence Tomography 06/12/24   RE: Subfoveal deposit of hyperreflective material; PVD  LE:Subfoveal deposit of hyperreflective material with trace potential sub-RPE fluid; Posterior vitreous detachment (PVD)    Intraocular lens calcs: reliable 05/02/24     Assessment & Plan:    # status post Cataract extraction intraocular lens right eye 08/06/24     Ketorolac (gray top) Three times a day x 1 week, then twice a day x 1 week and then once a day till finish  Predforte  (pink top) fThree times a day x 1 week, then twice a day x 1 week and then once a day till finish (shake the bottle before)  Ofloxacin (tan top) ok to stop    Put the eyedrops 5 minutes a part  Eye shield or glasses at all times x 3 weeks  Retina detachment and endophthalmitis precautions were discussed with the patient (increased blurry vision, drainage, new flashes, floaters or a curtain in the visual field) and was asked to return if any of the those occur    What to watch out for:  If you experience any of the  "following \"RSVP Symptoms\", you should call immediately:  Worsening Redness  Worsening Sensitivity to light  Worsening Vision, including new flashing lights or floaters  Worsening Pain, including nausea/vomiting    - good postoperative appearance  - done with eye drops    # s/p CE with PCIOL left eye 5.7.24  -CC60WF 18.5D left eye  With posterior capsular opacity (PCO)   Recommend yag laser 08/14/24   Risks, benefits and alternatives discussed with patient and agreed to proceed 08/14/24   No complications     #Adult-onset Vitelliform Dystrophy both eyes  Differential diagnosis atypical dry Age related macular degeneration  Less likely best disease  -less likely to be CNVM given exam and OCT findings OCT-A findings  Recommend to observe  05/02/24 left eye with race potential sub-RPE fluid; will observe closely    #Posterior vitreous detachment (PVD) both eyes  -No Retinal tear or Retinal detachment  on exam today   -Discussed Retinal detachment /Retinal tear symptoms    #   Breast Cancer Right Breast - S/P chemotherapy and lumpectomy 2018  -No ocular involvement.     #   Dry Eye Syndrome, both eyes.    artificial tears  and warm compresses     RTC: follow up in 2 weeks with Optical Coherence Tomography; prescription and dilation   Retina detachment precautions were discussed with the patient (presence or increased in flashes, floaters or a curtain in the visual field) and was asked to return if any of the those occur   ~~~~~~~~~~~~~~~~~~~~~~~~~~~~~~~~~~   Complete documentation of historical and exam elements from today's encounter can be found in the full encounter summary report (not reduplicated in this progress note).  I personally obtained the chief complaint(s) and history of present illness.  I confirmed and edited as necessary the review of systems, past medical/surgical history, family history, social history, and examination findings as documented by others; and I examined the patient myself.  I personally " reviewed the relevant tests, images, and reports as documented above.  I formulated and edited as necessary the assessment and plan and discussed the findings and management plan with the patient and family    Cira Herring MD  Professor of Ophthalmology  Vitreo-Retinal surgeon   Department of Ophthalmology and Visual Neurosciences   [facility]  Phone: [phone]   Fax: [phone]

## 2024-08-14 NOTE — NURSING NOTE
Chief Complaints and History of Present Illnesses   Patient presents with    Post Op (Ophthalmology) Right Eye     Chief Complaint(s) and History of Present Illness(es)       Post Op (Ophthalmology) Right Eye              Laterality: right eye    Onset: weeks ago    Quality: States va is the same since last visit      Associated symptoms: floaters (more in the OS).  Negative for photophobia and flashes    Treatments tried: eye drops    Pain scale: 0/10              Comments    S/P Cataract extraction intraocular lens right eye 08/06/24   Ketorolac (gray top) four times a day  right eye   Predforte  (pink top) four times a day right eye   Ofloxacin (tan top) four times a day right eye   Jaqui Dinero COT 1:08 PM August 14, 2024

## 2024-08-28 ENCOUNTER — OFFICE VISIT (OUTPATIENT)
Dept: OPHTHALMOLOGY | Facility: CLINIC | Age: 74
End: 2024-08-28
Attending: OPHTHALMOLOGY
Payer: COMMERCIAL

## 2024-08-28 DIAGNOSIS — H35.54 MACULAR VITELLIFORM LESION: Primary | ICD-10-CM

## 2024-08-28 PROCEDURE — 92134 CPTRZ OPH DX IMG PST SGM RTA: CPT | Performed by: OPHTHALMOLOGY

## 2024-08-28 PROCEDURE — 92015 DETERMINE REFRACTIVE STATE: CPT

## 2024-08-28 PROCEDURE — 99024 POSTOP FOLLOW-UP VISIT: CPT | Performed by: OPHTHALMOLOGY

## 2024-08-28 PROCEDURE — G0463 HOSPITAL OUTPT CLINIC VISIT: HCPCS | Performed by: OPHTHALMOLOGY

## 2024-08-28 ASSESSMENT — TONOMETRY
OD_IOP_MMHG: 14
OS_IOP_MMHG: 13
IOP_METHOD: TONOPEN

## 2024-08-28 ASSESSMENT — REFRACTION_WEARINGRX
OD_AXIS: 110
OS_AXIS: 065
OD_SPHERE: -5.75
OS_SPHERE: -1.00
OD_CYLINDER: +1.00
OS_ADD: +2.50
OS_CYLINDER: +1.00
OD_ADD: +2.50

## 2024-08-28 ASSESSMENT — VISUAL ACUITY
OS_SC: 20/30
OD_SC+: +1
OS_SC+: +1
OD_SC: 20/30
METHOD: SNELLEN - LINEAR

## 2024-08-28 ASSESSMENT — CONF VISUAL FIELD
OD_NORMAL: 1
OS_NORMAL: 1
OS_INFERIOR_NASAL_RESTRICTION: 0
OD_SUPERIOR_TEMPORAL_RESTRICTION: 0
OD_SUPERIOR_NASAL_RESTRICTION: 0
OS_SUPERIOR_TEMPORAL_RESTRICTION: 0
OD_INFERIOR_TEMPORAL_RESTRICTION: 0
OS_INFERIOR_TEMPORAL_RESTRICTION: 0
METHOD: COUNTING FINGERS
OD_INFERIOR_NASAL_RESTRICTION: 0
OS_SUPERIOR_NASAL_RESTRICTION: 0

## 2024-08-28 ASSESSMENT — REFRACTION_MANIFEST
OD_SPHERE: PLANO
OS_CYLINDER: +0.50
OD_ADD: +2.75
OS_SPHERE: -0.50
OD_CYLINDER: SPHERE
OS_AXIS: 057
OS_ADD: +2.75

## 2024-08-28 ASSESSMENT — SLIT LAMP EXAM - LIDS: COMMENTS: DERMATOCHALASIS UPPER LID

## 2024-08-28 ASSESSMENT — EXTERNAL EXAM - LEFT EYE: OS_EXAM: NORMAL

## 2024-08-28 ASSESSMENT — EXTERNAL EXAM - RIGHT EYE: OD_EXAM: NORMAL

## 2024-08-28 NOTE — NURSING NOTE
Chief Complaints and History of Present Illnesses   Patient presents with    Pseudophakia Follow Up     2 week follow up      Chief Complaint(s) and History of Present Illness(es)       Pseudophakia Follow Up              Comments: 2 week follow up               Comments    Pt states vision has been ok, patient feels that her LE is not as clear as her RE.. No new flashes or floaters. No new redness or dryness.   Some tearing from LE that comes and goes, no changes.    BHAVNA Ortega August 28, 2024 1:15 PM

## 2024-08-28 NOTE — PROGRESS NOTES
"   CC: status post yag left eye   Cataract extraction intraocular lens right eye 08/06/24   Cataract extraction intraocular lens left eye 5.7.24  Interval: VA improving left eye  No eye pain    HPI: Julieta Rivera is a  73 year old  patient with history as listed below who presents as a referral from Dr. Tony for evaluation of macular lesions of both eyes. She reports that her vision has felt blurred in both eyes for quite some time.     First noticed worsening in 2018 when she was receiving chemotherapy ductal carcinoma of right breast. She first noticed droopiness of left eyelid during this time as well.     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  -Hypothyroidism   -Hashimoto's thyroiditis  -Cardiomyopathy secondary to drug  -Congenital stenosis of aortic valve  -Heterozygous factor V Leiden mutation  - Ductal carcinoma of right breast    Retinal Imaging:  Optical Coherence Tomography 08/28/24   RE: Subfoveal deposit of hyperreflective material; PVD  LE:Subfoveal deposit of hyperreflective material with trace potential sub-RPE fluid; Posterior vitreous detachment (PVD)    Intraocular lens calcs: reliable 05/02/24     Assessment & Plan:    # status post Cataract extraction intraocular lens right eye 08/06/24     Ketorolac (gray top) once a day till finish  Predforte  (pink top)  once a day till finish (shake the bottle before)    Put the eyedrops 5 minutes a part    Retina detachment and endophthalmitis precautions were discussed with the patient (increased blurry vision, drainage, new flashes, floaters or a curtain in the visual field) and was asked to return if any of the those occur    What to watch out for:  If you experience any of the following \"RSVP Symptoms\", you should call immediately:  Worsening Redness  Worsening Sensitivity to light  Worsening Vision, including new flashing lights or floaters  Worsening Pain, including nausea/vomiting    - good " postoperative appearance  - done with eye drops    # s/p CE with PCIOL left eye 5.7.24  -CC60WF 18.5D left eye  # Status post yag laser 08/14/24  for posterior capsular opacity (PCO)   Clear axis     #Adult-onset Vitelliform Dystrophy both eyes  Differential diagnosis atypical dry Age related macular degeneration  Less likely best disease  -less likely to be CNVM given exam and OCT findings OCT-A findings  Recommend to observe  05/02/24 left eye with race potential sub-RPE fluid; will observe closely    #Posterior vitreous detachment (PVD) both eyes  -No Retinal tear or Retinal detachment  on exam today   -Discussed Retinal detachment /Retinal tear symptoms    #   Breast Cancer Right Breast - S/P chemotherapy and lumpectomy 2018  -No ocular involvement.     #   Dry Eye Syndrome, both eyes.    artificial tears  and warm compresses     RTC: follow up in 3-4 weeks with Optical Coherence Tomography; prescription and dilation   Retina detachment precautions were discussed with the patient (presence or increased in flashes, floaters or a curtain in the visual field) and was asked to return if any of the those occur   ~~~~~~~~~~~~~~~~~~~~~~~~~~~~~~~~~~   Complete documentation of historical and exam elements from today's encounter can be found in the full encounter summary report (not reduplicated in this progress note).  I personally obtained the chief complaint(s) and history of present illness.  I confirmed and edited as necessary the review of systems, past medical/surgical history, family history, social history, and examination findings as documented by others; and I examined the patient myself.  I personally reviewed the relevant tests, images, and reports as documented above.  I formulated and edited as necessary the assessment and plan and discussed the findings and management plan with the patient and family    Cira Herring MD  Professor of Ophthalmology  Vitreo-Retinal surgeon   Department of  Ophthalmology and Visual Neurosciences   [facility]  Phone: [phone]   Fax: [phone]

## 2024-09-06 DIAGNOSIS — H35.54 MACULAR VITELLIFORM LESION: Primary | ICD-10-CM

## 2024-09-18 ENCOUNTER — OFFICE VISIT (OUTPATIENT)
Dept: OPHTHALMOLOGY | Facility: CLINIC | Age: 74
End: 2024-09-18
Attending: OPHTHALMOLOGY
Payer: COMMERCIAL

## 2024-09-18 DIAGNOSIS — H35.54 MACULAR VITELLIFORM LESION: ICD-10-CM

## 2024-09-18 PROCEDURE — G0463 HOSPITAL OUTPT CLINIC VISIT: HCPCS | Performed by: OPHTHALMOLOGY

## 2024-09-18 PROCEDURE — 92134 CPTRZ OPH DX IMG PST SGM RTA: CPT | Performed by: OPHTHALMOLOGY

## 2024-09-18 PROCEDURE — 92015 DETERMINE REFRACTIVE STATE: CPT

## 2024-09-18 PROCEDURE — 92014 COMPRE OPH EXAM EST PT 1/>: CPT | Performed by: OPHTHALMOLOGY

## 2024-09-18 ASSESSMENT — TONOMETRY
OD_IOP_MMHG: 17
OS_IOP_MMHG: 15
IOP_METHOD: TONOPEN

## 2024-09-18 ASSESSMENT — CONF VISUAL FIELD
OS_INFERIOR_NASAL_RESTRICTION: 0
OS_INFERIOR_TEMPORAL_RESTRICTION: 0
OS_NORMAL: 1
OS_SUPERIOR_NASAL_RESTRICTION: 0
OD_NORMAL: 1
OD_INFERIOR_TEMPORAL_RESTRICTION: 0
OD_SUPERIOR_NASAL_RESTRICTION: 0
OD_INFERIOR_NASAL_RESTRICTION: 0
OD_SUPERIOR_TEMPORAL_RESTRICTION: 0
METHOD: COUNTING FINGERS
OS_SUPERIOR_TEMPORAL_RESTRICTION: 0

## 2024-09-18 ASSESSMENT — REFRACTION_MANIFEST
OD_ADD: +2.75
OD_SPHERE: PLANO
OS_SPHERE: +0.25
OS_CYLINDER: SPHERE
OS_ADD: +2.75
OD_CYLINDER: SPHERE

## 2024-09-18 ASSESSMENT — VISUAL ACUITY
OD_PH_SC+: -5
OS_PH_SC+: -5
METHOD: SNELLEN - LINEAR
OD_PH_SC: 20/25
OS_PH_SC: 20/25
OD_SC: 20/30
OS_SC: 20/40
OD_SC+: -2

## 2024-09-18 ASSESSMENT — EXTERNAL EXAM - LEFT EYE: OS_EXAM: NORMAL

## 2024-09-18 ASSESSMENT — EXTERNAL EXAM - RIGHT EYE: OD_EXAM: NORMAL

## 2024-09-18 ASSESSMENT — SLIT LAMP EXAM - LIDS: COMMENTS: DERMATOCHALASIS UPPER LID

## 2024-09-18 NOTE — NURSING NOTE
Chief Complaints and History of Present Illnesses   Patient presents with    Post Op (Ophthalmology) Right Eye     Visit for status post Cataract extraction intraocular lens right eye 08/06/24      Chief Complaint(s) and History of Present Illness(es)       Post Op (Ophthalmology) Right Eye              Laterality: right eye    Associated symptoms: tearing    Comments: Visit for status post Cataract extraction intraocular lens right eye 08/06/24               Comments    Patient reports no change to vision, with blurriness in the left eye.  Pt notes ongoing light sensitivity.   Pt reports intermittent headaches.     Ocular Meds:   PF Refresh AT's PRN     Ashly Nolan OA 2:16 PM September 18, 2024

## 2024-09-18 NOTE — PROGRESS NOTES
CC: status post yag left eye 8.14.24  VA improving  Cataract extraction intraocular lens right eye 08/06/24   Cataract extraction intraocular lens left eye 5.7.24  Interval: VA improving left eye  No eye pain    HPI: Julieta Rivera is a  73 year old  patient with history as listed below who presents as a referral from Dr. Tony for evaluation of macular lesions of both eyes. She reports that her vision has felt blurred in both eyes for quite some time.     First noticed worsening in 2018 when she was receiving chemotherapy ductal carcinoma of right breast. She first noticed droopiness of left eyelid during this time as well.     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  -Hypothyroidism   -Hashimoto's thyroiditis  -Cardiomyopathy secondary to drug  -Congenital stenosis of aortic valve  -Heterozygous factor V Leiden mutation  - Ductal carcinoma of right breast    Retinal Imaging:  Optical Coherence Tomography 09/18/24   RE: Subfoveal deposit of hyperreflective material with possible trace subretinal fluid- to monitor in 3 mo, PVD  LE:Subfoveal deposit of hyperreflective material with trace potential sub-RPE fluid; Posterior vitreous detachment (PVD)      Assessment & Plan:    # status post Cataract extraction intraocular lens right eye 08/06/24;  left eye 5.7.24  # Status post yag laser 08/14/24  for posterior capsular opacity (PCO)   Clear axis left eye   Mild posterior capsular opacity (PCO) right eye observe    #Adult-onset Vitelliform Dystrophy both eyes  Differential diagnosis atypical dry Age related macular degeneration  Less likely best disease  -less likely to be CNVM given exam and OCT findings OCT-A findings  Recommend to observe  09/18/24 right eye with trace subretinal fluid; will observe closely    #Posterior vitreous detachment (PVD) both eyes  -No Retinal tear or Retinal detachment  on exam today   -Discussed Retinal detachment /Retinal tear  symptoms    #   Breast Cancer Right Breast - S/P chemotherapy and lumpectomy 2018  -No ocular involvement.     #   Dry Eye Syndrome, both eyes.    artificial tears  and warm compresses     RTC: follow up in 3 months with Optical Coherence Tomography; prescription and dilation   Consider yag right eye  in the future   Retina detachment precautions were discussed with the patient (presence or increased in flashes, floaters or a curtain in the visual field) and was asked to return if any of the those occur   ~~~~~~~~~~~~~~~~~~~~~~~~~~~~~~~~~~   Complete documentation of historical and exam elements from today's encounter can be found in the full encounter summary report (not reduplicated in this progress note).  I personally obtained the chief complaint(s) and history of present illness.  I confirmed and edited as necessary the review of systems, past medical/surgical history, family history, social history, and examination findings as documented by others; and I examined the patient myself.  I personally reviewed the relevant tests, images, and reports as documented above.  I formulated and edited as necessary the assessment and plan and discussed the findings and management plan with the patient and family    Cira Herring MD  Professor of Ophthalmology  Vitreo-Retinal surgeon   Department of Ophthalmology and Visual Neurosciences   [facility]  Phone: [phone]   Fax: [phone]

## 2024-10-07 ENCOUNTER — OFFICE VISIT (OUTPATIENT)
Dept: PULMONOLOGY | Facility: CLINIC | Age: 74
End: 2024-10-07
Payer: COMMERCIAL

## 2024-10-07 VITALS
DIASTOLIC BLOOD PRESSURE: 56 MMHG | BODY MASS INDEX: 24.94 KG/M2 | WEIGHT: 125.6 LBS | HEART RATE: 70 BPM | SYSTOLIC BLOOD PRESSURE: 98 MMHG | OXYGEN SATURATION: 98 %

## 2024-10-07 DIAGNOSIS — R05.3 CHRONIC COUGH: Primary | ICD-10-CM

## 2024-10-07 DIAGNOSIS — J30.9 ALLERGIC RHINITIS, UNSPECIFIED SEASONALITY, UNSPECIFIED TRIGGER: ICD-10-CM

## 2024-10-07 PROCEDURE — 99214 OFFICE O/P EST MOD 30 MIN: CPT | Performed by: NURSE PRACTITIONER

## 2024-10-07 ASSESSMENT — ASTHMA QUESTIONNAIRES
QUESTION_3 LAST FOUR WEEKS HOW OFTEN DID YOUR ASTHMA SYMPTOMS (WHEEZING, COUGHING, SHORTNESS OF BREATH, CHEST TIGHTNESS OR PAIN) WAKE YOU UP AT NIGHT OR EARLIER THAN USUAL IN THE MORNING: ONCE OR TWICE
QUESTION_2 LAST FOUR WEEKS HOW OFTEN HAVE YOU HAD SHORTNESS OF BREATH: NOT AT ALL
QUESTION_1 LAST FOUR WEEKS HOW MUCH OF THE TIME DID YOUR ASTHMA KEEP YOU FROM GETTING AS MUCH DONE AT WORK, SCHOOL OR AT HOME: NONE OF THE TIME
ACT_TOTALSCORE: 22
QUESTION_4 LAST FOUR WEEKS HOW OFTEN HAVE YOU USED YOUR RESCUE INHALER OR NEBULIZER MEDICATION (SUCH AS ALBUTEROL): ONCE A WEEK OR LESS
QUESTION_5 LAST FOUR WEEKS HOW WOULD YOU RATE YOUR ASTHMA CONTROL: WELL CONTROLLED
ACT_TOTALSCORE: 22

## 2024-10-07 NOTE — PATIENT INSTRUCTIONS
It was a pleasure seeing you in clinic today. This is what we discussed:    If you want to, continue the inhaler twice daily as you have  been for the next few months. We would then start to taper off.   If you feel like trying off the medication do 1 puff daily x 1 week then 1 puff every other day x 1 week. If your symptoms do not return you stay off the inhaler.   If you get sick or your cough returns, re-start the inhaler.   Use your albuterol every 4 hours as needed for cough, shortness of breath, wheeze, or chest tightness.   Follow up as needed   If you have worsening symptoms, questions, or need to speak with the nurse please call [phone].

## 2024-10-07 NOTE — PROGRESS NOTES
Pulmonary Outpatient Consult Note  October 7, 2024      Assessment and Plan:   Julieta Rivera is a 73 year old F, former smoker, with history of factor V Leiden, hypothyroidism, osteoporosis, anxiety, varicose veins, breast cancer, cardiomyopathy, and bicuspid aortic valve presenting today for follow up.   No prior asthma diagnosis or history of similar symptoms.  Had significant URI symptoms and was exposed to a lot of illness this past spring and winter from grandchildren. Reports persistent cough with occasional wheeze and shortness of breath. Symptoms almost resolved after 2 months of ICS/LABA.   She has been having more allergy symptoms.   Minimal remote smoking history.    PFTs show normal spirometry, lung volumes, and diffusing capacity.  Chest x-ray in 01/2024 was clear.    #. Post infectious inflammatory process versus reactive airway: Given initial illness trigger and response to Advair and albuterol this could be post infectious. We discussed continuing ICS/LABA for another few months versus trying to peel back now. She is hesitant to stay on the inhaler if it is not needed. We will wait and see how she responds to her next URI while off the daily inhaler before we come up with a long term plan. Could consider methacholine challenge in the future if she does not respond to inhaled medications.  Continue Advair, 1 puff twice daily.  Rinse and gargle after each use. If symptoms do not worsen in the next few months plan to peel back therapy. She may only need this prn with illness.   Continue albuterol as needed  No improvement at follow-up consider IgE and CBC with differential.  #. Allergic rhinitis: worsened with season change. This could be contributing to her symptoms. Will check IgE, if abnormal add Singulair. Eos normal.   IgE, I will contact her with results.  #. HCM: UTD with COVID-vaccine and booster, seasonal flu, Tdap, PCV 13, and PPSV23.  Recommend RSV vaccine.  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    RTC 3 months, sooner if needed.    Yanna Infante, CNP  Pulmonary Medicine  ______________________________________________________________________________    CC:   Chief Complaint   Patient presents with    Follow Up     2 month follow up: Trial of Advair, 1 puff twice daily. Rinse and gargle after each use. If cough resolves in a few months plan to peel back therapy. We did discuss the chance this is a longer term medication or possibly a as needed medication in the future with illness.  Chronic cough  Allergic rhinitis, unspecified seasonality, unspecified trigger        HPI:   Julieta was last seen in clinic for initial evaluation in 08/2024.  At that time we started a ICS/LAMA given her continued symptoms following URIs.  She reports almost complete resolution of symptoms and her breathing has much improved.  Is not happy with the generic alternative given for her Advair.    Had multiple URI illnesses over the winter and spring. She was exposed to a host of different illnesses from there grandchildren.   As needed albuterol has been helpful. How often she has needed this has waxed and waned.   Had COVID in 01/2024, given Paxlovid.   Hx of seasonal allergies. She does not use 24 hr antihistamines as they are very drying.   Denies history of asthma, prolonged illness recovery, or frequent bronchitis prior to this year.   Continues to have some wheeze and cough, usually worst at night. Has been fine with albuterol once daily.     Remote smoking history, quit in 1971. 3 pack year history.         8/4/2024     4:15 PM 10/7/2024     1:00 PM   ACT Total Scores   ACT TOTAL SCORE (Goal Greater than or Equal to 20) 18 22   In the past 12 months, how many times did you visit the emergency room for your asthma without being admitted to the hospital? 0 0   In the past 12 months, how many times were you hospitalized overnight because of your asthma? 0 0      PMH:  Patient Active Problem List    Diagnosis Date Noted    Nuclear sclerosis, right 2024     Priority: Medium    Nuclear cataract, nonsenile 2024     Priority: Medium    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) 2019     Priority: Medium    Cardiomyopathy secondary to drug (H) 2019     Priority: Medium     Diagnosed by Dr. Millan in    ECHO         Bicuspid aortic valve 2019     Priority: Medium    Congenital stenosis of aortic valve 2019     Priority: Medium    Encounter for other specified aftercare 2018     Priority: Medium    Malignant neoplasm of upper outer quadrant of breast in female, estrogen receptor negative (H) 2018     Priority: Medium    Hypothyroidism due to Hashimoto's thyroiditis 2017     Priority: Medium    Other specified hypothyroidism 10/09/2015     Priority: Medium    CARDIOVASCULAR SCREENING; LDL GOAL LESS THAN 130 2012     Priority: Medium    Adenoma of large intestine 2011     Priority: Medium    Heterozygous factor V Leiden mutation (H)      Priority: Medium     PSH:  Past Surgical History:   Procedure Laterality Date    CATARACT IOL, RT/LT       SECTION      x2 ,     COLONOSCOPY N/A 2022    Procedure: COLONOSCOPY, WITH POLYPECTOMY;  Surgeon: Elo Berumen MD;  Location: [facility] OR    COLPOSCOPY CERVIX, BIOPSY CERVIX, ENDOCERVICAL CURETTAGE, COMBINED      EXTRACAPSULAR CATARACT EXTRATION WITH INTRAOCULAR LENS IMPLANT Left 2024    Procedure: LEFT EYE EXTRACTION, CATARACT, EXTRACAPSULAR, WITH INTRAOCULAR LENS IMPLANT INSERTION;  Surgeon: Cira Herring MD;  Location: [facility] OR    INSERT PORT VASCULAR ACCESS N/A 2018    Procedure: INSERT PORT VASCULAR ACCESS;  Single Lumen Chest Power Port Placement;  Surgeon: Brian Tolbert PA-C;  Location: [facility] OR    MASTECTOMY SIMPLE, SENTINEL NODE, COMBINED Right 2018    Lumpectomy with SENTINEL NODE;  Right Wire  Localized Lumpectomy And Right [place] Lymph Node Biopsy;  Surgeon: Eugene Guzman MD;  Location: [facility] OR    PHACOEMULSIFICATION CLEAR CORNEA WITH STANDARD INTRAOCULAR LENS IMPLANT Right 08/06/2024    Procedure: RIGHT EYE PHACOEMULSIFICATION, CATARACT, WITH INTRAOCULAR LENS IMPLANT;  Surgeon: Cira Herring MD;  Location: [facility] OR    REMOVE PORT VASCULAR ACCESS Left 11/15/2018    Procedure: Left Port Removal;  Surgeon: Tom Quick PA-C;  Location: [facility] OR    TONSILLECTOMY, ADENOIDECTOMY, COMBINED  1959     Current Meds:  Current Outpatient Medications   Medication Sig Dispense Refill    albuterol (PROAIR HFA/PROVENTIL HFA/VENTOLIN HFA) 108 (90 Base) MCG/ACT inhaler       Eyelid Cleansers (OCUSOFT EYELID CLEANSING) PADS Externally apply 1 Box topically 2 times daily 60 each 11    fexofenadine (ALLEGRA) 60 MG tablet Take 1 tablet (60 mg) by mouth daily 90 tablet 3    fluticasone-salmeterol (ADVAIR) 250-50 MCG/ACT inhaler Inhale 1 puff into the lungs 2 times daily 60 each 4    levothyroxine (SYNTHROID/LEVOTHROID) 100 MCG tablet Take 1 tablet (100 mcg) by mouth daily 90 tablet 3    levothyroxine (SYNTHROID/LEVOTHROID) 75 MCG tablet Take 1 tablet (75 mcg) by mouth daily on Monday, Wednesday, Friday and Sunday. Alternate with 100 mcg dose on Tuesday, Thursday and Saturday 90 tablet 2    Omega-3 Fatty Acids (FISH OIL PO) Take 1 capsule by mouth daily.      UNABLE TO FIND MEDICATION NAME: Bone Strength [place]      vitamin D3 (CHOLECALCIFEROL) 2000 units (50 mcg) tablet Take 1 tablet by mouth daily      ketorolac (ACULAR) 0.5 % ophthalmic solution Apply 1 drop to eye 4 times daily 5 mL 1    prednisoLONE acetate (PRED FORTE) 1 % ophthalmic suspension Place 1 drop Into the left eye 4 times daily To operative eye 5 mL 0     No current facility-administered medications for this visit.     Allergies:  Allergies   Allergen Reactions    Seasonal Allergies      Social Hx:  Marital status: Lives with    Occupational history: retired 2019, [employer]    service: none  Pets: none currently   Smoking history: 3 pack year history    Family HX: family history includes Cancer in her paternal aunt; Cancer (age of onset: 48) in her sister; Cerebrovascular Disease in her father; Diabetes in her brother and father; Heart Disease in her father and mother; Hypertension in her brother and sister; Obesity in her brother and sister.    ROS:   10-point review performed and notable for the above mentioned symtpoms. The remainder reviewed and negative.     Physical Exam:  BP 98/56 (BP Location: Left arm, Patient Position: Sitting, Cuff Size: Adult Regular)   Pulse 70   Wt 57 kg (125 lb 9.6 oz)   SpO2 98%   BMI 24.94 kg/m      Physical Exam  Constitutional:       General: She is not in acute distress.     Appearance: She is not ill-appearing or diaphoretic.   Cardiovascular:      Rate and Rhythm: Normal rate and regular rhythm.      Heart sounds: Murmur heard.   Pulmonary:      Effort: Pulmonary effort is normal. No respiratory distress.      Breath sounds: No wheezing or rhonchi.   Musculoskeletal:      Right lower leg: No edema.      Left lower leg: No edema.   Neurological:      Mental Status: She is alert.   Psychiatric:         Behavior: Behavior normal.       PFT's     8/5/2024:      Impression:  Full Pulmonary Function Test is abnormal.  PFTs are consistent with  no  obstructive disease.  There is no hyperinflation.  There is no air-trapping.  Diffusion capacity when corrected for hemoglobin is not reduced.    Labs:   personally reviewed in the EMR.   Latest Reference Range & Units 09/08/21 13:09 03/22/22 12:34 09/06/22 11:42 05/04/23 12:35 09/28/23 13:40 04/15/24 10:43   Absolute Eosinophils 0.0 - 0.7 10e3/uL 0.1 0.1 0.2 0.1 0.1 0.2     Imaging studies: personally reviewed and interpreted. Below are the Radiology interpretations.    CXR 01/2024, [place]

## 2024-10-16 ENCOUNTER — PATIENT OUTREACH (OUTPATIENT)
Dept: CARE COORDINATION | Facility: CLINIC | Age: 74
End: 2024-10-16
Payer: COMMERCIAL

## 2024-10-24 ENCOUNTER — ONCOLOGY SURVIVORSHIP VISIT (OUTPATIENT)
Dept: ONCOLOGY | Facility: CLINIC | Age: 74
End: 2024-10-24
Attending: PHYSICIAN ASSISTANT
Payer: COMMERCIAL

## 2024-10-24 ENCOUNTER — LAB (OUTPATIENT)
Dept: LAB | Facility: CLINIC | Age: 74
End: 2024-10-24
Payer: COMMERCIAL

## 2024-10-24 VITALS
DIASTOLIC BLOOD PRESSURE: 73 MMHG | RESPIRATION RATE: 16 BRPM | OXYGEN SATURATION: 98 % | SYSTOLIC BLOOD PRESSURE: 105 MMHG | HEART RATE: 74 BPM | WEIGHT: 125.3 LBS | BODY MASS INDEX: 24.88 KG/M2 | TEMPERATURE: 98 F

## 2024-10-24 DIAGNOSIS — E06.3 HYPOTHYROIDISM DUE TO HASHIMOTO'S THYROIDITIS: Primary | ICD-10-CM

## 2024-10-24 DIAGNOSIS — Z17.1 MALIGNANT NEOPLASM OF UPPER-OUTER QUADRANT OF RIGHT BREAST IN FEMALE, ESTROGEN RECEPTOR NEGATIVE (H): Primary | ICD-10-CM

## 2024-10-24 DIAGNOSIS — J30.9 ALLERGIC RHINITIS, UNSPECIFIED SEASONALITY, UNSPECIFIED TRIGGER: ICD-10-CM

## 2024-10-24 DIAGNOSIS — Z12.31 VISIT FOR SCREENING MAMMOGRAM: ICD-10-CM

## 2024-10-24 DIAGNOSIS — C50.411 MALIGNANT NEOPLASM OF UPPER-OUTER QUADRANT OF RIGHT BREAST IN FEMALE, ESTROGEN RECEPTOR NEGATIVE (H): Primary | ICD-10-CM

## 2024-10-24 LAB — TSH SERPL DL<=0.005 MIU/L-ACNC: 2.69 UIU/ML (ref 0.3–4.2)

## 2024-10-24 PROCEDURE — 36415 COLL VENOUS BLD VENIPUNCTURE: CPT | Performed by: PATHOLOGY

## 2024-10-24 PROCEDURE — 99215 OFFICE O/P EST HI 40 MIN: CPT | Performed by: PHYSICIAN ASSISTANT

## 2024-10-24 PROCEDURE — 99000 SPECIMEN HANDLING OFFICE-LAB: CPT | Performed by: PATHOLOGY

## 2024-10-24 PROCEDURE — 84443 ASSAY THYROID STIM HORMONE: CPT | Performed by: PATHOLOGY

## 2024-10-24 PROCEDURE — 82785 ASSAY OF IGE: CPT | Performed by: NURSE PRACTITIONER

## 2024-10-24 PROCEDURE — G2211 COMPLEX E/M VISIT ADD ON: HCPCS | Performed by: PHYSICIAN ASSISTANT

## 2024-10-24 ASSESSMENT — PAIN SCALES - GENERAL: PAINLEVEL_OUTOF10: NO PAIN (0)

## 2024-10-24 NOTE — LETTER
10/24/2024      Julieta Rivera  [address] [place] [address]      Dear Colleague,    Thank you for referring your patient, Julieta Rivera, to the [facility]. Please see a copy of my visit note below.    Oct 24, 2024    HISTORY OF PRESENT ILLNESS:  Julieta Rivera is a 73-year-old woman with a history of right triple-negative breast cancer, diagnosed 2018. Julieta was followed by routine screening mammography, when she was discovered to have a 7 x 6 x 9-mm mass at the 12 o'clock position of the right breast 6 cm from the nipple-areolar complex. She did undergo a biopsy of this mass which showed an ER-negative, TX-negative, HER2-nonamplified, invasive mammary carcinoma of no special type, invasive ductal carcinoma, [place] grade 3.  Ductal carcinoma in situ was also noted.  Nuclear grade 2 solid type. HER2 FISH showed no amplification.       TREATMENT HISTORY:  A.  TC x 4  B.  Lumpectomy 6-7-18 [id number] RCB 1  (0.71)  C.  Radiation  Radiation Oncology - Course: 1         Protocol:   Treatment Site            Current Dose   Modality           From    To        Elapsed Days  Fx.  1 R Breast        4,240 cGy       06 X      7/30/2018        8/21/2018        22       16  1 R Breast Boost          1,000 cGy       e09       8/22/2018 8/27/2018         5         4     PAST MEDICAL HISTORY:  She has no history of breast surgery in the past or breast cancer in the past. She has no history of radiation of any kind.  She has no history of tumor of any kind.  She may have a history of a heart problem with mitral prolapse and a murmur.  The last echo we have on record is from 1996.  She has no history of heart attack, breathing problems, blood clots, seizures, arthritis, peptic ulcer disease, osteoporosis or bone fractures.  She is not currently participating in a clinical trial and has not had any significant weight loss.  She has no history of hypertension, but she does have a  history of factor V Leiden because her sister was diagnosed with factor V Leiden and Julieta was tested, although Julieta herself has had no blood clots or pulmonary emboli.      INTERVAL HISTORY: Julieta is feeling well today. She has been looking forward to being seen in the survivorship clinic. She has noticed hair thinning progressively since completing TC. Her hair did come back well and was quite thick after chemo but has been thinning the past few years. She has seen dermatology who thinks this is genetic but they recommended getting a ferritin level checked and following up again. She is not interested in minoxidil. She has neuropathy with numbness and some tingling consistently in her feet. Her arms will fall asleep frequently. She will have intense leg cramps intermittently. These started last summer. These do not occur during the day.     No breast concerns. Had lymphedema previously but this is well controlled now.     She is interested in connecting with other survivors.     PHYSICAL EXAMINATION:   /73   Pulse 74   Temp 98  F (36.7  C) (Oral)   Resp 16   Wt 56.8 kg (125 lb 4.8 oz)   SpO2 98%   BMI 24.88 kg/m    Wt Readings from Last 4 Encounters:   10/24/24 56.8 kg (125 lb 4.8 oz)   10/07/24 57 kg (125 lb 9.6 oz)   08/06/24 55.8 kg (123 lb)   08/05/24 55.8 kg (123 lb)   Vital signs were reviewed.   Julieta is a very pleasant 73-year-old female who is alert, oriented, and in no acute distress today.  PERRLA. EOMI. No scleral icterus noted.   Neck exam: No palpable cervical, supraclavicular or axillary nodes bilaterally.   Heart: RRR  Lungs: Bilateral lungs are clear to auscultation with no crackles or wheezing noted.  Breast: No palpable masses in left or right breast. No overlying skin changes. No palpable axillary lymphadenopathy bilaterally   Abd: Abdomen is soft, nontender and nondistended with positive bowel sounds throughout.  Extremities: No lower extremity edema.   Neuro: Cranial nerves II  through XII are grossly intact.  She is alert and oriented.  Gait and balance intact.  Mood and affect are stable       LABORATORY DATA:    Most Recent 3 CBC's:  Recent Labs   Lab Test 08/05/24  1511 04/15/24  1043 09/28/23  1340 05/04/23  1235   WBC  --  6.8 6.2 6.2   HGB 13.6 13.2 13.0 12.9   MCV  --  89 90 89   PLT  --  200 213 219    Most Recent 3 BMP's:  Recent Labs   Lab Test 04/15/24  1043 09/28/23  1340 05/04/23  1235    139 140   POTASSIUM 3.6 4.2 4.2   CHLORIDE 102 105 106   CO2 23 24 25   BUN 12.5 13.0 9.5   CR 0.71 0.70 0.69   ANIONGAP 11 10 9   CINDI 9.3 9.5 9.1   GLC 77 93 98    Most Recent 2 LFT's:  Recent Labs   Lab Test 04/15/24  1043 09/28/23  1340   AST 23 22   ALT 12 12   ALKPHOS 55 64   BILITOTAL 0.6 0.3      I reviewed the above labs today.    IMAGING:  Mammogram 4/2024 benign      ASSESSMENT AND PLAN:    1.  Julieta Rivera is a 73-year-old woman with a history of stage I, clinical [id number] invasive ductal carcinoma of the right breast, triple negative in histology, maximum dimension 9 mm, grade 3: She received neoadjuvant TC chemotherapy following lumpectomy showed an RCB1 response with significant reduction of the tumor and with a small amount of residual disease measuring about 2.5 mm in largest dimension.  There is 1 other small focus.  The margins were clear for DCIS and invasive cancer.  She was not offered adjuvant Xeloda given the small amount of residual disease.  Eight sentinel lymph nodes were examined and were negative for cancer.    - She is over 6 years out from her primary treatment. With stage I triple negative breast cancer, she has completed over 5 years of active medical oncology surveillance. Her recurrence risk is highest in the first 5 years. She would like to follow-up in survivorship clinic now.   -She is due for repeat mammography in April 2025.  Will have her meet Priyanka Malik at that time and then see her annually.      2.  Bone health:  - DEXA scan 1/18/2024 shows  osteoporosis with a most valid negative T-score of -2.8.    - Per prior notes, she did not want zoledronic acid, Prolia or Fosamax and has declined these drugs multiple times in the past.   - She will continue with weightbearing exercise, calcium and vitamin D.   -She had an ER negative cancer so our treatment did not do much to contribute.     3. 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 Symptoms are likely permanent now. She does have some associated discomfort but is not interested in gabapentin or lyrica. Discussed acupuncture; she did this a while ago and would consider this but requires commitment to attend appointments.     I do not think muscle cramping is related since this started a year ago. Recommended increasing hydration, magnesium supplements, epsom salt baths. Try one thing at a time to see what helps.      4. Hair thinning: Came back well after taxotere so I do not think this is treatment related. TSH done about a year ago and WNL. Would check TSH and ferritin through PCP and then follow-up with Dermatology.     5. Psychosocial: Discussed [employer] sister[employer] and thinking about attending survivorship conferences to meet other survivors.     60 minutes spent on the date of the encounter doing chart review, review of test results, interpretation of tests, patient visit, and documentation     The longitudinal plan of care for the diagnosis(es)/condition(s) as documented were addressed during this visit. Due to the added complexity in care, I will continue to support Julieta in the subsequent management and with ongoing continuity of care.    Cira Wells PA-C                 Again, thank you for allowing me to participate in the care of your patient.        Sincerely,        Cira Wells PA-C

## 2024-10-24 NOTE — PROGRESS NOTES
Oct 24, 2024    HISTORY OF PRESENT ILLNESS:  Julieta Rivera is a 73-year-old woman with a history of right triple-negative breast cancer, diagnosed 2018. Julieta was followed by routine screening mammography, when she was discovered to have a 7 x 6 x 9-mm mass at the 12 o'clock position of the right breast 6 cm from the nipple-areolar complex. She did undergo a biopsy of this mass which showed an ER-negative, TX-negative, HER2-nonamplified, invasive mammary carcinoma of no special type, invasive ductal carcinoma, [place] grade 3.  Ductal carcinoma in situ was also noted.  Nuclear grade 2 solid type. HER2 FISH showed no amplification.       TREATMENT HISTORY:  A.  TC x 4  B.  Lumpectomy 6-7-18 [id number] RCB 1  (0.71)  C.  Radiation  Radiation Oncology - Course: 1         Protocol:   Treatment Site            Current Dose   Modality           From    To        Elapsed Days  Fx.  1 R Breast        4,240 cGy       06 X      7/30/2018        8/21/2018        22       16  1 R Breast Boost          1,000 cGy       e09       8/22/2018 8/27/2018         5         4     PAST MEDICAL HISTORY:  She has no history of breast surgery in the past or breast cancer in the past. She has no history of radiation of any kind.  She has no history of tumor of any kind.  She may have a history of a heart problem with mitral prolapse and a murmur.  The last echo we have on record is from 1996.  She has no history of heart attack, breathing problems, blood clots, seizures, arthritis, peptic ulcer disease, osteoporosis or bone fractures.  She is not currently participating in a clinical trial and has not had any significant weight loss.  She has no history of hypertension, but she does have a history of factor V Leiden because her sister was diagnosed with factor V Leiden and Julieta was tested, although Julieta herself has had no blood clots or pulmonary emboli.      INTERVAL HISTORY: Julieta is feeling well today. She has been looking forward  to being seen in the survivorship clinic. She has noticed hair thinning progressively since completing TC. Her hair did come back well and was quite thick after chemo but has been thinning the past few years. She has seen dermatology who thinks this is genetic but they recommended getting a ferritin level checked and following up again. She is not interested in minoxidil. She has neuropathy with numbness and some tingling consistently in her feet. Her arms will fall asleep frequently. She will have intense leg cramps intermittently. These started last summer. These do not occur during the day.     No breast concerns. Had lymphedema previously but this is well controlled now.     She is interested in connecting with other survivors.     PHYSICAL EXAMINATION:   /73   Pulse 74   Temp 98  F (36.7  C) (Oral)   Resp 16   Wt 56.8 kg (125 lb 4.8 oz)   SpO2 98%   BMI 24.88 kg/m    Wt Readings from Last 4 Encounters:   10/24/24 56.8 kg (125 lb 4.8 oz)   10/07/24 57 kg (125 lb 9.6 oz)   08/06/24 55.8 kg (123 lb)   08/05/24 55.8 kg (123 lb)   Vital signs were reviewed.   Julieta is a very pleasant 73-year-old female who is alert, oriented, and in no acute distress today.  PERRLA. EOMI. No scleral icterus noted.   Neck exam: No palpable cervical, supraclavicular or axillary nodes bilaterally.   Heart: RRR  Lungs: Bilateral lungs are clear to auscultation with no crackles or wheezing noted.  Breast: No palpable masses in left or right breast. No overlying skin changes. No palpable axillary lymphadenopathy bilaterally   Abd: Abdomen is soft, nontender and nondistended with positive bowel sounds throughout.  Extremities: No lower extremity edema.   Neuro: Cranial nerves II through XII are grossly intact.  She is alert and oriented.  Gait and balance intact.  Mood and affect are stable       LABORATORY DATA:    Most Recent 3 CBC's:  Recent Labs   Lab Test 08/05/24  1511 04/15/24  1043 09/28/23  1340 05/04/23  1235   WBC  --   6.8 6.2 6.2   HGB 13.6 13.2 13.0 12.9   MCV  --  89 90 89   PLT  --  200 213 219    Most Recent 3 BMP's:  Recent Labs   Lab Test 04/15/24  1043 09/28/23  1340 05/04/23  1235    139 140   POTASSIUM 3.6 4.2 4.2   CHLORIDE 102 105 106   CO2 23 24 25   BUN 12.5 13.0 9.5   CR 0.71 0.70 0.69   ANIONGAP 11 10 9   CINDI 9.3 9.5 9.1   GLC 77 93 98    Most Recent 2 LFT's:  Recent Labs   Lab Test 04/15/24  1043 09/28/23  1340   AST 23 22   ALT 12 12   ALKPHOS 55 64   BILITOTAL 0.6 0.3      I reviewed the above labs today.    IMAGING:  Mammogram 4/2024 benign      ASSESSMENT AND PLAN:    1.  Julieta Rivera is a 73-year-old woman with a history of stage I, clinical [id number] invasive ductal carcinoma of the right breast, triple negative in histology, maximum dimension 9 mm, grade 3: She received neoadjuvant TC chemotherapy following lumpectomy showed an RCB1 response with significant reduction of the tumor and with a small amount of residual disease measuring about 2.5 mm in largest dimension.  There is 1 other small focus.  The margins were clear for DCIS and invasive cancer.  She was not offered adjuvant Xeloda given the small amount of residual disease.  Eight sentinel lymph nodes were examined and were negative for cancer.    - She is over 6 years out from her primary treatment. With stage I triple negative breast cancer, she has completed over 5 years of active medical oncology surveillance. Her recurrence risk is highest in the first 5 years. She would like to follow-up in survivorship clinic now.   -She is due for repeat mammography in April 2025.  Will have her meet Priyanka Malik at that time and then see her annually.      2.  Bone health:  - DEXA scan 1/18/2024 shows osteoporosis with a most valid negative T-score of -2.8.    - Per prior notes, she did not want zoledronic acid, Prolia or Fosamax and has declined these drugs multiple times in the past.   - She will continue with weightbearing exercise, calcium and  vitamin D.   -She had an ER negative cancer so our treatment did not do much to contribute.     3. 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 Symptoms are likely permanent now. She does have some associated discomfort but is not interested in gabapentin or lyrica. Discussed acupuncture; she did this a while ago and would consider this but requires commitment to attend appointments.     I do not think muscle cramping is related since this started a year ago. Recommended increasing hydration, magnesium supplements, epsom salt baths. Try one thing at a time to see what helps.      4. Hair thinning: Came back well after taxotere so I do not think this is treatment related. TSH done about a year ago and WNL. Would check TSH and ferritin through PCP and then follow-up with Dermatology.     5. Psychosocial: Discussed [employer] sister[employer] and thinking about attending survivorship conferences to meet other survivors.     60 minutes spent on the date of the encounter doing chart review, review of test results, interpretation of tests, patient visit, and documentation     The longitudinal plan of care for the diagnosis(es)/condition(s) as documented were addressed during this visit. Due to the added complexity in care, I will continue to support Julieta in the subsequent management and with ongoing continuity of care.    Cira Wells PA-C

## 2024-10-26 LAB — IGE SERPL-ACNC: 214 KU/L (ref 0–114)

## 2024-10-30 ENCOUNTER — PATIENT OUTREACH (OUTPATIENT)
Dept: CARE COORDINATION | Facility: CLINIC | Age: 74
End: 2024-10-30
Payer: COMMERCIAL

## 2024-11-06 ENCOUNTER — PATIENT OUTREACH (OUTPATIENT)
Dept: CARE COORDINATION | Facility: CLINIC | Age: 74
End: 2024-11-06
Payer: COMMERCIAL

## 2024-11-21 ENCOUNTER — TELEPHONE (OUTPATIENT)
Dept: CARDIOLOGY | Facility: CLINIC | Age: 74
End: 2024-11-21
Payer: COMMERCIAL

## 2024-11-21 NOTE — TELEPHONE ENCOUNTER
Left Voicemail (1st Attempt) for the patient to call back and schedule the following:    Appointment type:  rtn cardio   Provider: karen   Return date: 03/18/25  Specialty phone number: [phone] opt 1   Additional appointment(s) needed: fasting labs, echo  Additonal Notes: n/a

## 2024-11-27 ENCOUNTER — TELEPHONE (OUTPATIENT)
Dept: CARDIOLOGY | Facility: CLINIC | Age: 74
End: 2024-11-27
Payer: COMMERCIAL

## 2024-11-27 NOTE — TELEPHONE ENCOUNTER
Left Voicemail (1st Attempt) and Sent Mychart (1st Attempt) for the patient to call back and schedule the following:    Appointment type: RTN CARDIO  Provider: RICH  Return date: 03/10/2025  Specialty phone number: [phone] OPT 1  Additional appointment(s) needed: FASTING LABS AND ECHO PRIOR  Additonal Notes: N/A

## 2024-12-11 DIAGNOSIS — H35.54 MACULAR VITELLIFORM LESION: Primary | ICD-10-CM

## 2024-12-21 ENCOUNTER — HEALTH MAINTENANCE LETTER (OUTPATIENT)
Age: 74
End: 2024-12-21

## 2025-01-29 NOTE — PROGRESS NOTES
HPI:    Patient was last seen on 7/30/24 by me for borderline retinal nerve fiber layer thinning, right eye. Since then, she has been seen by Dr. Herring for adult-onset vitelliform dystrophy, both eyes and CE.    Since then, she notes that her left eye feels blurrier with her glasses on.    Today's Testing:  OCT RNFL:  Right eye: Average RNFL 78 microns (previously 76).  Borderline nasal thinning + superonasal thinning.  Left eye: Average RNFL 87 microns (previously 86).  Normal compared to age-matched controls.     GTop:  Right eye: Reliable.  Mean deviation 1.79 dB.  Non-specific defect, grossly full.  Left eye: Reliable.  Mean deviation 1.08 dB.  Non-specific defect, grossly full.    Assessment and Plan:  Today, Julieta has stable, borderline retinal nerve fiber layer thinning of the right eye. Her afferent function remains intact.  Given stability over time, I will follow-up with her in 1 year to monitor for changes.     Complete documentation of historical and exam elements from today's encounter can be found in the full encounter summary report (not reduplicated in this progress note). I personally obtained the chief complaint(s) and history of present illness. I confirmed and edited as necessary the review of systems, past medical/surgical history, family history, social history, and examination findings as document by others; and I examined the patient myself. I personally reviewed the relevant tests, images, and reports as documented above. I formulated and edited as necessary the assessment and plan and discussed the findings and management plan with the patient and family.    Myriam Mares OD

## 2025-02-04 ENCOUNTER — OFFICE VISIT (OUTPATIENT)
Dept: OPHTHALMOLOGY | Facility: CLINIC | Age: 75
End: 2025-02-04
Payer: COMMERCIAL

## 2025-02-04 ENCOUNTER — TELEPHONE (OUTPATIENT)
Dept: CARDIOLOGY | Facility: CLINIC | Age: 75
End: 2025-02-04
Payer: COMMERCIAL

## 2025-02-04 DIAGNOSIS — H47.393 OTHER DISORDERS OF OPTIC DISC, BILATERAL: Primary | ICD-10-CM

## 2025-02-04 PROCEDURE — G0463 HOSPITAL OUTPT CLINIC VISIT: HCPCS

## 2025-02-04 PROCEDURE — 92133 CPTRZD OPH DX IMG PST SGM ON: CPT

## 2025-02-04 PROCEDURE — 92083 EXTENDED VISUAL FIELD XM: CPT

## 2025-02-04 ASSESSMENT — VISUAL ACUITY
OD_CC+: -3
OS_PH_CC: 20/25
OD_CC: 20/25
METHOD: SNELLEN - LINEAR
OS_PH_CC+: -2
CORRECTION_TYPE: GLASSES
OS_CC: 20/40
OS_CC+: -1

## 2025-02-04 ASSESSMENT — CONF VISUAL FIELD
METHOD: COUNTING FINGERS
OD_NORMAL: 1
OS_SUPERIOR_TEMPORAL_RESTRICTION: 0
OD_INFERIOR_NASAL_RESTRICTION: 0
OS_INFERIOR_NASAL_RESTRICTION: 0
OS_NORMAL: 1
OD_SUPERIOR_NASAL_RESTRICTION: 0
OD_INFERIOR_TEMPORAL_RESTRICTION: 0
OS_SUPERIOR_NASAL_RESTRICTION: 0
OS_INFERIOR_TEMPORAL_RESTRICTION: 0
OD_SUPERIOR_TEMPORAL_RESTRICTION: 0

## 2025-02-04 ASSESSMENT — REFRACTION_WEARINGRX
OD_SPHERE: +0.25
OD_ADD: +2.75
OS_CYLINDER: SPHERE
OS_ADD: +2.75
OD_CYLINDER: SPHERE
OS_SPHERE: +0.25

## 2025-02-04 ASSESSMENT — EXTERNAL EXAM - LEFT EYE: OS_EXAM: NORMAL

## 2025-02-04 ASSESSMENT — TONOMETRY
OS_IOP_MMHG: 09
OD_IOP_MMHG: 10
IOP_METHOD: ICARE

## 2025-02-04 ASSESSMENT — EXTERNAL EXAM - RIGHT EYE: OD_EXAM: NORMAL

## 2025-02-04 ASSESSMENT — SLIT LAMP EXAM - LIDS: COMMENTS: DERMATOCHALASIS UPPER LID

## 2025-02-04 NOTE — NURSING NOTE
Chief Complaint(s) and History of Present Illness(es)       Follow Up    In both eyes.  Since onset it is stable.  Associated symptoms include eye pain and floaters.  Negative for double vision, headache and flashes.  Pain was noted as 0/10. Additional comments: 6 month follow up for borderline retinal nerve fiber layer thinning, right eye.  She was last seen by Dr. Mares 07/30/24.  Had cataract surgery, right eye, in 08/2024.  No changes to her vision.  Left eye has been feeling sore lately, not feeling it today.  Feels like left eye vision is not as sharp as the right eye.  No headaches or migraines.   QIAN Jerry 2/4/2025 1:25 PM

## 2025-02-04 NOTE — TELEPHONE ENCOUNTER
Left Voicemail (1st Attempt) and Sent Mychart (1st Attempt) for the patient to call back and schedule the following:    Appointment type: RTN CARDIO  Provider: RICH  Return date: NEXT AVAILABLE  Specialty phone number: [phone] OPT 1  Additional appointment(s) needed: N/A  Additonal Notes: PT DUE IN MARCH, BUT RICH BOOKING OUT INTO AUGUST. SCHEDULE ECHO, LABS, AND APPT NEXT AVAILABLE AND SEND TE TO EXTEND ORDER AFTER.

## 2025-02-06 NOTE — TELEPHONE ENCOUNTER
Left Voicemail (2nd Attempt)  for the patient to call back and schedule the following:     Appointment type: RTN CARDIO  Provider: RICH  Return date: NEXT AVAILABLE  Specialty phone number: [phone] OPT 1  Additional appointment(s) needed: N/A  Additonal Notes: PT DUE IN MARCH, BUT RICH BOOKING OUT INTO AUGUST. SCHEDULE ECHO, LABS, AND APPT NEXT AVAILABLE AND SEND TE TO EXTEND ORDER AFTER.

## 2025-02-13 ENCOUNTER — OFFICE VISIT (OUTPATIENT)
Dept: OPHTHALMOLOGY | Facility: CLINIC | Age: 75
End: 2025-02-13
Attending: OPHTHALMOLOGY
Payer: COMMERCIAL

## 2025-02-13 DIAGNOSIS — H35.54 MACULAR VITELLIFORM LESION: ICD-10-CM

## 2025-02-13 PROCEDURE — 92134 CPTRZ OPH DX IMG PST SGM RTA: CPT | Performed by: OPHTHALMOLOGY

## 2025-02-13 PROCEDURE — G0463 HOSPITAL OUTPT CLINIC VISIT: HCPCS | Performed by: OPHTHALMOLOGY

## 2025-02-13 ASSESSMENT — VISUAL ACUITY
METHOD: SNELLEN - LINEAR
OD_SC+: -2
OD_SC: 20/30
OS_SC+: -2
OS_SC: 20/40 ECC

## 2025-02-13 ASSESSMENT — CONF VISUAL FIELD
OD_INFERIOR_TEMPORAL_RESTRICTION: 0
OD_NORMAL: 1
OD_SUPERIOR_TEMPORAL_RESTRICTION: 0
OS_INFERIOR_TEMPORAL_RESTRICTION: 0
OS_SUPERIOR_TEMPORAL_RESTRICTION: 0
OD_SUPERIOR_NASAL_RESTRICTION: 0
METHOD: COUNTING FINGERS
OD_INFERIOR_NASAL_RESTRICTION: 0
OS_INFERIOR_NASAL_RESTRICTION: 0
OS_SUPERIOR_NASAL_RESTRICTION: 0
OS_NORMAL: 1

## 2025-02-13 ASSESSMENT — TONOMETRY
OS_IOP_MMHG: 13
IOP_METHOD: TONOPEN
OD_IOP_MMHG: 14

## 2025-02-13 ASSESSMENT — SLIT LAMP EXAM - LIDS: COMMENTS: DERMATOCHALASIS UPPER LID

## 2025-02-13 ASSESSMENT — EXTERNAL EXAM - RIGHT EYE: OD_EXAM: NORMAL

## 2025-02-13 ASSESSMENT — EXTERNAL EXAM - LEFT EYE: OS_EXAM: NORMAL

## 2025-02-13 NOTE — PROGRESS NOTES
CC: AOVD follow up    Interval: Patient states vision is the same since last visit. No changes in blurriness. Pt states eyes feel tired a lot. Lots of tearing BE. Some intermittent pain BE. Pt notices a new black [place] in vision that moves, this comes and goes. No new flashes of light. Ocular Meds: Artificial tears PRN. Not having black [place] in exam room. Only noticed it when staring at wall in waiting room.     HPI: Julieta Rivera is a 74 year old patient with PMH of AOVD OU who presents for follow up. Presented initially 11/2023 as a referral from Dr. Tony for evaluation of macular lesions of both eyes. She reports that her vision has felt blurred in both eyes for quite some time. First noticed worsening in 2018 when she was receiving chemotherapy ductal carcinoma of right breast. She first noticed droopiness of left eyelid during this time as well.     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 intestine  -Hypothyroidism   -Hashimoto's thyroiditis  -Cardiomyopathy secondary to drug  -Congenital stenosis of aortic valve  -Heterozygous factor V Leiden mutation  - Ductal carcinoma of right breast    Retinal Imaging:  Optical Coherence Tomography 02/13/25    RE: Subfoveal deposit of hyperreflective material; no fluid - stable  LE:Subfoveal deposit of hyperreflective material with trace SRF - stable to improved    Assessment & Plan:    #Adult-Onset Vitelliform Dystrophy both eyes  Differential diagnosis atypical dry Age related macular degeneration  Less likely best disease  Less likely to be CNVM given exam and OCT findings OCT-A findings  09/18/24 right eye with trace subretinal fluid; will observe closely  02/13/25 OS with tr SRF; no fluid OD; VA OD 20/30sc stable; OS 20/40 grossly stable; no indication for treatment presently;     # status  post Cataract extraction intraocular lens right eye 08/06/24;  left eye 5.7.24  # Status post yag laser 08/14/24  for posterior capsular opacity (PCO) OS  - Mild posterior capsular opacity (PCO) right eye observe - unbothered by OD VA (20/25sc) - declines PCO presently    #Posterior vitreous detachment (PVD) both eyes  -No Retinal tear or Retinal detachment  on exam today   -Discussed Retinal detachment /Retinal tear symptoms    #   Breast Cancer Right Breast - S/P chemotherapy and lumpectomy 2018  -No ocular involvement.     #   Dry Eye Syndrome, both eyes.    artificial tears  and warm compresses     RTC 4 m VTD, OCT mac, heidelberg trucolor; OCT-angiogram OU    Leticia York MD  Vitreoretinal Surgery Fellow     ~~~~~~~~~~~~~~~~~~~~~~~~~~~~~~~~~~   Complete documentation of historical and exam elements from today's encounter can be found in the full encounter summary report (not reduplicated in this progress note).  I personally obtained the chief complaint(s) and history of present illness.  I confirmed and edited as necessary the review of systems, past medical/surgical history, family history, social history, and examination findings as documented by others; and I examined the patient myself.  I personally reviewed the relevant tests, images, and reports as documented above.  I formulated and edited as necessary the assessment and plan and discussed the findings and management plan with the patient and family    Cira Herring MD  Professor of Ophthalmology  Vitreo-Retinal surgeon   Department of Ophthalmology and Visual Neurosciences   [facility]  Phone: [phone]   Fax: [phone]

## 2025-02-13 NOTE — NURSING NOTE
Chief Complaints and History of Present Illnesses   Patient presents with    Post Op (Ophthalmology) Right Eye     status post Cataract extraction intraocular lens right eye 08/06/24     Chief Complaint(s) and History of Present Illness(es)       Post Op (Ophthalmology) Right Eye              Comments: status post Cataract extraction intraocular lens right eye 08/06/24              Comments    Patient states vision is the same since last visit. No changes in blurriness. Pt states eyes feel tired a lot. Lots of tearing BE. Some intermittent pain BE. Pt notices a new black [place] in vision that moves, this comes and goes. No new flashes of light.    Ocular Meds: Artificial tears PRN    Brittni ABERNATHY 12:39 PM February 13, 2025

## 2025-06-19 ENCOUNTER — ANCILLARY PROCEDURE (OUTPATIENT)
Dept: MAMMOGRAPHY | Facility: CLINIC | Age: 75
End: 2025-06-19
Attending: PHYSICIAN ASSISTANT
Payer: COMMERCIAL

## 2025-06-19 DIAGNOSIS — Z12.31 VISIT FOR SCREENING MAMMOGRAM: ICD-10-CM

## 2025-06-19 PROCEDURE — 77067 SCR MAMMO BI INCL CAD: CPT

## 2025-06-19 PROCEDURE — 77063 BREAST TOMOSYNTHESIS BI: CPT

## 2025-07-28 DIAGNOSIS — Q23.0 CONGENITAL STENOSIS OF AORTIC VALVE: ICD-10-CM

## 2025-07-28 DIAGNOSIS — I42.7 CARDIOMYOPATHY SECONDARY TO DRUG: Primary | ICD-10-CM

## 2025-07-28 DIAGNOSIS — Q23.81 BICUSPID AORTIC VALVE: ICD-10-CM

## 2025-07-28 DIAGNOSIS — D68.51 HETEROZYGOUS FACTOR V LEIDEN MUTATION: ICD-10-CM

## 2025-07-29 ENCOUNTER — TELEPHONE (OUTPATIENT)
Dept: CARDIOLOGY | Facility: CLINIC | Age: 75
End: 2025-07-29
Payer: COMMERCIAL

## 2025-07-29 NOTE — TELEPHONE ENCOUNTER
Sent Mychart (1st Attempt) and Left Voicemail (2nd Attempt) for the patient to call back and schedule the following:    Appointment type: Labs  Provider: Dr. Tidwell  Return date: 8/11/2025  Specialty phone number: [phone] Option 1  Additional appointment(s) needed: NA  Additonal Notes: fasting labs prior to Yaw OV

## 2025-08-07 ENCOUNTER — LAB (OUTPATIENT)
Dept: LAB | Facility: CLINIC | Age: 75
End: 2025-08-07
Payer: COMMERCIAL

## 2025-08-07 LAB
ALT SERPL W P-5'-P-CCNC: 14 U/L (ref 0–50)
AST SERPL W P-5'-P-CCNC: 25 U/L (ref 0–45)
CHOLEST SERPL-MCNC: 218 MG/DL
FASTING STATUS PATIENT QL REPORTED: YES
HDLC SERPL-MCNC: 92 MG/DL
LDLC SERPL CALC-MCNC: 115 MG/DL
NONHDLC SERPL-MCNC: 126 MG/DL
TRIGL SERPL-MCNC: 53 MG/DL

## 2025-08-08 ENCOUNTER — HOSPITAL ENCOUNTER (OUTPATIENT)
Dept: CARDIOLOGY | Facility: CLINIC | Age: 75
Discharge: HOME | End: 2025-08-08
Attending: INTERNAL MEDICINE | Admitting: INTERNAL MEDICINE
Payer: COMMERCIAL

## 2025-08-08 DIAGNOSIS — I42.7 CARDIOMYOPATHY SECONDARY TO DRUG: ICD-10-CM

## 2025-08-08 DIAGNOSIS — D68.51 HETEROZYGOUS FACTOR V LEIDEN MUTATION: ICD-10-CM

## 2025-08-08 DIAGNOSIS — Q23.81 BICUSPID AORTIC VALVE: ICD-10-CM

## 2025-08-08 DIAGNOSIS — Q23.0 CONGENITAL STENOSIS OF AORTIC VALVE: ICD-10-CM

## 2025-08-08 LAB — LVEF ECHO: NORMAL

## 2025-08-08 PROCEDURE — 93306 TTE W/DOPPLER COMPLETE: CPT

## 2025-08-08 PROCEDURE — 93306 TTE W/DOPPLER COMPLETE: CPT | Mod: 26 | Performed by: INTERNAL MEDICINE

## 2025-08-11 ENCOUNTER — OFFICE VISIT (OUTPATIENT)
Dept: CARDIOLOGY | Facility: CLINIC | Age: 75
End: 2025-08-11
Attending: INTERNAL MEDICINE
Payer: COMMERCIAL

## 2025-08-11 VITALS
HEART RATE: 80 BPM | SYSTOLIC BLOOD PRESSURE: 126 MMHG | WEIGHT: 131.4 LBS | BODY MASS INDEX: 26.1 KG/M2 | DIASTOLIC BLOOD PRESSURE: 82 MMHG | OXYGEN SATURATION: 97 %

## 2025-08-11 DIAGNOSIS — Z85.3 PERSONAL HISTORY OF MALIGNANT NEOPLASM OF BREAST: ICD-10-CM

## 2025-08-11 DIAGNOSIS — Q23.81 BICUSPID AORTIC VALVE: Primary | ICD-10-CM

## 2025-08-11 PROCEDURE — 1126F AMNT PAIN NOTED NONE PRSNT: CPT | Performed by: INTERNAL MEDICINE

## 2025-08-11 PROCEDURE — G0463 HOSPITAL OUTPT CLINIC VISIT: HCPCS | Performed by: INTERNAL MEDICINE

## 2025-08-11 PROCEDURE — 93005 ELECTROCARDIOGRAM TRACING: CPT

## 2025-08-11 PROCEDURE — 99213 OFFICE O/P EST LOW 20 MIN: CPT | Mod: GC | Performed by: INTERNAL MEDICINE

## 2025-08-11 PROCEDURE — 3074F SYST BP LT 130 MM HG: CPT | Performed by: INTERNAL MEDICINE

## 2025-08-11 PROCEDURE — 3078F DIAST BP <80 MM HG: CPT | Performed by: INTERNAL MEDICINE

## 2025-08-11 RX ORDER — LEVOTHYROXINE SODIUM 88 UG/1
88 TABLET ORAL
COMMUNITY

## 2025-08-11 ASSESSMENT — PAIN SCALES - GENERAL: PAINLEVEL_OUTOF10: NO PAIN (0)

## 2025-08-12 LAB
ATRIAL RATE - MUSE: 80 BPM
DIASTOLIC BLOOD PRESSURE - MUSE: NORMAL MMHG
INTERPRETATION ECG - MUSE: NORMAL
P AXIS - MUSE: 44 DEGREES
PR INTERVAL - MUSE: 144 MS
QRS DURATION - MUSE: 74 MS
QT - MUSE: 368 MS
QTC - MUSE: 457 MS
R AXIS - MUSE: 49 DEGREES
SYSTOLIC BLOOD PRESSURE - MUSE: NORMAL MMHG
T AXIS - MUSE: 13 DEGREES
VENTRICULAR RATE- MUSE: 93 BPM

## 2025-08-27 DIAGNOSIS — H35.54 MACULAR VITELLIFORM LESION: Primary | ICD-10-CM

## 2025-09-04 ENCOUNTER — OFFICE VISIT (OUTPATIENT)
Dept: OPHTHALMOLOGY | Facility: CLINIC | Age: 75
End: 2025-09-04
Attending: OPHTHALMOLOGY
Payer: COMMERCIAL

## 2025-09-04 DIAGNOSIS — H35.54 MACULAR VITELLIFORM LESION: ICD-10-CM

## 2025-09-04 PROCEDURE — 92134 CPTRZ OPH DX IMG PST SGM RTA: CPT | Performed by: OPHTHALMOLOGY

## 2025-09-04 PROCEDURE — 92250 FUNDUS PHOTOGRAPHY W/I&R: CPT | Performed by: OPHTHALMOLOGY

## 2025-09-04 ASSESSMENT — VISUAL ACUITY
METHOD: SNELLEN - LINEAR
OS_SC+: -1
OS_SC: 20/40
OD_SC: 20/30
OD_SC+: -2

## 2025-09-04 ASSESSMENT — EXTERNAL EXAM - LEFT EYE: OS_EXAM: NORMAL

## 2025-09-04 ASSESSMENT — SLIT LAMP EXAM - LIDS: COMMENTS: DERMATOCHALASIS UPPER LID

## 2025-09-04 ASSESSMENT — TONOMETRY
OS_IOP_MMHG: 10
IOP_METHOD: TONOPEN
OD_IOP_MMHG: 8

## 2025-09-04 ASSESSMENT — EXTERNAL EXAM - RIGHT EYE: OD_EXAM: NORMAL

## (undated) DEVICE — SU PDS II 4-0 FS-2 27" Z422H

## (undated) DEVICE — TAPE MICROPORE 2"X1.5YD 1530S-2

## (undated) DEVICE — SU MONOCRYL 4-0 P-3 18" UND Y494G

## (undated) DEVICE — SOL WATER IRRIG 500ML BOTTLE 2F7113

## (undated) DEVICE — EYE TIP IRRIGATION & ASPIRATION POLYMER 35D BENT 8065751511

## (undated) DEVICE — LINEN TOWEL PACK X5 5464

## (undated) DEVICE — GOWN XLG DISP 9545

## (undated) DEVICE — DRSG TEGADERM 4X4 3/4" 1626W

## (undated) DEVICE — SOL NACL 0.9% IRRIG 500ML BOTTLE 2F7123

## (undated) DEVICE — PAD CHUX UNDERPAD 30X30"

## (undated) DEVICE — KIT INTRODUCER FLUENT MICRO 5FRX10CM ECHO TIP KIT-038-04

## (undated) DEVICE — DRAPE SHEET MED 44X70" 9355

## (undated) DEVICE — GOWN IMPERVIOUS 2XL BLUE

## (undated) DEVICE — SU VICRYL 3-0 SH 27" J316H

## (undated) DEVICE — COVER ULTRASOUND PROBE W/GEL FLEXI-FEEL 6"X58" LF  25-FF658

## (undated) DEVICE — CLIP HORIZON SM RED WIDE SLOT 001201

## (undated) DEVICE — ESU ELEC BLADE 2.75" COATED/INSULATED E1455

## (undated) DEVICE — GLOVE BIOGEL PI MICRO SZ 6.0 48560

## (undated) DEVICE — SUCTION MANIFOLD NEPTUNE 2 SYS 1 PORT 702-025-000

## (undated) DEVICE — TAPE MICROPORE 1"X1.5YD 1530S-1

## (undated) DEVICE — BNDG COBAN 2"X5YDS CO-FLEX UNSTERILE ASSRTD CLRS LF 5200CP

## (undated) DEVICE — EYE PACK CUSTOM ANTERIOR 30DEG TIP CENTURION PPK6682-04

## (undated) DEVICE — NDL BLUNT 18GA 1" W/O FILTER 305181

## (undated) DEVICE — PACK MINOR CUSTOM ASC

## (undated) DEVICE — DECANTER BAG 2002S

## (undated) DEVICE — EYE SHIELD PLASTIC

## (undated) DEVICE — GLOVE PROTEXIS POWDER FREE SMT 8.0  2D72PT80X

## (undated) DEVICE — SPECIMEN CONTAINER 3OZ W/FORMALIN 59901

## (undated) DEVICE — SU DERMABOND ADVANCED .7ML DNX12

## (undated) DEVICE — KIT ENDO TURNOVER/PROCEDURE CARRY-ON 101822

## (undated) DEVICE — GLOVE PROTEXIS POWDER FREE SMT 7.0  2D72PT70X

## (undated) DEVICE — PREP CHLORAPREP 26ML TINTED ORANGE  260815

## (undated) DEVICE — BASIN SET SINGLE STERILE 13752-624

## (undated) DEVICE — SOL NACL 0.9% INJ 250ML BAG 2B1322Q

## (undated) DEVICE — NDL 25GA 2"  8881200441

## (undated) DEVICE — TUBING SUCTION 12"X1/4" N612

## (undated) DEVICE — PACK CATARACT CUSTOM ASC SEY15CPUMC

## (undated) DEVICE — CLIP HORIZON MED BLUE 002200

## (undated) DEVICE — CATARACT BLADE PACK 2.5MM 58001898

## (undated) DEVICE — PACK CENTRAL LINE INSERTION SAN32CLFCG

## (undated) DEVICE — SNARE CAPIVATOR ROUND COLD SNR BX10 M00561101

## (undated) DEVICE — Device

## (undated) DEVICE — NDL 27GA 1.25" 305136

## (undated) DEVICE — EYE CANN IRR 25GA HYDRODISSECTING 585037

## (undated) DEVICE — ESU GROUND PAD ADULT W/CORD E7507

## (undated) DEVICE — DRAPE IOBAN INCISE 23X17" 6650EZ

## (undated) DEVICE — MARKER MARGIN MARKER STD 6 COLOR SGL USE MMS6

## (undated) DEVICE — EYE NDL RETROBULBAR ATKINSON 25GA 1.5" 581637

## (undated) DEVICE — PREP PAD ALCOHOL 6818

## (undated) DEVICE — GLOVE PROTEXIS POWDER FREE SMT 7.5  2D72PT75X

## (undated) DEVICE — DRAPE EYE SHEET 8441

## (undated) DEVICE — DRAPE LAP TRANSVERSE 29421

## (undated) DEVICE — ENDO FORCEP SPIKED SERRATED SHAFT JUMBO 239CM G56998

## (undated) DEVICE — SYR 05ML LL W/O NDL

## (undated) RX ORDER — FENTANYL CITRATE 50 UG/ML
INJECTION, SOLUTION INTRAMUSCULAR; INTRAVENOUS
Status: DISPENSED
Start: 2024-05-07

## (undated) RX ORDER — ACETAMINOPHEN 325 MG/1
TABLET ORAL
Status: DISPENSED
Start: 2018-06-07

## (undated) RX ORDER — CEFAZOLIN SODIUM 1 G/50ML
SOLUTION INTRAVENOUS
Status: DISPENSED
Start: 2018-11-15

## (undated) RX ORDER — LIDOCAINE HYDROCHLORIDE AND EPINEPHRINE 10; 10 MG/ML; UG/ML
INJECTION, SOLUTION INFILTRATION; PERINEURAL
Status: DISPENSED
Start: 2018-06-07

## (undated) RX ORDER — ONDANSETRON 2 MG/ML
INJECTION INTRAMUSCULAR; INTRAVENOUS
Status: DISPENSED
Start: 2022-12-01

## (undated) RX ORDER — LIDOCAINE HYDROCHLORIDE 20 MG/ML
INJECTION, SOLUTION EPIDURAL; INFILTRATION; INTRACAUDAL; PERINEURAL
Status: DISPENSED
Start: 2018-06-07

## (undated) RX ORDER — DEXAMETHASONE SODIUM PHOSPHATE 4 MG/ML
INJECTION, SOLUTION INTRA-ARTICULAR; INTRALESIONAL; INTRAMUSCULAR; INTRAVENOUS; SOFT TISSUE
Status: DISPENSED
Start: 2018-06-07

## (undated) RX ORDER — ISOSULFAN BLUE 50 MG/5ML
INJECTION, SOLUTION SUBCUTANEOUS
Status: DISPENSED
Start: 2018-06-07

## (undated) RX ORDER — ONDANSETRON 2 MG/ML
INJECTION INTRAMUSCULAR; INTRAVENOUS
Status: DISPENSED
Start: 2024-05-07

## (undated) RX ORDER — ACETAMINOPHEN 325 MG/1
TABLET ORAL
Status: DISPENSED
Start: 2024-08-06

## (undated) RX ORDER — BUPIVACAINE HYDROCHLORIDE 2.5 MG/ML
INJECTION, SOLUTION EPIDURAL; INFILTRATION; INTRACAUDAL
Status: DISPENSED
Start: 2018-06-07

## (undated) RX ORDER — ACETAMINOPHEN 325 MG/1
TABLET ORAL
Status: DISPENSED
Start: 2018-11-15

## (undated) RX ORDER — PHENYLEPHRINE HCL IN 0.9% NACL 1 MG/10 ML
SYRINGE (ML) INTRAVENOUS
Status: DISPENSED
Start: 2018-06-07

## (undated) RX ORDER — PROPOFOL 10 MG/ML
INJECTION, EMULSION INTRAVENOUS
Status: DISPENSED
Start: 2024-08-06

## (undated) RX ORDER — FENTANYL CITRATE 50 UG/ML
INJECTION, SOLUTION INTRAMUSCULAR; INTRAVENOUS
Status: DISPENSED
Start: 2022-12-01

## (undated) RX ORDER — DIPHENHYDRAMINE HYDROCHLORIDE 50 MG/ML
INJECTION INTRAMUSCULAR; INTRAVENOUS
Status: DISPENSED
Start: 2022-12-01

## (undated) RX ORDER — PROPOFOL 10 MG/ML
INJECTION, EMULSION INTRAVENOUS
Status: DISPENSED
Start: 2024-05-07

## (undated) RX ORDER — ONDANSETRON 2 MG/ML
INJECTION INTRAMUSCULAR; INTRAVENOUS
Status: DISPENSED
Start: 2018-06-07

## (undated) RX ORDER — PROPOFOL 10 MG/ML
INJECTION, EMULSION INTRAVENOUS
Status: DISPENSED
Start: 2018-06-07

## (undated) RX ORDER — EPHEDRINE SULFATE 50 MG/ML
INJECTION, SOLUTION INTRAMUSCULAR; INTRAVENOUS; SUBCUTANEOUS
Status: DISPENSED
Start: 2018-06-07